# Patient Record
Sex: FEMALE | Race: WHITE | NOT HISPANIC OR LATINO | Employment: OTHER | URBAN - NONMETROPOLITAN AREA
[De-identification: names, ages, dates, MRNs, and addresses within clinical notes are randomized per-mention and may not be internally consistent; named-entity substitution may affect disease eponyms.]

---

## 2017-01-24 ENCOUNTER — APPOINTMENT (OUTPATIENT)
Dept: CT IMAGING | Facility: HOSPITAL | Age: 82
End: 2017-01-24

## 2017-01-24 ENCOUNTER — APPOINTMENT (OUTPATIENT)
Dept: GENERAL RADIOLOGY | Facility: HOSPITAL | Age: 82
End: 2017-01-24

## 2017-01-24 ENCOUNTER — HOSPITAL ENCOUNTER (EMERGENCY)
Facility: HOSPITAL | Age: 82
Discharge: HOME | End: 2017-01-24
Attending: EMERGENCY MEDICINE | Admitting: EMERGENCY MEDICINE

## 2017-01-24 VITALS
OXYGEN SATURATION: 94 % | HEART RATE: 58 BPM | TEMPERATURE: 98.9 F | DIASTOLIC BLOOD PRESSURE: 92 MMHG | BODY MASS INDEX: 26.52 KG/M2 | WEIGHT: 159.2 LBS | SYSTOLIC BLOOD PRESSURE: 119 MMHG | RESPIRATION RATE: 17 BRPM | HEIGHT: 65 IN

## 2017-01-24 DIAGNOSIS — G45.9 TRANSIENT CEREBRAL ISCHEMIA, UNSPECIFIED TYPE: Primary | ICD-10-CM

## 2017-01-24 LAB
ALBUMIN SERPL-MCNC: 4.2 G/DL (ref 3.5–5)
ALBUMIN/GLOB SERPL: 1.3 G/DL (ref 1.1–2.5)
ALP SERPL-CCNC: 88 U/L (ref 24–120)
ALT SERPL W P-5'-P-CCNC: 29 U/L (ref 0–54)
ANION GAP SERPL CALCULATED.3IONS-SCNC: 9 MMOL/L (ref 4–13)
APTT PPP: 25.4 SECONDS (ref 24.1–34.8)
AST SERPL-CCNC: 23 U/L (ref 7–45)
BASOPHILS # BLD AUTO: 0.02 10*3/MM3 (ref 0–0.2)
BASOPHILS NFR BLD AUTO: 0.3 % (ref 0–2)
BILIRUB SERPL-MCNC: 0.4 MG/DL (ref 0.1–1)
BUN BLD-MCNC: 29 MG/DL (ref 5–21)
BUN/CREAT SERPL: 26.1 (ref 7–25)
CALCIUM SPEC-SCNC: 9.4 MG/DL (ref 8.4–10.4)
CHLORIDE SERPL-SCNC: 95 MMOL/L (ref 98–110)
CO2 SERPL-SCNC: 27 MMOL/L (ref 24–31)
CREAT BLD-MCNC: 1.11 MG/DL (ref 0.5–1.4)
DEPRECATED RDW RBC AUTO: 42.4 FL (ref 40–54)
EOSINOPHIL # BLD AUTO: 0.26 10*3/MM3 (ref 0–0.7)
EOSINOPHIL NFR BLD AUTO: 3.8 % (ref 0–4)
ERYTHROCYTE [DISTWIDTH] IN BLOOD BY AUTOMATED COUNT: 12.8 % (ref 12–15)
GFR SERPL CREATININE-BSD FRML MDRD: 47 ML/MIN/1.73
GLOBULIN UR ELPH-MCNC: 3.3 GM/DL
GLUCOSE BLD-MCNC: 105 MG/DL (ref 70–100)
HCT VFR BLD AUTO: 35.7 % (ref 37–47)
HGB BLD-MCNC: 12.3 G/DL (ref 12–16)
IMM GRANULOCYTES # BLD: 0.02 10*3/MM3 (ref 0–0.03)
IMM GRANULOCYTES NFR BLD: 0.3 % (ref 0–5)
INR PPP: 0.84 (ref 0.91–1.09)
LYMPHOCYTES # BLD AUTO: 2.62 10*3/MM3 (ref 0.72–4.86)
LYMPHOCYTES NFR BLD AUTO: 38.2 % (ref 15–45)
MCH RBC QN AUTO: 31.5 PG (ref 28–32)
MCHC RBC AUTO-ENTMCNC: 34.5 G/DL (ref 33–36)
MCV RBC AUTO: 91.3 FL (ref 82–98)
MONOCYTES # BLD AUTO: 0.71 10*3/MM3 (ref 0.19–1.3)
MONOCYTES NFR BLD AUTO: 10.3 % (ref 4–12)
NEUTROPHILS # BLD AUTO: 3.23 10*3/MM3 (ref 1.87–8.4)
NEUTROPHILS NFR BLD AUTO: 47.1 % (ref 39–78)
PLATELET # BLD AUTO: 202 10*3/MM3 (ref 130–400)
PMV BLD AUTO: 10.5 FL (ref 6–12)
POTASSIUM BLD-SCNC: 4.5 MMOL/L (ref 3.5–5.3)
PROT SERPL-MCNC: 7.5 G/DL (ref 6.3–8.7)
PROTHROMBIN TIME: 11.7 SECONDS (ref 11.9–14.6)
RBC # BLD AUTO: 3.91 10*6/MM3 (ref 4.2–5.4)
SODIUM BLD-SCNC: 131 MMOL/L (ref 135–145)
WBC NRBC COR # BLD: 6.86 10*3/MM3 (ref 4.8–10.8)

## 2017-01-24 PROCEDURE — 85610 PROTHROMBIN TIME: CPT | Performed by: EMERGENCY MEDICINE

## 2017-01-24 PROCEDURE — 93005 ELECTROCARDIOGRAM TRACING: CPT

## 2017-01-24 PROCEDURE — 85025 COMPLETE CBC W/AUTO DIFF WBC: CPT | Performed by: EMERGENCY MEDICINE

## 2017-01-24 PROCEDURE — 70450 CT HEAD/BRAIN W/O DYE: CPT

## 2017-01-24 PROCEDURE — 71010 HC CHEST PA OR AP: CPT

## 2017-01-24 PROCEDURE — 80053 COMPREHEN METABOLIC PANEL: CPT | Performed by: EMERGENCY MEDICINE

## 2017-01-24 PROCEDURE — 85730 THROMBOPLASTIN TIME PARTIAL: CPT | Performed by: EMERGENCY MEDICINE

## 2017-01-24 PROCEDURE — 93010 ELECTROCARDIOGRAM REPORT: CPT | Performed by: INTERNAL MEDICINE

## 2017-01-24 PROCEDURE — 99284 EMERGENCY DEPT VISIT MOD MDM: CPT

## 2017-01-24 RX ORDER — SODIUM CHLORIDE 0.9 % (FLUSH) 0.9 %
10 SYRINGE (ML) INJECTION AS NEEDED
Status: DISCONTINUED | OUTPATIENT
Start: 2017-01-24 | End: 2017-01-24 | Stop reason: HOSPADM

## 2017-01-25 NOTE — ED PROVIDER NOTES
"Subjective   HPI Comments: Patient says she noted \"numbness\" in right palm and middle finger yesterday and then it cleared.  Later had some numbness left forehead that cleared.  Had it again today in right arm and hand and little finger and then seemed to travel up to shoulder and right neck and face.  Now normal again.  Says she has h/o small stroke in past.    Patient is a 81 y.o. female presenting with neurologic complaint.   History provided by:  Patient   used: No    Neurologic Problem   The patient's primary symptoms include focal sensory loss. This is a new problem. The current episode started yesterday. The neurological problem developed suddenly. The last time the patient was known to be well was 1/24/2017 4:00 PM.  The problem has been resolved since onset. There was right-sided and upper extremity focality noted. Associated symptoms include neck pain. Past treatments include nothing. Her past medical history is significant for a CVA.       Review of Systems   Constitutional: Negative.    HENT: Negative.    Respiratory: Negative.    Cardiovascular: Negative.    Genitourinary: Negative.    Musculoskeletal: Positive for neck pain.   Neurological: Positive for numbness.   Hematological: Negative.    Psychiatric/Behavioral: Negative.    All other systems reviewed and are negative.      Past Medical History   Diagnosis Date   • Diabetes type 2, controlled    • Diverticulitis    • Hypertension    • Hypothyroidism        Allergies   Allergen Reactions   • Bactroban [Mupirocin Calcium]    • Codeine    • Iodine      Xray dye   • Levaquin [Levofloxacin]    • Lortab [Hydrocodone-Acetaminophen]    • Quinolones    • Shrimp (Diagnostic)        Past Surgical History   Procedure Laterality Date   • Appendectomy     • Tumor excision       under armpits and left breast   • Partial hysterectomy     • Breast cyst excision     • Basal cell carcinoma excision     • Cholecystectomy     • Cataract extraction   "   • Belpharoptosis repair     • Colonoscopy Left 11/1/2016     Procedure: COLONOSCOPY WITH ANESTHESIA;  Surgeon: Yordan Blanton MD;  Location: [facility] ENDOSCOPY;  Service:        Family History   Problem Relation Age of Onset   • Diabetes Mother    • Cancer Mother    • Heart failure Mother    • Diabetes Father        Social History     Social History   • Marital status:      Spouse name: N/A   • Number of children: N/A   • Years of education: N/A     Social History Main Topics   • Smoking status: Never Smoker   • Smokeless tobacco: None   • Alcohol use No   • Drug use: Defer   • Sexual activity: Defer     Other Topics Concern   • None     Social History Narrative   • None       Prior to Admission medications    Medication Sig Start Date End Date Taking? Authorizing Provider   acetaminophen (TYLENOL) 500 MG tablet Take 500 mg by mouth every 6 (six) hours as needed for mild pain (1-3).   Yes Historical Provider, MD   Cholecalciferol (VITAMIN D3) 5000 UNITS capsule capsule Take 5,000 Units by mouth daily.   Yes Historical Provider, MD   Coenzyme Q10 (CO Q 10) 100 MG capsule Take  by mouth.   Yes Historical Provider, MD   Garlic 10 MG capsule Take  by mouth.   Yes Historical Provider, MD   glimepiride (AMARYL) 4 MG tablet Take 4 mg by mouth every morning before breakfast.   Yes Historical Provider, MD   levothyroxine (SYNTHROID, LEVOTHROID) 50 MCG tablet Take 50 mcg by mouth daily.   Yes Historical Provider, MD   metFORMIN (GLUCOPHAGE) 500 MG tablet Take 500 mg by mouth Every Other Day.   Yes Historical Provider, MD   Multiple Vitamins-Minerals (CENTRUM SILVER PO) Take  by mouth.   Yes Historical Provider, MD   nebivolol (BYSTOLIC) 2.5 MG tablet Take 2.5 mg by mouth daily.   Yes Historical Provider, MD   valsartan (DIOVAN) 40 MG tablet Take 40 mg by mouth daily.   Yes Historical Provider, MD   nitrofurantoin (MACRODANTIN) 50 MG capsule Take 50 mg by mouth 3 (Three) Times a Week. weekly     Historical Provider, MD        Medications - No data to display    Vitals:    01/24/17 2101   BP:    Pulse:    Resp:    Temp: 98.9 °F (37.2 °C)   SpO2:          Objective   Physical Exam   Constitutional: She is oriented to person, place, and time. She appears well-developed and well-nourished.   HENT:   Head: Normocephalic and atraumatic.   Neck: Normal range of motion. Neck supple.   Cardiovascular: Normal rate and regular rhythm.    Pulmonary/Chest: Effort normal and breath sounds normal.   Abdominal: Soft. Bowel sounds are normal.   Musculoskeletal: Normal range of motion.   Neurological: She is alert and oriented to person, place, and time.   Skin: Skin is warm and dry.   Psychiatric: She has a normal mood and affect. Her behavior is normal.   Nursing note and vitals reviewed.      Procedures         Lab Results (last 24 hours)     Procedure Component Value Units Date/Time    CBC & Differential [[id number]] Collected:  01/24/17 1900    Specimen:  Blood Updated:  01/24/17 1915    Narrative:       The following orders were created for panel order CBC & Differential.  Procedure                               Abnormality         Status                     ---------                               -----------         ------                     CBC Auto Differential[[id number]]         Abnormal            Final result                 Please view results for these tests on the individual orders.    Comprehensive Metabolic Panel [[id number]]  (Abnormal) Collected:  01/24/17 1900    Specimen:  Blood Updated:  01/24/17 1929     Glucose 105 (H) mg/dL      BUN 29 (H) mg/dL      Creatinine 1.11 mg/dL      Sodium 131 (L) mmol/L      Potassium 4.5 mmol/L      Chloride 95 (L) mmol/L      CO2 27.0 mmol/L      Calcium 9.4 mg/dL      Total Protein 7.5 g/dL      Albumin 4.20 g/dL      ALT (SGPT) 29 U/L      AST (SGOT) 23 U/L      Alkaline Phosphatase 88 U/L      Total Bilirubin 0.4 mg/dL      eGFR Non African Amer 47 (L) mL/min/1.73      Globulin 3.3 gm/dL       A/G Ratio 1.3 g/dL      BUN/Creatinine Ratio 26.1 (H)      Anion Gap 9.0 mmol/L     Narrative:       The MDRD GFR formula is only valid for adults with stable renal function between ages 18 and 70.    aPTT [[id number]]  (Normal) Collected:  01/24/17 1900    Specimen:  Blood Updated:  01/24/17 1926     PTT 25.4 seconds     Protime-INR [[id number]]  (Abnormal) Collected:  01/24/17 1900    Specimen:  Blood Updated:  01/24/17 1926     Protime 11.7 (L) Seconds      INR 0.84 (L)     CBC Auto Differential [[id number]]  (Abnormal) Collected:  01/24/17 1900    Specimen:  Blood Updated:  01/24/17 1915     WBC 6.86 10*3/mm3      RBC 3.91 (L) 10*6/mm3      Hemoglobin 12.3 g/dL      Hematocrit 35.7 (L) %      MCV 91.3 fL      MCH 31.5 pg      MCHC 34.5 g/dL      RDW 12.8 %      RDW-SD 42.4 fl      MPV 10.5 fL      Platelets 202 10*3/mm3      Neutrophil % 47.1 %      Lymphocyte % 38.2 %      Monocyte % 10.3 %      Eosinophil % 3.8 %      Basophil % 0.3 %      Immature Grans % 0.3 %      Neutrophils, Absolute 3.23 10*3/mm3      Lymphocytes, Absolute 2.62 10*3/mm3      Monocytes, Absolute 0.71 10*3/mm3      Eosinophils, Absolute 0.26 10*3/mm3      Basophils, Absolute 0.02 10*3/mm3      Immature Grans, Absolute 0.02 10*3/mm3           CT Head Without Contrast   Final Result   Mild cerebral and cerebellar volume loss with chronic microvascular   disease but no evidence of acute intracranial process.           This report was finalized on 01/24/2017 19:51 by Dr. Brayan Resendiz MD.      XR Chest 1 View   Final Result   Impression: No evidence of acute cardiopulmonary disease.   This report was finalized on 01/24/2017 19:22 by Dr. Brayan Resendiz MD.          ED Course  ED Course   Comment By Time   Told patient that her testing here was okay with no signs of acute changes at present.  Her symptoms sound more like radicular problem with compression of nerve but I could not be sure that it could not be TIA.  Recommended and offered to  keep for obs and get further testing in AM but patient decided she did not want to stay and will keep her appointment with Dr. Rodrigues on Thursday. Melvin Keller Jr., MD 01/24 5965          MDM  Number of Diagnoses or Management Options  Transient cerebral ischemia, unspecified type: new and requires workup     Amount and/or Complexity of Data Reviewed  Clinical lab tests: ordered and reviewed  Tests in the radiology section of CPT®: ordered and reviewed  Tests in the medicine section of CPT®: reviewed and ordered    Risk of Complications, Morbidity, and/or Mortality  Presenting problems: moderate  Diagnostic procedures: moderate  Management options: moderate        Final diagnoses:   Transient cerebral ischemia, unspecified type        Melvin Keller Jr., MD  01/24/17 0173

## 2017-02-03 ENCOUNTER — TRANSCRIBE ORDERS (OUTPATIENT)
Dept: ADMINISTRATIVE | Facility: HOSPITAL | Age: 82
End: 2017-02-03

## 2017-02-03 DIAGNOSIS — G45.9 TRANSIENT CEREBRAL ISCHEMIA, UNSPECIFIED TYPE: Primary | ICD-10-CM

## 2017-02-21 ENCOUNTER — HOSPITAL ENCOUNTER (OUTPATIENT)
Dept: CARDIOLOGY | Facility: HOSPITAL | Age: 82
Discharge: HOME | End: 2017-02-21
Attending: INTERNAL MEDICINE

## 2017-02-21 ENCOUNTER — HOSPITAL ENCOUNTER (OUTPATIENT)
Dept: ULTRASOUND IMAGING | Facility: HOSPITAL | Age: 82
Discharge: HOME | End: 2017-02-21
Attending: INTERNAL MEDICINE

## 2017-02-21 ENCOUNTER — HOSPITAL ENCOUNTER (OUTPATIENT)
Dept: MRI IMAGING | Facility: HOSPITAL | Age: 82
Discharge: HOME | End: 2017-02-21
Attending: INTERNAL MEDICINE | Admitting: INTERNAL MEDICINE

## 2017-02-21 DIAGNOSIS — G45.9 TRANSIENT CEREBRAL ISCHEMIA, UNSPECIFIED TYPE: ICD-10-CM

## 2017-02-21 PROCEDURE — 93880 EXTRACRANIAL BILAT STUDY: CPT

## 2017-02-21 PROCEDURE — 93306 TTE W/DOPPLER COMPLETE: CPT | Performed by: INTERNAL MEDICINE

## 2017-02-21 PROCEDURE — 93306 TTE W/DOPPLER COMPLETE: CPT

## 2017-02-21 PROCEDURE — 70551 MRI BRAIN STEM W/O DYE: CPT

## 2017-02-21 PROCEDURE — 93880 EXTRACRANIAL BILAT STUDY: CPT | Performed by: SURGERY

## 2017-02-22 LAB
BH CV ECHO MEAS - AO ROOT AREA (BSA CORRECTED): 2
BH CV ECHO MEAS - AO ROOT AREA: 9.6 CM^2
BH CV ECHO MEAS - AO ROOT DIAM: 3.5 CM
BH CV ECHO MEAS - BSA(HAYCOCK): 1.8 M^2
BH CV ECHO MEAS - BSA: 1.8 M^2
BH CV ECHO MEAS - BZI_BMI: 26.4 KILOGRAMS/M^2
BH CV ECHO MEAS - BZI_METRIC_HEIGHT: 162.6 CM
BH CV ECHO MEAS - BZI_METRIC_WEIGHT: 69.9 KG
BH CV ECHO MEAS - CONTRAST EF 4CH: 60.2 ML/M^2
BH CV ECHO MEAS - EDV(CUBED): 106.5 ML
BH CV ECHO MEAS - EDV(MOD-SP4): 42 ML
BH CV ECHO MEAS - EDV(TEICH): 104.4 ML
BH CV ECHO MEAS - EF(CUBED): 65.9 %
BH CV ECHO MEAS - EF(MOD-SP4): 60.2 %
BH CV ECHO MEAS - EF(TEICH): 57.4 %
BH CV ECHO MEAS - ESV(CUBED): 36.3 ML
BH CV ECHO MEAS - ESV(MOD-SP4): 16.7 ML
BH CV ECHO MEAS - ESV(TEICH): 44.5 ML
BH CV ECHO MEAS - FS: 30.2 %
BH CV ECHO MEAS - IVS/LVPW: 0.91
BH CV ECHO MEAS - IVSD: 1.1 CM
BH CV ECHO MEAS - LA DIMENSION: 3.3 CM
BH CV ECHO MEAS - LA/AO: 0.94
BH CV ECHO MEAS - LAT PEAK E' VEL: 6.3 CM/SEC
BH CV ECHO MEAS - LV DIASTOLIC VOL/BSA (35-75): 24 ML/M^2
BH CV ECHO MEAS - LV MASS(C)D: 191.3 GRAMS
BH CV ECHO MEAS - LV MASS(C)DI: 109.3 GRAMS/M^2
BH CV ECHO MEAS - LV MAX PG: 2.4 MMHG
BH CV ECHO MEAS - LV MEAN PG: 1 MMHG
BH CV ECHO MEAS - LV SYSTOLIC VOL/BSA (12-30): 9.5 ML/M^2
BH CV ECHO MEAS - LV V1 MAX: 77.6 CM/SEC
BH CV ECHO MEAS - LV V1 MEAN: 56.7 CM/SEC
BH CV ECHO MEAS - LV V1 VTI: 22.1 CM
BH CV ECHO MEAS - LVIDD: 4.7 CM
BH CV ECHO MEAS - LVIDS: 3.3 CM
BH CV ECHO MEAS - LVLD AP4: 5.7 CM
BH CV ECHO MEAS - LVLS AP4: 4.8 CM
BH CV ECHO MEAS - LVOT AREA (M): 3.1 CM^2
BH CV ECHO MEAS - LVOT AREA: 3.1 CM^2
BH CV ECHO MEAS - LVOT DIAM: 2 CM
BH CV ECHO MEAS - LVPWD: 1.2 CM
BH CV ECHO MEAS - MV A MAX VEL: 107 CM/SEC
BH CV ECHO MEAS - MV DEC TIME: 0.3 SEC
BH CV ECHO MEAS - MV E MAX VEL: 84.9 CM/SEC
BH CV ECHO MEAS - MV E/A: 0.79
BH CV ECHO MEAS - RAP SYSTOLE: 10 MMHG
BH CV ECHO MEAS - RVSP: 38.5 MMHG
BH CV ECHO MEAS - SI(CUBED): 40.1 ML/M^2
BH CV ECHO MEAS - SI(LVOT): 39.7 ML/M^2
BH CV ECHO MEAS - SI(MOD-SP4): 14.5 ML/M^2
BH CV ECHO MEAS - SI(TEICH): 34.3 ML/M^2
BH CV ECHO MEAS - SV(CUBED): 70.2 ML
BH CV ECHO MEAS - SV(LVOT): 69.4 ML
BH CV ECHO MEAS - SV(MOD-SP4): 25.3 ML
BH CV ECHO MEAS - SV(TEICH): 60 ML
BH CV ECHO MEAS - TR MAX VEL: 267 CM/SEC
LEFT ATRIUM VOLUME INDEX: 29.5 ML/M2

## 2017-09-18 ENCOUNTER — OFFICE VISIT (OUTPATIENT)
Dept: OTOLARYNGOLOGY | Facility: CLINIC | Age: 82
End: 2017-09-18

## 2017-09-18 VITALS
SYSTOLIC BLOOD PRESSURE: 140 MMHG | BODY MASS INDEX: 27.31 KG/M2 | HEIGHT: 64 IN | HEART RATE: 80 BPM | WEIGHT: 160 LBS | DIASTOLIC BLOOD PRESSURE: 75 MMHG | TEMPERATURE: 97.8 F

## 2017-09-18 DIAGNOSIS — E04.1 THYROID NODULE: Primary | ICD-10-CM

## 2017-09-18 DIAGNOSIS — K21.9 LARYNGOPHARYNGEAL REFLUX (LPR): ICD-10-CM

## 2017-09-18 PROCEDURE — 99214 OFFICE O/P EST MOD 30 MIN: CPT | Performed by: NURSE PRACTITIONER

## 2017-09-18 NOTE — PROGRESS NOTES
YOB: 1935  Location: [facility]  Location Address: [address] [place] [address], [facility], [address] [place] [address]  Location Phone: [phone]    Chief Complaint   Patient presents with   • Thyroid Problem       History of Present Illness  Nedra Tolliver is a 81 y.o. female.  Nedra Tolliver complains of ENT complaints. The patient has had problems with a thyroid nodule  The symptoms are not localized to a particular location. The patient has had moderate symptoms. The symptoms have been present for the last several years . The symptoms are aggravated by  no identifiable factors . The symptoms are improved by no identifieable factors.  Denies dysphagia or new symptoms.    Longstanding history of thyroid nodule - has been followed by Dr. Campos for many years with no signficant growth at any interval.  She reports only occasional GERD and was on Omeprazole. Positive for fatigue   TSH .947 Tiffany 15, 2017         TSH Dec 2015 1.50 per PCP     Past Medical History:   Diagnosis Date   • Diabetes type 2, controlled    • Diverticulitis    • Hypertension    • Hypothyroidism    • LPRD (laryngopharyngeal reflux disease)    • Pharyngeal dysphagia    • Thyroid nodule        Past Surgical History:   Procedure Laterality Date   • APPENDECTOMY     • BASAL CELL CARCINOMA EXCISION     • BELPHAROPTOSIS REPAIR     • BREAST CYST EXCISION     • CATARACT EXTRACTION     • CHOLECYSTECTOMY     • COLONOSCOPY Left 2016    Procedure: COLONOSCOPY WITH ANESTHESIA;  Surgeon: Yordan Blanton MD;  Location: [facility] ENDOSCOPY;  Service:    • PARTIAL HYSTERECTOMY     • TUMOR EXCISION      under armpits and left breast         Current Outpatient Prescriptions:   •  acetaminophen (TYLENOL) 500 MG tablet, Take 500 mg by mouth every 6 (six) hours as needed for mild pain (1-3)., Disp: , Rfl:   •  Cholecalciferol (VITAMIN D3) 5000 UNITS capsule capsule, Take 5,000 Units by mouth daily., Disp: , Rfl:   •  Coenzyme Q10 (CO Q 10) 100 MG capsule,  Take  by mouth., Disp: , Rfl:   •  Garlic 10 MG capsule, Take  by mouth., Disp: , Rfl:   •  glimepiride (AMARYL) 4 MG tablet, Take 4 mg by mouth every morning before breakfast., Disp: , Rfl:   •  levothyroxine (SYNTHROID, LEVOTHROID) 50 MCG tablet, Take 50 mcg by mouth daily., Disp: , Rfl:   •  metFORMIN (GLUCOPHAGE) 500 MG tablet, Take 500 mg by mouth Every Other Day., Disp: , Rfl:   •  Multiple Vitamins-Minerals (CENTRUM SILVER PO), Take  by mouth., Disp: , Rfl:   •  nebivolol (BYSTOLIC) 2.5 MG tablet, Take 2.5 mg by mouth daily., Disp: , Rfl:   •  nitrofurantoin (MACRODANTIN) 50 MG capsule, Take 50 mg by mouth 3 (Three) Times a Week. weekly , Disp: , Rfl:   •  Probiotic Product (PROBIOTIC DAILY PO), Take  by mouth., Disp: , Rfl:   •  valsartan (DIOVAN) 40 MG tablet, Take 40 mg by mouth daily., Disp: , Rfl:     Bactroban [mupirocin calcium]; Codeine; Iodine; Levaquin [levofloxacin]; Lortab [hydrocodone-acetaminophen]; Quinolones; and Shrimp (diagnostic)    Family History   Problem Relation Age of Onset   • Diabetes Mother    • Cancer Mother    • Heart failure Mother    • Diabetes Father        Social History     Social History   • Marital status:      Spouse name: N/A   • Number of children: N/A   • Years of education: N/A     Occupational History   • Not on file.     Social History Main Topics   • Smoking status: Never Smoker   • Smokeless tobacco: Not on file   • Alcohol use No   • Drug use: Defer   • Sexual activity: Defer     Other Topics Concern   • Not on file     Social History Narrative       Review of Systems   Constitutional: Positive for fatigue.   HENT: Negative.         Reflux, dysphagia   Eyes: Negative.    Respiratory: Negative.    Cardiovascular: Negative.    Gastrointestinal: Negative.    Endocrine: Negative.    Genitourinary: Negative.    Musculoskeletal: Negative.    Skin: Negative.    Allergic/Immunologic: Negative.    Neurological: Negative.    Hematological: Negative.     Psychiatric/Behavioral: Negative.        Vitals:    09/18/17 1035   BP: 140/75   Pulse: 80   Temp: 97.8 °F (36.6 °C)       Objective     Physical Exam    CONSTITUTIONAL: well nourished, alert, oriented, in no acute distress     COMMUNICATION AND VOICE: able to communicate normally, normal voice quality    HEAD: normocephalic, no lesions, atraumatic, no tenderness, no masses     FACE: appearance normal, no lesions, no tenderness, no deformities, facial motion symmetric    SALIVARY GLANDS: parotid glands with no tenderness, no swelling, no masses, submandibular glands with normal size, nontender    EYES: ocular motility normal, eyelids normal, orbits normal, no proptosis, conjunctiva normal , pupils equal, round     EARS:  Hearing: response to conversational voice normal bilaterally   External Ears: auricles without lesions  Otoscopic: tympanic membrane appearance normal, no lesions, no perforation, normal mobility, no fluid    NOSE:  External Nose: structure normal, no tenderness on palpation, no nasal discharge, no lesions, no evidence of trauma, nostrils patent   Intranasal Exam: nasal mucosa normal, vestibule within normal limits, inferior turbinate normal, nasal septum midline     ORAL:  Lips: upper and lower lips without lesion   Teeth: dentition within normal limits for age   Gums: gingivae healthy   Oral Mucosa: oral mucosa normal, no mucosal lesions   Floor of Mouth: Warthin’s duct patent, mucosa normal  Tongue: lingual mucosa normal without lesions, normal tongue mobility   Palate: soft and hard palates with normal mucosa and structure  Oropharynx: oropharyngeal mucosa normal    NECK: neck appearance normal, no mass,  noted without erythema or tenderness    THYROID: nodule not palpable    LYMPH NODES: no lymphadenopathy    CHEST/RESPIRATORY: respiratory effort normal,     CARDIOVASCULAR: extremities without cyanosis or edema      NEUROLOGIC/PSYCHIATRIC: oriented to time, place and person, mood normal,  affect appropriate, CN II-XII intact grossly      Assessment/Plan   Nedra was seen today for thyroid problem.    Diagnoses and all orders for this visit:    Thyroid nodule  -     US Thyroid; Future    Laryngopharyngeal reflux (LPR)      * Surgery not found *  Orders Placed This Encounter   Procedures   • US Thyroid     Standing Status:   Future     Standing Expiration Date:   9/18/2019     Order Specific Question:   Reason for Exam:     Answer:   thyroid nodule     Return in about 1 year (around 9/18/2018).       Patient Instructions   The patient has a thyroid nodule, which is relatively small, and studies do not suggest a malignancy. I have recommended observation with follow-up with me for repeat ultrasound. I explained the pathology of thyroid nodules including the risks of cancer. The options of surgery were discussed, but we will take an observational course for now.

## 2017-09-18 NOTE — PATIENT INSTRUCTIONS
The patient has a thyroid nodule, which is relatively small, and studies do not suggest a malignancy. I have recommended observation with follow-up with me for repeat ultrasound. I explained the pathology of thyroid nodules including the risks of cancer. The options of surgery were discussed, but we will take an observational course for now.

## 2018-04-24 ENCOUNTER — TRANSCRIBE ORDERS (OUTPATIENT)
Dept: ADMINISTRATIVE | Facility: HOSPITAL | Age: 83
End: 2018-04-24

## 2018-04-24 DIAGNOSIS — R82.81 PYURIA: Primary | ICD-10-CM

## 2018-04-24 DIAGNOSIS — R10.2 PELVIC PAIN: ICD-10-CM

## 2018-04-25 ENCOUNTER — HOSPITAL ENCOUNTER (OUTPATIENT)
Dept: CT IMAGING | Facility: HOSPITAL | Age: 83
Discharge: HOME | End: 2018-04-25
Attending: INTERNAL MEDICINE | Admitting: INTERNAL MEDICINE

## 2018-04-25 DIAGNOSIS — R10.2 PELVIC PAIN: ICD-10-CM

## 2018-04-25 DIAGNOSIS — R82.81 PYURIA: ICD-10-CM

## 2018-04-25 LAB — CREAT BLDA-MCNC: 1.1 MG/DL (ref 0.6–1.3)

## 2018-04-25 PROCEDURE — 25010000002 IOPAMIDOL 61 % SOLUTION: Performed by: INTERNAL MEDICINE

## 2018-04-25 PROCEDURE — 74178 CT ABD&PLV WO CNTR FLWD CNTR: CPT

## 2018-04-25 PROCEDURE — 82565 ASSAY OF CREATININE: CPT

## 2018-04-25 RX ADMIN — IOPAMIDOL 100 ML: 612 INJECTION, SOLUTION INTRAVENOUS at 08:45

## 2018-05-02 ENCOUNTER — OFFICE VISIT (OUTPATIENT)
Dept: UROLOGY | Facility: CLINIC | Age: 83
End: 2018-05-02

## 2018-05-02 VITALS
SYSTOLIC BLOOD PRESSURE: 150 MMHG | DIASTOLIC BLOOD PRESSURE: 70 MMHG | WEIGHT: 163 LBS | TEMPERATURE: 97.8 F | HEIGHT: 64 IN | BODY MASS INDEX: 27.83 KG/M2

## 2018-05-02 DIAGNOSIS — R30.0 DYSURIA: ICD-10-CM

## 2018-05-02 DIAGNOSIS — N39.0 URINARY TRACT INFECTION WITHOUT HEMATURIA, SITE UNSPECIFIED: Primary | ICD-10-CM

## 2018-05-02 DIAGNOSIS — N95.2 VAGINAL ATROPHY: ICD-10-CM

## 2018-05-02 LAB
BILIRUB BLD-MCNC: NEGATIVE MG/DL
CLARITY, POC: CLEAR
COLOR UR: YELLOW
GLUCOSE UR STRIP-MCNC: NEGATIVE MG/DL
KETONES UR QL: NEGATIVE
LEUKOCYTE EST, POC: ABNORMAL
NITRITE UR-MCNC: NEGATIVE MG/ML
PH UR: 6 [PH] (ref 5–8)
PROT UR STRIP-MCNC: ABNORMAL MG/DL
RBC # UR STRIP: NEGATIVE /UL
SP GR UR: 1.02 (ref 1–1.03)
UROBILINOGEN UR QL: NORMAL

## 2018-05-02 PROCEDURE — 81003 URINALYSIS AUTO W/O SCOPE: CPT | Performed by: UROLOGY

## 2018-05-02 PROCEDURE — 99204 OFFICE O/P NEW MOD 45 MIN: CPT | Performed by: UROLOGY

## 2018-05-02 PROCEDURE — 51798 US URINE CAPACITY MEASURE: CPT | Performed by: UROLOGY

## 2018-05-02 PROCEDURE — 87086 URINE CULTURE/COLONY COUNT: CPT | Performed by: UROLOGY

## 2018-05-02 RX ORDER — AMLODIPINE BESYLATE 2.5 MG/1
2.5 TABLET ORAL NIGHTLY
Refills: 3 | COMMUNITY
Start: 2018-03-05 | End: 2021-01-07

## 2018-05-02 NOTE — PROGRESS NOTES
Ms. Tolliver is 82 y.o. female    Chief Complaint   Patient presents with   • Urinary Tract Infection   • Pelvic Pain       Urinary Tract Infection    This is a chronic problem. The current episode started more than 1 year ago. The problem occurs intermittently. The problem has been waxing and waning. The quality of the pain is described as aching and burning. The pain is moderate. There is no history of pyelonephritis. Associated symptoms include chills and urgency. Pertinent negatives include no flank pain, frequency or hematuria. She has tried antibiotics for the symptoms. The treatment provided mild relief. Her past medical history is significant for recurrent UTIs. There is no history of a urological procedure.   Pelvic Pain   The patient's primary symptoms include pelvic pain. This is a chronic problem. The current episode started more than 1 year ago. The problem occurs intermittently. Associated symptoms include back pain, chills and urgency. Pertinent negatives include no abdominal pain, dysuria, fever, flank pain, frequency, headaches, hematuria, rash or sore throat.       The following portions of the patient's history were reviewed and updated as appropriate: allergies, current medications, past family history, past medical history, past social history, past surgical history and problem list.    Review of Systems   Constitutional: Positive for chills. Negative for fever.   HENT: Negative for congestion and sore throat.    Eyes: Negative for pain and itching.   Respiratory: Positive for cough and shortness of breath.    Cardiovascular: Negative for chest pain.   Gastrointestinal: Negative for abdominal pain, anal bleeding and blood in stool.   Endocrine: Negative for cold intolerance and heat intolerance.   Genitourinary: Positive for difficulty urinating (burning with urination ), pelvic pain and urgency. Negative for dysuria, flank pain, frequency and hematuria.   Musculoskeletal: Positive for back pain.  Negative for neck pain.   Skin: Negative for color change and rash.   Allergic/Immunologic: Positive for food allergies (shrimp ). Negative for immunocompromised state.   Neurological: Positive for dizziness. Negative for headaches.   Hematological: Does not bruise/bleed easily.   Psychiatric/Behavioral: Negative for confusion and sleep disturbance.         Current Outpatient Prescriptions:   •  acetaminophen (TYLENOL) 500 MG tablet, Take 500 mg by mouth every 6 (six) hours as needed for mild pain (1-3)., Disp: , Rfl:   •  amLODIPine (NORVASC) 2.5 MG tablet, , Disp: , Rfl: 3  •  Cholecalciferol (VITAMIN D3) 5000 UNITS capsule capsule, Take 5,000 Units by mouth daily., Disp: , Rfl:   •  Coenzyme Q10 (CO Q 10) 100 MG capsule, Take  by mouth., Disp: , Rfl:   •  Garlic 10 MG capsule, Take  by mouth., Disp: , Rfl:   •  glimepiride (AMARYL) 4 MG tablet, Take 4 mg by mouth every morning before breakfast., Disp: , Rfl:   •  levothyroxine (SYNTHROID, LEVOTHROID) 50 MCG tablet, Take 50 mcg by mouth daily., Disp: , Rfl:   •  metFORMIN (GLUCOPHAGE) 500 MG tablet, Take 500 mg by mouth Every Other Day., Disp: , Rfl:   •  Multiple Vitamins-Minerals (CENTRUM SILVER PO), Take  by mouth., Disp: , Rfl:   •  nebivolol (BYSTOLIC) 2.5 MG tablet, Take 2.5 mg by mouth daily., Disp: , Rfl:   •  Probiotic Product (PROBIOTIC DAILY PO), Take  by mouth., Disp: , Rfl:   •  valsartan (DIOVAN) 40 MG tablet, Take 40 mg by mouth daily., Disp: , Rfl:     Past Medical History:   Diagnosis Date   • Diabetes type 2, controlled    • Diverticulitis    • Hypertension    • Hypothyroidism    • LPRD (laryngopharyngeal reflux disease)    • Pharyngeal dysphagia    • Thyroid nodule        Past Surgical History:   Procedure Laterality Date   • APPENDECTOMY     • BASAL CELL CARCINOMA EXCISION     • BELPHAROPTOSIS REPAIR     • BREAST CYST EXCISION     • CATARACT EXTRACTION     • CHOLECYSTECTOMY     • COLONOSCOPY Left 11/1/2016    Procedure: COLONOSCOPY WITH  "ANESTHESIA;  Surgeon: Yordan Blanton MD;  Location: [facility] ENDOSCOPY;  Service:    • PARTIAL HYSTERECTOMY     • TUMOR EXCISION      under armpits and left breast       Social History     Social History   • Marital status:      Social History Main Topics   • Smoking status: Never Smoker   • Smokeless tobacco: Never Used   • Alcohol use No   • Drug use: Unknown   • Sexual activity: Defer     Other Topics Concern   • Not on file       Family History   Problem Relation Age of Onset   • Diabetes Mother    • Cancer Mother    • Heart failure Mother    • Diabetes Father        Objective    /70   Temp 97.8 °F (36.6 °C)   Ht 162.6 cm (64\")   Wt 73.9 kg (163 lb)   LMP  (LMP Unknown)   BMI 27.98 kg/m²     Physical Exam  Constitutional: Well nourished, Well developed; No apparent distress  Psychiatric: Appropriate affect; Alert and oriented  Eyes: Unremarkable  Musculoskeletal: Normal gait and station  GI: Abdomen is soft, non-tender  Respiratory: No distress; Unlabored movement; No accessory musculature needed with symmetric movements  Skin: No pallor or diaphoresis  : Labia normal; Urethral meatus normal position, not stenotic; No significant bladder prolapse.  She does have significant vaginal atrophy.  She is significantly tender throughout the vagina both anterior and posteriorly    Hospital Outpatient Visit on 04/25/2018   Component Date Value Ref Range Status   • Creatinine 04/25/2018 1.10  0.60 - 1.30 mg/dL Final       Results for orders placed or performed in visit on 05/02/18   POC Urinalysis Dipstick, Automated   Result Value Ref Range    Color Yellow Yellow, Straw, Dark Yellow, Zenia    Clarity, UA Clear Clear    Glucose, UA Negative Negative, 1000 mg/dL (3+) mg/dL    Bilirubin Negative Negative    Ketones, UA Negative Negative    Specific Gravity  1.020 1.005 - 1.030    Blood, UA Negative Negative    pH, Urine 6.0 5.0 - 8.0    Protein, POC Trace (A) Negative mg/dL    Urobilinogen, UA Normal " Normal    Leukocytes Trace (A) Negative    Nitrite, UA Negative Negative     Assessment and Plan    Nedra was seen today for urinary tract infection and pelvic pain.    Diagnoses and all orders for this visit:    Urinary tract infection without hematuria, site unspecified  -     POC Urinalysis Dipstick, Automated  -     Urine Culture - Urine, Urine, Clean Catch    Dysuria    Vaginal atrophy       I reviewed her outside records.  In summary, this is an 82-year-old female who had previously seen a urologist at our practice and has retired for recurrent urinary tract infections and pelvic pressure.  At that point, she was started on nitrofurantoin 50 mg which she took 3 days a week.  This did seem to help her symptoms have returned.  She does have a urine culture from Dr. Rodrigues showing Escherichia coli positive UTI.    Recurrent urinary tract infections, culture proven.  I think she does need to start out with a cystoscopy with the risk and benefits as outlined below.  She has had a recent CT scan which I was able to review showing no abnormalities of the kidney ureters or bladder.  I discussed with her the association between urinary tract infections and vaginal atrophy.  I think if his cystoscopy is negative she would be a candidate to start topical ashen cream.  No history of breast uterine cervical or ovarian cancer.    CT independent reivew    The CT scan of the abdomen/pelvis done with and without contrast is available for me to review.  Treatment recommendations require an independent review.  First I scanned the liver, spleen, and bowel pattern.  The retroperitoneum including the major vessels and lymphatic packages are briefly reviewed.  This film as been reviewed by the radiologist to determine any non urologic abnormalities that are present.  The kidneys are closely inspected for size, symmetry, contour, parenchymal thickness, perinephric reaction, presence of calcifications, and intrarenal dilation of the  collecting system.  The ureters are inspected for their course, caliber, and any calcifications.  The bladder is inspected for its thickness, size, and presence of any calcifications.  This scan shows:    The right kidney appears normal on this contrasted CT scan.  The renal parenchymal is norml in thickness.  There are no solid masses or cysts.  There is no hydronephrosis.  There are no stones.      The left kidney appears normal on this contrasted CT scan.  The renal parenchymal is norml in thickness.  There are no solid masses or cysts.  There is no hydronephrosis.  There are no stones.      The bladder appears normal on this non-contrasted CT scan.  The bladder appears normal in thickness.  There no masses or stones seen on this exam.        Estimation of residual urine via abdominal ultrasound  Residual Urine: 19 ml  Indication: uti  Position: Supine  Examination: Incremental scanning of the suprapubic area using 3 MHz transducer using copious amounts of acoustic gel.   Findings: An anechoic area was demonstrated which represented the bladder, with measurement of residual urine as noted. I inspected this myself. In that the residual urine was stable or insignificant, no treatment will be necessary at this time.

## 2018-05-02 NOTE — PATIENT INSTRUCTIONS

## 2018-05-04 LAB — BACTERIA SPEC AEROBE CULT: NORMAL

## 2018-05-16 ENCOUNTER — PROCEDURE VISIT (OUTPATIENT)
Dept: UROLOGY | Facility: CLINIC | Age: 83
End: 2018-05-16

## 2018-05-16 DIAGNOSIS — N95.2 VAGINAL ATROPHY: ICD-10-CM

## 2018-05-16 DIAGNOSIS — N39.0 URINARY TRACT INFECTION WITHOUT HEMATURIA, SITE UNSPECIFIED: Primary | ICD-10-CM

## 2018-05-16 LAB
BILIRUB BLD-MCNC: NEGATIVE MG/DL
CLARITY, POC: CLEAR
COLOR UR: YELLOW
GLUCOSE UR STRIP-MCNC: NEGATIVE MG/DL
KETONES UR QL: NEGATIVE
LEUKOCYTE EST, POC: ABNORMAL
NITRITE UR-MCNC: NEGATIVE MG/ML
PH UR: 7 [PH] (ref 5–8)
PROT UR STRIP-MCNC: ABNORMAL MG/DL
RBC # UR STRIP: NEGATIVE /UL
SP GR UR: 1.02 (ref 1–1.03)
UROBILINOGEN UR QL: NORMAL

## 2018-05-16 PROCEDURE — 81003 URINALYSIS AUTO W/O SCOPE: CPT | Performed by: UROLOGY

## 2018-05-16 PROCEDURE — 52000 CYSTOURETHROSCOPY: CPT | Performed by: UROLOGY

## 2018-05-16 NOTE — PROGRESS NOTES
Pre- operative diagnosis:  Recurrent UTI    Post operative diagnosis:  Same    Procedure:  The patient was prepped and draped in a normal sterile fashion.  The urethra was anesthetized with 2% lidocaine jelly.  A rigid cystoscope was introduced per urethra.  The urethra is normal in appearance without obstruction or mass.  The bladder is without evidence of mucosal lesion.   There is no abnormality of the urothelium.  There is minimal trabeculation of the detrusor muscle.  The ureteral orifices are relatively normal in position and they efflux clear urine.    Patient tolerated the procedure well    Complications: none    Blood loss: minimal    Follow up:    Routine follow up    Cystoscopy negative for any lesions in the bladder.  I did discuss with her again today the association between vaginal atrophy and recurrent urinary tract infections.  I think she is very interested and starting a topical estrogen cream.  I written her for Premarin today.  We did have a discussion of the risks of a topical ashen cream including but not limited to gynecologic malignancies and blood clots.  I discussed with her that she needs to call me immediately if she notices asymmetric swelling of her extremities or vaginal spotting or bleeding.  She expressed understanding and I will see her back in 6 months.  I have added a 25 modifier due to the addition of medications today and the discussion of the risks and benefits.

## 2018-05-31 ENCOUNTER — OFFICE VISIT (OUTPATIENT)
Dept: OBSTETRICS AND GYNECOLOGY | Facility: CLINIC | Age: 83
End: 2018-05-31

## 2018-05-31 VITALS
DIASTOLIC BLOOD PRESSURE: 70 MMHG | HEIGHT: 64 IN | WEIGHT: 160 LBS | BODY MASS INDEX: 27.31 KG/M2 | SYSTOLIC BLOOD PRESSURE: 120 MMHG

## 2018-05-31 DIAGNOSIS — N95.2 VAGINAL ATROPHY: Primary | ICD-10-CM

## 2018-05-31 PROCEDURE — 99202 OFFICE O/P NEW SF 15 MIN: CPT | Performed by: OBSTETRICS & GYNECOLOGY

## 2018-05-31 NOTE — PROGRESS NOTES
"Nedra Tolliver is a 82 y.o. female here today for evaluation of pelvic pain.  She has had a recent evaluation for recurrent urinary tract infections and related lower abdominal and low back pain.  She has had a hysterectomy for endometriosis in the past.  She brings with her ultrasound reports from 2008 showing a possible right ovarian cyst, but resolution on repeat ultrasound.  As a part of her urology evaluation she had a CT scan performed in April showing no mention of abnormalities of the ovaries.  She was given a prescription for Premarin cream to be used at the introitus to try to help reduce the risk of recurrent urinary tract infection.  She denies vaginal discharge or bleeding.  She reports that she has been tested for the breast cancer gene and is negative.    Visit Vitals  /70 (BP Location: Left arm, Patient Position: Sitting, Cuff Size: Adult)   Ht 162.6 cm (64\")   Wt 72.6 kg (160 lb)   LMP  (LMP Unknown)   Breastfeeding? No   BMI 27.46 kg/m²     Pleasant female no acute distress  Abdomen nontender  No inguinal lymphadenopathy  The vaginal mucosa appears atrophic with a small amount of discharge present.  There are no lesions or blood present.  Bimanual exam reveals no tenderness in the adnexa.    Assessment: Vaginal atrophy    I find no abnormality of her adnexa and vaginal atrophy generally does not cause pelvic or back pain.  However, I have recommended that she use her Premarin cream 0.5 g with the vaginal applicator 3 times a week for the next 2 weeks.  She may then transition back to using it as prescribed by urology.  I have reassured her that I find no significant GYN abnormality.  She may follow-up here as needed.      "

## 2018-08-03 ENCOUNTER — HOSPITAL ENCOUNTER (OUTPATIENT)
Dept: ULTRASOUND IMAGING | Facility: HOSPITAL | Age: 83
Discharge: HOME | End: 2018-08-03
Attending: INTERNAL MEDICINE

## 2018-08-03 ENCOUNTER — TRANSCRIBE ORDERS (OUTPATIENT)
Dept: ADMINISTRATIVE | Facility: HOSPITAL | Age: 83
End: 2018-08-03

## 2018-08-03 ENCOUNTER — HOSPITAL ENCOUNTER (OUTPATIENT)
Dept: CT IMAGING | Facility: HOSPITAL | Age: 83
Discharge: HOME | End: 2018-08-03
Attending: INTERNAL MEDICINE | Admitting: INTERNAL MEDICINE

## 2018-08-03 DIAGNOSIS — G45.9 TRANSIENT CEREBRAL ISCHEMIA, UNSPECIFIED TYPE: ICD-10-CM

## 2018-08-03 DIAGNOSIS — I65.23 CAROTID OCCLUSION, BILATERAL: ICD-10-CM

## 2018-08-03 DIAGNOSIS — G45.9 TRANSIENT CEREBRAL ISCHEMIA, UNSPECIFIED TYPE: Primary | ICD-10-CM

## 2018-08-03 PROCEDURE — 70450 CT HEAD/BRAIN W/O DYE: CPT

## 2018-08-03 PROCEDURE — 93880 EXTRACRANIAL BILAT STUDY: CPT

## 2018-09-19 ENCOUNTER — OFFICE VISIT (OUTPATIENT)
Dept: OTOLARYNGOLOGY | Facility: CLINIC | Age: 83
End: 2018-09-19

## 2018-09-19 ENCOUNTER — CLINICAL SUPPORT (OUTPATIENT)
Dept: OTOLARYNGOLOGY | Facility: CLINIC | Age: 83
End: 2018-09-19

## 2018-09-19 VITALS
HEART RATE: 68 BPM | HEIGHT: 64 IN | DIASTOLIC BLOOD PRESSURE: 67 MMHG | SYSTOLIC BLOOD PRESSURE: 150 MMHG | WEIGHT: 163.38 LBS | BODY MASS INDEX: 27.89 KG/M2 | TEMPERATURE: 97.8 F

## 2018-09-19 DIAGNOSIS — E04.1 THYROID NODULE: Primary | ICD-10-CM

## 2018-09-19 DIAGNOSIS — E03.9 HYPOTHYROIDISM, UNSPECIFIED TYPE: ICD-10-CM

## 2018-09-19 DIAGNOSIS — E04.1 THYROID NODULE: ICD-10-CM

## 2018-09-19 DIAGNOSIS — K21.9 LARYNGOPHARYNGEAL REFLUX (LPR): ICD-10-CM

## 2018-09-19 DIAGNOSIS — H61.21 IMPACTED CERUMEN OF RIGHT EAR: ICD-10-CM

## 2018-09-19 PROCEDURE — 99214 OFFICE O/P EST MOD 30 MIN: CPT | Performed by: NURSE PRACTITIONER

## 2018-09-19 PROCEDURE — 76536 US EXAM OF HEAD AND NECK: CPT | Performed by: NURSE PRACTITIONER

## 2018-09-19 RX ORDER — IRBESARTAN 75 MG/1
TABLET ORAL
Refills: 3 | COMMUNITY
Start: 2018-09-10 | End: 2020-01-03 | Stop reason: HOSPADM

## 2018-09-19 NOTE — PROGRESS NOTES
YOB: 1935  Location: [facility]  Location Address: [address] [place] [address], [facility], [address] [place] [address]  Location Phone: [phone]    Chief Complaint   Patient presents with   • Thyroid Problem       History of Present Illness  Nedra Tolliver is a 82 y.o. female.  Nedra Tolliver is here for follow up of ENT complaints. The patient has had problems with a thyroid nodule  The symptoms are not localized to a particular location. The patient has had moderate symptoms. The symptoms have been present for the last several years The symptoms are aggravated by  no identifiable factors. The symptoms are improved by no identifiable factors.  C/o occasional dysphagia.  Having some problems hearing as well.           Past Medical History:   Diagnosis Date   • Diabetes type 2, controlled (CMS/HCC)    • Diverticulitis    • Hypertension    • Hypothyroidism    • LPRD (laryngopharyngeal reflux disease)    • Pharyngeal dysphagia    • Thyroid nodule        Past Surgical History:   Procedure Laterality Date   • APPENDECTOMY     • BASAL CELL CARCINOMA EXCISION     • BELPHAROPTOSIS REPAIR     • BREAST CYST EXCISION     • CATARACT EXTRACTION     • CHOLECYSTECTOMY     • COLONOSCOPY Left 2016    Procedure: COLONOSCOPY WITH ANESTHESIA;  Surgeon: Yordan Blanton MD;  Location: [facility] ENDOSCOPY;  Service:    • PARTIAL HYSTERECTOMY     • TUMOR EXCISION      under armpits and left breast       Outpatient Prescriptions Marked as Taking for the 18 encounter (Office Visit) with Estephania Steel APRN   Medication Sig Dispense Refill   • acetaminophen (TYLENOL) 500 MG tablet Take 500 mg by mouth every 6 (six) hours as needed for mild pain (1-3).     • amLODIPine (NORVASC) 2.5 MG tablet   3   • Cholecalciferol (VITAMIN D3) 5000 UNITS capsule capsule Take 5,000 Units by mouth daily.     • Coenzyme Q10 (CO Q 10) 100 MG capsule Take  by mouth.     • conjugated estrogens (PREMARIN) 0.625 MG/GM vaginal cream Insert  into the vagina  2 (Two) Times a Week. 30 g 6   • Garlic 10 MG capsule Take  by mouth.     • glimepiride (AMARYL) 4 MG tablet Take 4 mg by mouth every morning before breakfast.     • irbesartan (AVAPRO) 75 MG tablet TAKE 1 TABLET BY MOUTH EVERY DAY  REPLACES VALSARTAN  3   • levothyroxine (SYNTHROID, LEVOTHROID) 50 MCG tablet Take 50 mcg by mouth daily.     • metFORMIN (GLUCOPHAGE) 500 MG tablet Take 500 mg by mouth Every Other Day.     • Multiple Vitamins-Minerals (CENTRUM SILVER PO) Take  by mouth.     • nebivolol (BYSTOLIC) 2.5 MG tablet Take 2.5 mg by mouth daily.     • Probiotic Product (PROBIOTIC DAILY PO) Take  by mouth.         Bactroban [mupirocin calcium]; Codeine; Iodine; Levaquin [levofloxacin]; Lortab [hydrocodone-acetaminophen]; Quinolones; and Shrimp (diagnostic)    Family History   Problem Relation Age of Onset   • Diabetes Mother    • Cancer Mother    • Heart failure Mother    • Diabetes Father        Social History     Social History   • Marital status:      Spouse name: N/A   • Number of children: N/A   • Years of education: N/A     Occupational History   • Not on file.     Social History Main Topics   • Smoking status: Never Smoker   • Smokeless tobacco: Never Used   • Alcohol use No   • Drug use: Unknown   • Sexual activity: Defer     Other Topics Concern   • Not on file     Social History Narrative   • No narrative on file       Review of Systems   Constitutional: Negative.    HENT:        SEE HPI   Eyes: Negative.    Respiratory: Negative.    Cardiovascular: Negative.    Gastrointestinal: Negative.         Reflux   Endocrine: Negative.    Genitourinary: Negative.    Musculoskeletal: Negative.    Skin: Negative.    Allergic/Immunologic: Negative.    Neurological: Negative.    Hematological: Bruises/bleeds easily.   Psychiatric/Behavioral: Negative.        Vitals:    09/19/18 1019   BP: 150/67   Pulse: 68   Temp: 97.8 °F (36.6 °C)       Body mass index is 28.04 kg/m².    Objective     Physical  Exam  CONSTITUTIONAL: well nourished, alert, oriented, in no acute distress     COMMUNICATION AND VOICE: able to communicate normally, normal voice quality    HEAD: normocephalic, no lesions, atraumatic, no tenderness, no masses     FACE: appearance normal, no lesions, no tenderness, no deformities, facial motion symmetric    SALIVARY GLANDS: parotid glands with no tenderness, no swelling, no masses, submandibular glands with normal size, nontender, left parotid surgically absent    EYES: ocular motility normal, eyelids normal, orbits normal, no proptosis, conjunctiva normal , pupils equal, round     EARS:  Hearing: response to conversational voice normal bilaterally   External Ears: auricles without lesions  Otoscopic: tympanic membrane with myringosclerosis, no lesions, no perforation, normal mobility, no fluid  Cerumen removed from right EAC    NOSE:  External Nose: structure normal, no tenderness on palpation, no nasal discharge, no lesions, no evidence of trauma, nostrils patent   Intranasal Exam: nasal mucosa normal, vestibule within normal limits, inferior turbinate normal, nasal septum midline     ORAL:  Lips: upper and lower lips without lesion   Teeth: dentition within normal limits for age   Gums: gingivae healthy   Oral Mucosa: oral mucosa normal, no mucosal lesions   Floor of Mouth: Warthin’s duct patent, mucosa normal  Tongue: lingual mucosa normal without lesions, normal tongue mobility   Palate: soft and hard palates with normal mucosa and structure  Oropharynx: oropharyngeal mucosa normal    NECK: neck appearance normal, no mass,  noted without erythema or tenderness    THYROID: left thyroid nodule     LYMPH NODES: no lymphadenopathy    CHEST/RESPIRATORY: respiratory effort normal    CARDIOVASCULAR: extremities without cyanosis or edema      NEUROLOGIC/PSYCHIATRIC: oriented to time, place and person, mood normal, affect appropriate, CN II-XII intact grossly    Assessment/Plan   Nedra was seen today  for thyroid problem.    Diagnoses and all orders for this visit:    Thyroid nodule  -     US Head Neck Soft Tissue; Future    Laryngopharyngeal reflux (LPR)    Hypothyroidism, unspecified type    Impacted cerumen of right ear      * Surgery not found *  Orders Placed This Encounter   Procedures   • US Head Neck Soft Tissue     Standing Status:   Future     Standing Expiration Date:   2/1/2020     Order Specific Question:   Reason for Exam:     Answer:   thyroid nodules     Return in about 1 year (around 9/19/2019).       Patient Instructions   Cerumen was removed    The patient has a thyroid nodule, which is relatively small, and studies do not suggest a malignancy. I have recommended observation with follow-up with me for repeat ultrasound. I explained the pathology of thyroid nodules including the risks of cancer. The options of surgery were discussed, but we will take an observational course for now.    Call for problems or worsening symptoms

## 2018-09-19 NOTE — PATIENT INSTRUCTIONS
Cerumen was removed    The patient has a thyroid nodule, which is relatively small, and studies do not suggest a malignancy. I have recommended observation with follow-up with me for repeat ultrasound. I explained the pathology of thyroid nodules including the risks of cancer. The options of surgery were discussed, but we will take an observational course for now.    Call for problems or worsening symptoms

## 2018-12-05 ENCOUNTER — OFFICE VISIT (OUTPATIENT)
Dept: UROLOGY | Facility: CLINIC | Age: 83
End: 2018-12-05

## 2018-12-05 VITALS — WEIGHT: 160 LBS | BODY MASS INDEX: 27.31 KG/M2 | HEIGHT: 64 IN | TEMPERATURE: 98.6 F

## 2018-12-05 DIAGNOSIS — N39.0 URINARY TRACT INFECTION WITHOUT HEMATURIA, SITE UNSPECIFIED: ICD-10-CM

## 2018-12-05 DIAGNOSIS — N32.81 OVERACTIVE BLADDER: ICD-10-CM

## 2018-12-05 DIAGNOSIS — N95.2 VAGINAL ATROPHY: Primary | ICD-10-CM

## 2018-12-05 LAB
BILIRUB BLD-MCNC: NEGATIVE MG/DL
CLARITY, POC: CLEAR
COLOR UR: YELLOW
GLUCOSE UR STRIP-MCNC: NEGATIVE MG/DL
KETONES UR QL: NEGATIVE
LEUKOCYTE EST, POC: ABNORMAL
NITRITE UR-MCNC: NEGATIVE MG/ML
PH UR: 6 [PH] (ref 5–8)
PROT UR STRIP-MCNC: NEGATIVE MG/DL
RBC # UR STRIP: ABNORMAL /UL
SP GR UR: 1.02 (ref 1–1.03)
UROBILINOGEN UR QL: NORMAL

## 2018-12-05 PROCEDURE — 81003 URINALYSIS AUTO W/O SCOPE: CPT | Performed by: UROLOGY

## 2018-12-05 PROCEDURE — 99213 OFFICE O/P EST LOW 20 MIN: CPT | Performed by: UROLOGY

## 2018-12-05 NOTE — PROGRESS NOTES
"Ms. Tolliver is 83 y.o. female    Chief Complaint   Patient presents with   • Urinary Tract Infection   • vaginal atrophy   • Urinary Frequency       Urinary Frequency    This is a new problem. The current episode started more than 1 month ago. The problem occurs intermittently. The problem has been unchanged. The patient is experiencing no pain. There has been no fever. Associated symptoms include frequency and urgency. Pertinent negatives include no chills, flank pain or hematuria. She has tried nothing for the symptoms.   Urinary Tract Infection    This is a chronic problem. The current episode started more than 1 year ago. The problem has been resolved. The patient is experiencing no pain. There has been no fever. Associated symptoms include frequency and urgency. Pertinent negatives include no chills, flank pain or hematuria. Treatments tried: estrogen cream.       The following portions of the patient's history were reviewed and updated as appropriate: allergies, current medications, past family history, past medical history, past social history, past surgical history and problem list.    Review of Systems   Constitutional: Negative for chills and fever.   Gastrointestinal: Negative for abdominal pain, anal bleeding and blood in stool.   Genitourinary: Positive for dysuria, frequency and urgency. Negative for decreased urine volume, difficulty urinating, dyspareunia, enuresis, flank pain, genital sores, hematuria, menstrual problem, pelvic pain, vaginal bleeding, vaginal discharge and vaginal pain.        \"Odor to urine\"         Current Outpatient Medications:   •  acetaminophen (TYLENOL) 500 MG tablet, Take 500 mg by mouth every 6 (six) hours as needed for mild pain (1-3)., Disp: , Rfl:   •  amLODIPine (NORVASC) 2.5 MG tablet, , Disp: , Rfl: 3  •  Cholecalciferol (VITAMIN D3) 5000 UNITS capsule capsule, Take 5,000 Units by mouth daily., Disp: , Rfl:   •  Coenzyme Q10 (CO Q 10) 100 MG capsule, Take  by mouth., " Disp: , Rfl:   •  conjugated estrogens (PREMARIN) 0.625 MG/GM vaginal cream, Insert  into the vagina 2 (Two) Times a Week., Disp: 30 g, Rfl: 6  •  Garlic 10 MG capsule, Take  by mouth., Disp: , Rfl:   •  glimepiride (AMARYL) 4 MG tablet, Take 4 mg by mouth every morning before breakfast., Disp: , Rfl:   •  irbesartan (AVAPRO) 75 MG tablet, TAKE 1 TABLET BY MOUTH EVERY DAY  REPLACES VALSARTAN, Disp: , Rfl: 3  •  levothyroxine (SYNTHROID, LEVOTHROID) 50 MCG tablet, Take 50 mcg by mouth daily., Disp: , Rfl:   •  metFORMIN (GLUCOPHAGE) 500 MG tablet, Take 500 mg by mouth Every Other Day., Disp: , Rfl:   •  Multiple Vitamins-Minerals (CENTRUM SILVER PO), Take  by mouth., Disp: , Rfl:   •  nebivolol (BYSTOLIC) 2.5 MG tablet, Take 2.5 mg by mouth daily., Disp: , Rfl:   •  Probiotic Product (PROBIOTIC DAILY PO), Take  by mouth., Disp: , Rfl:     Past Medical History:   Diagnosis Date   • Diabetes type 2, controlled (CMS/HCC)    • Diverticulitis    • Hypertension    • Hypothyroidism    • LPRD (laryngopharyngeal reflux disease)    • Pharyngeal dysphagia    • Thyroid nodule        Past Surgical History:   Procedure Laterality Date   • APPENDECTOMY     • BASAL CELL CARCINOMA EXCISION     • BELPHAROPTOSIS REPAIR     • BREAST CYST EXCISION     • CATARACT EXTRACTION     • CHOLECYSTECTOMY     • PARTIAL HYSTERECTOMY     • TUMOR EXCISION      under armpits and left breast       Social History     Socioeconomic History   • Marital status:      Spouse name: Not on file   • Number of children: Not on file   • Years of education: Not on file   • Highest education level: Not on file   Tobacco Use   • Smoking status: Never Smoker   • Smokeless tobacco: Never Used   Substance and Sexual Activity   • Alcohol use: No   • Drug use: Defer   • Sexual activity: Defer       Family History   Problem Relation Age of Onset   • Diabetes Mother    • Cancer Mother    • Heart failure Mother    • Diabetes Father        Objective    Temp 98.6 °F (37  "°C)   Ht 162.6 cm (64\")   Wt 72.6 kg (160 lb)   LMP  (LMP Unknown)   BMI 27.46 kg/m²     Physical Exam    Procedure visit on 05/16/2018   Component Date Value Ref Range Status   • Color 05/16/2018 Yellow  Yellow, Straw, Dark Yellow, Zenia Final   • Clarity, UA 05/16/2018 Clear  Clear Final   • Glucose, UA 05/16/2018 Negative  Negative, 1000 mg/dL (3+) mg/dL Final   • Bilirubin 05/16/2018 Negative  Negative Final   • Ketones, UA 05/16/2018 Negative  Negative Final   • Specific Gravity  05/16/2018 1.020  1.005 - 1.030 Final   • Blood, UA 05/16/2018 Negative  Negative Final   • pH, Urine 05/16/2018 7.0  5.0 - 8.0 Final   • Protein, POC 05/16/2018 Trace* Negative mg/dL Final   • Urobilinogen, UA 05/16/2018 Normal  Normal Final   • Leukocytes 05/16/2018 Small (1+)* Negative Final   • Nitrite, UA 05/16/2018 Negative  Negative Final       Results for orders placed or performed in visit on 12/05/18   POC Urinalysis Dipstick, Multipro   Result Value Ref Range    Color Yellow Yellow, Straw, Dark Yellow, Zenia    Clarity, UA Clear Clear    Glucose, UA Negative Negative, 1000 mg/dL (3+) mg/dL    Bilirubin Negative Negative    Ketones, UA Negative Negative    Specific Gravity  1.020 1.005 - 1.030    Blood, UA Small (A) Negative    pH, Urine 6.0 5.0 - 8.0    Protein, POC Negative Negative mg/dL    Urobilinogen, UA Normal Normal    Nitrite, UA Negative Negative    Leukocytes Trace (A) Negative     Patient's Body mass index is 27.46 kg/m². BMI is above normal parameters. Recommendations include: educational material.    Assessment and Plan    Nedra was seen today for urinary tract infection, vaginal atrophy and urinary frequency.    Diagnoses and all orders for this visit:    Vaginal atrophy  -     POC Urinalysis Dipstick, Multipro    Urinary tract infection without hematuria, site unspecified    Overactive bladder    Patient with a history of vaginal atrophy with recurrent urinary tract infections.  She has had no infections " since our last visit.  I did start her on topical ashen cream, the patient admittedly does not use this regularly.  I did discuss with her the benefit of this medication and she will make a decision on her own if she would like to continue to use it.  Patient has new complaints today of overactive bladder.  She states that she has urgency but that her symptoms are not severe enough to want any type of medication or intervention at this time.  We will keep an eye on it and she will follow back up with the nurse practitioner in 6 months.

## 2018-12-05 NOTE — PATIENT INSTRUCTIONS

## 2019-03-30 ENCOUNTER — APPOINTMENT (OUTPATIENT)
Dept: CT IMAGING | Facility: HOSPITAL | Age: 84
End: 2019-03-30

## 2019-03-30 ENCOUNTER — HOSPITAL ENCOUNTER (EMERGENCY)
Facility: HOSPITAL | Age: 84
Discharge: HOME | End: 2019-03-30
Attending: EMERGENCY MEDICINE | Admitting: EMERGENCY MEDICINE

## 2019-03-30 VITALS
HEIGHT: 64 IN | OXYGEN SATURATION: 95 % | TEMPERATURE: 98.4 F | HEART RATE: 73 BPM | RESPIRATION RATE: 18 BRPM | WEIGHT: 166.8 LBS | BODY MASS INDEX: 28.48 KG/M2 | DIASTOLIC BLOOD PRESSURE: 66 MMHG | SYSTOLIC BLOOD PRESSURE: 164 MMHG

## 2019-03-30 DIAGNOSIS — K57.92 ACUTE DIVERTICULITIS: Primary | ICD-10-CM

## 2019-03-30 LAB
ALBUMIN SERPL-MCNC: 4.4 G/DL (ref 3.5–5)
ALBUMIN/GLOB SERPL: 1.7 G/DL (ref 1.1–2.5)
ALP SERPL-CCNC: 67 U/L (ref 24–120)
ALT SERPL W P-5'-P-CCNC: 20 U/L (ref 0–54)
ANION GAP SERPL CALCULATED.3IONS-SCNC: 10 MMOL/L (ref 4–13)
AST SERPL-CCNC: 25 U/L (ref 7–45)
BACTERIA UR QL AUTO: ABNORMAL /HPF
BASOPHILS # BLD AUTO: 0.04 10*3/MM3 (ref 0–0.2)
BASOPHILS NFR BLD AUTO: 0.4 % (ref 0–2)
BILIRUB SERPL-MCNC: 0.6 MG/DL (ref 0.1–1)
BILIRUB UR QL STRIP: NEGATIVE
BUN BLD-MCNC: 15 MG/DL (ref 5–21)
BUN/CREAT SERPL: 17.9 (ref 7–25)
CALCIUM SPEC-SCNC: 9.8 MG/DL (ref 8.4–10.4)
CHLORIDE SERPL-SCNC: 94 MMOL/L (ref 98–110)
CLARITY UR: CLEAR
CO2 SERPL-SCNC: 25 MMOL/L (ref 24–31)
COLOR UR: YELLOW
CREAT BLD-MCNC: 0.84 MG/DL (ref 0.5–1.4)
DEPRECATED RDW RBC AUTO: 41.3 FL (ref 40–54)
EOSINOPHIL # BLD AUTO: 0.24 10*3/MM3 (ref 0–0.7)
EOSINOPHIL NFR BLD AUTO: 2.3 % (ref 0–4)
ERYTHROCYTE [DISTWIDTH] IN BLOOD BY AUTOMATED COUNT: 12.8 % (ref 12–15)
GFR SERPL CREATININE-BSD FRML MDRD: 65 ML/MIN/1.73
GLOBULIN UR ELPH-MCNC: 2.6 GM/DL
GLUCOSE BLD-MCNC: 123 MG/DL (ref 70–100)
GLUCOSE UR STRIP-MCNC: NEGATIVE MG/DL
HCT VFR BLD AUTO: 33.6 % (ref 37–47)
HGB BLD-MCNC: 11.9 G/DL (ref 12–16)
HGB UR QL STRIP.AUTO: NEGATIVE
HYALINE CASTS UR QL AUTO: ABNORMAL /LPF
IMM GRANULOCYTES # BLD AUTO: 0.03 10*3/MM3 (ref 0–0.05)
IMM GRANULOCYTES NFR BLD AUTO: 0.3 % (ref 0–5)
KETONES UR QL STRIP: NEGATIVE
LEUKOCYTE ESTERASE UR QL STRIP.AUTO: ABNORMAL
LIPASE SERPL-CCNC: 44 U/L (ref 23–203)
LYMPHOCYTES # BLD AUTO: 1.84 10*3/MM3 (ref 0.72–4.86)
LYMPHOCYTES NFR BLD AUTO: 17.8 % (ref 15–45)
MCH RBC QN AUTO: 31.5 PG (ref 28–32)
MCHC RBC AUTO-ENTMCNC: 35.4 G/DL (ref 33–36)
MCV RBC AUTO: 88.9 FL (ref 82–98)
MONOCYTES # BLD AUTO: 1.04 10*3/MM3 (ref 0.19–1.3)
MONOCYTES NFR BLD AUTO: 10.1 % (ref 4–12)
NEUTROPHILS # BLD AUTO: 7.13 10*3/MM3 (ref 1.87–8.4)
NEUTROPHILS NFR BLD AUTO: 69.1 % (ref 39–78)
NITRITE UR QL STRIP: NEGATIVE
NRBC BLD AUTO-RTO: 0 /100 WBC (ref 0–0)
PH UR STRIP.AUTO: 7 [PH] (ref 5–8)
PLATELET # BLD AUTO: 254 10*3/MM3 (ref 130–400)
PMV BLD AUTO: 9.6 FL (ref 6–12)
POTASSIUM BLD-SCNC: 4.6 MMOL/L (ref 3.5–5.3)
PROT SERPL-MCNC: 7 G/DL (ref 6.3–8.7)
PROT UR QL STRIP: NEGATIVE
RBC # BLD AUTO: 3.78 10*6/MM3 (ref 4.2–5.4)
RBC # UR: ABNORMAL /HPF
REF LAB TEST METHOD: ABNORMAL
SODIUM BLD-SCNC: 129 MMOL/L (ref 135–145)
SP GR UR STRIP: 1.01 (ref 1–1.03)
SQUAMOUS #/AREA URNS HPF: ABNORMAL /HPF
UROBILINOGEN UR QL STRIP: ABNORMAL
WBC NRBC COR # BLD: 10.32 10*3/MM3 (ref 4.8–10.8)
WBC UR QL AUTO: ABNORMAL /HPF

## 2019-03-30 PROCEDURE — 87086 URINE CULTURE/COLONY COUNT: CPT | Performed by: EMERGENCY MEDICINE

## 2019-03-30 PROCEDURE — 99284 EMERGENCY DEPT VISIT MOD MDM: CPT

## 2019-03-30 PROCEDURE — 81001 URINALYSIS AUTO W/SCOPE: CPT | Performed by: EMERGENCY MEDICINE

## 2019-03-30 PROCEDURE — 80053 COMPREHEN METABOLIC PANEL: CPT | Performed by: EMERGENCY MEDICINE

## 2019-03-30 PROCEDURE — 74176 CT ABD & PELVIS W/O CONTRAST: CPT

## 2019-03-30 PROCEDURE — 87088 URINE BACTERIA CULTURE: CPT | Performed by: EMERGENCY MEDICINE

## 2019-03-30 PROCEDURE — 85025 COMPLETE CBC W/AUTO DIFF WBC: CPT | Performed by: EMERGENCY MEDICINE

## 2019-03-30 PROCEDURE — 83690 ASSAY OF LIPASE: CPT | Performed by: EMERGENCY MEDICINE

## 2019-03-30 PROCEDURE — 87186 SC STD MICRODIL/AGAR DIL: CPT | Performed by: EMERGENCY MEDICINE

## 2019-03-30 RX ORDER — ONDANSETRON 4 MG/1
4 TABLET, ORALLY DISINTEGRATING ORAL EVERY 6 HOURS PRN
Qty: 12 TABLET | Refills: 0 | Status: SHIPPED | OUTPATIENT
Start: 2019-03-30 | End: 2019-04-04

## 2019-03-30 RX ORDER — AMOXICILLIN AND CLAVULANATE POTASSIUM 875; 125 MG/1; MG/1
1 TABLET, FILM COATED ORAL 2 TIMES DAILY
Qty: 20 TABLET | Refills: 0 | Status: SHIPPED | OUTPATIENT
Start: 2019-03-30 | End: 2019-04-09

## 2019-03-30 RX ORDER — AMOXICILLIN AND CLAVULANATE POTASSIUM 875; 125 MG/1; MG/1
1 TABLET, FILM COATED ORAL ONCE
Status: COMPLETED | OUTPATIENT
Start: 2019-03-30 | End: 2019-03-30

## 2019-03-30 RX ORDER — SODIUM CHLORIDE 0.9 % (FLUSH) 0.9 %
10 SYRINGE (ML) INJECTION AS NEEDED
Status: DISCONTINUED | OUTPATIENT
Start: 2019-03-30 | End: 2019-03-30 | Stop reason: HOSPADM

## 2019-03-30 RX ADMIN — AMOXICILLIN AND CLAVULANATE POTASSIUM 1 TABLET: 875; 125 TABLET, FILM COATED ORAL at 19:35

## 2019-04-01 ENCOUNTER — TELEPHONE (OUTPATIENT)
Dept: EMERGENCY DEPT | Facility: HOSPITAL | Age: 84
End: 2019-04-01

## 2019-04-01 LAB — BACTERIA SPEC AEROBE CULT: ABNORMAL

## 2019-04-26 ENCOUNTER — TRANSCRIBE ORDERS (OUTPATIENT)
Dept: ADMINISTRATIVE | Facility: HOSPITAL | Age: 84
End: 2019-04-26

## 2019-04-26 DIAGNOSIS — R07.9 CHEST PAIN ON EXERTION: Primary | ICD-10-CM

## 2019-05-01 ENCOUNTER — HOSPITAL ENCOUNTER (OUTPATIENT)
Dept: CARDIOLOGY | Facility: HOSPITAL | Age: 84
Discharge: HOME | End: 2019-05-01
Admitting: INTERNAL MEDICINE

## 2019-05-01 VITALS — HEIGHT: 64 IN | WEIGHT: 162 LBS | BODY MASS INDEX: 27.66 KG/M2

## 2019-05-01 PROCEDURE — 93350 STRESS TTE ONLY: CPT

## 2019-05-01 PROCEDURE — 25010000003 DOBUTAMINE PER 250 MG: Performed by: INTERNAL MEDICINE

## 2019-05-01 PROCEDURE — 93352 ADMIN ECG CONTRAST AGENT: CPT | Performed by: INTERNAL MEDICINE

## 2019-05-01 PROCEDURE — 93017 CV STRESS TEST TRACING ONLY: CPT

## 2019-05-01 PROCEDURE — 93018 CV STRESS TEST I&R ONLY: CPT | Performed by: INTERNAL MEDICINE

## 2019-05-01 PROCEDURE — 93350 STRESS TTE ONLY: CPT | Performed by: INTERNAL MEDICINE

## 2019-05-01 PROCEDURE — 25010000002 PERFLUTREN 6.52 MG/ML SUSPENSION: Performed by: INTERNAL MEDICINE

## 2019-05-01 RX ORDER — DOBUTAMINE HYDROCHLORIDE 100 MG/100ML
10-50 INJECTION INTRAVENOUS CONTINUOUS
Status: DISCONTINUED | OUTPATIENT
Start: 2019-05-01 | End: 2019-05-02 | Stop reason: HOSPADM

## 2019-05-01 RX ORDER — METOPROLOL TARTRATE 5 MG/5ML
5 INJECTION INTRAVENOUS ONCE
Status: COMPLETED | OUTPATIENT
Start: 2019-05-01 | End: 2019-05-01

## 2019-05-01 RX ADMIN — PERFLUTREN 8.48 MG: 6.52 INJECTION, SUSPENSION INTRAVENOUS at 08:16

## 2019-05-01 RX ADMIN — ATROPINE SULFATE 0.3 MG: 0.1 INJECTION PARENTERAL at 08:42

## 2019-05-01 RX ADMIN — Medication 10 MCG/KG/MIN: at 08:19

## 2019-05-01 RX ADMIN — METOPROLOL TARTRATE 5 MG: 5 INJECTION INTRAVENOUS at 08:42

## 2019-05-02 LAB
BH CV STRESS BP STAGE 1: NORMAL
BH CV STRESS BP STAGE 2: NORMAL
BH CV STRESS BP STAGE 3: NORMAL
BH CV STRESS DOB - ATROPINE STAGE 3: 0.3
BH CV STRESS DOSE DOBUTAMINE STAGE 1: 10
BH CV STRESS DOSE DOBUTAMINE STAGE 2: 20
BH CV STRESS DOSE DOBUTAMINE STAGE 3: 30
BH CV STRESS DURATION MIN STAGE 1: 3
BH CV STRESS DURATION MIN STAGE 2: 3
BH CV STRESS DURATION MIN STAGE 3: 2
BH CV STRESS DURATION SEC STAGE 1: 0
BH CV STRESS DURATION SEC STAGE 2: 0
BH CV STRESS DURATION SEC STAGE 3: 17
BH CV STRESS HR STAGE 1: 65
BH CV STRESS HR STAGE 2: 81
BH CV STRESS HR STAGE 3: 146
BH CV STRESS PROTOCOL 1: NORMAL
BH CV STRESS RECOVERY BP: NORMAL MMHG
BH CV STRESS RECOVERY HR: 77 BPM
BH CV STRESS STAGE 1: 1
BH CV STRESS STAGE 2: 2
BH CV STRESS STAGE 3: 3
MAXIMAL PREDICTED HEART RATE: 137 BPM
PERCENT MAX PREDICTED HR: 106.57 %
STRESS BASELINE BP: NORMAL MMHG
STRESS BASELINE HR: 60 BPM
STRESS PERCENT HR: 125 %
STRESS POST EXERCISE DUR MIN: 8 MIN
STRESS POST EXERCISE DUR SEC: 17 SEC
STRESS POST PEAK BP: NORMAL MMHG
STRESS POST PEAK HR: 146 BPM
STRESS TARGET HR: 116 BPM

## 2019-05-08 ENCOUNTER — OFFICE VISIT (OUTPATIENT)
Dept: GASTROENTEROLOGY | Facility: CLINIC | Age: 84
End: 2019-05-08

## 2019-05-08 VITALS
WEIGHT: 163 LBS | DIASTOLIC BLOOD PRESSURE: 70 MMHG | HEART RATE: 70 BPM | SYSTOLIC BLOOD PRESSURE: 132 MMHG | BODY MASS INDEX: 27.83 KG/M2 | HEIGHT: 64 IN | OXYGEN SATURATION: 97 %

## 2019-05-08 DIAGNOSIS — K57.92 DIVERTICULITIS: Primary | ICD-10-CM

## 2019-05-08 DIAGNOSIS — I10 HTN (HYPERTENSION), BENIGN: ICD-10-CM

## 2019-05-08 PROCEDURE — 99213 OFFICE O/P EST LOW 20 MIN: CPT | Performed by: CLINICAL NURSE SPECIALIST

## 2019-05-08 RX ORDER — SODIUM, POTASSIUM,MAG SULFATES 17.5-3.13G
SOLUTION, RECONSTITUTED, ORAL ORAL
Qty: 2 BOTTLE | Refills: 0 | Status: SHIPPED | OUTPATIENT
Start: 2019-05-08 | End: 2020-01-04 | Stop reason: HOSPADM

## 2019-05-08 RX ORDER — DICYCLOMINE HYDROCHLORIDE 10 MG/1
10 CAPSULE ORAL 3 TIMES DAILY PRN
Status: ON HOLD | COMMUNITY
End: 2020-10-02

## 2019-05-08 NOTE — PROGRESS NOTES
Nedra Tolliver  1935 5/8/2019  Chief Complaint   Patient presents with   • GI Problem     Abdominal pain     Subjective   HPI  Nedra Tolliver is a 83 y.o. female who presents with a complaint of recent diverticulitis. She was diagnosed mid April and treated with antibiotics her pain persisted and then he put her on doxycycline and this has helped her. This was acute onset LLQ abdominal pain to the lower abdomen, severe at that time. No diarrhea or BRBPR. NO fever. No wt loss. Her last colonoscopy was in 2016. She has a hx of diverticula. No  Family hx for colon cancer. She has a hx of colon polyps.   Workup has included CT scan of the abdomen/pelvis showing acute diverticulitis of the mid sigmoid colon no obvious perforation or abscess.   Past Medical History:   Diagnosis Date   • Diabetes type 2, controlled (CMS/HCC)    • Diverticulitis    • Hypertension    • Hypothyroidism    • LPRD (laryngopharyngeal reflux disease)    • Pharyngeal dysphagia    • Thyroid nodule      Past Surgical History:   Procedure Laterality Date   • APPENDECTOMY     • BASAL CELL CARCINOMA EXCISION     • BELPHAROPTOSIS REPAIR     • BREAST CYST EXCISION     • CATARACT EXTRACTION     • CHOLECYSTECTOMY     • COLONOSCOPY Left 11/1/2016    Tubular adenoma cecum, Diverticulosis repeat prn   • PARTIAL HYSTERECTOMY     • TUMOR EXCISION      under armpits and left breast       Outpatient Medications Marked as Taking for the 5/8/19 encounter (Office Visit) with Arianne Charles APRN   Medication Sig Dispense Refill   • acetaminophen (TYLENOL) 500 MG tablet Take 500 mg by mouth every 6 (six) hours as needed for mild pain (1-3).     • amLODIPine (NORVASC) 2.5 MG tablet   3   • Cholecalciferol (VITAMIN D3) 5000 UNITS capsule capsule Take 5,000 Units by mouth daily.     • Coenzyme Q10 (CO Q 10) 100 MG capsule Take  by mouth.     • conjugated estrogens (PREMARIN) 0.625 MG/GM vaginal cream Insert  into the vagina 2 (Two) Times a Week. 30 g 6    • dicyclomine (BENTYL) 10 MG capsule Take 10 mg by mouth 3 (Three) Times a Day.     • Garlic 10 MG capsule Take  by mouth.     • glimepiride (AMARYL) 4 MG tablet Take 4 mg by mouth every morning before breakfast.     • irbesartan (AVAPRO) 75 MG tablet TAKE 1 TABLET BY MOUTH EVERY DAY  REPLACES VALSARTAN  3   • levothyroxine (SYNTHROID, LEVOTHROID) 50 MCG tablet Take 50 mcg by mouth daily.     • metFORMIN (GLUCOPHAGE) 500 MG tablet Take 500 mg by mouth Every Other Day.     • Multiple Vitamins-Minerals (CENTRUM SILVER PO) Take  by mouth.     • nebivolol (BYSTOLIC) 2.5 MG tablet Take 2.5 mg by mouth daily.     • Probiotic Product (PROBIOTIC DAILY PO) Take  by mouth.       Allergies   Allergen Reactions   • Bactroban [Mupirocin Calcium]    • Codeine    • Iodine      Xray dye   • Levaquin [Levofloxacin]    • Lortab [Hydrocodone-Acetaminophen]    • Quinolones    • Shrimp (Diagnostic)      Social History     Socioeconomic History   • Marital status:      Spouse name: Not on file   • Number of children: Not on file   • Years of education: Not on file   • Highest education level: Not on file   Tobacco Use   • Smoking status: Never Smoker   • Smokeless tobacco: Never Used   Substance and Sexual Activity   • Alcohol use: No   • Drug use: Defer   • Sexual activity: Defer     Family History   Problem Relation Age of Onset   • Diabetes Mother    • Cancer Mother    • Heart failure Mother    • Diabetes Father    • Colon cancer Neg Hx    • Colon polyps Neg Hx      Health Maintenance   Topic Date Due   • URINE MICROALBUMIN  1935   • TDAP/TD VACCINES (1 - Tdap) 11/16/1954   • ZOSTER VACCINE (1 of 2) 11/16/1985   • PNEUMOCOCCAL VACCINES (65+ LOW/MEDIUM RISK) (1 of 2 - PCV13) 11/16/2000   • MEDICARE ANNUAL WELLNESS  09/18/2017   • HEMOGLOBIN A1C  06/13/2019   • INFLUENZA VACCINE  08/01/2019     Review of Systems   Constitutional: Negative for activity change, appetite change, chills, diaphoresis, fatigue, fever and  "unexpected weight change.   HENT: Negative for ear pain, hearing loss, mouth sores, sore throat, trouble swallowing and voice change.    Eyes: Negative.    Respiratory: Negative for cough, choking, shortness of breath and wheezing.    Cardiovascular: Negative for chest pain and palpitations.   Gastrointestinal: Negative for abdominal pain, blood in stool, constipation, diarrhea, nausea and vomiting.   Endocrine: Negative for cold intolerance and heat intolerance.   Genitourinary: Negative for decreased urine volume, dysuria, frequency, hematuria and urgency.   Musculoskeletal: Negative for back pain, gait problem and myalgias.   Skin: Negative for color change, pallor and rash.   Allergic/Immunologic: Negative for food allergies and immunocompromised state.   Neurological: Negative for dizziness, tremors, seizures, syncope, weakness, light-headedness, numbness and headaches.   Hematological: Negative for adenopathy. Does not bruise/bleed easily.   Psychiatric/Behavioral: Negative for agitation and confusion. The patient is not nervous/anxious.    All other systems reviewed and are negative.    Objective   Vitals:    05/08/19 0839   BP: 132/70   Pulse: 70   SpO2: 97%   Weight: 73.9 kg (163 lb)   Height: 162.6 cm (64\")     Body mass index is 27.98 kg/m².  Physical Exam   Constitutional: She is oriented to person, place, and time. She appears well-developed and well-nourished.   HENT:   Head: Normocephalic and atraumatic.   Eyes: Pupils are equal, round, and reactive to light.   Neck: Normal range of motion. Neck supple. No tracheal deviation present.   Cardiovascular: Normal rate, regular rhythm and normal heart sounds. Exam reveals no gallop and no friction rub.   No murmur heard.  Pulmonary/Chest: Effort normal and breath sounds normal. No respiratory distress. She has no wheezes. She has no rales. She exhibits no tenderness.   Abdominal: Soft. Bowel sounds are normal. She exhibits no distension. There is no " hepatosplenomegaly. There is no tenderness. There is no rigidity, no rebound and no guarding.   Musculoskeletal: Normal range of motion. She exhibits no edema, tenderness or deformity.   Neurological: She is alert and oriented to person, place, and time. She has normal reflexes.   Skin: Skin is warm and dry. No rash noted. No pallor.   Psychiatric: She has a normal mood and affect. Her behavior is normal. Judgment and thought content normal.     Assessment/Plan   Nedra was seen today for gi problem.    Diagnoses and all orders for this visit:    Diverticulitis  -     Case Request; Standing  -     Follow Anesthesia Guidelines / Standing Orders; Future  -     Obtain Informed Consent; Future  -     Implement Anesthesia Orders Day of Procedure; Standing  -     Obtain Informed Consent; Standing  -     Verify bowel prep was successful; Standing  -     Case Request    HTN (hypertension), benign  Comments:  cont BP medication the day of procedure      COLONOSCOPY WITH ANESTHESIA (N/A)  EMR Dragon/transcription disclaimer: Much of this encounter note is electronic transcription/translation of spoken language to printed text. The electronic translation of spoken language may be erroneous, or at times, nonsensical words or phrases may be inadvertently transcribed. Although I have reviewed the note for such errors, some may still exist.  Body mass index is 27.98 kg/m².  No Follow-up on file.    Patient's Body mass index is 27.98 kg/m². BMI is above normal parameters. Recommendations include: nutrition counseling.      All risks, benefits, alternatives, and indications of colonoscopy and/or Endoscopy procedure have been discussed with the patient. Risks to include perforation of the colon requiring possible surgery or colostomy, risk of bleeding from biopsies or removal of colon tissue, possibility of missing a colon polyp or cancer, or adverse drug reaction.  Benefits to include the diagnosis and management of disease of the  colon and rectum. Alternatives to include barium enema, radiographic evaluation, lab testing or no intervention. Pt verbalizes understanding and agrees.     Arianne Charles, APRN  5/8/2019  9:03 AM      Obesity, Adult  Obesity is the condition of having too much total body fat. Being overweight or obese means that your weight is greater than what is considered healthy for your body size. Obesity is determined by a measurement called BMI. BMI is an estimate of body fat and is calculated from height and weight. For adults, a BMI of 30 or higher is considered obese.  Obesity can eventually lead to other health concerns and major illnesses, including:  · Stroke.  · Coronary artery disease (CAD).  · Type 2 diabetes.  · Some types of cancer, including cancers of the colon, breast, uterus, and gallbladder.  · Osteoarthritis.  · High blood pressure (hypertension).  · High cholesterol.  · Sleep apnea.  · Gallbladder stones.  · Infertility problems.  What are the causes?  The main cause of obesity is taking in (consuming) more calories than your body uses for energy. Other factors that contribute to this condition may include:  · Being born with genes that make you more likely to become obese.  · Having a medical condition that causes obesity. These conditions include:  ¨ Hypothyroidism.  ¨ Polycystic ovarian syndrome (PCOS).  ¨ Binge-eating disorder.  ¨ Cushing syndrome.  · Taking certain medicines, such as steroids, antidepressants, and seizure medicines.  · Not being physically active (sedentary lifestyle).  · Living where there are limited places to exercise safely or buy healthy foods.  · Not getting enough sleep.  What increases the risk?  The following factors may increase your risk of this condition:  · Having a family history of obesity.  · Being a woman of -American descent.  · Being a man of  descent.  What are the signs or symptoms?  Having excessive body fat is the main symptom of this  condition.  How is this diagnosed?  This condition may be diagnosed based on:  · Your symptoms.  · Your medical history.  · A physical exam. Your health care provider may measure:  ¨ Your BMI. If you are an adult with a BMI between 25 and less than 30, you are considered overweight. If you are an adult with a BMI of 30 or higher, you are considered obese.  ¨ The distances around your hips and your waist (circumferences). These may be compared to each other to help diagnose your condition.  ¨ Your skinfold thickness. Your health care provider may gently pinch a fold of your skin and measure it.  How is this treated?  Treatment for this condition often includes changing your lifestyle. Treatment may include some or all of the following:  · Dietary changes. Work with your health care provider and a dietitian to set a weight-loss goal that is healthy and reasonable for you. Dietary changes may include eating:  ¨ Smaller portions. A portion size is the amount of a particular food that is healthy for you to eat at one time. This varies from person to person.  ¨ Low-calorie or low-fat options.  ¨ More whole grains, fruits, and vegetables.  · Regular physical activity. This may include aerobic activity (cardio) and strength training.  · Medicine to help you lose weight. Your health care provider may prescribe medicine if you are unable to lose 1 pound a week after 6 weeks of eating more healthily and doing more physical activity.  · Surgery. Surgical options may include gastric banding and gastric bypass. Surgery may be done if:  ¨ Other treatments have not helped to improve your condition.  ¨ You have a BMI of 40 or higher.  ¨ You have life-threatening health problems related to obesity.  Follow these instructions at home:     Eating and drinking     · Follow recommendations from your health care provider about what you eat and drink. Your health care provider may advise you to:  ¨ Limit fast foods, sweets, and processed  snack foods.  ¨ Choose low-fat options, such as low-fat milk instead of whole milk.  ¨ Eat 5 or more servings of fruits or vegetables every day.  ¨ Eat at home more often. This gives you more control over what you eat.  ¨ Choose healthy foods when you eat out.  ¨ Learn what a healthy portion size is.  ¨ Keep low-fat snacks on hand.  ¨ Avoid sugary drinks, such as soda, fruit juice, iced tea sweetened with sugar, and flavored milk.  ¨ Eat a healthy breakfast.  · Drink enough water to keep your urine clear or pale yellow.  · Do not go without eating for long periods of time (do not fast) or follow a fad diet. Fasting and fad diets can be unhealthy and even dangerous.  Physical Activity   · Exercise regularly, as told by your health care provider. Ask your health care provider what types of exercise are safe for you and how often you should exercise.  · Warm up and stretch before being active.  · Cool down and stretch after being active.  · Rest between periods of activity.  Lifestyle   · Limit the time that you spend in front of your TV, computer, or video game system.  · Find ways to reward yourself that do not involve food.  · Limit alcohol intake to no more than 1 drink a day for nonpregnant women and 2 drinks a day for men. One drink equals 12 oz of beer, 5 oz of wine, or 1½ oz of hard liquor.  General instructions   · Keep a weight loss journal to keep track of the food you eat and how much you exercise you get.  · Take over-the-counter and prescription medicines only as told by your health care provider.  · Take vitamins and supplements only as told by your health care provider.  · Consider joining a support group. Your health care provider may be able to recommend a support group.  · Keep all follow-up visits as told by your health care provider. This is important.  Contact a health care provider if:  · You are unable to meet your weight loss goal after 6 weeks of dietary and lifestyle changes.  This  information is not intended to replace advice given to you by your health care provider. Make sure you discuss any questions you have with your health care provider.  Document Released: 01/25/2006 Document Revised: 05/22/2017 Document Reviewed: 10/05/2016  [email or web address] Interactive Patient Education © 2017 [email or web address]      If you smoke or use tobacco, 4 minutes reading provided  Steps to Quit Smoking  Smoking tobacco can be harmful to your health and can affect almost every organ in your body. Smoking puts you, and those around you, at risk for developing many serious chronic diseases. Quitting smoking is difficult, but it is one of the best things that you can do for your health. It is never too late to quit.  What are the benefits of quitting smoking?  When you quit smoking, you lower your risk of developing serious diseases and conditions, such as:  · Lung cancer or lung disease, such as COPD.  · Heart disease.  · Stroke.  · Heart attack.  · Infertility.  · Osteoporosis and bone fractures.  Additionally, symptoms such as coughing, wheezing, and shortness of breath may get better when you quit. You may also find that you get sick less often because your body is stronger at fighting off colds and infections. If you are pregnant, quitting smoking can help to reduce your chances of having a baby of low birth weight.  How do I get ready to quit?  When you decide to quit smoking, create a plan to make sure that you are successful. Before you quit:  · Pick a date to quit. Set a date within the next two weeks to give you time to prepare.  · Write down the reasons why you are quitting. Keep this list in places where you will see it often, such as on your bathroom mirror or in your car or wallet.  · Identify the people, places, things, and activities that make you want to smoke (triggers) and avoid them. Make sure to take these actions:  ¨ Throw away all cigarettes at home, at work, and in your car.  ¨ Throw away smoking  accessories, such as ashtrays and lighters.  ¨ Clean your car and make sure to empty the ashtray.  ¨ Clean your home, including curtains and carpets.  · Tell your family, friends, and coworkers that you are quitting. Support from your loved ones can make quitting easier.  · Talk with your health care provider about your options for quitting smoking.  · Find out what treatment options are covered by your health insurance.  What strategies can I use to quit smoking?  Talk with your healthcare provider about different strategies to quit smoking. Some strategies include:  · Quitting smoking altogether instead of gradually lessening how much you smoke over a period of time. Research shows that quitting “cold turkey” is more successful than gradually quitting.  · Attending in-person counseling to help you build problem-solving skills. You are more likely to have success in quitting if you attend several counseling sessions. Even short sessions of 10 minutes can be effective.  · Finding resources and support systems that can help you to quit smoking and remain smoke-free after you quit. These resources are most helpful when you use them often. They can include:  ¨ Online chats with a counselor.  ¨ Telephone quitlines.  ¨ Printed self-help materials.  ¨ Support groups or group counseling.  ¨ Text messaging programs.  ¨ Mobile phone applications.  · Taking medicines to help you quit smoking. (If you are pregnant or breastfeeding, talk with your health care provider first.) Some medicines contain nicotine and some do not. Both types of medicines help with cravings, but the medicines that include nicotine help to relieve withdrawal symptoms. Your health care provider may recommend:  ¨ Nicotine patches, gum, or lozenges.  ¨ Nicotine inhalers or sprays.  ¨ Non-nicotine medicine that is taken by mouth.  Talk with your health care provider about combining strategies, such as taking medicines while you are also receiving in-person  counseling. Using these two strategies together makes you more likely to succeed in quitting than if you used either strategy on its own.  If you are pregnant or breastfeeding, talk with your health care provider about finding counseling or other support strategies to quit smoking. Do not take medicine to help you quit smoking unless told to do so by your health care provider.  What things can I do to make it easier to quit?  Quitting smoking might feel overwhelming at first, but there is a lot that you can do to make it easier. Take these important actions:  · Reach out to your family and friends and ask that they support and encourage you during this time. Call telephone quitlines, reach out to support groups, or work with a counselor for support.  · Ask people who smoke to avoid smoking around you.  · Avoid places that trigger you to smoke, such as bars, parties, or smoke-break areas at work.  · Spend time around people who do not smoke.  · Lessen stress in your life, because stress can be a smoking trigger for some people. To lessen stress, try:  ¨ Exercising regularly.  ¨ Deep-breathing exercises.  ¨ Yoga.  ¨ Meditating.  ¨ Performing a body scan. This involves closing your eyes, scanning your body from head to toe, and noticing which parts of your body are particularly tense. Purposefully relax the muscles in those areas.  · Download or purchase mobile phone or tablet apps (applications) that can help you stick to your quit plan by providing reminders, tips, and encouragement. There are many free apps, such as QuitGuide from the CDC (Centers for Disease Control and Prevention). You can find other support for quitting smoking (smoking cessation) through smokefree.gov and other websites.  How will I feel when I quit smoking?  Within the first 24 hours of quitting smoking, you may start to feel some withdrawal symptoms. These symptoms are usually most noticeable 2-3 days after quitting, but they usually do not  last beyond 2-3 weeks. Changes or symptoms that you might experience include:  · Mood swings.  · Restlessness, anxiety, or irritation.  · Difficulty concentrating.  · Dizziness.  · Strong cravings for sugary foods in addition to nicotine.  · Mild weight gain.  · Constipation.  · Nausea.  · Coughing or a sore throat.  · Changes in how your medicines work in your body.  · A depressed mood.  · Difficulty sleeping (insomnia).  After the first 2-3 weeks of quitting, you may start to notice more positive results, such as:  · Improved sense of smell and taste.  · Decreased coughing and sore throat.  · Slower heart rate.  · Lower blood pressure.  · Clearer skin.  · The ability to breathe more easily.  · Fewer sick days.  Quitting smoking is very challenging for most people. Do not get discouraged if you are not successful the first time. Some people need to make many attempts to quit before they achieve long-term success. Do your best to stick to your quit plan, and talk with your health care provider if you have any questions or concerns.  This information is not intended to replace advice given to you by your health care provider. Make sure you discuss any questions you have with your health care provider.  Document Released: 12/12/2002 Document Revised: 08/15/2017 Document Reviewed: 05/03/2016  [employer] Interactive Patient Education © 2017 [employer]

## 2019-06-27 ENCOUNTER — TELEPHONE (OUTPATIENT)
Dept: GASTROENTEROLOGY | Facility: CLINIC | Age: 84
End: 2019-06-27

## 2019-06-27 NOTE — TELEPHONE ENCOUNTER
Patient cancelled her colonoscopy for diverticulitis. She states she is feeling much better and with her age she thinks it is best to not have the procedure. I will send her and her PCP a letter.

## 2019-07-08 DIAGNOSIS — E04.1 THYROID NODULE: Primary | ICD-10-CM

## 2019-09-18 ENCOUNTER — APPOINTMENT (OUTPATIENT)
Dept: ULTRASOUND IMAGING | Facility: HOSPITAL | Age: 84
End: 2019-09-18

## 2019-10-24 ENCOUNTER — HOSPITAL ENCOUNTER (OUTPATIENT)
Dept: ULTRASOUND IMAGING | Facility: HOSPITAL | Age: 84
Discharge: HOME | End: 2019-10-24
Admitting: OTOLARYNGOLOGY

## 2019-10-24 ENCOUNTER — OFFICE VISIT (OUTPATIENT)
Dept: OTOLARYNGOLOGY | Facility: CLINIC | Age: 84
End: 2019-10-24

## 2019-10-24 VITALS
HEIGHT: 64 IN | WEIGHT: 166.2 LBS | SYSTOLIC BLOOD PRESSURE: 167 MMHG | BODY MASS INDEX: 28.38 KG/M2 | HEART RATE: 77 BPM | TEMPERATURE: 97.8 F | DIASTOLIC BLOOD PRESSURE: 70 MMHG

## 2019-10-24 DIAGNOSIS — E03.9 ACQUIRED HYPOTHYROIDISM: ICD-10-CM

## 2019-10-24 DIAGNOSIS — E04.1 THYROID NODULE: ICD-10-CM

## 2019-10-24 DIAGNOSIS — E04.1 THYROID NODULE: Primary | ICD-10-CM

## 2019-10-24 PROCEDURE — 99213 OFFICE O/P EST LOW 20 MIN: CPT | Performed by: PHYSICIAN ASSISTANT

## 2019-10-24 PROCEDURE — 76536 US EXAM OF HEAD AND NECK: CPT

## 2019-10-24 NOTE — PROGRESS NOTES
YOB: 1935  Location: [facility]  Location Address: [address] [place] [address], [facility], [address] [place] [address]  Location Phone: [phone]    Chief Complaint   Patient presents with   • Follow-up       History of Present Illness  Nedra Tolliver is a 83 y.o. female.  Nedra Tolliver is here for follow up of ENT complaints. The patient has had problems with a thyroid nodule.  The symptoms are localized to the left thyroid. The patient has had no obvious clinical symptoms. The symptoms have been present for the last several years. The symptoms are aggravated by  no identifiable factors. The symptoms are improved by no identifiable factors.    Ultrasound indicates a stable left nodule and no nodule on the right.         Study Result     EXAMINATION: US THYROID- 10/24/2019 12:33 PM CDT     HISTORY: Thyroid disease     COMPARISON: None     FINDINGS:  Sonographic evaluation of the thyroid gland was performed in the  transverse and longitudinal projections.     The right thyroid lobe measures 4.5 x 1.7 x 1.5 cm in size. No discrete  nodules are demonstrated. Color flow appears grossly normal.     Left thyroid lobe measures 4.4 x 1.4 x 1.8 cm in size. A hypoechoic  nodule with microcalcifications is seen which measures 1.3 x 0.9 x 1.2  cm in size. No internal color flow is demonstrated.     The thyroid isthmus measures 0.3 cm in AP diameter.     IMPRESSION:  Hypoechoic nodule in the inferior left thyroid lobe  measuring up to 1.3 cm in size.  This report was finalized on 10/24/2019 12:38 by Dr. Bari Duarte MD.       Past Medical History:   Diagnosis Date   • Diabetes type 2, controlled (CMS/HCC)    • Diverticulitis    • Hypertension    • Hypothyroidism    • LPRD (laryngopharyngeal reflux disease)    • Pharyngeal dysphagia    • Thyroid nodule        Past Surgical History:   Procedure Laterality Date   • APPENDECTOMY     • BASAL CELL CARCINOMA EXCISION     • BELPHAROPTOSIS REPAIR     • BREAST CYST EXCISION      • CATARACT EXTRACTION     • CHOLECYSTECTOMY     • COLONOSCOPY Left 11/1/2016    Tubular adenoma cecum, Diverticulosis repeat prn   • PARTIAL HYSTERECTOMY     • TUMOR EXCISION      under armpits and left breast       Outpatient Medications Marked as Taking for the 10/24/19 encounter (Office Visit) with Roberto Collins PA   Medication Sig Dispense Refill   • acetaminophen (TYLENOL) 500 MG tablet Take 500 mg by mouth every 6 (six) hours as needed for mild pain (1-3).     • amLODIPine (NORVASC) 2.5 MG tablet   3   • Cholecalciferol (VITAMIN D3) 5000 UNITS capsule capsule Take 5,000 Units by mouth daily.     • Coenzyme Q10 (CO Q 10) 100 MG capsule Take  by mouth.     • conjugated estrogens (PREMARIN) 0.625 MG/GM vaginal cream Insert  into the vagina 2 (Two) Times a Week. 30 g 6   • dicyclomine (BENTYL) 10 MG capsule Take 10 mg by mouth 3 (Three) Times a Day.     • Garlic 10 MG capsule Take  by mouth.     • glimepiride (AMARYL) 4 MG tablet Take 4 mg by mouth every morning before breakfast.     • irbesartan (AVAPRO) 75 MG tablet TAKE 1 TABLET BY MOUTH EVERY DAY  REPLACES VALSARTAN  3   • levothyroxine (SYNTHROID, LEVOTHROID) 50 MCG tablet Take 50 mcg by mouth daily.     • metFORMIN (GLUCOPHAGE) 500 MG tablet Take 500 mg by mouth Every Other Day.     • Multiple Vitamins-Minerals (CENTRUM SILVER PO) Take  by mouth.     • nebivolol (BYSTOLIC) 2.5 MG tablet Take 2.5 mg by mouth daily.     • Probiotic Product (PROBIOTIC DAILY PO) Take  by mouth.         Bactroban [mupirocin calcium]; Codeine; Iodine; Levaquin [levofloxacin]; Lortab [hydrocodone-acetaminophen]; Quinolones; and Shrimp (diagnostic)    Family History   Problem Relation Age of Onset   • Diabetes Mother    • Cancer Mother    • Heart failure Mother    • Diabetes Father    • Colon cancer Neg Hx    • Colon polyps Neg Hx        Social History     Socioeconomic History   • Marital status:      Spouse name: Not on file   • Number of children: Not on file    • Years of education: Not on file   • Highest education level: Not on file   Tobacco Use   • Smoking status: Never Smoker   • Smokeless tobacco: Never Used   Substance and Sexual Activity   • Alcohol use: No   • Drug use: Defer   • Sexual activity: Defer       Review of Systems   Constitutional: Negative.  Negative for activity change, appetite change, chills, diaphoresis, fatigue, fever and unexpected weight change.   HENT: Negative for congestion, dental problem, drooling, ear discharge, ear pain, facial swelling, hearing loss, mouth sores, nosebleeds, postnasal drip, rhinorrhea, sinus pressure, sneezing, sore throat, tinnitus, trouble swallowing and voice change.         Thyroid nodule   Eyes: Negative.    Respiratory: Negative.    Cardiovascular: Negative.    Gastrointestinal: Negative.         Reflux   Endocrine: Negative.    Genitourinary: Negative.    Musculoskeletal: Negative.    Skin: Negative.    Allergic/Immunologic: Negative.  Negative for environmental allergies, food allergies and immunocompromised state.   Neurological: Negative.    Hematological: Bruises/bleeds easily.   Psychiatric/Behavioral: Negative.        Vitals:    10/24/19 1312   BP: 167/70   Pulse: 77   Temp: 97.8 °F (36.6 °C)       Body mass index is 28.53 kg/m².    Objective     Physical Exam  CONSTITUTIONAL: well nourished, alert, oriented, in no acute distress     COMMUNICATION AND VOICE: able to communicate normally, normal voice quality    HEAD: normocephalic, no lesions, atraumatic, no tenderness, no masses     FACE: appearance normal, no lesions, no tenderness, no deformities, facial motion symmetric    SALIVARY GLANDS: parotid glands with no tenderness, no swelling, no masses, submandibular glands with normal size, nontender, left parotid surgically absent    EYES: ocular motility normal, eyelids normal, orbits normal, no proptosis, conjunctiva normal , pupils equal, round     EARS:  Hearing: response to conversational voice  normal bilaterally   External Ears: auricles without lesions  Otoscopic: tympanic membrane with myringosclerosis, no lesions, no perforation, normal mobility, no fluid     NOSE:  External Nose: structure normal, no tenderness on palpation, no nasal discharge, no lesions, no evidence of trauma, nostrils patent   Intranasal Exam: nasal mucosa normal, vestibule within normal limits, inferior turbinate normal, nasal septum midline     ORAL:  Lips: upper and lower lips without lesion   Teeth: dentition within normal limits for age   Gums: gingivae healthy   Oral Mucosa: oral mucosa normal, no mucosal lesions   Floor of Mouth: Warthin’s duct patent, mucosa normal  Tongue: lingual mucosa normal without lesions, normal tongue mobility   Palate: soft and hard palates with normal mucosa and structure  Oropharynx: oropharyngeal mucosa normal    NECK: neck appearance normal, no mass,  noted without erythema or tenderness    THYROID: left thyroid nodule 1.3 cm, no nodules on the right, position midline, no tenderness on palpation, no overt thyromegaly     LYMPH NODES: no lymphadenopathy    CHEST/RESPIRATORY: respiratory effort normal    CARDIOVASCULAR: extremities without cyanosis or edema      NEUROLOGIC/PSYCHIATRIC: oriented to time, place and person, mood normal, affect appropriate, CN II-XII intact grossly    Assessment/Plan   Nedra was seen today for follow-up.    Diagnoses and all orders for this visit:    Thyroid nodule    Acquired hypothyroidism      * Surgery not found *  No orders of the defined types were placed in this encounter.    Return in about 1 year (around 10/24/2020) for Recheck thyroid ultrasound.       Patient Instructions   Recheck thyroid in one year with thyroid ultrasound

## 2019-10-28 PROBLEM — E04.1 THYROID NODULE: Status: ACTIVE | Noted: 2019-10-28

## 2019-10-28 PROBLEM — E03.9 HYPOTHYROIDISM: Status: ACTIVE | Noted: 2019-10-28

## 2019-12-29 ENCOUNTER — APPOINTMENT (OUTPATIENT)
Dept: GENERAL RADIOLOGY | Facility: HOSPITAL | Age: 84
End: 2019-12-29

## 2019-12-29 ENCOUNTER — HOSPITAL ENCOUNTER (INPATIENT)
Facility: HOSPITAL | Age: 84
LOS: 4 days | Discharge: HOME HEALTH | End: 2020-01-03
Attending: INTERNAL MEDICINE | Admitting: INTERNAL MEDICINE

## 2019-12-29 DIAGNOSIS — E87.1 HYPONATREMIA: Primary | ICD-10-CM

## 2019-12-29 DIAGNOSIS — R53.1 WEAKNESS: ICD-10-CM

## 2019-12-29 DIAGNOSIS — R06.02 SHORTNESS OF BREATH: ICD-10-CM

## 2019-12-29 DIAGNOSIS — E11.65 CONTROLLED TYPE 2 DIABETES MELLITUS WITH HYPERGLYCEMIA, WITHOUT LONG-TERM CURRENT USE OF INSULIN (HCC): Chronic | ICD-10-CM

## 2019-12-29 DIAGNOSIS — Z78.9 DECREASED ACTIVITIES OF DAILY LIVING (ADL): ICD-10-CM

## 2019-12-29 DIAGNOSIS — Z74.09 IMPAIRED MOBILITY: ICD-10-CM

## 2019-12-29 DIAGNOSIS — I10 HTN (HYPERTENSION), BENIGN: ICD-10-CM

## 2019-12-29 LAB
ALBUMIN SERPL-MCNC: 4 G/DL (ref 3.5–5.2)
ALBUMIN/GLOB SERPL: 1.4 G/DL
ALP SERPL-CCNC: 70 U/L (ref 39–117)
ALT SERPL W P-5'-P-CCNC: 17 U/L (ref 1–33)
ANION GAP SERPL CALCULATED.3IONS-SCNC: 14 MMOL/L (ref 5–15)
ARTERIAL PATENCY WRIST A: POSITIVE
AST SERPL-CCNC: 15 U/L (ref 1–32)
ATMOSPHERIC PRESS: 741 MMHG
BASE EXCESS BLDA CALC-SCNC: 0.2 MMOL/L (ref 0–2)
BASOPHILS # BLD AUTO: 0.03 10*3/MM3 (ref 0–0.2)
BASOPHILS NFR BLD AUTO: 0.3 % (ref 0–1.5)
BDY SITE: ABNORMAL
BILIRUB SERPL-MCNC: 0.3 MG/DL (ref 0.2–1.2)
BODY TEMPERATURE: 37 C
BUN BLD-MCNC: 20 MG/DL (ref 8–23)
BUN/CREAT SERPL: 18.7 (ref 7–25)
CALCIUM SPEC-SCNC: 9.7 MG/DL (ref 8.6–10.5)
CHLORIDE SERPL-SCNC: 83 MMOL/L (ref 98–107)
CO2 SERPL-SCNC: 23 MMOL/L (ref 22–29)
CREAT BLD-MCNC: 1.07 MG/DL (ref 0.57–1)
D DIMER PPP FEU-MCNC: 0.55 MG/L (FEU) (ref 0–0.5)
DEPRECATED RDW RBC AUTO: 39.4 FL (ref 37–54)
EOSINOPHIL # BLD AUTO: 0.09 10*3/MM3 (ref 0–0.4)
EOSINOPHIL NFR BLD AUTO: 0.9 % (ref 0.3–6.2)
ERYTHROCYTE [DISTWIDTH] IN BLOOD BY AUTOMATED COUNT: 12.3 % (ref 12.3–15.4)
FLUAV AG NPH QL: NEGATIVE
FLUBV AG NPH QL IA: NEGATIVE
GFR SERPL CREATININE-BSD FRML MDRD: 49 ML/MIN/1.73
GLOBULIN UR ELPH-MCNC: 2.9 GM/DL
GLUCOSE BLD-MCNC: 182 MG/DL (ref 65–99)
HCO3 BLDA-SCNC: 24.6 MMOL/L (ref 20–26)
HCT VFR BLD AUTO: 34.4 % (ref 34–46.6)
HGB BLD-MCNC: 12.5 G/DL (ref 12–15.9)
HOLD SPECIMEN: NORMAL
HOLD SPECIMEN: NORMAL
IMM GRANULOCYTES # BLD AUTO: 0.04 10*3/MM3 (ref 0–0.05)
IMM GRANULOCYTES NFR BLD AUTO: 0.4 % (ref 0–0.5)
LYMPHOCYTES # BLD AUTO: 1.47 10*3/MM3 (ref 0.7–3.1)
LYMPHOCYTES NFR BLD AUTO: 14 % (ref 19.6–45.3)
Lab: ABNORMAL
MCH RBC QN AUTO: 32 PG (ref 26.6–33)
MCHC RBC AUTO-ENTMCNC: 36.3 G/DL (ref 31.5–35.7)
MCV RBC AUTO: 88 FL (ref 79–97)
MODALITY: ABNORMAL
MONOCYTES # BLD AUTO: 0.96 10*3/MM3 (ref 0.1–0.9)
MONOCYTES NFR BLD AUTO: 9.1 % (ref 5–12)
NEUTROPHILS # BLD AUTO: 7.93 10*3/MM3 (ref 1.7–7)
NEUTROPHILS NFR BLD AUTO: 75.3 % (ref 42.7–76)
NRBC BLD AUTO-RTO: 0 /100 WBC (ref 0–0.2)
NT-PROBNP SERPL-MCNC: 248 PG/ML (ref 5–1800)
PCO2 BLDA: 38.4 MM HG (ref 35–45)
PH BLDA: 7.42 PH UNITS (ref 7.35–7.45)
PLATELET # BLD AUTO: 265 10*3/MM3 (ref 140–450)
PMV BLD AUTO: 9.6 FL (ref 6–12)
PO2 BLDA: 64.2 MM HG (ref 83–108)
POTASSIUM BLD-SCNC: 4.5 MMOL/L (ref 3.5–5.2)
PROT SERPL-MCNC: 6.9 G/DL (ref 6–8.5)
RBC # BLD AUTO: 3.91 10*6/MM3 (ref 3.77–5.28)
SAO2 % BLDCOA: 93.3 % (ref 94–99)
SODIUM BLD-SCNC: 120 MMOL/L (ref 136–145)
TROPONIN T SERPL-MCNC: <0.01 NG/ML (ref 0–0.03)
VENTILATOR MODE: ABNORMAL
WBC NRBC COR # BLD: 10.52 10*3/MM3 (ref 3.4–10.8)
WHOLE BLOOD HOLD SPECIMEN: NORMAL
WHOLE BLOOD HOLD SPECIMEN: NORMAL

## 2019-12-29 PROCEDURE — 83880 ASSAY OF NATRIURETIC PEPTIDE: CPT | Performed by: PHYSICIAN ASSISTANT

## 2019-12-29 PROCEDURE — 84484 ASSAY OF TROPONIN QUANT: CPT | Performed by: PHYSICIAN ASSISTANT

## 2019-12-29 PROCEDURE — 93005 ELECTROCARDIOGRAM TRACING: CPT

## 2019-12-29 PROCEDURE — 93005 ELECTROCARDIOGRAM TRACING: CPT | Performed by: INTERNAL MEDICINE

## 2019-12-29 PROCEDURE — 99285 EMERGENCY DEPT VISIT HI MDM: CPT

## 2019-12-29 PROCEDURE — 83036 HEMOGLOBIN GLYCOSYLATED A1C: CPT | Performed by: INTERNAL MEDICINE

## 2019-12-29 PROCEDURE — 85379 FIBRIN DEGRADATION QUANT: CPT | Performed by: PHYSICIAN ASSISTANT

## 2019-12-29 PROCEDURE — 93010 ELECTROCARDIOGRAM REPORT: CPT | Performed by: INTERNAL MEDICINE

## 2019-12-29 PROCEDURE — 87804 INFLUENZA ASSAY W/OPTIC: CPT | Performed by: PHYSICIAN ASSISTANT

## 2019-12-29 PROCEDURE — 36600 WITHDRAWAL OF ARTERIAL BLOOD: CPT

## 2019-12-29 PROCEDURE — 85025 COMPLETE CBC W/AUTO DIFF WBC: CPT | Performed by: PHYSICIAN ASSISTANT

## 2019-12-29 PROCEDURE — 71045 X-RAY EXAM CHEST 1 VIEW: CPT

## 2019-12-29 PROCEDURE — 82803 BLOOD GASES ANY COMBINATION: CPT

## 2019-12-29 PROCEDURE — 80053 COMPREHEN METABOLIC PANEL: CPT | Performed by: PHYSICIAN ASSISTANT

## 2019-12-29 RX ORDER — ACETAMINOPHEN 325 MG/1
650 TABLET ORAL ONCE
Status: COMPLETED | OUTPATIENT
Start: 2019-12-29 | End: 2019-12-29

## 2019-12-29 RX ORDER — SODIUM CHLORIDE 0.9 % (FLUSH) 0.9 %
10 SYRINGE (ML) INJECTION AS NEEDED
Status: DISCONTINUED | OUTPATIENT
Start: 2019-12-29 | End: 2020-01-03 | Stop reason: HOSPADM

## 2019-12-29 RX ADMIN — ACETAMINOPHEN 650 MG: 325 TABLET, FILM COATED ORAL at 23:50

## 2019-12-30 ENCOUNTER — APPOINTMENT (OUTPATIENT)
Dept: NUCLEAR MEDICINE | Facility: HOSPITAL | Age: 84
End: 2019-12-30

## 2019-12-30 ENCOUNTER — APPOINTMENT (OUTPATIENT)
Dept: CT IMAGING | Facility: HOSPITAL | Age: 84
End: 2019-12-30

## 2019-12-30 ENCOUNTER — APPOINTMENT (OUTPATIENT)
Dept: CARDIOLOGY | Facility: HOSPITAL | Age: 84
End: 2019-12-30

## 2019-12-30 PROBLEM — E11.9 DIABETES TYPE 2, CONTROLLED: Chronic | Status: ACTIVE | Noted: 2019-12-30

## 2019-12-30 PROBLEM — R55 SYNCOPE AND COLLAPSE: Status: ACTIVE | Noted: 2019-12-30

## 2019-12-30 PROBLEM — E86.9 VOLUME DEPLETION: Status: ACTIVE | Noted: 2019-12-30

## 2019-12-30 PROBLEM — E87.1 HYPONATREMIA: Status: ACTIVE | Noted: 2019-12-30

## 2019-12-30 LAB
ANION GAP SERPL CALCULATED.3IONS-SCNC: 10 MMOL/L (ref 5–15)
ANION GAP SERPL CALCULATED.3IONS-SCNC: 11 MMOL/L (ref 5–15)
ANION GAP SERPL CALCULATED.3IONS-SCNC: 12 MMOL/L (ref 5–15)
BH CV ECHO MEAS - AO MAX PG (FULL): 7.4 MMHG
BH CV ECHO MEAS - AO MAX PG: 11.6 MMHG
BH CV ECHO MEAS - AO MEAN PG (FULL): 4 MMHG
BH CV ECHO MEAS - AO MEAN PG: 6 MMHG
BH CV ECHO MEAS - AO ROOT AREA (BSA CORRECTED): 1.9
BH CV ECHO MEAS - AO ROOT AREA: 9.1 CM^2
BH CV ECHO MEAS - AO ROOT DIAM: 3.4 CM
BH CV ECHO MEAS - AO V2 MAX: 170 CM/SEC
BH CV ECHO MEAS - AO V2 MEAN: 108 CM/SEC
BH CV ECHO MEAS - AO V2 VTI: 34.5 CM
BH CV ECHO MEAS - AVA(I,A): 2.2 CM^2
BH CV ECHO MEAS - AVA(I,D): 2.2 CM^2
BH CV ECHO MEAS - AVA(V,A): 1.9 CM^2
BH CV ECHO MEAS - AVA(V,D): 1.9 CM^2
BH CV ECHO MEAS - BSA(HAYCOCK): 1.9 M^2
BH CV ECHO MEAS - BSA: 1.8 M^2
BH CV ECHO MEAS - BZI_BMI: 28.2 KILOGRAMS/M^2
BH CV ECHO MEAS - BZI_METRIC_HEIGHT: 162.6 CM
BH CV ECHO MEAS - BZI_METRIC_WEIGHT: 74.4 KG
BH CV ECHO MEAS - EDV(CUBED): 159.2 ML
BH CV ECHO MEAS - EDV(MOD-SP4): 49.6 ML
BH CV ECHO MEAS - EDV(TEICH): 142.5 ML
BH CV ECHO MEAS - EF(CUBED): 79.4 %
BH CV ECHO MEAS - EF(MOD-SP4): 76.2 %
BH CV ECHO MEAS - EF(TEICH): 71.3 %
BH CV ECHO MEAS - ESV(CUBED): 32.8 ML
BH CV ECHO MEAS - ESV(MOD-SP4): 11.8 ML
BH CV ECHO MEAS - ESV(TEICH): 41 ML
BH CV ECHO MEAS - FS: 41 %
BH CV ECHO MEAS - IVS/LVPW: 1.1
BH CV ECHO MEAS - IVSD: 1.1 CM
BH CV ECHO MEAS - LA DIMENSION: 3.3 CM
BH CV ECHO MEAS - LA/AO: 0.97
BH CV ECHO MEAS - LAT PEAK E' VEL: 5 CM/SEC
BH CV ECHO MEAS - LV DIASTOLIC VOL/BSA (35-75): 27.6 ML/M^2
BH CV ECHO MEAS - LV MASS(C)D: 225.2 GRAMS
BH CV ECHO MEAS - LV MASS(C)DI: 125.3 GRAMS/M^2
BH CV ECHO MEAS - LV MAX PG: 4.2 MMHG
BH CV ECHO MEAS - LV MEAN PG: 2 MMHG
BH CV ECHO MEAS - LV SYSTOLIC VOL/BSA (12-30): 6.6 ML/M^2
BH CV ECHO MEAS - LV V1 MAX: 102 CM/SEC
BH CV ECHO MEAS - LV V1 MEAN: 70.5 CM/SEC
BH CV ECHO MEAS - LV V1 VTI: 24.1 CM
BH CV ECHO MEAS - LVIDD: 5.4 CM
BH CV ECHO MEAS - LVIDS: 3.2 CM
BH CV ECHO MEAS - LVLD AP4: 7.1 CM
BH CV ECHO MEAS - LVLS AP4: 5.9 CM
BH CV ECHO MEAS - LVOT AREA (M): 3.1 CM^2
BH CV ECHO MEAS - LVOT AREA: 3.1 CM^2
BH CV ECHO MEAS - LVOT DIAM: 2 CM
BH CV ECHO MEAS - LVPWD: 0.99 CM
BH CV ECHO MEAS - MED PEAK E' VEL: 3.81 CM/SEC
BH CV ECHO MEAS - MV A MAX VEL: 89.2 CM/SEC
BH CV ECHO MEAS - MV DEC TIME: 0.41 SEC
BH CV ECHO MEAS - MV E MAX VEL: 56.3 CM/SEC
BH CV ECHO MEAS - MV E/A: 0.63
BH CV ECHO MEAS - PA MAX PG: 2.2 MMHG
BH CV ECHO MEAS - PA V2 MAX: 74.2 CM/SEC
BH CV ECHO MEAS - PI END-D VEL: 105 CM/SEC
BH CV ECHO MEAS - RAP SYSTOLE: 5 MMHG
BH CV ECHO MEAS - RVSP: 31.8 MMHG
BH CV ECHO MEAS - SI(AO): 174.2 ML/M^2
BH CV ECHO MEAS - SI(CUBED): 70.3 ML/M^2
BH CV ECHO MEAS - SI(LVOT): 42.1 ML/M^2
BH CV ECHO MEAS - SI(MOD-SP4): 21 ML/M^2
BH CV ECHO MEAS - SI(TEICH): 56.5 ML/M^2
BH CV ECHO MEAS - SV(AO): 313.2 ML
BH CV ECHO MEAS - SV(CUBED): 126.5 ML
BH CV ECHO MEAS - SV(LVOT): 75.7 ML
BH CV ECHO MEAS - SV(MOD-SP4): 37.8 ML
BH CV ECHO MEAS - SV(TEICH): 101.6 ML
BH CV ECHO MEAS - TR MAX VEL: 259 CM/SEC
BH CV ECHO MEASUREMENTS AVERAGE E/E' RATIO: 12.78
BUN BLD-MCNC: 16 MG/DL (ref 8–23)
BUN BLD-MCNC: 17 MG/DL (ref 8–23)
BUN BLD-MCNC: 17 MG/DL (ref 8–23)
BUN/CREAT SERPL: 16.8 (ref 7–25)
BUN/CREAT SERPL: 18 (ref 7–25)
BUN/CREAT SERPL: 18.7 (ref 7–25)
CALCIUM SPEC-SCNC: 9.4 MG/DL (ref 8.6–10.5)
CALCIUM SPEC-SCNC: 9.5 MG/DL (ref 8.6–10.5)
CALCIUM SPEC-SCNC: 9.5 MG/DL (ref 8.6–10.5)
CHLORIDE SERPL-SCNC: 84 MMOL/L (ref 98–107)
CHLORIDE SERPL-SCNC: 86 MMOL/L (ref 98–107)
CHLORIDE SERPL-SCNC: 88 MMOL/L (ref 98–107)
CHOLEST SERPL-MCNC: 266 MG/DL (ref 0–200)
CK SERPL-CCNC: 70 U/L (ref 20–180)
CO2 SERPL-SCNC: 26 MMOL/L (ref 22–29)
CO2 SERPL-SCNC: 27 MMOL/L (ref 22–29)
CO2 SERPL-SCNC: 27 MMOL/L (ref 22–29)
CREAT BLD-MCNC: 0.89 MG/DL (ref 0.57–1)
CREAT BLD-MCNC: 0.91 MG/DL (ref 0.57–1)
CREAT BLD-MCNC: 1.01 MG/DL (ref 0.57–1)
CREAT UR-MCNC: 38.2 MG/DL
GFR SERPL CREATININE-BSD FRML MDRD: 52 ML/MIN/1.73
GFR SERPL CREATININE-BSD FRML MDRD: 59 ML/MIN/1.73
GFR SERPL CREATININE-BSD FRML MDRD: 60 ML/MIN/1.73
GLUCOSE BLD-MCNC: 105 MG/DL (ref 65–99)
GLUCOSE BLD-MCNC: 134 MG/DL (ref 65–99)
GLUCOSE BLD-MCNC: 140 MG/DL (ref 65–99)
GLUCOSE BLDC GLUCOMTR-MCNC: 105 MG/DL (ref 70–130)
GLUCOSE BLDC GLUCOMTR-MCNC: 143 MG/DL (ref 70–130)
GLUCOSE BLDC GLUCOMTR-MCNC: 160 MG/DL (ref 70–130)
GLUCOSE BLDC GLUCOMTR-MCNC: 161 MG/DL (ref 70–130)
HBA1C MFR BLD: 7.2 % (ref 4.8–5.6)
HDLC SERPL-MCNC: 57 MG/DL (ref 40–60)
LDLC SERPL CALC-MCNC: 168 MG/DL (ref 0–100)
LDLC/HDLC SERPL: 2.95 {RATIO}
LEFT ATRIUM VOLUME INDEX: 28.5 ML/M2
LEFT ATRIUM VOLUME: 51.3 CM3
MAGNESIUM SERPL-MCNC: 1.8 MG/DL (ref 1.6–2.4)
MAXIMAL PREDICTED HEART RATE: 136 BPM
OSMOLALITY SERPL: 259 MOSM/KG (ref 289–308)
OSMOLALITY UR: 328 MOSM/KG (ref 601–850)
PHOSPHATE SERPL-MCNC: 3.1 MG/DL (ref 2.5–4.5)
POTASSIUM BLD-SCNC: 4 MMOL/L (ref 3.5–5.2)
POTASSIUM BLD-SCNC: 4.2 MMOL/L (ref 3.5–5.2)
POTASSIUM BLD-SCNC: 4.3 MMOL/L (ref 3.5–5.2)
SODIUM BLD-SCNC: 121 MMOL/L (ref 136–145)
SODIUM BLD-SCNC: 124 MMOL/L (ref 136–145)
SODIUM BLD-SCNC: 126 MMOL/L (ref 136–145)
SODIUM UR-SCNC: 84 MMOL/L
STRESS TARGET HR: 116 BPM
T4 FREE SERPL-MCNC: 1.08 NG/DL (ref 0.93–1.7)
TRIGL SERPL-MCNC: 205 MG/DL (ref 0–150)
TROPONIN T SERPL-MCNC: <0.01 NG/ML (ref 0–0.03)
TROPONIN T SERPL-MCNC: <0.01 NG/ML (ref 0–0.03)
TSH SERPL DL<=0.05 MIU/L-ACNC: 0.81 UIU/ML (ref 0.27–4.2)
VLDLC SERPL-MCNC: 41 MG/DL

## 2019-12-30 PROCEDURE — 93306 TTE W/DOPPLER COMPLETE: CPT | Performed by: INTERNAL MEDICINE

## 2019-12-30 PROCEDURE — 82570 ASSAY OF URINE CREATININE: CPT | Performed by: INTERNAL MEDICINE

## 2019-12-30 PROCEDURE — 93306 TTE W/DOPPLER COMPLETE: CPT

## 2019-12-30 PROCEDURE — A9558 XE133 XENON 10MCI: HCPCS | Performed by: PHYSICIAN ASSISTANT

## 2019-12-30 PROCEDURE — 70450 CT HEAD/BRAIN W/O DYE: CPT

## 2019-12-30 PROCEDURE — 83935 ASSAY OF URINE OSMOLALITY: CPT | Performed by: INTERNAL MEDICINE

## 2019-12-30 PROCEDURE — 82962 GLUCOSE BLOOD TEST: CPT

## 2019-12-30 PROCEDURE — 80048 BASIC METABOLIC PNL TOTAL CA: CPT | Performed by: INTERNAL MEDICINE

## 2019-12-30 PROCEDURE — 93010 ELECTROCARDIOGRAM REPORT: CPT | Performed by: INTERNAL MEDICINE

## 2019-12-30 PROCEDURE — A9540 TC99M MAA: HCPCS | Performed by: PHYSICIAN ASSISTANT

## 2019-12-30 PROCEDURE — 97161 PT EVAL LOW COMPLEX 20 MIN: CPT | Performed by: PHYSICAL THERAPIST

## 2019-12-30 PROCEDURE — 82550 ASSAY OF CK (CPK): CPT | Performed by: INTERNAL MEDICINE

## 2019-12-30 PROCEDURE — 80048 BASIC METABOLIC PNL TOTAL CA: CPT | Performed by: NURSE PRACTITIONER

## 2019-12-30 PROCEDURE — 84300 ASSAY OF URINE SODIUM: CPT | Performed by: INTERNAL MEDICINE

## 2019-12-30 PROCEDURE — 78582 LUNG VENTILAT&PERFUS IMAGING: CPT

## 2019-12-30 PROCEDURE — 0 TECHNETIUM ALBUMIN AGGREGATED: Performed by: PHYSICIAN ASSISTANT

## 2019-12-30 PROCEDURE — 0 XENON XE 133: Performed by: PHYSICIAN ASSISTANT

## 2019-12-30 PROCEDURE — 84484 ASSAY OF TROPONIN QUANT: CPT | Performed by: INTERNAL MEDICINE

## 2019-12-30 PROCEDURE — 84100 ASSAY OF PHOSPHORUS: CPT | Performed by: INTERNAL MEDICINE

## 2019-12-30 PROCEDURE — 83735 ASSAY OF MAGNESIUM: CPT | Performed by: INTERNAL MEDICINE

## 2019-12-30 PROCEDURE — 93005 ELECTROCARDIOGRAM TRACING: CPT | Performed by: PHYSICIAN ASSISTANT

## 2019-12-30 PROCEDURE — 84439 ASSAY OF FREE THYROXINE: CPT | Performed by: INTERNAL MEDICINE

## 2019-12-30 PROCEDURE — 80061 LIPID PANEL: CPT | Performed by: INTERNAL MEDICINE

## 2019-12-30 PROCEDURE — 25010000002 ENOXAPARIN PER 10 MG: Performed by: INTERNAL MEDICINE

## 2019-12-30 PROCEDURE — 84443 ASSAY THYROID STIM HORMONE: CPT | Performed by: INTERNAL MEDICINE

## 2019-12-30 PROCEDURE — 83930 ASSAY OF BLOOD OSMOLALITY: CPT | Performed by: INTERNAL MEDICINE

## 2019-12-30 RX ORDER — NITROGLYCERIN 0.4 MG/1
0.4 TABLET SUBLINGUAL
Status: DISCONTINUED | OUTPATIENT
Start: 2019-12-30 | End: 2020-01-03 | Stop reason: HOSPADM

## 2019-12-30 RX ORDER — ONDANSETRON 4 MG/1
4 TABLET, FILM COATED ORAL EVERY 6 HOURS PRN
Status: DISCONTINUED | OUTPATIENT
Start: 2019-12-30 | End: 2020-01-03 | Stop reason: HOSPADM

## 2019-12-30 RX ORDER — SODIUM CHLORIDE 0.9 % (FLUSH) 0.9 %
10 SYRINGE (ML) INJECTION EVERY 12 HOURS SCHEDULED
Status: DISCONTINUED | OUTPATIENT
Start: 2019-12-30 | End: 2020-01-03 | Stop reason: HOSPADM

## 2019-12-30 RX ORDER — LEVOTHYROXINE SODIUM 0.05 MG/1
50 TABLET ORAL DAILY
Status: DISCONTINUED | OUTPATIENT
Start: 2019-12-30 | End: 2020-01-03 | Stop reason: HOSPADM

## 2019-12-30 RX ORDER — NICOTINE POLACRILEX 4 MG
15 LOZENGE BUCCAL
Status: DISCONTINUED | OUTPATIENT
Start: 2019-12-30 | End: 2020-01-03 | Stop reason: HOSPADM

## 2019-12-30 RX ORDER — LOSARTAN POTASSIUM 25 MG/1
25 TABLET ORAL
Status: DISCONTINUED | OUTPATIENT
Start: 2019-12-30 | End: 2019-12-30

## 2019-12-30 RX ORDER — ASPIRIN 300 MG/1
300 SUPPOSITORY RECTAL DAILY
Status: DISCONTINUED | OUTPATIENT
Start: 2019-12-30 | End: 2019-12-30

## 2019-12-30 RX ORDER — DICYCLOMINE HYDROCHLORIDE 10 MG/1
10 CAPSULE ORAL 3 TIMES DAILY PRN
Status: DISCONTINUED | OUTPATIENT
Start: 2019-12-30 | End: 2020-01-03 | Stop reason: HOSPADM

## 2019-12-30 RX ORDER — ACETAMINOPHEN 650 MG/1
650 SUPPOSITORY RECTAL EVERY 4 HOURS PRN
Status: DISCONTINUED | OUTPATIENT
Start: 2019-12-30 | End: 2020-01-03 | Stop reason: HOSPADM

## 2019-12-30 RX ORDER — SODIUM CHLORIDE 0.9 % (FLUSH) 0.9 %
10 SYRINGE (ML) INJECTION AS NEEDED
Status: DISCONTINUED | OUTPATIENT
Start: 2019-12-30 | End: 2020-01-03 | Stop reason: HOSPADM

## 2019-12-30 RX ORDER — L.ACID,PARA/B.BIFIDUM/S.THERM 8B CELL
1 CAPSULE ORAL DAILY
Status: DISCONTINUED | OUTPATIENT
Start: 2019-12-30 | End: 2020-01-03 | Stop reason: HOSPADM

## 2019-12-30 RX ORDER — SODIUM CHLORIDE 9 MG/ML
75 INJECTION, SOLUTION INTRAVENOUS CONTINUOUS
Status: DISCONTINUED | OUTPATIENT
Start: 2019-12-30 | End: 2019-12-30

## 2019-12-30 RX ORDER — MULTIVIT,CALC,MINS/IRON/FOLIC 9MG-400MCG
1 TABLET ORAL DAILY
Status: DISCONTINUED | OUTPATIENT
Start: 2019-12-30 | End: 2020-01-03 | Stop reason: HOSPADM

## 2019-12-30 RX ORDER — GLIPIZIDE 10 MG/1
10 TABLET ORAL
Status: DISCONTINUED | OUTPATIENT
Start: 2019-12-30 | End: 2020-01-03 | Stop reason: HOSPADM

## 2019-12-30 RX ORDER — ACETAMINOPHEN 325 MG/1
650 TABLET ORAL EVERY 4 HOURS PRN
Status: DISCONTINUED | OUTPATIENT
Start: 2019-12-30 | End: 2020-01-03 | Stop reason: HOSPADM

## 2019-12-30 RX ORDER — NEBIVOLOL 2.5 MG/1
2.5 TABLET ORAL DAILY
Status: DISCONTINUED | OUTPATIENT
Start: 2019-12-30 | End: 2020-01-03 | Stop reason: HOSPADM

## 2019-12-30 RX ORDER — ATORVASTATIN CALCIUM 10 MG/1
20 TABLET, FILM COATED ORAL NIGHTLY
Status: DISCONTINUED | OUTPATIENT
Start: 2019-12-30 | End: 2019-12-31

## 2019-12-30 RX ORDER — ACETAMINOPHEN 500 MG
500 TABLET ORAL EVERY 6 HOURS PRN
Status: DISCONTINUED | OUTPATIENT
Start: 2019-12-30 | End: 2019-12-30 | Stop reason: SDUPTHER

## 2019-12-30 RX ORDER — ASPIRIN 325 MG
325 TABLET ORAL DAILY
Status: DISCONTINUED | OUTPATIENT
Start: 2019-12-30 | End: 2019-12-30

## 2019-12-30 RX ORDER — ONDANSETRON 2 MG/ML
4 INJECTION INTRAMUSCULAR; INTRAVENOUS EVERY 6 HOURS PRN
Status: DISCONTINUED | OUTPATIENT
Start: 2019-12-30 | End: 2020-01-03 | Stop reason: HOSPADM

## 2019-12-30 RX ORDER — ASPIRIN 81 MG/1
81 TABLET ORAL DAILY
Status: DISCONTINUED | OUTPATIENT
Start: 2019-12-31 | End: 2020-01-03 | Stop reason: HOSPADM

## 2019-12-30 RX ORDER — DEXTROSE MONOHYDRATE 25 G/50ML
25 INJECTION, SOLUTION INTRAVENOUS
Status: DISCONTINUED | OUTPATIENT
Start: 2019-12-30 | End: 2020-01-03 | Stop reason: HOSPADM

## 2019-12-30 RX ORDER — AMLODIPINE BESYLATE 5 MG/1
2.5 TABLET ORAL
Status: DISCONTINUED | OUTPATIENT
Start: 2019-12-30 | End: 2020-01-03 | Stop reason: HOSPADM

## 2019-12-30 RX ADMIN — GLIPIZIDE 10 MG: 10 TABLET ORAL at 09:46

## 2019-12-30 RX ADMIN — LOSARTAN POTASSIUM 25 MG: 25 TABLET ORAL at 09:48

## 2019-12-30 RX ADMIN — ENOXAPARIN SODIUM 40 MG: 40 INJECTION SUBCUTANEOUS at 09:46

## 2019-12-30 RX ADMIN — SODIUM CHLORIDE, PRESERVATIVE FREE 10 ML: 5 INJECTION INTRAVENOUS at 21:09

## 2019-12-30 RX ADMIN — NEBIVOLOL HYDROCHLORIDE 2.5 MG: 2.5 TABLET ORAL at 09:47

## 2019-12-30 RX ADMIN — SODIUM CHLORIDE 75 ML/HR: 9 INJECTION, SOLUTION INTRAVENOUS at 02:08

## 2019-12-30 RX ADMIN — Medication 1 TABLET: at 09:47

## 2019-12-30 RX ADMIN — Medication 1 CAPSULE: at 09:47

## 2019-12-30 RX ADMIN — ACETAMINOPHEN 650 MG: 325 TABLET, FILM COATED ORAL at 19:21

## 2019-12-30 RX ADMIN — ASPIRIN 325 MG: 325 TABLET ORAL at 09:46

## 2019-12-30 RX ADMIN — LEVOTHYROXINE SODIUM 50 MCG: 50 TABLET ORAL at 09:47

## 2019-12-30 RX ADMIN — AMLODIPINE BESYLATE 2.5 MG: 5 TABLET ORAL at 09:47

## 2019-12-30 RX ADMIN — ACETAMINOPHEN 650 MG: 325 TABLET, FILM COATED ORAL at 06:42

## 2019-12-30 RX ADMIN — XENON XE-133 13.6 MILLICURIE: 10 GAS RESPIRATORY (INHALATION) at 01:10

## 2019-12-30 RX ADMIN — Medication 1 DOSE: at 01:13

## 2019-12-31 LAB
ANION GAP SERPL CALCULATED.3IONS-SCNC: 11 MMOL/L (ref 5–15)
BACTERIA UR QL AUTO: ABNORMAL /HPF
BILIRUB UR QL STRIP: NEGATIVE
BUN BLD-MCNC: 17 MG/DL (ref 8–23)
BUN/CREAT SERPL: 17.9 (ref 7–25)
CALCIUM SPEC-SCNC: 9.8 MG/DL (ref 8.6–10.5)
CHLORIDE SERPL-SCNC: 89 MMOL/L (ref 98–107)
CLARITY UR: CLEAR
CO2 SERPL-SCNC: 26 MMOL/L (ref 22–29)
COLOR UR: YELLOW
CREAT BLD-MCNC: 0.95 MG/DL (ref 0.57–1)
DEPRECATED RDW RBC AUTO: 40.9 FL (ref 37–54)
ERYTHROCYTE [DISTWIDTH] IN BLOOD BY AUTOMATED COUNT: 12.4 % (ref 12.3–15.4)
GFR SERPL CREATININE-BSD FRML MDRD: 56 ML/MIN/1.73
GLUCOSE BLD-MCNC: 114 MG/DL (ref 65–99)
GLUCOSE BLDC GLUCOMTR-MCNC: 129 MG/DL (ref 70–130)
GLUCOSE BLDC GLUCOMTR-MCNC: 152 MG/DL (ref 70–130)
GLUCOSE BLDC GLUCOMTR-MCNC: 176 MG/DL (ref 70–130)
GLUCOSE BLDC GLUCOMTR-MCNC: 188 MG/DL (ref 70–130)
GLUCOSE UR STRIP-MCNC: NEGATIVE MG/DL
HCT VFR BLD AUTO: 36.6 % (ref 34–46.6)
HGB BLD-MCNC: 12.9 G/DL (ref 12–15.9)
HGB UR QL STRIP.AUTO: NEGATIVE
HYALINE CASTS UR QL AUTO: ABNORMAL /LPF
KETONES UR QL STRIP: NEGATIVE
LEUKOCYTE ESTERASE UR QL STRIP.AUTO: ABNORMAL
MCH RBC QN AUTO: 31.8 PG (ref 26.6–33)
MCHC RBC AUTO-ENTMCNC: 35.2 G/DL (ref 31.5–35.7)
MCV RBC AUTO: 90.1 FL (ref 79–97)
NITRITE UR QL STRIP: NEGATIVE
PH UR STRIP.AUTO: 7 [PH] (ref 5–8)
PLATELET # BLD AUTO: 293 10*3/MM3 (ref 140–450)
PMV BLD AUTO: 9.9 FL (ref 6–12)
POTASSIUM BLD-SCNC: 4.2 MMOL/L (ref 3.5–5.2)
PROT UR QL STRIP: NEGATIVE
RBC # BLD AUTO: 4.06 10*6/MM3 (ref 3.77–5.28)
RBC # UR: ABNORMAL /HPF
REF LAB TEST METHOD: ABNORMAL
SODIUM BLD-SCNC: 126 MMOL/L (ref 136–145)
SP GR UR STRIP: 1.01 (ref 1–1.03)
SQUAMOUS #/AREA URNS HPF: ABNORMAL /HPF
URATE SERPL-MCNC: 6 MG/DL (ref 2.4–5.7)
UROBILINOGEN UR QL STRIP: ABNORMAL
WBC NRBC COR # BLD: 6.66 10*3/MM3 (ref 3.4–10.8)
WBC UR QL AUTO: ABNORMAL /HPF
YEAST URNS QL MICRO: ABNORMAL /HPF

## 2019-12-31 PROCEDURE — 82962 GLUCOSE BLOOD TEST: CPT

## 2019-12-31 PROCEDURE — 85027 COMPLETE CBC AUTOMATED: CPT | Performed by: NURSE PRACTITIONER

## 2019-12-31 PROCEDURE — 81001 URINALYSIS AUTO W/SCOPE: CPT | Performed by: INTERNAL MEDICINE

## 2019-12-31 PROCEDURE — 84550 ASSAY OF BLOOD/URIC ACID: CPT | Performed by: INTERNAL MEDICINE

## 2019-12-31 PROCEDURE — 97110 THERAPEUTIC EXERCISES: CPT

## 2019-12-31 PROCEDURE — 97116 GAIT TRAINING THERAPY: CPT

## 2019-12-31 PROCEDURE — 25010000002 ONDANSETRON PER 1 MG: Performed by: INTERNAL MEDICINE

## 2019-12-31 PROCEDURE — 25010000002 ENOXAPARIN PER 10 MG: Performed by: INTERNAL MEDICINE

## 2019-12-31 PROCEDURE — 80048 BASIC METABOLIC PNL TOTAL CA: CPT | Performed by: NURSE PRACTITIONER

## 2019-12-31 PROCEDURE — 97165 OT EVAL LOW COMPLEX 30 MIN: CPT | Performed by: OCCUPATIONAL THERAPIST

## 2019-12-31 RX ADMIN — ASPIRIN 81 MG: 81 TABLET ORAL at 08:53

## 2019-12-31 RX ADMIN — Medication 1 CAPSULE: at 08:54

## 2019-12-31 RX ADMIN — ENOXAPARIN SODIUM 40 MG: 40 INJECTION SUBCUTANEOUS at 08:54

## 2019-12-31 RX ADMIN — SODIUM CHLORIDE, PRESERVATIVE FREE 10 ML: 5 INJECTION INTRAVENOUS at 22:29

## 2019-12-31 RX ADMIN — NEBIVOLOL HYDROCHLORIDE 2.5 MG: 2.5 TABLET ORAL at 08:53

## 2019-12-31 RX ADMIN — SODIUM CHLORIDE, PRESERVATIVE FREE 10 ML: 5 INJECTION INTRAVENOUS at 08:55

## 2019-12-31 RX ADMIN — ACETAMINOPHEN 650 MG: 325 TABLET, FILM COATED ORAL at 08:54

## 2019-12-31 RX ADMIN — ONDANSETRON HYDROCHLORIDE 4 MG: 2 SOLUTION INTRAMUSCULAR; INTRAVENOUS at 23:42

## 2019-12-31 RX ADMIN — LEVOTHYROXINE SODIUM 50 MCG: 50 TABLET ORAL at 08:54

## 2019-12-31 RX ADMIN — METFORMIN HYDROCHLORIDE 500 MG: 500 TABLET ORAL at 22:30

## 2019-12-31 RX ADMIN — GLIPIZIDE 10 MG: 10 TABLET ORAL at 08:54

## 2019-12-31 RX ADMIN — ACETAMINOPHEN 650 MG: 325 TABLET, FILM COATED ORAL at 23:36

## 2019-12-31 RX ADMIN — AMLODIPINE BESYLATE 2.5 MG: 5 TABLET ORAL at 08:53

## 2019-12-31 RX ADMIN — Medication 1 TABLET: at 08:53

## 2020-01-01 LAB
ANION GAP SERPL CALCULATED.3IONS-SCNC: 12 MMOL/L (ref 5–15)
BUN BLD-MCNC: 19 MG/DL (ref 8–23)
BUN/CREAT SERPL: 16.4 (ref 7–25)
CALCIUM SPEC-SCNC: 8.7 MG/DL (ref 8.6–10.5)
CHLORIDE SERPL-SCNC: 92 MMOL/L (ref 98–107)
CO2 SERPL-SCNC: 25 MMOL/L (ref 22–29)
CREAT BLD-MCNC: 1.16 MG/DL (ref 0.57–1)
GFR SERPL CREATININE-BSD FRML MDRD: 45 ML/MIN/1.73
GLUCOSE BLD-MCNC: 161 MG/DL (ref 65–99)
GLUCOSE BLDC GLUCOMTR-MCNC: 190 MG/DL (ref 70–130)
GLUCOSE BLDC GLUCOMTR-MCNC: 200 MG/DL (ref 70–130)
GLUCOSE BLDC GLUCOMTR-MCNC: 266 MG/DL (ref 70–130)
GLUCOSE BLDC GLUCOMTR-MCNC: 63 MG/DL (ref 70–130)
GLUCOSE BLDC GLUCOMTR-MCNC: 64 MG/DL (ref 70–130)
POTASSIUM BLD-SCNC: 4.2 MMOL/L (ref 3.5–5.2)
SODIUM BLD-SCNC: 129 MMOL/L (ref 136–145)

## 2020-01-01 PROCEDURE — 80048 BASIC METABOLIC PNL TOTAL CA: CPT | Performed by: NURSE PRACTITIONER

## 2020-01-01 PROCEDURE — 25010000002 CEFTRIAXONE PER 250 MG: Performed by: INTERNAL MEDICINE

## 2020-01-01 PROCEDURE — 82962 GLUCOSE BLOOD TEST: CPT

## 2020-01-01 PROCEDURE — 97116 GAIT TRAINING THERAPY: CPT

## 2020-01-01 RX ORDER — CEFDINIR 300 MG/1
300 CAPSULE ORAL 2 TIMES DAILY
Qty: 6 CAPSULE | Refills: 0 | Status: SHIPPED | OUTPATIENT
Start: 2020-01-01 | End: 2020-01-03 | Stop reason: HOSPADM

## 2020-01-01 RX ORDER — ASPIRIN 81 MG/1
81 TABLET ORAL DAILY
Start: 2020-01-02 | End: 2022-10-10 | Stop reason: HOSPADM

## 2020-01-01 RX ADMIN — ASPIRIN 81 MG: 81 TABLET ORAL at 09:59

## 2020-01-01 RX ADMIN — CEFTRIAXONE SODIUM 1 G: 1 INJECTION, POWDER, FOR SOLUTION INTRAMUSCULAR; INTRAVENOUS at 13:35

## 2020-01-01 RX ADMIN — GLIPIZIDE 10 MG: 10 TABLET ORAL at 09:59

## 2020-01-01 RX ADMIN — Medication 1 TABLET: at 09:59

## 2020-01-01 RX ADMIN — LEVOTHYROXINE SODIUM 50 MCG: 50 TABLET ORAL at 09:59

## 2020-01-01 RX ADMIN — ACETAMINOPHEN 650 MG: 325 TABLET, FILM COATED ORAL at 21:17

## 2020-01-01 RX ADMIN — METFORMIN HYDROCHLORIDE 500 MG: 500 TABLET ORAL at 09:59

## 2020-01-01 RX ADMIN — Medication 1 CAPSULE: at 10:00

## 2020-01-01 RX ADMIN — SODIUM CHLORIDE, PRESERVATIVE FREE 10 ML: 5 INJECTION INTRAVENOUS at 21:05

## 2020-01-01 NOTE — PLAN OF CARE
Problem: Pain, Chronic (Adult)  Goal: Identify Related Risk Factors and Signs and Symptoms  Outcome: Ongoing (interventions implemented as appropriate)  Goal: Acceptable Pain/Comfort Level and Functional Ability  Outcome: Ongoing (interventions implemented as appropriate)

## 2020-01-01 NOTE — PLAN OF CARE
Problem: Patient Care Overview  Goal: Plan of Care Review  Outcome: Ongoing (interventions implemented as appropriate)  Flowsheets (Taken 1/1/2020 7639)  Progress: no change  Plan of Care Reviewed With: patient  Outcome Summary: pt c/o pain and some nausea; tylenol and zofran given with relief noted; pt rested fair; VSS; safety maintained; will continue to monitor

## 2020-01-01 NOTE — PROGRESS NOTES
[facility] Medicine Services  INPATIENT PROGRESS NOTE    Patient Name: Nedra Tolliver  Date of Admission: 12/29/2019  Today's Date: 01/01/20  Length of Stay: 2  Primary Care Physician: Vijay Rodrigues MD    Subjective   Chief Complaint: Follow-up shortness of breath  HPI   Patient tells me she does not feel well.  Her biggest complaint is that her back and neck hurt from lying in bed.  She was not able to sleep overnight.  She tells me that the mattress is very uncomfortable.  I have encouraged her to increase activity today, sit in the chair with meals, and ambulate as tolerated.  She denies shortness of breath, chest pain or pressure.  She tells me her appetite is fair.  She tells me that her mouth is dry today.  She denies nausea, vomiting, abdominal pain and diarrhea.    Review of Systems   All pertinent negatives and positives are as above. All other systems have been reviewed and are negative unless otherwise stated.     Objective    Temp:  [97.8 °F (36.6 °C)-98.5 °F (36.9 °C)] 97.8 °F (36.6 °C)  Heart Rate:  [60-68] 68  Resp:  [16-20] 16  BP: ()/(43-89) 112/52  Physical Exam  Constitutional: She is oriented to person, place, and time. She appears well-developed and well-nourished. No distress.   HENT:   Head: Normocephalic and atraumatic.   Mouth/Throat: Mucous membranes are dry.    Eyes: Conjunctivae and EOM are normal. No scleral icterus.   Neck: Neck supple. No JVD present.   Cardiovascular: Normal rate, regular rhythm, normal heart sounds and intact distal pulses. Exam reveals no gallop and no friction rub.   No murmur heard.  Pulmonary/Chest: Effort normal and breath sounds normal. She has no wheezes. She has no rales.   Abdominal: Soft. Bowel sounds are normal. She exhibits no distension and no mass. There is no tenderness. There is no rebound and no guarding.   Musculoskeletal: Normal range of motion. She exhibits no edema.   Lymphadenopathy:     She has  no cervical adenopathy.   Neurological: She is alert and oriented to person, place, and time. No cranial nerve deficit.   Skin: Skin is warm and dry. She is not diaphoretic.   Psychiatric: She has a normal mood and affect. Her behavior is normal.   Nursing note and vitals reviewed.    Results Review:  I have reviewed the labs, radiology results, and diagnostic studies.    Laboratory Data:   Results from last 7 days   Lab Units 12/31/19  0448 12/29/19  2107   WBC 10*3/mm3 6.66 10.52   HEMOGLOBIN g/dL 12.9 12.5   HEMATOCRIT % 36.6 34.4   PLATELETS 10*3/mm3 293 265        Results from last 7 days   Lab Units 01/01/20  0517 12/31/19  0448 12/30/19  1724  12/29/19  2107   SODIUM mmol/L 129* 126* 126*   < > 120*   POTASSIUM mmol/L 4.2 4.2 4.3   < > 4.5   CHLORIDE mmol/L 92* 89* 88*   < > 83*   CO2 mmol/L 25.0 26.0 26.0   < > 23.0   BUN mg/dL 19 17 17   < > 20   CREATININE mg/dL 1.16* 0.95 1.01*   < > 1.07*   CALCIUM mg/dL 8.7 9.8 9.4   < > 9.7   BILIRUBIN mg/dL  --   --   --   --  0.3   ALK PHOS U/L  --   --   --   --  70   ALT (SGPT) U/L  --   --   --   --  17   AST (SGOT) U/L  --   --   --   --  15   GLUCOSE mg/dL 161* 114* 134*   < > 182*    < > = values in this interval not displayed.     I have reviewed the patient's current medications.     Assessment/Plan     Active Hospital Problems    Diagnosis   • Hyponatremia   • Syncope and collapse   • Volume depletion   • Diabetes type 2, controlled (CMS/HCC)   • Hypothyroidism   • HTN (hypertension), benign     cont BP medication the day of procedure         Plan:  1.  Hyponatremia improving with a level of 129 today.  2.  Nephrology following - encouraged patient fluid restriction 1500 milliliters fluid per day.  Hyponatremia likely caused by HCTZ.  3.  Fluid restriction slightly increased from 1000 to 1500 ml.  Creatinine slightly elevated compared to yesterday.  4.  She was given a one time dose of rocephin for urinalysis microscopic that was collected overnight from  initial admission orders.  Question whether it is a true urinary tract infection as there are 3-6 squamous epithelial cell.   5.  Strict intake and output and daily weights.  Patient understands fluid restriction and is agreeable.  6.  Patient was noted to be orthostatic this morning.  She was instructed on importance of changing positions slowly.  6.  Continue low-dose sliding scale insulin, metformin and glipizide.  7.  Lovenox for VTE prophylaxis.  8.  Will need to follow up as outpatient for hyperlipidemia as patient states she does not tolerate cholesterol medicine.  9.  Continue physical and Occupational Therapy.  11.  Labs in AM.    Discharge Planning: I expect the patient to be discharged to home likely tomorrow.    QUETA Michaels   01/01/20   12:36 PM     I personally evaluated and examined the patient in conjunction with QUETA Valdez and agree with the assessment, treatment plan, and disposition of the patient as recorded by her. My history, exam, and further recommendations are:     Patient seen and examined.  States she is not feeling great today.  She was orthostatic earlier that is improved.  States she had some urinary urgency/incontinence last evening.  Denies burning.  No chest pain or shortness of breath.    GEN: Awake, alert, interactive, in NAD  HEENT: PERRLA, EOMI, Anicteric, Trachea midline. Dry MMM  Lungs: CTAB, no wheezing/rales/rhonchi  Heart: RRR, +S1/s2, no rub  ABD: soft, nt/nd, +BS, no guarding/rebound  Extremities: atraumatic, no cyanosis, no edema  Neuro: AAOx3, no focal deficits    Patient with a small bump in creatinine today and orthostatic.  Suspect she may be starting to get a little dry in the setting of fluid restriction.  Will loosen her restriction to 1500 cc.  Her sodium up to 129.  Continue to monitor.  We will give her a one-time dose of ceftriaxone and then start Omnicef for possible UTI.  Continue to monitor here today.  Recheck sodium and BMP in the morning.   Potential discharge tomorrow.    Ruiz Bowser,   01/01/20  12:59 PM

## 2020-01-01 NOTE — THERAPY TREATMENT NOTE
Acute Care - Physical Therapy Treatment Note  [facility]     Patient Name: Nedra Tolliver  : 1935  MRN: [id number]  Today's Date: 2020  Onset of Illness/Injury or Date of Surgery: 19     Referring Physician: Dr. Bowser    Admit Date: 2019    Visit Dx:    ICD-10-CM ICD-9-CM   1. Hyponatremia E87.1 276.1   2. Weakness R53.1 780.79   3. Shortness of breath R06.02 786.05   4. Impaired mobility Z74.09 799.89   5. Decreased activities of daily living (ADL) Z78.9 V49.89     Patient Active Problem List   Diagnosis   • Urinary tract infection without hematuria   • Dysuria   • Vaginal atrophy   • Overactive bladder   • Diverticulitis   • HTN (hypertension), benign   • Thyroid nodule   • Hypothyroidism   • Hyponatremia   • Syncope and collapse   • Volume depletion   • Diabetes type 2, controlled (CMS/HCC)       Therapy Treatment    Rehabilitation Treatment Summary     Row Name 20 1357             Treatment Time/Intention    Discipline  physical therapy assistant  -YOLY      Document Type  therapy note (daily note)  -YOLY      Subjective Information  complains of;weakness;pain  -JB2      Existing Precautions/Restrictions  fall  -JB2      Recorded by [YOLY] Wiliam Manley, PTA 20 1401  [JB2] Wiliam Manley, PTA 20 1419      Row Name 20 1357             Bed Mobility Assessment/Treatment    Supine-Sit [place] (Bed Mobility)  independent  -YOLY      Sit-Supine [place] (Bed Mobility)  independent  -YOLY      Recorded by [YOLY] Wiliam Manley, PTA 20 1419      Row Name 20 1357             Sit-Stand Transfer    Sit-Stand [place] (Transfers)  stand by assist  -YOLY      Assistive Device (Sit-Stand Transfers)  walker, front-wheeled  -YOLY      Recorded by [YOLY] Wiliam Manley, PTA 20 1419      Row Name 20 1357             Stand-Sit Transfer    Stand-Sit [place] (Transfers)  stand by assist  -YOLY      Recorded by [YOLY] Wiliam Manley, PTA 20  1419      Row Name 01/01/20 1357             Gait/Stairs Assessment/Training    73618 - Gait Training Minutes   23  -YOLY      [place] Level (Gait)  stand by assist  -YOLY      Assistive Device (Gait)  walker, front-wheeled  -YOLY      Distance in Feet (Gait)  350  -YOLY      Pattern (Gait)  step-through  -YOLY      Comment (Gait/Stairs)  try to amb without AD next treatment, pt does not use walker at home.   -YOLY      Recorded by [YOLY] Wiliam Manley, PTA 01/01/20 1419      Row Name 01/01/20 1357             Positioning and Restraints    Pre-Treatment Position  in bed  -YOLY      Post Treatment Position  bed  -YOLY      In Bed  fowlers;call light within reach;encouraged to call for assist;side rails up x2  -YOLY      Recorded by [YOLY] Wiliam Manley, PTA 01/01/20 1419        User Key  (r) = Recorded By, (t) = Taken By, (c) = Cosigned By    Initials Name Effective Dates Discipline    YOLY Wiliam Manley, PTA 12/08/16 -  PT                       PT Recommendation and Plan        Outcome Measures     Row Name 01/01/20 1357 12/31/19 1041 12/31/19 1004       How much help from another person do you currently need...    Turning from your back to your side while in flat bed without using bedrails?  4  -YOLY  --  --    Moving from lying on back to sitting on the side of a flat bed without bedrails?  4  -YOLY  --  --    Moving to and from a bed to a chair (including a wheelchair)?  4  -YOLY  --  --    Standing up from a chair using your arms (e.g., wheelchair, bedside chair)?  4  -YOLY  --  --    Climbing 3-5 steps with a railing?  3  -YOLY  --  --    To walk in hospital room?  3  -YOLY  --  --    AM-PAC 6 Clicks Score (PT)  22  -YOLY  --  --       How much help from another is currently needed...    Putting on and taking off regular lower body clothing?  --  4  -JJ  --    Bathing (including washing, rinsing, and drying)  --  3  -JJ  --    Toileting (which includes using toilet bed pan or urinal)  --  4  -JJ  --    Putting on and taking off  regular upper body clothing  --  4  -JJ  --    Taking care of personal grooming (such as brushing teeth)  --  4  -JJ  --    Eating meals  --  4  -JJ  --    AM-PAC 6 Clicks Score (OT)  --  23  -JJ  --       Functional Assessment    Outcome Measure Options  AM-PAC 6 Clicks Basic Mobility (PT)  -YOLY  AM-PAC 6 Clicks Daily Activity (OT)  -  AM-PAC 6 Clicks Basic Mobility (PT)  -      User Key  (r) = Recorded By, (t) = Taken By, (c) = Cosigned By    Initials Name Provider Type    Wiliam Baeza, BREE Physical Therapy Assistant    Chelsea Christie, PTA Physical Therapy Assistant    Arianne Ortez, OTR/L Occupational Therapist         Time Calculation:   PT Charges     Row Name 01/01/20 1357             Time Calculation    Start Time  1357  -YOLY      Stop Time  1420  -YOLY      Time Calculation (min)  23 min  -YOLY      PT Received On  01/01/20  -YOLY         Time Calculation- PT    Total Timed Code Minutes- PT  23 minute(s)  -YOLY         Timed Charges    21548 - Gait Training Minutes   23  -YOLY        User Key  (r) = Recorded By, (t) = Taken By, (c) = Cosigned By    Initials Name Provider Type    Wiliam Baeza PTA Physical Therapy Assistant        Therapy Charges for Today     Code Description Service Date Service Provider Modifiers Qty    [id number] HC GAIT TRAINING EA 15 MIN 1/1/2020 Wiliam Manley PTA GP 2          PT G-Codes  Outcome Measure Options: AM-PAC 6 Clicks Basic Mobility (PT)  AM-PAC 6 Clicks Score (PT): 22  AM-PAC 6 Clicks Score (OT): 23    Wiliam Manley PTA  1/1/2020

## 2020-01-02 LAB
ANION GAP SERPL CALCULATED.3IONS-SCNC: 10 MMOL/L (ref 5–15)
BUN BLD-MCNC: 22 MG/DL (ref 8–23)
BUN/CREAT SERPL: 18.3 (ref 7–25)
CALCIUM SPEC-SCNC: 9.3 MG/DL (ref 8.6–10.5)
CHLORIDE SERPL-SCNC: 92 MMOL/L (ref 98–107)
CO2 SERPL-SCNC: 25 MMOL/L (ref 22–29)
CREAT BLD-MCNC: 1.2 MG/DL (ref 0.57–1)
GFR SERPL CREATININE-BSD FRML MDRD: 43 ML/MIN/1.73
GLUCOSE BLD-MCNC: 153 MG/DL (ref 65–99)
GLUCOSE BLDC GLUCOMTR-MCNC: 109 MG/DL (ref 70–130)
GLUCOSE BLDC GLUCOMTR-MCNC: 143 MG/DL (ref 70–130)
GLUCOSE BLDC GLUCOMTR-MCNC: 158 MG/DL (ref 70–130)
GLUCOSE BLDC GLUCOMTR-MCNC: 216 MG/DL (ref 70–130)
POTASSIUM BLD-SCNC: 4.7 MMOL/L (ref 3.5–5.2)
SODIUM BLD-SCNC: 127 MMOL/L (ref 136–145)

## 2020-01-02 PROCEDURE — 97110 THERAPEUTIC EXERCISES: CPT

## 2020-01-02 PROCEDURE — 80048 BASIC METABOLIC PNL TOTAL CA: CPT | Performed by: NURSE PRACTITIONER

## 2020-01-02 PROCEDURE — 82962 GLUCOSE BLOOD TEST: CPT

## 2020-01-02 PROCEDURE — 97116 GAIT TRAINING THERAPY: CPT

## 2020-01-02 PROCEDURE — 25010000002 ENOXAPARIN PER 10 MG: Performed by: INTERNAL MEDICINE

## 2020-01-02 RX ORDER — CEFDINIR 300 MG/1
300 CAPSULE ORAL EVERY 12 HOURS SCHEDULED
Status: DISCONTINUED | OUTPATIENT
Start: 2020-01-02 | End: 2020-01-03 | Stop reason: HOSPADM

## 2020-01-02 RX ORDER — SODIUM CHLORIDE 1000 MG
1 TABLET, SOLUBLE MISCELLANEOUS
Status: DISCONTINUED | OUTPATIENT
Start: 2020-01-02 | End: 2020-01-03 | Stop reason: HOSPADM

## 2020-01-02 RX ADMIN — ASPIRIN 81 MG: 81 TABLET ORAL at 09:13

## 2020-01-02 RX ADMIN — LEVOTHYROXINE SODIUM 50 MCG: 50 TABLET ORAL at 09:14

## 2020-01-02 RX ADMIN — AMLODIPINE BESYLATE 2.5 MG: 5 TABLET ORAL at 09:13

## 2020-01-02 RX ADMIN — METFORMIN HYDROCHLORIDE 500 MG: 500 TABLET ORAL at 09:14

## 2020-01-02 RX ADMIN — SODIUM CHLORIDE TAB 1 GM 1 G: 1 TAB at 12:27

## 2020-01-02 RX ADMIN — SODIUM CHLORIDE, PRESERVATIVE FREE 10 ML: 5 INJECTION INTRAVENOUS at 09:15

## 2020-01-02 RX ADMIN — Medication 1 CAPSULE: at 09:13

## 2020-01-02 RX ADMIN — CEFDINIR 300 MG: 300 CAPSULE ORAL at 12:27

## 2020-01-02 RX ADMIN — ENOXAPARIN SODIUM 40 MG: 40 INJECTION SUBCUTANEOUS at 09:13

## 2020-01-02 RX ADMIN — Medication 1 TABLET: at 09:14

## 2020-01-02 RX ADMIN — SODIUM CHLORIDE, PRESERVATIVE FREE 10 ML: 5 INJECTION INTRAVENOUS at 20:41

## 2020-01-02 RX ADMIN — METFORMIN HYDROCHLORIDE 500 MG: 500 TABLET ORAL at 18:20

## 2020-01-02 RX ADMIN — GLIPIZIDE 10 MG: 10 TABLET ORAL at 09:15

## 2020-01-02 RX ADMIN — CEFDINIR 300 MG: 300 CAPSULE ORAL at 20:41

## 2020-01-02 RX ADMIN — SODIUM CHLORIDE TAB 1 GM 1 G: 1 TAB at 18:20

## 2020-01-02 RX ADMIN — ACETAMINOPHEN 650 MG: 325 TABLET, FILM COATED ORAL at 20:48

## 2020-01-02 RX ADMIN — NEBIVOLOL HYDROCHLORIDE 2.5 MG: 2.5 TABLET ORAL at 09:13

## 2020-01-02 NOTE — PROGRESS NOTES
Nephrology ([facility]) Progress Note      Patient:  Nedra Tolliver  YOB: 1935  Date of Service: 1/1/2020  MRN: [id number]   Acct: [id number]   Primary Care Physician: Vijay Rodrigues MD  Advance Directive:   Code Status and Medical Interventions:   Ordered at: 12/30/19 0510     Limited Support to NOT Include:    Intubation     Level Of Support Discussed With:    Patient     Code Status:    No CPR     Medical Interventions (Level of Support Prior to Arrest):    Limited     Admit Date: 12/29/2019       Hospital Day: 2  Referring Provider: Fransisco Rod MD      Patient personally seen and examined.  Complete chart including Consults, Notes, Operative Reports, Labs, Cardiology, and Radiology studies reviewed as able.        Subjective:  Nedra Tolliver is a 84 y.o. female  whom we were consulted for consulted for hyponatremia.  Pt with chronic hyponatremia (~130) which worsened after starting HCTZ.  Admitted to weakness.   No other med changes noted.  No changes in diet.      Today, no new events.  Weakness improved.  Hoping for d/c tomorrow.  Denies cp/soa/n/v/dizzy/ha/falls/vision or hearing changes.     Allergies:  Bactroban [mupirocin calcium]; Codeine; Iodine; Levaquin [levofloxacin]; Lortab [hydrocodone-acetaminophen]; Quinolones; and Shrimp (diagnostic)    Home Meds:  Medications Prior to Admission   Medication Sig Dispense Refill Last Dose   • acetaminophen (TYLENOL) 500 MG tablet Take 500 mg by mouth every 6 (six) hours as needed for mild pain (1-3).   Taking   • amLODIPine (NORVASC) 2.5 MG tablet   3 Taking   • Cholecalciferol (VITAMIN D3) 5000 UNITS capsule capsule Take 5,000 Units by mouth daily.   Taking   • Coenzyme Q10 (CO Q 10) 100 MG capsule Take  by mouth.   Taking   • dicyclomine (BENTYL) 10 MG capsule Take 10 mg by mouth 3 (Three) Times a Day. The patient states she is taking this PRN   Taking   • glimepiride (AMARYL) 4 MG tablet Take 4 mg by mouth  every morning before breakfast.   Taking   • irbesartan (AVAPRO) 75 MG tablet TAKE 1 TABLET BY MOUTH EVERY DAY  REPLACES VALSARTAN  3 Taking   • levothyroxine (SYNTHROID, LEVOTHROID) 50 MCG tablet Take 50 mcg by mouth daily.   Taking   • metFORMIN (GLUCOPHAGE) 500 MG tablet Take 500 mg by mouth 2 (Two) Times a Day With Meals.   Taking   • Multiple Vitamins-Minerals (CENTRUM SILVER PO) Take  by mouth.   Taking   • nebivolol (BYSTOLIC) 2.5 MG tablet Take 2.5 mg by mouth daily.   Taking   • Probiotic Product (PROBIOTIC DAILY PO) Take  by mouth.   Taking   • conjugated estrogens (PREMARIN) 0.625 MG/GM vaginal cream Insert  into the vagina 2 (Two) Times a Week. 30 g 6 Patient Taking Differently   • SUPREP BOWEL PREP KIT 17.5-3.13-1.6 GM/177ML solution oral solution Take as directed by office instructions provided 2 bottle 0 Not Taking       Medicines:  Current Facility-Administered Medications   Medication Dose Route Frequency Provider Last Rate Last Dose   • acetaminophen (TYLENOL) tablet 650 mg  650 mg Oral Q4H PRN Fransisco Rod MD   650 mg at 01/01/20 2117    Or   • acetaminophen (TYLENOL) suppository 650 mg  650 mg Rectal Q4H PRN Fransisco Rod MD       • amLODIPine (NORVASC) tablet 2.5 mg  2.5 mg Oral Q24H Fransisco Rod MD   2.5 mg at 12/31/19 0853   • aspirin EC tablet 81 mg  81 mg Oral Daily Ana Raygoza APRN   81 mg at 01/01/20 0959   • dextrose (D50W) 25 g/ 50mL Intravenous Solution 25 g  25 g Intravenous Q15 Min PRN Fransisco Rod MD       • dextrose (GLUTOSE) oral gel 15 g  15 g Oral Q15 Min PRN Fransisco Rod MD       • dicyclomine (BENTYL) capsule 10 mg  10 mg Oral TID PRN Fransisco Rod MD       • enoxaparin (LOVENOX) syringe 40 mg  40 mg Subcutaneous Q24H Fransisco Rod MD   40 mg at 12/31/19 0854   • glipizide (GLUCOTROL) tablet 10 mg  10 mg Oral QAM AC Fransisco Rod MD   10 mg at 01/01/20 0959   • glucagon (human recombinant) (GLUCAGEN DIAGNOSTIC) injection 1 mg   1 mg Subcutaneous Q15 Min PRN Fransisco Rod MD       • insulin lispro (humaLOG) injection 2-7 Units  2-7 Units Subcutaneous 4x Daily With Meals & Nightly Fransisco Rod MD       • lactobacillus acidophilus (RISAQUAD) capsule 1 capsule  1 capsule Oral Daily Fransisco Rod MD   1 capsule at 01/01/20 1000   • levothyroxine (SYNTHROID, LEVOTHROID) tablet 50 mcg  50 mcg Oral Daily Fransisco Rod MD   50 mcg at 01/01/20 0959   • metFORMIN (GLUCOPHAGE) tablet 500 mg  500 mg Oral BID With Meals Beverly Paz, APRN   500 mg at 01/01/20 0959   • multivitamin w/minerals tablet 1 tablet  1 tablet Oral Daily Fransisco Rod MD   1 tablet at 01/01/20 0959   • nebivolol (BYSTOLIC) tablet 2.5 mg  2.5 mg Oral Daily Fransisco Rod MD   2.5 mg at 12/31/19 0853   • nitroglycerin (NITROSTAT) SL tablet 0.4 mg  0.4 mg Sublingual Q5 Min PRN Fransisco Rod MD       • ondansetron (ZOFRAN) tablet 4 mg  4 mg Oral Q6H PRN Fransisco Rod MD        Or   • ondansetron (ZOFRAN) injection 4 mg  4 mg Intravenous Q6H PRN Fransisco Rod MD   4 mg at 12/31/19 2342   • sodium chloride 0.9 % flush 10 mL  10 mL Intravenous PRN Fransisco Rod MD       • sodium chloride 0.9 % flush 10 mL  10 mL Intravenous PRN Fransisco Rod MD       • sodium chloride 0.9 % flush 10 mL  10 mL Intravenous Q12H Fransisco Rod MD   10 mL at 01/01/20 2105   • sodium chloride 0.9 % flush 10 mL  10 mL Intravenous PRN Fransisco Rod MD           Past Medical History:  Past Medical History:   Diagnosis Date   • Diabetes type 2, controlled (CMS/HCC)    • Diverticulitis    • Hypertension    • Hypothyroidism    • LPRD (laryngopharyngeal reflux disease)    • Pharyngeal dysphagia    • Thyroid nodule        Past Surgical History:  Past Surgical History:   Procedure Laterality Date   • APPENDECTOMY     • BASAL CELL CARCINOMA EXCISION     • BELPHAROPTOSIS REPAIR     • BREAST CYST EXCISION     • CATARACT EXTRACTION     • CHOLECYSTECTOMY     •  COLONOSCOPY Left 11/1/2016    Tubular adenoma cecum, Diverticulosis repeat prn   • PARTIAL HYSTERECTOMY     • TUMOR EXCISION      under armpits and left breast       Family History  Family History   Problem Relation Age of Onset   • Diabetes Mother    • Cancer Mother    • Heart failure Mother    • Diabetes Father    • Colon cancer Neg Hx    • Colon polyps Neg Hx        Social History  Social History     Socioeconomic History   • Marital status:      Spouse name: Not on file   • Number of children: Not on file   • Years of education: Not on file   • Highest education level: Not on file   Tobacco Use   • Smoking status: Never Smoker   • Smokeless tobacco: Never Used   Substance and Sexual Activity   • Alcohol use: No   • Drug use: Defer   • Sexual activity: Defer         Review of Systems:  History obtained from chart review and the patient  General ROS: No fever or chills  Respiratory ROS: No cough, shortness of breath, wheezing  Cardiovascular ROS: No chest pain or palpitations  Gastrointestinal ROS: No abdominal pain or melena  Genito-Urinary ROS: No dysuria or hematuria    Objective:  Patient Vitals for the past 24 hrs:   BP Temp Temp src Pulse Resp SpO2 Weight   01/01/20 2110 134/49 -- -- -- -- -- --   01/01/20 1936 (!) 117/38 -- -- 82 -- -- 72.8 kg (160 lb 6.4 oz)   01/01/20 1934 146/51 -- -- 78 -- -- --   01/01/20 1933 (!) 117/38 98.2 °F (36.8 °C) Oral 73 16 95 % --   01/01/20 1631 118/42 98.1 °F (36.7 °C) Oral 74 16 94 % --   01/01/20 1040 112/52 97.8 °F (36.6 °C) Oral 68 16 94 % --   01/01/20 1038 112/69 -- -- -- -- -- --   01/01/20 1035 126/52 -- -- -- -- -- --   01/01/20 0724 (!) 85/45 -- -- -- -- -- --   01/01/20 0722 102/89 -- -- -- -- -- --   01/01/20 0721 122/43 98.5 °F (36.9 °C) Oral 66 16 95 % --       Intake/Output Summary (Last 24 hours) at 1/1/2020 2142  Last data filed at 1/1/2020 1900  Gross per 24 hour   Intake 1340 ml   Output 975 ml   Net 365 ml     General: awake/alert   Chest:  clear  to auscultation bilaterally without respiratory distress  CVS: regular rate and rhythm  Abdominal: soft, nontender, positive bowel sounds  Extremities: no cyanosis or edema  Skin: warm and dry without rash         Labs:  Results from last 7 days   Lab Units 12/31/19  0448 12/29/19  2107   WBC 10*3/mm3 6.66 10.52   HEMOGLOBIN g/dL 12.9 12.5   HEMATOCRIT % 36.6 34.4   PLATELETS 10*3/mm3 293 265         Results from last 7 days   Lab Units 01/01/20  0517 12/31/19  0448 12/30/19  1724  12/29/19  2107   SODIUM mmol/L 129* 126* 126*   < > 120*   POTASSIUM mmol/L 4.2 4.2 4.3   < > 4.5   CHLORIDE mmol/L 92* 89* 88*   < > 83*   CO2 mmol/L 25.0 26.0 26.0   < > 23.0   BUN mg/dL 19 17 17   < > 20   CREATININE mg/dL 1.16* 0.95 1.01*   < > 1.07*   CALCIUM mg/dL 8.7 9.8 9.4   < > 9.7   BILIRUBIN mg/dL  --   --   --   --  0.3   ALK PHOS U/L  --   --   --   --  70   ALT (SGPT) U/L  --   --   --   --  17   AST (SGOT) U/L  --   --   --   --  15   GLUCOSE mg/dL 161* 114* 134*   < > 182*    < > = values in this interval not displayed.       Radiology:   Imaging Results (Last 72 Hours)     Procedure Component Value Units Date/Time    CT Head Without Contrast [[id number]] Collected:  12/30/19 0759     Updated:  12/30/19 0805    Narrative:       EXAMINATION: CT HEAD WO CONTRAST- 12/30/2019 7:59 AM CST     HISTORY: Syncope/fainting; E87.9-Pyqt-hlchtqunea and hyponatremia;  R53.1-Weakness; R06.02-Shortness of breath.     DOSE: 581 mGycm (Automatic exposure control technique was implemented in  an effort to keep the radiation dose as low as possible without  compromising image quality)     REPORT: Spiral CT of the head was performed without contrast,  reconstructed coronal and sagittal images were also reviewed.     COMPARISON: CT head without contrast 08/03/2018.     No intracranial hemorrhage, mass or mass effect is identified. There is  mild diffuse cortical atrophy. There is mildly decreased attenuation in  the periventricular and  subcortical white matter tracts compatible with  chronic small vessel white matter ischemic disease. This is stable.  Review of bone windows is unremarkable. There is normal aeration of the  visualized paranasal sinuses and mastoid air cells.       Impression:       1. No acute intracranial abnormality.  2. Mild diffuse atrophy and chronic small vessel white matter ischemic  changes. This is stable.     This report was finalized on 12/30/2019 08:02 by Dr. Hank Sauer MD.    XR Chest 1 View [[id number]] Collected:  12/30/19 0639     Updated:  12/30/19 0642    Narrative:       EXAMINATION: XR CHEST 1 VW- 12/30/2019 6:39 AM CST     HISTORY: Chest pain, sob.     REPORT: Comparison is made with the chest x-ray 01/24/2017.        One-view chest: Frontal view of the chest was obtained. The lungs are  clear and normally expanded. The cardiac and mediastinal silhouettes are  within normal limits. Scattered calcified granulomas are present as  before. The visualized bony thorax and upper abdomen are unremarkable.                                                                                                                                                       Impression:              Normal one-view chest.                                                                      This report was finalized on 12/30/2019 06:39 by Dr. Hank Sauer MD.    NM Lung Ventilation Perfusion [[id number]] Collected:  12/30/19 0636     Updated:  12/30/19 0641    Narrative:       EXAMINATION: NM LUNG VENTILATION PERFUSION- 12/30/2019 6:36 AM CST     HISTORY: Shortness of breath.; E87.1-Azfa-vffnrcnyco and hyponatremia;  R53.1-Weakness; R06.02-Shortness of breath.     Dose:  1. 13.6 mCi xenon-133 via inhalation.  2. 4.6 mCi technetium 99m MAA intravenously.     REPORT: Comparison is made with the chest x-ray on 12/29/2013.  Correlation images demonstrate a normal distribution of xenon within the  lungs without significant air trapping.  Perfusion images demonstrate a  normal homogeneous distribution of activity within both lungs with no  peripheral defects to indicate significant pulmonary emboli.       Impression:       Very low probability ventilation/perfusion lung scan.     A preliminary report was provided by the [facility] radiology service.     This report was finalized on 12/30/2019 06:38 by Dr. Hank Sauer MD.          Culture:  No results found for: BLOODCX, URINECX, WOUNDCX, MRSACX, RESPCX, STOOLCX      Assessment   Euvolemic acute on chronic hyponatremia  HTN  DM2  hypothyroidism     Plan:  Agree that hyponatremia likely caused by HCTZ, would not restart in the future.    TSH adequate  Encouraged pt fluid restriction, relaxed today  Recommended outpt eval to ensure age appropriate cancer screenings up to date (CXR 12/29 without mass)  F/u BMP 3d post d/c  D/w pt/nursing/Dr. Mague Zuniga MD  1/1/2020  9:42 PM

## 2020-01-02 NOTE — PLAN OF CARE
Patient c/o back pain; prn med given and relief noted; rested well through the night; VSS; room air; will continue to monitor.    Problem: Pain, Chronic (Adult)  Goal: Identify Related Risk Factors and Signs and Symptoms  Outcome: Ongoing (interventions implemented as appropriate)  Flowsheets (Taken 12/30/2019 0552 by Lavonne Bauer RN)  Related Risk Factors (Chronic Pain): pain control inadequate  Signs and Symptoms (Chronic Pain): verbalization of pain descriptors;verbalization of pain/discomfort for a prolonged time period;fatigue/weakness

## 2020-01-02 NOTE — THERAPY TREATMENT NOTE
Acute Care - Physical Therapy Treatment Note  [facility]     Patient Name: Nedra Tolliver  : 1935  MRN: [id number]  Today's Date: 2020  Onset of Illness/Injury or Date of Surgery: 19     Referring Physician: Dr. Bowser    Admit Date: 2019    Visit Dx:    ICD-10-CM ICD-9-CM   1. Hyponatremia E87.1 276.1   2. Weakness R53.1 780.79   3. Shortness of breath R06.02 786.05   4. Impaired mobility Z74.09 799.89   5. Decreased activities of daily living (ADL) Z78.9 V49.89     Patient Active Problem List   Diagnosis   • Urinary tract infection without hematuria   • Dysuria   • Vaginal atrophy   • Overactive bladder   • Diverticulitis   • HTN (hypertension), benign   • Thyroid nodule   • Hypothyroidism   • Hyponatremia   • Syncope and collapse   • Volume depletion   • Diabetes type 2, controlled (CMS/HCC)       Therapy Treatment    Rehabilitation Treatment Summary     Row Name 20 1518 20 1012 20 0804       Treatment Time/Intention    Discipline  physical therapy assistant  -YOLY  physical therapy assistant  -LC  physical therapy assistant  -    Document Type  therapy note (daily note)  -YOLY  therapy note (daily note)  -  therapy note (daily note)  -    Subjective Information  complains of;pain  -YOLY  complains of;pain  -LC2  --    Mode of Treatment  --  physical therapy  -LC  physical therapy  -    Comment  --  --  patient in bathroom  -2    Existing Precautions/Restrictions  fall  -  --  --    Recorded by [YOLY] Wiliam Manley, PTA 20 1529 [LC] Dejah Bates, PTA 20 1012  [LC2] Dejah Bates, PTA 20 1047 [LC] Dejah Bates, PTA 20 0804  [LC2] Dejah Bates, PTA 20 0807    Row Name 20 1518 20 1012          Bed Mobility Assessment/Treatment    Supine-Sit [place] [facility] (Bed Mobility)  independent  -YOLY  independent  -LC     Sit-Supine [facility] (Bed Mobility)  independent  -YOLY  independent  -     Assistive Device (Bed Mobility)  --   head of bed elevated  -LC     Recorded by [YOLY] Wiliam Manley, PTA 01/02/20 1529 [LC] Jana Dejah TARA, PTA 01/02/20 1047     Row Name 01/02/20 1518 01/02/20 1012          Sit-Stand Transfer    Sit-Stand [place] (Transfers)  independent  -YOLY  stand by assist  -LC     Recorded by [YOLY] Wiliam Manley, PTA 01/02/20 1529 [LC] Dejah Bates TARA, PTA 01/02/20 1047     Row Name 01/02/20 1518 01/02/20 1012          Stand-Sit Transfer    Stand-Sit [place] (Transfers)  independent  -YOLY  stand by assist  -LC     Recorded by [YOLY] Wiliam Manley, PTA 01/02/20 1529 [LC] Dejah Bates TARA, PTA 01/02/20 1047     Row Name 01/02/20 1518 01/02/20 1012          Gait/Stairs Assessment/Training    [place] Level (Gait)  verbal cues;contact guard  -YOLY  contact guard  -LC     Distance in Feet (Gait)  525  -YOLY  300 x2  -LC     Pattern (Gait)  --  step-through  -LC     Deviations/Abnormal Patterns (Gait)  --  stride length decreased  -LC     Bilateral Gait Deviations  forward flexed posture  -YOLY  forward flexed posture  -LC     [place] Level (Stairs)  --  contact guard  -LC     Handrail Location (Stairs)  --  right side (ascending)  -LC     Number of Steps (Stairs)  --  3 x2  -LC     Ascending Technique (Stairs)  --  step-over-step  -LC     Descending Technique (Stairs)  --  step-to-step  -LC     Comment (Gait/Stairs)  --  patient wanted to practice stairs, she has 3 at home  -LC     Recorded by [YOLY] Wiliam Manley, PTA 01/02/20 1529 [LC] Dejah Bates TARA, PTA 01/02/20 1047     Row Name 01/02/20 1012             Therapeutic Exercise    Lower Extremity (Therapeutic Exercise)  LAQ (long arc quad), bilateral;marching while seated  -LC      Lower Extremity Range of Motion (Therapeutic Exercise)  hip abduction/adduction, bilateral;ankle dorsiflexion/plantar flexion, bilateral  -LC      Exercise Type (Therapeutic Exercise)  AROM (active range of motion)  -LC      Position (Therapeutic Exercise)  seated  -LC      Sets/Reps  (Therapeutic Exercise)  20  -LC      Recorded by [LC] Dejah Bates, PTA 01/02/20 1047      Row Name 01/02/20 1518 01/02/20 1012          Positioning and Restraints    Pre-Treatment Position  in bed  -YOLY  in bed  -LC     Post Treatment Position  bed  -YOLY  bed  -LC     In Bed  sitting EOB;call light within reach;encouraged to call for assist;side rails up x2  -YOLY  fowlers;call light within reach  -LC     Recorded by [YOLY] Wiliam Manley, PTA 01/02/20 1529 [LC] Dejah Bates, PTA 01/02/20 1047     Row Name 01/02/20 1518 01/02/20 1012          Pain Scale: Numbers Pre/Post-Treatment    Pain Scale: Numbers, Pretreatment  4/10  -YOLY  3/10  -LC     Pain Scale: Numbers, Post-Treatment  4/10  -YOLY  3/10  -LC     Pre/Post Treatment Pain Comment  back  -YOLY  soreness in her back  -LC     Pain Intervention(s)  Repositioned;Ambulation/increased activity  -YOLY  --     Recorded by [YOLY] Wiliam Manley, PTA 01/02/20 1529 [LC] Dejah Bates, PTA 01/02/20 1047     Row Name 01/02/20 1012             Plan of Care Review    Plan of Care Reviewed With  patient  -      Progress  improving  -      Outcome Summary  Patient is independent with all bed moibility. She ambulated 300' x2 without AD, she states she does not use one at home. No LOB noted with ambulation. Practiced stairs 3x2 with one handrail, CGA. She performed BLE AROM x20 seated for increased strength.   -LC      Recorded by [LC] Dejah Bates, PTA 01/02/20 1047        User Key  (r) = Recorded By, (t) = Taken By, (c) = Cosigned By    Initials Name Effective Dates Discipline    YOLY Wiliam Manley, PTA 12/08/16 -  PT     Dejah Bates, PTA 10/18/19 -  PT                       PT Recommendation and Plan        Outcome Measures     Row Name 01/01/20 1357 12/31/19 1041 12/31/19 1004       How much help from another person do you currently need...    Turning from your back to your side while in flat bed without using bedrails?  4  -YOLY  --  --    Moving from lying on back to  sitting on the side of a flat bed without bedrails?  4  -YOLY  --  --    Moving to and from a bed to a chair (including a wheelchair)?  4  -YOLY  --  --    Standing up from a chair using your arms (e.g., wheelchair, bedside chair)?  4  -YOLY  --  --    Climbing 3-5 steps with a railing?  3  -YOLY  --  --    To walk in hospital room?  3  -YOLY  --  --    AM-PAC 6 Clicks Score (PT)  22  -YOLY  --  --       How much help from another is currently needed...    Putting on and taking off regular lower body clothing?  --  4  -JJ  --    Bathing (including washing, rinsing, and drying)  --  3  -JJ  --    Toileting (which includes using toilet bed pan or urinal)  --  4  -JJ  --    Putting on and taking off regular upper body clothing  --  4  -JJ  --    Taking care of personal grooming (such as brushing teeth)  --  4  -JJ  --    Eating meals  --  4  -JJ  --    AM-PAC 6 Clicks Score (OT)  --  23  -JJ  --       Functional Assessment    Outcome Measure Options  AM-PAC 6 Clicks Basic Mobility (PT)  -  AM-PAC 6 Clicks Daily Activity (OT)  -[facility] 6 Clicks Basic Mobility (PT)  -      User Key  (r) = Recorded By, (t) = Taken By, (c) = Cosigned By    Initials Name Provider Type    YOLY Wiliam Manley, BREE Physical Therapy Assistant    Chelsea Christie PTA Physical Therapy Assistant    Arianne Ortez OTR/L Occupational Therapist         Time Calculation:   PT Charges     Row Name 01/02/20 1518 01/02/20 1047          Time Calculation    Start Time  1518  -  1012  -     Stop Time  1529  -  1042  -     Time Calculation (min)  11 min  -YOLY  30 min  -     PT Received On  --  01/02/20  -     PT Goal Re-Cert Due Date  --  01/13/20  -        Time Calculation- PT    Total Timed Code Minutes- PT  11 minute(s)  -YOLY  30 minute(s)  -        Timed Charges    32170 - Gait Training Minutes   11  -YOLY  --       User Key  (r) = Recorded By, (t) = Taken By, (c) = Cosigned By    Initials Name Provider Type    Wiliam Baeza  BREE THOMAS Physical Therapy Assistant    Dejah North PTA Physical Therapy Assistant        Therapy Charges for Today     Code Description Service Date Service Provider Modifiers Qty    45255115106 HC GAIT TRAINING EA 15 MIN 1/1/2020 Wiliam Manley, BREE GP 2    10198373433 HC GAIT TRAINING EA 15 MIN 1/2/2020 Wiliam Manley PTA GP 1          PT G-Codes  Outcome Measure Options: AM-PAC 6 Clicks Basic Mobility (PT)  AM-PAC 6 Clicks Score (PT): 22  AM-PAC 6 Clicks Score (OT): 23    Wiliam Manley PTA  1/2/2020

## 2020-01-02 NOTE — THERAPY TREATMENT NOTE
Acute Care - Physical Therapy Treatment Note  [facility]     Patient Name: Nedra Tolliver  : 1935  MRN: [id number]  Today's Date: 2020  Onset of Illness/Injury or Date of Surgery: 19     Referring Physician: Dr. Bowser    Admit Date: 2019    Visit Dx:    ICD-10-CM ICD-9-CM   1. Hyponatremia E87.1 276.1   2. Weakness R53.1 780.79   3. Shortness of breath R06.02 786.05   4. Impaired mobility Z74.09 799.89   5. Decreased activities of daily living (ADL) Z78.9 V49.89     Patient Active Problem List   Diagnosis   • Urinary tract infection without hematuria   • Dysuria   • Vaginal atrophy   • Overactive bladder   • Diverticulitis   • HTN (hypertension), benign   • Thyroid nodule   • Hypothyroidism   • Hyponatremia   • Syncope and collapse   • Volume depletion   • Diabetes type 2, controlled (CMS/HCC)       Therapy Treatment    Rehabilitation Treatment Summary     Row Name 20 1012 20 0804          Treatment Time/Intention    Discipline  physical therapy assistant  -  physical therapy assistant  -     Document Type  therapy note (daily note)  -  therapy note (daily note)  -     Subjective Information  complains of;pain  -LC2  --     Mode of Treatment  physical therapy  -  physical therapy  -     Comment  --  patient in bathroom  -2     Recorded by [LC] Dejah Bates, PTA 20 1012  [LC2] Dejah Bates, PTA 20 1047 [LC] Dejah Bates, PTA 20 0804  [LC2] Dejah Bates, PTA 20 0807     Row Name 20 1012             Bed Mobility Assessment/Treatment    Supine-Sit [place] (Bed Mobility)  independent  -      Sit-Supine [place] (Bed Mobility)  independent  -      Assistive Device (Bed Mobility)  head of bed elevated  -      Recorded by [LC] Dejah Bates, PTA 20 1047      Row Name 20 1012             Sit-Stand Transfer    Sit-Stand [place] (Transfers)  stand by assist  -      Recorded by [LC] Dejah Bates, PTA  01/02/20 1047      Row Name 01/02/20 1012             Stand-Sit Transfer    Stand-Sit [place] (Transfers)  stand by assist  -LC      Recorded by [LC] Dejah Bates, PTA 01/02/20 1047      Row Name 01/02/20 1012             Gait/Stairs Assessment/Training    [place] Level (Gait)  contact guard  -LC      Distance in Feet (Gait)  300 x2  -LC      Pattern (Gait)  step-through  -LC      Deviations/Abnormal Patterns (Gait)  stride length decreased  -LC      Bilateral Gait Deviations  forward flexed posture  -LC      [place] Level (Stairs)  contact guard  -LC      Handrail Location (Stairs)  right side (ascending)  -LC      Number of Steps (Stairs)  3 x2  -LC      Ascending Technique (Stairs)  step-over-step  -LC      Descending Technique (Stairs)  step-to-step  -LC      Comment (Gait/Stairs)  patient wanted to practice stairs, she has 3 at home  -LC      Recorded by [LC] Dejah Bates, PTA 01/02/20 1047      Row Name 01/02/20 1012             Therapeutic Exercise    Lower Extremity (Therapeutic Exercise)  LAQ (long arc quad), bilateral;marching while seated  -LC      Lower Extremity Range of Motion (Therapeutic Exercise)  hip abduction/adduction, bilateral;ankle dorsiflexion/plantar flexion, bilateral  -      Exercise Type (Therapeutic Exercise)  AROM (active range of motion)  -LC      Position (Therapeutic Exercise)  seated  -LC      Sets/Reps (Therapeutic Exercise)  20  -LC      Recorded by [LC] Dejah Bates, PTA 01/02/20 1047      Row Name 01/02/20 1012             Positioning and Restraints    Pre-Treatment Position  in bed  -      Post Treatment Position  bed  -      In Bed  fowlers;call light within reach  -LC      Recorded by [LC] Djeah Bates, PTA 01/02/20 1047      Row Name 01/02/20 1012             Pain Scale: Numbers Pre/Post-Treatment    Pain Scale: Numbers, Pretreatment  3/10  -LC      Pain Scale: Numbers, Post-Treatment  3/10  -LC      Pre/Post Treatment Pain Comment  soreness in her  back  -LC      Recorded by [] Dejah Bates, PTA 01/02/20 1047      Row Name 01/02/20 1012             Plan of Care Review    Plan of Care Reviewed With  patient  -LC      Progress  improving  -      Outcome Summary  Patient is independent with all bed moibility. She ambulated 300' x2 without AD, she states she does not use one at home. No LOB noted with ambulation. Practiced stairs 3x2 with one handrail, CGA. She performed BLE AROM x20 seated for increased strength.   -LC      Recorded by [LC] Dejah Bates, PTA 01/02/20 1047        User Key  (r) = Recorded By, (t) = Taken By, (c) = Cosigned By    Initials Name Effective Dates Discipline     Dejah Bates, PTA 10/18/19 -  PT                       PT Recommendation and Plan     Plan of Care Reviewed With: patient  Progress: improving  Outcome Summary: Patient is independent with all bed moibility. She ambulated 300' x2 without AD, she states she does not use one at home. No LOB noted with ambulation. Practiced stairs 3x2 with one handrail, CGA. She performed BLE AROM x20 seated for increased strength.   Outcome Measures     Row Name 01/01/20 1357 12/31/19 1041 12/31/19 1004       How much help from another person do you currently need...    Turning from your back to your side while in flat bed without using bedrails?  4  -YOLY  --  --    Moving from lying on back to sitting on the side of a flat bed without bedrails?  4  -YOLY  --  --    Moving to and from a bed to a chair (including a wheelchair)?  4  -YOLY  --  --    Standing up from a chair using your arms (e.g., wheelchair, bedside chair)?  4  -YOLY  --  --    Climbing 3-5 steps with a railing?  3  -YOLY  --  --    To walk in hospital room?  3  -YOLY  --  --    AM-PAC 6 Clicks Score (PT)  22  -YOLY  --  --       How much help from another is currently needed...    Putting on and taking off regular lower body clothing?  --  4  -JJ  --    Bathing (including washing, rinsing, and drying)  --  3  -JJ  --    Toileting (which  includes using toilet bed pan or urinal)  --  4  -JJ  --    Putting on and taking off regular upper body clothing  --  4  -JJ  --    Taking care of personal grooming (such as brushing teeth)  --  4  -JJ  --    Eating meals  --  4  -JJ  --    AM-PAC 6 Clicks Score (OT)  --  23 -JJ  --       Functional Assessment    Outcome Measure Options  AM-PAC 6 Clicks Basic Mobility (PT)  -YOLY  AM-PAC 6 Clicks Daily Activity (OT)  -  AM-PAC 6 Clicks Basic Mobility (PT)  -      User Key  (r) = Recorded By, (t) = Taken By, (c) = Cosigned By    Initials Name Provider Type    Wiliam Baeza, PTA Physical Therapy Assistant    hCelsea Christie, PTA Physical Therapy Assistant    Arianne Ortez, OTR/L Occupational Therapist         Time Calculation:   PT Charges     Row Name 01/02/20 1047             Time Calculation    Start Time  1012  -      Stop Time  1042  -      Time Calculation (min)  30 min  -      PT Received On  01/02/20  -      PT Goal Re-Cert Due Date  01/13/20  -         Time Calculation- PT    Total Timed Code Minutes- PT  30 minute(s)  -        User Key  (r) = Recorded By, (t) = Taken By, (c) = Cosigned By    Initials Name Provider Type    Dejah North PTA Physical Therapy Assistant        Therapy Charges for Today     Code Description Service Date Service Provider Modifiers Qty    04917625161 HC GAIT TRAINING EA 15 MIN 1/2/2020 Dejah Bates PTA GP 1    05544587819 HC PT THER PROC EA 15 MIN 1/2/2020 Dejah Bates PTA GP 1          PT G-Codes  Outcome Measure Options: AM-PAC 6 Clicks Basic Mobility (PT)  AM-PAC 6 Clicks Score (PT): 22  AM-PAC 6 Clicks Score (OT): 23    Dejah Bates PTA  1/2/2020

## 2020-01-02 NOTE — PROGRESS NOTES
[facility]  INPATIENT PROGRESS NOTE    Patient Name: Nedra Tolliver  Date of Admission: 12/29/2019  Today's Date: 01/02/20  Length of Stay: 3  Primary Care Physician: Vijay Rodrigues MD    Subjective   Chief Complaint: Follow-up hyponatremia  HPI   Patient resting comfortably in bed.  She tells me she feels as though she is ready to go home.  She denies shortness of breath, chest pain or pressure.  She tells me she is concerned that nursing staff have not been keeping up with her intake and output.  She tells me that there is urine sitting in the hat of the toilet that has been there since yesterday some time.  She tells me she is thirsty and has not had anything to drink.  I talked to her nurse Fabio, he tells me he will keep up with her intake and output today.  Patient made aware that it is okay to drink as Fabio will document her intake.  She tells me her appetite is okay, she tries to eat about half of each meal.  Her bowels last moved the day before yesterday.    Review of Systems   All pertinent negatives and positives are as above. All other systems have been reviewed and are negative unless otherwise stated.     Objective    Temp:  [97.8 °F (36.6 °C)-98.2 °F (36.8 °C)] 98 °F (36.7 °C)  Heart Rate:  [63-82] 79  Resp:  [16-18] 18  BP: (115-146)/(38-57) 129/57  Physical Exam  Constitutional: She is oriented to person, place, and time. She appears well-developed and well-nourished. No distress.   HENT:   Head: Normocephalic and atraumatic.   Mouth/Throat: Mucous membranes are dry.    Eyes: Conjunctivae and EOM are normal. No scleral icterus.   Neck: Neck supple. No JVD present.   Cardiovascular: Normal rate, regular rhythm, normal heart sounds and intact distal pulses. Exam reveals no gallop and no friction rub.   No murmur heard.  Pulmonary/Chest: Effort normal and breath sounds normal. She has no wheezes. She has no rales.   Abdominal: Soft. Bowel sounds  are normal. She exhibits no distension and no mass. There is no tenderness. There is no rebound and no guarding.   Musculoskeletal: Normal range of motion. She exhibits no edema.   Lymphadenopathy:     She has no cervical adenopathy.   Neurological: She is alert and oriented to person, place, and time. No cranial nerve deficit.   Skin: Skin is warm and dry. She is not diaphoretic.   Psychiatric: She has a normal mood and affect. Her behavior is normal.   Nursing note and vitals reviewed.    Results Review:  I have reviewed the labs, radiology results, and diagnostic studies.    Laboratory Data:   Results from last 7 days   Lab Units 12/31/19  0448 12/29/19  2107   WBC 10*3/mm3 6.66 10.52   HEMOGLOBIN g/dL 12.9 12.5   HEMATOCRIT % 36.6 34.4   PLATELETS 10*3/mm3 293 265        Results from last 7 days   Lab Units 01/02/20  0706 01/01/20  0517 12/31/19  0448  12/29/19  2107   SODIUM mmol/L 127* 129* 126*   < > 120*   POTASSIUM mmol/L 4.7 4.2 4.2   < > 4.5   CHLORIDE mmol/L 92* 92* 89*   < > 83*   CO2 mmol/L 25.0 25.0 26.0   < > 23.0   BUN mg/dL 22 19 17   < > 20   CREATININE mg/dL 1.20* 1.16* 0.95   < > 1.07*   CALCIUM mg/dL 9.3 8.7 9.8   < > 9.7   BILIRUBIN mg/dL  --   --   --   --  0.3   ALK PHOS U/L  --   --   --   --  70   ALT (SGPT) U/L  --   --   --   --  17   AST (SGOT) U/L  --   --   --   --  15   GLUCOSE mg/dL 153* 161* 114*   < > 182*    < > = values in this interval not displayed.     I have reviewed the patient's current medications.     Assessment/Plan     Active Hospital Problems    Diagnosis   • **Hyponatremia   • Syncope and collapse   • Volume depletion   • Diabetes type 2, controlled (CMS/[facility])   • Hypothyroidism   • HTN (hypertension), benign     cont BP medication the day of procedure     • Urinary tract infection without hematuria       Plan:  1.  Sodium noted to be 127 today.  Will begin sodium chloride tablets.  2.  Nephrology following - encouraged patient fluid restriction 1500 milliliters fluid  per day.  Hyponatremia likely caused by HCTZ.  3.  Creatinine slightly elevated compared to yesterday.  Will continue to monitor closely.  4.  She was given a one time dose of rocephin for concern for urinary tract infection.  Will start omnicef for 4 days.  5.  Strict intake and output and daily weights.  Discussed with KHANG Mccarthy the importance of documenting her intake and output.  Patient tells me she did not drink hardly anything yesterday because the nursing staff had not been keeping up with her I&O.  She was afraid to drink on her own.  She was told that it is okay to drink water and KHANG Mccarthy is aware to keep up with her intake.  6.  Continue low-dose sliding scale insulin, metformin and glipizide.  7.  I have encouraged her to increase activity as tolerated.  She must be up in the chair with meals.  8.  Lovenox for VTE prophylaxis.  9.  Will need to follow up as outpatient for hyperlipidemia as patient states she does not tolerate cholesterol medicine.  10.  Continue physical and Occupational Therapy.  11.  Labs in AM.    Discharge Planning: I expect the patient to be discharged to home likely tomorrow.     QUETA Michaels   01/02/20   10:56 AM     I personally evaluated and examined the patient in conjunction with QUETA Valdez and agree with the assessment, treatment plan, and disposition of the patient as recorded by her. My history, exam, and further recommendations are:     Patient seen working with PT and feels better.  Feels stronger.  No chest pain or shortness of breath.  No nausea or vomiting.    GEN: Awake, alert, interactive, in NAD  HEENT: PERRLA, EOMI, Anicteric, Trachea midline. Dry MMM  Lungs: CTAB, no wheezing/rales/rhonchi  Heart: RRR, +S1/s2, no rub  ABD: soft, nt/nd, +BS, no guarding/rebound  Extremities: atraumatic, no cyanosis, no edema  Neuro: AAOx3, no focal deficits    Slight dip down in sodium to 127 today.  Renal function about the same as yesterday.  Still up from arrival.  Will  continue looser fluid restrictions and start salt tabs.  Monitor renal function and sodium.  Hopefully home with home health services tomorrow.    Ruiz Bowser,   01/02/20  4:40 PM

## 2020-01-02 NOTE — PLAN OF CARE
Denies pain. Up ad tramaine in room and ambulates with SBA in hallway. VSS with no drop in b/p with orthostatic check.I&O continued. Will continue current POC and monitor effectiveness each shift and PRN.

## 2020-01-03 VITALS
RESPIRATION RATE: 20 BRPM | HEART RATE: 68 BPM | OXYGEN SATURATION: 97 % | WEIGHT: 160.4 LBS | DIASTOLIC BLOOD PRESSURE: 56 MMHG | BODY MASS INDEX: 27.39 KG/M2 | SYSTOLIC BLOOD PRESSURE: 124 MMHG | HEIGHT: 64 IN | TEMPERATURE: 97.6 F

## 2020-01-03 LAB
ANION GAP SERPL CALCULATED.3IONS-SCNC: 9 MMOL/L (ref 5–15)
BUN BLD-MCNC: 21 MG/DL (ref 8–23)
BUN/CREAT SERPL: 22.8 (ref 7–25)
CALCIUM SPEC-SCNC: 9.2 MG/DL (ref 8.6–10.5)
CHLORIDE SERPL-SCNC: 95 MMOL/L (ref 98–107)
CO2 SERPL-SCNC: 25 MMOL/L (ref 22–29)
CREAT BLD-MCNC: 0.92 MG/DL (ref 0.57–1)
GFR SERPL CREATININE-BSD FRML MDRD: 58 ML/MIN/1.73
GLUCOSE BLD-MCNC: 107 MG/DL (ref 65–99)
GLUCOSE BLDC GLUCOMTR-MCNC: 110 MG/DL (ref 70–130)
GLUCOSE BLDC GLUCOMTR-MCNC: 219 MG/DL (ref 70–130)
POTASSIUM BLD-SCNC: 4.3 MMOL/L (ref 3.5–5.2)
SODIUM BLD-SCNC: 129 MMOL/L (ref 136–145)

## 2020-01-03 PROCEDURE — 97110 THERAPEUTIC EXERCISES: CPT

## 2020-01-03 PROCEDURE — 97116 GAIT TRAINING THERAPY: CPT

## 2020-01-03 PROCEDURE — 25010000002 ENOXAPARIN PER 10 MG: Performed by: INTERNAL MEDICINE

## 2020-01-03 PROCEDURE — 82962 GLUCOSE BLOOD TEST: CPT

## 2020-01-03 PROCEDURE — 80048 BASIC METABOLIC PNL TOTAL CA: CPT | Performed by: NURSE PRACTITIONER

## 2020-01-03 RX ORDER — SODIUM CHLORIDE 1000 MG
1 TABLET, SOLUBLE MISCELLANEOUS
Qty: 15 TABLET | Refills: 0 | Status: SHIPPED | OUTPATIENT
Start: 2020-01-03 | End: 2020-01-08

## 2020-01-03 RX ORDER — CEFDINIR 300 MG/1
300 CAPSULE ORAL EVERY 12 HOURS SCHEDULED
Qty: 5 CAPSULE | Refills: 0 | Status: SHIPPED | OUTPATIENT
Start: 2020-01-03 | End: 2020-01-06

## 2020-01-03 RX ADMIN — AMLODIPINE BESYLATE 2.5 MG: 5 TABLET ORAL at 10:05

## 2020-01-03 RX ADMIN — ENOXAPARIN SODIUM 40 MG: 40 INJECTION SUBCUTANEOUS at 10:14

## 2020-01-03 RX ADMIN — NEBIVOLOL HYDROCHLORIDE 2.5 MG: 2.5 TABLET ORAL at 10:05

## 2020-01-03 RX ADMIN — GLIPIZIDE 10 MG: 10 TABLET ORAL at 10:04

## 2020-01-03 RX ADMIN — CEFDINIR 300 MG: 300 CAPSULE ORAL at 10:04

## 2020-01-03 RX ADMIN — SODIUM CHLORIDE, PRESERVATIVE FREE 10 ML: 5 INJECTION INTRAVENOUS at 10:06

## 2020-01-03 RX ADMIN — ASPIRIN 81 MG: 81 TABLET ORAL at 10:04

## 2020-01-03 RX ADMIN — SODIUM CHLORIDE TAB 1 GM 1 G: 1 TAB at 10:06

## 2020-01-03 RX ADMIN — Medication 1 CAPSULE: at 10:06

## 2020-01-03 RX ADMIN — Medication 1 TABLET: at 10:04

## 2020-01-03 RX ADMIN — LEVOTHYROXINE SODIUM 50 MCG: 50 TABLET ORAL at 10:06

## 2020-01-03 RX ADMIN — METFORMIN HYDROCHLORIDE 500 MG: 500 TABLET ORAL at 10:05

## 2020-01-03 NOTE — PROGRESS NOTES
Continued Stay Note   [facility]     Patient Name: Nedra Tolliver  MRN: [id number]  Today's Date: 1/3/2020    Admit Date: 12/29/2019    Discharge Plan     Row Name 01/03/20 0950       Plan    Plan  Home    Patient/Family in Agreement with Plan  yes    Plan Comments  Pt is planning to return home alone as before.  No home needs identified this far. Will follow.         Discharge Codes    No documentation.       Expected Discharge Date and Time     Expected Discharge Date Expected Discharge Time    Jan 1, 2020             DACIA Lucia

## 2020-01-03 NOTE — PROGRESS NOTES
Continued Stay Note  [facility]     Patient Name: Nedra Tolliver  MRN: [id number]  Today's Date: 1/3/2020    Admit Date: 12/29/2019    Discharge Plan     Row Name 01/03/20 1216       Plan    Plan  Home    Post Acute Provider Lists  Home Health    Post Acuite Provider List  Delivered    Delivered To  Patient    Method of Delivery  In person    Patient/Family in Agreement with Plan  yes    Final Discharge Disposition Code  06 - home with home health care    Final Note  Pt is being discharged home today with  care orders. Pt prefers [facility] so provided Martha Maciel referral,she is here now.     Row Name 01/03/20 1053       Plan    Final Discharge Disposition Code  01 - home or self-care    Row Name 01/03/20 0950       Plan    Plan  Home    Patient/Family in Agreement with Plan  yes    Plan Comments  Pt is planning to return home alone as before.  No home needs identified this far. Will follow.         Discharge Codes    No documentation.       Expected Discharge Date and Time     Expected Discharge Date Expected Discharge Time    Daljit 3, 2020             DACIA Lucia

## 2020-01-03 NOTE — PLAN OF CARE
Problem: Patient Care Overview  Goal: Plan of Care Review  1/3/2020 0401 by Fannie Mckeon RN  Outcome: Ongoing (interventions implemented as appropriate)  Flowsheets (Taken 1/3/2020 0401)  Progress: improving  Plan of Care Reviewed With: patient  Outcome Summary: pt c/o pain this shift; tylenol given with relief noted; overall pain better; pt is hoping to go home today but it will depend on her morning lab results; pt rested well; VSS; safety maintained; will continue to monitor

## 2020-01-03 NOTE — THERAPY TREATMENT NOTE
Acute Care - Physical Therapy Treatment Note  [facility]     Patient Name: Nedra Tolliver  : 1935  MRN: [id number]  Today's Date: 1/3/2020  Onset of Illness/Injury or Date of Surgery: 19     Referring Physician: Dr. Bowser    Admit Date: 2019    Visit Dx:    ICD-10-CM ICD-9-CM   1. Hyponatremia E87.1 276.1   2. Weakness R53.1 780.79   3. Shortness of breath R06.02 786.05   4. Impaired mobility Z74.09 799.89   5. Decreased activities of daily living (ADL) Z78.9 V49.89     Patient Active Problem List   Diagnosis   • Urinary tract infection without hematuria   • Dysuria   • Vaginal atrophy   • Overactive bladder   • Diverticulitis   • HTN (hypertension), benign   • Thyroid nodule   • Hypothyroidism   • Hyponatremia   • Syncope and collapse   • Volume depletion   • Diabetes type 2, controlled (CMS/HCC)       Therapy Treatment    Rehabilitation Treatment Summary     Row Name 20             Treatment Time/Intention    Discipline  physical therapy assistant  -      Document Type  therapy note (daily note)  -LC2      Subjective Information  complains of;pain  -LC2      Mode of Treatment  physical therapy  -LC2      Recorded by [LC] Dejah Bates, PTA 20  [LC2] Dejah Bates, PTA 20      Row Name 20             Bed Mobility Assessment/Treatment    Supine-Sit [place] (Bed Mobility)  independent  -LC      Sit-Supine [place] (Bed Mobility)  independent  -LC      Assistive Device (Bed Mobility)  head of bed elevated  -LC      Recorded by [LC] Dejah Bates, PTA 20      Row Name 20             Sit-Stand Transfer    Sit-Stand [place] (Transfers)  independent  -LC      Recorded by [LC] Dejah Bates, PTA 20      Row Name 20             Stand-Sit Transfer    Stand-Sit [place] (Transfers)  independent  -      Recorded by [LC] Dejah Bates, PTA 20      Row Name 20              Gait/Stairs Assessment/Training    [place] Level (Gait)  verbal cues;stand by assist  -      Distance in Feet (Gait)  300 x2   -      Pattern (Gait)  step-through  -      Deviations/Abnormal Patterns (Gait)  trang decreased;stride length decreased  -      Bilateral Gait Deviations  forward flexed posture  -      Comment (Gait/Stairs)  standing rest break required between trials   -LC      Recorded by [LC] Dejah Bates, PTA 01/03/20 0912      Row Name 01/03/20 0843             Therapeutic Exercise    Lower Extremity (Therapeutic Exercise)  LAQ (long arc quad), bilateral;marching while seated  -      Lower Extremity Range of Motion (Therapeutic Exercise)  hip abduction/adduction, bilateral;ankle dorsiflexion/plantar flexion, bilateral  -      Exercise Type (Therapeutic Exercise)  AROM (active range of motion)  -      Position (Therapeutic Exercise)  seated  -      Sets/Reps (Therapeutic Exercise)  20  -LC      Recorded by [LC] Dejah Bates, PTA 01/03/20 0912      Row Name 01/03/20 0843             Positioning and Restraints    Pre-Treatment Position  in bed  -      Post Treatment Position  bed  -LC      In Bed  sitting EOB;call light within reach  -      Recorded by [LC] Dejah Bates, PTA 01/03/20 0912      Row Name 01/03/20 0843             Pain Scale: Numbers Pre/Post-Treatment    Pain Scale: Numbers, Pretreatment  4/10  -      Pain Scale: Numbers, Post-Treatment  4/10  -LC      Pre/Post Treatment Pain Comment  back  -      Pain Intervention(s)  Medication (See MAR);Repositioned  -LC      Recorded by [LC] Dejah Bates, PTA 01/03/20 0912      Row Name 01/03/20 0843             Plan of Care Review    Plan of Care Reviewed With  patient  -      Progress  improving  -      Outcome Summary  Patient continues to progress, she ambulated 300 x2 with SBA and 1 standing rest break. Performed seated BLE therex in all available planes x20. Patient will benefit from  services for  improved strength and endurance. Will cont to monitor.  -      Recorded by [LC] Dejah Bates, PTA 01/03/20 0912        User Key  (r) = Recorded By, (t) = Taken By, (c) = Cosigned By    Initials Name Effective Dates Discipline    LC Dejah Bates, PTA 10/18/19 -  PT                       PT Recommendation and Plan     Plan of Care Reviewed With: patient  Progress: improving  Outcome Summary: Patient continues to progress, she ambulated 300 x2 with SBA and 1 standing rest break. Performed seated BLE therex in all available planes x20. Patient will benefit from  services for improved strength and endurance. Will cont to monitor.  Outcome Measures     Row Name 01/01/20 1357 12/31/19 1041 12/31/19 1004       How much help from another person do you currently need...    Turning from your back to your side while in flat bed without using bedrails?  4  -YOLY  --  --    Moving from lying on back to sitting on the side of a flat bed without bedrails?  4  -YOLY  --  --    Moving to and from a bed to a chair (including a wheelchair)?  4  -YOLY  --  --    Standing up from a chair using your arms (e.g., wheelchair, bedside chair)?  4  -YOLY  --  --    Climbing 3-5 steps with a railing?  3  -YOLY  --  --    To walk in hospital room?  3  -YOLY  --  --    AM-PAC 6 Clicks Score (PT)  22  -YOLY  --  --       How much help from another is currently needed...    Putting on and taking off regular lower body clothing?  --  4  -JJ  --    Bathing (including washing, rinsing, and drying)  --  3  -JJ  --    Toileting (which includes using toilet bed pan or urinal)  --  4  -JJ  --    Putting on and taking off regular upper body clothing  --  4  -JJ  --    Taking care of personal grooming (such as brushing teeth)  --  4  -JJ  --    Eating meals  --  4  -JJ  --    AM-PAC 6 Clicks Score (OT)  --  23  -JJ  --       Functional Assessment    Outcome Measure Options  AM-PAC 6 Clicks Basic Mobility (PT)  -YOLY  AM-PAC 6 Clicks Daily Activity (OT)  -JJ  AM-PAC 6  Clicks Basic Mobility (PT)  -      User Key  (r) = Recorded By, (t) = Taken By, (c) = Cosigned By    Initials Name Provider Type    Wiliam Baeza, BREE Physical Therapy Assistant    Chelsea Christie, PTA Physical Therapy Assistant    Arianne Ortez, OTR/L Occupational Therapist         Time Calculation:   PT Charges     Row Name 01/03/20 0912             Time Calculation    Start Time  0843  -      Stop Time  0907  -      Time Calculation (min)  24 min  -      PT Received On  01/03/20  -      PT Goal Re-Cert Due Date  01/13/20  -         Time Calculation- PT    Total Timed Code Minutes- PT  24 minute(s)  -        User Key  (r) = Recorded By, (t) = Taken By, (c) = Cosigned By    Initials Name Provider Type    Dejah North PTA Physical Therapy Assistant        Therapy Charges for Today     Code Description Service Date Service Provider Modifiers Qty    76754808474 HC GAIT TRAINING EA 15 MIN 1/2/2020 Dejah Bates, PTA GP 1    32052001637 HC PT THER PROC EA 15 MIN 1/2/2020 Dejah Bates, PTA GP 1    08710254243 HC GAIT TRAINING EA 15 MIN 1/3/2020 Dejah Bates, PTA GP 1    94491363904 HC PT THER PROC EA 15 MIN 1/3/2020 Dejah Bates, BREE GP 1          PT G-Codes  Outcome Measure Options: AM-PAC 6 Clicks Basic Mobility (PT)  AM-PAC 6 Clicks Score (PT): 22  AM-PAC 6 Clicks Score (OT): 23    Dejah Bates PTA  1/3/2020

## 2020-01-03 NOTE — DISCHARGE SUMMARY
[facility] Medicine Services  DISCHARGE SUMMARY       Date of Admission: 12/29/2019  Date of Discharge:  1/3/2020  Primary Care Physician: Vijay Rodrigues MD    Presenting Problem/History of Present Illness:  Hyponatremia [E87.1]  Hyponatremia [E87.1]     Final Discharge Diagnoses:  Active Hospital Problems    Diagnosis   • **Hyponatremia   • Syncope and collapse   • Volume depletion   • Diabetes type 2, controlled (CMS/HCC)   • Hypothyroidism   • HTN (hypertension), benign     cont BP medication the day of procedure     • Urinary tract infection without hematuria       Consults: Dr. Zuniga with nephrology.    Procedures Performed:   Imaging Results (Last 7 Days)     Procedure Component Value Units Date/Time    CT Head Without Contrast [[id number]] Collected:  12/30/19 0759     Updated:  12/30/19 0805    Narrative:       EXAMINATION: CT HEAD WO CONTRAST- 12/30/2019 7:59 AM CST     HISTORY: Syncope/fainting; E87.3-Hbca-bqunkjcfns and hyponatremia;  R53.1-Weakness; R06.02-Shortness of breath.     DOSE: 581 mGycm (Automatic exposure control technique was implemented in  an effort to keep the radiation dose as low as possible without  compromising image quality)     REPORT: Spiral CT of the head was performed without contrast,  reconstructed coronal and sagittal images were also reviewed.     COMPARISON: CT head without contrast 08/03/2018.     No intracranial hemorrhage, mass or mass effect is identified. There is  mild diffuse cortical atrophy. 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 This is stable.  Review of bone windows is unremarkable. There is normal aeration of the  visualized paranasal sinuses and mastoid air cells.       Impression:       1. No acute intracranial abnormality.  2. Mild diffuse atrophy and chronic small vessel white matter  ischemic  changes. This is stable.     This report was finalized on 12/30/2019 08:02 by Dr. Hank Sauer MD.    XR Chest 1 View [[id number]] Collected:  12/30/19 0639     Updated:  12/30/19 0642    Narrative:       EXAMINATION: XR CHEST 1 VW- 12/30/2019 6:39 AM CST     HISTORY: Chest pain, sob.     REPORT: Comparison is made with the chest x-ray 01/24/2017.        One-view chest: Frontal view of the chest was obtained. The lungs are  clear and normally expanded. The cardiac and mediastinal silhouettes are  within normal limits. Scattered calcified granulomas are present as  before. The visualized bony thorax and upper abdomen are unremarkable.                                                                                                                                                       Impression:              Normal one-view chest.                                                                      This report was finalized on 12/30/2019 06:39 by Dr. Hank Sauer MD.    NM Lung Ventilation Perfusion [[id number]] Collected:  12/30/19 0636     Updated:  12/30/19 0641    Narrative:       EXAMINATION: NM LUNG VENTILATION PERFUSION- 12/30/2019 6:36 AM CST     HISTORY: Shortness of breath.; E87.9-Iowk-vfnybvjmue and hyponatremia;  R53.1-Weakness; R06.02-Shortness of breath.     Dose:  1. 13.6 mCi xenon-133 via inhalation.  2. 4.6 mCi technetium 99m MAA intravenously.     REPORT: Comparison is made with the chest x-ray on 12/29/2013.  Correlation images demonstrate a normal distribution of xenon within the  lungs without significant air trapping. Perfusion images demonstrate a  normal homogeneous distribution of activity within both lungs with no  peripheral defects to indicate significant pulmonary emboli.       Impression:       Very low probability ventilation/perfusion lung scan.     A preliminary report was provided by the [facility] radiology service.     This report was finalized on 12/30/2019 06:38 by   Hank Sauer MD.        Pertinent Test Results:   Lab Results (last 48 hours)     Procedure Component Value Units Date/Time    Basic Metabolic Panel [[id number]]  (Abnormal) Collected:  01/03/20 0454    Specimen:  Blood Updated:  01/03/20 0535     Glucose 107 mg/dL      BUN 21 mg/dL      Creatinine 0.92 mg/dL      Sodium 129 mmol/L      Potassium 4.3 mmol/L      Chloride 95 mmol/L      CO2 25.0 mmol/L      Calcium 9.2 mg/dL      eGFR Non African Amer 58 mL/min/1.73      BUN/Creatinine Ratio 22.8     Anion Gap 9.0 mmol/L     Narrative:       GFR Normal >60  Chronic Kidney Disease <60  Kidney Failure <15      POC Glucose Once [[id number]]  (Abnormal) Collected:  01/02/20 2041    Specimen:  Blood Updated:  01/02/20 2052     Glucose 143 mg/dL      Comment: : [id number] Mike Saleh) JaneeaMeter ID: [id number]       POC Glucose Once [[id number]]  (Normal) Collected:  01/02/20 1640    Specimen:  Blood Updated:  01/02/20 1704     Glucose 109 mg/dL      Comment: : [id number] Bharathi FancloudMeter ID: [id number]       POC Glucose Once [[id number]]  (Abnormal) Collected:  01/02/20 1113    Specimen:  Blood Updated:  01/02/20 1143     Glucose 216 mg/dL      Comment: : [id number] Bharathi FancloudMeter ID: [id number]       POC Glucose Once [[id number]]  (Abnormal) Collected:  01/02/20 0749    Specimen:  Blood Updated:  01/02/20 0819     Glucose 158 mg/dL      Comment: : [id number] Bharathi FancloudMeter ID: [id number]       Basic Metabolic Panel [[id number]]  (Abnormal) Collected:  01/02/20 0706    Specimen:  Blood Updated:  01/02/20 0757     Glucose 153 mg/dL      BUN 22 mg/dL      Creatinine 1.20 mg/dL      Sodium 127 mmol/L      Potassium 4.7 mmol/L      Chloride 92 mmol/L      CO2 25.0 mmol/L      Calcium 9.3 mg/dL      eGFR Non African Amer 43 mL/min/1.73      BUN/Creatinine Ratio 18.3     Anion Gap 10.0 mmol/L     Narrative:       GFR Normal >60  Chronic Kidney Disease <60  Kidney Failure <15      POC Glucose Once  [[id number]]  (Abnormal) Collected:  01/01/20 2107    Specimen:  Blood Updated:  01/01/20 2118     Glucose 190 mg/dL      Comment: : [id number] Bg CrystalMeter ID: [id number]       POC Glucose Once [[id number]]  (Abnormal) Collected:  01/01/20 1646    Specimen:  Blood Updated:  01/01/20 1657     Glucose 64 mg/dL      Comment: : [id number] WhitneyMeter ID: [id number]       POC Glucose Once [[id number]]  (Abnormal) Collected:  01/01/20 1635    Specimen:  Blood Updated:  01/01/20 1646     Glucose 63 mg/dL      Comment: : [id number] Whitney[employer]eter ID: [id number]       POC Glucose Once [[id number]]  (Abnormal) Collected:  01/01/20 1109    Specimen:  Blood Updated:  01/01/20 1120     Glucose 266 mg/dL      Comment: : [id number] [employer]eter ID: [id number]       POC Glucose Once [[id number]]  (Abnormal) Collected:  01/01/20 0724    Specimen:  Blood Updated:  01/01/20 0735     Glucose 200 mg/dL      Comment: : [id number] [employer]eter ID: [id number]             Chief Complaint on Day of Discharge: She voices no new concerns at this time she tells me she feels as though she is ready to go home.    Hospital Course:  The patient is a 84 y.o. female who presented to [facility] with shortness of breath and syncope.  She has a past medical history significant for non-insulin-dependent type 2 diabetes, hypertension, coronary artery disease and hypothyroidism.  Upon review of records, it is noted the patient has chronic hyponatremia with a level at baseline around 130.  Patient reported worsening shortness of breath with exertion and lying supine.  The patient stated that approximately a week prior to admission she also began to experience increased swelling in her legs and abdomen.  Her primary care provider Dr. Rodrigues had started her on hydrochlorothiazide.  Patient reported she was trying to drink extra water to counteract fluid loss.  Since then, patient reported she  lost 6 pounds and noted that the edema in her legs has resolved.  However, her shortness of breath persisted.  Prior to presentation the patient had a syncopal episode after she stood up in which she believes she was unconscious for an unknown period of time.  She had also complained of some intermittent chest tightness with exertion.  She also denied nausea, vomiting, diarrhea and abdominal pain.  She denied any recent sick contacts.  Upon evaluation in the emergency department, she was found to have hyponatremia with a level of 120.  CT of the head was negative for acute process.  She was admitted to the hospitalist service for further evaluation and management.    Hydrochlorothiazide was discontinued.  She was initially placed on intravenous fluids for suspected dehydration.  Urine sodium was noted to be 84 and Osmo of 324.  FENA calculated at 1.6%.  Intravenous fluids was subsequently stopped.  She was seen in consultation by nephrologist Dr. Zuniga.  Hyponatremia likely caused by hydrochlorothiazide recommendations to not restart for the future.  Patient was encouraged fluid restriction of 1000 mL.  She has been monitored on strict intake and outputs and daily weights.  Patient had a mildly increased creatinine and was found to be orthostatic.  Orthostatic hypotension has resolved.  Fluid restriction was loosened to 1500 mL.  She was given sodium chloride tablets.  Today, sodium is noted to be 129 and renal function is noted to be stable.  She will be discharged with instructions to continue chloride tablets upon discharge for 5 days.  Serial troponins have remained negative.  EKG with no acute ST - T wave changes.  The patient had a low risk stress echocardiogram in May 2019.  VQ scan with very low probability for pulmonary embolism.  Transthoracic echocardiogram obtained, revealed left ventricular diastolic dysfunction with an ejection fraction of 66 to 70%.  Syncope was felt likely secondary to  "dehydration and hyponatremia.  Urinalysis was obtained with concern for urinary tract infection.  Patient reported she has had some urinary urgency and incontinence.  She was given a one-time dose of intravenous Rocephin and has been transitioned to start Omnicef for 4 days for a total of 5 days of antibiotic therapy.  She has been working with physical and Occupational Therapy.  Physical therapy recommends home health for continued strengthening upon discharge.  Overall, the patient tells me she is feeling better and is ready to go home.  She has denied shortness of breath and chest pain.  She also denies nausea, vomiting, diarrhea and abdominal pain.  She is tolerating a diet.  She is voiding without difficulty.  She has been cleared for discharge by nephrology.  She is now medically stable for discharge home with home health.  Nephrology recommends outpatient evaluation to ensure age-appropriate cancer screenings up-to-date.  She will be discharged with instructions to continue 1500 mL fluid restriction.  She is to follow-up with her primary care provider within one week.  She is to follow-up with nephrology on Monday 1/6 or Tuesday 1/7 - Repeat BMP ordered for Monday, January 6, 2020.    Condition on Discharge:  Medically stable.    Physical Exam on Discharge:  /56 (BP Location: Left arm, Patient Position: Standing)   Pulse 68   Temp 97.6 °F (36.4 °C) (Oral)   Resp 20   Ht 162.6 cm (64\")   Wt 72.8 kg (160 lb 6.4 oz)   LMP  (LMP Unknown)   SpO2 97%   BMI 27.53 kg/m²   Physical Exam  Constitutional: She is oriented to person, place, and time. She appears well-developed and well-nourished. No distress.   HENT:   Head: Normocephalic and atraumatic.   Mouth/Throat: Oropharynx is clear and moist.   Eyes: Conjunctivae and EOM are normal. No scleral icterus.   Neck: Neck supple. No JVD present.   Cardiovascular: Normal rate, regular rhythm, normal heart sounds and intact distal pulses. Exam reveals no " gallop and no friction rub.   No murmur heard.  Pulmonary/Chest: Effort normal and breath sounds normal. She has no wheezes. She has no rales.   Abdominal: Soft. Bowel sounds are normal. She exhibits no distension and no mass. There is no tenderness. There is no rebound and no guarding.   Musculoskeletal: Normal range of motion. She exhibits no edema.   Lymphadenopathy:     She has no cervical adenopathy.   Neurological: She is alert and oriented to person, place, and time. No cranial nerve deficit.   Skin: Skin is warm and dry. She is not diaphoretic.   Psychiatric: She has a normal mood and affect. Her behavior is normal.   Nursing note and vitals reviewed.    Discharge Disposition:  [facility]-Health Care [facility]    Discharge Medications:     Discharge Medications      New Medications      Instructions Start Date   aspirin 81 MG EC tablet   81 mg, Oral, Daily      cefdinir 300 MG capsule  Commonly known as:  OMNICEF   300 mg, Oral, Every 12 Hours Scheduled      sodium chloride 1 g tablet   1 g, Oral, 3 Times Daily With Meals         Continue These Medications      Instructions Start Date   acetaminophen 500 MG tablet  Commonly known as:  TYLENOL   500 mg, Oral, Every 6 Hours PRN      amLODIPine 2.5 MG tablet  Commonly known as:  NORVASC   No dose, route, or frequency recorded.      CENTRUM SILVER PO   Oral      Co Q 10 100 MG capsule   Oral      conjugated estrogens 0.625 MG/GM vaginal cream  Commonly known as:  PREMARIN   Vaginal, 2 Times Weekly      dicyclomine 10 MG capsule  Commonly known as:  BENTYL   10 mg, Oral, 3 Times Daily, The patient states she is taking this PRN      glimepiride 4 MG tablet  Commonly known as:  AMARYL   4 mg, Oral, Every Morning Before Breakfast      levothyroxine 50 MCG tablet  Commonly known as:  SYNTHROID, LEVOTHROID   50 mcg, Oral, Daily      metFORMIN 500 MG tablet  Commonly known as:  GLUCOPHAGE   500 mg, Oral, 2 Times Daily With Meals      nebivolol 2.5 MG tablet  Commonly known as:   BYSTOLIC   2.5 mg, Oral, Daily      PROBIOTIC DAILY PO   Oral      SUPREP BOWEL PREP KIT 17.5-3.13-1.6 GM/177ML solution oral solution  Generic drug:  sodium-potassium-magnesium sulfates   Take as directed by office instructions provided      vitamin D3 125 MCG (5000 UT) capsule capsule   5,000 Units, Oral, Daily         Stop These Medications    irbesartan 75 MG tablet  Commonly known as:  AVAPRO          Discharge Diet:   Diet Instructions     Diet: Regular, Consistent Carbohydrate, Cardiac; Thin      Discharge Diet:   Regular  Consistent Carbohydrate  Cardiac       Fluid Consistency:  Thin    Fluid Restriction per day:  1500 mL Fluid        Activity at Discharge:   Activity Instructions     Activity as Tolerated          Discharge Care Plan/Instructions:  1.  Return for worsening symptoms.  2.  Nephrology recommends outpatient evaluation to ensure age-appropriate cancer screenings up-to-date.   3.  She will be discharged with instructions to continue 1500 mL fluid restriction.    Follow-up Appointments:   1.  She is to follow-up with her primary care provider within one week.  2.  She is to follow-up with nephrology on Monday 1/6 or Tuesday 1/7 - Repeat BMP ordered for Monday, January 6, 2020.    Test Results Pending at Discharge: None.    QUETA Michaels  01/03/20  11:16 AM    Time: 40 minutes.    I personally evaluated and examined the patient in conjunction with QUETA Valdez and agree with the assessment, treatment plan, and disposition of the patient as recorded by her. My history, exam, and further recommendations are:     GEN: Awake, alert, interactive, in NAD  HEENT: Atraumatic, PERRLA, EOMI, Anicteric, Trachea midline  Lungs: CTAB, no wheezing/rales/rhonchi  Heart: RRR, +S1/s2, no rub  ABD: soft, nt/nd, +BS, no guarding/rebound  Extremities: atraumatic, no cyanosis, no edema  Skin: no rashes or lesions  Neuro: AAOx3, no focal deficits  Psych: normal mood & affect    Seen and examined.  She is doing  well.  Feels stronger.  No dizziness or orthostasis upon standing.  Urinating well.  Sodium up to 129 and creatinine normalized.  Wants to go home.  Agree with above note.  We will continue Omnicef to complete 5 days of antibiotics for UTI.  Continue salt tabs and fluid restriction after discharge.  Close interval follow-up with nephrology for repeat BMP and ongoing management as needed for hyponatremia.  Otherwise follow-up with PCP.      Ruiz Bowser DO  01/03/20  4:39 PM

## 2020-01-03 NOTE — PLAN OF CARE
Problem: Patient Care Overview  Goal: Plan of Care Review  Outcome: Ongoing (interventions implemented as appropriate)  Flowsheets (Taken 1/3/2020 2881)  Progress: improving  Plan of Care Reviewed With: patient  Outcome Summary: Patient continues to progress, she ambulated 300 x2 with SBA and 1 standing rest break. Performed seated BLE therex in all available planes x20. Patient will benefit from  services for improved strength and endurance. Will cont to monitor.

## 2020-01-04 ENCOUNTER — HOSPITAL ENCOUNTER (EMERGENCY)
Facility: HOSPITAL | Age: 85
Discharge: HOME | End: 2020-01-04
Admitting: PHYSICIAN ASSISTANT

## 2020-01-04 ENCOUNTER — READMISSION MANAGEMENT (OUTPATIENT)
Dept: CALL CENTER | Facility: HOSPITAL | Age: 85
End: 2020-01-04

## 2020-01-04 ENCOUNTER — APPOINTMENT (OUTPATIENT)
Dept: CT IMAGING | Facility: HOSPITAL | Age: 85
End: 2020-01-04

## 2020-01-04 VITALS
HEART RATE: 84 BPM | WEIGHT: 153.5 LBS | DIASTOLIC BLOOD PRESSURE: 72 MMHG | RESPIRATION RATE: 16 BRPM | BODY MASS INDEX: 24.67 KG/M2 | HEIGHT: 66 IN | OXYGEN SATURATION: 98 % | SYSTOLIC BLOOD PRESSURE: 130 MMHG | TEMPERATURE: 97.8 F

## 2020-01-04 DIAGNOSIS — K59.00 CONSTIPATION, UNSPECIFIED CONSTIPATION TYPE: ICD-10-CM

## 2020-01-04 DIAGNOSIS — R11.0 NAUSEA: ICD-10-CM

## 2020-01-04 DIAGNOSIS — R10.84 GENERALIZED ABDOMINAL PAIN: Primary | ICD-10-CM

## 2020-01-04 LAB
ALBUMIN SERPL-MCNC: 4 G/DL (ref 3.5–5.2)
ALBUMIN/GLOB SERPL: 1.2 G/DL
ALP SERPL-CCNC: 69 U/L (ref 39–117)
ALT SERPL W P-5'-P-CCNC: 18 U/L (ref 1–33)
AMYLASE SERPL-CCNC: 35 U/L (ref 28–100)
ANION GAP SERPL CALCULATED.3IONS-SCNC: 13 MMOL/L (ref 5–15)
AST SERPL-CCNC: 18 U/L (ref 1–32)
BACTERIA UR QL AUTO: ABNORMAL /HPF
BASOPHILS # BLD AUTO: 0.03 10*3/MM3 (ref 0–0.2)
BASOPHILS NFR BLD AUTO: 0.3 % (ref 0–1.5)
BILIRUB SERPL-MCNC: 0.3 MG/DL (ref 0.2–1.2)
BILIRUB UR QL STRIP: NEGATIVE
BUN BLD-MCNC: 26 MG/DL (ref 8–23)
BUN/CREAT SERPL: 25.2 (ref 7–25)
CALCIUM SPEC-SCNC: 9.6 MG/DL (ref 8.6–10.5)
CHLORIDE SERPL-SCNC: 95 MMOL/L (ref 98–107)
CLARITY UR: CLEAR
CO2 SERPL-SCNC: 23 MMOL/L (ref 22–29)
COLOR UR: ABNORMAL
CREAT BLD-MCNC: 1.03 MG/DL (ref 0.57–1)
D-LACTATE SERPL-SCNC: 1.3 MMOL/L (ref 0.5–2)
DEPRECATED RDW RBC AUTO: 42.5 FL (ref 37–54)
EOSINOPHIL # BLD AUTO: 0.09 10*3/MM3 (ref 0–0.4)
EOSINOPHIL NFR BLD AUTO: 0.9 % (ref 0.3–6.2)
ERYTHROCYTE [DISTWIDTH] IN BLOOD BY AUTOMATED COUNT: 12.9 % (ref 12.3–15.4)
GFR SERPL CREATININE-BSD FRML MDRD: 51 ML/MIN/1.73
GLOBULIN UR ELPH-MCNC: 3.4 GM/DL
GLUCOSE BLD-MCNC: 235 MG/DL (ref 65–99)
GLUCOSE UR STRIP-MCNC: NEGATIVE MG/DL
HCT VFR BLD AUTO: 37 % (ref 34–46.6)
HGB BLD-MCNC: 12.8 G/DL (ref 12–15.9)
HGB UR QL STRIP.AUTO: NEGATIVE
HOLD SPECIMEN: NORMAL
HYALINE CASTS UR QL AUTO: ABNORMAL /LPF
IMM GRANULOCYTES # BLD AUTO: 0.05 10*3/MM3 (ref 0–0.05)
IMM GRANULOCYTES NFR BLD AUTO: 0.5 % (ref 0–0.5)
KETONES UR QL STRIP: ABNORMAL
LEUKOCYTE ESTERASE UR QL STRIP.AUTO: ABNORMAL
LIPASE SERPL-CCNC: 22 U/L (ref 13–60)
LYMPHOCYTES # BLD AUTO: 0.49 10*3/MM3 (ref 0.7–3.1)
LYMPHOCYTES NFR BLD AUTO: 5.1 % (ref 19.6–45.3)
MCH RBC QN AUTO: 31.7 PG (ref 26.6–33)
MCHC RBC AUTO-ENTMCNC: 34.6 G/DL (ref 31.5–35.7)
MCV RBC AUTO: 91.6 FL (ref 79–97)
MONOCYTES # BLD AUTO: 0.6 10*3/MM3 (ref 0.1–0.9)
MONOCYTES NFR BLD AUTO: 6.2 % (ref 5–12)
NEUTROPHILS # BLD AUTO: 8.39 10*3/MM3 (ref 1.7–7)
NEUTROPHILS NFR BLD AUTO: 87 % (ref 42.7–76)
NITRITE UR QL STRIP: NEGATIVE
NRBC BLD AUTO-RTO: 0 /100 WBC (ref 0–0.2)
PH UR STRIP.AUTO: 5.5 [PH] (ref 5–8)
PLATELET # BLD AUTO: 270 10*3/MM3 (ref 140–450)
PMV BLD AUTO: 9.6 FL (ref 6–12)
POTASSIUM BLD-SCNC: 5 MMOL/L (ref 3.5–5.2)
PROT SERPL-MCNC: 7.4 G/DL (ref 6–8.5)
PROT UR QL STRIP: ABNORMAL
RBC # BLD AUTO: 4.04 10*6/MM3 (ref 3.77–5.28)
RBC # UR: ABNORMAL /HPF
REF LAB TEST METHOD: ABNORMAL
SODIUM BLD-SCNC: 131 MMOL/L (ref 136–145)
SP GR UR STRIP: 1.02 (ref 1–1.03)
SQUAMOUS #/AREA URNS HPF: ABNORMAL /HPF
UROBILINOGEN UR QL STRIP: ABNORMAL
WBC NRBC COR # BLD: 9.65 10*3/MM3 (ref 3.4–10.8)
WBC UR QL AUTO: ABNORMAL /HPF
WHOLE BLOOD HOLD SPECIMEN: NORMAL
WHOLE BLOOD HOLD SPECIMEN: NORMAL

## 2020-01-04 PROCEDURE — 96374 THER/PROPH/DIAG INJ IV PUSH: CPT

## 2020-01-04 PROCEDURE — 81001 URINALYSIS AUTO W/SCOPE: CPT | Performed by: PHYSICIAN ASSISTANT

## 2020-01-04 PROCEDURE — 83605 ASSAY OF LACTIC ACID: CPT | Performed by: PHYSICIAN ASSISTANT

## 2020-01-04 PROCEDURE — 25010000002 ONDANSETRON PER 1 MG: Performed by: PHYSICIAN ASSISTANT

## 2020-01-04 PROCEDURE — 87205 SMEAR GRAM STAIN: CPT | Performed by: PHYSICIAN ASSISTANT

## 2020-01-04 PROCEDURE — 83690 ASSAY OF LIPASE: CPT | Performed by: PHYSICIAN ASSISTANT

## 2020-01-04 PROCEDURE — 87040 BLOOD CULTURE FOR BACTERIA: CPT | Performed by: PHYSICIAN ASSISTANT

## 2020-01-04 PROCEDURE — 36415 COLL VENOUS BLD VENIPUNCTURE: CPT

## 2020-01-04 PROCEDURE — 82150 ASSAY OF AMYLASE: CPT | Performed by: PHYSICIAN ASSISTANT

## 2020-01-04 PROCEDURE — 96361 HYDRATE IV INFUSION ADD-ON: CPT

## 2020-01-04 PROCEDURE — 87147 CULTURE TYPE IMMUNOLOGIC: CPT | Performed by: PHYSICIAN ASSISTANT

## 2020-01-04 PROCEDURE — 87150 DNA/RNA AMPLIFIED PROBE: CPT | Performed by: PHYSICIAN ASSISTANT

## 2020-01-04 PROCEDURE — 85025 COMPLETE CBC W/AUTO DIFF WBC: CPT | Performed by: PHYSICIAN ASSISTANT

## 2020-01-04 PROCEDURE — 80053 COMPREHEN METABOLIC PANEL: CPT | Performed by: PHYSICIAN ASSISTANT

## 2020-01-04 PROCEDURE — 99284 EMERGENCY DEPT VISIT MOD MDM: CPT

## 2020-01-04 PROCEDURE — 74176 CT ABD & PELVIS W/O CONTRAST: CPT

## 2020-01-04 RX ORDER — SODIUM CHLORIDE 0.9 % (FLUSH) 0.9 %
10 SYRINGE (ML) INJECTION AS NEEDED
Status: DISCONTINUED | OUTPATIENT
Start: 2020-01-04 | End: 2020-01-04 | Stop reason: HOSPADM

## 2020-01-04 RX ORDER — ONDANSETRON 2 MG/ML
4 INJECTION INTRAMUSCULAR; INTRAVENOUS ONCE
Status: COMPLETED | OUTPATIENT
Start: 2020-01-04 | End: 2020-01-04

## 2020-01-04 RX ORDER — IRBESARTAN 75 MG/1
75 TABLET ORAL NIGHTLY
COMMUNITY
End: 2020-07-28 | Stop reason: SDUPTHER

## 2020-01-04 RX ORDER — SODIUM CHLORIDE 9 MG/ML
125 INJECTION, SOLUTION INTRAVENOUS CONTINUOUS
Status: DISCONTINUED | OUTPATIENT
Start: 2020-01-04 | End: 2020-01-04 | Stop reason: HOSPADM

## 2020-01-04 RX ORDER — ONDANSETRON 4 MG/1
4 TABLET, ORALLY DISINTEGRATING ORAL EVERY 6 HOURS PRN
Qty: 10 TABLET | Refills: 0 | Status: SHIPPED | OUTPATIENT
Start: 2020-01-04 | End: 2020-10-26

## 2020-01-04 RX ADMIN — BARIUM SULFATE 900 ML: 20 SUSPENSION ORAL at 12:45

## 2020-01-04 RX ADMIN — SODIUM CHLORIDE 125 ML/HR: 9 INJECTION, SOLUTION INTRAVENOUS at 12:23

## 2020-01-04 RX ADMIN — ONDANSETRON HYDROCHLORIDE 4 MG: 2 SOLUTION INTRAMUSCULAR; INTRAVENOUS at 12:23

## 2020-01-04 NOTE — THERAPY DISCHARGE NOTE
Acute Care - Physical Therapy Discharge Summary  [facility]       Patient Name: Nedra Tolliver  : 1935  MRN: [id number]    Today's Date: 2020  Onset of Illness/Injury or Date of Surgery: 19       Referring Physician: Dr. Bowser      Admit Date: 2019      PT Recommendation and Plan    Visit Dx:    ICD-10-CM ICD-9-CM   1. Hyponatremia E87.1 276.1   2. Weakness R53.1 780.79   3. Shortness of breath R06.02 786.05   4. Impaired mobility Z74.09 799.89   5. Decreased activities of daily living (ADL) Z78.9 V49.89   6. HTN (hypertension), benign I10 401.1   7. Controlled type 2 diabetes mellitus with hyperglycemia, without long-term current use of insulin (CMS/[facility]) E11.65 250.80     790.29               Rehab Goal Summary     Row Name 20 1421             Bed Mobility Goal 1 (PT)    Activity/Assistive Device (Bed Mobility Goal 1, PT)  sit to supine;supine to sit  -MF      [place] Level/Cues Needed (Bed Mobility Goal 1, PT)  independent  -MF      Time Frame (Bed Mobility Goal 1, PT)  10 days  -MF      Progress/Outcomes (Bed Mobility Goal 1, PT)  goal met  -MF         Transfer Goal 1 (PT)    Activity/Assistive Device (Transfer Goal 1, PT)  sit-to-stand/stand-to-sit;bed-to-chair/chair-to-bed;walker, rolling  -MF      [place] Level/Cues Needed (Transfer Goal 1, PT)  conditional independence  -MF      Time Frame (Transfer Goal 1, PT)  10 days  -MF      Progress/Outcome (Transfer Goal 1, PT)  goal met  -MF         Gait Training Goal 1 (PT)    Activity/Assistive Device (Gait Training Goal 1, PT)  gait (walking locomotion);improve balance and speed;increase endurance/gait distance;increase energy conservation;decrease fall risk;walker, rolling  -MF      [place] Level (Gait Training Goal 1, PT)  supervision required  -MF      Distance (Gait Goal 1, PT)  250  -MF      Time Frame (Gait Training Goal 1, PT)  10 days  -MF      Progress/Outcome (Gait Training Goal 1, PT)  goal partially met   -        User Key  (r) = Recorded By, (t) = Taken By, (c) = Cosigned By    Initials Name Provider Type Discipline    Chelsea Christie, PTA Physical Therapy Assistant PT              PT Discharge Summary  Anticipated Discharge Disposition (PT): home with home health  Reason for Discharge: Discharge from facility  Outcomes Achieved: Able to achieve all goals within established timeline  Discharge Destination: Home with home health      Chelsea Figueroa PTA   1/4/2020

## 2020-01-04 NOTE — OUTREACH NOTE
Prep Survey      Responses   Facility patient discharged from?  [facility]   Is patient eligible?  Yes   Discharge diagnosis  Hyponatremia,  Syncope and collapse, volume depletion, DMT2, UTI   Does the patient have one of the following disease processes/diagnoses(primary or secondary)?  Other   Does the patient have Home health ordered?  Yes   What is the Home health agency?   [facility]    Is there a DME ordered?  No   Prep survey completed?  Yes          Arianne Resendiz RN

## 2020-01-05 LAB — BACTERIA BLD CULT: ABNORMAL

## 2020-01-06 ENCOUNTER — READMISSION MANAGEMENT (OUTPATIENT)
Dept: CALL CENTER | Facility: HOSPITAL | Age: 85
End: 2020-01-06

## 2020-01-06 NOTE — OUTREACH NOTE
Medical Week 1 Survey      Responses   Facility patient discharged from?  [facility]   Does the patient have one of the following disease processes/diagnoses(primary or secondary)?  Other   Is there a successful TCM telephone encounter documented?  No   Week 1 attempt successful?  No   Unsuccessful attempts  Attempt 1          Lexy Ibarra RN

## 2020-01-07 LAB
BACTERIA SPEC AEROBE CULT: ABNORMAL
GRAM STN SPEC: ABNORMAL
ISOLATED FROM: ABNORMAL

## 2020-01-09 ENCOUNTER — READMISSION MANAGEMENT (OUTPATIENT)
Dept: CALL CENTER | Facility: HOSPITAL | Age: 85
End: 2020-01-09

## 2020-01-09 LAB — BACTERIA SPEC AEROBE CULT: NORMAL

## 2020-01-09 NOTE — OUTREACH NOTE
Medical Week 1 Survey      Responses   Facility patient discharged from?  [facility]   Does the patient have one of the following disease processes/diagnoses(primary or secondary)?  Other   Is there a successful TCM telephone encounter documented?  No   Week 1 attempt successful?  Yes   Call start time  1036   Rescheduled  Revoked   Revoke  Decline to participate [Has home health through [facility]. Aware of Nurse Call Center.]   Discharge diagnosis  Hyponatremia,  Syncope and collapse, volume depletion, DMT2, UTI          Sheila Welch RN

## 2020-01-22 ENCOUNTER — TELEPHONE (OUTPATIENT)
Dept: INTERNAL MEDICINE | Facility: CLINIC | Age: 85
End: 2020-01-22

## 2020-01-22 NOTE — TELEPHONE ENCOUNTER
Called Mariama and discuss her DM medications, not BP meds.  Sugar running around 100 in the morning and 200 in the evenings.  She is taking Metformin 500mg 1/2 bid on her own, not taking Amaryl.  Discussed with Dr. Rodrigues, who says she should take Metformin 500mg in the morning, may continue 250mg in the evening and see how she does with this.  Nurse says pt was only c/o flank pain, no other urinary sx's, and worried that UTI from hospital hadn't cleared, so will hold off on giving Ab's until we get her report back.

## 2020-01-22 NOTE — TELEPHONE ENCOUNTER
"Mariama with HH called requesting a UA and social work eval.   PT is c/o pain with urination and frequency.     Caller is also needing to verify Bp meds     [phone]      Polina VICKERS gave \"ok\" for HH to collect UA and Culture. And for pt to start antibiotics.    Pharm: [facility]      "

## 2020-01-24 ENCOUNTER — TELEPHONE (OUTPATIENT)
Dept: INTERNAL MEDICINE | Facility: CLINIC | Age: 85
End: 2020-01-24

## 2020-01-30 ENCOUNTER — TELEPHONE (OUTPATIENT)
Dept: INTERNAL MEDICINE | Facility: CLINIC | Age: 85
End: 2020-01-30

## 2020-02-05 NOTE — ED PROVIDER NOTES
Subjective   History of Present Illness    Review of Systems    Past Medical History:   Diagnosis Date   • Diabetes type 2, controlled (CMS/HCC)    • Diverticulitis    • Hypertension    • Hypothyroidism    • LPRD (laryngopharyngeal reflux disease)    • Pharyngeal dysphagia    • Thyroid nodule        Allergies   Allergen Reactions   • Iodine Anaphylaxis     Xray dye   • Levaquin [Levofloxacin] Anaphylaxis   • Shrimp (Diagnostic) Anaphylaxis   • Bactroban [Mupirocin Calcium] Swelling   • Codeine Hallucinations   • Lortab [Hydrocodone-Acetaminophen] Confusion   • Quinolones Other (See Comments)     Unknown.       Past Surgical History:   Procedure Laterality Date   • APPENDECTOMY     • BASAL CELL CARCINOMA EXCISION     • BELPHAROPTOSIS REPAIR     • BREAST CYST EXCISION     • CATARACT EXTRACTION     • CHOLECYSTECTOMY     • COLONOSCOPY Left 11/1/2016    Tubular adenoma cecum, Diverticulosis repeat prn   • SUBTOTAL HYSTERECTOMY     • TUMOR EXCISION      under armpits and left breast       Family History   Problem Relation Age of Onset   • Diabetes Mother    • Cancer Mother    • Heart failure Mother    • Diabetes Father    • Colon cancer Neg Hx    • Colon polyps Neg Hx        Social History     Socioeconomic History   • Marital status:      Spouse name: Not on file   • Number of children: Not on file   • Years of education: Not on file   • Highest education level: Not on file   Tobacco Use   • Smoking status: Never Smoker   • Smokeless tobacco: Never Used   Substance and Sexual Activity   • Alcohol use: No   • Drug use: Defer   • Sexual activity: Defer       Prior to Admission medications    Medication Sig Start Date End Date Taking? Authorizing Provider   acetaminophen (TYLENOL) 500 MG tablet Take 500 mg by mouth every 6 (six) hours as needed for mild pain (1-3).   Yes ProviderElma MD   amLODIPine (NORVASC) 2.5 MG tablet Every Night. 3/5/18  Yes ProviderElma MD   aspirin 81 MG EC tablet Take 1  "tablet by mouth Daily. 1/2/20  Yes Beverly Paz APRN   Cholecalciferol (VITAMIN D3) 5000 UNITS capsule capsule Take 5,000 Units by mouth daily.   Yes Elma Yoo MD   Coenzyme Q10 (CO Q 10) 100 MG capsule Take  by mouth.   Yes Elma Yoo MD   conjugated estrogens (PREMARIN) 0.625 MG/GM vaginal cream Insert  into the vagina 2 (Two) Times a Week. 5/17/18  Yes Chandrakant Amin MD   dicyclomine (BENTYL) 10 MG capsule Take 10 mg by mouth 3 (Three) Times a Day. The patient states she is taking this PRN   Yes Elma Yoo MD   glimepiride (AMARYL) 4 MG tablet Take 2 mg by mouth 2 (Two) Times a Day.   Yes Elma Yoo MD   irbesartan (AVAPRO) 75 MG tablet Take 75 mg by mouth Every Night.   Yes Elma Yoo MD   levothyroxine (SYNTHROID, LEVOTHROID) 50 MCG tablet Take 50 mcg by mouth daily.   Yes Elma Yoo MD   metFORMIN (GLUCOPHAGE) 500 MG tablet Take 500 mg by mouth 2 (Two) Times a Day With Meals.   Yes Elma Yoo MD   Multiple Vitamins-Minerals (CENTRUM SILVER PO) Take  by mouth.   Yes Elma Yoo MD   nebivolol (BYSTOLIC) 2.5 MG tablet Take 2.5 mg by mouth daily.   Yes Elma Yoo MD   ondansetron ODT (ZOFRAN-ODT) 4 MG disintegrating tablet Take 1 tablet by mouth Every 6 (Six) Hours As Needed for Nausea. 1/4/20   Mayela Lebron APRN   Probiotic Product (PROBIOTIC DAILY PO) Take  by mouth.    Elma Yoo MD       /72   Pulse 84   Temp 97.8 °F (36.6 °C)   Resp 16   Ht 166.4 cm (65.5\")   Wt 69.6 kg (153 lb 8 oz)   LMP  (LMP Unknown)   SpO2 98%   BMI 25.16 kg/m²     Objective   Physical Exam    Procedures         Lab Results (last 24 hours)     ** No results found for the last 24 hours. **          CT Abdomen Pelvis Without Contrast   Final Result   1. Colonic diverticulosis without CT evidence of acute diverticulitis.   2. Small focus of gas dependently in the urinary bladder, iatrogenic   etiology " questioned, correlate with patient history.   3. Atherosclerotic calcifications.   4. Otherwise, No CT evidence of acute intra-abdominal/pelvic   pathological process.   This report was finalized on 01/04/2020 15:59 by Dr. Corwin Sommer MD.          ED Course  ED Course as of Feb 05 1146   Sat Jan 04, 2020   1441 Pt resting- says her stomach symptoms have eased.  Labs overall stable.  Na doing better at 131.  CT pending.  No vomiting while in ER.  Has declined pain medication.  Continue to monitor.  Wait for CT results.    [DC]   1504 Discussed case with CELESTE Lebron NP, she will be managing care from this point forward.     [DC]   1612 Obtained pt care from D.Castleman PA-C. Please see her note for h&p and physical exam.Reviewed ct scan results and laboratory studies with pt and pt spouse. Pt states that nausea is better this time. Pt was just discharged from hospital yesterday after being hospitalized with hyponatremia. Her sodium is much improved today at 131. Advised pt that she is very constipated. She states that she has not had BM within the last week. Will send home with golytely and zofran. Advised to follow up with dr sam draper on Monday- advised to return before if symptoms worsen     [CW]      ED Course User Index  [CW] Mayela Lebron APRN  [DC] Castleman, Danna D, PA MDM    Final diagnoses:   Generalized abdominal pain   Nausea   Constipation, unspecified constipation type          Mayela Lebron APRN  02/05/20 1146

## 2020-02-06 RX ORDER — CALCIUM CITRATE/VITAMIN D3 200MG-6.25
TABLET ORAL
Qty: 100 EACH | Refills: 3 | Status: SHIPPED | OUTPATIENT
Start: 2020-02-06 | End: 2020-08-24

## 2020-03-12 ENCOUNTER — TELEPHONE (OUTPATIENT)
Dept: INTERNAL MEDICINE | Facility: CLINIC | Age: 85
End: 2020-03-12

## 2020-03-12 NOTE — TELEPHONE ENCOUNTER
We have a pre-op clearnace request from [facility], for upcoming TKR.  Please call pt. And explain that we will need to see her in order to clear her for surgery.  She is having pre-op testing on 3/24 and surgery on 4/1, so see if we can get her on Dr. Rodrigues's schedule for 3/26.

## 2020-03-24 ENCOUNTER — APPOINTMENT (OUTPATIENT)
Dept: PREADMISSION TESTING | Facility: HOSPITAL | Age: 85
End: 2020-03-24

## 2020-04-13 ENCOUNTER — OFFICE VISIT (OUTPATIENT)
Dept: INTERNAL MEDICINE | Facility: CLINIC | Age: 85
End: 2020-04-13

## 2020-04-13 ENCOUNTER — TELEPHONE (OUTPATIENT)
Dept: INTERNAL MEDICINE | Facility: CLINIC | Age: 85
End: 2020-04-13

## 2020-04-13 VITALS — DIASTOLIC BLOOD PRESSURE: 70 MMHG | SYSTOLIC BLOOD PRESSURE: 146 MMHG | TEMPERATURE: 97.7 F | HEART RATE: 64 BPM

## 2020-04-13 DIAGNOSIS — S93.402A MODERATE LEFT ANKLE SPRAIN, INITIAL ENCOUNTER: Primary | ICD-10-CM

## 2020-04-13 DIAGNOSIS — M79.645 PAIN OF LEFT THUMB: ICD-10-CM

## 2020-04-13 DIAGNOSIS — M25.562 ACUTE PAIN OF LEFT KNEE: ICD-10-CM

## 2020-04-13 DIAGNOSIS — M25.552 LEFT HIP PAIN: ICD-10-CM

## 2020-04-13 PROCEDURE — 99214 OFFICE O/P EST MOD 30 MIN: CPT | Performed by: INTERNAL MEDICINE

## 2020-04-13 NOTE — PROGRESS NOTES
Subjective   Nedra Tolliver is a 84 y.o. female.   Chief Complaint   Patient presents with   • Fall     Fell yesterday, after jumping off couch to answer the phone.  c/o left ankle pain and swelling, left knee, left hip, left wrist.   • Back Pain     c/o mid lower back pain, which has been an ongoing issue, not just since her fall       Patient got up suddenly to answer the phone yesterday morning subsequently she became lightheaded and fell turning her left ankle hitting her left knee her left lateral to posterior buttock area and her left.       The following portions of the patient's history were reviewed and updated as appropriate: allergies, current medications, past family history, past medical history, past social history, past surgical history and problem list.    Review of Systems   Constitutional: Negative for activity change, appetite change, fatigue, fever, unexpected weight gain and unexpected weight loss.   HENT: Negative for swollen glands, trouble swallowing and voice change.    Eyes: Negative for blurred vision and visual disturbance.   Respiratory: Negative for cough and shortness of breath.    Cardiovascular: Negative for chest pain, palpitations and leg swelling.   Gastrointestinal: Negative for abdominal pain, constipation, diarrhea, nausea, vomiting and indigestion.   Endocrine: Negative for cold intolerance, heat intolerance, polydipsia and polyphagia.   Genitourinary: Negative for dysuria and frequency.   Musculoskeletal: Positive for arthralgias and myalgias. Negative for back pain, joint swelling and neck pain.   Skin: Negative for color change, rash and skin lesions.   Neurological: Negative for dizziness, weakness, headache, memory problem and confusion.   Hematological: Does not bruise/bleed easily.   Psychiatric/Behavioral: Negative for agitation, hallucinations and suicidal ideas. The patient is not nervous/anxious.        Objective   Past Medical History:   Diagnosis Date   •  Diabetes type 2, controlled (CMS/HCC)    • Diverticulitis    • Hypertension    • Hypothyroidism    • LPRD (laryngopharyngeal reflux disease)    • Pharyngeal dysphagia    • Thyroid nodule       Past Surgical History:   Procedure Laterality Date   • APPENDECTOMY     • BASAL CELL CARCINOMA EXCISION     • BELPHAROPTOSIS REPAIR     • BREAST CYST EXCISION     • CATARACT EXTRACTION     • CHOLECYSTECTOMY     • COLONOSCOPY Left 11/1/2016    Tubular adenoma cecum, Diverticulosis repeat prn   • SUBTOTAL HYSTERECTOMY     • TUMOR EXCISION      under armpits and left breast        Current Outpatient Medications:   •  acetaminophen (TYLENOL) 500 MG tablet, Take 500 mg by mouth every 6 (six) hours as needed for mild pain (1-3)., Disp: , Rfl:   •  amLODIPine (NORVASC) 2.5 MG tablet, Every Night., Disp: , Rfl: 3  •  aspirin 81 MG EC tablet, Take 1 tablet by mouth Daily., Disp: , Rfl:   •  Cholecalciferol (VITAMIN D3) 5000 UNITS capsule capsule, Take 5,000 Units by mouth daily., Disp: , Rfl:   •  Coenzyme Q10 (CO Q 10) 100 MG capsule, Take  by mouth., Disp: , Rfl:   •  conjugated estrogens (PREMARIN) 0.625 MG/GM vaginal cream, Insert  into the vagina 2 (Two) Times a Week., Disp: 30 g, Rfl: 6  •  dicyclomine (BENTYL) 10 MG capsule, Take 10 mg by mouth 3 (Three) Times a Day. The patient states she is taking this PRN, Disp: , Rfl:   •  glimepiride (AMARYL) 4 MG tablet, Take 2 mg by mouth 2 (Two) Times a Day., Disp: , Rfl:   •  irbesartan (AVAPRO) 75 MG tablet, Take 75 mg by mouth Every Night., Disp: , Rfl:   •  levothyroxine (SYNTHROID, LEVOTHROID) 50 MCG tablet, Take 50 mcg by mouth daily., Disp: , Rfl:   •  metFORMIN (GLUCOPHAGE) 500 MG tablet, Take 500 mg by mouth 2 (Two) Times a Day With Meals., Disp: , Rfl:   •  Multiple Vitamins-Minerals (CENTRUM SILVER PO), Take  by mouth., Disp: , Rfl:   •  nebivolol (BYSTOLIC) 2.5 MG tablet, Take 2.5 mg by mouth daily., Disp: , Rfl:   •  ondansetron ODT (ZOFRAN-ODT) 4 MG disintegrating tablet,  Take 1 tablet by mouth Every 6 (Six) Hours As Needed for Nausea., Disp: 10 tablet, Rfl: 0  •  Probiotic Product (PROBIOTIC DAILY PO), Take  by mouth., Disp: , Rfl:   •  TRUE METRIX BLOOD GLUCOSE TEST test strip, TEST TWICE DAILY AS DIRECTED, Disp: 100 each, Rfl: 3     Vitals:    04/13/20 1400   BP: 146/70   Pulse: 64   Temp: 97.7 °F (36.5 °C)     There were no vitals filed for this visit.  Patient's There is no height or weight on file to calculate BMI. BMI is within normal parameters. No follow-up required..      Physical Exam   Constitutional: She is oriented to person, place, and time. She appears well-developed and well-nourished.   HENT:   Head: Normocephalic and atraumatic.   Right Ear: External ear normal.   Left Ear: External ear normal.   Nose: Nose normal.   Mouth/Throat: Oropharynx is clear and moist.   Eyes: Pupils are equal, round, and reactive to light. Conjunctivae and EOM are normal.   Neck: Normal range of motion. Neck supple. No thyromegaly present.   Cardiovascular: Normal rate, regular rhythm, normal heart sounds and intact distal pulses.   Pulmonary/Chest: Effort normal and breath sounds normal.   Abdominal: Soft. Bowel sounds are normal.   Musculoskeletal:   Patient has severe swelling left lateral ankle she also has some mild tenderness along the left knee no particular swelling her left lateral buttock she has about a 4cm hematoma and bruise her left thumb is very tender and swollen at the wrist   Lymphadenopathy:     She has no cervical adenopathy.   Neurological: She is alert and oriented to person, place, and time.   Skin: Skin is warm and dry.   Psychiatric: She has a normal mood and affect. Her behavior is normal. Thought content normal.   Nursing note and vitals reviewed.            Assessment/Plan   Diagnoses and all orders for this visit:    1. Moderate left ankle sprain, initial encounter (Primary)  -     Walking Boot- Left  -     XR Ankle 3+ View Left; Future    2. Left hip pain  -      XR Hip With or Without Pelvis 2 - 3 View Left; Future    3. Acute pain of left knee  -     XR Knee 3 View Left; Future    4. Pain of left thumb  -     XR Finger 2+ View Left; Future        At this point patient needs her ankle hip knee and thumb x-rayed to rule out fracture we are going ahead and giving her a cast boot/walking boot for the severe sprain I have sent her to [facility] radiology as they are open and could give me a verbal report quickly we will follow-up with her soon as we get that verbal report.

## 2020-04-14 DIAGNOSIS — S82.892D AVULSION FRACTURE OF ANKLE, LEFT, CLOSED, WITH ROUTINE HEALING, SUBSEQUENT ENCOUNTER: Primary | ICD-10-CM

## 2020-05-13 ENCOUNTER — OFFICE VISIT (OUTPATIENT)
Dept: INTERNAL MEDICINE | Facility: CLINIC | Age: 85
End: 2020-05-13

## 2020-05-13 VITALS
WEIGHT: 154 LBS | HEART RATE: 72 BPM | BODY MASS INDEX: 24.75 KG/M2 | DIASTOLIC BLOOD PRESSURE: 80 MMHG | HEIGHT: 66 IN | SYSTOLIC BLOOD PRESSURE: 140 MMHG

## 2020-05-13 DIAGNOSIS — S92.255D CLOSED NONDISPLACED FRACTURE OF NAVICULAR BONE OF LEFT FOOT WITH ROUTINE HEALING, SUBSEQUENT ENCOUNTER: ICD-10-CM

## 2020-05-13 DIAGNOSIS — E87.1 HYPONATREMIA: ICD-10-CM

## 2020-05-13 DIAGNOSIS — I10 HTN (HYPERTENSION), BENIGN: Primary | ICD-10-CM

## 2020-05-13 DIAGNOSIS — I10 HTN (HYPERTENSION), BENIGN: ICD-10-CM

## 2020-05-13 PROCEDURE — 99214 OFFICE O/P EST MOD 30 MIN: CPT | Performed by: INTERNAL MEDICINE

## 2020-05-13 RX ORDER — METFORMIN HYDROCHLORIDE 500 MG/1
TABLET, EXTENDED RELEASE ORAL
COMMUNITY
Start: 2020-04-30 | End: 2020-05-21

## 2020-05-13 NOTE — PROGRESS NOTES
Subjective   Nedra Tolliver is a 84 y.o. female.   Chief Complaint   Patient presents with   • Ankle Injury       This is a 4-week follow-up for patient who had a avulsion fracture of the anterior navicular.  She is wearing her cast boot regularly she has some concern about what appears to be some atrophy of the muscles above the ankle which I think to a great extent is from wearing the cast boot.       The following portions of the patient's history were reviewed and updated as appropriate: allergies, current medications, past family history, past medical history, past social history, past surgical history and problem list.    Review of Systems   Constitutional: Negative for activity change, appetite change, fatigue, fever, unexpected weight gain and unexpected weight loss.   HENT: Negative for swollen glands, trouble swallowing and voice change.    Eyes: Negative for blurred vision and visual disturbance.   Respiratory: Negative for cough and shortness of breath.    Cardiovascular: Negative for chest pain, palpitations and leg swelling.   Gastrointestinal: Negative for abdominal pain, constipation, diarrhea, nausea, vomiting and indigestion.   Endocrine: Negative for cold intolerance, heat intolerance, polydipsia and polyphagia.   Genitourinary: Negative for dysuria and frequency.   Musculoskeletal: Negative for arthralgias, back pain, joint swelling and neck pain.   Skin: Negative for color change, rash and skin lesions.   Neurological: Negative for dizziness, weakness, headache, memory problem and confusion.   Hematological: Does not bruise/bleed easily.   Psychiatric/Behavioral: Negative for agitation, hallucinations and suicidal ideas. The patient is not nervous/anxious.        Objective   Past Medical History:   Diagnosis Date   • Diabetes type 2, controlled (CMS/HCC)    • Diverticulitis    • Hypertension    • Hypothyroidism    • LPRD (laryngopharyngeal reflux disease)    • Pharyngeal dysphagia    • Thyroid  nodule       Past Surgical History:   Procedure Laterality Date   • APPENDECTOMY     • BASAL CELL CARCINOMA EXCISION     • BELPHAROPTOSIS REPAIR     • BREAST CYST EXCISION     • CATARACT EXTRACTION     • CHOLECYSTECTOMY     • COLONOSCOPY Left 11/1/2016    Tubular adenoma cecum, Diverticulosis repeat prn   • SUBTOTAL HYSTERECTOMY     • TUMOR EXCISION      under armpits and left breast        Current Outpatient Medications:   •  acetaminophen (TYLENOL) 500 MG tablet, Take 500 mg by mouth every 6 (six) hours as needed for mild pain (1-3)., Disp: , Rfl:   •  amLODIPine (NORVASC) 2.5 MG tablet, Every Night., Disp: , Rfl: 3  •  aspirin 81 MG EC tablet, Take 1 tablet by mouth Daily., Disp: , Rfl:   •  Cholecalciferol (VITAMIN D3) 5000 UNITS capsule capsule, Take 5,000 Units by mouth daily., Disp: , Rfl:   •  Coenzyme Q10 (CO Q 10) 100 MG capsule, Take  by mouth., Disp: , Rfl:   •  conjugated estrogens (PREMARIN) 0.625 MG/GM vaginal cream, Insert  into the vagina 2 (Two) Times a Week., Disp: 30 g, Rfl: 6  •  dicyclomine (BENTYL) 10 MG capsule, Take 10 mg by mouth 3 (Three) Times a Day. The patient states she is taking this PRN, Disp: , Rfl:   •  glimepiride (AMARYL) 4 MG tablet, Take 2 mg by mouth 2 (Two) Times a Day., Disp: , Rfl:   •  irbesartan (AVAPRO) 75 MG tablet, Take 75 mg by mouth Every Night., Disp: , Rfl:   •  levothyroxine (SYNTHROID, LEVOTHROID) 50 MCG tablet, Take 50 mcg by mouth daily., Disp: , Rfl:   •  metFORMIN (GLUCOPHAGE) 500 MG tablet, Take 500 mg by mouth 2 (Two) Times a Day With Meals., Disp: , Rfl:   •  metFORMIN ER (GLUCOPHAGE-XR) 500 MG 24 hr tablet, , Disp: , Rfl:   •  Multiple Vitamins-Minerals (CENTRUM SILVER PO), Take  by mouth., Disp: , Rfl:   •  nebivolol (BYSTOLIC) 2.5 MG tablet, Take 2.5 mg by mouth daily., Disp: , Rfl:   •  ondansetron ODT (ZOFRAN-ODT) 4 MG disintegrating tablet, Take 1 tablet by mouth Every 6 (Six) Hours As Needed for Nausea., Disp: 10 tablet, Rfl: 0  •  Probiotic Product  (PROBIOTIC DAILY PO), Take  by mouth., Disp: , Rfl:   •  TRUE METRIX BLOOD GLUCOSE TEST test strip, TEST TWICE DAILY AS DIRECTED, Disp: 100 each, Rfl: 3     Vitals:    05/13/20 1131   BP: 140/80   Pulse: 72         05/13/20  1131   Weight: 69.9 kg (154 lb)     Patient's Body mass index is 25.24 kg/m². BMI is within normal parameters. No follow-up required..      Physical Exam   Constitutional: She is oriented to person, place, and time. She appears well-developed and well-nourished.   HENT:   Head: Normocephalic and atraumatic.   Right Ear: External ear normal.   Left Ear: External ear normal.   Nose: Nose normal.   Mouth/Throat: Oropharynx is clear and moist.   Eyes: Pupils are equal, round, and reactive to light. Conjunctivae and EOM are normal.   Neck: Normal range of motion. Neck supple. No thyromegaly present.   Cardiovascular: Normal rate, regular rhythm, normal heart sounds and intact distal pulses.   Pulmonary/Chest: Effort normal and breath sounds normal.   Abdominal: Soft. Bowel sounds are normal.   Lymphadenopathy:     She has no cervical adenopathy.   Neurological: She is alert and oriented to person, place, and time.   Skin: Skin is warm and dry.   Psychiatric: She has a normal mood and affect. Her behavior is normal. Thought content normal.   Nursing note and vitals reviewed.            Assessment/Plan   Diagnoses and all orders for this visit:    1. HTN (hypertension), benign (Primary)  -     Comprehensive Metabolic Panel  -     CBC & Differential  -     Urinalysis With Microscopic - Urine, Clean Catch  -     Protein / Creatinine Ratio, Urine - Urine, Clean Catch; Future    2. Hyponatremia  -     CBC & Differential    3. Closed nondisplaced fracture of navicular bone of left foot with routine healing, subsequent encounter      At this point patient's navicular fracture appears to be healed I have asked her to wear the fracture boot another 2 weeks and then to begin gentle weightbearing call us if she  has more problems

## 2020-05-14 DIAGNOSIS — I10 HTN (HYPERTENSION), BENIGN: Primary | ICD-10-CM

## 2020-05-14 LAB
ALBUMIN SERPL-MCNC: 4.5 G/DL (ref 3.5–5.2)
ALBUMIN/GLOB SERPL: 1.7 G/DL
ALP SERPL-CCNC: 65 U/L (ref 39–117)
ALT SERPL-CCNC: 14 U/L (ref 1–33)
APPEARANCE UR: CLEAR
AST SERPL-CCNC: 17 U/L (ref 1–32)
BACTERIA #/AREA URNS HPF: NORMAL /HPF
BASOPHILS # BLD AUTO: 0.07 10*3/MM3 (ref 0–0.2)
BASOPHILS NFR BLD AUTO: 0.9 % (ref 0–1.5)
BILIRUB SERPL-MCNC: 0.2 MG/DL (ref 0.2–1.2)
BILIRUB UR QL STRIP: NEGATIVE
BUN SERPL-MCNC: 20 MG/DL (ref 8–23)
BUN/CREAT SERPL: 19.4 (ref 7–25)
CALCIUM SERPL-MCNC: 10.5 MG/DL (ref 8.6–10.5)
CASTS URNS MICRO: NORMAL
CHLORIDE SERPL-SCNC: 87 MMOL/L (ref 98–107)
CO2 SERPL-SCNC: 25.1 MMOL/L (ref 22–29)
COLOR UR: YELLOW
CREAT SERPL-MCNC: 1.03 MG/DL (ref 0.57–1)
CREAT UR-MCNC: 50.5 MG/DL
EOSINOPHIL # BLD AUTO: 0.24 10*3/MM3 (ref 0–0.4)
EOSINOPHIL NFR BLD AUTO: 3.1 % (ref 0.3–6.2)
EPI CELLS #/AREA URNS HPF: NORMAL /HPF
ERYTHROCYTE [DISTWIDTH] IN BLOOD BY AUTOMATED COUNT: 12.8 % (ref 12.3–15.4)
GLOBULIN SER CALC-MCNC: 2.6 GM/DL
GLUCOSE SERPL-MCNC: 144 MG/DL (ref 65–99)
GLUCOSE UR QL: NEGATIVE
HCT VFR BLD AUTO: 35.6 % (ref 34–46.6)
HGB BLD-MCNC: 12.3 G/DL (ref 12–15.9)
HGB UR QL STRIP: NEGATIVE
IMM GRANULOCYTES # BLD AUTO: 0.02 10*3/MM3 (ref 0–0.05)
IMM GRANULOCYTES NFR BLD AUTO: 0.3 % (ref 0–0.5)
KETONES UR QL STRIP: NEGATIVE
LEUKOCYTE ESTERASE UR QL STRIP: ABNORMAL
LYMPHOCYTES # BLD AUTO: 2.87 10*3/MM3 (ref 0.7–3.1)
LYMPHOCYTES NFR BLD AUTO: 37.3 % (ref 19.6–45.3)
MCH RBC QN AUTO: 31.3 PG (ref 26.6–33)
MCHC RBC AUTO-ENTMCNC: 34.6 G/DL (ref 31.5–35.7)
MCV RBC AUTO: 90.6 FL (ref 79–97)
MONOCYTES # BLD AUTO: 0.73 10*3/MM3 (ref 0.1–0.9)
MONOCYTES NFR BLD AUTO: 9.5 % (ref 5–12)
NEUTROPHILS # BLD AUTO: 3.76 10*3/MM3 (ref 1.7–7)
NEUTROPHILS NFR BLD AUTO: 48.9 % (ref 42.7–76)
NITRITE UR QL STRIP: NEGATIVE
NRBC BLD AUTO-RTO: 0 /100 WBC (ref 0–0.2)
PH UR STRIP: 7 [PH] (ref 5–8)
PLATELET # BLD AUTO: 281 10*3/MM3 (ref 140–450)
POTASSIUM SERPL-SCNC: 4.6 MMOL/L (ref 3.5–5.2)
PROT SERPL-MCNC: 7.1 G/DL (ref 6–8.5)
PROT UR QL STRIP: ABNORMAL
PROT UR-MCNC: 25 MG/DL
PROT/CREAT UR: 495 MG/G CREA (ref 0–200)
RBC # BLD AUTO: 3.93 10*6/MM3 (ref 3.77–5.28)
RBC #/AREA URNS HPF: NORMAL /HPF
SODIUM SERPL-SCNC: 126 MMOL/L (ref 136–145)
SP GR UR: 1.01 (ref 1–1.03)
UROBILINOGEN UR STRIP-MCNC: ABNORMAL MG/DL
WBC # BLD AUTO: 7.69 10*3/MM3 (ref 3.4–10.8)
WBC #/AREA URNS HPF: NORMAL /HPF

## 2020-05-19 ENCOUNTER — TELEPHONE (OUTPATIENT)
Dept: INTERNAL MEDICINE | Facility: CLINIC | Age: 85
End: 2020-05-19

## 2020-05-19 NOTE — TELEPHONE ENCOUNTER
Pt called in requesting a call back from nurse to go over med list. Pt wants to make sure she is taking meds as directed.   Please call [phone].

## 2020-05-21 NOTE — TELEPHONE ENCOUNTER
Called pt and her printout showed she was on Metformin and Metformin ER and she says she is not on Metformin ER.  Told her I was not sure how that got on her chart, but I have removed it now.

## 2020-05-28 ENCOUNTER — RESULTS ENCOUNTER (OUTPATIENT)
Dept: INTERNAL MEDICINE | Facility: CLINIC | Age: 85
End: 2020-05-28

## 2020-05-28 DIAGNOSIS — I10 HTN (HYPERTENSION), BENIGN: ICD-10-CM

## 2020-06-26 ENCOUNTER — LAB (OUTPATIENT)
Dept: INTERNAL MEDICINE | Facility: CLINIC | Age: 85
End: 2020-06-26

## 2020-06-26 DIAGNOSIS — I10 HTN (HYPERTENSION), BENIGN: Primary | ICD-10-CM

## 2020-06-26 DIAGNOSIS — E11.65 CONTROLLED TYPE 2 DIABETES MELLITUS WITH HYPERGLYCEMIA, WITHOUT LONG-TERM CURRENT USE OF INSULIN (HCC): Chronic | ICD-10-CM

## 2020-06-26 DIAGNOSIS — M85.80 OSTEOPENIA, UNSPECIFIED LOCATION: ICD-10-CM

## 2020-06-26 DIAGNOSIS — E03.9 ACQUIRED HYPOTHYROIDISM: ICD-10-CM

## 2020-06-27 LAB
25(OH)D3+25(OH)D2 SERPL-MCNC: 80.9 NG/ML (ref 30–100)
ALBUMIN SERPL-MCNC: 4.7 G/DL (ref 3.5–5.2)
ALBUMIN/GLOB SERPL: 2 G/DL
ALP SERPL-CCNC: 68 U/L (ref 39–117)
ALT SERPL-CCNC: 11 U/L (ref 1–33)
APPEARANCE UR: ABNORMAL
AST SERPL-CCNC: 12 U/L (ref 1–32)
BACTERIA #/AREA URNS HPF: ABNORMAL /HPF
BASOPHILS # BLD AUTO: 0.05 10*3/MM3 (ref 0–0.2)
BASOPHILS NFR BLD AUTO: 0.7 % (ref 0–1.5)
BILIRUB SERPL-MCNC: 0.4 MG/DL (ref 0.2–1.2)
BILIRUB UR QL STRIP: NEGATIVE
BUN SERPL-MCNC: 20 MG/DL (ref 8–23)
BUN/CREAT SERPL: 17.9 (ref 7–25)
CALCIUM SERPL-MCNC: 9.9 MG/DL (ref 8.6–10.5)
CASTS URNS MICRO: ABNORMAL
CHLORIDE SERPL-SCNC: 95 MMOL/L (ref 98–107)
CHOLEST SERPL-MCNC: 301 MG/DL (ref 0–200)
CO2 SERPL-SCNC: 25 MMOL/L (ref 22–29)
COLOR UR: YELLOW
CREAT SERPL-MCNC: 1.12 MG/DL (ref 0.57–1)
EOSINOPHIL # BLD AUTO: 0.18 10*3/MM3 (ref 0–0.4)
EOSINOPHIL NFR BLD AUTO: 2.6 % (ref 0.3–6.2)
EPI CELLS #/AREA URNS HPF: ABNORMAL /HPF
ERYTHROCYTE [DISTWIDTH] IN BLOOD BY AUTOMATED COUNT: 13.1 % (ref 12.3–15.4)
GLOBULIN SER CALC-MCNC: 2.4 GM/DL
GLUCOSE SERPL-MCNC: 124 MG/DL (ref 65–99)
GLUCOSE UR QL: NEGATIVE
HBA1C MFR BLD: 7 % (ref 4.8–5.6)
HCT VFR BLD AUTO: 35.5 % (ref 34–46.6)
HDLC SERPL-MCNC: 58 MG/DL (ref 40–60)
HGB BLD-MCNC: 12.1 G/DL (ref 12–15.9)
HGB UR QL STRIP: NEGATIVE
IMM GRANULOCYTES # BLD AUTO: 0.02 10*3/MM3 (ref 0–0.05)
IMM GRANULOCYTES NFR BLD AUTO: 0.3 % (ref 0–0.5)
KETONES UR QL STRIP: NEGATIVE
LDLC SERPL CALC-MCNC: 193 MG/DL (ref 0–100)
LEUKOCYTE ESTERASE UR QL STRIP: ABNORMAL
LYMPHOCYTES # BLD AUTO: 2.34 10*3/MM3 (ref 0.7–3.1)
LYMPHOCYTES NFR BLD AUTO: 33.3 % (ref 19.6–45.3)
MCH RBC QN AUTO: 31.3 PG (ref 26.6–33)
MCHC RBC AUTO-ENTMCNC: 34.1 G/DL (ref 31.5–35.7)
MCV RBC AUTO: 92 FL (ref 79–97)
MONOCYTES # BLD AUTO: 0.75 10*3/MM3 (ref 0.1–0.9)
MONOCYTES NFR BLD AUTO: 10.7 % (ref 5–12)
NEUTROPHILS # BLD AUTO: 3.68 10*3/MM3 (ref 1.7–7)
NEUTROPHILS NFR BLD AUTO: 52.4 % (ref 42.7–76)
NITRITE UR QL STRIP: POSITIVE
NRBC BLD AUTO-RTO: 0 /100 WBC (ref 0–0.2)
PH UR STRIP: 6.5 [PH] (ref 5–8)
PLATELET # BLD AUTO: 263 10*3/MM3 (ref 140–450)
POTASSIUM SERPL-SCNC: 4.8 MMOL/L (ref 3.5–5.2)
PROT SERPL-MCNC: 7.1 G/DL (ref 6–8.5)
PROT UR QL STRIP: ABNORMAL
RBC # BLD AUTO: 3.86 10*6/MM3 (ref 3.77–5.28)
RBC #/AREA URNS HPF: ABNORMAL /HPF
SODIUM SERPL-SCNC: 133 MMOL/L (ref 136–145)
SP GR UR: 1.02 (ref 1–1.03)
TRIGL SERPL-MCNC: 251 MG/DL (ref 0–150)
TSH SERPL DL<=0.005 MIU/L-ACNC: 0.68 UIU/ML (ref 0.27–4.2)
URATE SERPL-MCNC: 7.1 MG/DL (ref 2.4–5.7)
UROBILINOGEN UR STRIP-MCNC: ABNORMAL MG/DL
VLDLC SERPL CALC-MCNC: 50.2 MG/DL
WBC # BLD AUTO: 7.02 10*3/MM3 (ref 3.4–10.8)
WBC #/AREA URNS HPF: ABNORMAL /HPF

## 2020-06-30 ENCOUNTER — OFFICE VISIT (OUTPATIENT)
Dept: INTERNAL MEDICINE | Facility: CLINIC | Age: 85
End: 2020-06-30

## 2020-06-30 VITALS
BODY MASS INDEX: 24.65 KG/M2 | OXYGEN SATURATION: 98 % | WEIGHT: 153.38 LBS | HEIGHT: 66 IN | SYSTOLIC BLOOD PRESSURE: 150 MMHG | RESPIRATION RATE: 18 BRPM | DIASTOLIC BLOOD PRESSURE: 76 MMHG | TEMPERATURE: 98.5 F | HEART RATE: 73 BPM

## 2020-06-30 DIAGNOSIS — N30.00 ACUTE CYSTITIS WITHOUT HEMATURIA: ICD-10-CM

## 2020-06-30 DIAGNOSIS — I10 HTN (HYPERTENSION), BENIGN: ICD-10-CM

## 2020-06-30 DIAGNOSIS — R07.9 CHEST PAIN AT REST: ICD-10-CM

## 2020-06-30 DIAGNOSIS — E11.21 CONTROLLED TYPE 2 DIABETES MELLITUS WITH DIABETIC NEPHROPATHY, WITHOUT LONG-TERM CURRENT USE OF INSULIN (HCC): Primary | Chronic | ICD-10-CM

## 2020-06-30 PROCEDURE — G0439 PPPS, SUBSEQ VISIT: HCPCS | Performed by: INTERNAL MEDICINE

## 2020-06-30 PROCEDURE — 93000 ELECTROCARDIOGRAM COMPLETE: CPT | Performed by: INTERNAL MEDICINE

## 2020-06-30 RX ORDER — CEFDINIR 300 MG/1
300 CAPSULE ORAL 2 TIMES DAILY
Qty: 14 CAPSULE | Refills: 0 | Status: SHIPPED | OUTPATIENT
Start: 2020-06-30 | End: 2020-09-15

## 2020-06-30 RX ORDER — TRIAMCINOLONE ACETONIDE 1 MG/G
1 CREAM TOPICAL 2 TIMES DAILY PRN
COMMUNITY
Start: 2020-06-12 | End: 2020-12-08

## 2020-06-30 NOTE — PROGRESS NOTES
The ABCs of the Annual Wellness Visit  Initial Medicare Wellness Visit    Chief Complaint   Patient presents with   • Welcome To Medicare     Annual Exam       Subjective   History of Present Illness:  Nedra Tolliver is a 84 y.o. female who presents for an Initial Medicare Wellness Visit.    HEALTH RISK ASSESSMENT    Recent Hospitalizations:  No hospitalization(s) within the last year.    Current Medical Providers:  Patient Care Team:  Vijay Rodrigues MD as PCP - General  Vijay Rodrigues MD as PCP - Family Medicine  Manish Smith MD as Consulting Physician (Otolaryngology)  Chandrakant Amin MD (Inactive) as Consulting Physician (Urology)  Yordan Blanton MD as Consulting Physician (Gastroenterology)  Roberto Collins PA as Physician Assistant (Otolaryngology)    Smoking Status:  Social History     Tobacco Use   Smoking Status Never Smoker   Smokeless Tobacco Never Used       Alcohol Consumption:  Social History     Substance and Sexual Activity   Alcohol Use No       Depression Screen:   PHQ-2/PHQ-9 Depression Screening 6/30/2020   Little interest or pleasure in doing things 0   Feeling down, depressed, or hopeless 0   Total Score 0       Fall Risk Screen:  STEADI Fall Risk Assessment was completed, and patient is at HIGH risk for falls. Assessment completed on:5/13/2020    Health Habits and Functional and Cognitive Screening:  Functional & Cognitive Status 6/30/2020   Do you have difficulty preparing food and eating? No   Do you have difficulty bathing yourself, getting dressed or grooming yourself? No   Do you have difficulty using the toilet? No   Do you have difficulty moving around from place to place? No   Do you have trouble with steps or getting out of a bed or a chair? Yes   Current Diet Well Balanced Diet   Dental Exam Up to date   Eye Exam Up to date   Exercise (times per week) 2 times per week   Current Exercise Activities Include Yoga   Do you need help using the phone?  No   Are  you deaf or do you have serious difficulty hearing?  No   Do you need help with transportation? No   Do you need help shopping? No   Do you need help preparing meals?  No   Do you need help with housework?  No   Do you need help with laundry? No   Do you need help taking your medications? No   Do you need help managing money? No   Do you ever drive or ride in a car without wearing a seat belt? No   Have you felt unusual stress, anger or loneliness in the last month? No   Who do you live with? Alone   If you need help, do you have trouble finding someone available to you? No   Have you been bothered in the last four weeks by sexual problems? No   Do you have difficulty concentrating, remembering or making decisions? No         Does the patient have evidence of cognitive impairment? No    Asprin use counseling:Taking ASA appropriately as indicated    Age-appropriate Screening Schedule:  Refer to the list below for future screening recommendations based on patient's age, sex and/or medical conditions. Orders for these recommended tests are listed in the plan section. The patient has been provided with a written plan.    Health Maintenance   Topic Date Due   • TDAP/TD VACCINES (1 - Tdap) 11/16/1946   • ZOSTER VACCINE (1 of 2) 11/16/1985   • DIABETIC EYE EXAM  09/18/2017   • INFLUENZA VACCINE  08/01/2020   • HEMOGLOBIN A1C  12/26/2020   • URINE MICROALBUMIN  05/13/2021          The following portions of the patient's history were reviewed and updated as appropriate: allergies, current medications, past family history, past medical history, past social history, past surgical history and problem list.    Outpatient Medications Prior to Visit   Medication Sig Dispense Refill   • acetaminophen (TYLENOL) 500 MG tablet Take 500 mg by mouth every 6 (six) hours as needed for mild pain (1-3).     • amLODIPine (NORVASC) 2.5 MG tablet Every Night.  3   • aspirin 81 MG EC tablet Take 1 tablet by mouth Daily.     • Cholecalciferol  (VITAMIN D3) 5000 UNITS capsule capsule Take 5,000 Units by mouth daily.     • Coenzyme Q10 (CO Q 10) 100 MG capsule Take  by mouth.     • conjugated estrogens (PREMARIN) 0.625 MG/GM vaginal cream Insert  into the vagina 2 (Two) Times a Week. 30 g 6   • dicyclomine (BENTYL) 10 MG capsule Take 10 mg by mouth 3 (Three) Times a Day As Needed.     • glimepiride (AMARYL) 4 MG tablet Take 2 mg by mouth 2 (Two) Times a Day.     • irbesartan (AVAPRO) 75 MG tablet Take 75 mg by mouth Every Night.     • levothyroxine (SYNTHROID, LEVOTHROID) 50 MCG tablet Take 50 mcg by mouth daily.     • metFORMIN (GLUCOPHAGE) 500 MG tablet Take 500 mg by mouth 2 (Two) Times a Day With Meals.     • Multiple Vitamins-Minerals (CENTRUM SILVER PO) Take  by mouth.     • nebivolol (BYSTOLIC) 2.5 MG tablet Take 2.5 mg by mouth daily.     • ondansetron ODT (ZOFRAN-ODT) 4 MG disintegrating tablet Take 1 tablet by mouth Every 6 (Six) Hours As Needed for Nausea. 10 tablet 0   • Probiotic Product (PROBIOTIC DAILY PO) Take  by mouth.     • triamcinolone (KENALOG) 0.1 % cream 1 application 2 (Two) Times a Day As Needed.     • TRUE METRIX BLOOD GLUCOSE TEST test strip TEST TWICE DAILY AS DIRECTED 100 each 3     No facility-administered medications prior to visit.        Patient Active Problem List   Diagnosis   • Urinary tract infection without hematuria   • Dysuria   • Vaginal atrophy   • Overactive bladder   • Diverticulitis   • HTN (hypertension), benign   • Thyroid nodule   • Hypothyroidism   • Hyponatremia   • Syncope and collapse   • Volume depletion   • Diabetes type 2, controlled (CMS/[facility])   • Moderate left ankle sprain   • Left hip pain   • Acute pain of left knee   • Pain of left thumb   • Nondisplaced fracture of navicular bone of left foot with routine healing   • Chest pain at rest   • Acute cystitis without hematuria       Advanced Care Planning:  ACP discussion was held with the patient during this visit. Patient has an advance directive in  "EMR which is still valid.     Review of Systems   Constitutional: Negative for activity change, appetite change and chills.   HENT: Negative for congestion, ear pain and facial swelling.    Eyes: Negative for pain, discharge and itching.   Respiratory: Negative for apnea, cough and shortness of breath.    Cardiovascular: Negative for chest pain, palpitations and leg swelling.   Gastrointestinal: Negative for abdominal distention, abdominal pain and anal bleeding.   Endocrine: Negative for cold intolerance and heat intolerance.   Genitourinary: Negative for difficulty urinating, dysuria and flank pain.   Musculoskeletal: Positive for arthralgias and gait problem. Negative for back pain and joint swelling.   Skin: Negative for color change, pallor and rash.   Allergic/Immunologic: Negative for environmental allergies and food allergies.   Neurological: Negative for dizziness and facial asymmetry.   Hematological: Negative for adenopathy. Does not bruise/bleed easily.   Psychiatric/Behavioral: Negative for agitation, behavioral problems and confusion.       Compared to one year ago, the patient feels her physical health is the same.  Compared to one year ago, the patient feels her mental health is the same.    Reviewed chart for potential of high risk medication in the elderly: yes  Reviewed chart for potential of harmful drug interactions in the elderly:yes    Objective         Vitals:    06/30/20 1311   BP: 150/76   BP Location: Left arm   Patient Position: Sitting   Cuff Size: Adult   Pulse: 73   Resp: 18   Temp: 98.5 °F (36.9 °C)   TempSrc: Temporal   SpO2: 98%   Weight: 69.6 kg (153 lb 6 oz)   Height: 166.4 cm (65.5\")       Body mass index is 25.13 kg/m².  Discussed the patient's BMI with her. The BMI is above average; BMI management plan is completed.    Physical Exam   Constitutional: She is oriented to person, place, and time. She appears well-developed and well-nourished.   HENT:   Head: Normocephalic and " atraumatic.   Mouth/Throat: Oropharynx is clear and moist.   Eyes: Pupils are equal, round, and reactive to light. EOM are normal.   Neck: Normal range of motion. Neck supple.   Cardiovascular: Normal rate and regular rhythm.   Pulmonary/Chest: Effort normal and breath sounds normal.   I did not do breast exam per her request   Abdominal: Soft. Bowel sounds are normal.   Musculoskeletal: Normal range of motion. She exhibits no edema.   Neurological: She is alert and oriented to person, place, and time.   Skin: Skin is warm and dry.   Psychiatric: She has a normal mood and affect. Her behavior is normal.         Lab Results   Component Value Date     (H) 06/26/2020    CHLPL 301 (H) 06/26/2020    TRIG 251 (H) 06/26/2020    HDL 58 06/26/2020     (H) 06/26/2020    VLDL 50.2 06/26/2020    HGBA1C 7.00 (H) 06/26/2020        Assessment/Plan   Medicare Risks and Personalized Health Plan  CMS Preventative Services Quick Reference  Advance Directive Discussion  Breast Cancer/Mammogram Screening  Colon Cancer Screening  Diabetic Lab Screening   Immunizations Discussed/Encouraged (specific immunizations; Shingrix )  Obesity/Overweight   Osteoprorosis Risk    The above risks/problems have been discussed with the patient.  Pertinent information has been shared with the patient in the After Visit Summary.  Follow up plans and orders are seen below in the Assessment/Plan Section.    Diagnoses and all orders for this visit:    1. Controlled type 2 diabetes mellitus with diabetic nephropathy, without long-term current use of insulin (CMS/[facility]) (Primary)    2. HTN (hypertension), benign    3. Chest pain at rest  -     ECG 12 Lead    4. Acute cystitis without hematuria    Other orders  -     cefdinir (OMNICEF) 300 MG capsule; Take 1 capsule by mouth 2 (Two) Times a Day.  Dispense: 14 capsule; Refill: 0      ECG 12 Lead  Date/Time: 6/30/2020 1:53 PM  Performed by: Vijay Rodrigues MD  Authorized by: Vijay Rodrigues MD    Comparison: not compared with previous ECG   Rhythm: sinus rhythm  Rate: normal  Conduction: left anterior fascicular block  QRS axis: normal  Other: no other findings    Clinical impression: abnormal EKG          Follow Up:  Return in about 6 months (around 12/30/2020).  Patient having some nonspecific chest discomfort she only had one episode it occurred in the evening states it was underneath her left breast it lasted for significant period of time.  I reviewed her EKG which showed no acute findings she has a left anterior fascicular block otherwise unremarkable.  His diabetes is reasonably well controlled her urine analysis is abnormal I am going to go ahead and treat her as presumed urinary tract infection    An After Visit Summary and PPPS were given to the patient.

## 2020-07-06 RX ORDER — LEVOTHYROXINE SODIUM 0.05 MG/1
TABLET ORAL
Qty: 90 TABLET | Refills: 3 | Status: SHIPPED | OUTPATIENT
Start: 2020-07-06 | End: 2021-06-30

## 2020-07-28 ENCOUNTER — TELEPHONE (OUTPATIENT)
Dept: INTERNAL MEDICINE | Facility: CLINIC | Age: 85
End: 2020-07-28

## 2020-07-28 RX ORDER — IRBESARTAN 150 MG/1
75 TABLET ORAL NIGHTLY
Qty: 45 TABLET | Refills: 3 | Status: SHIPPED | OUTPATIENT
Start: 2020-07-28 | End: 2021-04-28

## 2020-07-28 NOTE — TELEPHONE ENCOUNTER
[facility] need script for Irbesartan 150mg 1/2 tablet at bedtime (this is how she is use to taking it)

## 2020-08-06 RX ORDER — GLIMEPIRIDE 4 MG/1
2 TABLET ORAL 2 TIMES DAILY
Qty: 90 TABLET | Refills: 3 | Status: SHIPPED | OUTPATIENT
Start: 2020-08-06 | End: 2020-11-12

## 2020-08-14 RX ORDER — METFORMIN HYDROCHLORIDE 500 MG/1
TABLET, EXTENDED RELEASE ORAL
Qty: 180 TABLET | Refills: 3 | Status: SHIPPED | OUTPATIENT
Start: 2020-08-14 | End: 2020-11-12

## 2020-08-24 DIAGNOSIS — E11.21 CONTROLLED TYPE 2 DIABETES MELLITUS WITH DIABETIC NEPHROPATHY, WITHOUT LONG-TERM CURRENT USE OF INSULIN (HCC): Primary | Chronic | ICD-10-CM

## 2020-08-24 RX ORDER — CALCIUM CITRATE/VITAMIN D3 200MG-6.25
TABLET ORAL
Qty: 100 EACH | Refills: 3 | Status: SHIPPED | OUTPATIENT
Start: 2020-08-24 | End: 2021-04-07

## 2020-09-03 DIAGNOSIS — Z12.31 ENCOUNTER FOR SCREENING MAMMOGRAM FOR BREAST CANCER: Primary | ICD-10-CM

## 2020-09-15 ASSESSMENT — KOOS JR
KOOS JR SCORE: 21
KOOS JR SCORE: 34.174

## 2020-09-22 ENCOUNTER — APPOINTMENT (OUTPATIENT)
Dept: PREADMISSION TESTING | Facility: HOSPITAL | Age: 85
End: 2020-09-22

## 2020-09-22 ENCOUNTER — HOSPITAL ENCOUNTER (OUTPATIENT)
Dept: GENERAL RADIOLOGY | Facility: HOSPITAL | Age: 85
Discharge: HOME | End: 2020-09-22

## 2020-09-22 VITALS
WEIGHT: 153.88 LBS | OXYGEN SATURATION: 100 % | BODY MASS INDEX: 26.27 KG/M2 | DIASTOLIC BLOOD PRESSURE: 61 MMHG | HEART RATE: 63 BPM | HEIGHT: 64 IN | SYSTOLIC BLOOD PRESSURE: 151 MMHG

## 2020-09-22 LAB
ALBUMIN SERPL-MCNC: 4.6 G/DL (ref 3.5–5.2)
ALBUMIN/GLOB SERPL: 1.6 G/DL
ALP SERPL-CCNC: 70 U/L (ref 39–117)
ALT SERPL W P-5'-P-CCNC: 14 U/L (ref 1–33)
ANION GAP SERPL CALCULATED.3IONS-SCNC: 10 MMOL/L (ref 5–15)
AST SERPL-CCNC: 16 U/L (ref 1–32)
BACTERIA UR QL AUTO: ABNORMAL /HPF
BILIRUB SERPL-MCNC: 0.3 MG/DL (ref 0–1.2)
BILIRUB UR QL STRIP: NEGATIVE
BUN SERPL-MCNC: 27 MG/DL (ref 8–23)
BUN/CREAT SERPL: 25 (ref 7–25)
CALCIUM SPEC-SCNC: 10.2 MG/DL (ref 8.6–10.5)
CHLORIDE SERPL-SCNC: 97 MMOL/L (ref 98–107)
CLARITY UR: CLEAR
CO2 SERPL-SCNC: 25 MMOL/L (ref 22–29)
COLOR UR: YELLOW
CREAT SERPL-MCNC: 1.08 MG/DL (ref 0.57–1)
DEPRECATED RDW RBC AUTO: 41.7 FL (ref 37–54)
ERYTHROCYTE [DISTWIDTH] IN BLOOD BY AUTOMATED COUNT: 12.4 % (ref 12.3–15.4)
GFR SERPL CREATININE-BSD FRML MDRD: 48 ML/MIN/1.73
GLOBULIN UR ELPH-MCNC: 2.8 GM/DL
GLUCOSE SERPL-MCNC: 171 MG/DL (ref 65–99)
GLUCOSE UR STRIP-MCNC: NEGATIVE MG/DL
HCT VFR BLD AUTO: 37.3 % (ref 34–46.6)
HGB BLD-MCNC: 12.9 G/DL (ref 12–15.9)
HGB UR QL STRIP.AUTO: NEGATIVE
HYALINE CASTS UR QL AUTO: ABNORMAL /LPF
INR PPP: 0.95 (ref 0.91–1.09)
KETONES UR QL STRIP: NEGATIVE
LEUKOCYTE ESTERASE UR QL STRIP.AUTO: ABNORMAL
MCH RBC QN AUTO: 31.9 PG (ref 26.6–33)
MCHC RBC AUTO-ENTMCNC: 34.6 G/DL (ref 31.5–35.7)
MCV RBC AUTO: 92.3 FL (ref 79–97)
NITRITE UR QL STRIP: NEGATIVE
PH UR STRIP.AUTO: 6.5 [PH] (ref 5–8)
PLATELET # BLD AUTO: 298 10*3/MM3 (ref 140–450)
PMV BLD AUTO: 9.6 FL (ref 6–12)
POTASSIUM SERPL-SCNC: 4.5 MMOL/L (ref 3.5–5.2)
PROT SERPL-MCNC: 7.4 G/DL (ref 6–8.5)
PROT UR QL STRIP: ABNORMAL
PROTHROMBIN TIME: 12.3 SECONDS (ref 11.9–14.6)
RBC # BLD AUTO: 4.04 10*6/MM3 (ref 3.77–5.28)
RBC # UR: ABNORMAL /HPF
REF LAB TEST METHOD: ABNORMAL
SODIUM SERPL-SCNC: 132 MMOL/L (ref 136–145)
SP GR UR STRIP: 1.01 (ref 1–1.03)
SQUAMOUS #/AREA URNS HPF: ABNORMAL /HPF
UROBILINOGEN UR QL STRIP: ABNORMAL
WBC # BLD AUTO: 7.36 10*3/MM3 (ref 3.4–10.8)
WBC UR QL AUTO: ABNORMAL /HPF

## 2020-09-22 PROCEDURE — 93010 ELECTROCARDIOGRAM REPORT: CPT | Performed by: INTERNAL MEDICINE

## 2020-09-22 PROCEDURE — 93005 ELECTROCARDIOGRAM TRACING: CPT

## 2020-09-22 PROCEDURE — 36415 COLL VENOUS BLD VENIPUNCTURE: CPT

## 2020-09-22 PROCEDURE — 71045 X-RAY EXAM CHEST 1 VIEW: CPT

## 2020-09-22 PROCEDURE — 85610 PROTHROMBIN TIME: CPT | Performed by: ORTHOPAEDIC SURGERY

## 2020-09-22 PROCEDURE — 85027 COMPLETE CBC AUTOMATED: CPT | Performed by: ORTHOPAEDIC SURGERY

## 2020-09-22 PROCEDURE — 81001 URINALYSIS AUTO W/SCOPE: CPT | Performed by: ORTHOPAEDIC SURGERY

## 2020-09-22 PROCEDURE — 80053 COMPREHEN METABOLIC PANEL: CPT | Performed by: ORTHOPAEDIC SURGERY

## 2020-09-22 NOTE — PAT
Patient attended total joint replacement class education included: pain scale, incentive spirometry , turn cough deep breathing, DVT prevention, use of bactroban and CHG surgical scrub, patient verbalizes understanding

## 2020-09-23 ENCOUNTER — TELEPHONE (OUTPATIENT)
Dept: INTERNAL MEDICINE | Facility: CLINIC | Age: 85
End: 2020-09-23

## 2020-09-23 ENCOUNTER — OFFICE VISIT (OUTPATIENT)
Dept: INTERNAL MEDICINE | Facility: CLINIC | Age: 85
End: 2020-09-23

## 2020-09-23 VITALS
SYSTOLIC BLOOD PRESSURE: 160 MMHG | OXYGEN SATURATION: 99 % | HEART RATE: 60 BPM | WEIGHT: 153.2 LBS | DIASTOLIC BLOOD PRESSURE: 68 MMHG | HEIGHT: 64 IN | TEMPERATURE: 97.5 F | BODY MASS INDEX: 26.15 KG/M2

## 2020-09-23 DIAGNOSIS — E11.21 CONTROLLED TYPE 2 DIABETES MELLITUS WITH DIABETIC NEPHROPATHY, WITHOUT LONG-TERM CURRENT USE OF INSULIN (HCC): Chronic | ICD-10-CM

## 2020-09-23 DIAGNOSIS — I10 BENIGN ESSENTIAL HTN: ICD-10-CM

## 2020-09-23 DIAGNOSIS — Z23 NEED FOR INFLUENZA VACCINATION: Primary | ICD-10-CM

## 2020-09-23 DIAGNOSIS — Z01.818 PREOPERATIVE CLEARANCE: ICD-10-CM

## 2020-09-23 PROBLEM — E04.2 NON-TOXIC MULTINODULAR GOITER: Status: ACTIVE | Noted: 2020-09-23

## 2020-09-23 PROBLEM — M41.20 IDIOPATHIC SCOLIOSIS: Status: ACTIVE | Noted: 2020-09-23

## 2020-09-23 PROBLEM — E55.9 VITAMIN D DEFICIENCY: Status: ACTIVE | Noted: 2020-09-23

## 2020-09-23 PROBLEM — G47.00 INSOMNIA: Status: ACTIVE | Noted: 2020-09-23

## 2020-09-23 PROBLEM — E11.40 NEUROPATHY DUE TO TYPE 2 DIABETES MELLITUS: Status: ACTIVE | Noted: 2020-09-23

## 2020-09-23 PROBLEM — I32 PERICARDITIS SECONDARY TO UREMIA: Status: ACTIVE | Noted: 2020-09-23

## 2020-09-23 PROBLEM — M54.16 LUMBAR RADICULOPATHY: Status: ACTIVE | Noted: 2020-09-23

## 2020-09-23 PROBLEM — M17.11 PRIMARY OSTEOARTHRITIS OF RIGHT KNEE: Status: ACTIVE | Noted: 2020-09-23

## 2020-09-23 PROBLEM — N18.9 PERICARDITIS SECONDARY TO UREMIA: Status: ACTIVE | Noted: 2020-09-23

## 2020-09-23 PROBLEM — I07.1 MILD TRICUSPID INSUFFICIENCY: Status: ACTIVE | Noted: 2020-09-23

## 2020-09-23 PROBLEM — M85.80 OSTEOPENIA: Status: ACTIVE | Noted: 2020-09-23

## 2020-09-23 PROBLEM — R80.9 PROTEINURIA: Status: ACTIVE | Noted: 2020-09-23

## 2020-09-23 PROBLEM — G45.9 TRANSIENT ISCHEMIC ATTACK: Status: ACTIVE | Noted: 2020-09-23

## 2020-09-23 PROBLEM — K58.9 IBS (IRRITABLE BOWEL SYNDROME): Status: ACTIVE | Noted: 2020-09-23

## 2020-09-23 PROBLEM — B35.1 ONYCHOMYCOSIS: Status: ACTIVE | Noted: 2020-09-23

## 2020-09-23 PROBLEM — E16.2 HYPOGLYCEMIA: Status: ACTIVE | Noted: 2020-09-23

## 2020-09-23 PROBLEM — E78.00 HIGH CHOLESTEROL: Status: ACTIVE | Noted: 2020-09-23

## 2020-09-23 PROBLEM — Z86.39 H/O THYROID NODULE: Status: ACTIVE | Noted: 2020-09-23

## 2020-09-23 PROCEDURE — G0008 ADMIN INFLUENZA VIRUS VAC: HCPCS | Performed by: INTERNAL MEDICINE

## 2020-09-23 PROCEDURE — 90694 VACC AIIV4 NO PRSRV 0.5ML IM: CPT | Performed by: INTERNAL MEDICINE

## 2020-09-23 PROCEDURE — 99214 OFFICE O/P EST MOD 30 MIN: CPT | Performed by: INTERNAL MEDICINE

## 2020-09-23 RX ORDER — LANCETS 30 GAUGE
1 EACH MISCELLANEOUS DAILY
Qty: 100 EACH | Refills: 3 | Status: ON HOLD | OUTPATIENT
Start: 2020-09-23 | End: 2022-10-10

## 2020-09-23 RX ORDER — BLOOD-GLUCOSE METER
1 KIT MISCELLANEOUS DAILY
Qty: 1 EACH | Refills: 0 | Status: ON HOLD | OUTPATIENT
Start: 2020-09-23 | End: 2022-10-10

## 2020-09-23 NOTE — TELEPHONE ENCOUNTER
Patient called in regards to having surgery this coming Wednesday. She would like advice in regards to which stool softener you would prefer for her to take since there are so many.    Patient mentioned getting a copy of labs that were done on yesterday at [facility]. She was informed that I did not see any labs that were reviewed in chart and someone from this office will call her. Please advise.

## 2020-09-23 NOTE — PROGRESS NOTES
Subjective   Nedra Tolliver is a 84 y.o. female.   Chief Complaint   Patient presents with   • Surgical Clearance       Patient is here for surgical clearance for total knee replacement.  She is scheduled to have that with Dr. Pickens soon.  She is already had blood work which she is available and epic.  I have reviewed her blood work and with the exception of her sugar and a slightly decreased sodium everything else looked acceptable.  We spent some time talking about care post knee replacement I suggested she do [facility].       The following portions of the patient's history were reviewed and updated as appropriate: allergies, current medications, past family history, past medical history, past social history, past surgical history and problem list.    Review of Systems   Constitutional: Negative for activity change, appetite change, fatigue, fever, unexpected weight gain and unexpected weight loss.   HENT: Negative for swollen glands, trouble swallowing and voice change.    Eyes: Negative for blurred vision and visual disturbance.   Respiratory: Negative for cough and shortness of breath.    Cardiovascular: Negative for chest pain, palpitations and leg swelling.   Gastrointestinal: Negative for abdominal pain, constipation, diarrhea, nausea, vomiting and indigestion.   Endocrine: Negative for cold intolerance, heat intolerance, polydipsia and polyphagia.   Genitourinary: Negative for dysuria and frequency.   Musculoskeletal: Negative for arthralgias, back pain, joint swelling and neck pain.   Skin: Negative for color change, rash and skin lesions.   Neurological: Negative for dizziness, weakness, headache, memory problem and confusion.   Hematological: Does not bruise/bleed easily.   Psychiatric/Behavioral: Negative for agitation, hallucinations and suicidal ideas. The patient is not nervous/anxious.        Objective   Past Medical History:   Diagnosis Date   • Arthritis    • Cancer (CMS/[facility])     BCC  @ nose & Left arm   • Diabetes type 2, controlled (CMS/HCC)    • Diverticulitis    • History of GI bleed    • History of transfusion     Has had x 5 units.   • Hypertension    • Hyponatremia     Required hospitalization   • Hypothyroidism    • LPRD (laryngopharyngeal reflux disease)    • Pharyngeal dysphagia    • Thyroid nodule       Past Surgical History:   Procedure Laterality Date   • APPENDECTOMY     • BASAL CELL CARCINOMA EXCISION     • BELPHAROPTOSIS REPAIR     • BREAST CYST EXCISION     • CATARACT EXTRACTION     • CATARACT EXTRACTION WITH INTRAOCULAR LENS IMPLANT Bilateral    • CHOLECYSTECTOMY     • COLONOSCOPY Left 11/1/2016    Tubular adenoma cecum, Diverticulosis repeat prn   • SUBTOTAL HYSTERECTOMY     • TUMOR EXCISION      under armpits and left breast        Current Outpatient Medications:   •  acetaminophen (TYLENOL) 500 MG tablet, Take 1,000 mg by mouth At Night As Needed for Mild Pain ., Disp: , Rfl:   •  amLODIPine (NORVASC) 2.5 MG tablet, Take 2.5 mg by mouth Every Night., Disp: , Rfl: 3  •  aspirin 81 MG EC tablet, Take 1 tablet by mouth Daily., Disp: , Rfl:   •  Cholecalciferol (VITAMIN D3) 5000 UNITS capsule capsule, Take 5,000 Units by mouth daily., Disp: , Rfl:   •  Coenzyme Q10 (CO Q 10) 100 MG capsule, Take 1 tablet by mouth Daily., Disp: , Rfl:   •  conjugated estrogens (PREMARIN) 0.625 MG/GM vaginal cream, Insert  into the vagina 2 (Two) Times a Week. (Patient taking differently: Insert  into the vagina 2 (Two) Times a Week. Pt does not use regularly), Disp: 30 g, Rfl: 6  •  glimepiride (AMARYL) 4 MG tablet, Take 0.5 tablets by mouth 2 (Two) Times a Day., Disp: 90 tablet, Rfl: 3  •  irbesartan (AVAPRO) 150 MG tablet, Take 0.5 tablets by mouth Every Night., Disp: 45 tablet, Rfl: 3  •  levothyroxine (SYNTHROID, LEVOTHROID) 50 MCG tablet, TAKE 1 TABLET BY MOUTH EVERY DAY, Disp: 90 tablet, Rfl: 3  •  metFORMIN ER (GLUCOPHAGE-XR) 500 MG 24 hr tablet, TAKE 1 TABLET BY MOUTH TWICE DAILY , Disp:  180 tablet, Rfl: 3  •  Multiple Vitamins-Minerals (CENTRUM SILVER PO), Take 1 tablet by mouth Daily., Disp: , Rfl:   •  nebivolol (BYSTOLIC) 2.5 MG tablet, Take 2.5 mg by mouth Every Evening., Disp: , Rfl:   •  ondansetron ODT (ZOFRAN-ODT) 4 MG disintegrating tablet, Take 1 tablet by mouth Every 6 (Six) Hours As Needed for Nausea., Disp: 10 tablet, Rfl: 0  •  triamcinolone (KENALOG) 0.1 % cream, 1 application 2 (Two) Times a Day As Needed., Disp: , Rfl:   •  TRUE METRIX BLOOD GLUCOSE TEST test strip, TEST TWICE DAILY AS DIRECTED , Disp: 100 each, Rfl: 3  •  dicyclomine (BENTYL) 10 MG capsule, Take 10 mg by mouth 3 (Three) Times a Day As Needed., Disp: , Rfl:   •  glucose blood test strip, 1 daily, Disp: 100 each, Rfl: 3  •  glucose monitor monitoring kit, 1 each Daily., Disp: 1 each, Rfl: 0  •  Lancets misc, 1 each Daily., Disp: 100 each, Rfl: 3  •  Probiotic Product (PROBIOTIC DAILY PO), Take 1 tablet by mouth Daily., Disp: , Rfl:      Vitals:    09/23/20 0840   BP: 160/68   Pulse: 60   Temp: 97.5 °F (36.4 °C)   SpO2: 99%         09/23/20  0840   Weight: 69.5 kg (153 lb 3.2 oz)     Patient's Body mass index is 26.48 kg/m². BMI is .      Physical Exam  Constitutional:       Appearance: Normal appearance. She is well-developed.   HENT:      Head: Normocephalic and atraumatic.      Right Ear: External ear normal.      Left Ear: External ear normal.      Nose: Nose normal.   Eyes:      Extraocular Movements: Extraocular movements intact.      Conjunctiva/sclera: Conjunctivae normal.      Pupils: Pupils are equal, round, and reactive to light.   Neck:      Musculoskeletal: Normal range of motion and neck supple. No neck rigidity or muscular tenderness.      Vascular: No carotid bruit.   Cardiovascular:      Rate and Rhythm: Normal rate and regular rhythm.      Heart sounds: No murmur. No gallop.    Pulmonary:      Effort: Pulmonary effort is normal.      Breath sounds: Normal breath sounds. No wheezing.   Abdominal:       General: Bowel sounds are normal. There is no distension.      Palpations: Abdomen is soft.   Musculoskeletal: Normal range of motion.   Skin:     General: Skin is warm and dry.   Neurological:      General: No focal deficit present.      Mental Status: She is alert and oriented to person, place, and time.   Psychiatric:         Mood and Affect: Mood normal.         Behavior: Behavior normal.               Assessment/Plan   Diagnoses and all orders for this visit:    1. Need for influenza vaccination (Primary)  -     Fluad Quad >65 years    2. Preoperative clearance    3. Benign essential HTN    4. Controlled type 2 diabetes mellitus with diabetic nephropathy, without long-term current use of insulin (CMS/[facility])    Other orders  -     glucose monitor monitoring kit; 1 each Daily.  Dispense: 1 each; Refill: 0  -     Lancets misc; 1 each Daily.  Dispense: 100 each; Refill: 3  -     glucose blood test strip; 1 daily  Dispense: 100 each; Refill: 3      We spent time reviewing her preoperative list of medications to avoid she understands not to take aspirin or nonsteroidals.  Also her diabetes is under reasonable control I am suggesting she hold metformin 24 hours prior to surgery and not take the glipizide the evening before.  She will need sliding scale NovoLog or Humalog around the time of surgery to control her sugars.  Blood pressure is not ideal however feel some of this is anxiety associated with upcoming surgery no changes are made.

## 2020-09-24 ENCOUNTER — TRANSCRIBE ORDERS (OUTPATIENT)
Dept: ADMINISTRATIVE | Facility: HOSPITAL | Age: 85
End: 2020-09-24

## 2020-09-24 DIAGNOSIS — Z11.59 SCREENING FOR VIRAL DISEASE: Primary | ICD-10-CM

## 2020-09-24 NOTE — TELEPHONE ENCOUNTER
I usually like for people to use MiraLAX 1 scoop daily or adjust the dose as needed to have a good bowel movement

## 2020-09-28 ENCOUNTER — LAB (OUTPATIENT)
Dept: LAB | Facility: HOSPITAL | Age: 85
End: 2020-09-28

## 2020-09-28 PROCEDURE — C9803 HOPD COVID-19 SPEC COLLECT: HCPCS | Performed by: ORTHOPAEDIC SURGERY

## 2020-09-28 PROCEDURE — 87635 SARS-COV-2 COVID-19 AMP PRB: CPT | Performed by: ORTHOPAEDIC SURGERY

## 2020-09-29 LAB — SARS-COV-2 N GENE RESP QL NAA+PROBE: NOT DETECTED

## 2020-09-30 ENCOUNTER — ANESTHESIA EVENT (OUTPATIENT)
Dept: PERIOP | Facility: HOSPITAL | Age: 85
End: 2020-09-30

## 2020-09-30 ENCOUNTER — HOSPITAL ENCOUNTER (OUTPATIENT)
Facility: HOSPITAL | Age: 85
Discharge: HOME | End: 2020-10-03
Attending: ORTHOPAEDIC SURGERY | Admitting: ORTHOPAEDIC SURGERY

## 2020-09-30 ENCOUNTER — ANESTHESIA (OUTPATIENT)
Dept: PERIOP | Facility: HOSPITAL | Age: 85
End: 2020-09-30

## 2020-09-30 DIAGNOSIS — Z78.9 DECREASED ACTIVITIES OF DAILY LIVING (ADL): ICD-10-CM

## 2020-09-30 DIAGNOSIS — Z74.09 IMPAIRED MOBILITY: ICD-10-CM

## 2020-09-30 DIAGNOSIS — M17.11 PRIMARY OSTEOARTHRITIS OF RIGHT KNEE: Primary | ICD-10-CM

## 2020-09-30 LAB
ABO GROUP BLD: NORMAL
BLD GP AB SCN SERPL QL: NEGATIVE
GLUCOSE BLDC GLUCOMTR-MCNC: 133 MG/DL (ref 70–130)
GLUCOSE BLDC GLUCOMTR-MCNC: 134 MG/DL (ref 70–130)
RH BLD: POSITIVE
T&S EXPIRATION DATE: NORMAL

## 2020-09-30 PROCEDURE — A9270 NON-COVERED ITEM OR SERVICE: HCPCS | Performed by: ORTHOPAEDIC SURGERY

## 2020-09-30 PROCEDURE — 25010000002 VANCOMYCIN 1 G RECONSTITUTED SOLUTION 1 EACH VIAL: Performed by: ORTHOPAEDIC SURGERY

## 2020-09-30 PROCEDURE — 63710000001 ACETAMINOPHEN 325 MG TABLET: Performed by: ORTHOPAEDIC SURGERY

## 2020-09-30 PROCEDURE — 63710000001 NEBIVOLOL 5 MG TABLET: Performed by: ORTHOPAEDIC SURGERY

## 2020-09-30 PROCEDURE — C1776 JOINT DEVICE (IMPLANTABLE): HCPCS | Performed by: ORTHOPAEDIC SURGERY

## 2020-09-30 PROCEDURE — 25010000002 FENTANYL CITRATE (PF) 100 MCG/2ML SOLUTION: Performed by: ANESTHESIOLOGY

## 2020-09-30 PROCEDURE — 82962 GLUCOSE BLOOD TEST: CPT

## 2020-09-30 PROCEDURE — 25010000002 PROPOFOL 10 MG/ML EMULSION: Performed by: NURSE ANESTHETIST, CERTIFIED REGISTERED

## 2020-09-30 PROCEDURE — 86901 BLOOD TYPING SEROLOGIC RH(D): CPT | Performed by: ORTHOPAEDIC SURGERY

## 2020-09-30 PROCEDURE — 25010000002 ONDANSETRON PER 1 MG: Performed by: NURSE ANESTHETIST, CERTIFIED REGISTERED

## 2020-09-30 PROCEDURE — 63710000001 DICYCLOMINE 10 MG CAPSULE: Performed by: ORTHOPAEDIC SURGERY

## 2020-09-30 PROCEDURE — 86900 BLOOD TYPING SEROLOGIC ABO: CPT | Performed by: ORTHOPAEDIC SURGERY

## 2020-09-30 PROCEDURE — 25010000002 ROPIVACAINE PER 1 MG: Performed by: ANESTHESIOLOGY

## 2020-09-30 PROCEDURE — 25010000002 ONDANSETRON PER 1 MG: Performed by: ANESTHESIOLOGY

## 2020-09-30 PROCEDURE — 25010000002 FENTANYL CITRATE (PF) 100 MCG/2ML SOLUTION: Performed by: NURSE ANESTHETIST, CERTIFIED REGISTERED

## 2020-09-30 PROCEDURE — 63710000001 AMLODIPINE 5 MG TABLET: Performed by: ORTHOPAEDIC SURGERY

## 2020-09-30 PROCEDURE — 63710000001 DIPHENHYDRAMINE PER 50 MG: Performed by: ORTHOPAEDIC SURGERY

## 2020-09-30 PROCEDURE — 63710000001 TRAMADOL 50 MG TABLET: Performed by: ORTHOPAEDIC SURGERY

## 2020-09-30 PROCEDURE — 25010000002 ONDANSETRON PER 1 MG: Performed by: ORTHOPAEDIC SURGERY

## 2020-09-30 PROCEDURE — 86850 RBC ANTIBODY SCREEN: CPT | Performed by: ORTHOPAEDIC SURGERY

## 2020-09-30 PROCEDURE — 25010000002 HYDROMORPHONE PER 4 MG: Performed by: NURSE ANESTHETIST, CERTIFIED REGISTERED

## 2020-09-30 PROCEDURE — 25010000002 CEFAZOLIN PER 500 MG: Performed by: ORTHOPAEDIC SURGERY

## 2020-09-30 PROCEDURE — C1713 ANCHOR/SCREW BN/BN,TIS/BN: HCPCS | Performed by: ORTHOPAEDIC SURGERY

## 2020-09-30 DEVICE — IMPLANTABLE DEVICE: Type: IMPLANTABLE DEVICE | Status: FUNCTIONAL

## 2020-09-30 DEVICE — COMP FEM TRIATH PS CMT NO5 RT: Type: IMPLANTABLE DEVICE | Status: FUNCTIONAL

## 2020-09-30 DEVICE — BASEPLT TIB TRIATH TS NO4: Type: IMPLANTABLE DEVICE | Status: FUNCTIONAL

## 2020-09-30 DEVICE — CMT BONE SIMPLEX/P FULL DOSE 10/PK: Type: IMPLANTABLE DEVICE | Status: FUNCTIONAL

## 2020-09-30 DEVICE — TOTL KN HI DEMAND STRYKER: Type: IMPLANTABLE DEVICE | Status: FUNCTIONAL

## 2020-09-30 RX ORDER — SODIUM CHLORIDE, SODIUM LACTATE, POTASSIUM CHLORIDE, CALCIUM CHLORIDE 600; 310; 30; 20 MG/100ML; MG/100ML; MG/100ML; MG/100ML
100 INJECTION, SOLUTION INTRAVENOUS CONTINUOUS
Status: DISCONTINUED | OUTPATIENT
Start: 2020-09-30 | End: 2020-09-30

## 2020-09-30 RX ORDER — LEVOTHYROXINE SODIUM 0.05 MG/1
50 TABLET ORAL
Status: DISCONTINUED | OUTPATIENT
Start: 2020-10-01 | End: 2020-10-03 | Stop reason: HOSPADM

## 2020-09-30 RX ORDER — SODIUM CHLORIDE 0.9 % (FLUSH) 0.9 %
10 SYRINGE (ML) INJECTION AS NEEDED
Status: DISCONTINUED | OUTPATIENT
Start: 2020-09-30 | End: 2020-09-30 | Stop reason: HOSPADM

## 2020-09-30 RX ORDER — SODIUM CHLORIDE 0.9 % (FLUSH) 0.9 %
10 SYRINGE (ML) INJECTION EVERY 12 HOURS SCHEDULED
Status: DISCONTINUED | OUTPATIENT
Start: 2020-09-30 | End: 2020-09-30 | Stop reason: HOSPADM

## 2020-09-30 RX ORDER — DOCUSATE SODIUM 100 MG/1
100 CAPSULE, LIQUID FILLED ORAL 2 TIMES DAILY PRN
Status: DISCONTINUED | OUTPATIENT
Start: 2020-09-30 | End: 2020-10-03 | Stop reason: HOSPADM

## 2020-09-30 RX ORDER — MORPHINE SULFATE 2 MG/ML
2 INJECTION, SOLUTION INTRAMUSCULAR; INTRAVENOUS
Status: DISCONTINUED | OUTPATIENT
Start: 2020-09-30 | End: 2020-09-30 | Stop reason: HOSPADM

## 2020-09-30 RX ORDER — METFORMIN HYDROCHLORIDE 500 MG/1
500 TABLET, EXTENDED RELEASE ORAL 2 TIMES DAILY WITH MEALS
Status: DISCONTINUED | OUTPATIENT
Start: 2020-09-30 | End: 2020-10-03 | Stop reason: HOSPADM

## 2020-09-30 RX ORDER — ONDANSETRON 2 MG/ML
4 INJECTION INTRAMUSCULAR; INTRAVENOUS AS NEEDED
Status: DISCONTINUED | OUTPATIENT
Start: 2020-09-30 | End: 2020-09-30 | Stop reason: HOSPADM

## 2020-09-30 RX ORDER — BUPIVACAINE HCL/0.9 % NACL/PF 0.1 %
2 PLASTIC BAG, INJECTION (ML) EPIDURAL EVERY 8 HOURS
Status: COMPLETED | OUTPATIENT
Start: 2020-09-30 | End: 2020-10-01

## 2020-09-30 RX ORDER — DIPHENHYDRAMINE HCL 25 MG
25 CAPSULE ORAL EVERY 6 HOURS PRN
Status: DISCONTINUED | OUTPATIENT
Start: 2020-09-30 | End: 2020-10-03 | Stop reason: HOSPADM

## 2020-09-30 RX ORDER — ASPIRIN 81 MG/1
81 TABLET ORAL DAILY
Status: DISCONTINUED | OUTPATIENT
Start: 2020-10-01 | End: 2020-10-03 | Stop reason: HOSPADM

## 2020-09-30 RX ORDER — ACETAMINOPHEN 325 MG/1
650 TABLET ORAL EVERY 4 HOURS PRN
Status: DISCONTINUED | OUTPATIENT
Start: 2020-09-30 | End: 2020-10-03 | Stop reason: HOSPADM

## 2020-09-30 RX ORDER — LABETALOL HYDROCHLORIDE 5 MG/ML
5 INJECTION, SOLUTION INTRAVENOUS
Status: DISCONTINUED | OUTPATIENT
Start: 2020-09-30 | End: 2020-09-30 | Stop reason: HOSPADM

## 2020-09-30 RX ORDER — ONDANSETRON 4 MG/1
4 TABLET, FILM COATED ORAL EVERY 6 HOURS PRN
Status: DISCONTINUED | OUTPATIENT
Start: 2020-09-30 | End: 2020-10-03 | Stop reason: HOSPADM

## 2020-09-30 RX ORDER — GLIPIZIDE 5 MG/1
5 TABLET ORAL
Status: DISCONTINUED | OUTPATIENT
Start: 2020-10-01 | End: 2020-10-03 | Stop reason: HOSPADM

## 2020-09-30 RX ORDER — ONDANSETRON 2 MG/ML
4 INJECTION INTRAMUSCULAR; INTRAVENOUS EVERY 6 HOURS PRN
Status: DISCONTINUED | OUTPATIENT
Start: 2020-09-30 | End: 2020-10-03 | Stop reason: HOSPADM

## 2020-09-30 RX ORDER — SODIUM CHLORIDE, SODIUM LACTATE, POTASSIUM CHLORIDE, CALCIUM CHLORIDE 600; 310; 30; 20 MG/100ML; MG/100ML; MG/100ML; MG/100ML
1000 INJECTION, SOLUTION INTRAVENOUS CONTINUOUS
Status: DISCONTINUED | OUTPATIENT
Start: 2020-09-30 | End: 2020-09-30

## 2020-09-30 RX ORDER — FENTANYL CITRATE 50 UG/ML
50 INJECTION, SOLUTION INTRAMUSCULAR; INTRAVENOUS ONCE
Status: COMPLETED | OUTPATIENT
Start: 2020-09-30 | End: 2020-09-30

## 2020-09-30 RX ORDER — OXYCODONE AND ACETAMINOPHEN 7.5; 325 MG/1; MG/1
2 TABLET ORAL ONCE AS NEEDED
Status: DISCONTINUED | OUTPATIENT
Start: 2020-09-30 | End: 2020-09-30 | Stop reason: HOSPADM

## 2020-09-30 RX ORDER — NALOXONE HCL 0.4 MG/ML
0.1 VIAL (ML) INJECTION
Status: DISCONTINUED | OUTPATIENT
Start: 2020-09-30 | End: 2020-10-03 | Stop reason: HOSPADM

## 2020-09-30 RX ORDER — SODIUM CHLORIDE 0.9 % (FLUSH) 0.9 %
3-10 SYRINGE (ML) INJECTION AS NEEDED
Status: DISCONTINUED | OUTPATIENT
Start: 2020-09-30 | End: 2020-09-30 | Stop reason: HOSPADM

## 2020-09-30 RX ORDER — SODIUM CHLORIDE, SODIUM LACTATE, POTASSIUM CHLORIDE, CALCIUM CHLORIDE 600; 310; 30; 20 MG/100ML; MG/100ML; MG/100ML; MG/100ML
100 INJECTION, SOLUTION INTRAVENOUS CONTINUOUS PRN
Status: DISCONTINUED | OUTPATIENT
Start: 2020-09-30 | End: 2020-09-30 | Stop reason: HOSPADM

## 2020-09-30 RX ORDER — FENTANYL CITRATE 50 UG/ML
INJECTION, SOLUTION INTRAMUSCULAR; INTRAVENOUS AS NEEDED
Status: DISCONTINUED | OUTPATIENT
Start: 2020-09-30 | End: 2020-09-30 | Stop reason: SURG

## 2020-09-30 RX ORDER — ROPIVACAINE HYDROCHLORIDE 5 MG/ML
INJECTION, SOLUTION EPIDURAL; INFILTRATION; PERINEURAL
Status: COMPLETED | OUTPATIENT
Start: 2020-09-30 | End: 2020-09-30

## 2020-09-30 RX ORDER — SODIUM CHLORIDE 0.9 % (FLUSH) 0.9 %
3 SYRINGE (ML) INJECTION EVERY 12 HOURS SCHEDULED
Status: DISCONTINUED | OUTPATIENT
Start: 2020-09-30 | End: 2020-09-30 | Stop reason: HOSPADM

## 2020-09-30 RX ORDER — LABETALOL HYDROCHLORIDE 5 MG/ML
INJECTION, SOLUTION INTRAVENOUS AS NEEDED
Status: DISCONTINUED | OUTPATIENT
Start: 2020-09-30 | End: 2020-09-30 | Stop reason: SURG

## 2020-09-30 RX ORDER — MORPHINE SULFATE 1 MG/ML
INJECTION INTRAVENOUS CONTINUOUS
Status: DISCONTINUED | OUTPATIENT
Start: 2020-09-30 | End: 2020-10-01

## 2020-09-30 RX ORDER — NALOXONE HCL 0.4 MG/ML
0.04 VIAL (ML) INJECTION AS NEEDED
Status: DISCONTINUED | OUTPATIENT
Start: 2020-09-30 | End: 2020-09-30 | Stop reason: HOSPADM

## 2020-09-30 RX ORDER — AMLODIPINE BESYLATE 5 MG/1
2.5 TABLET ORAL NIGHTLY
Status: DISCONTINUED | OUTPATIENT
Start: 2020-09-30 | End: 2020-10-03 | Stop reason: HOSPADM

## 2020-09-30 RX ORDER — HYDROMORPHONE HCL 110MG/55ML
PATIENT CONTROLLED ANALGESIA SYRINGE INTRAVENOUS AS NEEDED
Status: DISCONTINUED | OUTPATIENT
Start: 2020-09-30 | End: 2020-09-30 | Stop reason: SURG

## 2020-09-30 RX ORDER — ACETAMINOPHEN 500 MG
1000 TABLET ORAL ONCE
Status: COMPLETED | OUTPATIENT
Start: 2020-09-30 | End: 2020-09-30

## 2020-09-30 RX ORDER — LIDOCAINE HYDROCHLORIDE 10 MG/ML
0.5 INJECTION, SOLUTION EPIDURAL; INFILTRATION; INTRACAUDAL; PERINEURAL ONCE AS NEEDED
Status: DISCONTINUED | OUTPATIENT
Start: 2020-09-30 | End: 2020-09-30 | Stop reason: HOSPADM

## 2020-09-30 RX ORDER — ROCURONIUM BROMIDE 10 MG/ML
INJECTION, SOLUTION INTRAVENOUS AS NEEDED
Status: DISCONTINUED | OUTPATIENT
Start: 2020-09-30 | End: 2020-09-30 | Stop reason: SURG

## 2020-09-30 RX ORDER — PROPOFOL 10 MG/ML
VIAL (ML) INTRAVENOUS AS NEEDED
Status: DISCONTINUED | OUTPATIENT
Start: 2020-09-30 | End: 2020-09-30 | Stop reason: SURG

## 2020-09-30 RX ORDER — NEBIVOLOL 5 MG/1
2.5 TABLET ORAL NIGHTLY
Status: DISCONTINUED | OUTPATIENT
Start: 2020-09-30 | End: 2020-10-03 | Stop reason: HOSPADM

## 2020-09-30 RX ORDER — OXYCODONE AND ACETAMINOPHEN 10; 325 MG/1; MG/1
1 TABLET ORAL ONCE AS NEEDED
Status: DISCONTINUED | OUTPATIENT
Start: 2020-09-30 | End: 2020-09-30 | Stop reason: HOSPADM

## 2020-09-30 RX ORDER — TRANEXAMIC ACID 100 MG/ML
INJECTION, SOLUTION INTRAVENOUS AS NEEDED
Status: DISCONTINUED | OUTPATIENT
Start: 2020-09-30 | End: 2020-09-30 | Stop reason: SURG

## 2020-09-30 RX ORDER — HYDROCODONE BITARTRATE AND ACETAMINOPHEN 5; 325 MG/1; MG/1
1 TABLET ORAL EVERY 4 HOURS PRN
Status: DISCONTINUED | OUTPATIENT
Start: 2020-09-30 | End: 2020-10-02

## 2020-09-30 RX ORDER — SODIUM CHLORIDE 0.9 % (FLUSH) 0.9 %
3 SYRINGE (ML) INJECTION AS NEEDED
Status: DISCONTINUED | OUTPATIENT
Start: 2020-09-30 | End: 2020-09-30 | Stop reason: HOSPADM

## 2020-09-30 RX ORDER — MAGNESIUM HYDROXIDE 1200 MG/15ML
LIQUID ORAL AS NEEDED
Status: DISCONTINUED | OUTPATIENT
Start: 2020-09-30 | End: 2020-09-30 | Stop reason: HOSPADM

## 2020-09-30 RX ORDER — ONDANSETRON 2 MG/ML
INJECTION INTRAMUSCULAR; INTRAVENOUS AS NEEDED
Status: DISCONTINUED | OUTPATIENT
Start: 2020-09-30 | End: 2020-09-30 | Stop reason: SURG

## 2020-09-30 RX ORDER — SODIUM CHLORIDE, SODIUM LACTATE, POTASSIUM CHLORIDE, CALCIUM CHLORIDE 600; 310; 30; 20 MG/100ML; MG/100ML; MG/100ML; MG/100ML
75 INJECTION, SOLUTION INTRAVENOUS CONTINUOUS PRN
Status: DISCONTINUED | OUTPATIENT
Start: 2020-09-30 | End: 2020-10-01

## 2020-09-30 RX ORDER — DICYCLOMINE HYDROCHLORIDE 10 MG/1
10 CAPSULE ORAL 3 TIMES DAILY
Status: DISCONTINUED | OUTPATIENT
Start: 2020-09-30 | End: 2020-10-02

## 2020-09-30 RX ORDER — TRAMADOL HYDROCHLORIDE 50 MG/1
50 TABLET ORAL EVERY 6 HOURS PRN
Status: DISCONTINUED | OUTPATIENT
Start: 2020-09-30 | End: 2020-10-03 | Stop reason: HOSPADM

## 2020-09-30 RX ORDER — FLUMAZENIL 0.1 MG/ML
0.2 INJECTION INTRAVENOUS AS NEEDED
Status: DISCONTINUED | OUTPATIENT
Start: 2020-09-30 | End: 2020-09-30 | Stop reason: HOSPADM

## 2020-09-30 RX ORDER — FENTANYL CITRATE 50 UG/ML
25 INJECTION, SOLUTION INTRAMUSCULAR; INTRAVENOUS
Status: DISCONTINUED | OUTPATIENT
Start: 2020-09-30 | End: 2020-09-30 | Stop reason: HOSPADM

## 2020-09-30 RX ORDER — LOSARTAN POTASSIUM 50 MG/1
50 TABLET ORAL
Status: DISCONTINUED | OUTPATIENT
Start: 2020-10-01 | End: 2020-10-03 | Stop reason: HOSPADM

## 2020-09-30 RX ADMIN — ONDANSETRON HYDROCHLORIDE 4 MG: 2 SOLUTION INTRAMUSCULAR; INTRAVENOUS at 16:58

## 2020-09-30 RX ADMIN — ROPIVACAINE HYDROCHLORIDE 20 ML: 5 INJECTION, SOLUTION EPIDURAL; INFILTRATION; PERINEURAL at 13:57

## 2020-09-30 RX ADMIN — SODIUM CHLORIDE, POTASSIUM CHLORIDE, SODIUM LACTATE AND CALCIUM CHLORIDE 1000 ML: 600; 310; 30; 20 INJECTION, SOLUTION INTRAVENOUS at 10:38

## 2020-09-30 RX ADMIN — ROCURONIUM BROMIDE 30 MG: 10 INJECTION INTRAVENOUS at 14:27

## 2020-09-30 RX ADMIN — CEFAZOLIN SODIUM 2 G: 10 INJECTION, POWDER, FOR SOLUTION INTRAVENOUS at 22:52

## 2020-09-30 RX ADMIN — ONDANSETRON HYDROCHLORIDE 4 MG: 2 SOLUTION INTRAMUSCULAR; INTRAVENOUS at 15:36

## 2020-09-30 RX ADMIN — TRANEXAMIC ACID 1000 MG: 100 INJECTION, SOLUTION INTRAVENOUS at 14:53

## 2020-09-30 RX ADMIN — ACETAMINOPHEN 1000 MG: 500 TABLET, FILM COATED ORAL at 13:28

## 2020-09-30 RX ADMIN — SODIUM CHLORIDE, POTASSIUM CHLORIDE, SODIUM LACTATE AND CALCIUM CHLORIDE: 600; 310; 30; 20 INJECTION, SOLUTION INTRAVENOUS at 15:45

## 2020-09-30 RX ADMIN — LABETALOL 20 MG/4 ML (5 MG/ML) INTRAVENOUS SYRINGE 10 MG: at 16:41

## 2020-09-30 RX ADMIN — FENTANYL CITRATE 100 MCG: 50 INJECTION, SOLUTION INTRAMUSCULAR; INTRAVENOUS at 15:45

## 2020-09-30 RX ADMIN — DIPHENHYDRAMINE HYDROCHLORIDE 25 MG: 25 CAPSULE ORAL at 23:28

## 2020-09-30 RX ADMIN — ACETAMINOPHEN 650 MG: 325 TABLET, FILM COATED ORAL at 20:37

## 2020-09-30 RX ADMIN — FENTANYL CITRATE 100 MCG: 50 INJECTION, SOLUTION INTRAMUSCULAR; INTRAVENOUS at 14:26

## 2020-09-30 RX ADMIN — LIDOCAINE HYDROCHLORIDE 100 MG: 20 INJECTION, SOLUTION INTRAVENOUS at 14:26

## 2020-09-30 RX ADMIN — DICYCLOMINE HYDROCHLORIDE 10 MG: 10 CAPSULE ORAL at 20:38

## 2020-09-30 RX ADMIN — FENTANYL CITRATE 50 MCG: 50 INJECTION, SOLUTION INTRAMUSCULAR; INTRAVENOUS at 13:52

## 2020-09-30 RX ADMIN — PROPOFOL 150 MG: 10 INJECTION, EMULSION INTRAVENOUS at 14:27

## 2020-09-30 RX ADMIN — LABETALOL 20 MG/4 ML (5 MG/ML) INTRAVENOUS SYRINGE 5 MG: at 16:05

## 2020-09-30 RX ADMIN — HYDROMORPHONE HYDROCHLORIDE 2 MG: 2 INJECTION, SOLUTION INTRAMUSCULAR; INTRAVENOUS; SUBCUTANEOUS at 16:55

## 2020-09-30 RX ADMIN — CEFAZOLIN 1 G: 1 INJECTION, POWDER, FOR SOLUTION INTRAMUSCULAR; INTRAVENOUS; PARENTERAL at 14:30

## 2020-09-30 RX ADMIN — TRANEXAMIC ACID 1000 MG: 100 INJECTION, SOLUTION INTRAVENOUS at 16:18

## 2020-09-30 RX ADMIN — AMLODIPINE BESYLATE 2.5 MG: 5 TABLET ORAL at 20:38

## 2020-09-30 RX ADMIN — ONDANSETRON HYDROCHLORIDE 4 MG: 2 SOLUTION INTRAMUSCULAR; INTRAVENOUS at 20:48

## 2020-09-30 RX ADMIN — SODIUM CHLORIDE, POTASSIUM CHLORIDE, SODIUM LACTATE AND CALCIUM CHLORIDE 75 ML/HR: 600; 310; 30; 20 INJECTION, SOLUTION INTRAVENOUS at 22:52

## 2020-09-30 RX ADMIN — TRAMADOL HYDROCHLORIDE 50 MG: 50 TABLET, FILM COATED ORAL at 23:28

## 2020-09-30 RX ADMIN — FENTANYL CITRATE 100 MCG: 50 INJECTION, SOLUTION INTRAMUSCULAR; INTRAVENOUS at 15:09

## 2020-09-30 RX ADMIN — NEBIVOLOL HYDROCHLORIDE 2.5 MG: 5 TABLET ORAL at 20:39

## 2020-09-30 NOTE — ANESTHESIA PROCEDURE NOTES
Airway  Date/Time: 9/30/2020 2:27 PM  Airway not difficult    General Information and Staff    Patient location during procedure: OR  CRNA: Vladimir Fields CRNA    Indications and Patient Condition  Indications for airway management: airway protection    Preoxygenated: yes  Mask difficulty assessment: 0 - not attempted    Final Airway Details  Final airway type: endotracheal airway      Successful airway: ETT  Cuffed: yes   Successful intubation technique: direct laryngoscopy  Blade: Melba  Blade size: 3  ETT size (mm): 7.0  Cormack-Lehane Classification: grade I - full view of glottis  Placement verified by: chest auscultation and capnometry   Cuff volume (mL): 6  Measured from: teeth  ETT/EBT  to teeth (cm): 21  Number of attempts at approach: 1  Assessment: lips, teeth, and gum same as pre-op and atraumatic intubation

## 2020-09-30 NOTE — ANESTHESIA PREPROCEDURE EVALUATION
Anesthesia Evaluation     Patient summary reviewed and Nursing notes reviewed   no history of anesthetic complications:  NPO Solid Status: > 8 hours  NPO Liquid Status: > 8 hours           Airway   Mallampati: II  TM distance: >3 FB  Neck ROM: full  Dental      Pulmonary    Cardiovascular   Exercise tolerance: poor (<4 METS)    Patient on routine beta blocker and Beta blocker given within 24 hours of surgery    (+) hypertension, hyperlipidemia,     ROS comment: Echo 12/2019  · Left ventricular systolic function is normal. Estimated ejection fraction is 66 to 70%.  · Left ventricular diastolic dysfunction (grade I) consistent with impaired relaxation.  · Normal right ventricular cavity size and systolic function noted.  · There is aortic valve sclerosis without significant stenosis.  · Mild aortic, pulmonic, and tricuspid valve regurgitation is present.       Low risk stress test 5/2019    Neuro/Psych  (+) TIA,     GI/Hepatic/Renal/Endo    (+)   renal disease CRI, diabetes mellitus well controlled, thyroid problem hypothyroidism    Musculoskeletal     Abdominal    Substance History      OB/GYN          Other   arthritis,                    Anesthesia Plan    ASA 3     general and general with block     intravenous induction     Anesthetic plan, all risks, benefits, and alternatives have been provided, discussed and informed consent has been obtained with: patient.

## 2020-09-30 NOTE — ANESTHESIA PROCEDURE NOTES
Peripheral Block    Pre-sedation assessment completed: 9/30/2020 1:48 PM    Patient reassessed immediately prior to procedure    Patient location during procedure: holding area  Start time: 9/30/2020 1:56 PM  Stop time: 9/30/2020 1:57 PM  Reason for block: procedure for pain, at surgeon's request, post-op pain management and Requested by Dr RANCHO Pickens  Performed by  Anesthesiologist: Autumn Pickens MD  Preanesthetic Checklist  Completed: patient identified, site marked, surgical consent, pre-op evaluation, timeout performed, IV checked, risks and benefits discussed and monitors and equipment checked  Prep:  Pt Position: supine  Sterile barriers:gloves, mask and cap  Prep: ChloraPrep  Patient monitoring: continuous pulse oximetry, blood pressure monitoring and EKG  Procedure  Sedation:yes    Guidance:ultrasound guided and femoral artery identified in adductor canal and local anesthetic seen surrounding artery  ULTRASOUND INTERPRETATION. Using ultrasound guidance a 20 G gauge needle was placed in close proximity to the nerve, at which point, under ultrasound guidance anesthetic was injected in the area of the nerve and spread of the anesthesia was seen on ultrasound in close proximity thereto.  There were no abnormalities seen on ultrasound; a digital image was taken; and the patient tolerated the procedure with no complications. Images:still images obtained (picture printed and placed in patients chart)    Laterality:right  Block Type:adductor canal block  Injection Technique:single-shot  Needle Type:echogenic      Medications Used: ropivacaine (NAROPIN) injection 0.5 %, 20 mL      Post Assessment  Injection Assessment: negative aspiration for heme, no paresthesia on injection and incremental injection  Patient Tolerance:comfortable throughout block  Complications:no

## 2020-10-01 LAB
GLUCOSE BLDC GLUCOMTR-MCNC: 168 MG/DL (ref 70–130)
GLUCOSE BLDC GLUCOMTR-MCNC: 190 MG/DL (ref 70–130)

## 2020-10-01 PROCEDURE — A9270 NON-COVERED ITEM OR SERVICE: HCPCS | Performed by: ORTHOPAEDIC SURGERY

## 2020-10-01 PROCEDURE — 97161 PT EVAL LOW COMPLEX 20 MIN: CPT

## 2020-10-01 PROCEDURE — 63710000001 ACETAMINOPHEN 325 MG TABLET: Performed by: ORTHOPAEDIC SURGERY

## 2020-10-01 PROCEDURE — 63710000001 AMLODIPINE 5 MG TABLET: Performed by: ORTHOPAEDIC SURGERY

## 2020-10-01 PROCEDURE — 63710000001 LOSARTAN 50 MG TABLET: Performed by: ORTHOPAEDIC SURGERY

## 2020-10-01 PROCEDURE — 97166 OT EVAL MOD COMPLEX 45 MIN: CPT

## 2020-10-01 PROCEDURE — 63710000001 LEVOTHYROXINE 50 MCG TABLET: Performed by: ORTHOPAEDIC SURGERY

## 2020-10-01 PROCEDURE — 63710000001 NEBIVOLOL 5 MG TABLET: Performed by: ORTHOPAEDIC SURGERY

## 2020-10-01 PROCEDURE — 97530 THERAPEUTIC ACTIVITIES: CPT

## 2020-10-01 PROCEDURE — 25010000002 CEFAZOLIN PER 500 MG: Performed by: ORTHOPAEDIC SURGERY

## 2020-10-01 PROCEDURE — 63710000001 HYDROCODONE-ACETAMINOPHEN 5-325 MG TABLET: Performed by: ORTHOPAEDIC SURGERY

## 2020-10-01 PROCEDURE — 82962 GLUCOSE BLOOD TEST: CPT

## 2020-10-01 PROCEDURE — 63710000001 TRAMADOL 50 MG TABLET: Performed by: ORTHOPAEDIC SURGERY

## 2020-10-01 PROCEDURE — 63710000001 ASPIRIN 81 MG TABLET DELAYED-RELEASE: Performed by: ORTHOPAEDIC SURGERY

## 2020-10-01 PROCEDURE — 63710000001 GLIPIZIDE 5 MG TABLET: Performed by: ORTHOPAEDIC SURGERY

## 2020-10-01 PROCEDURE — 97110 THERAPEUTIC EXERCISES: CPT

## 2020-10-01 PROCEDURE — 63710000001 METFORMIN ER 500 MG TABLET SUSTAINED-RELEASE 24 HOUR: Performed by: ORTHOPAEDIC SURGERY

## 2020-10-01 RX ADMIN — ACETAMINOPHEN 650 MG: 325 TABLET, FILM COATED ORAL at 15:08

## 2020-10-01 RX ADMIN — TRAMADOL HYDROCHLORIDE 50 MG: 50 TABLET, FILM COATED ORAL at 06:35

## 2020-10-01 RX ADMIN — CEFAZOLIN SODIUM 2 G: 10 INJECTION, POWDER, FOR SOLUTION INTRAVENOUS at 05:42

## 2020-10-01 RX ADMIN — GLIPIZIDE 5 MG: 5 TABLET ORAL at 08:47

## 2020-10-01 RX ADMIN — HYDROCODONE BITARTRATE AND ACETAMINOPHEN 1 TABLET: 5; 325 TABLET ORAL at 21:11

## 2020-10-01 RX ADMIN — LEVOTHYROXINE SODIUM 50 MCG: 50 TABLET ORAL at 05:42

## 2020-10-01 RX ADMIN — ACETAMINOPHEN 650 MG: 325 TABLET, FILM COATED ORAL at 08:46

## 2020-10-01 RX ADMIN — AMLODIPINE BESYLATE 2.5 MG: 5 TABLET ORAL at 21:11

## 2020-10-01 RX ADMIN — LOSARTAN POTASSIUM 50 MG: 50 TABLET, FILM COATED ORAL at 08:46

## 2020-10-01 RX ADMIN — TRAMADOL HYDROCHLORIDE 50 MG: 50 TABLET, FILM COATED ORAL at 13:07

## 2020-10-01 RX ADMIN — METFORMIN HYDROCHLORIDE 500 MG: 500 TABLET, EXTENDED RELEASE ORAL at 17:59

## 2020-10-01 RX ADMIN — ACETAMINOPHEN 650 MG: 325 TABLET, FILM COATED ORAL at 02:40

## 2020-10-01 RX ADMIN — NEBIVOLOL HYDROCHLORIDE 2.5 MG: 5 TABLET ORAL at 21:12

## 2020-10-01 RX ADMIN — ASPIRIN 81 MG: 81 TABLET ORAL at 08:47

## 2020-10-01 RX ADMIN — METFORMIN HYDROCHLORIDE 500 MG: 500 TABLET, EXTENDED RELEASE ORAL at 08:46

## 2020-10-01 NOTE — PROGRESS NOTES
Patient is 1 day post total knee arthroplasty.  Plan change dressing discontinue Hemovac discontinue IV and PCA.  Continue with physical therapy

## 2020-10-01 NOTE — THERAPY EVALUATION
Patient Name: Nedra Tolliver  : 1935    MRN: [id number]                              Today's Date: 10/1/2020       Admit Date: 2020    Visit Dx:     ICD-10-CM ICD-9-CM   1. Decreased activities of daily living (ADL)  Z78.9 V49.89     Patient Active Problem List   Diagnosis   • Urinary tract infection without hematuria   • Dysuria   • Vaginal atrophy   • Overactive bladder   • Diverticulitis   • Benign essential HTN   • Thyroid nodule   • Hypothyroidism   • Hyponatremia   • Syncope and collapse   • Volume depletion   • Diabetes type 2, controlled (CMS/HCC)   • Moderate left ankle sprain   • Left hip pain   • Acute pain of left knee   • Pain of left thumb   • Nondisplaced fracture of navicular bone of left foot with routine healing   • Chest pain at rest   • Acute cystitis without hematuria   • H/O thyroid nodule   • Hypoglycemia   • IBS (irritable bowel syndrome)   • Idiopathic scoliosis   • Insomnia   • Mild tricuspid insufficiency   • Neuropathy due to type 2 diabetes mellitus (CMS/HCC)   • Onychomycosis   • Osteopenia   • Pericarditis secondary to uremia   • Primary osteoarthritis of right knee   • Proteinuria   • Vitamin D deficiency   • High cholesterol   • Lumbar radiculopathy   • Non-toxic multinodular goiter   • Transient ischemic attack     Past Medical History:   Diagnosis Date   • Arthritis    • Cancer (CMS/HCC)     BCC @ nose & Left arm   • Diabetes type 2, controlled (CMS/HCC)    • Diverticulitis    • History of GI bleed    • History of transfusion     Has had x 5 units.   • Hypertension    • Hyponatremia     Required hospitalization   • Hypothyroidism    • LPRD (laryngopharyngeal reflux disease)    • Pharyngeal dysphagia    • Thyroid nodule      Past Surgical History:   Procedure Laterality Date   • APPENDECTOMY     • BASAL CELL CARCINOMA EXCISION     • BELPHAROPTOSIS REPAIR     • BREAST CYST EXCISION     • CATARACT EXTRACTION     • CATARACT EXTRACTION WITH INTRAOCULAR LENS IMPLANT  Bilateral    • CHOLECYSTECTOMY     • COLONOSCOPY Left 11/1/2016    Tubular adenoma cecum, Diverticulosis repeat prn   • SUBTOTAL HYSTERECTOMY     • TOTAL KNEE ARTHROPLASTY Right 9/30/2020    Procedure: TOTAL KNEE ARTHROPLASTY;  Surgeon: Mak Pickens MD;  Location: [facility];  Service: Orthopedics;  Laterality: Right;   • TUMOR EXCISION      under armpits and left breast     General Information     Row Name 10/01/20 0731          OT Time and Intention    Document Type  evaluation  -CS     Mode of Treatment  occupational therapy  -CS     Row Name 10/01/20 0731          General Information    Patient Profile Reviewed  yes  -CS     Prior Level of Function  independent:;dressing;bathing;all household mobility;driving;cleaning;cooking  -CS     Existing Precautions/Restrictions  fall  -CS     Barriers to Rehab  physical barrier  -CS     Row Name 10/01/20 0731          Occupational Profile    Occupational History/Life Experiences (Occupational Profile)  s/p L TKA  -CS     Environmental Supports and Barriers (Occupational Profile)  tub shower with grab bars, pt owns a rollator from a family member (does not fit appropriate, shower chair (unable to use well in her shower)  -CS     Row Name 10/01/20 0731          Living Environment    Lives With  alone  -CS     Row Name 10/01/20 0731          Home Main Entrance    Number of Stairs, Main Entrance  four  -CS     Stair Railings, Main Entrance  none  -CS     Row Name 10/01/20 0731          Stairs Within Home, Primary    Number of Stairs, Within Home, Primary  none  -CS     Row Name 10/01/20 0731          Cognition    Orientation Status (Cognition)  oriented x 4  -CS     Row Name 10/01/20 0731          Safety Issues, Functional Mobility    Impairments Affecting Function (Mobility)  balance;pain;endurance/activity tolerance  -CS       User Key  (r) = Recorded By, (t) = Taken By, (c) = Cosigned By    Initials Name Provider Type    CS Rebecca Gregorio S, OTR/L, CNT Occupational  Therapist        Mobility/ADL's     Row Name 10/01/20 0731          Bed Mobility    Bed Mobility  scooting/bridging;supine-sit;sit-supine  -     Scooting/Bridging [place] (Bed Mobility)  contact guard  -     Supine-Sit [place] (Bed Mobility)  minimum assist (75% patient effort);2 person assist;verbal cues;nonverbal cues (demo/gesture)  -     Sit-Supine [place] (Bed Mobility)  minimum assist (75% patient effort);moderate assist (50% patient effort);2 person assist;verbal cues;nonverbal cues (demo/gesture)  -     Assistive Device (Bed Mobility)  head of bed elevated  -     Comment (Bed Mobility)  Pt required increased assist to transition from sitting to supine due to becoming diaphoretic upon standing and needing to lay down quickly.  -     Row Name 10/01/20 0731          Transfers    Transfers  sit-stand transfer  -     Sit-Stand [place] (Transfers)  contact guard;verbal cues;nonverbal cues (demo/gesture)  -     Row Name 10/01/20 0731          Sit-Stand Transfer    Assistive Device (Sit-Stand Transfers)  walker, front-wheeled  -     Row Name 10/01/20 0731          Functional Mobility    Functional Mobility- Ind. Level  contact guard assist;minimum assist (75% patient effort)  -     Functional Mobility- Device  rolling walker  -     Functional Mobility-Distance (Feet)  -- Pt was able to take 2 steps forward and backward.  -     Functional Mobility- Comment  Upon standing and taking a couple of steps, pt became dizzy, light headed and faint requiring her to sit back down quickly after taking a couple of steps.  Pt required intermittent min A due to sudden onset weakness.  -     Row Name 10/01/20 0731          Activities of Daily Living    BADL Assessment/Intervention  lower body dressing  -     Row Name 10/01/20 0731          Lower Body Dressing Assessment/Training    [place] Level (Lower Body Dressing)  don;socks;moderate assist (50% patient effort);maximum assist  (25% patient effort)  -CS     Position (Lower Body Dressing)  edge of bed sitting  -CS       User Key  (r) = Recorded By, (t) = Taken By, (c) = Cosigned By    Initials Name Provider Type    Rebecca Dunne OTR/L, LATRELL Occupational Therapist        Obj/Interventions     Row Name 10/01/20 0731          Sensory Assessment (Somatosensory)    Sensory Assessment (Somatosensory)  sensation intact  -CS     Row Name 10/01/20 0731          Vision Assessment/Intervention    Visual Impairment/Limitations  WNL  -CS     Row Name 10/01/20 0731          Range of Motion Comprehensive    General Range of Motion  bilateral upper extremity ROM WFL  -CS     Comment, General Range of Motion  LUE shoulder AROM impaired 25-50% due to previous injury.  All other UE joint AROM WNL.  -CS     Row Name 10/01/20 0731          Strength Comprehensive (MMT)    Comment, General Manual Muscle Testing (MMT) Assessment  BUE functional strength: 4+/5 within limitations of AROM  -CS     Row Name 10/01/20 0731          Balance    Balance Assessment  standing static balance;standing dynamic balance  -CS     Static Standing Balance  mild impairment  -CS     Dynamic Standing Balance  mild impairment  -CS       User Key  (r) = Recorded By, (t) = Taken By, (c) = Cosigned By    Initials Name Provider Type    Rebecca Dunne OTR/L, LATRELL Occupational Therapist        Goals/Plan     Row Name 10/01/20 0731          Transfer Goal 1 (OT)    Activity/Assistive Device (Transfer Goal 1, OT)  sit-to-stand/stand-to-sit;bed-to-chair/chair-to-bed;tub;toilet;commode  -CS     [place] Level/Cues Needed (Transfer Goal 1, OT)  standby assist  -CS     Time Frame (Transfer Goal 1, OT)  long term goal (LTG)  -CS     Progress/Outcome (Transfer Goal 1, OT)  goal ongoing  -CS     Row Name 10/01/20 0731          Dressing Goal 1 (OT)    Activity/Device (Dressing Goal 1, OT)  lower body dressing;long-handled shoe horn;reacher;sock-aid  -CS     [place]/Cues Needed  (Dressing Goal 1, OT)  standby assist  -CS     Row Name 10/01/20 0731          Toileting Goal 1 (OT)    Activity/Device (Toileting Goal 1, OT)  toileting skills, all;commode  -CS     [place] Level/Cues Needed (Toileting Goal 1, OT)  standby assist  -CS     Time Frame (Toileting Goal 1, OT)  long term goal (LTG)  -CS     Progress/Outcome (Toileting Goal 1, OT)  goal ongoing  -CS     Row Name 10/01/20 0731          Therapy Assessment/Plan (OT)    Planned Therapy Interventions (OT)  activity tolerance training;BADL retraining;transfer/mobility retraining;patient/caregiver education/training;occupation/activity based interventions;functional balance retraining;adaptive equipment training;IADL retraining  -CS       User Key  (r) = Recorded By, (t) = Taken By, (c) = Cosigned By    Initials Name Provider Type    CS Rebecca Gregorio S, OTR/L, CNT Occupational Therapist        Clinical Impression     Row Name 10/01/20 0731          Pain Assessment    Additional Documentation  Pain Scale: Numbers Pre/Post-Treatment (Group) chronic numbness in toes of arabella feet  -CS     Row Name 10/01/20 0731          Pain Scale: Numbers Pre/Post-Treatment    Pretreatment Pain Rating  5/10  -CS     Posttreatment Pain Rating  5/10  -CS     Pain Location - Side  Right  -CS     Pain Location  knee  -CS     Pain Intervention(s)  Medication (See MAR);Repositioned;Ambulation/increased activity  -CS     Row Name 10/01/20 0731          Plan of Care Review    Plan of Care Reviewed With  patient  -CS     Progress  improving  -CS     Outcome Summary  OT evaluation completed.  Pt demo need for skilled OT services.  Pt required mod-max A for LB dressing sitting EOB.  Pt completed sup to sit with min A x2, and pt required mod Ax2 to lay back down due to being faint and dizzy.  Pt able to stand with CGA and use of RWX.  Pt required CGA for functional mobility of taking 2 steps forward and 2 steps backward however she required min A at times due to her  being faint and becoming weak.  Pt transitioned to supine, wet washcloth provided to her head, and pt cued to complete ankle pumps.  OT will cont to follow to maximize her I and safety with ADLs and functional mobility.  It is recommended pt to discharge home with assist and HH OT and PT.  -CS     Row Name 10/01/20 0731          Therapy Assessment/Plan (OT)    Patient/Family Therapy Goal Statement (OT)  to go home  -CS     Rehab Potential (OT)  good, to achieve stated therapy goals  -CS     Criteria for Skilled Therapeutic Interventions Met (OT)  yes  -CS     Therapy Frequency (OT)  5 times/wk  -CS     Predicted Duration of Therapy Intervention (OT)  until hospital discharge  -CS     Row Name 10/01/20 0731          Therapy Plan Review/Discharge Plan (OT)    Equipment Needs Upon Discharge (OT)  walker, rolling  -CS     Anticipated Discharge Disposition (OT)  home with assist;home with home health  -CS     Row Name 10/01/20 0731          Positioning and Restraints    Pre-Treatment Position  in bed  -CS     Post Treatment Position  bed  -CS     In Bed  supine;fowlers;call light within reach;notified nsg;encouraged to call for assist;side rails up x2  -CS       User Key  (r) = Recorded By, (t) = Taken By, (c) = Cosigned By    Initials Name Provider Type    CS Rebecca Gregorio, OTR/L, CNT Occupational Therapist        Outcome Measures     Row Name 10/01/20 0731          How much help from another is currently needed...    Putting on and taking off regular lower body clothing?  2  -CS     Bathing (including washing, rinsing, and drying)  2  -CS     Toileting (which includes using toilet bed pan or urinal)  2  -CS     Putting on and taking off regular upper body clothing  4  -CS     Taking care of personal grooming (such as brushing teeth)  4  -CS     Eating meals  4  -CS     AM-PAC 6 Clicks Score (OT)  18  -CS     Row Name 10/01/20 0731          Functional Assessment    Outcome Measure Options  AM-PAC 6 Clicks Daily  Activity (OT)  -       User Key  (r) = Recorded By, (t) = Taken By, (c) = Cosigned By    Initials Name Provider Type    Rebecca Dunne OTR/L, CNT Occupational Therapist        Occupational Therapy Education                 Title: PT OT SLP Therapies (Done)     Topic: Occupational Therapy (Done)     Point: ADL training (Done)     Description:   Instruct learner(s) on proper safety adaptation and remediation techniques during self care or transfers.   Instruct in proper use of assistive devices.              Learning Progress Summary           Patient Acceptance, E, VU,NR by  at 10/1/2020 0911                   Point: Precautions (Done)     Description:   Instruct learner(s) on prescribed precautions during self-care and functional transfers.              Learning Progress Summary           Patient Acceptance, E, VU,NR by  at 10/1/2020 0911                               User Key     Initials Effective Dates Name Provider Type [facility] 04/02/19 -  Rebecca Gregorio OTR/L, CNT Occupational Therapist OT              OT Recommendation and Plan  Retired Outcome Summary/Treatment Plan (OT)  Anticipated Discharge Disposition (OT): home with assist, home with home health  Planned Therapy Interventions (OT): activity tolerance training, BADL retraining, transfer/mobility retraining, patient/caregiver education/training, occupation/activity based interventions, functional balance retraining, adaptive equipment training, IADL retraining  Therapy Frequency (OT): 5 times/wk  Plan of Care Review  Plan of Care Reviewed With: patient  Progress: improving  Outcome Summary: OT evaluation completed.  Pt demo need for skilled OT services.  Pt required mod-max A for LB dressing sitting EOB.  Pt completed sup to sit with min A x2, and pt required mod Ax2 to lay back down due to being faint and dizzy.  Pt able to stand with CGA and use of RWX.  Pt required CGA for functional mobility of taking 2 steps forward and 2 steps  backward however she required min A at times due to her being faint and becoming weak.  Pt transitioned to supine, wet washcloth provided to her head, and pt cued to complete ankle pumps.  OT will cont to follow to maximize her I and safety with ADLs and functional mobility.  It is recommended pt to discharge home with assist and HH OT and PT.  Plan of Care Reviewed With: patient  Outcome Summary: OT evaluation completed.  Pt demo need for skilled OT services.  Pt required mod-max A for LB dressing sitting EOB.  Pt completed sup to sit with min A x2, and pt required mod Ax2 to lay back down due to being faint and dizzy.  Pt able to stand with CGA and use of RWX.  Pt required CGA for functional mobility of taking 2 steps forward and 2 steps backward however she required min A at times due to her being faint and becoming weak.  Pt transitioned to supine, wet washcloth provided to her head, and pt cued to complete ankle pumps.  OT will cont to follow to maximize her I and safety with ADLs and functional mobility.  It is recommended pt to discharge home with assist and HH OT and PT.     Time Calculation:   Time Calculation- OT     Row Name 10/01/20 0911             Time Calculation- OT    OT Start Time  0728  -CS      OT Stop Time  0807  -      OT Time Calculation (min)  39 min  -CS      OT Received On  10/01/20  -      OT Goal Re-Cert Due Date  10/11/20  -        User Key  (r) = Recorded By, (t) = Taken By, (c) = Cosigned By    Initials Name Provider Type    CS Rebecca Gregorio OTR/L, LATRELL Occupational Therapist        Therapy Charges for Today     Code Description Service Date Service Provider Modifiers Qty    [id number]  OT EVAL MOD COMPLEXITY 3 10/1/2020 Rebecca Gregorio OTR/L, LATRELL GO 1               THEODORE Contreras/L, CNT  10/1/2020

## 2020-10-01 NOTE — PLAN OF CARE
Goal Outcome Evaluation:  Plan of Care Reviewed With: patient  Progress: no change  Outcome Summary: Patient c/o pain, tylenol and toradol given as ordered. Patient refused narcotics. No neuro changes A&O x4, [place]. Dressing to right knee CDI, PPP. VSS, Safety maintained.

## 2020-10-01 NOTE — PLAN OF CARE
Goal Outcome Evaluation:  Plan of Care Reviewed With: patient, family  Progress: no change  Outcome Summary: Pt is A&Ox4. VSS. Medicated with pain meds in PACU. Pt denied pain meds on floor due to being so drowsy. Educated pt on pain management interventions. No neuro changes. PPP. Radha CDI. Hemovac and F/C present. Safety maintained. Will cont to monitor.

## 2020-10-01 NOTE — PLAN OF CARE
Goal Outcome Evaluation:  Plan of Care Reviewed With: patient  Progress: improving  Outcome Summary: Pt A&Ox4. VSS. C/o pain this shift, medicated with PRNs with relief. Up  with PT. Voiding. Dressing changed and hemavac pulled. CDI. PPP. Safety maintained, sandy continue to monitor.

## 2020-10-01 NOTE — THERAPY EVALUATION
Patient Name: Nedra Tolliver  : 1935    MRN: [id number]                              Today's Date: 10/1/2020       Admit Date: 2020    Visit Dx:     ICD-10-CM ICD-9-CM   1. Decreased activities of daily living (ADL)  Z78.9 V49.89   2. Impaired mobility  Z74.09 799.89     Patient Active Problem List   Diagnosis   • Urinary tract infection without hematuria   • Dysuria   • Vaginal atrophy   • Overactive bladder   • Diverticulitis   • Benign essential HTN   • Thyroid nodule   • Hypothyroidism   • Hyponatremia   • Syncope and collapse   • Volume depletion   • Diabetes type 2, controlled (CMS/HCC)   • Moderate left ankle sprain   • Left hip pain   • Acute pain of left knee   • Pain of left thumb   • Nondisplaced fracture of navicular bone of left foot with routine healing   • Chest pain at rest   • Acute cystitis without hematuria   • H/O thyroid nodule   • Hypoglycemia   • IBS (irritable bowel syndrome)   • Idiopathic scoliosis   • Insomnia   • Mild tricuspid insufficiency   • Neuropathy due to type 2 diabetes mellitus (CMS/HCC)   • Onychomycosis   • Osteopenia   • Pericarditis secondary to uremia   • Primary osteoarthritis of right knee   • Proteinuria   • Vitamin D deficiency   • High cholesterol   • Lumbar radiculopathy   • Non-toxic multinodular goiter   • Transient ischemic attack     Past Medical History:   Diagnosis Date   • Arthritis    • Cancer (CMS/HCC)     BCC @ nose & Left arm   • Diabetes type 2, controlled (CMS/HCC)    • Diverticulitis    • History of GI bleed    • History of transfusion     Has had x 5 units.   • Hypertension    • Hyponatremia     Required hospitalization   • Hypothyroidism    • LPRD (laryngopharyngeal reflux disease)    • Pharyngeal dysphagia    • Thyroid nodule      Past Surgical History:   Procedure Laterality Date   • APPENDECTOMY     • BASAL CELL CARCINOMA EXCISION     • BELPHAROPTOSIS REPAIR     • BREAST CYST EXCISION     • CATARACT EXTRACTION     • CATARACT  EXTRACTION WITH INTRAOCULAR LENS IMPLANT Bilateral    • CHOLECYSTECTOMY     • COLONOSCOPY Left 11/1/2016    Tubular adenoma cecum, Diverticulosis repeat prn   • SUBTOTAL HYSTERECTOMY     • TOTAL KNEE ARTHROPLASTY Right 9/30/2020    Procedure: TOTAL KNEE ARTHROPLASTY;  Surgeon: Mak Pickens MD;  Location[facility];  Service: Orthopedics;  Laterality: Right;   • TUMOR EXCISION      under armpits and left breast     General Information     Row Name 10/01/20 0730          Physical Therapy Time and Intention    Document Type  evaluation Sx:  RTKR 9.30.20  -     Mode of Treatment  co-treatment;physical therapy  -     Row Name 10/01/20 0730          General Information    Patient Profile Reviewed  yes  -     Prior Level of Function  independent:;community mobility;ADL's;driving;shopping;cooking;cleaning  -     Existing Precautions/Restrictions  fall  -     Barriers to Rehab  none identified  -     Row Name 10/01/20 0730          Living Environment    Lives With  alone  -     Row Name 10/01/20 0730          Home Main Entrance    Number of Stairs, Main Entrance  four  -     Stair Railings, Main Entrance  none  -     Row Name 10/01/20 0730          Stairs Within Home, Primary    Stair Railings, Within Home, Primary  none  -     Row Name 10/01/20 0730          Cognition    Orientation Status (Cognition)  oriented x 4  -     Row Name 10/01/20 0730          Safety Issues, Functional Mobility    Safety Issues Affecting Function (Mobility)  ability to follow commands  -     Impairments Affecting Function (Mobility)  balance;endurance/activity tolerance;pain;range of motion (ROM);strength  -       User Key  (r) = Recorded By, (t) = Taken By, (c) = Cosigned By    Initials Name Provider Type     Wicho Johnson, PT Physical Therapist        Mobility     Row Name 10/01/20 0730          Bed Mobility    Bed Mobility  supine-sit;sit-supine  -     Supine-Sit [place] (Bed Mobility)  verbal  cues;minimum assist (75% patient effort);2 person assist  -     Sit-Supine [place] (Bed Mobility)  verbal cues;minimum assist (75% patient effort);moderate assist (50% patient effort);2 person assist  -[facility] Name 10/01/20 0730          Sit-Stand Transfer    Sit-Stand [place] (Transfers)  -- sit<>stand cg A  -     Row Name 10/01/20 0730          Gait/Stairs (Locomotion)    [place] Level (Gait)  verbal cues;contact guard;minimum assist (75% patient effort)  -     Assistive Device (Gait)  walker, front-wheeled  -     Distance in Feet (Gait)  few steps before pt became faint and dizzy  -       User Key  (r) = Recorded By, (t) = Taken By, (c) = Cosigned By    Initials Name Provider Type     Wicho Johnson, PT Physical Therapist        Obj/Interventions     Row Name 10/01/20 0730          Range of Motion Comprehensive    General Range of Motion  lower extremity range of motion deficits identified  -     Comment, General Range of Motion  R knee AAROM 12-62 degrees  -     Row Name 10/01/20 0730          Strength Comprehensive (MMT)    General Manual Muscle Testing (MMT) Assessment  lower extremity strength deficits identified  -       User Key  (r) = Recorded By, (t) = Taken By, (c) = Cosigned By    Initials Name Provider Type     Wicho Johnson, PT Physical Therapist        Goals/Plan     Row Name 10/01/20 0730          Bed Mobility Goal 1 (PT)    Activity/Assistive Device (Bed Mobility Goal 1, PT)  bed mobility activities, all  -     [place] Level/Cues Needed (Bed Mobility Goal 1, PT)  independent  -     Time Frame (Bed Mobility Goal 1, PT)  by discharge  -     Progress/Outcomes (Bed Mobility Goal 1, PT)  goal ongoing  -     Row Name 10/01/20 0730          Transfer Goal 1 (PT)    Activity/Assistive Device (Transfer Goal 1, PT)  sit-to-stand/stand-to-sit  -     [place] Level/Cues Needed (Transfer Goal 1, PT)  independent  -     Time Frame (Transfer Goal 1, PT)  by  discharge  -     Progress/Outcome (Transfer Goal 1, PT)  goal ongoing  -     Row Name 10/01/20 0730          Gait Training Goal 1 (PT)    Activity/Assistive Device (Gait Training Goal 1, PT)  gait (walking locomotion);assistive device use  -     [place] Level (Gait Training Goal 1, PT)  standby assist  -     Distance (Gait Training Goal 1, PT)  100ft  -LH     Time Frame (Gait Training Goal 1, PT)  by discharge  -     Progress/Outcome (Gait Training Goal 1, PT)  goal ongoing  -     Row Name 10/01/20 0730          ROM Goal 1 (PT)    ROM Goal 1 (PT)  R knee AROM 5-75 degrees  -     Time Frame (ROM Goal 1, PT)  by discharge  -     Progress/Outcome (ROM Goal 1, PT)  goal ongoing  -     Row Name 10/01/20 0730          Stairs Goal 1 (PT)    Activity/Assistive Device (Stairs Goal 1, PT)  ascending stairs;descending stairs no HR at home  -     [place] Level/Cues Needed (Stairs Goal 1, PT)  contact guard assist;1 person assist  -     Number of Stairs (Stairs Goal 1, PT)  4  -LH     Time Frame (Stairs Goal 1, PT)  by discharge  -     Progress/Outcome (Stairs Goal 1, PT)  goal ongoing  -       User Key  (r) = Recorded By, (t) = Taken By, (c) = Cosigned By    Initials Name Provider Type     Wicho Johnson, PT Physical Therapist        Clinical Impression     Row Name 10/01/20 0730          Pain    Additional Documentation  Pain Scale: Numbers Pre/Post-Treatment (Group)  -     Row Name 10/01/20 0730          Pain Scale: Numbers Pre/Post-Treatment    Pretreatment Pain Rating  5/10  -     Posttreatment Pain Rating  5/10  -     Pain Location - Side  Right  -     Pain Location  knee  -     Pain Intervention(s)  Medication (See MAR);Repositioned;Ambulation/increased activity  -     Row Name 10/01/20 0730          Plan of Care Review    Plan of Care Reviewed With  patient  -     Outcome Summary  PT IE complete.  Min/mod A x2 bed mobility.  Cg A sit<>stand.  After a few steps forward,  pt became faint and dizzy.  Returned to bed safely.  R knee AAROM 12-62 degrees.  PT to provide gait, exercise, ROM.  Recommend home health at [place].  Thank you for referral.  -     Row Name 10/01/20 0775          Therapy Assessment/Plan (PT)    Patient/Family Therapy Goals Statement (PT)  return home  -     Rehab Potential (PT)  good, to achieve stated therapy goals  -     Criteria for Skilled Interventions Met (PT)  yes;skilled treatment is necessary  -     Row Name 10/01/20 1630          Positioning and Restraints    Pre-Treatment Position  in bed  -     Post Treatment Position  bed  -     In Bed  fowlers;call light within reach;encouraged to call for assist;side rails up x2  -       User Key  (r) = Recorded By, (t) = Taken By, (c) = Cosigned By    Initials Name Provider Type     Wicho Johnson, PT Physical Therapist        Outcome Measures     Row Name 10/01/20 1132          How much help from another person do you currently need...    Turning from your back to your side while in flat bed without using bedrails?  3  -LH     Moving from lying on back to sitting on the side of a flat bed without bedrails?  3  -LH     Moving to and from a bed to a chair (including a wheelchair)?  3  -LH     Standing up from a chair using your arms (e.g., wheelchair, bedside chair)?  3  -LH     Climbing 3-5 steps with a railing?  3  -LH     To walk in hospital room?  3  -     AM-PAC 6 Clicks Score (PT)  18  -     Row Name 10/01/20 1132          Functional Assessment    Outcome Measure Options  AM-PAC 6 Clicks Basic Mobility (PT)  -       User Key  (r) = Recorded By, (t) = Taken By, (c) = Cosigned By    Initials Name Provider Type     Wicho Johnson, PT Physical Therapist        Physical Therapy Education                 Title: PT OT SLP Therapies (Done)     Topic: Physical Therapy (Done)     Point: Mobility training (Done)     Learning Progress Summary           Patient Acceptance, GIL CRAVEN, SCOTT,VU by  at 10/1/2020  1132    Comment: benefits of PT and POC, call for A OOB                   Point: Precautions (Done)     Learning Progress Summary           Patient Acceptance, E,D, DU,VU by  at 10/1/2020 1132    Comment: benefits of PT and POC, call for A OOB                               User Key     Initials Effective Dates Name Provider Type [facility] 08/02/16 -  Wicho Johnson PT Physical Therapist PT              PT Recommendation and Plan  Planned Therapy Interventions (PT): bed mobility training, gait training, home exercise program, patient/family education, ROM (range of motion), stair training, strengthening, transfer training  Plan of Care Reviewed With: patient  Outcome Summary: PT IE complete.  Min/mod A x2 bed mobility.  Cg A sit<>stand.  After a few steps forward, pt became faint and dizzy.  Returned to bed safely.  R knee AAROM 12-62 degrees.  PT to provide gait, exercise, ROM.  [facility]  Thank you for referral.     Time Calculation:   PT Charges     Row Name 10/01/20 1133             Time Calculation    Start Time  0730  -      Stop Time  0808  -      Time Calculation (min)  38 min  -      PT Received On  10/01/20  -      PT Goal Re-Cert Due Date  10/11/20  -        User Key  (r) = Recorded By, (t) = Taken By, (c) = Cosigned By    Initials Name Provider Type     Wicho Johnson PT Physical Therapist        Therapy Charges for Today     Code Description Service Date Service Provider Modifiers Qty    03317524454 HC PT EVAL LOW COMPLEXITY 3 10/1/2020 Wicho Johnson PT GP 1          PT G-Codes  Outcome Measure Options: AM-PAC 6 Clicks Basic Mobility (PT)  AM-PAC 6 Clicks Score (PT): 18  AM-PAC 6 Clicks Score (OT): 18    Wicho Johnson PT  10/1/2020

## 2020-10-01 NOTE — PLAN OF CARE
Problem: Adult Inpatient Plan of Care  Goal: Plan of Care Review  Outcome: Ongoing, Progressing  Flowsheets (Taken 10/1/2020 9193)  Progress: improving  Plan of Care Reviewed With: patient  Outcome Summary: OT evaluation completed.  Pt demo need for skilled OT services.  Pt required mod-max A for LB dressing sitting EOB.  Pt completed sup to sit with min A x2, and pt required mod Ax2 to lay back down due to being faint and dizzy.  Pt able to stand with CGA and use of RWX.  Pt required CGA for functional mobility of taking 2 steps forward and 2 steps backward however she required min A at times due to her being faint and becoming weak.  Pt transitioned to supine, wet washcloth provided to her head, and pt cued to complete ankle pumps.  OT will cont to follow to maximize her I and safety with ADLs and functional mobility.  It is recommended pt to discharge home with assist and HH OT and PT.

## 2020-10-02 LAB — GLUCOSE BLDC GLUCOMTR-MCNC: 196 MG/DL (ref 70–130)

## 2020-10-02 PROCEDURE — 63710000001 METFORMIN ER 500 MG TABLET SUSTAINED-RELEASE 24 HOUR: Performed by: ORTHOPAEDIC SURGERY

## 2020-10-02 PROCEDURE — 63710000001 GLIPIZIDE 5 MG TABLET: Performed by: ORTHOPAEDIC SURGERY

## 2020-10-02 PROCEDURE — A9270 NON-COVERED ITEM OR SERVICE: HCPCS | Performed by: ORTHOPAEDIC SURGERY

## 2020-10-02 PROCEDURE — 63710000001 LEVOTHYROXINE 50 MCG TABLET: Performed by: ORTHOPAEDIC SURGERY

## 2020-10-02 PROCEDURE — 97535 SELF CARE MNGMENT TRAINING: CPT

## 2020-10-02 PROCEDURE — 82962 GLUCOSE BLOOD TEST: CPT

## 2020-10-02 PROCEDURE — 63710000001 DICYCLOMINE 10 MG CAPSULE: Performed by: ORTHOPAEDIC SURGERY

## 2020-10-02 PROCEDURE — 63710000001 ACETAMINOPHEN 325 MG TABLET: Performed by: ORTHOPAEDIC SURGERY

## 2020-10-02 PROCEDURE — 63710000001 ASPIRIN 81 MG TABLET DELAYED-RELEASE: Performed by: ORTHOPAEDIC SURGERY

## 2020-10-02 PROCEDURE — 97110 THERAPEUTIC EXERCISES: CPT

## 2020-10-02 PROCEDURE — 97116 GAIT TRAINING THERAPY: CPT

## 2020-10-02 PROCEDURE — 63710000001 HYDROCODONE-ACETAMINOPHEN 5-325 MG TABLET: Performed by: ORTHOPAEDIC SURGERY

## 2020-10-02 PROCEDURE — 63710000001 AMLODIPINE 5 MG TABLET: Performed by: ORTHOPAEDIC SURGERY

## 2020-10-02 PROCEDURE — 63710000001 LOSARTAN 50 MG TABLET: Performed by: ORTHOPAEDIC SURGERY

## 2020-10-02 PROCEDURE — 63710000001 NEBIVOLOL 5 MG TABLET: Performed by: ORTHOPAEDIC SURGERY

## 2020-10-02 RX ORDER — HYDROCODONE BITARTRATE AND ACETAMINOPHEN 5; 325 MG/1; MG/1
1 TABLET ORAL EVERY 6 HOURS PRN
Qty: 40 TABLET | Refills: 0 | Status: SHIPPED | OUTPATIENT
Start: 2020-10-02 | End: 2020-10-03 | Stop reason: HOSPADM

## 2020-10-02 RX ORDER — POLYETHYLENE GLYCOL 3350 17 G/17G
17 POWDER, FOR SOLUTION ORAL DAILY
Status: DISCONTINUED | OUTPATIENT
Start: 2020-10-02 | End: 2020-10-03 | Stop reason: HOSPADM

## 2020-10-02 RX ADMIN — HYDROCODONE BITARTRATE AND ACETAMINOPHEN 1 TABLET: 5; 325 TABLET ORAL at 13:20

## 2020-10-02 RX ADMIN — GLIPIZIDE 5 MG: 5 TABLET ORAL at 08:36

## 2020-10-02 RX ADMIN — ACETAMINOPHEN 650 MG: 325 TABLET, FILM COATED ORAL at 20:19

## 2020-10-02 RX ADMIN — METFORMIN HYDROCHLORIDE 500 MG: 500 TABLET, EXTENDED RELEASE ORAL at 08:36

## 2020-10-02 RX ADMIN — AMLODIPINE BESYLATE 2.5 MG: 5 TABLET ORAL at 20:11

## 2020-10-02 RX ADMIN — METFORMIN HYDROCHLORIDE 500 MG: 500 TABLET, EXTENDED RELEASE ORAL at 18:12

## 2020-10-02 RX ADMIN — LEVOTHYROXINE SODIUM 50 MCG: 50 TABLET ORAL at 06:08

## 2020-10-02 RX ADMIN — DICYCLOMINE HYDROCHLORIDE 10 MG: 10 CAPSULE ORAL at 08:36

## 2020-10-02 RX ADMIN — ACETAMINOPHEN 650 MG: 325 TABLET, FILM COATED ORAL at 12:23

## 2020-10-02 RX ADMIN — HYDROCODONE BITARTRATE AND ACETAMINOPHEN 1 TABLET: 5; 325 TABLET ORAL at 04:49

## 2020-10-02 RX ADMIN — LOSARTAN POTASSIUM 50 MG: 50 TABLET, FILM COATED ORAL at 08:36

## 2020-10-02 RX ADMIN — HYDROCODONE BITARTRATE AND ACETAMINOPHEN 1 TABLET: 5; 325 TABLET ORAL at 08:35

## 2020-10-02 RX ADMIN — ASPIRIN 81 MG: 81 TABLET ORAL at 08:36

## 2020-10-02 RX ADMIN — NEBIVOLOL HYDROCHLORIDE 2.5 MG: 5 TABLET ORAL at 20:12

## 2020-10-02 NOTE — PLAN OF CARE
Problem: Adult Inpatient Plan of Care  Goal: Plan of Care Review  Recent Flowsheet Documentation  Taken 10/2/2020 0933 by Ashlee Lyles, PTA  Progress: improving  Plan of Care Reviewed With: patient  Outcome Summary: pt in chair, performed R TKR HEP A-AAROM x 10 reps, aarom 10-70 degrees, sit-stand cga-min assist, pt amb 25 feet with rwx cga, pt declined stair training at this time, trans back to bed cga

## 2020-10-02 NOTE — THERAPY TREATMENT NOTE
Acute Care - Physical Therapy Treatment Note  [facility]     Patient Name: Nedra Tolliver  : 1935  MRN: [id number]  Today's Date: 10/2/2020           PT Assessment (last 12 hours)      PT Evaluation and Treatment     [facility] Name 10/02/20 0933 10/02/20 0922       Physical Therapy Time and Intention    Subjective Information  complains of;pain;fatigue;weakness  -  --  -    Document Type  therapy note (daily note)  -  --    Mode of Treatment  physical therapy  -  --    Comment, Session Not Performed  --  with OT  -[facility] Name 10/02/20 0830          Physical Therapy Time and Intention    Session Not Performed  other (see comments)  -     Comment, Session Not Performed  check back after pain med  -[facility] Name 10/02/20 0933          General Information    Existing Precautions/Restrictions  fall  -[facility] Name 10/02/20 0933          Pain Scale: Numbers Pre/Post-Treatment    Pretreatment Pain Rating  6/10  -     Posttreatment Pain Rating  8/10  -     Pain Location - Side  Right  -     Pain Location  knee  -[facility] Name 10/02/20 0933          Pain Scale: Word Pre/Post-Treatment    Pain Intervention(s)  Medication (See MAR);Repositioned;Ambulation/increased activity;Cold applied  -[facility] Name 10/02/20 0933          Range of Motion Comprehensive    Comment, General Range of Motion  AAROM R knee 10-70, R TKR HEP AAROM x 10 reps  -[facility] Name 10/02/20 0933          Bed Mobility    Supine-Sit [place] (Bed Mobility)  -- chair  -     Sit-Supine [place] (Bed Mobility)  contact guard;verbal cues  -[facility] Name 10/02/20 0933          Transfers    Transfers  stand-sit transfer  -     Sit-Stand [place] (Transfers)  contact guard;minimum assist (75% patient effort);verbal cues  [facility]     Stand-Sit [place] (Transfers)  contact guard;verbal cues  -[facility] Name 10/02/20 0933          Gait/Stairs (Locomotion)    [place] Level (Gait)  contact guard;verbal cues  [facility]      Assistive Device (Gait)  walker, front-wheeled  -     Distance in Feet (Gait)  25  -     Comment (Gait/Stairs)  pt declined stair training at this time, will try again in pm  -     Row Name             Wound 09/30/20 1611 Right anterior knee Incision    Wound - Properties Group Placement Date: 09/30/20  - Placement Time: 1611  -HW Present on Hospital Admission: N  -HW Side: Right  -HW Orientation: anterior  -HW Location: knee  -HW Primary Wound Type: Incision  -HW    Retired Wound - Properties Group Date first assessed: 09/30/20  - Time first assessed: 1611  -HW Present on Hospital Admission: N  -HW Side: Right  -HW Location: knee  -HW Primary Wound Type: Incision  -HW    Row Name 10/02/20 0933          Plan of Care Review    Plan of Care Reviewed With  patient  -     Progress  [facility]  -     Outcome Summary  pt in chair, performed R TKR HEP A-AAROM x 10 reps, aarom 10-70 degrees, sit-stand cga-min assist, pt amb 25 feet with rwx cga, pt declined stair training at this time, trans back to bed [facility]  -     Row Name 10/02/20 0933          Positioning and Restraints    Pre-Treatment Position  sitting in chair/recliner  -     Post Treatment Position  bed  -     In Bed  fowlers;call light within reach;encouraged to call for assist;notified [facility]       User Key  (r) = Recorded By, (t) = Taken By, (c) = Cosigned By    Initials Name Provider Type     Ashlee Lyles PTA Physical Therapy Assistant     Kirill Garvin RNA Registered Nurse        Physical Therapy Education                 Title: PT OT SLP Therapies (Done)     Topic: Physical Therapy (Done)     Point: Mobility training (Done)     Learning Progress Summary           Patient Acceptance, E,D, DU,VU by  at 10/1/2020 1132    Comment: benefits of PT and POC, call for A OOB                   Point: Home exercise program (Done)     Learning Progress Summary           Patient Acceptance, E,TB,D,H, VU,DU,NR by  at 10/2/2020 1032     Comment: R TKR HEP                   Point: Precautions (Done)     Learning Progress Summary           Patient Acceptance, E,D, DU,VU by  at 10/1/2020 1132    Comment: benefits of PT and POC, call for A OOB                               User Key     Initials Effective Dates Name Provider Type [facility] 08/02/16 -  Ashlee Lyles PTA Physical Therapy Assistant PT     08/02/16 -  Wicho Johnson, PT Physical Therapist PT              PT Recommendation and Plan     Plan of Care Reviewed With: patient  Progress: improving  Outcome Summary: pt in chair, performed R TKR HEP A-AAROM x 10 reps, aarom 10-70 degrees, sit-stand cga-min assist, pt amb 25 feet with rwx cga, pt declined stair training at this time, trans back to bed cga       Time Calculation:   PT Charges     Row Name 10/02/20 1033             Time Calculation    Start Time  0933  -      Stop Time  1014  -      Time Calculation (min)  41 min  -      PT Received On  10/02/20  -         Time Calculation- PT    Total Timed Code Minutes- PT  41 minute(s)  -         Timed Charges    16050 - PT Therapeutic Exercise Minutes  20  -      26258 - Gait Training Minutes   21  -        User Key  (r) = Recorded By, (t) = Taken By, (c) = Cosigned By    Initials Name Provider Type     Ashlee Lyles PTA Physical Therapy Assistant        Therapy Charges for Today     Code Description Service Date Service Provider Modifiers Qty    28952964705 HC PT THER PROC EA 15 MIN 10/2/2020 Ashlee Lyles PTA GP 1    63932243279 HC GAIT TRAINING EA 15 MIN 10/2/2020 Ashlee Lyles PTA GP 2          PT G-Codes  Outcome Measure Options: AM-PAC 6 Clicks Basic Mobility (PT)  AM-PAC 6 Clicks Score (PT): 18  AM-PAC 6 Clicks Score (OT): 18    Ashlee Lyles PTA  10/2/2020

## 2020-10-02 NOTE — PROGRESS NOTES
Continued Stay Note   [facility]     Patient Name: Nedra Tolliver  MRN: [id number]  Today's Date: 10/2/2020    Admit Date: 9/30/2020    Discharge Plan     Row Name 10/02/20 0839       Plan    Plan  [facility]- Need orders    Patient/Family in Agreement with Plan  yes    Plan Comments  Pt lives at home alone and plans to dc home. Pt states she has a son and daughter nearby that assist. Pt is requesting HH [facility] [place]. Pt [facility] in [facility]. [facility] is only provider for [facility]. Will need HH orders to arrange. Pt states she has a standard walker at home but will need a rolling walker at dc. Pt has RX coverage and PCP        Discharge Codes    No documentation.             DACIA Lucia

## 2020-10-02 NOTE — THERAPY TREATMENT NOTE
Acute Care - Physical Therapy Treatment Note  [facility]     Patient Name: Nedra Tolliver  : 1935  MRN: [id number]  Today's Date: 10/2/2020           PT Assessment (last 12 hours)      PT Evaluation and Treatment     Row Name 10/02/20 1349 10/02/20 1300       Physical Therapy Time and Intention    Subjective Information  complains of;dizziness;nausea/vomiting;pain;fatigue;weakness  -  --  -    Document Type  therapy note (daily note)  -  --    Mode of Treatment  physical therapy  -  --    Comment, Session Not Performed  --  eating lunch  -    Row Name 10/02/20 0933 10/02/20 0922       Physical Therapy Time and Intention    Subjective Information  complains of;pain;fatigue;weakness  -  --  -    Document Type  therapy note (daily note)  -  --    Mode of Treatment  physical therapy  -  --    Comment, Session Not Performed  --  with OT  -    Row Name 10/02/20 0830          Physical Therapy Time and Intention    Session Not Performed  other (see comments)  -     Comment, Session Not Performed  check back after pain med  -     Row Name 10/02/20 1349 10/02/20 0933       General Information    Existing Precautions/Restrictions  fall  -  fall  -    Row Name 10/02/20 1349 10/02/20 0933       Pain Scale: Numbers Pre/Post-Treatment    Pretreatment Pain Rating  6/10  -  6/10  -    Posttreatment Pain Rating  8/10  -  8/10  -    Pain Location - Side  Right  -  Right  -    Pain Location  knee  -  knee  -    Row Name 10/02/20 1349 10/02/20 0933       Pain Scale: Word Pre/Post-Treatment    Pain Intervention(s)  Medication (See MAR);Cold applied;Repositioned;Ambulation/increased activity  -  Medication (See MAR);Repositioned;Ambulation/increased activity;Cold applied  -    Row Name 10/02/20 1349 10/02/20 0933       Range of Motion Comprehensive    Comment, General Range of Motion  AAROM R TKR HEP x 10 reps  -  AAROM R knee 10-70, R TKR HEP AAROM x 10 reps  -    Row Name  10/02/20 1349 10/02/20 0933       Bed Mobility    Supine-Sit [place] (Bed Mobility)  contact guard;verbal cues  -  -- chair  -    Sit-Supine [place] (Bed Mobility)  contact guard;verbal cues  -  contact guard;verbal cues  -    Comment (Bed Mobility)  left side of bed  -  --    Row Name 10/02/20 1349 10/02/20 0933       Transfers    Transfers  stand-sit transfer  -  stand-sit transfer  -    Sit-Stand [place] (Transfers)  contact guard;minimum assist (75% patient effort);verbal cues  -  contact guard;minimum assist (75% patient effort);verbal cues  -    Stand-Sit [place] (Transfers)  contact guard;verbal cues  -  contact guard;verbal cues  -    Row Name 10/02/20 1349 10/02/20 0933       Gait/Stairs (Locomotion)    [place] Level (Gait)  contact guard;verbal cues  -  contact guard;verbal cues  -    Assistive Device (Gait)  walker, front-wheeled  -  walker, front-wheeled  -    Distance in Feet (Gait)  25  -AH  25  -    [place] Level (Stairs)  minimum assist (75% patient effort);moderate assist (50% patient effort);verbal cues  -  --    Handrail Location (Stairs)  both sides  -  --    Number of Steps (Stairs)  3  -  --    Ascending Technique (Stairs)  step-to-step  -  --    Descending Technique (Stairs)  step-to-step  -  --    Stairs, Impairments  pain;ROM decreased;strength decreased;impaired balance;coordination impaired  -  --    Comment (Gait/Stairs)  pt required constant cues for technique with stairs and gait, pt DOES NOT have any handrails at home  -  pt declined stair training at this time, will try again in pm  -    Row Name 10/02/20 1349          Safety Issues, Functional Mobility    Impairments Affecting Function (Mobility)  balance;pain;endurance/activity tolerance  -     Row Name             Wound 09/30/20 1611 Right anterior knee Incision    Wound - Properties Group Placement Date: 09/30/20  - Placement Time: 1611 -HW Present  on Hospital Admission: N  -HW Side: Right  -HW Orientation: anterior  -HW Location: knee  -HW Primary Wound Type: Incision  -HW    Retired Wound - Properties Group Date first assessed: 09/30/20  -HW Time first assessed: 1611  -HW Present on Hospital Admission: N  -HW Side: Right  -HW Location: knee  -HW Primary Wound Type: Incision  -HW    Row Name 10/02/20 1349 10/02/20 0933       Plan of Care Review    Plan of Care Reviewed With  patient  -  patient  -    Progress  no change  -  improving  -    Outcome Summary  pt trans to EOB cga, sit-stand cga-min, pt amb 25 feet with rwx cga, transported to in w/c to perform stair training, pt required min-mod assist for up/down 3 steps with 2 handrails, pt DOES NOT have any handrails at home, pt became dizzy and nausous during stair training, vital signs WNL, pt trans back to bed cga-min, performed R TKR HEP x 10 reps,   -  pt in chair, performed R TKR HEP A-AAROM x 10 reps, aarom 10-70 degrees, sit-stand cga-min assist, pt amb 25 feet with rwx cga, pt declined stair training at this time, trans back to bed cga  -AH    Row Name 10/02/20 1349          Vital Signs    Post Treatment Diastolic BP  154  -     Pretreatment Heart Rate (beats/min)  57  -AH     Posttreatment Heart Rate (beats/min)  74  -     Post SpO2 (%)  95  -     O2 Delivery Post Treatment  room air  -     Row Name 10/02/20 1349 10/02/20 0933       Positioning and Restraints    Pre-Treatment Position  in bed  -  sitting in chair/recliner  -    Post Treatment Position  bed  -  bed  -AH    In Bed  notified nsg;fowlers;call light within reach;encouraged to call for assist;exit alarm on  -  fowlers;call light within reach;encouraged to call for assist;notified nsg  -      User Key  (r) = Recorded By, (t) = Taken By, (c) = Cosigned By    Initials Name Provider Type     Ashlee Lyles PTA Physical Therapy Assistant     Kirill Garvin RNA Registered Nurse        Physical Therapy  Education                 Title: PT OT SLP Therapies (Done)     Topic: Physical Therapy (Done)     Point: Mobility training (Done)     Learning Progress Summary           Patient Acceptance, E,TB,D, VU,NR by  at 10/2/2020 1501    Comment: stair training    Acceptance, E,D, DU,VU by  at 10/1/2020 1132    Comment: benefits of PT and POC, call for A OOB                   Point: Home exercise program (Done)     Learning Progress Summary           Patient Acceptance, E,TB,D,H, VU,DU,NR by  at 10/2/2020 1032    Comment: R TKR HEP                   Point: Precautions (Done)     Learning Progress Summary           Patient Acceptance, E,D, DU,VU by  at 10/1/2020 1132    Comment: benefits of PT and POC, call for A OOB                               User Key     Initials Effective Dates Name Provider Type Discipline     08/02/16 -  Ashlee Lyles, PTA Physical Therapy Assistant PT     08/02/16 -  Wicho Johnson, PT Physical Therapist PT              PT Recommendation and Plan     Plan of Care Reviewed With: patient  Progress: no change  Outcome Summary: pt trans to EOB cga, sit-stand cga-min, pt amb 25 feet with rwx cga, transported to in w/c to perform stair training, pt required min-mod assist for up/down 3 steps with 2 handrails, pt DOES NOT have any handrails at home, pt became dizzy and nausous during stair training, vital signs WNL, pt trans back to bed cga-min, performed R TKR HEP x 10 reps,        Time Calculation:   PT Charges     Row Name 10/02/20 1502 10/02/20 1033          Time Calculation    Start Time  1349  -  0933  -     Stop Time  1442  -  1014  -     Time Calculation (min)  53 min  -  41 min  -     PT Received On  --  10/02/20  -        Time Calculation- PT    Total Timed Code Minutes- PT  53 minute(s)  -  41 minute(s)  -        Timed Charges    25963 - PT Therapeutic Exercise Minutes  23  -  20  -     12964 - Gait Training Minutes   30  -  21  -       User Key  (r) =  Recorded By, (t) = Taken By, (c) = Cosigned By    Initials Name Provider Type    Ashlee Macias PTA Physical Therapy Assistant        Therapy Charges for Today     Code Description Service Date Service Provider Modifiers Qty    11884739013 HC PT THER PROC EA 15 MIN 10/2/2020 Ashlee Lyles, BREE GP 1    22850117498 HC GAIT TRAINING EA 15 MIN 10/2/2020 Ashlee Lyles, BREE GP 2    78644593195 HC PT THER PROC EA 15 MIN 10/2/2020 Ashlee Lyles, BREE GP 2    29678451969 HC GAIT TRAINING EA 15 MIN 10/2/2020 Ashlee Lyles, BREE GP 2          PT G-Codes  Outcome Measure Options: AM-PAC 6 Clicks Basic Mobility (PT)  AM-PAC 6 Clicks Score (PT): 18  AM-PAC 6 Clicks Score (OT): 18    Ashlee Lyles PTA  10/2/2020

## 2020-10-02 NOTE — PLAN OF CARE
Problem: Adult Inpatient Plan of Care  Goal: Plan of Care Review  Recent Flowsheet Documentation  Taken 10/2/2020 1349 by Ashlee Lyles, PTA  Progress: no change  Plan of Care Reviewed With: patient  Outcome Summary: pt trans to EOB cga, sit-stand cga-min, pt amb 25 feet with rwx cga, transported to in w/c to perform stair training, pt required min-mod assist for up/down 3 steps with 2 handrails, pt DOES NOT have any handrails at home, pt became dizzy and nausous during stair training, vital signs WNL, pt trans back to bed cga-min, performed R TKR HEP x 10 reps,   Taken 10/2/2020 0933 by Ashlee Lyles, PTA  Progress: improving  Plan of Care Reviewed With: patient  Outcome Summary: pt in chair, performed R TKR HEP A-AAROM x 10 reps, aarom 10-70 degrees, sit-stand cga-min assist, pt amb 25 feet with rwx cga, pt declined stair training at this time, trans back to bed cga

## 2020-10-02 NOTE — DISCHARGE SUMMARY
"[facility]  DISCHARGE SUMMARY       Date of Admission: 9/30/2020  Date of Discharge:  10/03/2020  Primary Care Physician: Vijay Rodrigues MD    Presenting Problem/History of Present Illness:  Primary osteoarthritis of right knee [M17.11]     Final Discharge Diagnoses:  Active Hospital Problems    Diagnosis   • Primary osteoarthritis of right knee       Consults: None    Procedures Performed: Right total knee arthroplasty    Pertinent Test Results: Preop lab within normal limits    History of Present Illness on Day of Discharge: Stable on discharge    Hospital Course:  The patient is a 84 y.o. female who presented to [facility] with painful primary osteoarthritis of the right knee.  On admission patient taken to surgery and right total knee arthroplasty carried out.  Postop she did well.  Her wound is clean without evidence of infection.  She is discharged to home in stable condition.        /51 (BP Location: Left arm, Patient Position: Lying)   Pulse 76   Temp 97.7 °F (36.5 °C) (Oral)   Resp 15   Ht 162.6 cm (64\")   Wt 72.8 kg (160 lb 8 oz)   LMP  (LMP Unknown)   SpO2 94%   Breastfeeding No   BMI 27.55 kg/m²       Discharge Medications:     Discharge Medications      Continue These Medications      Instructions Start Date   glucose monitor monitoring kit   1 each, Does not apply, Daily      Lancets misc   1 each, Does not apply, Daily         ASK your doctor about these medications      Instructions Start Date   acetaminophen 500 MG tablet  Commonly known as: TYLENOL   1,000 mg, Oral, Nightly PRN      amLODIPine 2.5 MG tablet  Commonly known as: NORVASC   2.5 mg, Oral, Nightly      aspirin 81 MG EC tablet   81 mg, Oral, Daily      CENTRUM SILVER PO   1 tablet, Oral, Daily      Co Q 10 100 MG capsule   1 tablet, Oral, Daily      conjugated estrogens 0.625 MG/GM vaginal cream  Commonly known as: Premarin   Vaginal, 2 Times Weekly      glimepiride 4 MG tablet  Commonly known as: " AMARYL   2 mg, Oral, 2 Times Daily      irbesartan 150 MG tablet  Commonly known as: AVAPRO   75 mg, Oral, Nightly      levothyroxine 50 MCG tablet  Commonly known as: SYNTHROID, LEVOTHROID   TAKE 1 TABLET BY MOUTH EVERY DAY      metFORMIN  MG 24 hr tablet  Commonly known as: GLUCOPHAGE-XR   TAKE 1 TABLET BY MOUTH TWICE DAILY       nebivolol 2.5 MG tablet  Commonly known as: BYSTOLIC   2.5 mg, Oral, Every Evening      ondansetron ODT 4 MG disintegrating tablet  Commonly known as: ZOFRAN-ODT   4 mg, Oral, Every 6 Hours PRN      PROBIOTIC DAILY PO   1 tablet, Oral, Daily      triamcinolone 0.1 % cream  Commonly known as: KENALOG   1 application, 2 Times Daily PRN      True Metrix Blood Glucose Test test strip  Generic drug: glucose blood   TEST TWICE DAILY AS DIRECTED       glucose blood test strip   1 daily      vitamin D3 125 MCG (5000 UT) capsule capsule   5,000 Units, Oral, Daily             Discharge Diet: Regular    Discharge Care Plan/Instructions: #1 she can bear weight as tolerated #2 she can shower and redress the wound #3 she will have home health physical therapy #4 she will be maintained on aspirin No. 5 she is to call for any problems #6 she is to follow-up in the office    Follow-up Appointments: Monday October lab  Future Appointments   Date Time Provider Department Center   10/22/2020  9:30 AM PAD BIC US 2 BH PAD US BI PAD   12/8/2020  1:45 PM Vijay Rodrigues MD [facility]AD         Mak Pickens MD  10/02/20  13:37 CDT

## 2020-10-02 NOTE — PROGRESS NOTES
Continued Stay Note   [place]     Patient Name: Nedra Tolliver  MRN: [id number]  Today's Date: 10/2/2020    Admit Date: 9/30/2020    Discharge Plan     Row Name 10/02/20 1418       Plan    Plan  [facility]    Patient/Family in Agreement with Plan  yes    Final Discharge Disposition Code  06 - home with home health care    Final Note  Pt is being discharged home today.  Informed Brisa from [facility] of referral and d/c status [phone].        Discharge Codes    No documentation.       Expected Discharge Date and Time     Expected Discharge Date Expected Discharge Time    Oct 3, 2020             DACIA Lucia

## 2020-10-02 NOTE — PLAN OF CARE
Goal Outcome Evaluation:  Plan of Care Reviewed With: patient  Progress: no change  Outcome Summary: Norco dc'd due to patient history of allergy to Lortab. DC tomorrow. Pain control an issue today. Wanted laxatives, then refused them. Nausea when up due to pain but does not want meds.

## 2020-10-02 NOTE — PLAN OF CARE
Goal Outcome Evaluation:  Plan of Care Reviewed With: patient  Progress: improving  Outcome Summary: Pt is A&Ox4, no neuro changes. Pt c/o pain to Rt knee, PRN PO pain meds administered with good relief. Pt has been resting well throughout shift. Denies N/T. Dressing to rt knee CDI. Up with PT. Voiding per bedpan. VSS. , O2 2L at night. Safety maintained. Will continue to monitor.

## 2020-10-02 NOTE — THERAPY TREATMENT NOTE
Acute Care - Occupational Therapy Treatment Note  [facility]     Patient Name: Nedra Tolliver  : 1935  MRN: [id number]  Today's Date: 10/2/2020             Admit Date: 2020       ICD-10-CM ICD-9-CM   1. Decreased activities of daily living (ADL)  Z78.9 V49.89   2. Impaired mobility  Z74.09 799.89     Patient Active Problem List   Diagnosis   • Urinary tract infection without hematuria   • Dysuria   • Vaginal atrophy   • Overactive bladder   • Diverticulitis   • Benign essential HTN   • Thyroid nodule   • Hypothyroidism   • Hyponatremia   • Syncope and collapse   • Volume depletion   • Diabetes type 2, controlled (CMS/HCC)   • Moderate left ankle sprain   • Left hip pain   • Acute pain of left knee   • Pain of left thumb   • Nondisplaced fracture of navicular bone of left foot with routine healing   • Chest pain at rest   • Acute cystitis without hematuria   • H/O thyroid nodule   • Hypoglycemia   • IBS (irritable bowel syndrome)   • Idiopathic scoliosis   • Insomnia   • Mild tricuspid insufficiency   • Neuropathy due to type 2 diabetes mellitus (CMS/HCC)   • Onychomycosis   • Osteopenia   • Pericarditis secondary to uremia   • Primary osteoarthritis of right knee   • Proteinuria   • Vitamin D deficiency   • High cholesterol   • Lumbar radiculopathy   • Non-toxic multinodular goiter   • Transient ischemic attack     Past Medical History:   Diagnosis Date   • Arthritis    • Cancer (CMS/HCC)     BCC @ nose & Left arm   • Diabetes type 2, controlled (CMS/HCC)    • Diverticulitis    • History of GI bleed    • History of transfusion     Has had x 5 units.   • Hypertension    • Hyponatremia     Required hospitalization   • Hypothyroidism    • LPRD (laryngopharyngeal reflux disease)    • Pharyngeal dysphagia    • Thyroid nodule      Past Surgical History:   Procedure Laterality Date   • APPENDECTOMY     • BASAL CELL CARCINOMA EXCISION     • BELPHAROPTOSIS REPAIR     • BREAST CYST EXCISION     • CATARACT  EXTRACTION     • CATARACT EXTRACTION WITH INTRAOCULAR LENS IMPLANT Bilateral    • CHOLECYSTECTOMY     • COLONOSCOPY Left 11/1/2016    Tubular adenoma cecum, Diverticulosis repeat prn   • SUBTOTAL HYSTERECTOMY     • TOTAL KNEE ARTHROPLASTY Right 9/30/2020    Procedure: TOTAL KNEE ARTHROPLASTY;  Surgeon: Mak Pickens MD;  Location: [facility];  Service: Orthopedics;  Laterality: Right;   • TUMOR EXCISION      under armpits and left breast          OT ASSESSMENT FLOWSHEET (last 12 hours)      OT Evaluation and Treatment     Row Name 10/02/20 0933 10/02/20 0901                OT Time and Intention    Subjective Information  --  complains of;pain  -MW       Document Type  --  therapy note (daily note)  -MW       Mode of Treatment  --  occupational therapy  -MW          General Information    Patient Profile Reviewed  --  yes  -MW       Existing Precautions/Restrictions  --  fall  -MW          Cognition    Personal Safety Interventions  --  fall prevention program maintained;gait belt;muscle strengthening facilitated;nonskid shoes/slippers when out of bed;supervised activity  -MW          Pain Assessment    Additional Documentation  --  Pain Scale: Numbers Pre/Post-Treatment (Group)  -          Pain Scale: Numbers Pre/Post-Treatment    Pretreatment Pain Rating  --  6/10  -MW       Posttreatment Pain Rating  --  6/10  -MW       Pain Location - Side  --  Right  -       Pain Location  --  knee  -MW       Pain Intervention(s)  --  Medication (See MAR);Repositioned;Ambulation/increased activity  -          Bed Mobility    Bed Mobility  --  rolling left;sidelying-sit  -       Rolling Left [place] (Bed Mobility)  --  supervision;verbal cues  -       Sidelying-Sit [place] (Bed Mobility)  --  minimum assist (75% patient effort);verbal cues  -       Comment (Bed Mobility)  --  used LLE to hook RLE to assist with bed mobility. stategies and training provided for increased independence  -           Functional Mobility    Functional Mobility- Ind. Level  --  contact guard assist;verbal cues required  -       Functional Mobility- Device  --  rolling walker  -       Functional Mobility- Comment  --  took a couple steps to chair, seated rest, took steps toward BR but unable to make it further than the end of the bed before requiring return to chair to rest. C/o increased fatigue in BUE  -          Transfer Assessment/Treatment    Transfers  --  sit-stand transfer;stand-sit transfer  -          Transfers    Sit-Stand [place] (Transfers)  --  contact guard;minimum assist (75% patient effort);verbal cues  -          Sit-Stand Transfer    Assistive Device (Sit-Stand Transfers)  --  walker, front-wheeled  -          Safety Issues, Functional Mobility    Impairments Affecting Function (Mobility)  balance;pain;endurance/activity tolerance  -  --          Activities of Daily Living    BADL Assessment/Intervention  --  -MW  lower body dressing  -          Lower Body Dressing Assessment/Training    [place] Level (Lower Body Dressing)  --  -MW  don;socks;moderate assist (50% patient effort)  -       Position (Lower Body Dressing)  --  -  edge of bed sitting  -       Comment (Lower Body Dressing)  --  -MW  --          Wound 09/30/20 1611 Right anterior knee Incision    Wound - Properties Group Placement Date: 09/30/20  - Placement Time: 1611  -HW Present on Hospital Admission: N  -HW Side: Right  -HW Orientation: anterior  -HW Location: knee  -HW Primary Wound Type: Incision  -HW    Retired Wound - Properties Group Date first assessed: 09/30/20  - Time first assessed: 1611  -HW Present on Hospital Admission: N  -HW Side: Right  -HW Location: knee  -HW Primary Wound Type: Incision  -HW       Plan of Care Review    Plan of Care Reviewed With  --  patient  -       Progress  --  no change  -       Outcome Summary  --  OT txt completed. Pt requires some encouragement to participate 2' her  pain. Strategies with training implemented for increased independence with bed mobility, CGA required with rolling L and sidelying to sit with use of LLE to assist her RLE coming to EOB. Stands CGA-Nicolasa with vc and use of RW. Took steps to chair with vc for step sequence. Able to take a couple steps away from chair toward BR, however unable to make it to BR c/o increased fatigue in BUE. Pt requires cont strengthening and endurance training to increase independence. Recommend cont rehab at d/c.  -MW          Positioning and Restraints    Pre-Treatment Position  --  in bed  -MW       Post Treatment Position  --  chair  -MW       In Chair  --  sitting;call light within reach;encouraged to call for assist;legs elevated;notified ns  -MW          Therapy Plan Review/Discharge Plan (OT)    Equipment Needs Upon Discharge (OT)  --  walker, rolling  -MW       Anticipated Discharge Disposition (OT)  --  home with assist;home with home health  -MW         User Key  (r) = Recorded By, (t) = Taken By, (c) = Cosigned By    Initials Name Effective Dates    MW Patti Messina, OTR/L 08/28/18 -     Kirill Wyatt RNA 02/04/20 - 06/04/20         Occupational Therapy Education                 Title: PT OT SLP Therapies (Done)     Topic: Occupational Therapy (Done)     Point: ADL training (Done)     Description:   Instruct learner(s) on proper safety adaptation and remediation techniques during self care or transfers.   Instruct in proper use of assistive devices.              Learning Progress Summary           Patient Acceptance, E, VU,NR by  at 10/1/2020 0911                   Point: Precautions (Done)     Description:   Instruct learner(s) on prescribed precautions during self-care and functional transfers.              Learning Progress Summary           Patient Acceptance, E, VU,NR by  at 10/1/2020 0911                               User Key     Initials Effective Dates Name Provider Type Discipline     04/02/19 -   Rebecca Gregorio S, OTR/L, CNT Occupational Therapist OT                  OT Recommendation and Plan     Plan of Care Review  Plan of Care Reviewed With: patient  Progress: no change  Outcome Summary: OT txt completed. Pt requires some encouragement to participate 2' her pain. Strategies with training implemented for increased independence with bed mobility, CGA required with rolling L and sidelying to sit with use of LLE to assist her RLE coming to EOB. Stands CGA-Nicolasa with vc and use of RW. Took steps to chair with vc for step sequence. Able to take a couple steps away from chair toward BR, however unable to make it to BR c/o increased fatigue in BUE. Pt requires cont strengthening and endurance training to increase independence. Recommend cont rehab at d/c.  Plan of Care Reviewed With: patient  Outcome Summary: OT txt completed. Pt requires some encouragement to participate 2' her pain. Strategies with training implemented for increased independence with bed mobility, CGA required with rolling L and sidelying to sit with use of LLE to assist her RLE coming to EOB. Stands CGA-Nicolasa with vc and use of RW. Took steps to chair with vc for step sequence. Able to take a couple steps away from chair toward BR, however unable to make it to BR c/o increased fatigue in BUE. Pt requires cont strengthening and endurance training to increase independence. Recommend cont rehab at d/c.        Time Calculation:   Time Calculation- OT     Row Name 10/02/20 1326 10/02/20 1033          Time Calculation- OT    OT Start Time  0853  -MW  --     OT Stop Time  0932  -MW  --     OT Time Calculation (min)  39 min  -MW  --     Total Timed Code Minutes- OT  39 minute(s)  -MW  --     OT Received On  10/02/20  -  --        Timed Charges    32142 - Gait Training Minutes   --  21  -       User Key  (r) = Recorded By, (t) = Taken By, (c) = Cosigned By    Initials Name Provider Type    Ashlee Macias, PTA Physical Therapy Assistant      Patti Messina, OTR/L Occupational Therapist        Therapy Charges for Today     Code Description Service Date Service Provider Modifiers Qty    [id number] HC OT SELF CARE/MGMT/TRAIN EA 15 MIN 10/2/2020 Patti Messina, JUANR/L GO 3               THEODORE Worthington/MARTHA  10/2/2020

## 2020-10-02 NOTE — PROGRESS NOTES
Patient is 2 days post right total knee replaced.  She is progressing satisfactorily.  Plan continue with physical therapy discharge in a.m.

## 2020-10-02 NOTE — DISCHARGE PLACEMENT REQUEST
"Shante Virk [phone]  Dorie Tolliver (84 y.o. Female)     Date of Birth Social Security Number Address Home Phone MRN    1935  [address] [place] [id number] [phone] [id number]    Scientologist Marital Status          Gnosticist        Admission Date Admission Type Admitting Provider Attending Provider Department, Room/Bed    9/30/20 Elective Mak Pickens MD Jackson, Stephen H, MD [facility] 3A, 335/1    Discharge Date Discharge Disposition Discharge Destination         Home or Self Care              Attending Provider: Mak Pickens MD    Allergies: Codeine, Iodine, Levaquin [Levofloxacin], Lortab [Hydrocodone-acetaminophen], Quinolones, Shrimp (Diagnostic), Bactroban [Mupirocin Calcium], Ace Inhibitors, Piperacillin Sod-tazobactam So, Statins, Thiazide-type Diuretics, Mupirocin    Isolation: None   Infection: None   Code Status: CPR    Ht: 162.6 cm (64\")   Wt: 72.8 kg (160 lb 8 oz)    Admission Cmt: None   Principal Problem: None                Active Insurance as of 9/30/2020     Primary Coverage     Payor Plan Insurance Group Employer/Plan Group    MEDICARE MEDICARE A & B      Payor Plan Address Payor Plan Phone Number Payor Plan Fax Number Effective Dates    [address] [phone]  11/1/2000 - None Entered    [facility] [id number]       Subscriber Name Subscriber Birth Date Member ID       DORIE TOLLIVER 1935 [id number]           Secondary Coverage     Payor Plan Insurance Group Employer/Plan Group    [facility] SUPP [id number]     Payor Plan Address Payor Plan Phone Number Payor Plan Fax Number Effective Dates    [address]   12/1/2016 - None Entered    [facility] [id number]       Subscriber Name Subscriber Birth Date Member ID       DORIE TOLLIVER 1935 [id number]                 Emergency Contacts      (Rel.) Home Phone Work Phone Mobile Phone    Jeannie Fernandez (Daughter) [phone] -- " [phone]    Harinder Tolliver (Son) -- -- [phone]        Referral to Home Health [[id number]] (Order [id number])  Order  Date: 10/2/2020 Department: [address] Ordering/Authorizing: Mak Pickens MD   Order History  Outpatient  Date/Time Action Taken User Additional Information   10/02/20 1341 Sign Mak Pickens MD    Order Details    Frequency Duration Priority Order Class   None None Routine Internal Referral   Start Date/Time    Start Date   10/02/20   Order Information    Order Date Service Start Date Start Time   10/02/20 Surgery 10/02/20    Reference Links    Associated Reports External References   View Encounter Current Health Referral Information   Order Questions    Question Answer Comment   Face to Face Visit Date: 10/2/2020    Follow-up provider for Plan of Care? I treated the patient in an acute care facility and will not continue treatment after discharge.    Follow-up provider: MAK PICKENS    Reason/Clinical Findings Total knee    Describe mobility limitations that make leaving home difficult: Total knee    Nursing/Therapeutic Services Requested Physical Therapy    Frequency: 1 Week 1           Source Order Set / Preference List    Order Set    IP GEN EXPRESS DISCHARGE [[id number]]   Clinical Indications     ICD-10-CM ICD-9-CM   Primary osteoarthritis of right knee  - Primary     M17.11 715.16   Reprint Order Requisition    Referral to Home Health (Order #[id number]) on 10/2/20   Encounter    View Encounter          Order Provider Info        Office phone Pager E-mail   Ordering User Mak Pickens [phone] -- --   Authorizing Provider Mak Pickens [phone] -- --   Attending Provider Mak Pickens [phone] -- --   Linked Charges    No charges linked to this procedure   Tracking Reports    Cosign Tracking Order Transmittal Tracking   Authorized by:  Mak Pickens MD  (NPI: [id number])       Lab Component SmartPhrase  "Guide    Referral to Home Health (Order #[id number]) on 10/2/            History & Physical      H&P signed by [employer] Onbase, [place] at 09/16/20 1158      Scan on 9/30/2020 by [employer] Onbase, [place]: H&amp;P, IGNACIOWANDREI, 09/30/2020          Electronically signed by New Onbase, [place] at 09/16/20 1158     H&P signed by New Onbase, [place] at 09/08/20 0939      Scan on 4/1/2020 by New Onbase, [place]: H&amp;P, OIWK, 09/03/2020          Electronically signed by New Onbase, [place] at 09/08/20 0939          Discharge Summary      Mak Pickens MD at 10/02/20 1337          [facility]  DISCHARGE SUMMARY       Date of Admission: 9/30/2020  Date of Discharge:  10/03/2020  Primary Care Physician: Vijay Rodrigues MD    Presenting Problem/History of Present Illness:  Primary osteoarthritis of right knee [M17.11]     Final Discharge Diagnoses:  Active Hospital Problems    Diagnosis   • Primary osteoarthritis of right knee       Consults: None    Procedures Performed: Right total knee arthroplasty    Pertinent Test Results: Preop lab within normal limits    History of Present Illness on Day of Discharge: Stable on discharge    Hospital Course:  The patient is a 84 y.o. female who presented to [facility] with painful primary osteoarthritis of the right knee.  On admission patient taken to surgery and right total knee arthroplasty carried out.  Postop she did well.  Her wound is clean without evidence of infection.  She is discharged to home in stable condition.        /51 (BP Location: Left arm, Patient Position: Lying)   Pulse 76   Temp 97.7 °F (36.5 °C) (Oral)   Resp 15   Ht 162.6 cm (64\")   Wt 72.8 kg (160 lb 8 oz)   LMP  (LMP Unknown)   SpO2 94%   Breastfeeding No   BMI 27.55 kg/m²       Discharge Medications:     Discharge Medications      Continue These Medications      Instructions Start Date   glucose monitor monitoring kit   1 each, Does not apply, Daily      Lancets misc   1 each, " Does not apply, Daily         ASK your doctor about these medications      Instructions Start Date   acetaminophen 500 MG tablet  Commonly known as: TYLENOL   1,000 mg, Oral, Nightly PRN      amLODIPine 2.5 MG tablet  Commonly known as: NORVASC   2.5 mg, Oral, Nightly      aspirin 81 MG EC tablet   81 mg, Oral, Daily      CENTRUM SILVER PO   1 tablet, Oral, Daily      Co Q 10 100 MG capsule   1 tablet, Oral, Daily      conjugated estrogens 0.625 MG/GM vaginal cream  Commonly known as: Premarin   Vaginal, 2 Times Weekly      glimepiride 4 MG tablet  Commonly known as: AMARYL   2 mg, Oral, 2 Times Daily      irbesartan 150 MG tablet  Commonly known as: AVAPRO   75 mg, Oral, Nightly      levothyroxine 50 MCG tablet  Commonly known as: SYNTHROID, LEVOTHROID   TAKE 1 TABLET BY MOUTH EVERY DAY      metFORMIN  MG 24 hr tablet  Commonly known as: GLUCOPHAGE-XR   TAKE 1 TABLET BY MOUTH TWICE DAILY       nebivolol 2.5 MG tablet  Commonly known as: BYSTOLIC   2.5 mg, Oral, Every Evening      ondansetron ODT 4 MG disintegrating tablet  Commonly known as: ZOFRAN-ODT   4 mg, Oral, Every 6 Hours PRN      PROBIOTIC DAILY PO   1 tablet, Oral, Daily      triamcinolone 0.1 % cream  Commonly known as: KENALOG   1 application, 2 Times Daily PRN      True Metrix Blood Glucose Test test strip  Generic drug: glucose blood   TEST TWICE DAILY AS DIRECTED       glucose blood test strip   1 daily      vitamin D3 125 MCG (5000 UT) capsule capsule   5,000 Units, Oral, Daily             Discharge Diet: Regular    Discharge Care Plan/Instructions: #1 she can bear weight as tolerated #2 she can shower and redress the wound #3 she will have home health physical therapy #4 she will be maintained on aspirin No. 5 she is to call for any problems #6 she is to follow-up in the office    Follow-up Appointments: Monday October lab  Future Appointments   Date Time Provider Department Center   10/22/2020  9:30 AM PAD BIC US 2 BH PAD US BI PAD    12/8/2020  1:45 PM Vijay Rodrigues MD [facility]         Mak Pickens MD  10/02/20  13:37 CDT        Electronically signed by Mak Pickens MD at 10/02/20 2707

## 2020-10-02 NOTE — PLAN OF CARE
Problem: Adult Inpatient Plan of Care  Goal: Plan of Care Review  Outcome: Ongoing, Progressing  Flowsheets (Taken 10/2/2020 0901)  Progress: no change  Plan of Care Reviewed With: patient  Outcome Summary: OT txt completed. Pt requires some encouragement to participate 2' her pain. Strategies with training implemented for increased independence with bed mobility, CGA required with rolling L and sidelying to sit with use of LLE to assist her RLE coming to EOB. Stands CGA-Nicolasa with vc and use of RW. Took steps to chair with vc for step sequence. Able to take a couple steps away from chair toward BR, however unable to make it to BR c/o increased fatigue in BUE. Pt requires cont strengthening and endurance training to increase independence. Recommend cont rehab at d/c.

## 2020-10-03 VITALS
WEIGHT: 160.5 LBS | RESPIRATION RATE: 16 BRPM | DIASTOLIC BLOOD PRESSURE: 56 MMHG | HEART RATE: 86 BPM | OXYGEN SATURATION: 94 % | TEMPERATURE: 98.6 F | HEIGHT: 64 IN | BODY MASS INDEX: 27.4 KG/M2 | SYSTOLIC BLOOD PRESSURE: 149 MMHG

## 2020-10-03 PROCEDURE — 63710000001 ACETAMINOPHEN 325 MG TABLET: Performed by: ORTHOPAEDIC SURGERY

## 2020-10-03 PROCEDURE — 97110 THERAPEUTIC EXERCISES: CPT

## 2020-10-03 PROCEDURE — 97535 SELF CARE MNGMENT TRAINING: CPT

## 2020-10-03 PROCEDURE — 63710000001 LEVOTHYROXINE 50 MCG TABLET: Performed by: ORTHOPAEDIC SURGERY

## 2020-10-03 PROCEDURE — A9270 NON-COVERED ITEM OR SERVICE: HCPCS | Performed by: ORTHOPAEDIC SURGERY

## 2020-10-03 PROCEDURE — 63710000001 ASPIRIN 81 MG TABLET DELAYED-RELEASE: Performed by: ORTHOPAEDIC SURGERY

## 2020-10-03 PROCEDURE — 63710000001 METFORMIN ER 500 MG TABLET SUSTAINED-RELEASE 24 HOUR: Performed by: ORTHOPAEDIC SURGERY

## 2020-10-03 PROCEDURE — 97116 GAIT TRAINING THERAPY: CPT

## 2020-10-03 PROCEDURE — 63710000001 POLYETHYLENE GLYCOL 17 G PACK: Performed by: ORTHOPAEDIC SURGERY

## 2020-10-03 PROCEDURE — 63710000001 GLIPIZIDE 5 MG TABLET: Performed by: ORTHOPAEDIC SURGERY

## 2020-10-03 PROCEDURE — 63710000001 LOSARTAN 50 MG TABLET: Performed by: ORTHOPAEDIC SURGERY

## 2020-10-03 RX ADMIN — GLIPIZIDE 5 MG: 5 TABLET ORAL at 09:18

## 2020-10-03 RX ADMIN — LOSARTAN POTASSIUM 50 MG: 50 TABLET, FILM COATED ORAL at 09:19

## 2020-10-03 RX ADMIN — POLYETHYLENE GLYCOL (3350) 17 G: 17 POWDER, FOR SOLUTION ORAL at 03:55

## 2020-10-03 RX ADMIN — ACETAMINOPHEN 650 MG: 325 TABLET, FILM COATED ORAL at 03:43

## 2020-10-03 RX ADMIN — ACETAMINOPHEN 650 MG: 325 TABLET, FILM COATED ORAL at 14:10

## 2020-10-03 RX ADMIN — LEVOTHYROXINE SODIUM 50 MCG: 50 TABLET ORAL at 06:46

## 2020-10-03 RX ADMIN — ACETAMINOPHEN 650 MG: 325 TABLET, FILM COATED ORAL at 09:38

## 2020-10-03 RX ADMIN — METFORMIN HYDROCHLORIDE 500 MG: 500 TABLET, EXTENDED RELEASE ORAL at 09:18

## 2020-10-03 RX ADMIN — ASPIRIN 81 MG: 81 TABLET ORAL at 09:20

## 2020-10-03 NOTE — PLAN OF CARE
Goal Outcome Evaluation:  Plan of Care Reviewed With: patient  Progress: no change  Outcome Summary: Pt is up x1 stand by assist with RW to bathroom.  Pt c/o weakness.  No BM yet, pt had refused miralax yesterday, but requested it this morning.  Bowel sounds + all 4 quads.  Knee drsg CDI.  Pt has been pleasant and cooperative tonight.  Self turning in bed.  Ice pack to knee

## 2020-10-03 NOTE — PLAN OF CARE
Goal Outcome Evaluation:  Plan of Care Reviewed With: patient  Progress: improving  Outcome Summary: A&Ox4. Dressing to RLE CDI. VSS. Tolerating regular diet. PRN tylenol given x2 with relief. Plans to d/c home today w home health. Total knee education completed. Voiding on own. Safety maintained.

## 2020-10-03 NOTE — THERAPY TREATMENT NOTE
Acute Care - Physical Therapy Treatment Note  [facility]     Patient Name: Nedra Tolliver  : 1935  MRN: [id number]  Today's Date: 10/3/2020           PT Assessment (last 12 hours)      PT Evaluation and Treatment     [facility] Name 10/03/20 0721          Physical Therapy Time and Intention    Subjective Information  complains of;pain;fatigue;weakness  -     Document Type  therapy note (daily note)  -     Mode of Treatment  physical therapy  -[facility] Name 10/03/20 0721          General Information    Existing Precautions/Restrictions  fall  -[facility] Name 10/03/20 0721          Pain Scale: Numbers Pre/Post-Treatment    Pretreatment Pain Rating  5/10  -     Posttreatment Pain Rating  6/10  -     Pain Location - Side  Right  -     Pain Location  knee  -[facility] Name 10/03/20 0721          Pain Scale: Word Pre/Post-Treatment    Pain Intervention(s)  Medication (See MAR);Repositioned;Ambulation/increased activity  -[facility] Name 10/03/20 0721          Range of Motion Comprehensive    Comment, General Range of Motion  R knee AAROM 10-80, R TKR HEP x 15 reps  -[facility] Name 10/03/20 0721          Bed Mobility    Supine-Sit [place] (Bed Mobility)  contact guard;verbal cues  -     Sit-Supine [place] (Bed Mobility)  -- chair  -[facility] Name 10/03/20 0721          Transfers    Transfers  stand-sit transfer  -     Sit-Stand [place] (Transfers)  contact guard;verbal cues  -     Stand-Sit [place] (Transfers)  contact guard;verbal cues  -[facility] Name 10/03/20 0721          Gait/Stairs (Locomotion)    [place] Level (Gait)  contact guard;verbal cues  -     Assistive Device (Gait)  walker, front-wheeled  -     Distance in Feet (Gait)  50  -[facility] Name 10/03/20 0721          Safety Issues, Functional Mobility    Impairments Affecting Function (Mobility)  balance;pain;endurance/activity tolerance  -[facility] Name             Wound 20 1611 Right anterior knee  Incision    Wound - Properties Group Placement Date: 09/30/20  - Placement Time: 1611  -HW Present on Hospital Admission: N  -HW Side: Right  -HW Orientation: anterior  -HW Location: knee  -HW Primary Wound Type: Incision  -HW    Retired Wound - Properties Group Date first assessed: 09/30/20  - Time first assessed: 1611  -HW Present on Hospital Admission: N  -HW Side: Right  -HW Location: knee  -HW Primary Wound Type: Incision  -HW    Row Name 10/03/20 0721          Plan of Care Review    Plan of Care Reviewed With  patient  -     Progress  improving  -     Outcome Summary  pt performed R TKR HEP x 15 reps AAROM, pt trans to EOB cga, sit-stand cga, pt amb 50 feet with rwx cga, cues for proper technique with gait/rwx, pt trans to chair cga, pt feels she is not ready to go home, will attempt stair training again in pm  -     Row Name 10/03/20 0721          Positioning and Restraints    Pre-Treatment Position  in bed  -     Post Treatment Position  chair  -     In Chair  notified nsg;sitting;call light within reach;encouraged to call for assist  -       User Key  (r) = Recorded By, (t) = Taken By, (c) = Cosigned By    Initials Name Provider Type     Ashlee Lyles PTA Physical Therapy Assistant     Kirill Garvin RNA Registered Nurse        Physical Therapy Education                 Title: PT OT SLP Therapies (Done)     Topic: Physical Therapy (Done)     Point: Mobility training (Done)     Learning Progress Summary           Patient Acceptance, E,TB,D, VU,NR by  at 10/2/2020 1501    Comment: stair training    Acceptance, E,D, DU,VU by  at 10/1/2020 1132    Comment: benefits of PT and POC, call for A OOB                   Point: Home exercise program (Done)     Learning Progress Summary           Patient Acceptance, E,TB,D,H, VU,DU,NR by  at 10/3/2020 0829    Comment: R TKR HEP    Acceptance, E,TB,D,H, VU,DU,NR by  at 10/2/2020 1032    Comment: R TKR HEP                   Point:  Precautions (Done)     Learning Progress Summary           Patient Acceptance, E,D, DU,VU by  at 10/1/2020 1132    Comment: benefits of PT and POC, call for A OOB                               User Key     Initials Effective Dates Name Provider Type [facility] 08/02/16 -  Ashlee Lyles PTA Physical Therapy Assistant PT     08/02/16 -  Wicho Johnson, PT Physical Therapist PT              PT Recommendation and Plan     Plan of Care Reviewed With: patient  Progress: improving  Outcome Summary: pt performed R TKR HEP x 15 reps AAROM, pt trans to EOB cga, sit-stand cga, pt amb 50 feet with rwx cga, cues for proper technique with gait/rwx, pt trans to chair cga, pt feels she is not ready to go home, will attempt stair training again in pm       Time Calculation:   PT Charges     Row Name 10/03/20 0829             Time Calculation    Start Time  0721  -      Stop Time  0815  -      Time Calculation (min)  54 min  -      PT Received On  10/03/20  -         Time Calculation- PT    Total Timed Code Minutes- PT  54 minute(s)  -         Timed Charges    55424 - PT Therapeutic Exercise Minutes  24  -      64419 - Gait Training Minutes   30  -AH        User Key  (r) = Recorded By, (t) = Taken By, (c) = Cosigned By    Initials Name Provider Type     Ashlee Lyles PTA Physical Therapy Assistant        Therapy Charges for Today     Code Description Service Date Service Provider Modifiers Qty    30059638043 HC PT THER PROC EA 15 MIN 10/2/2020 Ashlee Lyles, PTA GP 1    76716961362 HC GAIT TRAINING EA 15 MIN 10/2/2020 Ashlee Lyles, PTA GP 2    93662828199 HC PT THER PROC EA 15 MIN 10/2/2020 Ashlee Lyles, PTA GP 2    81384611119 HC GAIT TRAINING EA 15 MIN 10/2/2020 Ashlee Lyles, PTA GP 2    39469961030 HC PT THER PROC EA 15 MIN 10/3/2020 Ashlee Lyles, PTA GP 2    07855069826 HC GAIT TRAINING EA 15 MIN 10/3/2020 Ashlee Lyles, PTA GP 2          PT G-Codes  Outcome Measure Options: AM-PAC 6  Clicks Basic Mobility (PT)  AM-PAC 6 Clicks Score (PT): 18  AM-PAC 6 Clicks Score (OT): 18    Ashlee Lyles, PTA  10/3/2020

## 2020-10-03 NOTE — DISCHARGE INSTR - ACTIVITY
Discharge Care Plan/Instructions: #1 she can bear weight as tolerated  #2 she can shower and redress the wound  #3 she will have home health physical therapy   #4 she will be maintained on aspirin # 5 she is to call for any problems   #6 she is to follow-up in the office

## 2020-10-03 NOTE — PLAN OF CARE
Problem: Adult Inpatient Plan of Care  Goal: Plan of Care Review  Recent Flowsheet Documentation  Taken 10/3/2020 0721 by Ashlee Lyles, PTA  Progress: improving  Plan of Care Reviewed With: patient  Outcome Summary: pt performed R TKR HEP x 15 reps AAROM, pt trans to EOB cga, sit-stand cga, pt amb 50 feet with rwx cga, cues for proper technique with gait/rwx, pt trans to chair cga, pt feels she is not ready to go home, will attempt stair training again in pm

## 2020-10-03 NOTE — PLAN OF CARE
Problem: Adult Inpatient Plan of Care  Goal: Plan of Care Review  Recent Flowsheet Documentation  Taken 10/3/2020 1140 by Sayra Yang, TIDWELL/L  Progress: improving  Plan of Care Reviewed With: patient  Outcome Summary: Pt mod I for bed mobility with HOB elevated. Pt tranfers with S with RW. Ambulated in room with SBA/S with RW. Completed TB spongebath while seated on commode with set up. Pt set up with S for LB dressing with verbal cues. Pt would benefit from home with family assist and HH. Pt interested in life alert button. Continue OT POC

## 2020-10-03 NOTE — PROGRESS NOTES
Continued Stay Note  JARRETT [facility]     Patient Name: Nedra Tolliver  MRN: [id number]  Today's Date: 10/3/2020    Admit Date: 9/30/2020    Discharge Plan     Row Name 10/03/20 1329       Plan    Plan Comments  Jesika from [facility] is aware of discharge today.    Final Discharge Disposition Code  06 - home with home health care        Discharge Codes    No documentation.       Expected Discharge Date and Time     Expected Discharge Date Expected Discharge Time    Oct 3, 2020             Dejah Rivera

## 2020-10-03 NOTE — NURSING NOTE
Patient shaking like having chills, states this is what happened in past when she had reaction to Lortab. Patient has been receiving Norco (x 4 doses). Vitals were stable, warm blankets provided, pharmacy called and notified, as well as physician. Norco was discontinued. Within 15 minutes, chilling sensation had passed, and patient was sleeping comfortably. Continue to monitor.

## 2020-10-03 NOTE — THERAPY TREATMENT NOTE
Acute Care - Physical Therapy Treatment Note  [facility]     Patient Name: Nedra Tolliver  : 1935  MRN: [id number]  Today's Date: 10/3/2020           PT Assessment (last 12 hours)      PT Evaluation and Treatment     Row Name 10/03/20 1300 10/03/20 0721       Physical Therapy Time and Intention    Subjective Information  complains of;pain;fatigue;weakness  -  complains of;pain;fatigue;weakness  -    Document Type  therapy note (daily note)  -  therapy note (daily note)  -    Mode of Treatment  physical therapy  -  physical therapy  -    Row Name 10/03/20 1300 10/03/20 0721       General Information    Existing Precautions/Restrictions  fall  -  fall  -    Row Name 10/03/20 1300 10/03/20 0721       Pain Scale: Numbers Pre/Post-Treatment    Pretreatment Pain Rating  5/10  -  5/10  -    Posttreatment Pain Rating  5/10  -  6/10  -    Pain Location - Side  Right  -  Right  -    Pain Location  knee  -  knee  -[facility] Name 10/03/20 1300 10/03/20 0721       Pain Scale: Word Pre/Post-Treatment    Pain Intervention(s)  Repositioned;Ambulation/increased activity  -  Medication (See MAR);Repositioned;Ambulation/increased activity  -[facility] Name 10/03/20 0721          Range of Motion Comprehensive    Comment, General Range of Motion  R knee AAROM 10-80, R TKR HEP x 15 reps  -[facility] Name 10/03/20 1300 10/03/20 0721       Bed Mobility    Supine-Sit [place] (Bed Mobility)  contact guard;verbal cues  -  contact guard;verbal cues  -    Sit-Supine [place] (Bed Mobility)  contact guard;verbal cues  -  -- chair  -    Row Name 10/03/20 1300 10/03/20 0721       Transfers    Transfers  stand-sit transfer  -  stand-sit transfer  -    Sit-Stand [place] (Transfers)  contact guard;minimum assist (75% patient effort);verbal cues  -  contact guard;verbal cues  -    Stand-Sit [place] (Transfers)  contact guard;verbal cues  -  contact guard;verbal cues  -     Row Name 10/03/20 1300 10/03/20 0721       Gait/Stairs (Locomotion)    [place] Level (Gait)  contact guard;verbal cues  -  contact guard;verbal cues  -    Assistive Device (Gait)  walker, front-wheeled  -  walker, front-wheeled  -    Distance in Feet (Gait)  50  -AH  50  -    [place] Level (Stairs)  minimum assist (75% patient effort);verbal cues;contact guard  -  --    Handrail Location (Stairs)  both sides  -  --    Number of Steps (Stairs)  3 x 2  -  --    Ascending Technique (Stairs)  step-to-step  -  --    Descending Technique (Stairs)  step-to-step  -  --    Stairs, Impairments  pain;ROM decreased;strength decreased;impaired balance;coordination impaired  -  --    Row Name 10/03/20 1300 10/03/20 0721       Safety Issues, Functional Mobility    Impairments Affecting Function (Mobility)  balance;pain;endurance/activity tolerance  -  balance;pain;endurance/activity tolerance  -    Row Name             Wound 09/30/20 1611 Right anterior knee Incision    Wound - Properties Group Placement Date: 09/30/20  - Placement Time: 1611  -HW Present on Hospital Admission: N  -HW Side: Right  -HW Orientation: anterior  -HW Location: knee  -HW Primary Wound Type: Incision  -HW    Retired Wound - Properties Group Date first assessed: 09/30/20  - Time first assessed: 1611  -HW Present on Hospital Admission: N  -HW Side: Right  -HW Location: knee  -HW Primary Wound Type: Incision  -HW    Row Name 10/03/20 0721          Plan of Care Review    Plan of Care Reviewed With  patient  -     Progress  improving  -     Outcome Summary  pt performed R TKR HEP x 15 reps AAROM, pt trans to EOB cga, sit-stand cga, pt amb 50 feet with rwx cga, cues for proper technique with gait/rwx, pt trans to chair cga, pt feels she is not ready to go home, will attempt stair training again in pm  -     Row Name 10/03/20 1300 10/03/20 0721       Positioning and Restraints    Pre-Treatment Position  sitting in  chair/recliner  -AH  in bed  -AH    Post Treatment Position  bed  -AH  chair  -AH    In Bed  fowlers;call light within reach;encouraged to call for assist;notified nsg  -AH  --    In Chair  --  notified nsg;sitting;call light within reach;encouraged to call for assist  -      User Key  (r) = Recorded By, (t) = Taken By, (c) = Cosigned By    Initials Name Provider Type     Ashlee Lyles, BREE Physical Therapy Assistant     Kirill Garvin RNA Registered Nurse        Physical Therapy Education                 Title: PT OT SLP Therapies (Done)     Topic: Physical Therapy (Done)     Point: Mobility training (Done)     Learning Progress Summary           Patient Acceptance, E,TB,D, VU,NR by  at 10/2/2020 1501    Comment: stair training    Acceptance, E,D, DU,VU by  at 10/1/2020 1132    Comment: benefits of PT and POC, call for A OOB                   Point: Home exercise program (Done)     Learning Progress Summary           Patient Acceptance, E,TB,D,H, VU,DU,NR by  at 10/3/2020 0829    Comment: R TKR HEP    Acceptance, E,TB,D,H, VU,DU,NR by  at 10/2/2020 1032    Comment: R TKR HEP                   Point: Precautions (Done)     Learning Progress Summary           Patient Acceptance, E,D, DU,VU by  at 10/1/2020 1132    Comment: benefits of PT and POC, call for A OOB                               User Key     Initials Effective Dates Name Provider Type [facility] 08/02/16 -  Ashlee Lyles PTA Physical Therapy Assistant PT     08/02/16 -  Wicho Johnson, PT Physical Therapist PT              PT Recommendation and Plan     Plan of Care Reviewed With: patient  Progress: improving  Outcome Summary: pt performed R TKR HEP x 15 reps AAROM, pt trans to EOB cga, sit-stand cga, pt amb 50 feet with rwx cga, cues for proper technique with gait/rwx, pt trans to chair cga, pt feels she is not ready to go home, will attempt stair training again in pm       Time Calculation:   PT Charges     Row Name  10/03/20 1336 10/03/20 0829          Time Calculation    Start Time  1300  -  0721  -     Stop Time  1330  -  0815  -     Time Calculation (min)  30 min  -  54 min  -     PT Received On  --  10/03/20  -        Time Calculation- PT    Total Timed Code Minutes- PT  30 minute(s)  -  54 minute(s)  -        Timed Charges    92029 - PT Therapeutic Exercise Minutes  --  24  -     55609 - Gait Training Minutes   30  -AH  30  -       User Key  (r) = Recorded By, (t) = Taken By, (c) = Cosigned By    Initials Name Provider Type     Ashlee Lyles, PTA Physical Therapy Assistant        Therapy Charges for Today     Code Description Service Date Service Provider Modifiers Qty    62230932637 HC PT THER PROC EA 15 MIN 10/2/2020 Ashlee Lyles, PTA GP 1    26346540880 HC GAIT TRAINING EA 15 MIN 10/2/2020 Ashlee Lyles, PTA GP 2    74419967130 HC PT THER PROC EA 15 MIN 10/2/2020 Ashlee Lyles, PTA GP 2    39485485442 HC GAIT TRAINING EA 15 MIN 10/2/2020 Ashlee Lyles, PTA GP 2    62706960397 HC PT THER PROC EA 15 MIN 10/3/2020 Ashlee Lyles, PTA GP 2    94189304615 HC GAIT TRAINING EA 15 MIN 10/3/2020 Ashlee Lyles, PTA GP 2    62505611134 HC GAIT TRAINING EA 15 MIN 10/3/2020 Ashlee Lyles, PTA GP 2          PT G-Codes  Outcome Measure Options: AM-PAC 6 Clicks Basic Mobility (PT)  AM-PAC 6 Clicks Score (PT): 18  AM-PAC 6 Clicks Score (OT): 18    Ashlee Lyles PTA  10/3/2020

## 2020-10-03 NOTE — THERAPY TREATMENT NOTE
Acute Care - Occupational Therapy Treatment Note  [facility]     Patient Name: Nedra Tolliver  : 1935  MRN: [id number]  Today's Date: 10/3/2020             Admit Date: 2020       ICD-10-CM ICD-9-CM   1. Primary osteoarthritis of right knee  M17.11 715.16   2. Decreased activities of daily living (ADL)  Z78.9 V49.89   3. Impaired mobility  Z74.09 799.89     Patient Active Problem List   Diagnosis   • Urinary tract infection without hematuria   • Dysuria   • Vaginal atrophy   • Overactive bladder   • Diverticulitis   • Benign essential HTN   • Thyroid nodule   • Hypothyroidism   • Hyponatremia   • Syncope and collapse   • Volume depletion   • Diabetes type 2, controlled (CMS/HCC)   • Moderate left ankle sprain   • Left hip pain   • Acute pain of left knee   • Pain of left thumb   • Nondisplaced fracture of navicular bone of left foot with routine healing   • Chest pain at rest   • Acute cystitis without hematuria   • H/O thyroid nodule   • Hypoglycemia   • IBS (irritable bowel syndrome)   • Idiopathic scoliosis   • Insomnia   • Mild tricuspid insufficiency   • Neuropathy due to type 2 diabetes mellitus (CMS/HCC)   • Onychomycosis   • Osteopenia   • Pericarditis secondary to uremia   • Primary osteoarthritis of right knee   • Proteinuria   • Vitamin D deficiency   • High cholesterol   • Lumbar radiculopathy   • Non-toxic multinodular goiter   • Transient ischemic attack     Past Medical History:   Diagnosis Date   • Arthritis    • Cancer (CMS/HCC)     BCC @ nose & Left arm   • Diabetes type 2, controlled (CMS/HCC)    • Diverticulitis    • History of GI bleed    • History of transfusion     Has had x 5 units.   • Hypertension    • Hyponatremia     Required hospitalization   • Hypothyroidism    • LPRD (laryngopharyngeal reflux disease)    • Pharyngeal dysphagia    • Thyroid nodule      Past Surgical History:   Procedure Laterality Date   • APPENDECTOMY     • BASAL CELL CARCINOMA EXCISION     •  BELPHAROPTOSIS REPAIR     • BREAST CYST EXCISION     • CATARACT EXTRACTION     • CATARACT EXTRACTION WITH INTRAOCULAR LENS IMPLANT Bilateral    • CHOLECYSTECTOMY     • COLONOSCOPY Left 11/1/2016    Tubular adenoma cecum, Diverticulosis repeat prn   • SUBTOTAL HYSTERECTOMY     • TOTAL KNEE ARTHROPLASTY Right 9/30/2020    Procedure: TOTAL KNEE ARTHROPLASTY;  Surgeon: Mak Pickens MD;  Location: [facility] OR;  Service: Orthopedics;  Laterality: Right;   • TUMOR EXCISION      under armpits and left breast          OT ASSESSMENT FLOWSHEET (last 12 hours)      OT Evaluation and Treatment     Row Name 10/03/20 1140                   OT Time and Intention    Subjective Information  complains of;pain  -TS        Document Type  therapy note (daily note)  -TS        Mode of Treatment  occupational therapy  -TS        Patient Effort  good  -TS           Cognition    Personal Safety Interventions  fall prevention program maintained;nonskid shoes/slippers when out of bed  -TS           Pain Scale: Numbers Pre/Post-Treatment    Pretreatment Pain Rating  2/10  -TS        Posttreatment Pain Rating  2/10  -TS        Pain Location - Side  Right  -TS        Pain Location  knee  -TS        Pain Intervention(s)  Repositioned;Ambulation/increased activity  -TS           Bed Mobility    Supine-Sit [place] (Bed Mobility)  modified independence  -TS        Assistive Device (Bed Mobility)  head of bed elevated  -TS           Functional Mobility    Functional Mobility- Ind. Level  supervision required;conditional independence  -TS        Functional Mobility- Device  rolling walker  -TS        Functional Mobility- Comment  in room, in BR  -TS           Transfer Assessment/Treatment    Transfers  sit-stand transfer;stand-sit transfer;toilet transfer  -TS           Transfers    Sit-Stand [place] (Transfers)  supervision  -TS        Stand-Sit [place] (Transfers)  supervision  -TS        [place] Level (Toilet Transfer)   supervision  -TS        Assistive Device (Toilet Transfer)  commode;walker, front-wheeled  -TS           Sit-Stand Transfer    Assistive Device (Sit-Stand Transfers)  walker, front-wheeled  -TS           Stand-Sit Transfer    Assistive Device (Stand-Sit Transfers)  walker, front-wheeled  -TS           Toilet Transfer    Type (Toilet Transfer)  sit-stand;stand-sit  -TS           Activities of Daily Living    BADL Assessment/Intervention  upper body dressing;lower body dressing;bathing;toileting  -TS           Bathing Assessment/Intervention    [place] Level (Bathing)  upper body;lower body;set up;supervision  -TS        Position (Bathing)  unsupported sitting;unsupported standing  -TS        Comment (Bathing)  sponge bath while seated on commode  -TS           Upper Body Dressing Assessment/Training    [place] Level (Upper Body Dressing)  don;set up;bra/undergarment;pull-over garment  -TS        Position (Upper Body Dressing)  unsupported sitting  -TS           Lower Body Dressing Assessment/Training    [place] Level (Lower Body Dressing)  doff;socks;supervision;don;pants/bottoms;verbal cues  -TS        Position (Lower Body Dressing)  unsupported sitting;unsupported standing  -TS        Comment (Lower Body Dressing)  including undergarment  -TS           Toileting Assessment/Training    [place] Level (Toileting)  toileting skills;perform perineal hygiene;supervision  -TS        Assistive Devices (Toileting)  commode  -TS        Position (Toileting)  unsupported sitting;unsupported standing  -TS           Wound 09/30/20 1611 Right anterior knee Incision    Wound - Properties Group Placement Date: 09/30/20 -HW Placement Time: 1611 -HW Present on Hospital Admission: N  -HW Side: Right  -HW Orientation: anterior  -HW Location: knee  -HW Primary Wound Type: Incision  -HW    Retired Wound - Properties Group Date first assessed: 09/30/20  -HW Time first assessed: 1611 -HW Present on Hospital  Admission: N  -HW Side: Right  -HW Location: knee  -HW Primary Wound Type: Incision  -HW       Plan of Care Review    Plan of Care Reviewed With  patient  -TS        Progress  improving  -TS        Outcome Summary  Pt mod I for bed mobility with HOB elevated. Pt tranfers with S with RW. Ambulated in room with SBA/S with RW. Completed TB spongebath while seated on commode with set up. Pt set up with S for LB dressing with verbal cues. Pt would benefit from home with family assist and HH. Pt interested in life alert button. Continue OT POC   -TS           Positioning and Restraints    Pre-Treatment Position  in bed  -TS        Post Treatment Position  chair  -TS        In Chair  sitting;call light within reach;encouraged to call for assist  -TS          User Key  (r) = Recorded By, (t) = Taken By, (c) = Cosigned By    Initials Name Effective Dates    TS Sayra Yang TIDWELL/MARTHA 08/02/16 -     Kirill Wyatt RNA 02/04/20 - 06/04/20         Occupational Therapy Education                 Title: PT OT SLP Therapies (Done)     Topic: Occupational Therapy (Done)     Point: ADL training (Done)     Description:   Instruct learner(s) on proper safety adaptation and remediation techniques during self care or transfers.   Instruct in proper use of assistive devices.              Learning Progress Summary           Patient Acceptance, E, VU,NR by  at 10/1/2020 0911                   Point: Precautions (Done)     Description:   Instruct learner(s) on prescribed precautions during self-care and functional transfers.              Learning Progress Summary           Patient Acceptance, E, VU,NR by  at 10/1/2020 0911                               User Key     Initials Effective Dates Name Provider Type Discipline     04/02/19 -  Rebecca Gregorio, OTR/L, CNT Occupational Therapist OT                  OT Recommendation and Plan     Plan of Care Review  Plan of Care Reviewed With: patient  Progress: improving  Outcome  Summary: Pt mod I for bed mobility with HOB elevated. Pt tranfers with S with RW. Ambulated in room with SBA/S with RW. Completed TB spongebath while seated on commode with set up. Pt set up with S for LB dressing with verbal cues. Pt would benefit from home with family assist and HH. Pt interested in life alert button. Continue OT POC   Plan of Care Reviewed With: patient  Outcome Summary: Pt mod I for bed mobility with HOB elevated. Pt tranfers with S with RW. Ambulated in room with SBA/S with RW. Completed TB spongebath while seated on commode with set up. Pt set up with S for LB dressing with verbal cues. Pt would benefit from home with family assist and HH. Pt interested in life alert button. Continue OT POC         Time Calculation:   Time Calculation- OT     Row Name 10/03/20 1254 10/03/20 0829          Time Calculation- OT    OT Start Time  1140  -TS  --     OT Stop Time  1220  -TS  --     OT Time Calculation (min)  40 min  -TS  --     Total Timed Code Minutes- OT  40 minute(s)  -TS  --     OT Received On  10/03/20  -TS  --        Timed Charges    08973 - Gait Training Minutes   --  30  -AH     23957 - OT Self Care/Mgmt Minutes  40  -TS  --       User Key  (r) = Recorded By, (t) = Taken By, (c) = Cosigned By    Initials Name Provider Type     Ashlee Lyles, PTA Physical Therapy Assistant    TS Sayra Yang TIDWELL/L Occupational Therapy Assistant        Therapy Charges for Today     Code Description Service Date Service Provider Modifiers Qty    16184398089  OT SELF CARE/MGMT/TRAIN EA 15 MIN 10/3/2020 Sayra Yang TIDWELL/L GO 3               Sayra HEWITT. YAW Yang/MARTHA  10/3/2020

## 2020-10-04 NOTE — THERAPY DISCHARGE NOTE
Acute Care - Physical Therapy Discharge Summary  [facility]       Patient Name: Nedra Tolliver  : 1935  MRN: [id number]    Today's Date: 10/4/2020                 Admit Date: 2020      PT Recommendation and Plan    Visit Dx:    ICD-10-CM ICD-9-CM   1. Primary osteoarthritis of right knee  M17.11 715.16   2. Decreased activities of daily living (ADL)  Z78.9 V49.89   3. Impaired mobility  Z74.09 799.89               Rehab Goal Summary     Row Name 10/04/20 0659             Bed Mobility Goal 1 (PT)    Activity/Assistive Device (Bed Mobility Goal 1, PT)  bed mobility activities, all  -      [place] Level/Cues Needed (Bed Mobility Goal 1, PT)  independent  -AH      Time Frame (Bed Mobility Goal 1, PT)  by discharge  -      Progress/Outcomes (Bed Mobility Goal 1, PT)  goal met  -         Transfer Goal 1 (PT)    Activity/Assistive Device (Transfer Goal 1, PT)  sit-to-stand/stand-to-sit  -      [place] Level/Cues Needed (Transfer Goal 1, PT)  independent  -AH      Time Frame (Transfer Goal 1, PT)  by discharge  -      Progress/Outcome (Transfer Goal 1, PT)  goal not met  -         Gait Training Goal 1 (PT)    Activity/Assistive Device (Gait Training Goal 1, PT)  gait (walking locomotion);assistive device use  -      [place] Level (Gait Training Goal 1, PT)  standby assist  -      Distance (Gait Training Goal 1, PT)  100ft  -AH      Time Frame (Gait Training Goal 1, PT)  by discharge  -      Progress/Outcome (Gait Training Goal 1, PT)  goal not met  -AH         ROM Goal 1 (PT)    ROM Goal 1 (PT)  R knee AROM 5-75 degrees  -AH      Time Frame (ROM Goal 1, PT)  by discharge  -      Progress/Outcome (ROM Goal 1, PT)  goal not met  -         Stairs Goal 1 (PT)    Activity/Assistive Device (Stairs Goal 1, PT)  ascending stairs;descending stairs no HR at home  -      [place] Level/Cues Needed (Stairs Goal 1, PT)  contact guard assist;1 person assist  -AH      Number of  Stairs (Stairs Goal 1, PT)  4  -      Time Frame (Stairs Goal 1, PT)  by discharge  -      Progress/Outcome (Stairs Goal 1, PT)  goal not met  -        User Key  (r) = Recorded By, (t) = Taken By, (c) = Cosigned By    Initials Name Provider Type Discipline     Ashlee Lyles PTA Physical Therapy Assistant PT          Therapy Charges for Today     Code Description Service Date Service Provider Modifiers Qty    01334308540 HC PT THER PROC EA 15 MIN 10/3/2020 Ashlee Lyles, BREE GP 2    06630500551 HC GAIT TRAINING EA 15 MIN 10/3/2020 Ashlee Lyles, BREE GP 2    06621214726 HC GAIT TRAINING EA 15 MIN 10/3/2020 Ashlee Lyles, BREE GP 2          PT Discharge Summary  Reason for Discharge: Discharge from facility  Outcomes Achieved: Refer to plan of care for updates on goals achieved  Discharge Destination: Home with home health      Ashlee Lyles PTA   10/4/2020

## 2020-10-04 NOTE — THERAPY DISCHARGE NOTE
Acute Care - Occupational Therapy Discharge Summary  [facility]     Patient Name: Nedra Tolliver  : 1935  MRN: [id number]    Today's Date: 10/4/2020                 Admit Date: 2020        OT Recommendation and Plan    Visit Dx:    ICD-10-CM ICD-9-CM   1. Primary osteoarthritis of right knee  M17.11 715.16   2. Decreased activities of daily living (ADL)  Z78.9 V49.89   3. Impaired mobility  Z74.09 799.89               Rehab Goal Summary     Row Name 10/04/20 0700 10/04/20 0659          Bed Mobility Goal 1 (PT)    Activity/Assistive Device (Bed Mobility Goal 1, PT)  --  bed mobility activities, all  -     [place] Level/Cues Needed (Bed Mobility Goal 1, PT)  --  independent  -     Time Frame (Bed Mobility Goal 1, PT)  --  by discharge  -     Progress/Outcomes (Bed Mobility Goal 1, PT)  --  goal met  -        Transfer Goal 1 (PT)    Activity/Assistive Device (Transfer Goal 1, PT)  --  sit-to-stand/stand-to-sit  -     [place] Level/Cues Needed (Transfer Goal 1, PT)  --  independent  -     Time Frame (Transfer Goal 1, PT)  --  by discharge  -     Progress/Outcome (Transfer Goal 1, PT)  --  goal not met  -        Gait Training Goal 1 (PT)    Activity/Assistive Device (Gait Training Goal 1, PT)  --  gait (walking locomotion);assistive device use  -     [place] Level (Gait Training Goal 1, PT)  --  standby assist  -     Distance (Gait Training Goal 1, PT)  --  100ft  -     Time Frame (Gait Training Goal 1, PT)  --  by discharge  -     Progress/Outcome (Gait Training Goal 1, PT)  --  goal not met  -        ROM Goal 1 (PT)    ROM Goal 1 (PT)  --  R knee AROM 5-75 degrees  -AH     Time Frame (ROM Goal 1, PT)  --  by discharge  -     Progress/Outcome (ROM Goal 1, PT)  --  goal not met  -        Stairs Goal 1 (PT)    Activity/Assistive Device (Stairs Goal 1, PT)  --  ascending stairs;descending stairs no HR at home  -     [place] Level/Cues Needed (Stairs Goal  1, PT)  --  contact guard assist;1 person assist  -     Number of Stairs (Stairs Goal 1, PT)  --  4  -AH     Time Frame (Stairs Goal 1, PT)  --  by discharge  -     Progress/Outcome (Stairs Goal 1, PT)  --  goal not met  -        Occupational Therapy Goals    Dressing Goal Selection (OT)  dressing, OT goal 1  -TS  --        Transfer Goal 1 (OT)    Activity/Assistive Device (Transfer Goal 1, OT)  sit-to-stand/stand-to-sit;bed-to-chair/chair-to-bed;tub;toilet;commode  -TS  --     [place] Level/Cues Needed (Transfer Goal 1, OT)  standby assist  -TS  --     Time Frame (Transfer Goal 1, OT)  long term goal (LTG)  -TS  --     Progress/Outcome (Transfer Goal 1, OT)  goal met  -TS  --        Dressing Goal 1 (OT)    Activity/Device (Dressing Goal 1, OT)  lower body dressing;long-handled shoe horn;reacher;sock-aid  -TS  --     [place]/Cues Needed (Dressing Goal 1, OT)  standby assist  -TS  --     Time Frame (Dressing Goal 1, OT)  long term goal (LTG)  -TS  --     Progress/Outcome (Dressing Goal 1, OT)  goal met  -TS  --        Toileting Goal 1 (OT)    Activity/Device (Toileting Goal 1, OT)  toileting skills, all;commode  -TS  --     [place] Level/Cues Needed (Toileting Goal 1, OT)  standby assist  -TS  --     Time Frame (Toileting Goal 1, OT)  long term goal (LTG)  -TS  --     Progress/Outcome (Toileting Goal 1, OT)  goal met  -TS  --       User Key  (r) = Recorded By, (t) = Taken By, (c) = Cosigned By    Initials Name Provider Type Discipline     Ashlee Lyles, BREE Physical Therapy Assistant PT    TS Sayra Yang, YAW/L Occupational Therapy Assistant OT              Timed Therapy Charges  Total Units: 3    Charges  Total Units: 3    Procedure Name Documented Minutes Units Code    HC OT SELF CARE/MGMT/TRAIN EA 15 MIN 40  3    37467 (CPT®)               Documented Minutes  Total Minutes: 40    Therapy Provided Minutes    15628 - OT Self Care/Mgmt Minutes 40                Therapy Charges for  Today     Code Description Service Date Service Provider Modifiers Qty    37771885173 HC OT SELF CARE/MGMT/TRAIN EA 15 MIN 10/3/2020 Sayra Yang COTA/L GO 3          OT Discharge Summary  Anticipated Discharge Disposition (OT): home with assist  Reason for Discharge: Discharge from facility  Outcomes Achieved: Refer to plan of care for updates on goals achieved  Discharge Destination: Home with assist      YAW Pierre/MARTHA  10/4/2020

## 2020-10-05 ENCOUNTER — TELEPHONE (OUTPATIENT)
Dept: INTERNAL MEDICINE | Facility: CLINIC | Age: 85
End: 2020-10-05

## 2020-10-05 NOTE — TELEPHONE ENCOUNTER
Called to see when the last HBA1c was done. Gave her date of 6.26.20 and result was 7.00. She asked if we could authorize a home health nurse to go out and draw for a new one since it's been a while and last was abnormal.

## 2020-10-05 NOTE — H&P
I have reviewed the patient's office H & P. No changes need to be made.  
no vertigo/no hearing difficulty/no tinnitus/no nasal discharge/no sinus symptoms/no ear pain/no nasal congestion

## 2020-10-05 NOTE — TELEPHONE ENCOUNTER
Called Shelli and informed her that we will do the HBA1c at her appointment on 10.13.20. She will call a couple of days after to ask us to fax results.

## 2020-10-13 ENCOUNTER — OFFICE VISIT (OUTPATIENT)
Dept: INTERNAL MEDICINE | Facility: CLINIC | Age: 85
End: 2020-10-13

## 2020-10-13 VITALS
SYSTOLIC BLOOD PRESSURE: 138 MMHG | HEART RATE: 72 BPM | BODY MASS INDEX: 27.31 KG/M2 | TEMPERATURE: 97.1 F | WEIGHT: 160 LBS | HEIGHT: 64 IN | DIASTOLIC BLOOD PRESSURE: 70 MMHG | OXYGEN SATURATION: 99 %

## 2020-10-13 DIAGNOSIS — I10 BENIGN ESSENTIAL HTN: Primary | ICD-10-CM

## 2020-10-13 DIAGNOSIS — E11.21 CONTROLLED TYPE 2 DIABETES MELLITUS WITH DIABETIC NEPHROPATHY, WITHOUT LONG-TERM CURRENT USE OF INSULIN (HCC): Chronic | ICD-10-CM

## 2020-10-13 PROCEDURE — 99214 OFFICE O/P EST MOD 30 MIN: CPT | Performed by: INTERNAL MEDICINE

## 2020-10-13 NOTE — PROGRESS NOTES
Transitional Care Follow Up Visit  Subjective     Nedra Tolliver is a 84 y.o. female who presents for a transitional care management visit.    Within 48 business hours after discharge our office contacted her via telephone to coordinate her care and needs.      I reviewed and discussed the details of that call along with the discharge summary, hospital problems, inpatient lab results, inpatient diagnostic studies, and consultation reports with Nedra.     Current outpatient and discharge medications have been reconciled for the patient.  Reviewed by: Vijay Rodrigues MD      No flowsheet data found.  Risk for Readmission (LACE) Score: 9 (10/3/2020  5:00 AM)      Patient is here for follow-up and transition to care after having right total knee replacement.  She is had quite a bit of pain she is intolerant of opioids she is using tramadol as well as Tylenol at this time we spent some time talking about the safe usage of acetaminophen and the dose limitations.  She is currently getting home health and doing physical therapy there though they are saying she may be running out of her home health days.     Course During Hospital Stay: See HPI I reviewed patient's hospital course and her records.     The following portions of the patient's history were reviewed and updated as appropriate: allergies, current medications, past family history, past medical history, past social history, past surgical history and problem list.    Review of Systems   Constitutional: Negative for activity change, appetite change and chills.   HENT: Negative for congestion, ear pain and facial swelling.    Eyes: Negative for pain, discharge and itching.   Respiratory: Negative for apnea, cough and shortness of breath.    Cardiovascular: Negative for chest pain, palpitations and leg swelling.   Gastrointestinal: Negative for abdominal distention, abdominal pain and anal bleeding.   Endocrine: Negative for cold intolerance and heat intolerance.    Genitourinary: Negative for difficulty urinating, dysuria and flank pain.   Musculoskeletal: Positive for arthralgias ( Right knee pain right knee pain). Negative for back pain and joint swelling.   Skin: Negative for color change, pallor and rash.   Allergic/Immunologic: Negative for environmental allergies and food allergies.   Neurological: Negative for dizziness and facial asymmetry.   Hematological: Negative for adenopathy. Does not bruise/bleed easily.   Psychiatric/Behavioral: Negative for agitation, behavioral problems and confusion.       Objective   Physical Exam  Constitutional:       Appearance: Normal appearance. She is well-developed.   HENT:      Head: Normocephalic and atraumatic.      Right Ear: External ear normal.      Left Ear: External ear normal.   Eyes:      Extraocular Movements: Extraocular movements intact.      Conjunctiva/sclera: Conjunctivae normal.      Pupils: Pupils are equal, round, and reactive to light.   Neck:      Musculoskeletal: Normal range of motion and neck supple. No neck rigidity.      Vascular: No carotid bruit.   Cardiovascular:      Rate and Rhythm: Normal rate and regular rhythm.      Pulses: Normal pulses.      Heart sounds: Normal heart sounds. No murmur. No gallop.    Pulmonary:      Effort: Pulmonary effort is normal.      Breath sounds: Normal breath sounds.   Musculoskeletal:         General: Swelling ( Right knee is postop.  Wound and incision looks good there is no evidence of drainage) present. No tenderness.   Skin:     General: Skin is warm and dry.   Neurological:      General: No focal deficit present.      Mental Status: She is alert and oriented to person, place, and time.   Psychiatric:         Mood and Affect: Mood normal.         Behavior: Behavior normal.         Assessment/Plan   Diagnoses and all orders for this visit:    1. Benign essential HTN (Primary)  -     Basic Metabolic Panel    2. Controlled type 2 diabetes mellitus with diabetic  nephropathy, without long-term current use of insulin (CMS/[facility])  -     CBC & Differential      We are going to go ahead and get a BMP and CBC today to reevaluate patient's labs.  She also was not eating well I encouraged her to try to get at least 3 meals in a day.  In regard to her pain we talked about the use of Tylenol.  She is already using Ultram on a 4 times a day basis

## 2020-10-14 LAB
BASOPHILS # BLD AUTO: 0.05 10*3/MM3 (ref 0–0.2)
BASOPHILS NFR BLD AUTO: 0.5 % (ref 0–1.5)
BUN SERPL-MCNC: 21 MG/DL (ref 8–23)
BUN/CREAT SERPL: 21.9 (ref 7–25)
CALCIUM SERPL-MCNC: 9.7 MG/DL (ref 8.6–10.5)
CHLORIDE SERPL-SCNC: 94 MMOL/L (ref 98–107)
CO2 SERPL-SCNC: 26.7 MMOL/L (ref 22–29)
CREAT SERPL-MCNC: 0.96 MG/DL (ref 0.57–1)
EOSINOPHIL # BLD AUTO: 0.19 10*3/MM3 (ref 0–0.4)
EOSINOPHIL NFR BLD AUTO: 1.8 % (ref 0.3–6.2)
ERYTHROCYTE [DISTWIDTH] IN BLOOD BY AUTOMATED COUNT: 12.6 % (ref 12.3–15.4)
GLUCOSE SERPL-MCNC: 168 MG/DL (ref 65–99)
HCT VFR BLD AUTO: 30.5 % (ref 34–46.6)
HGB BLD-MCNC: 10.2 G/DL (ref 12–15.9)
IMM GRANULOCYTES # BLD AUTO: 0.08 10*3/MM3 (ref 0–0.05)
IMM GRANULOCYTES NFR BLD AUTO: 0.8 % (ref 0–0.5)
LYMPHOCYTES # BLD AUTO: 1.91 10*3/MM3 (ref 0.7–3.1)
LYMPHOCYTES NFR BLD AUTO: 18.4 % (ref 19.6–45.3)
MCH RBC QN AUTO: 31.2 PG (ref 26.6–33)
MCHC RBC AUTO-ENTMCNC: 33.4 G/DL (ref 31.5–35.7)
MCV RBC AUTO: 93.3 FL (ref 79–97)
MONOCYTES # BLD AUTO: 0.8 10*3/MM3 (ref 0.1–0.9)
MONOCYTES NFR BLD AUTO: 7.7 % (ref 5–12)
NEUTROPHILS # BLD AUTO: 7.35 10*3/MM3 (ref 1.7–7)
NEUTROPHILS NFR BLD AUTO: 70.8 % (ref 42.7–76)
NRBC BLD AUTO-RTO: 0 /100 WBC (ref 0–0.2)
PLATELET # BLD AUTO: 626 10*3/MM3 (ref 140–450)
POTASSIUM SERPL-SCNC: 5.1 MMOL/L (ref 3.5–5.2)
RBC # BLD AUTO: 3.27 10*6/MM3 (ref 3.77–5.28)
SODIUM SERPL-SCNC: 130 MMOL/L (ref 136–145)
WBC # BLD AUTO: 10.38 10*3/MM3 (ref 3.4–10.8)

## 2020-10-15 ENCOUNTER — TELEPHONE (OUTPATIENT)
Dept: INTERNAL MEDICINE | Facility: CLINIC | Age: 85
End: 2020-10-15

## 2020-10-15 RX ORDER — LANOLIN ALCOHOL/MO/W.PET/CERES
325 CREAM (GRAM) TOPICAL
Qty: 90 TABLET | Refills: 5 | Status: SHIPPED | OUTPATIENT
Start: 2020-10-15 | End: 2020-10-26 | Stop reason: SINTOL

## 2020-10-15 NOTE — TELEPHONE ENCOUNTER
Pt called in stating she spoke with someone on yesterday re: iron med.     Pt stated the med wasn't sent to [facility].     Pt is requesting a call back once med is sent in. [phone].

## 2020-10-16 LAB — HBA1C MFR BLD: 7 % (ref 4–6)

## 2020-10-21 ENCOUNTER — TELEPHONE (OUTPATIENT)
Dept: INTERNAL MEDICINE | Facility: CLINIC | Age: 85
End: 2020-10-21

## 2020-10-21 ENCOUNTER — OFFICE VISIT (OUTPATIENT)
Dept: INTERNAL MEDICINE | Facility: CLINIC | Age: 85
End: 2020-10-21

## 2020-10-21 VITALS
SYSTOLIC BLOOD PRESSURE: 152 MMHG | OXYGEN SATURATION: 98 % | DIASTOLIC BLOOD PRESSURE: 64 MMHG | HEIGHT: 64 IN | BODY MASS INDEX: 27.46 KG/M2 | HEART RATE: 73 BPM | TEMPERATURE: 97.1 F

## 2020-10-21 DIAGNOSIS — D64.9 POSTOPERATIVE ANEMIA: ICD-10-CM

## 2020-10-21 DIAGNOSIS — E87.1 HYPONATREMIA: Primary | ICD-10-CM

## 2020-10-21 DIAGNOSIS — T84.84XS PAIN DUE TO TOTAL KNEE REPLACEMENT, SEQUELA: ICD-10-CM

## 2020-10-21 DIAGNOSIS — Z96.659 PAIN DUE TO TOTAL KNEE REPLACEMENT, SEQUELA: ICD-10-CM

## 2020-10-21 DIAGNOSIS — I10 BENIGN ESSENTIAL HTN: ICD-10-CM

## 2020-10-21 PROCEDURE — 99213 OFFICE O/P EST LOW 20 MIN: CPT | Performed by: NURSE PRACTITIONER

## 2020-10-21 RX ORDER — TRAMADOL HYDROCHLORIDE 50 MG/1
50 TABLET ORAL EVERY 6 HOURS PRN
Qty: 20 TABLET | Refills: 0 | Status: SHIPPED | OUTPATIENT
Start: 2020-10-21 | End: 2020-10-26 | Stop reason: SINTOL

## 2020-10-21 RX ORDER — TRAMADOL HYDROCHLORIDE 50 MG/1
50 TABLET ORAL EVERY 6 HOURS PRN
COMMUNITY
End: 2020-10-21 | Stop reason: SDUPTHER

## 2020-10-21 NOTE — PROGRESS NOTES
Subjective   Nedra Tolliver is a 84 y.o. female.   Chief Complaint   Patient presents with   • Hypertension     Brought in bp readings. Patient has felt lightheaded today.        Mrs. Tolliver is a pleasant 84-year-old female who presents the office today related to intermittent increased blood pressure and feeling lightheaded multiple days since her surgery.  Patient had just been seen by Dr. Rodrigues for postsurgical follow-up.  Patient had had right total knee replacement by Dr. Pickens.  Labs were checked on 1016 and noted postoperative anemia and patient was started on iron supplementation.  Patient also has a chronically low sodium level which increases her risk for dizziness.  She is a patient of Dr. Busby at [facility] kidney specialist who has her on a 30 to 33 ounce fluid limit.  Blood pressure was rechecked and notable improvement with blood pressure of 124/64 and rechecked on opposite arm which showed 121/67.  Patient had brought in multiple blood pressure readings from home which ranged from 133/86-15 9/110.  Check patient for orthostatics and was negative.  Patient denies chest pain, shortness of breath, or palpitations.  Her right knee incision is healing appropriately for postop week 3.  Patient has chronic numbness in bilateral feet, denies changes at this time.  Dr. Rodrigues was consulted on this case per patient comfort.  Patient reports that her tramadol has been helping with her acute postoperative pain.  She has been taking every 6 hours to improve her mobility.  Patient is requesting refill on tramadol today for additional 2 to 3 days.  Patient has 1 week for of physical therapy and physical therapist is going to evaluate her this week for possible need for additional therapy.       The following portions of the patient's history were reviewed and updated as appropriate: allergies, current medications, past family history, past medical history, past social history, past surgical  history and problem list.    Review of Systems   Constitutional: Positive for activity change. Negative for appetite change, fatigue, fever, unexpected weight gain and unexpected weight loss.        Acute pain related to postoperative right knee replacement   HENT: Negative for swollen glands, trouble swallowing and voice change.    Eyes: Negative for blurred vision and visual disturbance.   Respiratory: Negative for cough and shortness of breath.    Cardiovascular: Negative for chest pain, palpitations and leg swelling.   Gastrointestinal: Negative for abdominal pain, constipation, diarrhea, nausea, vomiting and indigestion.   Endocrine: Negative for cold intolerance, heat intolerance, polydipsia and polyphagia.   Genitourinary: Negative for dysuria and frequency.   Musculoskeletal: Positive for arthralgias and gait problem. Negative for back pain, joint swelling and neck pain.   Skin: Negative for color change, rash and skin lesions.   Neurological: Positive for light-headedness. Negative for dizziness, weakness, headache, memory problem and confusion.   Hematological: Does not bruise/bleed easily.   Psychiatric/Behavioral: Negative for agitation, hallucinations and suicidal ideas. The patient is not nervous/anxious.        Objective   Past Medical History:   Diagnosis Date   • Arthritis    • Cancer (CMS/HCC)     BCC @ nose & Left arm   • Diabetes type 2, controlled (CMS/HCC)    • Diverticulitis    • History of GI bleed    • History of transfusion     Has had x 5 units.   • Hypertension    • Hyponatremia     Required hospitalization   • Hypothyroidism    • Knee arthropathy 09/30/2020   • LPRD (laryngopharyngeal reflux disease)    • Pharyngeal dysphagia    • Thyroid nodule       Past Surgical History:   Procedure Laterality Date   • APPENDECTOMY     • BASAL CELL CARCINOMA EXCISION     • BELPHAROPTOSIS REPAIR     • BREAST CYST EXCISION     • CATARACT EXTRACTION     • CATARACT EXTRACTION WITH INTRAOCULAR LENS IMPLANT  Bilateral    • CHOLECYSTECTOMY     • COLONOSCOPY Left 11/1/2016    Tubular adenoma cecum, Diverticulosis repeat prn   • SUBTOTAL HYSTERECTOMY     • TOTAL KNEE ARTHROPLASTY Right 9/30/2020    Procedure: TOTAL KNEE ARTHROPLASTY;  Surgeon: Mak Pickens MD;  Location: [facility] OR;  Service: Orthopedics;  Laterality: Right;   • TUMOR EXCISION      under armpits and left breast        Current Outpatient Medications:   •  acetaminophen (TYLENOL) 500 MG tablet, Take 1,000 mg by mouth At Night As Needed for Mild Pain ., Disp: , Rfl:   •  amLODIPine (NORVASC) 2.5 MG tablet, Take 2.5 mg by mouth Every Night., Disp: , Rfl: 3  •  aspirin 81 MG EC tablet, Take 1 tablet by mouth Daily., Disp: , Rfl:   •  Cholecalciferol (VITAMIN D3) 5000 UNITS capsule capsule, Take 5,000 Units by mouth daily., Disp: , Rfl:   •  Coenzyme Q10 (CO Q 10) 100 MG capsule, Take 1 tablet by mouth Daily., Disp: , Rfl:   •  conjugated estrogens (PREMARIN) 0.625 MG/GM vaginal cream, Insert  into the vagina 2 (Two) Times a Week. (Patient taking differently: Insert  into the vagina 2 (Two) Times a Week. Pt does not use regularly), Disp: 30 g, Rfl: 6  •  ferrous sulfate 325 (65 FE) MG EC tablet, Take 1 tablet by mouth 3 (Three) Times a Day With Meals., Disp: 90 tablet, Rfl: 5  •  glimepiride (AMARYL) 4 MG tablet, Take 0.5 tablets by mouth 2 (Two) Times a Day., Disp: 90 tablet, Rfl: 3  •  glucose blood test strip, 1 daily, Disp: 100 each, Rfl: 3  •  glucose monitor monitoring kit, 1 each Daily., Disp: 1 each, Rfl: 0  •  irbesartan (AVAPRO) 150 MG tablet, Take 0.5 tablets by mouth Every Night., Disp: 45 tablet, Rfl: 3  •  Lancets misc, 1 each Daily., Disp: 100 each, Rfl: 3  •  levothyroxine (SYNTHROID, LEVOTHROID) 50 MCG tablet, TAKE 1 TABLET BY MOUTH EVERY DAY, Disp: 90 tablet, Rfl: 3  •  metFORMIN ER (GLUCOPHAGE-XR) 500 MG 24 hr tablet, TAKE 1 TABLET BY MOUTH TWICE DAILY , Disp: 180 tablet, Rfl: 3  •  Multiple Vitamins-Minerals (CENTRUM SILVER PO), Take 1  tablet by mouth Daily., Disp: , Rfl:   •  nebivolol (BYSTOLIC) 2.5 MG tablet, Take 2.5 mg by mouth Every Evening., Disp: , Rfl:   •  ondansetron ODT (ZOFRAN-ODT) 4 MG disintegrating tablet, Take 1 tablet by mouth Every 6 (Six) Hours As Needed for Nausea., Disp: 10 tablet, Rfl: 0  •  Probiotic Product (PROBIOTIC DAILY PO), Take 1 tablet by mouth Daily., Disp: , Rfl:   •  traMADol (ULTRAM) 50 MG tablet, Take 50 mg by mouth Every 6 (Six) Hours As Needed for Moderate Pain ., Disp: , Rfl:   •  TRUE METRIX BLOOD GLUCOSE TEST test strip, TEST TWICE DAILY AS DIRECTED , Disp: 100 each, Rfl: 3  •  triamcinolone (KENALOG) 0.1 % cream, 1 application 2 (Two) Times a Day As Needed., Disp: , Rfl:      Vitals:    10/21/20 1447   BP: 152/64   Pulse: 73   Temp: 97.1 °F (36.2 °C)   SpO2: 98%     There were no vitals filed for this visit.  Patient's Body mass index is 27.46 kg/m². BMI is within normal parameters. No follow-up required..      Physical Exam  Vitals signs and nursing note reviewed.   Constitutional:       Appearance: Normal appearance. She is well-developed.   HENT:      Head: Normocephalic and atraumatic.      Right Ear: Tympanic membrane, ear canal and external ear normal. Decreased hearing noted.      Left Ear: Tympanic membrane, ear canal and external ear normal. Decreased hearing noted.      Nose: Nose normal.      Mouth/Throat:      Lips: Pink.      Mouth: Mucous membranes are moist.      Pharynx: Oropharynx is clear. Uvula midline.   Eyes:      General: Lids are normal. Lids are everted, no foreign bodies appreciated. Vision grossly intact.      Conjunctiva/sclera: Conjunctivae normal.      Pupils: Pupils are equal, round, and reactive to light.   Neck:      Musculoskeletal: Full passive range of motion without pain, normal range of motion and neck supple.      Thyroid: No thyromegaly.   Cardiovascular:      Rate and Rhythm: Normal rate and regular rhythm.      Pulses: Normal pulses.      Heart sounds: Normal heart  sounds.      Comments: Right lower extremity edema related to postoperative changes, appropriate for healing  Pulmonary:      Effort: Pulmonary effort is normal.      Breath sounds: Normal breath sounds.   Abdominal:      General: Bowel sounds are normal.      Palpations: Abdomen is soft.   Musculoskeletal:      Right lower le+ Edema present.   Lymphadenopathy:      Cervical: No cervical adenopathy.   Skin:     General: Skin is warm and dry.      Capillary Refill: Capillary refill takes less than 2 seconds.      Findings: Erythema present.          Neurological:      Mental Status: She is alert and oriented to person, place, and time.   Psychiatric:         Attention and Perception: Attention normal.         Mood and Affect: Mood normal.         Behavior: Behavior normal. Behavior is cooperative.         Thought Content: Thought content normal.           Assessment/Plan   Diagnoses and all orders for this visit:    1. Hyponatremia (Primary)    2. Benign essential HTN    3. Pain due to total knee replacement, sequela    4. Postoperative anemia      Patient will continue on iron supplementation.  We will follow parameters set by Dr. Busby to limit fluids to maintain hyponatremia.  We will continue monitoring blood pressure will adjust when she takes her blood pressure medicine.  Patient will take her Norvasc in the evening and a irbesartan in the morning.  Patient will hold irbesartan tonight and take it first thing in the morning.  Patient verbalized understanding, did write down on a piece of paper for her to take back for reference.  Will have patient return sooner than December appointment if continuing to feel lightheaded and fluctuating blood pressures.  However, when I rechecked her blood pressure multiple times in the office she was 120s over 60s with a controlled heart rate.  Will consider rechecking CBC in 1 month to make sure anemia has improved.

## 2020-10-26 ENCOUNTER — OFFICE VISIT (OUTPATIENT)
Dept: INTERNAL MEDICINE | Facility: CLINIC | Age: 85
End: 2020-10-26

## 2020-10-26 ENCOUNTER — TELEPHONE (OUTPATIENT)
Dept: INTERNAL MEDICINE | Facility: CLINIC | Age: 85
End: 2020-10-26

## 2020-10-26 VITALS
WEIGHT: 140 LBS | OXYGEN SATURATION: 99 % | DIASTOLIC BLOOD PRESSURE: 72 MMHG | SYSTOLIC BLOOD PRESSURE: 124 MMHG | BODY MASS INDEX: 23.9 KG/M2 | TEMPERATURE: 97.8 F | HEART RATE: 78 BPM | HEIGHT: 64 IN

## 2020-10-26 DIAGNOSIS — K29.00 ACUTE GASTRITIS WITHOUT HEMORRHAGE, UNSPECIFIED GASTRITIS TYPE: Primary | ICD-10-CM

## 2020-10-26 DIAGNOSIS — D50.8 OTHER IRON DEFICIENCY ANEMIA: ICD-10-CM

## 2020-10-26 DIAGNOSIS — R39.9 UTI SYMPTOMS: ICD-10-CM

## 2020-10-26 PROBLEM — D64.9 ABSOLUTE ANEMIA: Status: ACTIVE | Noted: 2020-10-26

## 2020-10-26 LAB
BILIRUB BLD-MCNC: NEGATIVE MG/DL
CLARITY, POC: ABNORMAL
COLOR UR: YELLOW
GLUCOSE UR STRIP-MCNC: NEGATIVE MG/DL
KETONES UR QL: ABNORMAL
LEUKOCYTE EST, POC: ABNORMAL
NITRITE UR-MCNC: NEGATIVE MG/ML
PH UR: 5.5 [PH] (ref 5–8)
PROT UR STRIP-MCNC: ABNORMAL MG/DL
RBC # UR STRIP: ABNORMAL /UL
SP GR UR: 1.02 (ref 1–1.03)
UROBILINOGEN UR QL: NORMAL

## 2020-10-26 PROCEDURE — 99214 OFFICE O/P EST MOD 30 MIN: CPT | Performed by: INTERNAL MEDICINE

## 2020-10-26 PROCEDURE — 81003 URINALYSIS AUTO W/O SCOPE: CPT | Performed by: INTERNAL MEDICINE

## 2020-10-26 RX ORDER — PANTOPRAZOLE SODIUM 40 MG/1
40 TABLET, DELAYED RELEASE ORAL DAILY
Qty: 30 TABLET | Refills: 1 | Status: SHIPPED | OUTPATIENT
Start: 2020-10-26 | End: 2021-04-29

## 2020-10-26 NOTE — PROGRESS NOTES
Subjective   Nedra Tolliver is a 84 y.o. female.   Chief Complaint   Patient presents with   • Nausea     started when she stared taking iron, started about a week ago   • Dizziness   • Urinary Tract Infection     burining        Ms. Tolliver is having quite a bit of abdominal discomfort she is feeling weak she is status post right total knee replacement and having quite a bit of problems with medications.  Specifically she is intolerant to her iron therapy she is intolerant to her pain medications most recently tramadol.       The following portions of the patient's history were reviewed and updated as appropriate: allergies, current medications, past family history, past medical history, past social history, past surgical history and problem list.    Review of Systems   Constitutional: Positive for fatigue. Negative for activity change, appetite change, fever, unexpected weight gain and unexpected weight loss.   HENT: Negative for swollen glands, trouble swallowing and voice change.    Eyes: Negative for blurred vision and visual disturbance.   Respiratory: Negative for cough and shortness of breath.    Cardiovascular: Negative for chest pain, palpitations and leg swelling.   Gastrointestinal: Positive for abdominal pain, nausea and indigestion. Negative for constipation, diarrhea and vomiting.   Endocrine: Negative for cold intolerance, heat intolerance, polydipsia and polyphagia.   Genitourinary: Negative for dysuria and frequency.   Musculoskeletal: Positive for arthralgias. Negative for back pain, joint swelling and neck pain.   Skin: Negative for color change, rash and skin lesions.   Neurological: Negative for dizziness, weakness, headache, memory problem and confusion.   Hematological: Does not bruise/bleed easily.   Psychiatric/Behavioral: Negative for agitation, hallucinations and suicidal ideas. The patient is not nervous/anxious.        Objective   Past Medical History:   Diagnosis Date   • Arthritis     • Cancer (CMS/HCC)     BCC @ nose & Left arm   • Diabetes type 2, controlled (CMS/HCC)    • Diverticulitis    • History of GI bleed    • History of transfusion     Has had x 5 units.   • Hypertension    • Hyponatremia     Required hospitalization   • Hypothyroidism    • Knee arthropathy 09/30/2020   • LPRD (laryngopharyngeal reflux disease)    • Pharyngeal dysphagia    • Thyroid nodule       Past Surgical History:   Procedure Laterality Date   • APPENDECTOMY     • BASAL CELL CARCINOMA EXCISION     • BELPHAROPTOSIS REPAIR     • BREAST CYST EXCISION     • CATARACT EXTRACTION     • CATARACT EXTRACTION WITH INTRAOCULAR LENS IMPLANT Bilateral    • CHOLECYSTECTOMY     • COLONOSCOPY Left 11/1/2016    Tubular adenoma cecum, Diverticulosis repeat prn   • SUBTOTAL HYSTERECTOMY     • TOTAL KNEE ARTHROPLASTY Right 9/30/2020    Procedure: TOTAL KNEE ARTHROPLASTY;  Surgeon: Mak Pickens MD;  Location: [facility];  Service: Orthopedics;  Laterality: Right;   • TUMOR EXCISION      under armpits and left breast        Current Outpatient Medications:   •  acetaminophen (TYLENOL) 500 MG tablet, Take 1,000 mg by mouth At Night As Needed for Mild Pain ., Disp: , Rfl:   •  amLODIPine (NORVASC) 2.5 MG tablet, Take 2.5 mg by mouth Every Night., Disp: , Rfl: 3  •  aspirin 81 MG EC tablet, Take 1 tablet by mouth Daily., Disp: , Rfl:   •  Cholecalciferol (VITAMIN D3) 5000 UNITS capsule capsule, Take 5,000 Units by mouth daily., Disp: , Rfl:   •  Coenzyme Q10 (CO Q 10) 100 MG capsule, Take 1 tablet by mouth Daily., Disp: , Rfl:   •  conjugated estrogens (PREMARIN) 0.625 MG/GM vaginal cream, Insert  into the vagina 2 (Two) Times a Week. (Patient taking differently: Insert  into the vagina 2 (Two) Times a Week. Pt does not use regularly), Disp: 30 g, Rfl: 6  •  glimepiride (AMARYL) 4 MG tablet, Take 0.5 tablets by mouth 2 (Two) Times a Day., Disp: 90 tablet, Rfl: 3  •  glucose blood test strip, 1 daily, Disp: 100 each, Rfl: 3  •  glucose  monitor monitoring kit, 1 each Daily., Disp: 1 each, Rfl: 0  •  irbesartan (AVAPRO) 150 MG tablet, Take 0.5 tablets by mouth Every Night., Disp: 45 tablet, Rfl: 3  •  Lancets misc, 1 each Daily., Disp: 100 each, Rfl: 3  •  levothyroxine (SYNTHROID, LEVOTHROID) 50 MCG tablet, TAKE 1 TABLET BY MOUTH EVERY DAY, Disp: 90 tablet, Rfl: 3  •  metFORMIN ER (GLUCOPHAGE-XR) 500 MG 24 hr tablet, TAKE 1 TABLET BY MOUTH TWICE DAILY , Disp: 180 tablet, Rfl: 3  •  Multiple Vitamins-Minerals (CENTRUM SILVER PO), Take 1 tablet by mouth Daily., Disp: , Rfl:   •  nebivolol (BYSTOLIC) 2.5 MG tablet, Take 2.5 mg by mouth Every Evening., Disp: , Rfl:   •  TRUE METRIX BLOOD GLUCOSE TEST test strip, TEST TWICE DAILY AS DIRECTED , Disp: 100 each, Rfl: 3  •  pantoprazole (Protonix) 40 MG EC tablet, Take 1 tablet by mouth Daily., Disp: 30 tablet, Rfl: 1  •  Probiotic Product (PROBIOTIC DAILY PO), Take 1 tablet by mouth Daily., Disp: , Rfl:   •  triamcinolone (KENALOG) 0.1 % cream, 1 application 2 (Two) Times a Day As Needed., Disp: , Rfl:      Vitals:    10/26/20 1525   BP: 124/72   Pulse: 78   Temp: 97.8 °F (36.6 °C)   SpO2: 99%         10/26/20  1525   Weight: 63.5 kg (140 lb)     Patient's Body mass index is 24.03 kg/m². BMI is .      Physical Exam  Constitutional:       Appearance: Normal appearance. She is well-developed.   HENT:      Head: Normocephalic and atraumatic.      Right Ear: External ear normal.      Left Ear: External ear normal.   Eyes:      Extraocular Movements: Extraocular movements intact.      Conjunctiva/sclera: Conjunctivae normal.      Pupils: Pupils are equal, round, and reactive to light.   Neck:      Musculoskeletal: Normal range of motion and neck supple. No neck rigidity.      Vascular: No carotid bruit.   Cardiovascular:      Rate and Rhythm: Normal rate and regular rhythm.      Pulses: Normal pulses.      Heart sounds: Normal heart sounds. No murmur. No gallop.    Pulmonary:      Effort: Pulmonary effort is  normal.      Breath sounds: Normal breath sounds.   Abdominal:      General: Bowel sounds are normal.      Palpations: Abdomen is soft.   Musculoskeletal: Normal range of motion.         General: No swelling or tenderness.   Skin:     General: Skin is warm and dry.   Neurological:      General: No focal deficit present.      Mental Status: She is alert and oriented to person, place, and time.   Psychiatric:         Mood and Affect: Mood normal.         Behavior: Behavior normal.               Assessment/Plan   Diagnoses and all orders for this visit:    1. Acute gastritis without hemorrhage, unspecified gastritis type (Primary)    2. UTI symptoms  -     POC Urinalysis Dipstick, Automated    3. Other iron deficiency anemia    Other orders  -     pantoprazole (Protonix) 40 MG EC tablet; Take 1 tablet by mouth Daily.  Dispense: 30 tablet; Refill: 1        Start patient on Protonix hopefully she will get her stomach much more comfortable in the next few days if she cannot tolerate iron and she cannot build her counts back up we may have to give her some IV iron therapy.  We will see her back in about 2 weeks

## 2020-10-26 NOTE — TELEPHONE ENCOUNTER
Gm, Nedra Escoto is c/o sickness to her stomach. Cant eat and feel very weak, Dr. Rodrigues is at 23 pt's. Can I add her on? She wants to see Dr. Rodrigues only    [phone]    Pt is requesting to be seen after 2 pm due to transportation.

## 2020-11-12 ENCOUNTER — OFFICE VISIT (OUTPATIENT)
Dept: INTERNAL MEDICINE | Facility: CLINIC | Age: 85
End: 2020-11-12

## 2020-11-12 VITALS
TEMPERATURE: 97.3 F | SYSTOLIC BLOOD PRESSURE: 170 MMHG | WEIGHT: 142 LBS | HEART RATE: 67 BPM | BODY MASS INDEX: 24.24 KG/M2 | HEIGHT: 64 IN | OXYGEN SATURATION: 99 % | DIASTOLIC BLOOD PRESSURE: 72 MMHG

## 2020-11-12 DIAGNOSIS — T38.3X5A ADVERSE EFFECT OF METFORMIN, INITIAL ENCOUNTER: ICD-10-CM

## 2020-11-12 DIAGNOSIS — R10.30 LOWER ABDOMINAL PAIN: Primary | ICD-10-CM

## 2020-11-12 DIAGNOSIS — I10 BENIGN ESSENTIAL HTN: ICD-10-CM

## 2020-11-12 PROCEDURE — 99213 OFFICE O/P EST LOW 20 MIN: CPT | Performed by: INTERNAL MEDICINE

## 2020-11-12 RX ORDER — GLIMEPIRIDE 4 MG/1
4 TABLET ORAL
Qty: 90 TABLET | Refills: 3 | Status: SHIPPED | OUTPATIENT
Start: 2020-11-12 | End: 2022-02-01

## 2020-11-12 NOTE — PROGRESS NOTES
Subjective   Nedra Tolliver is a 84 y.o. female.   Chief Complaint   Patient presents with   • Follow-up     2 week follow up : stomach       Is a follow-up for patient who has had multiple episodes of diverticulitis she is having some lower abdominal pain at this point.  She is not running temperatures she is not nauseated she has not had no vomiting and there is been no change in her stools.       The following portions of the patient's history were reviewed and updated as appropriate: allergies, current medications, past family history, past medical history, past social history, past surgical history and problem list.    Review of Systems   Constitutional: Negative for activity change, appetite change, fatigue, fever, unexpected weight gain and unexpected weight loss.   HENT: Negative for swollen glands, trouble swallowing and voice change.    Eyes: Negative for blurred vision and visual disturbance.   Respiratory: Negative for cough and shortness of breath.    Cardiovascular: Negative for chest pain, palpitations and leg swelling.   Gastrointestinal: Positive for abdominal pain. Negative for constipation, diarrhea, nausea, vomiting and indigestion.   Endocrine: Negative for cold intolerance, heat intolerance, polydipsia and polyphagia.   Genitourinary: Negative for dysuria and frequency.   Musculoskeletal: Negative for arthralgias, back pain, joint swelling and neck pain.   Skin: Negative for color change, rash and skin lesions.   Neurological: Negative for dizziness, weakness, headache, memory problem and confusion.   Hematological: Does not bruise/bleed easily.   Psychiatric/Behavioral: Negative for agitation, hallucinations and suicidal ideas. The patient is not nervous/anxious.        Objective   Past Medical History:   Diagnosis Date   • Arthritis    • Cancer (CMS/HCC)     BCC @ nose & Left arm   • Diabetes type 2, controlled (CMS/HCC)    • Diverticulitis    • History of GI bleed    • History of  transfusion     Has had x 5 units.   • Hypertension    • Hyponatremia     Required hospitalization   • Hypothyroidism    • Knee arthropathy 09/30/2020   • LPRD (laryngopharyngeal reflux disease)    • Pharyngeal dysphagia    • Thyroid nodule       Past Surgical History:   Procedure Laterality Date   • APPENDECTOMY     • BASAL CELL CARCINOMA EXCISION     • BELPHAROPTOSIS REPAIR     • BREAST CYST EXCISION     • CATARACT EXTRACTION     • CATARACT EXTRACTION WITH INTRAOCULAR LENS IMPLANT Bilateral    • CHOLECYSTECTOMY     • COLONOSCOPY Left 11/1/2016    Tubular adenoma cecum, Diverticulosis repeat prn   • SUBTOTAL HYSTERECTOMY     • TOTAL KNEE ARTHROPLASTY Right 9/30/2020    Procedure: TOTAL KNEE ARTHROPLASTY;  Surgeon: Mak Pickens MD;  Location: [facility] OR;  Service: Orthopedics;  Laterality: Right;   • TUMOR EXCISION      under armpits and left breast        Current Outpatient Medications:   •  acetaminophen (TYLENOL) 500 MG tablet, Take 1,000 mg by mouth At Night As Needed for Mild Pain ., Disp: , Rfl:   •  amLODIPine (NORVASC) 2.5 MG tablet, Take 2.5 mg by mouth Every Night., Disp: , Rfl: 3  •  aspirin 81 MG EC tablet, Take 1 tablet by mouth Daily., Disp: , Rfl:   •  Cholecalciferol (VITAMIN D3) 5000 UNITS capsule capsule, Take 5,000 Units by mouth daily., Disp: , Rfl:   •  Coenzyme Q10 (CO Q 10) 100 MG capsule, Take 1 tablet by mouth Daily., Disp: , Rfl:   •  conjugated estrogens (PREMARIN) 0.625 MG/GM vaginal cream, Insert  into the vagina 2 (Two) Times a Week. (Patient taking differently: Insert  into the vagina 2 (Two) Times a Week. Pt does not use regularly), Disp: 30 g, Rfl: 6  •  glimepiride (AMARYL) 4 MG tablet, Take 1 tablet by mouth Every Morning Before Breakfast., Disp: 90 tablet, Rfl: 3  •  glucose blood test strip, 1 daily, Disp: 100 each, Rfl: 3  •  glucose monitor monitoring kit, 1 each Daily., Disp: 1 each, Rfl: 0  •  irbesartan (AVAPRO) 150 MG tablet, Take 0.5 tablets by mouth Every Night.,  Disp: 45 tablet, Rfl: 3  •  Lancets misc, 1 each Daily., Disp: 100 each, Rfl: 3  •  levothyroxine (SYNTHROID, LEVOTHROID) 50 MCG tablet, TAKE 1 TABLET BY MOUTH EVERY DAY, Disp: 90 tablet, Rfl: 3  •  Multiple Vitamins-Minerals (CENTRUM SILVER PO), Take 1 tablet by mouth Daily., Disp: , Rfl:   •  nebivolol (BYSTOLIC) 2.5 MG tablet, Take 2.5 mg by mouth Every Evening., Disp: , Rfl:   •  pantoprazole (Protonix) 40 MG EC tablet, Take 1 tablet by mouth Daily., Disp: 30 tablet, Rfl: 1  •  Probiotic Product (PROBIOTIC DAILY PO), Take 1 tablet by mouth Daily., Disp: , Rfl:   •  triamcinolone (KENALOG) 0.1 % cream, 1 application 2 (Two) Times a Day As Needed., Disp: , Rfl:   •  TRUE METRIX BLOOD GLUCOSE TEST test strip, TEST TWICE DAILY AS DIRECTED , Disp: 100 each, Rfl: 3     Vitals:    11/12/20 1138   BP: 170/72   Pulse: 67   Temp: 97.3 °F (36.3 °C)   SpO2: 99%         11/12/20  1138   Weight: 64.4 kg (142 lb)     Patient's Body mass index is 24.37 kg/m². BMI is .      Physical Exam  Constitutional:       Appearance: Normal appearance. She is well-developed.   HENT:      Head: Normocephalic and atraumatic.      Right Ear: External ear normal.      Left Ear: External ear normal.   Eyes:      Extraocular Movements: Extraocular movements intact.      Conjunctiva/sclera: Conjunctivae normal.      Pupils: Pupils are equal, round, and reactive to light.   Neck:      Musculoskeletal: Normal range of motion and neck supple. No neck rigidity.      Vascular: No carotid bruit.   Cardiovascular:      Rate and Rhythm: Normal rate and regular rhythm.      Pulses: Normal pulses.      Heart sounds: Normal heart sounds. No murmur. No gallop.    Pulmonary:      Effort: Pulmonary effort is normal.      Breath sounds: Normal breath sounds.   Abdominal:      General: Bowel sounds are normal.      Palpations: Abdomen is soft.   Musculoskeletal: Normal range of motion.         General: No swelling or tenderness.   Skin:     General: Skin is warm  and dry.   Neurological:      General: No focal deficit present.      Mental Status: She is alert and oriented to person, place, and time.   Psychiatric:         Mood and Affect: Mood normal.         Behavior: Behavior normal.               Assessment/Plan   Diagnoses and all orders for this visit:    1. Lower abdominal pain (Primary)    2. Adverse effect of metformin, initial encounter    3. Benign essential HTN    Other orders  -     glimepiride (AMARYL) 4 MG tablet; Take 1 tablet by mouth Every Morning Before Breakfast.  Dispense: 90 tablet; Refill: 3        I have asked patient to stop her Metformin at this point as this may be causing her abdominal discomfort her sugars are in reasonably good range her blood pressure is slightly elevated today but that may be from her abdominal pain I have asked her to call me if this does not resolve the issue otherwise we will see her back in about 3 months

## 2020-12-04 RX ORDER — NEBIVOLOL HYDROCHLORIDE 2.5 MG/1
TABLET ORAL
Qty: 90 TABLET | Refills: 3 | Status: SHIPPED | OUTPATIENT
Start: 2020-12-04 | End: 2021-07-23

## 2020-12-08 ENCOUNTER — OFFICE VISIT (OUTPATIENT)
Dept: INTERNAL MEDICINE | Facility: CLINIC | Age: 85
End: 2020-12-08

## 2020-12-08 VITALS
HEIGHT: 64 IN | DIASTOLIC BLOOD PRESSURE: 70 MMHG | TEMPERATURE: 97.3 F | OXYGEN SATURATION: 99 % | WEIGHT: 143 LBS | HEART RATE: 64 BPM | SYSTOLIC BLOOD PRESSURE: 170 MMHG | BODY MASS INDEX: 24.41 KG/M2

## 2020-12-08 DIAGNOSIS — E78.00 HIGH CHOLESTEROL: ICD-10-CM

## 2020-12-08 DIAGNOSIS — E11.21 CONTROLLED TYPE 2 DIABETES MELLITUS WITH DIABETIC NEPHROPATHY, WITHOUT LONG-TERM CURRENT USE OF INSULIN (HCC): Primary | Chronic | ICD-10-CM

## 2020-12-08 DIAGNOSIS — I10 BENIGN ESSENTIAL HTN: ICD-10-CM

## 2020-12-08 PROCEDURE — 99214 OFFICE O/P EST MOD 30 MIN: CPT | Performed by: INTERNAL MEDICINE

## 2020-12-08 NOTE — PROGRESS NOTES
Subjective   Nedra Tolliver is a 85 y.o. female.   Chief Complaint   Patient presents with   • Hypertension     6 month follow up    • Diabetes     7.0 on 10/16/2020       Patient is here for routine follow-up she has hypertension which is not well controlled at this point she has diabetes hypothyroidism she is taking her medications appropriately       The following portions of the patient's history were reviewed and updated as appropriate: allergies, current medications, past family history, past medical history, past social history, past surgical history and problem list.    Review of Systems   Constitutional: Negative for activity change, appetite change, fatigue, fever, unexpected weight gain and unexpected weight loss.   HENT: Negative for swollen glands, trouble swallowing and voice change.    Eyes: Negative for blurred vision and visual disturbance.   Respiratory: Negative for cough and shortness of breath.    Cardiovascular: Negative for chest pain, palpitations and leg swelling.   Gastrointestinal: Negative for abdominal pain, constipation, diarrhea, nausea, vomiting and indigestion.   Endocrine: Negative for cold intolerance, heat intolerance, polydipsia and polyphagia.   Genitourinary: Negative for dysuria and frequency.   Musculoskeletal: Negative for arthralgias, back pain, joint swelling and neck pain.   Skin: Negative for color change, rash and skin lesions.   Neurological: Negative for dizziness, weakness, headache, memory problem and confusion.   Hematological: Does not bruise/bleed easily.   Psychiatric/Behavioral: Negative for agitation, hallucinations and suicidal ideas. The patient is not nervous/anxious.        Objective   Past Medical History:   Diagnosis Date   • Arthritis    • Cancer (CMS/HCC)     BCC @ nose & Left arm   • Diabetes type 2, controlled (CMS/HCC)    • Diverticulitis    • History of GI bleed    • History of transfusion     Has had x 5 units.   • Hypertension    • Hyponatremia      Required hospitalization   • Hypothyroidism    • Knee arthropathy 09/30/2020   • LPRD (laryngopharyngeal reflux disease)    • Pharyngeal dysphagia    • Thyroid nodule       Past Surgical History:   Procedure Laterality Date   • APPENDECTOMY     • BASAL CELL CARCINOMA EXCISION     • BELPHAROPTOSIS REPAIR     • BREAST CYST EXCISION     • CATARACT EXTRACTION     • CATARACT EXTRACTION WITH INTRAOCULAR LENS IMPLANT Bilateral    • CHOLECYSTECTOMY     • COLONOSCOPY Left 11/1/2016    Tubular adenoma cecum, Diverticulosis repeat prn   • SUBTOTAL HYSTERECTOMY     • TOTAL KNEE ARTHROPLASTY Right 9/30/2020    Procedure: TOTAL KNEE ARTHROPLASTY;  Surgeon: Mak Pickens MD;  Location: [facility];  Service: Orthopedics;  Laterality: Right;   • TUMOR EXCISION      under armpits and left breast        Current Outpatient Medications:   •  acetaminophen (TYLENOL) 500 MG tablet, Take 1,000 mg by mouth At Night As Needed for Mild Pain ., Disp: , Rfl:   •  amLODIPine (NORVASC) 2.5 MG tablet, Take 2.5 mg by mouth Every Night., Disp: , Rfl: 3  •  aspirin 81 MG EC tablet, Take 1 tablet by mouth Daily., Disp: , Rfl:   •  Bystolic 2.5 MG tablet, TAKE 1 TABLET BY MOUTH DAILY , Disp: 90 tablet, Rfl: 3  •  Cholecalciferol (VITAMIN D3) 5000 UNITS capsule capsule, Take 5,000 Units by mouth daily., Disp: , Rfl:   •  Coenzyme Q10 (CO Q 10) 100 MG capsule, Take 1 tablet by mouth Daily., Disp: , Rfl:   •  conjugated estrogens (PREMARIN) 0.625 MG/GM vaginal cream, Insert  into the vagina 2 (Two) Times a Week. (Patient taking differently: Insert  into the vagina 2 (Two) Times a Week. Pt does not use regularly), Disp: 30 g, Rfl: 6  •  glimepiride (AMARYL) 4 MG tablet, Take 1 tablet by mouth Every Morning Before Breakfast., Disp: 90 tablet, Rfl: 3  •  glucose blood test strip, 1 daily, Disp: 100 each, Rfl: 3  •  glucose monitor monitoring kit, 1 each Daily., Disp: 1 each, Rfl: 0  •  irbesartan (AVAPRO) 150 MG tablet, Take 0.5 tablets by mouth  Every Night., Disp: 45 tablet, Rfl: 3  •  Lancets misc, 1 each Daily., Disp: 100 each, Rfl: 3  •  levothyroxine (SYNTHROID, LEVOTHROID) 50 MCG tablet, TAKE 1 TABLET BY MOUTH EVERY DAY, Disp: 90 tablet, Rfl: 3  •  Multiple Vitamins-Minerals (CENTRUM SILVER PO), Take 1 tablet by mouth Daily., Disp: , Rfl:   •  pantoprazole (Protonix) 40 MG EC tablet, Take 1 tablet by mouth Daily., Disp: 30 tablet, Rfl: 1  •  Probiotic Product (PROBIOTIC DAILY PO), Take 1 tablet by mouth Daily., Disp: , Rfl:   •  TRUE METRIX BLOOD GLUCOSE TEST test strip, TEST TWICE DAILY AS DIRECTED , Disp: 100 each, Rfl: 3     Vitals:    12/08/20 1408   BP: 170/70   Pulse: 64   Temp: 97.3 °F (36.3 °C)   SpO2: 99%         12/08/20  1408   Weight: 64.9 kg (143 lb)     Patient's Body mass index is 24.55 kg/m². BMI is .      Physical Exam  Constitutional:       Appearance: Normal appearance. She is well-developed.   HENT:      Head: Normocephalic and atraumatic.      Right Ear: External ear normal.      Left Ear: External ear normal.   Eyes:      Extraocular Movements: Extraocular movements intact.      Conjunctiva/sclera: Conjunctivae normal.      Pupils: Pupils are equal, round, and reactive to light.   Neck:      Musculoskeletal: Normal range of motion and neck supple. No neck rigidity.      Vascular: No carotid bruit.   Cardiovascular:      Rate and Rhythm: Normal rate and regular rhythm.      Pulses: Normal pulses.      Heart sounds: Normal heart sounds. No murmur. No gallop.    Pulmonary:      Effort: Pulmonary effort is normal.      Breath sounds: Normal breath sounds.   Musculoskeletal: Normal range of motion.         General: No swelling or tenderness.   Skin:     General: Skin is warm and dry.   Neurological:      General: No focal deficit present.      Mental Status: She is alert and oriented to person, place, and time.   Psychiatric:         Mood and Affect: Mood normal.         Behavior: Behavior normal.               Assessment/Plan    Diagnoses and all orders for this visit:    1. Controlled type 2 diabetes mellitus with diabetic nephropathy, without long-term current use of insulin (CMS/[facility]) (Primary)  -     Basic Metabolic Panel  -     CBC & Differential    2. Benign essential HTN  -     Basic Metabolic Panel    3. High cholesterol      Patient is a reasonably well-controlled diabetic she is experiencing some neuropathy her blood pressure is not as well controlled is with prefer she is scheduled to back in the office for a follow-up in 6 months.  No change in medications are entertained at this point

## 2020-12-09 LAB
BASOPHILS # BLD AUTO: 0.08 10*3/MM3 (ref 0–0.2)
BASOPHILS NFR BLD AUTO: 1 % (ref 0–1.5)
BUN SERPL-MCNC: 18 MG/DL (ref 8–23)
BUN/CREAT SERPL: 20.2 (ref 7–25)
CALCIUM SERPL-MCNC: 10.3 MG/DL (ref 8.6–10.5)
CHLORIDE SERPL-SCNC: 98 MMOL/L (ref 98–107)
CO2 SERPL-SCNC: 26.2 MMOL/L (ref 22–29)
CREAT SERPL-MCNC: 0.89 MG/DL (ref 0.57–1)
EOSINOPHIL # BLD AUTO: 0.85 10*3/MM3 (ref 0–0.4)
EOSINOPHIL NFR BLD AUTO: 10.3 % (ref 0.3–6.2)
ERYTHROCYTE [DISTWIDTH] IN BLOOD BY AUTOMATED COUNT: 13.5 % (ref 12.3–15.4)
GLUCOSE SERPL-MCNC: 86 MG/DL (ref 65–99)
HCT VFR BLD AUTO: 35.9 % (ref 34–46.6)
HGB BLD-MCNC: 11.6 G/DL (ref 12–15.9)
IMM GRANULOCYTES # BLD AUTO: 0.03 10*3/MM3 (ref 0–0.05)
IMM GRANULOCYTES NFR BLD AUTO: 0.4 % (ref 0–0.5)
LYMPHOCYTES # BLD AUTO: 2.83 10*3/MM3 (ref 0.7–3.1)
LYMPHOCYTES NFR BLD AUTO: 34.3 % (ref 19.6–45.3)
MCH RBC QN AUTO: 30.9 PG (ref 26.6–33)
MCHC RBC AUTO-ENTMCNC: 32.3 G/DL (ref 31.5–35.7)
MCV RBC AUTO: 95.7 FL (ref 79–97)
MONOCYTES # BLD AUTO: 0.89 10*3/MM3 (ref 0.1–0.9)
MONOCYTES NFR BLD AUTO: 10.8 % (ref 5–12)
NEUTROPHILS # BLD AUTO: 3.56 10*3/MM3 (ref 1.7–7)
NEUTROPHILS NFR BLD AUTO: 43.2 % (ref 42.7–76)
NRBC BLD AUTO-RTO: 0.1 /100 WBC (ref 0–0.2)
PLATELET # BLD AUTO: 280 10*3/MM3 (ref 140–450)
POTASSIUM SERPL-SCNC: 4.2 MMOL/L (ref 3.5–5.2)
RBC # BLD AUTO: 3.75 10*6/MM3 (ref 3.77–5.28)
SODIUM SERPL-SCNC: 136 MMOL/L (ref 136–145)
WBC # BLD AUTO: 8.24 10*3/MM3 (ref 3.4–10.8)

## 2020-12-11 ENCOUNTER — TELEPHONE (OUTPATIENT)
Dept: INTERNAL MEDICINE | Facility: CLINIC | Age: 85
End: 2020-12-11

## 2020-12-11 NOTE — TELEPHONE ENCOUNTER
PATIENT CALLED STATING THAT SHE WAS MAILED A LIST OF HER MEDS AND SHE HAS SEVERAL QUESTIONS ABOUT THEM AND HER TEST RESULTS.    GOOD CALL BACK:  [phone]

## 2021-01-04 ENCOUNTER — TELEPHONE (OUTPATIENT)
Dept: INTERNAL MEDICINE | Facility: CLINIC | Age: 86
End: 2021-01-04

## 2021-01-04 NOTE — TELEPHONE ENCOUNTER
Pt called in stating she has trouble walking to mailbox due to neuropathy. Pt is requesting a letter from Dr. Rodrigues stating she needs mailbox moved closer to the house.     Pt is requesting a call back at [phone].

## 2021-01-07 RX ORDER — AMLODIPINE BESYLATE 2.5 MG/1
2.5 TABLET ORAL 2 TIMES DAILY
Qty: 180 TABLET | Refills: 3 | Status: SHIPPED | OUTPATIENT
Start: 2021-01-07 | End: 2021-05-09 | Stop reason: HOSPADM

## 2021-01-11 ENCOUNTER — TELEPHONE (OUTPATIENT)
Dept: INTERNAL MEDICINE | Facility: CLINIC | Age: 86
End: 2021-01-11

## 2021-01-11 NOTE — TELEPHONE ENCOUNTER
Pt stated she has several food and drug allergies. Pt stated she wants to know if its ok to get the vaccine. Per pt, she was told with the allergies she doesn't need to take it.    I asked pt what allergies she has:    Shrimp  Loratatricia Pretty  Pt stated there's more but she can't remember the name of them.    I advised to follow the CDC guidelines per Dr. Rodrigues. Pt is still requesting a call back.     [phone].

## 2021-01-12 NOTE — TELEPHONE ENCOUNTER
Patient was called and informed of guidelines. Patient advised to call the health department with any other questions or concerns.

## 2021-03-29 ENCOUNTER — TELEPHONE (OUTPATIENT)
Dept: INTERNAL MEDICINE | Facility: CLINIC | Age: 86
End: 2021-03-29

## 2021-03-29 NOTE — TELEPHONE ENCOUNTER
Caller: Nedra Tolliver    Relationship: Self    Best call back number: [phone]    Who are you requesting to speak with (clinical staff, provider,  specific staff member): CLINICAL STAFF MEMBER    What was the call regarding: COVID VACCINE

## 2021-03-29 NOTE — TELEPHONE ENCOUNTER
Pt called stating the information she has received about the Covid vaccine says not to take it if she has an allergy to Quinolones.  She had an anaphylactic reaction to Levaquin.  She is wanting your opinion.

## 2021-03-29 NOTE — TELEPHONE ENCOUNTER
I am unable to find any contraindication based on previous allergy to quinolones.  They are recommending people have severe anaphylactic reactions what ever the precipitating cause being monitored for 30 minutes instead of 15 minutes post vaccination.  If she would forward to me the information that she has on quinolones I would be happy to look at it however my research does not show quinolones to contraindicate vaccination.

## 2021-04-06 DIAGNOSIS — E11.21 CONTROLLED TYPE 2 DIABETES MELLITUS WITH DIABETIC NEPHROPATHY, WITHOUT LONG-TERM CURRENT USE OF INSULIN (HCC): Chronic | ICD-10-CM

## 2021-04-07 ENCOUNTER — TELEPHONE (OUTPATIENT)
Dept: INTERNAL MEDICINE | Facility: CLINIC | Age: 86
End: 2021-04-07

## 2021-04-07 RX ORDER — CALCIUM CITRATE/VITAMIN D3 200MG-6.25
TABLET ORAL
Qty: 100 EACH | Refills: 3 | Status: SHIPPED | OUTPATIENT
Start: 2021-04-07 | End: 2021-12-10

## 2021-04-07 NOTE — TELEPHONE ENCOUNTER
Patient is asking your opinion on taking the Covid vaccine, as she had an anaphylactic reaction to Levaquin and it has polyethylene glycol in it, same as Moderna.  The J&J vaccine has polysorbate-80, which is also in Levaquin.  Please advise.

## 2021-04-07 NOTE — TELEPHONE ENCOUNTER
Very much doubt this has anything to do with polyethylene glycol or any of the other binding agents in Levaquin.  It is likely the quinolone itself.  Which is not related to polyethylene glycol.  That said there is no way to be 100% sure except to have her allergy tested however I feel very confident that she is not had her reaction to Levaquin because of polyethylene glycol.  I would suggest that she have her shot at a medical facility and not at a pharmacy where they have the ability to take care of allergic reactions.

## 2021-04-08 NOTE — TELEPHONE ENCOUNTER
Pt informed of this information and voiced understanding.  She will take into consideration when trying to make her decision.

## 2021-04-23 ENCOUNTER — TELEPHONE (OUTPATIENT)
Dept: INTERNAL MEDICINE | Facility: CLINIC | Age: 86
End: 2021-04-23

## 2021-04-23 NOTE — TELEPHONE ENCOUNTER
PATIENT CALLED IN TO CHECK THE STATUS ON THE PAPERWORK FOR HER DIABETIC SHOES AND IF THERE IS ANYTHING ELSE SHE NEEDS TO DO BEFORE THE PAPERWORK CAN BE FILLED OUT AND FAXED BACK     PLEASE CALL BACK AND ADVISE  [phone]

## 2021-04-27 NOTE — TELEPHONE ENCOUNTER
Called pt and discussed at length and explained that we do not have sufficient documentation on her last note to cover Medicare requirements for coverage for DM shoes.  She made an appt solely for this purpose, as she does not want to wait until July for her regularly scheduled office visit.

## 2021-04-28 RX ORDER — IRBESARTAN 75 MG/1
TABLET ORAL
Qty: 90 TABLET | Refills: 3 | Status: SHIPPED | OUTPATIENT
Start: 2021-04-28 | End: 2021-04-29 | Stop reason: SDUPTHER

## 2021-04-29 ENCOUNTER — OFFICE VISIT (OUTPATIENT)
Dept: INTERNAL MEDICINE | Facility: CLINIC | Age: 86
End: 2021-04-29

## 2021-04-29 VITALS
TEMPERATURE: 96.9 F | OXYGEN SATURATION: 99 % | HEIGHT: 64 IN | BODY MASS INDEX: 26.12 KG/M2 | SYSTOLIC BLOOD PRESSURE: 192 MMHG | HEART RATE: 61 BPM | WEIGHT: 153 LBS | DIASTOLIC BLOOD PRESSURE: 76 MMHG

## 2021-04-29 DIAGNOSIS — E11.21 CONTROLLED TYPE 2 DIABETES MELLITUS WITH DIABETIC NEPHROPATHY, WITHOUT LONG-TERM CURRENT USE OF INSULIN (HCC): Primary | ICD-10-CM

## 2021-04-29 DIAGNOSIS — E11.9 ENCOUNTER FOR DIABETIC FOOT EXAM (HCC): ICD-10-CM

## 2021-04-29 DIAGNOSIS — M20.41 HAMMER TOES OF BOTH FEET: ICD-10-CM

## 2021-04-29 DIAGNOSIS — M20.42 HAMMER TOES OF BOTH FEET: ICD-10-CM

## 2021-04-29 LAB — HBA1C MFR BLD: 7 %

## 2021-04-29 PROCEDURE — 99214 OFFICE O/P EST MOD 30 MIN: CPT | Performed by: INTERNAL MEDICINE

## 2021-04-29 PROCEDURE — 83036 HEMOGLOBIN GLYCOSYLATED A1C: CPT | Performed by: INTERNAL MEDICINE

## 2021-04-29 RX ORDER — IRBESARTAN 150 MG/1
150 TABLET ORAL NIGHTLY
Qty: 90 TABLET | Refills: 3 | Status: SHIPPED | OUTPATIENT
Start: 2021-04-29 | End: 2021-05-20 | Stop reason: SDUPTHER

## 2021-04-29 NOTE — PROGRESS NOTES
Subjective   Nedra Tolliver is a 85 y.o. female.   Chief Complaint   Patient presents with   • Diabetes     A1C: 7.0%   • Diabetic Foot Exam     Required for diabetic shoe rx.       History of Present Illness patient is here for diabetic foot exam follow-up of her diabetes.  She is wanting shoes she has bilateral hammertoes.    The following portions of the patient's history were reviewed and updated as appropriate: allergies, current medications, past family history, past medical history, past social history, past surgical history and problem list.    Review of Systems   Constitutional: Negative for activity change, appetite change, fatigue, fever, unexpected weight gain and unexpected weight loss.   HENT: Negative for swollen glands, trouble swallowing and voice change.    Eyes: Negative for blurred vision and visual disturbance.   Respiratory: Negative for cough and shortness of breath.    Cardiovascular: Negative for chest pain, palpitations and leg swelling.   Gastrointestinal: Negative for abdominal pain, constipation, diarrhea, nausea, vomiting and indigestion.   Endocrine: Negative for cold intolerance, heat intolerance, polydipsia and polyphagia.   Genitourinary: Negative for dysuria and frequency.   Musculoskeletal: Negative for arthralgias, back pain, joint swelling and neck pain.   Skin: Negative for color change, rash and skin lesions.   Neurological: Negative for dizziness, weakness, headache, memory problem and confusion.   Hematological: Does not bruise/bleed easily.   Psychiatric/Behavioral: Negative for agitation, hallucinations and suicidal ideas. The patient is not nervous/anxious.        Objective   Past Medical History:   Diagnosis Date   • Arthritis    • Cancer (CMS/HCC)     BCC @ nose & Left arm   • Diabetes type 2, controlled (CMS/HCC)    • Diverticulitis    • History of GI bleed    • History of transfusion     Has had x 5 units.   • Hypertension    • Hyponatremia     Required  hospitalization   • Hypothyroidism    • Knee arthropathy 09/30/2020   • LPRD (laryngopharyngeal reflux disease)    • Pharyngeal dysphagia    • Thyroid nodule       Past Surgical History:   Procedure Laterality Date   • APPENDECTOMY     • BASAL CELL CARCINOMA EXCISION     • BELPHAROPTOSIS REPAIR     • BREAST CYST EXCISION     • CATARACT EXTRACTION     • CATARACT EXTRACTION WITH INTRAOCULAR LENS IMPLANT Bilateral    • CHOLECYSTECTOMY     • COLONOSCOPY Left 11/1/2016    Tubular adenoma cecum, Diverticulosis repeat prn   • SUBTOTAL HYSTERECTOMY     • TOTAL KNEE ARTHROPLASTY Right 9/30/2020    Procedure: TOTAL KNEE ARTHROPLASTY;  Surgeon: Mak Pickens MD;  Location: [facility];  Service: Orthopedics;  Laterality: Right;   • TUMOR EXCISION      under armpits and left breast        Current Outpatient Medications:   •  acetaminophen (TYLENOL) 500 MG tablet, Take 1,000 mg by mouth At Night As Needed for Mild Pain ., Disp: , Rfl:   •  amLODIPine (NORVASC) 2.5 MG tablet, Take 1 tablet by mouth 2 (two) times a day., Disp: 180 tablet, Rfl: 3  •  aspirin 81 MG EC tablet, Take 1 tablet by mouth Daily., Disp: , Rfl:   •  Bystolic 2.5 MG tablet, TAKE 1 TABLET BY MOUTH DAILY , Disp: 90 tablet, Rfl: 3  •  Cholecalciferol (VITAMIN D3) 5000 UNITS capsule capsule, Take 5,000 Units by mouth daily., Disp: , Rfl:   •  Coenzyme Q10 (CO Q 10) 100 MG capsule, Take 1 tablet by mouth Daily., Disp: , Rfl:   •  conjugated estrogens (PREMARIN) 0.625 MG/GM vaginal cream, Insert  into the vagina 2 (Two) Times a Week. (Patient taking differently: Insert  into the vagina 2 (Two) Times a Week. Pt does not use regularly), Disp: 30 g, Rfl: 6  •  glimepiride (AMARYL) 4 MG tablet, Take 1 tablet by mouth Every Morning Before Breakfast., Disp: 90 tablet, Rfl: 3  •  glucose blood test strip, 1 daily, Disp: 100 each, Rfl: 3  •  glucose monitor monitoring kit, 1 each Daily., Disp: 1 each, Rfl: 0  •  irbesartan (AVAPRO) 150 MG tablet, Take 1 tablet by mouth  Every Night., Disp: 90 tablet, Rfl: 3  •  Lancets misc, 1 each Daily., Disp: 100 each, Rfl: 3  •  levothyroxine (SYNTHROID, LEVOTHROID) 50 MCG tablet, TAKE 1 TABLET BY MOUTH EVERY DAY, Disp: 90 tablet, Rfl: 3  •  Multiple Vitamins-Minerals (CENTRUM SILVER PO), Take 1 tablet by mouth Daily., Disp: , Rfl:   •  True Metrix Blood Glucose Test test strip, TEST TWICE DAILY AS DIRECTED , Disp: 100 each, Rfl: 3     Vitals:    04/29/21 1114   BP: (!) 192/76   Pulse: 61   Temp: 96.9 °F (36.1 °C)   SpO2: 99%         04/29/21  1114   Weight: 69.4 kg (153 lb)     Patient's Body mass index is 26.26 kg/m². BMI is .      Physical Exam  Constitutional:       Appearance: Normal appearance. She is well-developed.   HENT:      Head: Normocephalic and atraumatic.      Right Ear: External ear normal.      Left Ear: External ear normal.   Eyes:      Extraocular Movements: Extraocular movements intact.      Conjunctiva/sclera: Conjunctivae normal.      Pupils: Pupils are equal, round, and reactive to light.   Neck:      Vascular: No carotid bruit.   Cardiovascular:      Rate and Rhythm: Normal rate and regular rhythm.      Pulses: Normal pulses.           Dorsalis pedis pulses are 2+ on the right side and 2+ on the left side.        Posterior tibial pulses are 2+ on the right side and 2+ on the left side.      Heart sounds: Normal heart sounds. No murmur heard.   No gallop.    Pulmonary:      Effort: Pulmonary effort is normal.      Breath sounds: Normal breath sounds.   Musculoskeletal:         General: No swelling or tenderness. Normal range of motion.      Cervical back: Normal range of motion and neck supple. No rigidity.      Right foot: Deformity and prominent metatarsal heads present.      Left foot: Deformity and prominent metatarsal heads present.        Feet:    Feet:      Right foot:      Protective Sensation: 2 sites tested. 2 sites sensed.      Left foot:      Protective Sensation: 2 sites tested. 2 sites sensed.       Comments: hammertoes  Skin:     General: Skin is warm and dry.   Neurological:      General: No focal deficit present.      Mental Status: She is alert and oriented to person, place, and time.   Psychiatric:         Mood and Affect: Mood normal.         Behavior: Behavior normal.               Assessment/Plan   Diagnoses and all orders for this visit:    1. Controlled type 2 diabetes mellitus with diabetic nephropathy, without long-term current use of insulin (CMS/[facility]) (Primary)  -     Diabetic Footwear    2. Encounter for diabetic foot exam (CMS/[facility])  -     POC Glycosylated Hemoglobin (Hb A1C)    3. Hammer toes of both feet  -     Diabetic Footwear    Other orders  -     irbesartan (AVAPRO) 150 MG tablet; Take 1 tablet by mouth Every Night.  Dispense: 90 tablet; Refill: 3      Patient has bilateral hammertoes she is reasonably controlled diabetes.  At this point I have ordered diabetic foot wear hopefully that will give her some relief from her hammertoes do not think we should proceed with operative procedure until this is our only option.  His blood pressure is out of control of increased her Avapro to 150 mg daily she understands that she is to take 2 of her 75's daily until she uses those up.

## 2021-05-07 ENCOUNTER — TELEPHONE (OUTPATIENT)
Dept: INTERNAL MEDICINE | Facility: CLINIC | Age: 86
End: 2021-05-07

## 2021-05-07 NOTE — TELEPHONE ENCOUNTER
Caller: Nedra Tolliver    Relationship: Self    Best call back number: [phone]    What is the best time to reach you: ANY    Who are you requesting to speak with (clinical staff, provider,  specific staff member): ANY    What was the call regarding: PATIENT WAS TOLD TO CALL WITH HER LATEST BLOOD PRESSURE READINGS. SHE IS CURRENTLY /61 WITH HEART RATE OF 61. PATIENT ALSO STATES THAT SHE NEEDS A NEW MONITOR AS HERS IS VERY OLD AND MAY NOT BE ACCURATE. SHE WOULD LIKE TO SPEAK WITH A NURSE ABOUT RECOMMENDING A NEW ONE.    Do you require a callback: YES

## 2021-05-07 NOTE — TELEPHONE ENCOUNTER
Pt was transferred from [facility]. Stating bld sugar 190, heart rate 58, bp 188/86-all checked at 12:30 and all meds taken. She is Dizzy. Per Norma BIRCH with any symptoms she needs to go to ER. Pt also told me she was shaky and having pain at left clavicle area. She will have a friend/neighbor to take her to ER.

## 2021-05-07 NOTE — TELEPHONE ENCOUNTER
Please document additional phone call.   I had advised if patient's BP is significantly elevated and symptomatic to recommend ER, otherwise would be happy to see her in the office today to adjust medication.

## 2021-05-08 ENCOUNTER — NURSE TRIAGE (OUTPATIENT)
Dept: CALL CENTER | Facility: HOSPITAL | Age: 86
End: 2021-05-08

## 2021-05-08 ENCOUNTER — HOSPITAL ENCOUNTER (OUTPATIENT)
Facility: HOSPITAL | Age: 86
Setting detail: OBSERVATION
Discharge: HOME | End: 2021-05-09
Attending: INTERNAL MEDICINE | Admitting: INTERNAL MEDICINE

## 2021-05-08 ENCOUNTER — APPOINTMENT (OUTPATIENT)
Dept: CT IMAGING | Facility: HOSPITAL | Age: 86
End: 2021-05-08

## 2021-05-08 ENCOUNTER — APPOINTMENT (OUTPATIENT)
Dept: GENERAL RADIOLOGY | Facility: HOSPITAL | Age: 86
End: 2021-05-08

## 2021-05-08 DIAGNOSIS — I48.91 NEW ONSET ATRIAL FIBRILLATION (HCC): ICD-10-CM

## 2021-05-08 DIAGNOSIS — I10 ESSENTIAL HYPERTENSION: Primary | ICD-10-CM

## 2021-05-08 DIAGNOSIS — I48.91 ATRIAL FIBRILLATION, UNSPECIFIED TYPE (HCC): ICD-10-CM

## 2021-05-08 LAB
ALBUMIN SERPL-MCNC: 4.3 G/DL (ref 3.5–5.2)
ALBUMIN/GLOB SERPL: 1.4 G/DL
ALP SERPL-CCNC: 75 U/L (ref 39–117)
ALT SERPL W P-5'-P-CCNC: 12 U/L (ref 1–33)
ANION GAP SERPL CALCULATED.3IONS-SCNC: 9 MMOL/L (ref 5–15)
AST SERPL-CCNC: 13 U/L (ref 1–32)
BASOPHILS # BLD AUTO: 0.05 10*3/MM3 (ref 0–0.2)
BASOPHILS NFR BLD AUTO: 0.8 % (ref 0–1.5)
BILIRUB SERPL-MCNC: 0.3 MG/DL (ref 0–1.2)
BUN SERPL-MCNC: 18 MG/DL (ref 8–23)
BUN/CREAT SERPL: 22.5 (ref 7–25)
CALCIUM SPEC-SCNC: 10.4 MG/DL (ref 8.6–10.5)
CHLORIDE SERPL-SCNC: 96 MMOL/L (ref 98–107)
CO2 SERPL-SCNC: 26 MMOL/L (ref 22–29)
CREAT SERPL-MCNC: 0.8 MG/DL (ref 0.57–1)
DEPRECATED RDW RBC AUTO: 41.1 FL (ref 37–54)
EOSINOPHIL # BLD AUTO: 0.25 10*3/MM3 (ref 0–0.4)
EOSINOPHIL NFR BLD AUTO: 4 % (ref 0.3–6.2)
ERYTHROCYTE [DISTWIDTH] IN BLOOD BY AUTOMATED COUNT: 12.5 % (ref 12.3–15.4)
GFR SERPL CREATININE-BSD FRML MDRD: 68 ML/MIN/1.73
GLOBULIN UR ELPH-MCNC: 3 GM/DL
GLUCOSE SERPL-MCNC: 124 MG/DL (ref 65–99)
HCT VFR BLD AUTO: 38.1 % (ref 34–46.6)
HGB BLD-MCNC: 13.2 G/DL (ref 12–15.9)
IMM GRANULOCYTES # BLD AUTO: 0.01 10*3/MM3 (ref 0–0.05)
IMM GRANULOCYTES NFR BLD AUTO: 0.2 % (ref 0–0.5)
LYMPHOCYTES # BLD AUTO: 2.23 10*3/MM3 (ref 0.7–3.1)
LYMPHOCYTES NFR BLD AUTO: 35.6 % (ref 19.6–45.3)
MCH RBC QN AUTO: 31 PG (ref 26.6–33)
MCHC RBC AUTO-ENTMCNC: 34.6 G/DL (ref 31.5–35.7)
MCV RBC AUTO: 89.4 FL (ref 79–97)
MONOCYTES # BLD AUTO: 0.78 10*3/MM3 (ref 0.1–0.9)
MONOCYTES NFR BLD AUTO: 12.4 % (ref 5–12)
NEUTROPHILS NFR BLD AUTO: 2.95 10*3/MM3 (ref 1.7–7)
NEUTROPHILS NFR BLD AUTO: 47 % (ref 42.7–76)
NRBC BLD AUTO-RTO: 0 /100 WBC (ref 0–0.2)
NT-PROBNP SERPL-MCNC: 691.1 PG/ML (ref 0–1800)
PLATELET # BLD AUTO: 246 10*3/MM3 (ref 140–450)
PMV BLD AUTO: 9.3 FL (ref 6–12)
POTASSIUM SERPL-SCNC: 4.4 MMOL/L (ref 3.5–5.2)
PROT SERPL-MCNC: 7.3 G/DL (ref 6–8.5)
RBC # BLD AUTO: 4.26 10*6/MM3 (ref 3.77–5.28)
SARS-COV-2 RNA PNL SPEC NAA+PROBE: NOT DETECTED
SODIUM SERPL-SCNC: 131 MMOL/L (ref 136–145)
TROPONIN T SERPL-MCNC: <0.01 NG/ML (ref 0–0.03)
WBC # BLD AUTO: 6.27 10*3/MM3 (ref 3.4–10.8)

## 2021-05-08 PROCEDURE — 25010000002 HYDRALAZINE PER 20 MG: Performed by: PHYSICIAN ASSISTANT

## 2021-05-08 PROCEDURE — G0378 HOSPITAL OBSERVATION PER HR: HCPCS

## 2021-05-08 PROCEDURE — 71045 X-RAY EXAM CHEST 1 VIEW: CPT

## 2021-05-08 PROCEDURE — 93010 ELECTROCARDIOGRAM REPORT: CPT | Performed by: INTERNAL MEDICINE

## 2021-05-08 PROCEDURE — 84484 ASSAY OF TROPONIN QUANT: CPT | Performed by: PHYSICIAN ASSISTANT

## 2021-05-08 PROCEDURE — 85025 COMPLETE CBC W/AUTO DIFF WBC: CPT | Performed by: PHYSICIAN ASSISTANT

## 2021-05-08 PROCEDURE — 93005 ELECTROCARDIOGRAM TRACING: CPT | Performed by: NURSE PRACTITIONER

## 2021-05-08 PROCEDURE — 70450 CT HEAD/BRAIN W/O DYE: CPT

## 2021-05-08 PROCEDURE — 96374 THER/PROPH/DIAG INJ IV PUSH: CPT

## 2021-05-08 PROCEDURE — 87635 SARS-COV-2 COVID-19 AMP PRB: CPT | Performed by: NURSE PRACTITIONER

## 2021-05-08 PROCEDURE — 80053 COMPREHEN METABOLIC PANEL: CPT | Performed by: PHYSICIAN ASSISTANT

## 2021-05-08 PROCEDURE — 25010000002 LORAZEPAM PER 2 MG: Performed by: NURSE PRACTITIONER

## 2021-05-08 PROCEDURE — 83880 ASSAY OF NATRIURETIC PEPTIDE: CPT | Performed by: PHYSICIAN ASSISTANT

## 2021-05-08 PROCEDURE — 96375 TX/PRO/DX INJ NEW DRUG ADDON: CPT

## 2021-05-08 PROCEDURE — 99285 EMERGENCY DEPT VISIT HI MDM: CPT

## 2021-05-08 PROCEDURE — C9803 HOPD COVID-19 SPEC COLLECT: HCPCS

## 2021-05-08 PROCEDURE — 93005 ELECTROCARDIOGRAM TRACING: CPT | Performed by: PHYSICIAN ASSISTANT

## 2021-05-08 RX ORDER — NEBIVOLOL 2.5 MG/1
2.5 TABLET ORAL DAILY
Status: DISCONTINUED | OUTPATIENT
Start: 2021-05-09 | End: 2021-05-09 | Stop reason: HOSPADM

## 2021-05-08 RX ORDER — ASPIRIN 81 MG/1
81 TABLET ORAL DAILY
Status: DISCONTINUED | OUTPATIENT
Start: 2021-05-09 | End: 2021-05-09 | Stop reason: HOSPADM

## 2021-05-08 RX ORDER — LORAZEPAM 2 MG/ML
1 INJECTION INTRAMUSCULAR ONCE
Status: COMPLETED | OUTPATIENT
Start: 2021-05-08 | End: 2021-05-08

## 2021-05-08 RX ORDER — ATORVASTATIN CALCIUM 40 MG/1
80 TABLET, FILM COATED ORAL NIGHTLY
Status: DISCONTINUED | OUTPATIENT
Start: 2021-05-09 | End: 2021-05-09

## 2021-05-08 RX ORDER — HYDRALAZINE HYDROCHLORIDE 20 MG/ML
20 INJECTION INTRAMUSCULAR; INTRAVENOUS ONCE
Status: COMPLETED | OUTPATIENT
Start: 2021-05-08 | End: 2021-05-08

## 2021-05-08 RX ORDER — SODIUM CHLORIDE 0.9 % (FLUSH) 0.9 %
10 SYRINGE (ML) INJECTION EVERY 12 HOURS SCHEDULED
Status: DISCONTINUED | OUTPATIENT
Start: 2021-05-09 | End: 2021-05-09 | Stop reason: HOSPADM

## 2021-05-08 RX ORDER — LEVOTHYROXINE SODIUM 0.05 MG/1
50 TABLET ORAL
Status: DISCONTINUED | OUTPATIENT
Start: 2021-05-09 | End: 2021-05-09 | Stop reason: HOSPADM

## 2021-05-08 RX ORDER — ACETAMINOPHEN 500 MG
1000 TABLET ORAL NIGHTLY PRN
Status: DISCONTINUED | OUTPATIENT
Start: 2021-05-08 | End: 2021-05-09 | Stop reason: HOSPADM

## 2021-05-08 RX ORDER — SODIUM CHLORIDE 0.9 % (FLUSH) 0.9 %
10 SYRINGE (ML) INJECTION AS NEEDED
Status: DISCONTINUED | OUTPATIENT
Start: 2021-05-08 | End: 2021-05-09 | Stop reason: HOSPADM

## 2021-05-08 RX ORDER — NICOTINE POLACRILEX 4 MG
15 LOZENGE BUCCAL
Status: DISCONTINUED | OUTPATIENT
Start: 2021-05-08 | End: 2021-05-09 | Stop reason: HOSPADM

## 2021-05-08 RX ORDER — AMLODIPINE BESYLATE 5 MG/1
5 TABLET ORAL DAILY
Status: DISCONTINUED | OUTPATIENT
Start: 2021-05-09 | End: 2021-05-09

## 2021-05-08 RX ORDER — GLIPIZIDE 10 MG/1
10 TABLET ORAL
Refills: 3 | Status: DISCONTINUED | OUTPATIENT
Start: 2021-05-09 | End: 2021-05-09

## 2021-05-08 RX ORDER — LOSARTAN POTASSIUM 50 MG/1
50 TABLET ORAL
Status: DISCONTINUED | OUTPATIENT
Start: 2021-05-09 | End: 2021-05-09 | Stop reason: HOSPADM

## 2021-05-08 RX ORDER — DEXTROSE MONOHYDRATE 25 G/50ML
25 INJECTION, SOLUTION INTRAVENOUS
Status: DISCONTINUED | OUTPATIENT
Start: 2021-05-08 | End: 2021-05-09 | Stop reason: HOSPADM

## 2021-05-08 RX ORDER — LABETALOL HYDROCHLORIDE 5 MG/ML
20 INJECTION, SOLUTION INTRAVENOUS EVERY 6 HOURS PRN
Status: DISCONTINUED | OUTPATIENT
Start: 2021-05-08 | End: 2021-05-08

## 2021-05-08 RX ORDER — DILTIAZEM HYDROCHLORIDE 5 MG/ML
20 INJECTION INTRAVENOUS ONCE
Status: COMPLETED | OUTPATIENT
Start: 2021-05-08 | End: 2021-05-08

## 2021-05-08 RX ADMIN — LORAZEPAM 1 MG: 2 INJECTION INTRAMUSCULAR; INTRAVENOUS at 20:24

## 2021-05-08 RX ADMIN — DILTIAZEM HYDROCHLORIDE 20 MG: 5 INJECTION INTRAVENOUS at 20:26

## 2021-05-08 RX ADMIN — HYDRALAZINE HYDROCHLORIDE 20 MG: 20 INJECTION INTRAMUSCULAR; INTRAVENOUS at 19:33

## 2021-05-08 NOTE — TELEPHONE ENCOUNTER
"She was told by PCP yesterday to go to ER and did not go. She is feeling worse, b/p is still high.     Reason for Disposition  • [1] Weakness of the face, arm or leg on one side of the body AND [2] new onset    Additional Information  • Negative: Difficult to awaken or acting confused (e.g., disoriented, slurred speech)  • Negative: Severe difficulty breathing (e.g., struggling for each breath, speaks in single words)    Answer Assessment - Initial Assessment Questions  1. BLOOD PRESSURE: \"What is the blood pressure?\" \"Did you take at least two measurements 5 minutes apart?\"      191/87 and 194/88   2. ONSET: \"When did you take your blood pressure?\"      Just now diastolic 100   3. HOW: \"How did you obtain the blood pressure?\" (e.g., visiting nurse, automatic home BP monitor)      Home cuff   4. HISTORY: \"Do you have a history of high blood pressure?\"      Yes   5. MEDICATIONS: \"Are you taking any medications for blood pressure?\" \"Have you missed any doses recently?\"      Medications recently been changed and taking as directed   6. OTHER SYMPTOMS: \"Do you have any symptoms?\" (e.g., headache, chest pain, blurred vision, difficulty breathing, weakness)      Yes, headache, dizzy, shaky, and weak   7. PREGNANCY: \"Is there any chance you are pregnant?\" \"When was your last menstrual period?\"      No    Protocols used: HIGH BLOOD PRESSURE-ADULT-AH      "

## 2021-05-09 ENCOUNTER — APPOINTMENT (OUTPATIENT)
Dept: CARDIOLOGY | Facility: HOSPITAL | Age: 86
End: 2021-05-09

## 2021-05-09 ENCOUNTER — READMISSION MANAGEMENT (OUTPATIENT)
Dept: CALL CENTER | Facility: HOSPITAL | Age: 86
End: 2021-05-09

## 2021-05-09 VITALS
OXYGEN SATURATION: 96 % | WEIGHT: 148.4 LBS | RESPIRATION RATE: 16 BRPM | DIASTOLIC BLOOD PRESSURE: 60 MMHG | SYSTOLIC BLOOD PRESSURE: 133 MMHG | HEIGHT: 64 IN | HEART RATE: 63 BPM | BODY MASS INDEX: 25.33 KG/M2 | TEMPERATURE: 98 F

## 2021-05-09 PROBLEM — I16.1 HYPERTENSIVE EMERGENCY: Status: ACTIVE | Noted: 2021-05-09

## 2021-05-09 PROBLEM — E11.9 TYPE 2 DIABETES MELLITUS, WITHOUT LONG-TERM CURRENT USE OF INSULIN (HCC): Status: ACTIVE | Noted: 2019-12-30

## 2021-05-09 PROBLEM — I44.7 LEFT BUNDLE BRANCH BLOCK (LBBB) ON ELECTROCARDIOGRAM: Status: ACTIVE | Noted: 2021-05-09

## 2021-05-09 PROBLEM — I48.0 PAROXYSMAL ATRIAL FIBRILLATION (HCC): Status: ACTIVE | Noted: 2021-05-09

## 2021-05-09 PROBLEM — I48.91 NEW ONSET ATRIAL FIBRILLATION (HCC): Status: ACTIVE | Noted: 2021-05-09

## 2021-05-09 PROBLEM — N18.2 CKD (CHRONIC KIDNEY DISEASE) STAGE 2, GFR 60-89 ML/MIN: Status: ACTIVE | Noted: 2021-05-09

## 2021-05-09 PROBLEM — I16.0 HYPERTENSIVE URGENCY: Status: ACTIVE | Noted: 2021-05-09

## 2021-05-09 PROBLEM — E78.2 MIXED HYPERLIPIDEMIA: Status: ACTIVE | Noted: 2021-05-09

## 2021-05-09 LAB
ANION GAP SERPL CALCULATED.3IONS-SCNC: 12 MMOL/L (ref 5–15)
BH CV ECHO MEAS - AO MAX PG (FULL): 4.5 MMHG
BH CV ECHO MEAS - AO MAX PG: 7.3 MMHG
BH CV ECHO MEAS - AO MEAN PG (FULL): 1 MMHG
BH CV ECHO MEAS - AO MEAN PG: 3 MMHG
BH CV ECHO MEAS - AO ROOT AREA (BSA CORRECTED): 2.1
BH CV ECHO MEAS - AO ROOT AREA: 10.2 CM^2
BH CV ECHO MEAS - AO ROOT DIAM: 3.6 CM
BH CV ECHO MEAS - AO V2 MAX: 135 CM/SEC
BH CV ECHO MEAS - AO V2 MEAN: 82.5 CM/SEC
BH CV ECHO MEAS - AO V2 VTI: 24.6 CM
BH CV ECHO MEAS - AVA(I,A): 2.3 CM^2
BH CV ECHO MEAS - AVA(I,D): 2.3 CM^2
BH CV ECHO MEAS - AVA(V,A): 1.9 CM^2
BH CV ECHO MEAS - AVA(V,D): 1.9 CM^2
BH CV ECHO MEAS - BSA(HAYCOCK): 1.8 M^2
BH CV ECHO MEAS - BSA: 1.7 M^2
BH CV ECHO MEAS - BZI_BMI: 25.4 KILOGRAMS/M^2
BH CV ECHO MEAS - BZI_METRIC_HEIGHT: 162.6 CM
BH CV ECHO MEAS - BZI_METRIC_WEIGHT: 67.1 KG
BH CV ECHO MEAS - EDV(CUBED): 93 ML
BH CV ECHO MEAS - EDV(MOD-SP2): 47.2 ML
BH CV ECHO MEAS - EDV(MOD-SP4): 80.1 ML
BH CV ECHO MEAS - EDV(TEICH): 93.9 ML
BH CV ECHO MEAS - EF(CUBED): 76.4 %
BH CV ECHO MEAS - EF(MOD-SP2): 46.2 %
BH CV ECHO MEAS - EF(MOD-SP4): 59.9 %
BH CV ECHO MEAS - EF(TEICH): 68.5 %
BH CV ECHO MEAS - ESV(CUBED): 22 ML
BH CV ECHO MEAS - ESV(MOD-SP2): 25.4 ML
BH CV ECHO MEAS - ESV(MOD-SP4): 32.1 ML
BH CV ECHO MEAS - ESV(TEICH): 29.6 ML
BH CV ECHO MEAS - FS: 38.2 %
BH CV ECHO MEAS - IVS/LVPW: 1.2
BH CV ECHO MEAS - IVSD: 1.2 CM
BH CV ECHO MEAS - LA DIMENSION: 3.7 CM
BH CV ECHO MEAS - LA/AO: 1
BH CV ECHO MEAS - LAT PEAK E' VEL: 4.5 CM/SEC
BH CV ECHO MEAS - LV DIASTOLIC VOL/BSA (35-75): 46.5 ML/M^2
BH CV ECHO MEAS - LV MASS(C)D: 184.9 GRAMS
BH CV ECHO MEAS - LV MASS(C)DI: 107.4 GRAMS/M^2
BH CV ECHO MEAS - LV MAX PG: 2.8 MMHG
BH CV ECHO MEAS - LV MEAN PG: 2 MMHG
BH CV ECHO MEAS - LV SYSTOLIC VOL/BSA (12-30): 18.7 ML/M^2
BH CV ECHO MEAS - LV V1 MAX: 83.5 CM/SEC
BH CV ECHO MEAS - LV V1 MEAN: 58.6 CM/SEC
BH CV ECHO MEAS - LV V1 VTI: 18.3 CM
BH CV ECHO MEAS - LVIDD: 4.5 CM
BH CV ECHO MEAS - LVIDS: 2.8 CM
BH CV ECHO MEAS - LVLD AP2: 6.7 CM
BH CV ECHO MEAS - LVLD AP4: 6.8 CM
BH CV ECHO MEAS - LVLS AP2: 6.3 CM
BH CV ECHO MEAS - LVLS AP4: 5.8 CM
BH CV ECHO MEAS - LVOT AREA (M): 3.1 CM^2
BH CV ECHO MEAS - LVOT AREA: 3.1 CM^2
BH CV ECHO MEAS - LVOT DIAM: 2 CM
BH CV ECHO MEAS - LVPWD: 1.1 CM
BH CV ECHO MEAS - MED PEAK E' VEL: 9.14 CM/SEC
BH CV ECHO MEAS - MV A MAX VEL: 88.8 CM/SEC
BH CV ECHO MEAS - MV DEC TIME: 0.27 SEC
BH CV ECHO MEAS - MV E MAX VEL: 55.3 CM/SEC
BH CV ECHO MEAS - MV E/A: 0.62
BH CV ECHO MEAS - PI END-D VEL: 122 CM/SEC
BH CV ECHO MEAS - SI(AO): 145.5 ML/M^2
BH CV ECHO MEAS - SI(CUBED): 41.3 ML/M^2
BH CV ECHO MEAS - SI(LVOT): 33.4 ML/M^2
BH CV ECHO MEAS - SI(MOD-SP2): 12.7 ML/M^2
BH CV ECHO MEAS - SI(MOD-SP4): 27.9 ML/M^2
BH CV ECHO MEAS - SI(TEICH): 37.4 ML/M^2
BH CV ECHO MEAS - SV(AO): 250.4 ML
BH CV ECHO MEAS - SV(CUBED): 71 ML
BH CV ECHO MEAS - SV(LVOT): 57.5 ML
BH CV ECHO MEAS - SV(MOD-SP2): 21.8 ML
BH CV ECHO MEAS - SV(MOD-SP4): 48 ML
BH CV ECHO MEAS - SV(TEICH): 64.3 ML
BH CV ECHO MEAS - TR MAX VEL: 223 CM/SEC
BH CV ECHO MEASUREMENTS AVERAGE E/E' RATIO: 8.11
BUN SERPL-MCNC: 21 MG/DL (ref 8–23)
BUN/CREAT SERPL: 21 (ref 7–25)
CALCIUM SPEC-SCNC: 10.1 MG/DL (ref 8.6–10.5)
CHLORIDE SERPL-SCNC: 96 MMOL/L (ref 98–107)
CHOLEST SERPL-MCNC: 302 MG/DL (ref 0–200)
CO2 SERPL-SCNC: 24 MMOL/L (ref 22–29)
CREAT SERPL-MCNC: 1 MG/DL (ref 0.57–1)
GFR SERPL CREATININE-BSD FRML MDRD: 53 ML/MIN/1.73
GLUCOSE BLDC GLUCOMTR-MCNC: 132 MG/DL (ref 70–130)
GLUCOSE BLDC GLUCOMTR-MCNC: 134 MG/DL (ref 70–130)
GLUCOSE BLDC GLUCOMTR-MCNC: 201 MG/DL (ref 70–130)
GLUCOSE SERPL-MCNC: 146 MG/DL (ref 65–99)
HBA1C MFR BLD: 6.9 % (ref 4.8–5.6)
HDLC SERPL-MCNC: 56 MG/DL (ref 40–60)
LDLC SERPL CALC-MCNC: 210 MG/DL (ref 0–100)
LDLC/HDLC SERPL: 3.73 {RATIO}
LEFT ATRIUM VOLUME INDEX: 23.1 ML/M2
LEFT ATRIUM VOLUME: 39.7 CM3
MAXIMAL PREDICTED HEART RATE: 135 BPM
POTASSIUM SERPL-SCNC: 4.3 MMOL/L (ref 3.5–5.2)
SODIUM SERPL-SCNC: 132 MMOL/L (ref 136–145)
STRESS TARGET HR: 115 BPM
T4 FREE SERPL-MCNC: 1.08 NG/DL (ref 0.93–1.7)
TRIGL SERPL-MCNC: 187 MG/DL (ref 0–150)
TROPONIN T SERPL-MCNC: <0.01 NG/ML (ref 0–0.03)
TROPONIN T SERPL-MCNC: <0.01 NG/ML (ref 0–0.03)
TSH SERPL DL<=0.05 MIU/L-ACNC: 1.59 UIU/ML (ref 0.27–4.2)
VLDLC SERPL-MCNC: 36 MG/DL (ref 5–40)

## 2021-05-09 PROCEDURE — 93005 ELECTROCARDIOGRAM TRACING: CPT | Performed by: INTERNAL MEDICINE

## 2021-05-09 PROCEDURE — 82962 GLUCOSE BLOOD TEST: CPT

## 2021-05-09 PROCEDURE — G0378 HOSPITAL OBSERVATION PER HR: HCPCS

## 2021-05-09 PROCEDURE — 84439 ASSAY OF FREE THYROXINE: CPT | Performed by: NURSE PRACTITIONER

## 2021-05-09 PROCEDURE — 84484 ASSAY OF TROPONIN QUANT: CPT | Performed by: INTERNAL MEDICINE

## 2021-05-09 PROCEDURE — 80061 LIPID PANEL: CPT | Performed by: INTERNAL MEDICINE

## 2021-05-09 PROCEDURE — 99214 OFFICE O/P EST MOD 30 MIN: CPT | Performed by: NURSE PRACTITIONER

## 2021-05-09 PROCEDURE — 93306 TTE W/DOPPLER COMPLETE: CPT | Performed by: EMERGENCY MEDICINE

## 2021-05-09 PROCEDURE — 63710000001 INSULIN LISPRO (HUMAN) PER 5 UNITS: Performed by: INTERNAL MEDICINE

## 2021-05-09 PROCEDURE — 93010 ELECTROCARDIOGRAM REPORT: CPT | Performed by: INTERNAL MEDICINE

## 2021-05-09 PROCEDURE — 84443 ASSAY THYROID STIM HORMONE: CPT | Performed by: NURSE PRACTITIONER

## 2021-05-09 PROCEDURE — 80048 BASIC METABOLIC PNL TOTAL CA: CPT | Performed by: NURSE PRACTITIONER

## 2021-05-09 PROCEDURE — 83036 HEMOGLOBIN GLYCOSYLATED A1C: CPT | Performed by: INTERNAL MEDICINE

## 2021-05-09 PROCEDURE — 93306 TTE W/DOPPLER COMPLETE: CPT

## 2021-05-09 RX ORDER — AMLODIPINE BESYLATE 10 MG/1
10 TABLET ORAL DAILY
Status: DISCONTINUED | OUTPATIENT
Start: 2021-05-10 | End: 2021-05-09 | Stop reason: HOSPADM

## 2021-05-09 RX ORDER — ATORVASTATIN CALCIUM 10 MG/1
20 TABLET, FILM COATED ORAL NIGHTLY
Status: DISCONTINUED | OUTPATIENT
Start: 2021-05-09 | End: 2021-05-09

## 2021-05-09 RX ORDER — AMLODIPINE BESYLATE 10 MG/1
10 TABLET ORAL DAILY
Qty: 30 TABLET | Refills: 0 | Status: SHIPPED | OUTPATIENT
Start: 2021-05-10 | End: 2021-06-03

## 2021-05-09 RX ADMIN — AMLODIPINE BESYLATE 5 MG: 5 TABLET ORAL at 08:06

## 2021-05-09 RX ADMIN — ASPIRIN 81 MG: 81 TABLET, FILM COATED ORAL at 08:06

## 2021-05-09 RX ADMIN — SODIUM CHLORIDE, PRESERVATIVE FREE 10 ML: 5 INJECTION INTRAVENOUS at 01:07

## 2021-05-09 RX ADMIN — LEVOTHYROXINE SODIUM 50 MCG: 50 TABLET ORAL at 06:20

## 2021-05-09 RX ADMIN — NEBIVOLOL HYDROCHLORIDE 2.5 MG: 2.5 TABLET ORAL at 08:06

## 2021-05-09 RX ADMIN — INSULIN LISPRO 3 UNITS: 100 INJECTION, SOLUTION INTRAVENOUS; SUBCUTANEOUS at 11:35

## 2021-05-09 RX ADMIN — LOSARTAN POTASSIUM 50 MG: 50 TABLET, FILM COATED ORAL at 08:06

## 2021-05-09 RX ADMIN — SODIUM CHLORIDE, PRESERVATIVE FREE 10 ML: 5 INJECTION INTRAVENOUS at 08:06

## 2021-05-09 NOTE — OUTREACH NOTE
Prep Survey      Responses   [facility] facility patient discharged from?  [place]   Is LACE score < 7 ?  No   Emergency Room discharge w/ pulse ox?  No   Eligibility  [facility]   Date of Admission  05/08/21   Date of Discharge  05/09/21   Discharge Disposition  Home or Self Care   Discharge diagnosis  hypertensive emergency with H/A, new onset A-fib, CKD, T2DM   Does the patient have one of the following disease processes/diagnoses(primary or secondary)?  Other   Does the patient have Home health ordered?  No   Is there a DME ordered?  No   Prep survey completed?  Yes          Reva Schafer RN

## 2021-05-10 ENCOUNTER — TRANSITIONAL CARE MANAGEMENT TELEPHONE ENCOUNTER (OUTPATIENT)
Dept: CALL CENTER | Facility: HOSPITAL | Age: 86
End: 2021-05-10

## 2021-05-10 LAB
QT INTERVAL: 358 MS
QT INTERVAL: 444 MS
QT INTERVAL: 462 MS
QTC INTERVAL: 444 MS
QTC INTERVAL: 482 MS
QTC INTERVAL: 484 MS

## 2021-05-10 NOTE — OUTREACH NOTE
Call Center TCM Note      Responses   [facility] patient discharged from?  [place]   Does the patient have one of the following disease processes/diagnoses(primary or secondary)?  Other   TCM attempt successful?  Yes   Call start time  1341   Call end time  1343   Discharge diagnosis  hypertensive emergency with H/A, new onset A-fib, CKD, T2DM   Meds reviewed with patient/caregiver?  Yes   Is the patient having any side effects they believe may be caused by any medication additions or changes?  No   Is the patient taking all medications as directed (includes completed medication regime)?  Yes   Does the patient have a primary care provider?   Yes   Does the patient have an appointment with their PCP within 7 days of discharge?  Yes   Comments regarding PCP  PCP APPOINTMENT IS 5/13/21   Has the patient kept scheduled appointments due by today?  N/A   Has home health visited the patient within 72 hours of discharge?  N/A   Psychosocial issues?  No   Did the patient receive a copy of their discharge instructions?  Yes   Nursing interventions  Reviewed instructions with patient   What is the patient's perception of their health status since discharge?  Improving   Is the patient/caregiver able to teach back signs and symptoms related to disease process for when to call PCP?  Yes   Is the patient/caregiver able to teach back signs and symptoms related to disease process for when to call 911?  Yes   Is the patient/caregiver able to teach back the hierarchy of who to call/visit for symptoms/problems? PCP, Specialist, Home health nurse, Urgent Care, ED, 911  Yes   If the patient is a current smoker, are they able to teach back resources for cessation?  Not a smoker   TCM call completed?  Yes          Darlene Edmonds LPN    5/10/2021, 13:44 EDT

## 2021-05-13 ENCOUNTER — OFFICE VISIT (OUTPATIENT)
Dept: INTERNAL MEDICINE | Facility: CLINIC | Age: 86
End: 2021-05-13

## 2021-05-13 VITALS
HEIGHT: 64 IN | OXYGEN SATURATION: 98 % | BODY MASS INDEX: 26.05 KG/M2 | WEIGHT: 152.6 LBS | DIASTOLIC BLOOD PRESSURE: 76 MMHG | SYSTOLIC BLOOD PRESSURE: 144 MMHG | HEART RATE: 63 BPM | TEMPERATURE: 97.3 F

## 2021-05-13 DIAGNOSIS — I10 ESSENTIAL HYPERTENSION: ICD-10-CM

## 2021-05-13 DIAGNOSIS — Z09 HOSPITAL DISCHARGE FOLLOW-UP: Primary | ICD-10-CM

## 2021-05-13 DIAGNOSIS — I48.91 NEW ONSET ATRIAL FIBRILLATION (HCC): ICD-10-CM

## 2021-05-13 DIAGNOSIS — I44.7 LEFT BUNDLE BRANCH BLOCK (LBBB) ON ELECTROCARDIOGRAM: ICD-10-CM

## 2021-05-13 PROCEDURE — 99496 TRANSJ CARE MGMT HIGH F2F 7D: CPT | Performed by: NURSE PRACTITIONER

## 2021-05-13 PROCEDURE — 1111F DSCHRG MED/CURRENT MED MERGE: CPT | Performed by: NURSE PRACTITIONER

## 2021-05-13 RX ORDER — IRBESARTAN 150 MG/1
150 TABLET ORAL NIGHTLY
Qty: 90 TABLET | Refills: 3 | Status: CANCELLED | OUTPATIENT
Start: 2021-05-13

## 2021-05-13 NOTE — PROGRESS NOTES
"Subjective   Nedra Tolliver is a 85 y.o. female.   Chief Complaint   Patient presents with   • Hospital Follow Up Visit     JARRETT, D/C 9th, Hypertension   • Transitional Care Management       Keyanna presents today for hospital follow-up after going to the emergency room for elevated blood pressure.  Emergency room she was given hydralazine but subsequently caused her to go into A. fib with RVR.  She was given a Cardizem bolus and brought back to sinus rhythm.  At discharge she was placed with a 14-day prescription monitor and instructed to increase her Norvasc to 10 mg daily.  Since discharge she reports she is feeling \"not very good\".  She states she \"feels weak\".  She reports she is \"kind of short of breath\" when walking distances.  She denies any chest pain or palpitations.  She complains of having some dizziness.  She reports she is struggled with dizziness in the past as well related to blood pressure medication.  At that time she was taking irbesartan and Norvasc at the same time.  After  the doses into the morning dose and evening dose she was tolerating the medication better.  Since her discharge she has been concerned with increasing her Norvasc up to 10 mg.  She has varied her dose between 5, 7.5, to 10 mg depending on what her blood pressure is reading.  She still reports getting multiple readings up in the 170s systolic.  She states she is not sleeping good since her hospitalization.       The following portions of the patient's history were reviewed and updated as appropriate: allergies, current medications, past family history, past medical history, past social history, past surgical history and problem list.    Review of Systems   Constitutional: Positive for fatigue. Negative for activity change, appetite change, fever, unexpected weight gain and unexpected weight loss.   HENT: Negative for swollen glands, trouble swallowing and voice change.    Eyes: Negative for blurred vision and visual " disturbance.   Respiratory: Negative for cough and shortness of breath.    Cardiovascular: Negative for chest pain, palpitations and leg swelling.   Gastrointestinal: Negative for abdominal pain, constipation, diarrhea, nausea, vomiting and indigestion.   Endocrine: Negative for cold intolerance, heat intolerance, polydipsia and polyphagia.   Genitourinary: Negative for dysuria and frequency.   Musculoskeletal: Negative for arthralgias, back pain, joint swelling and neck pain.   Skin: Negative for color change, rash and skin lesions.   Neurological: Positive for dizziness and weakness. Negative for headache, memory problem and confusion.   Hematological: Does not bruise/bleed easily.   Psychiatric/Behavioral: Negative for agitation, hallucinations and suicidal ideas. The patient is not nervous/anxious.        Objective   Past Medical History:   Diagnosis Date   • Arthritis    • Cancer (CMS/HCC)     BCC @ nose & Left arm   • Diabetes type 2, controlled (CMS/HCC)    • Diverticulitis    • History of GI bleed    • History of transfusion     Has had x 5 units.   • Hypertension    • Hyponatremia     Required hospitalization   • Hypothyroidism    • Knee arthropathy 09/30/2020   • LPRD (laryngopharyngeal reflux disease)    • Pharyngeal dysphagia    • Thyroid nodule       Past Surgical History:   Procedure Laterality Date   • APPENDECTOMY     • BASAL CELL CARCINOMA EXCISION     • BELPHAROPTOSIS REPAIR     • BREAST CYST EXCISION     • CATARACT EXTRACTION     • CATARACT EXTRACTION WITH INTRAOCULAR LENS IMPLANT Bilateral    • CHOLECYSTECTOMY     • COLONOSCOPY Left 11/1/2016    Tubular adenoma cecum, Diverticulosis repeat prn   • SUBTOTAL HYSTERECTOMY     • TOTAL KNEE ARTHROPLASTY Right 9/30/2020    Procedure: TOTAL KNEE ARTHROPLASTY;  Surgeon: Mak Pickens MD;  Location: [facility];  Service: Orthopedics;  Laterality: Right;   • TUMOR EXCISION      under armpits and left breast        Current Outpatient Medications:   •   acetaminophen (TYLENOL) 500 MG tablet, Take 1,000 mg by mouth At Night As Needed for Mild Pain ., Disp: , Rfl:   •  amLODIPine (NORVASC) 10 MG tablet, Take 1 tablet by mouth Daily., Disp: 30 tablet, Rfl: 0  •  aspirin 81 MG EC tablet, Take 1 tablet by mouth Daily., Disp: , Rfl:   •  Bystolic 2.5 MG tablet, TAKE 1 TABLET BY MOUTH DAILY , Disp: 90 tablet, Rfl: 3  •  Cholecalciferol (VITAMIN D3) 5000 UNITS capsule capsule, Take 5,000 Units by mouth daily., Disp: , Rfl:   •  Coenzyme Q10 (CO Q 10) 100 MG capsule, Take 1 tablet by mouth Daily., Disp: , Rfl:   •  conjugated estrogens (PREMARIN) 0.625 MG/GM vaginal cream, Insert  into the vagina 2 (Two) Times a Week. (Patient taking differently: Insert  into the vagina 2 (Two) Times a Week. Pt does not use regularly), Disp: 30 g, Rfl: 6  •  glimepiride (AMARYL) 4 MG tablet, Take 1 tablet by mouth Every Morning Before Breakfast., Disp: 90 tablet, Rfl: 3  •  glucose blood test strip, 1 daily, Disp: 100 each, Rfl: 3  •  glucose monitor monitoring kit, 1 each Daily., Disp: 1 each, Rfl: 0  •  irbesartan (AVAPRO) 150 MG tablet, Take 1 tablet by mouth Every Night., Disp: 90 tablet, Rfl: 3  •  Lancets misc, 1 each Daily., Disp: 100 each, Rfl: 3  •  levothyroxine (SYNTHROID, LEVOTHROID) 50 MCG tablet, TAKE 1 TABLET BY MOUTH EVERY DAY, Disp: 90 tablet, Rfl: 3  •  Multiple Vitamins-Minerals (CENTRUM SILVER PO), Take 1 tablet by mouth Daily., Disp: , Rfl:   •  True Metrix Blood Glucose Test test strip, TEST TWICE DAILY AS DIRECTED , Disp: 100 each, Rfl: 3     Vitals:    05/13/21 0854   BP: 144/76   Pulse: 63   Temp: 97.3 °F (36.3 °C)   SpO2: 98%         05/13/21  0854   Weight: 69.2 kg (152 lb 9.6 oz)         Physical Exam  HENT:      Right Ear: Tympanic membrane and external ear normal.      Left Ear: Tympanic membrane and external ear normal.      Nose: Nose normal.      Mouth/Throat:      Mouth: Mucous membranes are moist. No oral lesions.      Pharynx: Oropharynx is clear.   Eyes:       Conjunctiva/sclera: Conjunctivae normal.      Pupils: Pupils are equal, round, and reactive to light.   Neck:      Thyroid: No thyromegaly.   Cardiovascular:      Rate and Rhythm: Normal rate and regular rhythm.      Pulses: Normal pulses.           Radial pulses are 2+ on the right side and 2+ on the left side.      Heart sounds: Normal heart sounds.   Pulmonary:      Effort: Pulmonary effort is normal.      Breath sounds: Normal breath sounds.   Musculoskeletal:      Cervical back: Normal range of motion.      Right lower leg: No edema.      Left lower leg: No edema.   Lymphadenopathy:      Cervical: No cervical adenopathy.   Skin:     General: Skin is warm and dry.   Neurological:      Mental Status: She is alert and oriented to person, place, and time.      Gait: Gait normal.   Psychiatric:         Mood and Affect: Mood normal.         Speech: Speech normal.         Behavior: Behavior normal.         Thought Content: Thought content normal.               Assessment/Plan   Diagnoses and all orders for this visit:    1. Hospital discharge follow-up (Primary)    2. Essential hypertension    3. New Left bundle branch block (LBBB) on electrocardiogram    4. New onset atrial fibrillation, brief episode resolved after IV Cardizem bolus (CMS/[facility])        Address has had worsening dizziness since increasing her Norvasc dose to 10 mg.  She has previously taken Norvasc 5 mg with irbesartan together which also led to worsening dizziness.  She states she tolerates the irbesartan well.  It appears she may be having a better response to the irbesartan as her blood pressure does come down after taking this in the morning.  I did however recheck her blood pressure in the office today and she was back up to 160s over 60.  With her dizziness I considered having her to go back down to 5 mg of amlodipine at this time and we try taking her irbesartan twice daily. I am concerned with her chronic hyponatremia.  For now, will split  dose of Amlodipine to 5mg twice daily and continue 150mg Irbesartan.  I have asked for her to monitor her readings and return to the office in 1 week to recheck her blood pressures but sooner if needed.  Continue follow-ups with cardiology as scheduled.

## 2021-05-20 ENCOUNTER — OFFICE VISIT (OUTPATIENT)
Dept: INTERNAL MEDICINE | Facility: CLINIC | Age: 86
End: 2021-05-20

## 2021-05-20 VITALS
OXYGEN SATURATION: 98 % | HEIGHT: 64 IN | DIASTOLIC BLOOD PRESSURE: 68 MMHG | WEIGHT: 154.6 LBS | BODY MASS INDEX: 26.4 KG/M2 | HEART RATE: 58 BPM | SYSTOLIC BLOOD PRESSURE: 156 MMHG | TEMPERATURE: 97.1 F

## 2021-05-20 DIAGNOSIS — E87.1 HYPONATREMIA: ICD-10-CM

## 2021-05-20 DIAGNOSIS — I10 ESSENTIAL HYPERTENSION: Primary | ICD-10-CM

## 2021-05-20 PROCEDURE — 99213 OFFICE O/P EST LOW 20 MIN: CPT | Performed by: NURSE PRACTITIONER

## 2021-05-20 RX ORDER — IRBESARTAN 150 MG/1
TABLET ORAL
Start: 2021-05-20 | End: 2021-07-20 | Stop reason: SDUPTHER

## 2021-05-20 NOTE — PROGRESS NOTES
Subjective   Nedra Tolliver is a 85 y.o. female.   Chief Complaint   Patient presents with   • Hypertension     1 week follow up       Nedra presents today for 2-week follow-up on hypertension.  At her recent visit she was advised to use 5 mg of amlodipine twice daily and continue irbesartan at 150 mg daily.  She also continues Bystolic 2.5 mg daily.  She does bring a list of blood pressure readings in the office today that are primarily 140s to upper 150s systolic over 70s.  There are multiple readings however of 160s to 170s systolic over 70s to 80s.  Heart heart rate continues to be on the lower end anywhere between 47 and 69 bpm.  She continues on a Holter monitor at this time and currently under cardiology evaluation.  He states she is doing okay but does state that she does have some mild swelling in the ankles in the evenings.  She continues on a fluid restriction due to hyponatremia.  This was encouraged by her nephrologist.  She still has some intermittent dizziness.        The following portions of the patient's history were reviewed and updated as appropriate: allergies, current medications, past family history, past medical history, past social history, past surgical history and problem list.    Review of Systems   Constitutional: Negative for activity change, appetite change, fatigue, fever, unexpected weight gain and unexpected weight loss.   HENT: Negative for swollen glands, trouble swallowing and voice change.    Eyes: Negative for blurred vision and visual disturbance.   Respiratory: Negative for cough and shortness of breath.    Cardiovascular: Positive for leg swelling. Negative for chest pain and palpitations.   Gastrointestinal: Negative for abdominal pain, constipation, diarrhea, nausea, vomiting and indigestion.   Endocrine: Negative for cold intolerance, heat intolerance, polydipsia and polyphagia.   Genitourinary: Negative for dysuria and frequency.   Musculoskeletal: Negative for  arthralgias, back pain, joint swelling and neck pain.   Skin: Negative for color change, rash and skin lesions.   Neurological: Positive for dizziness. Negative for weakness, headache, memory problem and confusion.   Hematological: Does not bruise/bleed easily.   Psychiatric/Behavioral: Negative for agitation, hallucinations and suicidal ideas. The patient is not nervous/anxious.        Objective   Past Medical History:   Diagnosis Date   • Arthritis    • Cancer (CMS/HCC)     BCC @ nose & Left arm   • Diabetes type 2, controlled (CMS/HCC)    • Diverticulitis    • History of GI bleed    • History of transfusion     Has had x 5 units.   • Hypertension    • Hyponatremia     Required hospitalization   • Hypothyroidism    • Knee arthropathy 09/30/2020   • LPRD (laryngopharyngeal reflux disease)    • Pharyngeal dysphagia    • Thyroid nodule       Past Surgical History:   Procedure Laterality Date   • APPENDECTOMY     • BASAL CELL CARCINOMA EXCISION     • BELPHAROPTOSIS REPAIR     • BREAST CYST EXCISION     • CATARACT EXTRACTION     • CATARACT EXTRACTION WITH INTRAOCULAR LENS IMPLANT Bilateral    • CHOLECYSTECTOMY     • COLONOSCOPY Left 11/1/2016    Tubular adenoma cecum, Diverticulosis repeat prn   • SUBTOTAL HYSTERECTOMY     • TOTAL KNEE ARTHROPLASTY Right 9/30/2020    Procedure: TOTAL KNEE ARTHROPLASTY;  Surgeon: Mak Pickens MD;  Location: [facility];  Service: Orthopedics;  Laterality: Right;   • TUMOR EXCISION      under armpits and left breast        Current Outpatient Medications:   •  acetaminophen (TYLENOL) 500 MG tablet, Take 1,000 mg by mouth At Night As Needed for Mild Pain ., Disp: , Rfl:   •  amLODIPine (NORVASC) 10 MG tablet, Take 1 tablet by mouth Daily., Disp: 30 tablet, Rfl: 0  •  aspirin 81 MG EC tablet, Take 1 tablet by mouth Daily., Disp: , Rfl:   •  Bystolic 2.5 MG tablet, TAKE 1 TABLET BY MOUTH DAILY , Disp: 90 tablet, Rfl: 3  •  Cholecalciferol (VITAMIN D3) 5000 UNITS capsule capsule, Take  5,000 Units by mouth daily., Disp: , Rfl:   •  Coenzyme Q10 (CO Q 10) 100 MG capsule, Take 1 tablet by mouth Daily., Disp: , Rfl:   •  conjugated estrogens (PREMARIN) 0.625 MG/GM vaginal cream, Insert  into the vagina 2 (Two) Times a Week. (Patient taking differently: Insert  into the vagina 2 (Two) Times a Week. Pt does not use regularly), Disp: 30 g, Rfl: 6  •  glimepiride (AMARYL) 4 MG tablet, Take 1 tablet by mouth Every Morning Before Breakfast., Disp: 90 tablet, Rfl: 3  •  glucose blood test strip, 1 daily, Disp: 100 each, Rfl: 3  •  glucose monitor monitoring kit, 1 each Daily., Disp: 1 each, Rfl: 0  •  irbesartan (AVAPRO) 150 MG tablet, Take 1 tablet in the morning, 1/2 tablet in the evening, Disp: , Rfl:   •  Lancets misc, 1 each Daily., Disp: 100 each, Rfl: 3  •  levothyroxine (SYNTHROID, LEVOTHROID) 50 MCG tablet, TAKE 1 TABLET BY MOUTH EVERY DAY, Disp: 90 tablet, Rfl: 3  •  Multiple Vitamins-Minerals (CENTRUM SILVER PO), Take 1 tablet by mouth Daily., Disp: , Rfl:   •  True Metrix Blood Glucose Test test strip, TEST TWICE DAILY AS DIRECTED , Disp: 100 each, Rfl: 3     Vitals:    05/20/21 0904   BP: 156/68   Pulse: 58   Temp: 97.1 °F (36.2 °C)   SpO2: 98%         05/20/21  0904   Weight: 70.1 kg (154 lb 9.6 oz)         Physical Exam  Constitutional:       General: She is not in acute distress.     Appearance: She is well-developed.   HENT:      Head: Normocephalic.      Right Ear: External ear normal.      Left Ear: External ear normal.      Mouth/Throat:      Mouth: No oral lesions.   Eyes:      Conjunctiva/sclera: Conjunctivae normal.   Cardiovascular:      Rate and Rhythm: Normal rate and regular rhythm.      Heart sounds: Normal heart sounds.   Pulmonary:      Effort: Pulmonary effort is normal.      Breath sounds: Normal breath sounds.   Musculoskeletal:      Right lower leg: No edema.      Left lower leg: No edema.   Skin:     General: Skin is warm and dry.   Neurological:      Mental Status: She is  alert and oriented to person, place, and time.      Gait: Gait normal.   Psychiatric:         Mood and Affect: Mood normal.         Speech: Speech normal.         Behavior: Behavior normal.         Thought Content: Thought content normal.               Assessment/Plan   Diagnoses and all orders for this visit:    1. Essential hypertension (Primary)  -     irbesartan (AVAPRO) 150 MG tablet; Take 1 tablet in the morning, 1/2 tablet in the evening    2. Hyponatremia  -     Basic Metabolic Panel    BPs overall are still elevated with some minimal improvement.  Will avoid diuretic therapy due to her hyponatremia and listed allergy.  Will continue amlodipine at 5 mg twice daily at this time.  Will trial a mild increase in irbesartan but will have her take this dose in the evening.  I would like to start slow with increasing this therapy to be sure she tolerates this well.  We will check her sodium today to see where her level is.  I would like her to continue monitoring her blood pressures and follow-up with Dr. Rodrigues in 2 weeks to recheck.

## 2021-05-21 ENCOUNTER — TELEPHONE (OUTPATIENT)
Dept: INTERNAL MEDICINE | Facility: CLINIC | Age: 86
End: 2021-05-21

## 2021-05-21 LAB
BUN SERPL-MCNC: 21 MG/DL (ref 8–23)
BUN/CREAT SERPL: 21.9 (ref 7–25)
CALCIUM SERPL-MCNC: 10.1 MG/DL (ref 8.6–10.5)
CHLORIDE SERPL-SCNC: 95 MMOL/L (ref 98–107)
CO2 SERPL-SCNC: 24.1 MMOL/L (ref 22–29)
CREAT SERPL-MCNC: 0.96 MG/DL (ref 0.57–1)
GLUCOSE SERPL-MCNC: 142 MG/DL (ref 65–99)
POTASSIUM SERPL-SCNC: 5 MMOL/L (ref 3.5–5.2)
SODIUM SERPL-SCNC: 133 MMOL/L (ref 136–145)

## 2021-05-24 ENCOUNTER — TELEPHONE (OUTPATIENT)
Dept: INTERNAL MEDICINE | Facility: CLINIC | Age: 86
End: 2021-05-24

## 2021-05-24 NOTE — TELEPHONE ENCOUNTER
----- Message from Yeny Chaudhary sent at 5/22/2021 12:30 PM CDT -----    ----- Message -----  From: Norma Coreas APRN  Sent: 5/21/2021   7:51 AM CDT  To: Zayda Segovia MA    Stable BMP.  Sodium is 133.

## 2021-05-25 ENCOUNTER — READMISSION MANAGEMENT (OUTPATIENT)
Dept: CALL CENTER | Facility: HOSPITAL | Age: 86
End: 2021-05-25

## 2021-05-25 NOTE — OUTREACH NOTE
Medical Week 3 Survey      Responses   [facility] patient discharged from?  [facility]   Does the patient have one of the following disease processes/diagnoses(primary or secondary)?  Other   Week 3 attempt successful?  Yes   Call start time  1540   Call end time  1543   Discharge diagnosis  hypertensive emergency with H/A, new onset A-fib, CKD, T2DM   Person spoke with today (if not patient) and relationship  michael Dennis   Meds reviewed with patient/caregiver?  Yes   Is the patient having any side effects they believe may be caused by any medication additions or changes?  No   Does the patient have all medications ordered at discharge?  N/A   Is the patient taking all medications as directed (includes completed medication regime)?  Yes   Does the patient have an appointment with their PCP within 7 days of discharge?  Yes   Has the patient kept scheduled appointments due by today?  Yes   What is the Home health agency?   none   Has home health visited the patient within 72 hours of discharge?  N/A   Psychosocial issues?  No   Did the patient receive a copy of their discharge instructions?  Yes   Nursing interventions  Reviewed instructions with patient   What is the patient's perception of their health status since discharge?  Improving   Is the patient/caregiver able to teach back signs and symptoms related to disease process for when to call PCP?  Yes   Is the patient/caregiver able to teach back signs and symptoms related to disease process for when to call 911?  Yes   Is the patient/caregiver able to teach back the hierarchy of who to call/visit for symptoms/problems? PCP, Specialist, Home health nurse, Urgent Care, ED, 911  Yes   Additional teach back comments  daughter states pt well, still has episodes of light headedness which is chronic   Week 3 Call Completed?  Yes          Caro Arvizu RN

## 2021-06-03 ENCOUNTER — OFFICE VISIT (OUTPATIENT)
Dept: INTERNAL MEDICINE | Facility: CLINIC | Age: 86
End: 2021-06-03

## 2021-06-03 VITALS
BODY MASS INDEX: 26.29 KG/M2 | HEART RATE: 63 BPM | SYSTOLIC BLOOD PRESSURE: 156 MMHG | WEIGHT: 154 LBS | OXYGEN SATURATION: 99 % | HEIGHT: 64 IN | TEMPERATURE: 97.9 F | DIASTOLIC BLOOD PRESSURE: 64 MMHG

## 2021-06-03 DIAGNOSIS — I10 BENIGN ESSENTIAL HTN: Primary | ICD-10-CM

## 2021-06-03 DIAGNOSIS — E11.21 TYPE 2 DIABETES MELLITUS WITH DIABETIC NEPHROPATHY, WITHOUT LONG-TERM CURRENT USE OF INSULIN (HCC): ICD-10-CM

## 2021-06-03 DIAGNOSIS — I48.0 PAROXYSMAL ATRIAL FIBRILLATION (HCC): ICD-10-CM

## 2021-06-03 PROBLEM — M54.12 CERVICAL RADICULOPATHY: Status: ACTIVE | Noted: 2017-01-27

## 2021-06-03 PROCEDURE — 99214 OFFICE O/P EST MOD 30 MIN: CPT | Performed by: INTERNAL MEDICINE

## 2021-06-03 RX ORDER — DILTIAZEM HYDROCHLORIDE 120 MG/1
120 TABLET, EXTENDED RELEASE ORAL DAILY
Qty: 30 TABLET | Refills: 11 | Status: SHIPPED | OUTPATIENT
Start: 2021-06-03 | End: 2021-06-04 | Stop reason: SDUPTHER

## 2021-06-03 NOTE — PROGRESS NOTES
Subjective   Nedra Tolliver is a 85 y.o. female.   Chief Complaint   Patient presents with   • Hypertension     2 week recheck; saw Norma on 05/20/2021   • Leg Swelling     bilateral leg swelling; noticed about a week ago; keeping feet elevated does not seem to help    • Fall     patient fell in bathroom when up during the night going to the bathroom.  she fell back into the tub and bumped her head on her right side.    • Balance Issues     fells like her balance has been off more the last couple of weeks    • Shortness of Breath     when up moving around much        History of Present Illness patient is here for reevaluation she had been recently hospitalized for extremely elevated blood pressure brief episode of atrial fibrillation which responded to Cardizem drip.  Since that time she is worn a 14-day Zio patch and has a follow-up appointment with cardiology.  Since she has been home from the hospital her legs are becoming edematous very red secondary to the amlodipine dosage.  Also she had an episode where she fell in the bathroom she got up in the middle the night she felt fuzzy headed and lost her balance.    The following portions of the patient's history were reviewed and updated as appropriate: allergies, current medications, past family history, past medical history, past social history, past surgical history and problem list.    Review of Systems   Constitutional: Negative for activity change, appetite change, fatigue, fever, unexpected weight gain and unexpected weight loss.   HENT: Negative for swollen glands, trouble swallowing and voice change.    Eyes: Negative for blurred vision and visual disturbance.   Respiratory: Negative for cough and shortness of breath.    Cardiovascular: Negative for chest pain, palpitations and leg swelling.   Gastrointestinal: Negative for abdominal pain, constipation, diarrhea, nausea, vomiting and indigestion.   Endocrine: Negative for cold intolerance, heat  intolerance, polydipsia and polyphagia.   Genitourinary: Negative for dysuria and frequency.   Musculoskeletal: Negative for arthralgias, back pain, joint swelling and neck pain.   Skin: Negative for color change, rash and skin lesions.   Neurological: Negative for dizziness, weakness, headache, memory problem and confusion.   Hematological: Does not bruise/bleed easily.   Psychiatric/Behavioral: Negative for agitation, hallucinations and suicidal ideas. The patient is not nervous/anxious.        Objective   Past Medical History:   Diagnosis Date   • Arthritis    • Cancer (CMS/HCC)     BCC @ nose & Left arm   • Diabetes type 2, controlled (CMS/HCC)    • Diverticulitis    • History of GI bleed    • History of transfusion     Has had x 5 units.   • Hypertension    • Hyponatremia     Required hospitalization   • Hypothyroidism    • Knee arthropathy 09/30/2020   • LPRD (laryngopharyngeal reflux disease)    • Pharyngeal dysphagia    • Thyroid nodule       Past Surgical History:   Procedure Laterality Date   • APPENDECTOMY     • BASAL CELL CARCINOMA EXCISION     • BELPHAROPTOSIS REPAIR     • BREAST CYST EXCISION     • CATARACT EXTRACTION     • CATARACT EXTRACTION WITH INTRAOCULAR LENS IMPLANT Bilateral    • CHOLECYSTECTOMY     • COLONOSCOPY Left 11/1/2016    Tubular adenoma cecum, Diverticulosis repeat prn   • SUBTOTAL HYSTERECTOMY     • TOTAL KNEE ARTHROPLASTY Right 9/30/2020    Procedure: TOTAL KNEE ARTHROPLASTY;  Surgeon: Mak Pickens MD;  Location: [facility];  Service: Orthopedics;  Laterality: Right;   • TUMOR EXCISION      under armpits and left breast        Current Outpatient Medications:   •  acetaminophen (TYLENOL) 500 MG tablet, Take 1,000 mg by mouth At Night As Needed for Mild Pain ., Disp: , Rfl:   •  aspirin 81 MG EC tablet, Take 1 tablet by mouth Daily., Disp: , Rfl:   •  Bystolic 2.5 MG tablet, TAKE 1 TABLET BY MOUTH DAILY , Disp: 90 tablet, Rfl: 3  •  Cholecalciferol (VITAMIN D3) 5000 UNITS  capsule capsule, Take 5,000 Units by mouth daily., Disp: , Rfl:   •  Coenzyme Q10 (CO Q 10) 100 MG capsule, Take 1 tablet by mouth Daily., Disp: , Rfl:   •  conjugated estrogens (PREMARIN) 0.625 MG/GM vaginal cream, Insert  into the vagina 2 (Two) Times a Week. (Patient taking differently: Insert  into the vagina 2 (Two) Times a Week. Pt does not use regularly), Disp: 30 g, Rfl: 6  •  glimepiride (AMARYL) 4 MG tablet, Take 1 tablet by mouth Every Morning Before Breakfast., Disp: 90 tablet, Rfl: 3  •  glucose blood test strip, 1 daily, Disp: 100 each, Rfl: 3  •  glucose monitor monitoring kit, 1 each Daily., Disp: 1 each, Rfl: 0  •  irbesartan (AVAPRO) 150 MG tablet, Take 1 tablet in the morning, 1/2 tablet in the evening, Disp: , Rfl:   •  Lancets misc, 1 each Daily., Disp: 100 each, Rfl: 3  •  levothyroxine (SYNTHROID, LEVOTHROID) 50 MCG tablet, TAKE 1 TABLET BY MOUTH EVERY DAY, Disp: 90 tablet, Rfl: 3  •  Multiple Vitamins-Minerals (CENTRUM SILVER PO), Take 1 tablet by mouth Daily., Disp: , Rfl:   •  True Metrix Blood Glucose Test test strip, TEST TWICE DAILY AS DIRECTED , Disp: 100 each, Rfl: 3  •  dilTIAZem LA (Cardizem LA) 120 MG 24 hr tablet, Take 1 tablet by mouth Daily., Disp: 30 tablet, Rfl: 11     Vitals:    06/03/21 1343   BP: 156/64   Pulse: 63   Temp: 97.9 °F (36.6 °C)   SpO2: 99%         06/03/21  1343   Weight: 69.9 kg (154 lb)         Physical Exam  Constitutional:       Appearance: Normal appearance. She is well-developed.   HENT:      Head: Normocephalic and atraumatic.      Right Ear: External ear normal.      Left Ear: External ear normal.   Eyes:      Extraocular Movements: Extraocular movements intact.      Conjunctiva/sclera: Conjunctivae normal.      Pupils: Pupils are equal, round, and reactive to light.   Neck:      Vascular: No carotid bruit.   Cardiovascular:      Rate and Rhythm: Normal rate and regular rhythm.      Pulses: Normal pulses.      Heart sounds: Normal heart sounds. No murmur  heard.   No gallop.    Pulmonary:      Effort: Pulmonary effort is normal.      Breath sounds: Normal breath sounds.   Musculoskeletal:         General: No swelling or tenderness. Normal range of motion.      Cervical back: Normal range of motion and neck supple. No rigidity.   Skin:     General: Skin is warm and dry.   Neurological:      General: No focal deficit present.      Mental Status: She is alert and oriented to person, place, and time.   Psychiatric:         Mood and Affect: Mood normal.         Behavior: Behavior normal.               Assessment/Plan   Diagnoses and all orders for this visit:    1. Benign essential HTN (Primary)    2. Type 2 diabetes mellitus with diabetic nephropathy, without long-term current use of insulin (CMS/[facility])    3. Paroxysmal atrial fibrillation (CMS/[facility])    Other orders  -     dilTIAZem LA (Cardizem LA) 120 MG 24 hr tablet; Take 1 tablet by mouth Daily.  Dispense: 30 tablet; Refill: 11      At this point I am going to discontinue patient's amlodipine I am placing her on 120 mg of Cardizem I will bring her back next week and recheck her blood pressures.  My concern is that she is get paroxysmal atrial fibrillation she is on aspirin therapy at this point she may need to be on anticoagulant therapy depending on what her Zio patch shows.

## 2021-06-04 ENCOUNTER — TELEPHONE (OUTPATIENT)
Dept: INTERNAL MEDICINE | Facility: CLINIC | Age: 86
End: 2021-06-04

## 2021-06-04 RX ORDER — DILTIAZEM HYDROCHLORIDE 120 MG/1
120 TABLET, EXTENDED RELEASE ORAL DAILY
Qty: 30 TABLET | Refills: 11 | Status: SHIPPED | OUTPATIENT
Start: 2021-06-04 | End: 2021-06-07 | Stop reason: RX

## 2021-06-07 ENCOUNTER — TELEPHONE (OUTPATIENT)
Dept: INTERNAL MEDICINE | Facility: CLINIC | Age: 86
End: 2021-06-07

## 2021-06-07 RX ORDER — DILTIAZEM HYDROCHLORIDE 120 MG/1
120 CAPSULE, COATED, EXTENDED RELEASE ORAL DAILY
Qty: 30 CAPSULE | Refills: 11
Start: 2021-06-07 | End: 2021-12-01

## 2021-06-07 NOTE — TELEPHONE ENCOUNTER
Called pt and she had to wait until today to get her Cardizem, as it had to be ordered by the pharmacy, so needs to postpone her 1 week FU to next week.  Sheron r/s pt's appt.

## 2021-06-07 NOTE — TELEPHONE ENCOUNTER
Caller: Dorie Tolliver    Relationship: Self    Best call back number:  [phone]     What is the best time to reach you: ANYTIME     Who are you requesting to speak with (clinical staff, provider,  specific staff member): PRESTON    Do you know the name of the person who called: DORIE TOLLIVER     What was the call regarding: DISCUSS APPOINTMENT WITH PRESTON.    Do you require a callback: YES

## 2021-06-07 NOTE — TELEPHONE ENCOUNTER
Pt called back stating she talked to pharmacy and they said they have been trying to talk to our office about a different formula. She said this seems to be a lot of trouble is she needs to cancel this med she will. If you try to call her close to 2pm she has another appt. And sometimes her voicemail doesn't always work.

## 2021-06-07 NOTE — TELEPHONE ENCOUNTER
Called and talked with Carmen, the pharmacist at [facility], and she says they have the Cardizem in a capsule, but not a tablet.  Told ok to give the capsule.  Pt informed she can pick Rx up later today.  I have pended a new Rx, just for the sake of changing to a capsule so that we don't have this problem in the future.

## 2021-06-10 LAB
MAXIMAL PREDICTED HEART RATE: 135 BPM
STRESS TARGET HR: 115 BPM

## 2021-06-14 ENCOUNTER — OFFICE VISIT (OUTPATIENT)
Dept: INTERNAL MEDICINE | Facility: CLINIC | Age: 86
End: 2021-06-14

## 2021-06-14 VITALS
HEIGHT: 64 IN | TEMPERATURE: 97.1 F | SYSTOLIC BLOOD PRESSURE: 168 MMHG | HEART RATE: 67 BPM | BODY MASS INDEX: 26.46 KG/M2 | DIASTOLIC BLOOD PRESSURE: 70 MMHG | OXYGEN SATURATION: 98 % | WEIGHT: 155 LBS

## 2021-06-14 DIAGNOSIS — I87.303 STASIS EDEMA OF BOTH LOWER EXTREMITIES: ICD-10-CM

## 2021-06-14 DIAGNOSIS — I47.1 SVT (SUPRAVENTRICULAR TACHYCARDIA) (HCC): Primary | ICD-10-CM

## 2021-06-14 DIAGNOSIS — I10 ESSENTIAL HYPERTENSION: ICD-10-CM

## 2021-06-14 PROCEDURE — 99214 OFFICE O/P EST MOD 30 MIN: CPT | Performed by: INTERNAL MEDICINE

## 2021-06-14 NOTE — PROGRESS NOTES
"Subjective   Nedra Tolliver is a 85 y.o. female.   Chief Complaint   Patient presents with   • Follow-up     one week follow up for BP: at last appointment ; discontinue patient's amlodipine I am placing her on 120 mg of Cardizem ; the new medicatin causes patient to have a \" full feeling head\" at night like having sinus issues and makes sligtly dizzy.  she still has the symptoms in the evening but they have eased up        History of Present Illness patient is here for a follow-up after changing medications.  She had had some leg edema I changed her from Norvasc to Cardizem.  Also in that interim she has gotten a report on her Zio patch.  She was not aware of it but it did show some supraventricular tachycardia.  Interestingly Cardizem will likely help with that as well.    The following portions of the patient's history were reviewed and updated as appropriate: allergies, current medications, past family history, past medical history, past social history, past surgical history and problem list.    Review of Systems   Constitutional: Negative for activity change, appetite change, fatigue, fever, unexpected weight gain and unexpected weight loss.   HENT: Negative for swollen glands, trouble swallowing and voice change.    Eyes: Negative for blurred vision and visual disturbance.   Respiratory: Negative for cough and shortness of breath.    Cardiovascular: Negative for chest pain, palpitations and leg swelling.   Gastrointestinal: Negative for abdominal pain, constipation, diarrhea, nausea, vomiting and indigestion.   Endocrine: Negative for cold intolerance, heat intolerance, polydipsia and polyphagia.   Genitourinary: Negative for dysuria and frequency.   Musculoskeletal: Negative for arthralgias, back pain, joint swelling and neck pain.   Skin: Negative for color change, rash and skin lesions.   Neurological: Negative for dizziness, weakness, headache, memory problem and confusion.   Hematological: Does not " bruise/bleed easily.   Psychiatric/Behavioral: Negative for agitation, hallucinations and suicidal ideas. The patient is not nervous/anxious.        Objective   Past Medical History:   Diagnosis Date   • Arthritis    • Cancer (CMS/HCC)     BCC @ nose & Left arm   • Diabetes type 2, controlled (CMS/HCC)    • Diverticulitis    • History of GI bleed    • History of transfusion     Has had x 5 units.   • Hypertension    • Hyponatremia     Required hospitalization   • Hypothyroidism    • Knee arthropathy 09/30/2020   • LPRD (laryngopharyngeal reflux disease)    • Pharyngeal dysphagia    • Thyroid nodule       Past Surgical History:   Procedure Laterality Date   • APPENDECTOMY     • BASAL CELL CARCINOMA EXCISION     • BELPHAROPTOSIS REPAIR     • BREAST CYST EXCISION     • CATARACT EXTRACTION     • CATARACT EXTRACTION WITH INTRAOCULAR LENS IMPLANT Bilateral    • CHOLECYSTECTOMY     • COLONOSCOPY Left 11/1/2016    Tubular adenoma cecum, Diverticulosis repeat prn   • SUBTOTAL HYSTERECTOMY     • TOTAL KNEE ARTHROPLASTY Right 9/30/2020    Procedure: TOTAL KNEE ARTHROPLASTY;  Surgeon: Mak Pickens MD;  Location: [facility];  Service: Orthopedics;  Laterality: Right;   • TUMOR EXCISION      under armpits and left breast        Current Outpatient Medications:   •  acetaminophen (TYLENOL) 500 MG tablet, Take 1,000 mg by mouth At Night As Needed for Mild Pain ., Disp: , Rfl:   •  aspirin 81 MG EC tablet, Take 1 tablet by mouth Daily., Disp: , Rfl:   •  Bystolic 2.5 MG tablet, TAKE 1 TABLET BY MOUTH DAILY , Disp: 90 tablet, Rfl: 3  •  Cholecalciferol (VITAMIN D3) 5000 UNITS capsule capsule, Take 5,000 Units by mouth daily., Disp: , Rfl:   •  conjugated estrogens (PREMARIN) 0.625 MG/GM vaginal cream, Insert  into the vagina 2 (Two) Times a Week. (Patient taking differently: Insert  into the vagina 2 (Two) Times a Week. Pt does not use regularly), Disp: 30 g, Rfl: 6  •  dilTIAZem CD (dilTIAZem CD) 120 MG 24 hr capsule, Take 1  capsule by mouth Daily., Disp: 30 capsule, Rfl: 11  •  glimepiride (AMARYL) 4 MG tablet, Take 1 tablet by mouth Every Morning Before Breakfast., Disp: 90 tablet, Rfl: 3  •  glucose blood test strip, 1 daily, Disp: 100 each, Rfl: 3  •  glucose monitor monitoring kit, 1 each Daily., Disp: 1 each, Rfl: 0  •  irbesartan (AVAPRO) 150 MG tablet, Take 1 tablet in the morning, 1/2 tablet in the evening, Disp: , Rfl:   •  Lancets misc, 1 each Daily., Disp: 100 each, Rfl: 3  •  levothyroxine (SYNTHROID, LEVOTHROID) 50 MCG tablet, TAKE 1 TABLET BY MOUTH EVERY DAY, Disp: 90 tablet, Rfl: 3  •  Multiple Vitamins-Minerals (CENTRUM SILVER PO), Take 1 tablet by mouth Daily., Disp: , Rfl:   •  True Metrix Blood Glucose Test test strip, TEST TWICE DAILY AS DIRECTED , Disp: 100 each, Rfl: 3  •  Coenzyme Q10 (CO Q 10) 100 MG capsule, Take 1 tablet by mouth Daily., Disp: , Rfl:      Vitals:    06/14/21 1133   BP: 168/70   Pulse: 67   Temp: 97.1 °F (36.2 °C)   SpO2: 98%         06/14/21  1133   Weight: 70.3 kg (155 lb)         Physical Exam  Constitutional:       Appearance: Normal appearance. She is well-developed.   HENT:      Head: Normocephalic and atraumatic.      Right Ear: External ear normal.      Left Ear: External ear normal.   Eyes:      Extraocular Movements: Extraocular movements intact.      Conjunctiva/sclera: Conjunctivae normal.      Pupils: Pupils are equal, round, and reactive to light.   Neck:      Vascular: No carotid bruit.   Cardiovascular:      Rate and Rhythm: Normal rate and regular rhythm.      Pulses: Normal pulses.      Heart sounds: Normal heart sounds. No murmur heard.   No gallop.    Pulmonary:      Effort: Pulmonary effort is normal.      Breath sounds: Normal breath sounds.   Musculoskeletal:         General: No swelling or tenderness. Normal range of motion.      Cervical back: Normal range of motion and neck supple. No rigidity.   Skin:     General: Skin is warm and dry.   Neurological:      General: No  focal deficit present.      Mental Status: She is alert and oriented to person, place, and time.   Psychiatric:         Mood and Affect: Mood normal.         Behavior: Behavior normal.               Assessment/Plan   Diagnoses and all orders for this visit:    1. SVT (supraventricular tachycardia) (CMS/HCC) (Primary)    2. Essential hypertension    3. Stasis edema of both lower extremities      Patient recently had a 14-day monitor placed the results show brief episodes of SVT.  I have placed her on Cardizem in place of her Norvasc primarily because of the amount of leg edema she was experiencing however it is likely she will get a antiarrhythmic effect from the Cardizem.  In regard to her blood pressure is not as well-controlled as I would like but it may take a few weeks does get the desired effect otherwise we may need to increase the dosage.

## 2021-06-18 ENCOUNTER — TELEPHONE (OUTPATIENT)
Dept: CARDIOLOGY | Facility: CLINIC | Age: 86
End: 2021-06-18

## 2021-06-18 ENCOUNTER — OFFICE VISIT (OUTPATIENT)
Dept: CARDIOLOGY | Facility: CLINIC | Age: 86
End: 2021-06-18

## 2021-06-18 VITALS
WEIGHT: 155 LBS | SYSTOLIC BLOOD PRESSURE: 182 MMHG | HEART RATE: 57 BPM | DIASTOLIC BLOOD PRESSURE: 80 MMHG | OXYGEN SATURATION: 100 % | HEIGHT: 64 IN | BODY MASS INDEX: 26.46 KG/M2

## 2021-06-18 DIAGNOSIS — I44.7 NEW ONSET LEFT BUNDLE BRANCH BLOCK (LBBB): ICD-10-CM

## 2021-06-18 DIAGNOSIS — I71.40 ABDOMINAL AORTIC ANEURYSM (AAA) WITHOUT RUPTURE (HCC): Primary | ICD-10-CM

## 2021-06-18 DIAGNOSIS — I20.0 UNSTABLE ANGINA (HCC): ICD-10-CM

## 2021-06-18 PROCEDURE — 99214 OFFICE O/P EST MOD 30 MIN: CPT | Performed by: EMERGENCY MEDICINE

## 2021-06-18 RX ORDER — PREDNISONE 10 MG/1
50 TABLET ORAL TAKE AS DIRECTED
Qty: 5 TABLET | Refills: 0 | Status: SHIPPED | OUTPATIENT
Start: 2021-06-18 | End: 2021-06-24 | Stop reason: HOSPADM

## 2021-06-18 RX ORDER — DIPHENHYDRAMINE HCL 25 MG
25 TABLET ORAL TAKE AS DIRECTED
Qty: 1 TABLET | Refills: 0 | Status: SHIPPED | OUTPATIENT
Start: 2021-06-18 | End: 2021-06-24 | Stop reason: HOSPADM

## 2021-06-18 RX ORDER — SPIRONOLACTONE 25 MG/1
25 TABLET ORAL DAILY
Qty: 30 TABLET | Refills: 11 | Status: SHIPPED | OUTPATIENT
Start: 2021-06-18 | End: 2021-06-24 | Stop reason: HOSPADM

## 2021-06-18 NOTE — TELEPHONE ENCOUNTER
Mrs. Tolliver has a contrast allergy.  Please prescribe allergy pretreatment for her.  She is scheduled for 6/24/21 at 11:00/1:00 p.m.  Thanks.

## 2021-06-20 NOTE — PROGRESS NOTES
[facility] - CARDIOLOGY  New Patient Initial Outpatient Evaulation    Primary Care Physician: Vijay Rodrigues MD    Subjective     Chief Complaint   Patient presents with   • Palpitations     1 MON D/C FU/ RESULTS    • Shortness of Breath   • Dizziness        History of Present Illness    Patient is a very pleasant 85-year-old female who we first saw in the hospital last month at which time she was hospitalized with hypertensive emergency.  She has a history of diabetes, chronic kidney disease, hypertension, hyperlipidemia and hypothyroidism.  When she arrived to the hospital on May 8, her blood pressure was 213/70.  EKG at that time showed a new left bundle branch block.  She also had some evidence of atrial fibrillation with rapid ventricular rate on EKG which responded to 20 mg of Cardizem IV.    She underwent a echocardiogram which showed normal systolic function with some grade 1 diastolic dysfunction with LVH.  Trace AI, trace CO but otherwise valves were functioning appropriately.  She wore a Holter monitor for 14 days when she was discharged which showed a predominant rhythm of normal sinus with a first-degree block and intermittent left bundle block.  She had 9 episodes of SVT with occasional PACs but no evidence of atrial fibrillation.    Patient tells me today in more detail what has been going on with her the past few months.  She says she underwent knee surgery in September and started having episodes of chest pain and shortness of breath the months following this procedure.  She describes the episodes as a funny-like feeling in her chest which she kind of describes as pressure-like.  It was during this time when an EKG was performed in the left bundle branch block was first observed.  Patient tells me for the past few months she has also been getting episodes where she will be out trying to work in her garden and she will get so short of breath with chest pain that she has to stop and sit down for  resolution of the symptoms.  She had an episode in December where she became very nauseated and short of breath with the chest pain and started to see spots.  She says she almost lost consciousness during this episode.  She also gets occasional palpitations.  She tells me that she was told a long time ago that she had an abdominal aortic aneurysm but nothing else was ever mentioned since then.    Review of Systems   Constitutional: Negative for diaphoresis, fever and malaise/fatigue.   HENT: Negative for congestion.    Eyes: Negative for vision loss in left eye and vision loss in right eye.   Cardiovascular: Positive for chest pain and dyspnea on exertion. Negative for claudication, irregular heartbeat, leg swelling, orthopnea, palpitations and syncope.   Respiratory: Positive for shortness of breath. Negative for cough and wheezing.    Hematologic/Lymphatic: Negative for adenopathy.   Skin: Negative for rash.   Musculoskeletal: Negative for joint pain and joint swelling.   Gastrointestinal: Positive for nausea. Negative for abdominal pain, diarrhea and vomiting.   Neurological: Negative for excessive daytime sleepiness, dizziness, focal weakness, light-headedness, numbness and weakness.   Psychiatric/Behavioral: Negative for depression. The patient does not have insomnia.         Otherwise complete ROS reviewed and negative except as mentioned in the HPI.      Past Medical History:   Past Medical History:   Diagnosis Date   • Arthritis    • Cancer (CMS/HCC)     BCC @ nose & Left arm   • Diabetes type 2, controlled (CMS/HCC)    • Diverticulitis    • History of GI bleed    • History of transfusion     Has had x 5 units.   • Hypertension    • Hyponatremia     Required hospitalization   • Hypothyroidism    • Knee arthropathy 09/30/2020   • LPRD (laryngopharyngeal reflux disease)    • Pharyngeal dysphagia    • Thyroid nodule        Past Surgical History:  Past Surgical History:   Procedure Laterality Date   •  APPENDECTOMY     • BASAL CELL CARCINOMA EXCISION     • BELPHAROPTOSIS REPAIR     • BREAST CYST EXCISION     • CARDIAC CATHETERIZATION     • CATARACT EXTRACTION     • CATARACT EXTRACTION WITH INTRAOCULAR LENS IMPLANT Bilateral    • CHOLECYSTECTOMY     • COLONOSCOPY Left 11/1/2016    Tubular adenoma cecum, Diverticulosis repeat prn   • SUBTOTAL HYSTERECTOMY     • TOTAL KNEE ARTHROPLASTY Right 9/30/2020    Procedure: TOTAL KNEE ARTHROPLASTY;  Surgeon: Mak Pickens MD;  Location: [facility];  Service: Orthopedics;  Laterality: Right;   • TUMOR EXCISION      under armpits and left breast       Family History: family history includes Cancer in her mother; Diabetes in her father and mother; Heart failure in her mother.    Social History:  reports that she has never smoked. She has never used smokeless tobacco. She reports that she does not drink alcohol and does not use drugs.    Medications:  Prior to Admission medications    Medication Sig Start Date End Date Taking? Authorizing Provider   acetaminophen (TYLENOL) 500 MG tablet Take 1,000 mg by mouth At Night As Needed for Mild Pain .   Yes Elma Yoo MD   aspirin 81 MG EC tablet Take 1 tablet by mouth Daily. 1/2/20  Yes Beverly Paz APRN   Bystolic 2.5 MG tablet TAKE 1 TABLET BY MOUTH DAILY  12/4/20  Yes Jossy Maciel APRN   Cholecalciferol (VITAMIN D3) 5000 UNITS capsule capsule Take 5,000 Units by mouth daily.   Yes Elma Yoo MD   Coenzyme Q10 (CO Q 10) 100 MG capsule Take 1 tablet by mouth Daily.   Yes Elma Yoo MD   conjugated estrogens (PREMARIN) 0.625 MG/GM vaginal cream Insert  into the vagina 2 (Two) Times a Week.  Patient taking differently: Insert  into the vagina 2 (Two) Times a Week. Pt does not use regularly 5/17/18  Yes Chandrakant Amin MD   dilTIAZem CD (dilTIAZem CD) 120 MG 24 hr capsule Take 1 capsule by mouth Daily. 6/7/21  Yes Vijay Rodrigues MD   glimepiride (AMARYL) 4 MG tablet Take 1  tablet by mouth Every Morning Before Breakfast. 11/12/20  Yes Vijay Rodrigues MD   glucose blood test strip 1 daily 9/23/20  Yes Vijay Rodrigues MD   glucose monitor monitoring kit 1 each Daily. 9/23/20  Yes Vijay Rodrigues MD   irbesartan (AVAPRO) 150 MG tablet Take 1 tablet in the morning, 1/2 tablet in the evening 5/20/21  Yes Norma Coreas APRN   Lancets misc 1 each Daily. 9/23/20  Yes Vijay Rodrigues MD   levothyroxine (SYNTHROID, LEVOTHROID) 50 MCG tablet TAKE 1 TABLET BY MOUTH EVERY DAY 7/6/20  Yes Vijay Rodrigues MD   Multiple Vitamins-Minerals (CENTRUM SILVER PO) Take 1 tablet by mouth Daily.   Yes ProviderElma MD   True Metrix Blood Glucose Test test strip TEST TWICE DAILY AS DIRECTED  4/7/21  Yes Jossy Maciel APRN   cimetidine (Tagamet HB) 200 MG tablet Take 1 tablet by mouth Take As Directed. Take at 8am morning of procedure 6/18/21   Mak Nickerson DO   diphenhydrAMINE (Benadryl Allergy) 25 MG tablet Take 1 tablet by mouth Take As Directed. Take at 8am morning of procedure 6/18/21   Mak Nickerson DO   predniSONE (DELTASONE) 10 MG tablet Take 5 tablets by mouth Take As Directed. Take 5 tablets at 8am morning of procedure 6/18/21   Mak Nickerson DO   spironolactone (ALDACTONE) 25 MG tablet Take 1 tablet by mouth Daily. 6/18/21   Mak Nickerson DO     Allergies:  Allergies   Allergen Reactions   • Codeine Hallucinations   • Iodine Anaphylaxis     Xray dye   • Levaquin [Levofloxacin] Anaphylaxis   • Lortab [Hydrocodone-Acetaminophen] Mental Status Change and Confusion   • Quinolones Anaphylaxis     - CIPRO -    • Shrimp (Diagnostic) Anaphylaxis   • Bactroban [Mupirocin Calcium] Swelling   • Ace Inhibitors Cough   • Piperacillin Sod-Tazobactam So Unknown - High Severity   • Statins Myalgia   • Thiazide-Type Diuretics Unknown - High Severity   • Mupirocin Rash       Objective     Vital Signs: BP (!) 182/80   Pulse 57   Ht  "162.6 cm (64.02\")   Wt 70.3 kg (155 lb)   LMP  (LMP Unknown)   SpO2 100%   BMI 26.59 kg/m²     Vitals and nursing note reviewed.   Constitutional:       Appearance: Normal and healthy appearance. Well-developed and not in distress.   Eyes:      Extraocular Movements: Extraocular movements intact.      Pupils: Pupils are equal, round, and reactive to light.   HENT:      Head: Normocephalic and atraumatic.    Mouth/Throat:      Pharynx: Oropharynx is clear.   Neck:      Vascular: JVD normal.      Trachea: Trachea normal.   Pulmonary:      Effort: Pulmonary effort is normal.      Breath sounds: Normal breath sounds. No wheezing. No rhonchi. No rales.   Cardiovascular:      PMI at left midclavicular line. Normal rate. Regular rhythm. Normal S1. Normal S2.      Murmurs: There is a grade 2/6 systolic murmur.      No gallop. No click. No rub.   Pulses:     Dorsalis pedis: 2+ bilaterally.     Posterior tibial: 2+ bilaterally.  Abdominal:      General: Bowel sounds are normal. There is abdominal bruit.      Palpations: Abdomen is soft.      Tenderness: There is no abdominal tenderness.   Musculoskeletal: Normal range of motion.      Cervical back: Normal range of motion and neck supple. Skin:     General: Skin is warm and dry.      Capillary Refill: Capillary refill takes less than 2 seconds.   Feet:      Right foot:      Skin integrity: Skin integrity normal.      Left foot:      Skin integrity: Skin integrity normal.   Neurological:      Mental Status: Alert and oriented to person, place and time.      Cranial Nerves: Cranial nerves are intact.      Sensory: Sensation is intact.      Motor: Motor function is intact.      Coordination: Coordination is intact.   Psychiatric:         Speech: Speech normal.         Behavior: Behavior is cooperative.         Results Reviewed:          Lab Results   Component Value Date    CHOL 302 (H) 05/09/2021    TRIG 187 (H) 05/09/2021    HDL 56 05/09/2021    VLDL 36 05/09/2021    LDLHDL " 3.73 05/09/2021     Lab Results   Component Value Date    HGBA1C 6.90 (H) 05/09/2021       Assessment / Plan        Problem List Items Addressed This Visit        Cardiac and Vasculature    Unstable angina (CMS/HCC)    Overview     Added automatically from request for surgery [id number]         Relevant Orders    Case Request Cath Lab: Left Heart Cath R radial, US guided w poss intervention (Completed)    New onset left bundle branch block (LBBB)    Overview     Added automatically from request for surgery [id number]         Relevant Orders    Case Request Cath Lab: Left Heart Cath R radial, US guided w poss intervention (Completed)      Other Visit Diagnoses     Abdominal aortic aneurysm (AAA) without rupture (CMS/HCC)    -  Primary    Relevant Orders    US AAA Screen Limited          Plan:    85-year-old female with a history of diabetes, chronic kidney disease, hypertension, hyperlipidemia and hypothyroidism who is a hospital follow-up today.  She has a new left bundle branch block on her EKG that developed sometime over the past 9 months.  She has symptoms concerning for coronary disease including progressively worsening chest tightness and shortness of breath was associated nausea and seeing spots whenever she exerts herself that improves with rest.  She was hospitalized for hypertensive emergency and changes to her medications were made.  Current cardiac medications include aspirin 81, Bystolic 2.5, Cardizem 120, Avapro 150 mg in the morning and 75 mg at night.    Cardiology recommendations:  1.  With patient's symptoms concerning for unstable angina with the new onset left bundle branch block, I am concerned that she has coronary artery disease and is having unstable angina symptoms.  I am going to set her up for a left heart catheterization to assess her coronary anatomy.  We will plan on doing this next week.  2.  In terms of the atrial fibrillation, I do not think the EKG that was performed in the ER was A. fib  but instead was SVT.  The 14-day monitor also supports this as patient was found to have multiple episodes of SVT but no evidence of atrial fibrillation.  I do not think she needs to be on any anticoagulation at this time and recommend she just continue on the aspirin 81  3.  Patient has multiple allergies so we are limited on what medications we can use for her blood pressure.  She is still significantly elevated despite the changes while she was in the hospital.  Is 182/80 mmHg today.  I have instructed her to increase the Avapro from 75 mg at night up to 150 mg at night.  I am also adding on Aldactone 25 mg.  4.  In terms of this possibility of having abdominal aortic aneurysm, I am going to order a ultrasound to further assess for the possibility of this.  5.  Patient's lipid panel showed a total cholesterol 302, triglyceride 187, HDL 56 and LDL of 210.  She has tried statins in the past but were unable to tolerate due to significant myalgias.  I am going to discuss with her at next appointment other options including trying Pravachol versus possibly starting a PCSK9 inhibitor.  6.  Follow-up in 1 month to continue work-up and aggressive treatment for her blood pressure.    Thank you Dr. Rodrigues for this referral.  Please call me with any questions at any time.  My personal cell phone number is [phone].    Mak Nickerson, DO   Interventional cardiology  [facility]  06/20/21   11:12 CDT

## 2021-06-21 ENCOUNTER — TELEPHONE (OUTPATIENT)
Dept: INTERNAL MEDICINE | Facility: CLINIC | Age: 86
End: 2021-06-21

## 2021-06-21 NOTE — TELEPHONE ENCOUNTER
Called pt and she just wanted to make sure we had received Dr. Nickerson's notes about her recent visit and medication changes.  She is also concerned that her sugars have been running a bit higher since starting on Spironolactone.  She is having a heart cath soon and will be having labs to check her electrolytes.  I asked her to send us her sugar readings in 1-2 weeks for Dr. Rodrigues to review and see if anything DM meds needs to be adjusted.

## 2021-06-21 NOTE — TELEPHONE ENCOUNTER
Caller: Nedra Tolliver    Relationship: Self    Best call back number:[phone]    What is the best time to reach you: ANYTIME    Who are you requesting to speak with (clinical staff, provider,  specific staff member): PRESTON CHARLTON    Do you know the name of the person who called: SELF    What was the call regarding: BLOOD PRESSURE IS RUNNING HIGH AND WANTS TO SPEAK WITH PRESTON TO GIVE AN UPDATE     Do you require a callback: YES

## 2021-06-24 ENCOUNTER — HOSPITAL ENCOUNTER (OUTPATIENT)
Facility: HOSPITAL | Age: 86
Discharge: HOME | End: 2021-06-24
Attending: EMERGENCY MEDICINE | Admitting: EMERGENCY MEDICINE

## 2021-06-24 ENCOUNTER — HOSPITAL ENCOUNTER (OUTPATIENT)
Dept: ULTRASOUND IMAGING | Facility: HOSPITAL | Age: 86
Discharge: HOME | End: 2021-06-24

## 2021-06-24 VITALS
DIASTOLIC BLOOD PRESSURE: 61 MMHG | RESPIRATION RATE: 14 BRPM | OXYGEN SATURATION: 94 % | SYSTOLIC BLOOD PRESSURE: 174 MMHG | TEMPERATURE: 98.3 F | HEART RATE: 63 BPM

## 2021-06-24 DIAGNOSIS — I10 ESSENTIAL HYPERTENSION: ICD-10-CM

## 2021-06-24 DIAGNOSIS — I71.40 ABDOMINAL AORTIC ANEURYSM (AAA) WITHOUT RUPTURE (HCC): ICD-10-CM

## 2021-06-24 DIAGNOSIS — I20.0 UNSTABLE ANGINA (HCC): ICD-10-CM

## 2021-06-24 DIAGNOSIS — I44.7 NEW ONSET LEFT BUNDLE BRANCH BLOCK (LBBB): ICD-10-CM

## 2021-06-24 LAB
ANION GAP SERPL CALCULATED.3IONS-SCNC: 12 MMOL/L (ref 5–15)
BUN SERPL-MCNC: 31 MG/DL (ref 8–23)
BUN/CREAT SERPL: 29 (ref 7–25)
CALCIUM SPEC-SCNC: 10.7 MG/DL (ref 8.6–10.5)
CHLORIDE SERPL-SCNC: 98 MMOL/L (ref 98–107)
CO2 SERPL-SCNC: 24 MMOL/L (ref 22–29)
CREAT SERPL-MCNC: 1.07 MG/DL (ref 0.57–1)
DEPRECATED RDW RBC AUTO: 42.5 FL (ref 37–54)
ERYTHROCYTE [DISTWIDTH] IN BLOOD BY AUTOMATED COUNT: 12.5 % (ref 12.3–15.4)
GFR SERPL CREATININE-BSD FRML MDRD: 49 ML/MIN/1.73
GLUCOSE SERPL-MCNC: 179 MG/DL (ref 65–99)
HCT VFR BLD AUTO: 38.8 % (ref 34–46.6)
HGB BLD-MCNC: 13.1 G/DL (ref 12–15.9)
MCH RBC QN AUTO: 31.2 PG (ref 26.6–33)
MCHC RBC AUTO-ENTMCNC: 33.8 G/DL (ref 31.5–35.7)
MCV RBC AUTO: 92.4 FL (ref 79–97)
PLATELET # BLD AUTO: 260 10*3/MM3 (ref 140–450)
PMV BLD AUTO: 9.7 FL (ref 6–12)
POTASSIUM SERPL-SCNC: 4.7 MMOL/L (ref 3.5–5.2)
RBC # BLD AUTO: 4.2 10*6/MM3 (ref 3.77–5.28)
SODIUM SERPL-SCNC: 134 MMOL/L (ref 136–145)
WBC # BLD AUTO: 6.37 10*3/MM3 (ref 3.4–10.8)

## 2021-06-24 PROCEDURE — 93458 L HRT ARTERY/VENTRICLE ANGIO: CPT | Performed by: EMERGENCY MEDICINE

## 2021-06-24 PROCEDURE — 93571 IV DOP VEL&/PRESS C FLO 1ST: CPT | Performed by: EMERGENCY MEDICINE

## 2021-06-24 PROCEDURE — 25010000002 HEPARIN (PORCINE) 2000-0.9 UNIT/L-% SOLUTION: Performed by: EMERGENCY MEDICINE

## 2021-06-24 PROCEDURE — 93572 IV DOP VEL&/PRESS C FLO EA: CPT | Performed by: EMERGENCY MEDICINE

## 2021-06-24 PROCEDURE — 99152 MOD SED SAME PHYS/QHP 5/>YRS: CPT | Performed by: EMERGENCY MEDICINE

## 2021-06-24 PROCEDURE — 99153 MOD SED SAME PHYS/QHP EA: CPT | Performed by: EMERGENCY MEDICINE

## 2021-06-24 PROCEDURE — 25010000002 ADENOSINE (DIAGNOSTIC) PER 30 MG: Performed by: EMERGENCY MEDICINE

## 2021-06-24 PROCEDURE — 25010000002 FENTANYL CITRATE (PF) 100 MCG/2ML SOLUTION: Performed by: EMERGENCY MEDICINE

## 2021-06-24 PROCEDURE — 25010000002 IOPAMIDOL 61 % SOLUTION: Performed by: EMERGENCY MEDICINE

## 2021-06-24 PROCEDURE — C1769 GUIDE WIRE: HCPCS | Performed by: EMERGENCY MEDICINE

## 2021-06-24 PROCEDURE — 25010000002 HEPARIN (PORCINE) PER 1000 UNITS: Performed by: EMERGENCY MEDICINE

## 2021-06-24 PROCEDURE — 76706 US ABDL AORTA SCREEN AAA: CPT

## 2021-06-24 PROCEDURE — 25010000002 DIPHENHYDRAMINE PER 50 MG: Performed by: EMERGENCY MEDICINE

## 2021-06-24 PROCEDURE — 85027 COMPLETE CBC AUTOMATED: CPT | Performed by: EMERGENCY MEDICINE

## 2021-06-24 PROCEDURE — 25010000002 MIDAZOLAM HCL (PF) 5 MG/5ML SOLUTION: Performed by: EMERGENCY MEDICINE

## 2021-06-24 PROCEDURE — 80048 BASIC METABOLIC PNL TOTAL CA: CPT | Performed by: EMERGENCY MEDICINE

## 2021-06-24 PROCEDURE — 25010000002 HEPARIN (PORCINE) 1000-0.9 UT/500ML-% SOLUTION: Performed by: EMERGENCY MEDICINE

## 2021-06-24 PROCEDURE — C1887 CATHETER, GUIDING: HCPCS | Performed by: EMERGENCY MEDICINE

## 2021-06-24 PROCEDURE — C1894 INTRO/SHEATH, NON-LASER: HCPCS | Performed by: EMERGENCY MEDICINE

## 2021-06-24 RX ORDER — ACETAMINOPHEN 325 MG/1
650 TABLET ORAL EVERY 4 HOURS PRN
Status: CANCELLED | OUTPATIENT
Start: 2021-06-24

## 2021-06-24 RX ORDER — HEPARIN SODIUM 200 [USP'U]/100ML
INJECTION, SOLUTION INTRAVENOUS AS NEEDED
Status: DISCONTINUED | OUTPATIENT
Start: 2021-06-24 | End: 2021-06-24 | Stop reason: HOSPADM

## 2021-06-24 RX ORDER — LIDOCAINE HYDROCHLORIDE 20 MG/ML
INJECTION, SOLUTION EPIDURAL; INFILTRATION; INTRACAUDAL; PERINEURAL AS NEEDED
Status: DISCONTINUED | OUTPATIENT
Start: 2021-06-24 | End: 2021-06-24 | Stop reason: HOSPADM

## 2021-06-24 RX ORDER — SODIUM CHLORIDE 9 MG/ML
INJECTION, SOLUTION INTRAVENOUS CONTINUOUS PRN
Status: COMPLETED | OUTPATIENT
Start: 2021-06-24 | End: 2021-06-24

## 2021-06-24 RX ORDER — DIPHENHYDRAMINE HYDROCHLORIDE 50 MG/ML
INJECTION INTRAMUSCULAR; INTRAVENOUS AS NEEDED
Status: DISCONTINUED | OUTPATIENT
Start: 2021-06-24 | End: 2021-06-24 | Stop reason: HOSPADM

## 2021-06-24 RX ORDER — HEPARIN SODIUM 1000 [USP'U]/ML
INJECTION, SOLUTION INTRAVENOUS; SUBCUTANEOUS AS NEEDED
Status: DISCONTINUED | OUTPATIENT
Start: 2021-06-24 | End: 2021-06-24 | Stop reason: HOSPADM

## 2021-06-24 RX ORDER — MIDAZOLAM HYDROCHLORIDE 1 MG/ML
INJECTION, SOLUTION INTRAMUSCULAR; INTRAVENOUS AS NEEDED
Status: DISCONTINUED | OUTPATIENT
Start: 2021-06-24 | End: 2021-06-24 | Stop reason: HOSPADM

## 2021-06-24 RX ORDER — SODIUM CHLORIDE 9 MG/ML
100 INJECTION, SOLUTION INTRAVENOUS CONTINUOUS
Status: CANCELLED | OUTPATIENT
Start: 2021-06-24

## 2021-06-24 RX ORDER — FENTANYL CITRATE 50 UG/ML
INJECTION, SOLUTION INTRAMUSCULAR; INTRAVENOUS AS NEEDED
Status: DISCONTINUED | OUTPATIENT
Start: 2021-06-24 | End: 2021-06-24 | Stop reason: HOSPADM

## 2021-06-24 NOTE — H&P
Patient seen and examined at the bedside.  No changes have occurred in the interim since the time of the history and physical as below.  I explained the risk, benefits, and alternatives to the patient and she wishes to proceed.  We will perform a diagnostic left heart catheterization with possible intervention via right radial approach.        Chief Complaint   Patient presents with   • Palpitations       1 MON D/C FU/ RESULTS    • Shortness of Breath   • Dizziness         History of Present Illness     Patient is a very pleasant 85-year-old female who we first saw in the hospital last month at which time she was hospitalized with hypertensive emergency.  She has a history of diabetes, chronic kidney disease, hypertension, hyperlipidemia and hypothyroidism.  When she arrived to the hospital on May 8, her blood pressure was 213/70.  EKG at that time showed a new left bundle branch block.  She also had some evidence of atrial fibrillation with rapid ventricular rate on EKG which responded to 20 mg of Cardizem IV.     She underwent a echocardiogram which showed normal systolic function with some grade 1 diastolic dysfunction with LVH.  Trace AI, trace AL but otherwise valves were functioning appropriately.  She wore a Holter monitor for 14 days when she was discharged which showed a predominant rhythm of normal sinus with a first-degree block and intermittent left bundle block.  She had 9 episodes of SVT with occasional PACs but no evidence of atrial fibrillation.     Patient tells me today in more detail what has been going on with her the past few months.  She says she underwent knee surgery in September and started having episodes of chest pain and shortness of breath the months following this procedure.  She describes the episodes as a funny-like feeling in her chest which she kind of describes as pressure-like.  It was during this time when an EKG was performed in the left bundle branch block was first observed.   Patient tells me for the past few months she has also been getting episodes where she will be out trying to work in her garden and she will get so short of breath with chest pain that she has to stop and sit down for resolution of the symptoms.  She had an episode in December where she became very nauseated and short of breath with the chest pain and started to see spots.  She says she almost lost consciousness during this episode.  She also gets occasional palpitations.  She tells me that she was told a long time ago that she had an abdominal aortic aneurysm but nothing else was ever mentioned since then.     Review of Systems   Constitutional: Negative for diaphoresis, fever and malaise/fatigue.   HENT: Negative for congestion.    Eyes: Negative for vision loss in left eye and vision loss in right eye.   Cardiovascular: Positive for chest pain and dyspnea on exertion. Negative for claudication, irregular heartbeat, leg swelling, orthopnea, palpitations and syncope.   Respiratory: Positive for shortness of breath. Negative for cough and wheezing.    Hematologic/Lymphatic: Negative for adenopathy.   Skin: Negative for rash.   Musculoskeletal: Negative for joint pain and joint swelling.   Gastrointestinal: Positive for nausea. Negative for abdominal pain, diarrhea and vomiting.   Neurological: Negative for excessive daytime sleepiness, dizziness, focal weakness, light-headedness, numbness and weakness.   Psychiatric/Behavioral: Negative for depression. The patient does not have insomnia.          Otherwise complete ROS reviewed and negative except as mentioned in the HPI.        Past Medical History:   Medical History        Past Medical History:   Diagnosis Date   • Arthritis     • Cancer (CMS/HCC)       BCC @ nose & Left arm   • Diabetes type 2, controlled (CMS/HCC)     • Diverticulitis     • History of GI bleed     • History of transfusion       Has had x 5 units.   • Hypertension     • Hyponatremia        Required hospitalization   • Hypothyroidism     • Knee arthropathy 09/30/2020   • LPRD (laryngopharyngeal reflux disease)     • Pharyngeal dysphagia     • Thyroid nodule              Past Surgical History:  Surgical History         Past Surgical History:   Procedure Laterality Date   • APPENDECTOMY       • BASAL CELL CARCINOMA EXCISION       • BELPHAROPTOSIS REPAIR       • BREAST CYST EXCISION       • CARDIAC CATHETERIZATION       • CATARACT EXTRACTION       • CATARACT EXTRACTION WITH INTRAOCULAR LENS IMPLANT Bilateral     • CHOLECYSTECTOMY       • COLONOSCOPY Left 11/1/2016     Tubular adenoma cecum, Diverticulosis repeat prn   • SUBTOTAL HYSTERECTOMY       • TOTAL KNEE ARTHROPLASTY Right 9/30/2020     Procedure: TOTAL KNEE ARTHROPLASTY;  Surgeon: Mak Pickens MD;  Location: [facility];  Service: Orthopedics;  Laterality: Right;   • TUMOR EXCISION         under armpits and left breast            Family History: family history includes Cancer in her mother; Diabetes in her father and mother; Heart failure in her mother.     Social History:  reports that she has never smoked. She has never used smokeless tobacco. She reports that she does not drink alcohol and does not use drugs.     Medications:          Prior to Admission medications    Medication Sig Start Date End Date Taking? Authorizing Provider   acetaminophen (TYLENOL) 500 MG tablet Take 1,000 mg by mouth At Night As Needed for Mild Pain .     Yes Elma Yoo MD   aspirin 81 MG EC tablet Take 1 tablet by mouth Daily. 1/2/20   Yes Beverly Paz APRN   Bystolic 2.5 MG tablet TAKE 1 TABLET BY MOUTH DAILY  12/4/20   Yes Jossy Maciel APRN   Cholecalciferol (VITAMIN D3) 5000 UNITS capsule capsule Take 5,000 Units by mouth daily.     Yes Elma Yoo MD   Coenzyme Q10 (CO Q 10) 100 MG capsule Take 1 tablet by mouth Daily.     Yes Elma Yoo MD   conjugated estrogens (PREMARIN) 0.625 MG/GM vaginal cream Insert  into  the vagina 2 (Two) Times a Week.  Patient taking differently: Insert  into the vagina 2 (Two) Times a Week. Pt does not use regularly 5/17/18   Yes Chandrakant Amin MD   dilTIAZem CD (dilTIAZem CD) 120 MG 24 hr capsule Take 1 capsule by mouth Daily. 6/7/21   Yes Vijay Rodrigues MD   glimepiride (AMARYL) 4 MG tablet Take 1 tablet by mouth Every Morning Before Breakfast. 11/12/20   Yes Vijay Rodrigues MD   glucose blood test strip 1 daily 9/23/20   Yes Vijay Rodrigues MD   glucose monitor monitoring kit 1 each Daily. 9/23/20   Yes Vijay Rodrigues MD   irbesartan (AVAPRO) 150 MG tablet Take 1 tablet in the morning, 1/2 tablet in the evening 5/20/21   Yes Norma Coreas APRN   Lancets misc 1 each Daily. 9/23/20   Yes Vijay Rodrigues MD   levothyroxine (SYNTHROID, LEVOTHROID) 50 MCG tablet TAKE 1 TABLET BY MOUTH EVERY DAY 7/6/20   Yes Vijay Rodrigues MD   Multiple Vitamins-Minerals (CENTRUM SILVER PO) Take 1 tablet by mouth Daily.     Yes Provider, MD Elma   True Metrix Blood Glucose Test test strip TEST TWICE DAILY AS DIRECTED  4/7/21   Yes Jossy Maciel APRN   cimetidine (Tagamet HB) 200 MG tablet Take 1 tablet by mouth Take As Directed. Take at 8am morning of procedure 6/18/21     Mak Nickerson DO   diphenhydrAMINE (Benadryl Allergy) 25 MG tablet Take 1 tablet by mouth Take As Directed. Take at 8am morning of procedure 6/18/21     Mak Nickerson DO   predniSONE (DELTASONE) 10 MG tablet Take 5 tablets by mouth Take As Directed. Take 5 tablets at 8am morning of procedure 6/18/21     Mak Nickerson DO   spironolactone (ALDACTONE) 25 MG tablet Take 1 tablet by mouth Daily. 6/18/21     Mak Nickerson DO      Allergies:        Allergies   Allergen Reactions   • Codeine Hallucinations   • Iodine Anaphylaxis       Xray dye   • Levaquin [Levofloxacin] Anaphylaxis   • Lortab [Hydrocodone-Acetaminophen] Mental Status Change and Confusion   •  "Quinolones Anaphylaxis       - CIPRO -    • Shrimp (Diagnostic) Anaphylaxis   • Bactroban [Mupirocin Calcium] Swelling   • Ace Inhibitors Cough   • Piperacillin Sod-Tazobactam So Unknown - High Severity   • Statins Myalgia   • Thiazide-Type Diuretics Unknown - High Severity   • Mupirocin Rash         Objective      Vital Signs: BP (!) 182/80   Pulse 57   Ht 162.6 cm (64.02\")   Wt 70.3 kg (155 lb)   LMP  (LMP Unknown)   SpO2 100%   BMI 26.59 kg/m²      Vitals and nursing note reviewed.   Constitutional:       Appearance: Normal and healthy appearance. Well-developed and not in distress.   Eyes:      Extraocular Movements: Extraocular movements intact.      Pupils: Pupils are equal, round, and reactive to light.   HENT:      Head: Normocephalic and atraumatic.    Mouth/Throat:      Pharynx: Oropharynx is clear.   Neck:      Vascular: JVD normal.      Trachea: Trachea normal.   Pulmonary:      Effort: Pulmonary effort is normal.      Breath sounds: Normal breath sounds. No wheezing. No rhonchi. No rales.   Cardiovascular:      PMI at left midclavicular line. Normal rate. Regular rhythm. Normal S1. Normal S2.      Murmurs: There is a grade 2/6 systolic murmur.      No gallop. No click. No rub.   Pulses:     Dorsalis pedis: 2+ bilaterally.     Posterior tibial: 2+ bilaterally.  Abdominal:      General: Bowel sounds are normal. There is abdominal bruit.      Palpations: Abdomen is soft.      Tenderness: There is no abdominal tenderness.   Musculoskeletal: Normal range of motion.      Cervical back: Normal range of motion and neck supple. Skin:     General: Skin is warm and dry.      Capillary Refill: Capillary refill takes less than 2 seconds.   Feet:      Right foot:      Skin integrity: Skin integrity normal.      Left foot:      Skin integrity: Skin integrity normal.   Neurological:      Mental Status: Alert and oriented to person, place and time.      Cranial Nerves: Cranial nerves are intact.      Sensory: " Sensation is intact.      Motor: Motor function is intact.      Coordination: Coordination is intact.   Psychiatric:         Speech: Speech normal.         Behavior: Behavior is cooperative.            Results Reviewed:                    Lab Results   Component Value Date     CHOL 302 (H) 05/09/2021     TRIG 187 (H) 05/09/2021     HDL 56 05/09/2021     VLDL 36 05/09/2021     LDLHDL 3.73 05/09/2021            Lab Results   Component Value Date     HGBA1C 6.90 (H) 05/09/2021         Assessment / Plan              Problem List Items Addressed This Visit                 Cardiac and Vasculature      Unstable angina (CMS/HCC)      Overview        Added automatically from request for surgery [id number]            Relevant Orders      Case Request Cath Lab: Left Heart Cath R radial, US guided w poss intervention (Completed)      New onset left bundle branch block (LBBB)      Overview        Added automatically from request for surgery [id number]            Relevant Orders      Case Request Cath Lab: Left Heart Cath R radial, US guided w poss intervention (Completed)                Other Visit Diagnoses      Abdominal aortic aneurysm (AAA) without rupture (CMS/HCC)    -  Primary     Relevant Orders     US AAA Screen Limited             Plan:     85-year-old female with a history of diabetes, chronic kidney disease, hypertension, hyperlipidemia and hypothyroidism who is a hospital follow-up today.  She has a new left bundle branch block on her EKG that developed sometime over the past 9 months.  She has symptoms concerning for coronary disease including progressively worsening chest tightness and shortness of breath was associated nausea and seeing spots whenever she exerts herself that improves with rest.  She was hospitalized for hypertensive emergency and changes to her medications were made.  Current cardiac medications include aspirin 81, Bystolic 2.5, Cardizem 120, Avapro 150 mg in the morning and 75 mg at  night.     Cardiology recommendations:  1.  With patient's symptoms concerning for unstable angina with the new onset left bundle branch block, I am concerned that she has coronary artery disease and is having unstable angina symptoms.  I am going to set her up for a left heart catheterization to assess her coronary anatomy.  We will plan on doing this next week.  2.  In terms of the atrial fibrillation, I do not think the EKG that was performed in the ER was A. fib but instead was SVT.  The 14-day monitor also supports this as patient was found to have multiple episodes of SVT but no evidence of atrial fibrillation.  I do not think she needs to be on any anticoagulation at this time and recommend she just continue on the aspirin 81  3.  Patient has multiple allergies so we are limited on what medications we can use for her blood pressure.  She is still significantly elevated despite the changes while she was in the hospital.  Is 182/80 mmHg today.  I have instructed her to increase the Avapro from 75 mg at night up to 150 mg at night.  I am also adding on Aldactone 25 mg.  4.  In terms of this possibility of having abdominal aortic aneurysm, I am going to order a ultrasound to further assess for the possibility of this.  5.  Patient's lipid panel showed a total cholesterol 302, triglyceride 187, HDL 56 and LDL of 210.  She has tried statins in the past but were unable to tolerate due to significant myalgias.  I am going to discuss with her at next appointment other options including trying Pravachol versus possibly starting a PCSK9 inhibitor.  6.  Follow-up in 1 month to continue work-up and aggressive treatment for her blood pressure.

## 2021-06-24 NOTE — OP NOTE
Date of Procedure: June 24, 2021     Procedures Performed:     1. Access to the right radial artery via modified Seldinger technique and ultrasound guidance  2. Left heart catheterization with retrograde crossing the aortic valve into the left ventricle where pressures were obtained  3. LV ventriculography  4. Selective bilateral coronary angiography  5. Fractional flow reserve analysis of the left anterior descending coronary artery  6. Fractional flow reserve analysis of the left circumflex coronary artery  7. Patent hemostasis achieved to the right radial artery access site using a TR band applied to the right wrist once we pulled the sheath.  8. Conscious sedation with continuous hemodynamic monitoring for 40 minutes          Risk, Benefits, and Alternatives discussed with the patient and/or family.  Plan is for moderate sedation, and the patient agrees to proceed with the procedure.  An immediate assessment was done prior to the administration of moderate sedation.     Indication: Unstable angina, progressively worsening typical symptoms including classic chest pain and dyspnea on exertion, new onset left bundle branch block, recent hospitalization for hypertension emergency and new onset atrial fibrillation    Access: Right radial  Diagnostic Catheters: TIG, XB 3.5     Procedural Details:     After written and informed consent was obtained, the patient was brought to the cardiac catheterization lab in a fasting state. The skin overlying the patient's right wrist was prepped and draped in the usual sterile fashion. Timeout was taken to confirm the correct patient and procedure. IV Versed and fentanyl were used to achieve conscious sedation. Access was obtained in the right radial artery via modified Seldinger technique and ultrasound guidance. A 6 Jordanian sheath was placed into the artery and flushed. Next, the cocktail was administered. Next, a TIG catheter was inserted and advanced into the ascending aorta  where was used to engage the left main coronary artery. Selective left coronary angiography was performed in multiple views. This catheter was then used to engage the right coronary artery. Selective right coronary angiography was performed in multiple views. This catheter was then exchanged for a 5 Romanian pigtail catheter which was used to cross the aortic valve into the left ventricle where pressures were recorded. LV ventriculography was then performed in the BRODERICK projection. This catheter was then exchanged for an XB 3.5 catheter which was used to engage the left main coronary artery. We then inserted a FFR pressure wire into the LAD coronary artery. FFR analysis was performed and recorded a ratio of 0.82 indicating a nonhemodynamically significant lesion. The FFR wire was then pulled back and advanced into the left circumflex coronary artery. FFR analysis was then performed on the left circumflex artery and recorded a ratio of 0.86 indicating a nonhemodynamically significant lesion. Post analysis angiography was performed and no complications were noted. Everything was withdrawn to the sheath and the sheath was flushed. Patent hemostasis was achieved in the right radial artery access site using a TR band applied to the right wrist once the sheath was pulled. The patient tolerated the procedure well without any complications. She left the Cath Lab in stable condition.    During the procedure, I supervised the administration and monitoring of conscious sedation.  This included monitoring of the patient's overall status, hemodynamics and oximetry.  The patient received the first dose of IV sedation at 1422 and I left the room at 1503, accounting for a total of 40 minutes of face-to-face encounter time with the patient in the cath lab today.     Results:  Hemodynamics:  Aortic: 143/60 mmHg  LV: 170/7 mmHg  LVEDP: 11 mmHg     Selective Coronary Angiography:    Left Main Coronary Artery: Left main is a large-caliber  vessel that bifurcates into the LAD and left circumflex arteries, there is minor disease in the distal segment, TABITHA-3 flow  LAD: The LAD is a large-caliber vessel that wraps around to the apex, there is diffuse, mild disease in the proximal and mid segments, at its maximum, there is a 50% stenosis in the proximal/mid segment. We performed FFR analysis on this vessel and recorded a ratio of 0.82 which indicated a nonhemodynamically significant lesion. TABITHA-3 flow  Diagonals: The first and second diagonal branches are both moderate caliber vessels, the first diagonal has mild disease and is very tortuous, the second diagonal has diffuse disease in its proximal segment  Circumflex: Left circumflex is a large-caliber vessel, there is diffuse disease in the proximal segment, at its maximum there is approximately 50% stenosis. We performed FFR analysis on this vessel recorded a ratio of 0.86 which indicated a nonhemodynamically significant lesion. TABITHA-3 flow  Obtuse marginals: No significant OM branches  Right coronary artery: The right coronary artery is a large, dominant vessel, there is a 20 to 30% stenotic lesion in the proximal/mid segment, the remainder of the vessel has mild luminal irregularities, TABITHA-3 flow  PDA/DANIEL: Both of these are large caliber vessels with mild disease only         Total contrast: 133 mL     Fluoroscopy time: 7.5 min     X-ray exposure: 290 mGy     Estimated Blood Loss: 2 cc    Specimens: None    Complications: None    Impression:  1. Coronary artery disease as described above  2. Atrial fibrillation  3. Hypertension     Plan:  1. Routine post procedure orders  2. Discharged home later today  3. Continue to optimize her regimen to help alleviate symptoms and control her atrial fibrillation and blood pressure

## 2021-06-28 ENCOUNTER — TELEPHONE (OUTPATIENT)
Dept: CARDIOLOGY | Facility: CLINIC | Age: 86
End: 2021-06-28

## 2021-06-30 RX ORDER — LEVOTHYROXINE SODIUM 0.05 MG/1
TABLET ORAL
Qty: 90 TABLET | Refills: 3 | Status: ON HOLD | OUTPATIENT
Start: 2021-06-30 | End: 2022-10-10

## 2021-07-06 NOTE — PROGRESS NOTES
Nephrology ([facility]) Progress Note      Patient:  Nedra Tolliver  YOB: 1935  Date of Service: 1/3/2020  MRN: [id number]   Acct: [id number]   Primary Care Physician: Vijay Rodrigues MD  Advance Directive:   Code Status and Medical Interventions:   Ordered at: 12/30/19 0510     Limited Support to NOT Include:    Intubation     Level Of Support Discussed With:    Patient     Code Status:    No CPR     Medical Interventions (Level of Support Prior to Arrest):    Limited     Admit Date: 12/29/2019       Hospital Day: 4  Referring Provider: Fransisco Rod MD      Patient personally seen and examined.  Complete chart including Consults, Notes, Operative Reports, Labs, Cardiology, and Radiology studies reviewed as able.        Subjective:  Nedra Tolliver is a 84 y.o. female  whom we were consulted for consulted for hyponatremia.  Pt with chronic hyponatremia (~130) which worsened after starting HCTZ.  Admitted to weakness.   No other med changes noted.  No changes in diet.      Today, no new events.  Weakness improved.  Hoping for d/c today.  Denies cp/soa/n/v/dizzy/ha/falls/vision or hearing changes.     Allergies:  Bactroban [mupirocin calcium]; Codeine; Iodine; Levaquin [levofloxacin]; Lortab [hydrocodone-acetaminophen]; Quinolones; and Shrimp (diagnostic)    Home Meds:  Medications Prior to Admission   Medication Sig Dispense Refill Last Dose   • acetaminophen (TYLENOL) 500 MG tablet Take 500 mg by mouth every 6 (six) hours as needed for mild pain (1-3).   Taking   • amLODIPine (NORVASC) 2.5 MG tablet   3 Taking   • Cholecalciferol (VITAMIN D3) 5000 UNITS capsule capsule Take 5,000 Units by mouth daily.   Taking   • Coenzyme Q10 (CO Q 10) 100 MG capsule Take  by mouth.   Taking   • dicyclomine (BENTYL) 10 MG capsule Take 10 mg by mouth 3 (Three) Times a Day. The patient states she is taking this PRN   Taking   • glimepiride (AMARYL) 4 MG tablet Take 4 mg by mouth  every morning before breakfast.   Taking   • irbesartan (AVAPRO) 75 MG tablet TAKE 1 TABLET BY MOUTH EVERY DAY  REPLACES VALSARTAN  3 Taking   • levothyroxine (SYNTHROID, LEVOTHROID) 50 MCG tablet Take 50 mcg by mouth daily.   Taking   • metFORMIN (GLUCOPHAGE) 500 MG tablet Take 500 mg by mouth 2 (Two) Times a Day With Meals.   Taking   • Multiple Vitamins-Minerals (CENTRUM SILVER PO) Take  by mouth.   Taking   • nebivolol (BYSTOLIC) 2.5 MG tablet Take 2.5 mg by mouth daily.   Taking   • Probiotic Product (PROBIOTIC DAILY PO) Take  by mouth.   Taking   • conjugated estrogens (PREMARIN) 0.625 MG/GM vaginal cream Insert  into the vagina 2 (Two) Times a Week. 30 g 6 Patient Taking Differently   • SUPREP BOWEL PREP KIT 17.5-3.13-1.6 GM/177ML solution oral solution Take as directed by office instructions provided 2 bottle 0 Not Taking       Medicines:  Current Facility-Administered Medications   Medication Dose Route Frequency Provider Last Rate Last Dose   • acetaminophen (TYLENOL) tablet 650 mg  650 mg Oral Q4H PRN Fransisco Rod MD   650 mg at 01/02/20 2048    Or   • acetaminophen (TYLENOL) suppository 650 mg  650 mg Rectal Q4H PRN Fransisco Rod MD       • amLODIPine (NORVASC) tablet 2.5 mg  2.5 mg Oral Q24H Fransisco Rod MD   2.5 mg at 01/02/20 0913   • aspirin EC tablet 81 mg  81 mg Oral Daily Ana Raygoza APRN   81 mg at 01/02/20 0913   • cefdinir (OMNICEF) capsule 300 mg  300 mg Oral Q12H Ruiz Bowser DO   300 mg at 01/02/20 2041   • dextrose (D50W) 25 g/ 50mL Intravenous Solution 25 g  25 g Intravenous Q15 Min PRN Fransisco Rod MD       • dextrose (GLUTOSE) oral gel 15 g  15 g Oral Q15 Min PRN Fransisco Rod MD       • dicyclomine (BENTYL) capsule 10 mg  10 mg Oral TID PRN Fransisco Rod MD       • enoxaparin (LOVENOX) syringe 40 mg  40 mg Subcutaneous Q24H Fransisco Rod MD   40 mg at 01/02/20 0913   • glipizide (GLUCOTROL) tablet 10 mg  10 mg Oral ADRIAN Rod  Fransisco ALONSO MD   10 mg at 01/02/20 0915   • glucagon (human recombinant) (GLUCAGEN DIAGNOSTIC) injection 1 mg  1 mg Subcutaneous Q15 Min PRN Fransisco Rod MD       • insulin lispro (humaLOG) injection 2-7 Units  2-7 Units Subcutaneous 4x Daily With Meals & Nightly Fransisco Rod MD       • lactobacillus acidophilus (RISAQUAD) capsule 1 capsule  1 capsule Oral Daily Fransisco Rod MD   1 capsule at 01/02/20 0913   • levothyroxine (SYNTHROID, LEVOTHROID) tablet 50 mcg  50 mcg Oral Daily Fransisco Rod MD   50 mcg at 01/02/20 0914   • metFORMIN (GLUCOPHAGE) tablet 500 mg  500 mg Oral BID With Meals Beverly Paz APRN   500 mg at 01/02/20 1820   • multivitamin w/minerals tablet 1 tablet  1 tablet Oral Daily Fransisco Rod MD   1 tablet at 01/02/20 0914   • nebivolol (BYSTOLIC) tablet 2.5 mg  2.5 mg Oral Daily Fransisco Rod MD   2.5 mg at 01/02/20 0913   • nitroglycerin (NITROSTAT) SL tablet 0.4 mg  0.4 mg Sublingual Q5 Min PRN Fransisco Rod MD       • ondansetron (ZOFRAN) tablet 4 mg  4 mg Oral Q6H PRN Fransisco Rod MD        Or   • ondansetron (ZOFRAN) injection 4 mg  4 mg Intravenous Q6H PRN Fransisco Rod MD   4 mg at 12/31/19 2342   • sodium chloride 0.9 % flush 10 mL  10 mL Intravenous PRN Fransisco Rod MD       • sodium chloride 0.9 % flush 10 mL  10 mL Intravenous PRN Fransisco Rod MD       • sodium chloride 0.9 % flush 10 mL  10 mL Intravenous Q12H Fransisco Rod MD   10 mL at 01/02/20 2041   • sodium chloride 0.9 % flush 10 mL  10 mL Intravenous PRN Fransisco Rod MD       • sodium chloride tablet 1 g  1 g Oral TID With Meals Ruiz Bowser DO   1 g at 01/02/20 1820       Past Medical History:  Past Medical History:   Diagnosis Date   • Diabetes type 2, controlled (CMS/HCC)    • Diverticulitis    • Hypertension    • Hypothyroidism    • LPRD (laryngopharyngeal reflux disease)    • Pharyngeal dysphagia    • Thyroid nodule        Past Surgical  History:  Past Surgical History:   Procedure Laterality Date   • APPENDECTOMY     • BASAL CELL CARCINOMA EXCISION     • BELPHAROPTOSIS REPAIR     • BREAST CYST EXCISION     • CATARACT EXTRACTION     • CHOLECYSTECTOMY     • COLONOSCOPY Left 11/1/2016    Tubular adenoma cecum, Diverticulosis repeat prn   • PARTIAL HYSTERECTOMY     • TUMOR EXCISION      under armpits and left breast       Family History  Family History   Problem Relation Age of Onset   • Diabetes Mother    • Cancer Mother    • Heart failure Mother    • Diabetes Father    • Colon cancer Neg Hx    • Colon polyps Neg Hx        Social History  Social History     Socioeconomic History   • Marital status:      Spouse name: Not on file   • Number of children: Not on file   • Years of education: Not on file   • Highest education level: Not on file   Tobacco Use   • Smoking status: Never Smoker   • Smokeless tobacco: Never Used   Substance and Sexual Activity   • Alcohol use: No   • Drug use: Defer   • Sexual activity: Defer         Review of Systems:  History obtained from chart review and the patient  General ROS: No fever or chills  Respiratory ROS: No cough, shortness of breath, wheezing  Cardiovascular ROS: No chest pain or palpitations  Gastrointestinal ROS: No abdominal pain or melena  Genito-Urinary ROS: No dysuria or hematuria    Objective:  Patient Vitals for the past 24 hrs:   BP Temp Temp src Pulse Resp SpO2   01/02/20 1934 108/49 -- -- 74 -- --   01/02/20 1932 139/78 -- -- 67 -- --   01/02/20 1931 138/44 98 °F (36.7 °C) Oral 67 16 96 %   01/02/20 1203 114/46 97.9 °F (36.6 °C) Oral 65 18 96 %   01/02/20 0806 129/57 -- -- 79 -- --   01/02/20 0805 139/45 -- -- 66 -- --   01/02/20 0804 115/42 98 °F (36.7 °C) Oral 63 18 94 %   01/02/20 0503 125/48 97.8 °F (36.6 °C) Oral 68 16 92 %   01/02/20 0046 116/44 97.8 °F (36.6 °C) Oral 69 16 91 %       Intake/Output Summary (Last 24 hours) at 1/3/2020 0008  Last data filed at 1/2/2020 2048  Gross per 24  hour   Intake --   Output 700 ml   Net -700 ml     General: awake/alert   Chest:  clear to auscultation bilaterally without respiratory distress  CVS: regular rate and rhythm  Abdominal: soft, nontender, positive bowel sounds  Extremities: no cyanosis or edema  Skin: warm and dry without rash         Labs:  Results from last 7 days   Lab Units 12/31/19  0448 12/29/19 2107   WBC 10*3/mm3 6.66 10.52   HEMOGLOBIN g/dL 12.9 12.5   HEMATOCRIT % 36.6 34.4   PLATELETS 10*3/mm3 293 265         Results from last 7 days   Lab Units 01/02/20  0706 01/01/20  0517 12/31/19 0448  12/29/19  2107   SODIUM mmol/L 127* 129* 126*   < > 120*   POTASSIUM mmol/L 4.7 4.2 4.2   < > 4.5   CHLORIDE mmol/L 92* 92* 89*   < > 83*   CO2 mmol/L 25.0 25.0 26.0   < > 23.0   BUN mg/dL 22 19 17   < > 20   CREATININE mg/dL 1.20* 1.16* 0.95   < > 1.07*   CALCIUM mg/dL 9.3 8.7 9.8   < > 9.7   BILIRUBIN mg/dL  --   --   --   --  0.3   ALK PHOS U/L  --   --   --   --  70   ALT (SGPT) U/L  --   --   --   --  17   AST (SGOT) U/L  --   --   --   --  15   GLUCOSE mg/dL 153* 161* 114*   < > 182*    < > = values in this interval not displayed.       Radiology:   Imaging Results (Last 72 Hours)     ** No results found for the last 72 hours. **          Culture:  No results found for: BLOODCX, URINECX, WOUNDCX, MRSACX, RESPCX, STOOLCX      Assessment   Euvolemic acute on chronic hyponatremia  HTN  DM2  hypothyroidism     Plan:  Agree that hyponatremia likely caused by HCTZ, would not restart in the future.    TSH adequate  Encouraged pt fluid restriction, relaxed today  Recommended outpt eval to ensure age appropriate cancer screenings up to date (CXR 12/29 without mass)  F/u BMP 3d post d/c  D/w pt/nursing/Dr. Bowser  Na/creat both a bit worse, will watch one more day and monitor      Flavio Zuniga MD  1/3/2020  12:08 AM   Transposition Flap Text: The defect edges were debeveled with a #15 scalpel blade.  Given the location of the defect and the proximity to free margins a transposition flap was deemed most appropriate.  Using a sterile surgical marker, an appropriate transposition flap was drawn incorporating the defect.    The area thus outlined was incised deep to adipose tissue with a #15 scalpel blade.  The skin margins were undermined to an appropriate distance in all directions utilizing iris scissors.

## 2021-07-20 DIAGNOSIS — I10 ESSENTIAL HYPERTENSION: ICD-10-CM

## 2021-07-20 RX ORDER — IRBESARTAN 150 MG/1
TABLET ORAL
Qty: 45 TABLET | Refills: 11 | Status: SHIPPED | OUTPATIENT
Start: 2021-07-20 | End: 2021-07-21 | Stop reason: SDUPTHER

## 2021-07-20 NOTE — TELEPHONE ENCOUNTER
Caller: Tolliver Nedra HERNANDEZ    Relationship: Self    Best call back number:  [phone]      Medication needed:   Requested Prescriptions     Pending Prescriptions Disp Refills   • irbesartan (AVAPRO) 150 MG tablet       Sig: Take 1 tablet in the morning, 1/2 tablet in the evening       When do you need the refill by: ASAP     What additional details did the patient provide when requesting the medication: Pt states that  advised her to take 2x daily - she has ran out too soon - she said that her bp is still running high.     Does the patient have less than a 3 day supply:  [x] Yes  [] No    What is the patient's preferred pharmacy: [facility] - [address] - [phone] [facility] [phone] FX

## 2021-07-21 DIAGNOSIS — I10 ESSENTIAL HYPERTENSION: ICD-10-CM

## 2021-07-21 RX ORDER — IRBESARTAN 150 MG/1
150 TABLET ORAL 2 TIMES DAILY
Qty: 60 TABLET | Refills: 11 | Status: SHIPPED | OUTPATIENT
Start: 2021-07-21 | End: 2021-11-11 | Stop reason: SDUPTHER

## 2021-07-21 NOTE — TELEPHONE ENCOUNTER
Pt needs to confirm dosage on Irbesartan. Dr draper was prescribing one thing and now heart Dr wants to change it. Please call and confirm

## 2021-07-21 NOTE — TELEPHONE ENCOUNTER
Called pt and Rx we sent yesterday was not correct.  Cardiologist's notes indicate that dose was increased to 150mg bid, but wasn't changed on her medication list.  Pt informed we will send in a corrected Rx today.

## 2021-07-23 ENCOUNTER — OFFICE VISIT (OUTPATIENT)
Dept: CARDIOLOGY | Facility: CLINIC | Age: 86
End: 2021-07-23

## 2021-07-23 VITALS
HEIGHT: 64 IN | BODY MASS INDEX: 26.63 KG/M2 | WEIGHT: 156 LBS | OXYGEN SATURATION: 99 % | DIASTOLIC BLOOD PRESSURE: 88 MMHG | HEART RATE: 57 BPM | SYSTOLIC BLOOD PRESSURE: 160 MMHG

## 2021-07-23 DIAGNOSIS — I16.1 HYPERTENSIVE EMERGENCY: ICD-10-CM

## 2021-07-23 DIAGNOSIS — I44.7 LEFT BUNDLE BRANCH BLOCK (LBBB) ON ELECTROCARDIOGRAM: ICD-10-CM

## 2021-07-23 DIAGNOSIS — I10 ESSENTIAL HYPERTENSION: ICD-10-CM

## 2021-07-23 DIAGNOSIS — I07.1 MILD TRICUSPID INSUFFICIENCY: ICD-10-CM

## 2021-07-23 DIAGNOSIS — I10 BENIGN ESSENTIAL HTN: ICD-10-CM

## 2021-07-23 DIAGNOSIS — E78.00 HIGH CHOLESTEROL: ICD-10-CM

## 2021-07-23 DIAGNOSIS — R06.02 SHORTNESS OF BREATH: Primary | ICD-10-CM

## 2021-07-23 DIAGNOSIS — I48.91 NEW ONSET ATRIAL FIBRILLATION (HCC): ICD-10-CM

## 2021-07-23 DIAGNOSIS — R07.9 CHEST PAIN AT REST: ICD-10-CM

## 2021-07-23 PROCEDURE — 99213 OFFICE O/P EST LOW 20 MIN: CPT | Performed by: EMERGENCY MEDICINE

## 2021-07-23 RX ORDER — FUROSEMIDE 40 MG/1
40 TABLET ORAL DAILY
Qty: 30 TABLET | Refills: 11 | Status: SHIPPED | OUTPATIENT
Start: 2021-07-23 | End: 2021-10-27

## 2021-07-23 RX ORDER — CARVEDILOL 3.12 MG/1
3.12 TABLET ORAL 2 TIMES DAILY
Qty: 60 TABLET | Refills: 11 | Status: SHIPPED | OUTPATIENT
Start: 2021-07-23 | End: 2022-01-27

## 2021-07-24 NOTE — PROGRESS NOTES
Subjective:     Encounter Date:07/23/2021      Patient ID: Nedra Tolliver is a 85 y.o. female.    Chief Complaint:  History of Present Illness    Patient is an 85-year-old who follows up for 1 month follow-up today.  She has a history of left bundle branch block as well as difficult to control hypertension.  There was concern about atrial fibrillation while she was hospitalized.  She wore a Holter monitor for 14 days which showed a first-degree block with intermittent left bundle but no evidence of atrial fibrillation.  She underwent a left heart catheterization at which time she had FFR analysis of both her LAD and left circumflex arteries which were both hemodynamically nonsignificant.  No intervention was performed at that time.    Today, patient states that she still feels short of breath and has lower extremity edema.    Review of Systems   Constitutional: Negative for diaphoresis, fever and malaise/fatigue.   HENT: Negative for congestion.    Eyes: Negative for vision loss in left eye and vision loss in right eye.   Cardiovascular: Positive for dyspnea on exertion and leg swelling. Negative for chest pain, claudication, irregular heartbeat, orthopnea, palpitations and syncope.   Respiratory: Positive for shortness of breath. Negative for cough and wheezing.    Hematologic/Lymphatic: Negative for adenopathy.   Skin: Negative for rash.   Musculoskeletal: Negative for joint pain and joint swelling.   Gastrointestinal: Negative for abdominal pain, diarrhea, nausea and vomiting.   Neurological: Negative for excessive daytime sleepiness, dizziness, focal weakness, light-headedness, numbness and weakness.   Psychiatric/Behavioral: Negative for depression. The patient does not have insomnia.            Current Outpatient Medications:   •  acetaminophen (TYLENOL) 500 MG tablet, Take 1,000 mg by mouth At Night As Needed for Mild Pain ., Disp: , Rfl:   •  aspirin 81 MG EC tablet, Take 1 tablet by mouth Daily.,  Disp: , Rfl:   •  Cholecalciferol (VITAMIN D3) 5000 UNITS capsule capsule, Take 5,000 Units by mouth daily., Disp: , Rfl:   •  cimetidine (Tagamet HB) 200 MG tablet, Take 1 tablet by mouth Take As Directed. Take at 8am morning of procedure, Disp: 1 tablet, Rfl: 0  •  Coenzyme Q10 (CO Q 10) 100 MG capsule, Take 1 tablet by mouth Daily., Disp: , Rfl:   •  conjugated estrogens (PREMARIN) 0.625 MG/GM vaginal cream, Insert  into the vagina 2 (Two) Times a Week. (Patient taking differently: Insert  into the vagina 2 (Two) Times a Week. Pt does not use regularly), Disp: 30 g, Rfl: 6  •  dilTIAZem CD (dilTIAZem CD) 120 MG 24 hr capsule, Take 1 capsule by mouth Daily., Disp: 30 capsule, Rfl: 11  •  glimepiride (AMARYL) 4 MG tablet, Take 1 tablet by mouth Every Morning Before Breakfast., Disp: 90 tablet, Rfl: 3  •  glucose blood test strip, 1 daily, Disp: 100 each, Rfl: 3  •  glucose monitor monitoring kit, 1 each Daily., Disp: 1 each, Rfl: 0  •  irbesartan (AVAPRO) 150 MG tablet, Take 1 tablet by mouth 2 (Two) Times a Day. Take 1 tablet in the morning, 1/2 tablet in the evening, Disp: 60 tablet, Rfl: 11  •  Lancets misc, 1 each Daily., Disp: 100 each, Rfl: 3  •  levothyroxine (SYNTHROID, LEVOTHROID) 50 MCG tablet, TAKE 1 TABLET BY MOUTH EVERY DAY , Disp: 90 tablet, Rfl: 3  •  Multiple Vitamins-Minerals (CENTRUM SILVER PO), Take 1 tablet by mouth Daily., Disp: , Rfl:   •  True Metrix Blood Glucose Test test strip, TEST TWICE DAILY AS DIRECTED , Disp: 100 each, Rfl: 3  •  carvedilol (COREG) 3.125 MG tablet, Take 1 tablet by mouth 2 (Two) Times a Day., Disp: 60 tablet, Rfl: 11  •  furosemide (LASIX) 40 MG tablet, Take 1 tablet by mouth Daily., Disp: 30 tablet, Rfl: 11       Objective:      Vitals:    07/23/21 1105   BP: 160/88   Pulse: 57   SpO2: 99%     Vitals and nursing note reviewed.   Constitutional:       Appearance: Normal and healthy appearance. Well-developed and not in distress.   Eyes:      Extraocular Movements:  Extraocular movements intact.      Pupils: Pupils are equal, round, and reactive to light.   HENT:      Head: Normocephalic and atraumatic.    Mouth/Throat:      Pharynx: Oropharynx is clear.   Neck:      Vascular: JVD normal.      Trachea: Trachea normal.   Pulmonary:      Effort: Pulmonary effort is normal.      Breath sounds: Normal breath sounds. No wheezing. No rhonchi. No rales.   Cardiovascular:      PMI at left midclavicular line. Normal rate. Regular rhythm. Normal S1. Normal S2.      Murmurs: There is a grade 2/6 systolic murmur.      No gallop. No click. No rub.   Pulses:     Dorsalis pedis: 2+ bilaterally.     Posterior tibial: 2+ bilaterally.  Edema:     Peripheral edema present.  Abdominal:      General: Bowel sounds are normal.      Palpations: Abdomen is soft.      Tenderness: There is no abdominal tenderness.   Musculoskeletal: Normal range of motion.      Cervical back: Normal range of motion and neck supple. Skin:     General: Skin is warm and dry.      Capillary Refill: Capillary refill takes less than 2 seconds.   Feet:      Right foot:      Skin integrity: Skin integrity normal.      Left foot:      Skin integrity: Skin integrity normal.   Neurological:      Mental Status: Alert and oriented to person, place and time.      Cranial Nerves: Cranial nerves are intact.      Sensory: Sensation is intact.      Motor: Motor function is intact.      Coordination: Coordination is intact.   Psychiatric:         Speech: Speech normal.         Behavior: Behavior is cooperative.         Lab Review:             Assessment/Plan:     Problem List Items Addressed This Visit        Cardiac and Vasculature    Benign essential HTN    Overview     cont BP medication the day of procedure         Relevant Medications    carvedilol (COREG) 3.125 MG tablet    furosemide (LASIX) 40 MG tablet    Chest pain at rest    Mild tricuspid insufficiency    Relevant Medications    carvedilol (COREG) 3.125 MG tablet    High  cholesterol    Essential hypertension    Relevant Medications    carvedilol (COREG) 3.125 MG tablet    furosemide (LASIX) 40 MG tablet    Hypertensive emergency with headache    Relevant Medications    carvedilol (COREG) 3.125 MG tablet    furosemide (LASIX) 40 MG tablet    New Left bundle branch block (LBBB) on electrocardiogram    New onset atrial fibrillation, brief episode resolved after IV Cardizem bolus (CMS/HCC)    Overview      [id number] score  5         Relevant Medications    carvedilol (COREG) 3.125 MG tablet      Other Visit Diagnoses     Shortness of breath    -  Primary    Relevant Orders    Ambulatory Referral to Pulmonology            Recommendations/plans:    I reviewed the EKGs over the past year.  The EKG in question regarding the atrial fibrillation was poor quality.  I do not think this patient has atrial fibrillation.  Therefore  Recommend against full anticoagulation at this time.  Okay to continue on aspirin 81.  Her echocardiogram showed normal systolic function with grade 1 diastolic dysfunction.  Mild valve disease only.  I think her symptoms of shortness of breath are a combination of her poorly controlled hypertension as well as her diastolic dysfunction.  I am going to start her on a regimen of Lasix and Coreg today.  Stop the Bystolic.  Continue on the Cardizem 120 as well.  Continue on the Avapro 150 twice daily.  I am also going to refer her to pulmonology to assess the status of her lungs as a possible source for her significant shortness of breath.      Follow-up with cardiology in 3 months    Mak Nickerson,    Interventional cardiology  [facility]  07/23/2021  15:53 CDT

## 2021-08-04 ENCOUNTER — OFFICE VISIT (OUTPATIENT)
Dept: PULMONOLOGY | Facility: CLINIC | Age: 86
End: 2021-08-04

## 2021-08-04 VITALS
HEIGHT: 64 IN | DIASTOLIC BLOOD PRESSURE: 80 MMHG | BODY MASS INDEX: 26.12 KG/M2 | WEIGHT: 153 LBS | SYSTOLIC BLOOD PRESSURE: 172 MMHG | OXYGEN SATURATION: 98 % | HEART RATE: 59 BPM

## 2021-08-04 DIAGNOSIS — I07.1 MILD TRICUSPID INSUFFICIENCY: ICD-10-CM

## 2021-08-04 DIAGNOSIS — R06.09 DYSPNEA ON EXERTION: Primary | ICD-10-CM

## 2021-08-04 DIAGNOSIS — I44.7 NEW ONSET LEFT BUNDLE BRANCH BLOCK (LBBB): ICD-10-CM

## 2021-08-04 DIAGNOSIS — I51.89 GRADE I DIASTOLIC DYSFUNCTION: ICD-10-CM

## 2021-08-04 DIAGNOSIS — R01.1 HEART MURMUR: ICD-10-CM

## 2021-08-04 DIAGNOSIS — I10 ESSENTIAL HYPERTENSION: ICD-10-CM

## 2021-08-04 PROCEDURE — 99214 OFFICE O/P EST MOD 30 MIN: CPT | Performed by: NURSE PRACTITIONER

## 2021-08-04 NOTE — PROCEDURES
Walking Oximetry  Performed by: Gill Panda APRN  Authorized by: Gill Panda APRN     Rest room air SAT %:  98  Exercise room air SAT %:  98

## 2021-09-09 ENCOUNTER — OFFICE VISIT (OUTPATIENT)
Dept: INTERNAL MEDICINE | Facility: CLINIC | Age: 86
End: 2021-09-09

## 2021-09-09 VITALS
SYSTOLIC BLOOD PRESSURE: 170 MMHG | DIASTOLIC BLOOD PRESSURE: 60 MMHG | OXYGEN SATURATION: 97 % | HEIGHT: 64 IN | WEIGHT: 154 LBS | BODY MASS INDEX: 26.29 KG/M2 | HEART RATE: 57 BPM | TEMPERATURE: 97.9 F

## 2021-09-09 DIAGNOSIS — Z78.0 POST-MENOPAUSAL: ICD-10-CM

## 2021-09-09 DIAGNOSIS — I10 BENIGN ESSENTIAL HTN: ICD-10-CM

## 2021-09-09 DIAGNOSIS — Z79.899 ENCOUNTER FOR LONG-TERM CURRENT USE OF MEDICATION: ICD-10-CM

## 2021-09-09 DIAGNOSIS — E11.21 TYPE 2 DIABETES MELLITUS WITH DIABETIC NEPHROPATHY, WITHOUT LONG-TERM CURRENT USE OF INSULIN (HCC): Primary | ICD-10-CM

## 2021-09-09 DIAGNOSIS — E78.00 HIGH CHOLESTEROL: ICD-10-CM

## 2021-09-09 DIAGNOSIS — E55.9 VITAMIN D DEFICIENCY: ICD-10-CM

## 2021-09-09 DIAGNOSIS — Z12.31 SCREENING MAMMOGRAM, ENCOUNTER FOR: ICD-10-CM

## 2021-09-09 LAB — HBA1C MFR BLD: 7.2 %

## 2021-09-09 PROCEDURE — G0439 PPPS, SUBSEQ VISIT: HCPCS | Performed by: INTERNAL MEDICINE

## 2021-09-09 NOTE — PROGRESS NOTES
The ABCs of the Annual Wellness Visit  Subsequent Medicare Wellness Visit    Chief Complaint   Patient presents with   • Medicare Wellness-subsequent     a1c:7.2   • other     pt states she has an itchy spot and maybe a mole on her back       Subjective    History of Present Illness:  Nedra Tolliver is a 85 y.o. female who presents for a Subsequent Medicare Wellness Visit.    The following portions of the patient's history were reviewed and   updated as appropriate: allergies, current medications, past family history, past medical history, past social history, past surgical history and problem list.    Compared to one year ago, the patient feels her physical   health is worse.    Compared to one year ago, the patient feels her mental   health is the same.    Recent Hospitalizations:  This patient has had a [facility] admission record on file within the last 365 days.    Current Medical Providers:  Patient Care Team:  Vijay Rodrigues MD as PCP - General  Vijay Rodrigues MD as PCP - Family Medicine  Manish Smith MD as Consulting Physician (Otolaryngology)  Chandrakant Amin MD (Inactive) as Consulting Physician (Urology)  Yordan Blanton MD as Consulting Physician (Gastroenterology)  Roberto Collins PA as Physician Assistant (Otolaryngology)  Gill Panda APRN as Nurse Practitioner (Nurse Practitioner)    Outpatient Medications Prior to Visit   Medication Sig Dispense Refill   • acetaminophen (TYLENOL) 500 MG tablet Take 1,000 mg by mouth At Night As Needed for Mild Pain .     • aspirin 81 MG EC tablet Take 1 tablet by mouth Daily.     • carvedilol (COREG) 3.125 MG tablet Take 1 tablet by mouth 2 (Two) Times a Day. 60 tablet 11   • Coenzyme Q10 (CO Q 10) 100 MG capsule Take 1 tablet by mouth Daily.     • conjugated estrogens (PREMARIN) 0.625 MG/GM vaginal cream Insert  into the vagina 2 (Two) Times a Week. (Patient taking differently: Insert  into the vagina 2 (Two) Times a  Week. Pt does not use regularly) 30 g 6   • dilTIAZem CD (dilTIAZem CD) 120 MG 24 hr capsule Take 1 capsule by mouth Daily. 30 capsule 11   • glimepiride (AMARYL) 4 MG tablet Take 1 tablet by mouth Every Morning Before Breakfast. 90 tablet 3   • glucose monitor monitoring kit 1 each Daily. 1 each 0   • irbesartan (AVAPRO) 150 MG tablet Take 1 tablet by mouth 2 (Two) Times a Day. Take 1 tablet in the morning, 1/2 tablet in the evening (Patient taking differently: Take 150 mg by mouth 2 (Two) Times a Day. Take 1 tablet in the morning, 1 tablet in the evening) 60 tablet 11   • Lancets misc 1 each Daily. 100 each 3   • levothyroxine (SYNTHROID, LEVOTHROID) 50 MCG tablet TAKE 1 TABLET BY MOUTH EVERY DAY  90 tablet 3   • Multiple Vitamins-Minerals (CENTRUM SILVER PO) Take 1 tablet by mouth Daily.     • True Metrix Blood Glucose Test test strip TEST TWICE DAILY AS DIRECTED  100 each 3   • glucose blood test strip 1 daily 100 each 3   • Cholecalciferol (VITAMIN D3) 5000 UNITS capsule capsule Take 5,000 Units by mouth daily.     • furosemide (LASIX) 40 MG tablet Take 1 tablet by mouth Daily. 30 tablet 11     No facility-administered medications prior to visit.       No opioid medication identified on active medication list. I have reviewed chart for other potential  high risk medication/s and harmful drug interactions in the elderly.          Aspirin is on active medication list. Aspirin use is indicated based on review of current medical condition/s. Pros and cons of this therapy have been discussed today. Benefits of this medication outweigh potential harm.  Patient has been encouraged to continue taking this medication.  .      Patient Active Problem List   Diagnosis   • Urinary tract infection without hematuria   • Dysuria   • Vaginal atrophy   • Overactive bladder   • Diverticulitis   • Benign essential HTN   • Thyroid nodule   • Hypothyroidism   • Hyponatremia   • Syncope and collapse   • Volume depletion   • Type 2  diabetes mellitus, without long-term current use of insulin (CMS/[facility])   • Moderate left ankle sprain   • Left hip pain   • Acute pain of left knee   • Pain of left thumb   • Nondisplaced fracture of navicular bone of left foot with routine healing   • Chest pain at rest   • Acute cystitis without hematuria   • H/O thyroid nodule   • Hypoglycemia   • IBS (irritable bowel syndrome)   • Idiopathic scoliosis   • Insomnia   • Mild tricuspid insufficiency   • Neuropathy due to type 2 diabetes mellitus (CMS/[facility])   • Onychomycosis   • Osteopenia   • Pericarditis secondary to uremia   • Primary osteoarthritis of right knee   • Proteinuria   • Vitamin D deficiency   • High cholesterol   • Lumbar radiculopathy   • Non-toxic multinodular goiter   • Transient ischemic attack   • Acute gastritis without hemorrhage   • UTI symptoms   • Absolute anemia   • Essential hypertension   • Hypertensive emergency with headache   • New Left bundle branch block (LBBB) on electrocardiogram   • New onset atrial fibrillation, brief episode resolved after IV Cardizem bolus (CMS/[facility])   • Mixed hyperlipidemia with pervious statin intolerance   • CKD (chronic kidney disease) stage 2, GFR 60-89 ml/min   • Cervical radiculopathy   • Unstable angina (CMS/[facility])   • New onset left bundle branch block (LBBB)     Advance Care Planning  Advance Directive is on file.  ACP discussion was held with the patient during this visit. Patient has an advance directive in EMR which is still valid.     Review of Systems   Constitutional: Negative for activity change, appetite change and fatigue.   HENT: Negative for congestion, ear discharge and mouth sores.    Eyes: Negative for pain and discharge.   Respiratory: Negative for apnea, cough and shortness of breath.    Cardiovascular: Negative for chest pain, palpitations and leg swelling.   Gastrointestinal: Negative for abdominal distention, abdominal pain and anal bleeding.   Endocrine: Negative for cold intolerance  "and polydipsia.   Genitourinary: Negative for difficulty urinating, flank pain and hematuria.   Musculoskeletal: Negative for arthralgias, gait problem and myalgias.   Skin: Negative for color change and pallor.   Allergic/Immunologic: Negative for environmental allergies and food allergies.   Neurological: Negative for dizziness and numbness.   Psychiatric/Behavioral: Negative for agitation, decreased concentration and hallucinations.        Objective    Vitals:    09/09/21 1043   BP: 170/60   BP Location: Left arm   Patient Position: Sitting   Cuff Size: Adult   Pulse: 57   Temp: 97.9 °F (36.6 °C)   TempSrc: Temporal   SpO2: 97%   Weight: 69.9 kg (154 lb)   Height: 162.6 cm (64\")   PainSc: 0-No pain     BMI Readings from Last 1 Encounters:   09/09/21 26.43 kg/m²   BMI is above normal parameters. Recommendations include: nutrition counseling    Does the patient have evidence of cognitive impairment? No    Physical Exam  Constitutional:       Appearance: She is well-developed.   HENT:      Head: Normocephalic and atraumatic.   Eyes:      Pupils: Pupils are equal, round, and reactive to light.   Cardiovascular:      Rate and Rhythm: Normal rate and regular rhythm.   Pulmonary:      Effort: Pulmonary effort is normal.      Breath sounds: Normal breath sounds.   Abdominal:      General: Bowel sounds are normal.      Palpations: Abdomen is soft.   Musculoskeletal:         General: Normal range of motion.      Cervical back: Normal range of motion and neck supple.   Skin:     General: Skin is warm and dry.   Neurological:      Mental Status: She is alert and oriented to person, place, and time.   Psychiatric:         Behavior: Behavior normal.                 HEALTH RISK ASSESSMENT    Smoking Status:  Social History     Tobacco Use   Smoking Status Never Smoker   Smokeless Tobacco Never Used     Alcohol Consumption:  Social History     Substance and Sexual Activity   Alcohol Use No     Fall Risk Screen:    STEADI Fall " Risk Assessment was completed, and patient is at LOW risk for falls.Assessment completed on:9/9/2021    Depression Screening:  PHQ-2/PHQ-9 Depression Screening 9/9/2021   Little interest or pleasure in doing things 0   Feeling down, depressed, or hopeless 0   Total Score 0       Health Habits and Functional and Cognitive Screening:  Functional & Cognitive Status 9/9/2021   Do you have difficulty preparing food and eating? No   Do you have difficulty bathing yourself, getting dressed or grooming yourself? No   Do you have difficulty using the toilet? No   Do you have difficulty moving around from place to place? No   Do you have trouble with steps or getting out of a bed or a chair? Yes   Current Diet Well Balanced Diet   Dental Exam Up to date   Eye Exam Up to date   Exercise (times per week) 2 times per week   Current Exercises Include House Cleaning   Current Exercise Activities Include -   Do you need help using the phone?  No   Are you deaf or do you have serious difficulty hearing?  No   Do you need help with transportation? No   Do you need help shopping? No   Do you need help preparing meals?  No   Do you need help with housework?  No   Do you need help with laundry? No   Do you need help taking your medications? No   Do you need help managing money? No   Do you ever drive or ride in a car without wearing a seat belt? No   Have you felt unusual stress, anger or loneliness in the last month? No   Who do you live with? Alone   If you need help, do you have trouble finding someone available to you? No   Have you been bothered in the last four weeks by sexual problems? No   Do you have difficulty concentrating, remembering or making decisions? No       Age-appropriate Screening Schedule:  Refer to the list below for future screening recommendations based on patient's age, sex and/or medical conditions. Orders for these recommended tests are listed in the plan section. The patient has been provided with a written  plan.    Health Maintenance   Topic Date Due   • ZOSTER VACCINE (2 of 2) 07/28/2020   • URINE MICROALBUMIN  05/13/2021   • DXA SCAN  08/01/2021   • DIABETIC EYE EXAM  09/10/2021   • INFLUENZA VACCINE  10/01/2021   • HEMOGLOBIN A1C  11/09/2021   • LIPID PANEL  05/09/2022   • TDAP/TD VACCINES (3 - Td or Tdap) 06/19/2027              Assessment/Plan   CMS Preventative Services Quick Reference  Risk Factors Identified During Encounter  Cardiovascular Disease  Immunizations Discussed/Encouraged (specific Immunizations; Influenza  The above risks/problems have been discussed with the patient.  Follow up actions/plans if indicated are seen below in the Assessment/Plan Section.  Pertinent information has been shared with the patient in the After Visit Summary.    Diagnoses and all orders for this visit:    1. Benign essential HTN  -     Comprehensive Metabolic Panel  -     Urinalysis With Microscopic - Urine, Clean Catch  -     Uric Acid    2. High cholesterol  -     TSH  -     Lipid Panel    3. Vitamin D deficiency  -     Vitamin D 25 Hydroxy    4. Encounter for long-term current use of medication  -     CBC & Differential    5. Post-menopausal  -     DEXA Bone Density Axial; Future    6. Screening mammogram, encounter for  -     Mammo Screening Digital Tomosynthesis Bilateral With CAD; Future    Other orders  -     POC Glycosylated Hemoglobin (Hb A1C)  -     MicroAlbumin, Urine, Random - Urine, Clean Catch        Follow Up:   Return in about 6 months (around 3/9/2022).     An After Visit Summary and PPPS were made available to the patient.    At today's visit we completed her wellness evaluation I have ordered additional screening studies including laboratory mammography.  Patient's blood pressure is not under good control she is recently stopped her Lasix but she states it makes her feel bad and she did not want to continue diuretic therapy her diastolics are excellent.

## 2021-09-10 ENCOUNTER — TELEPHONE (OUTPATIENT)
Dept: INTERNAL MEDICINE | Facility: CLINIC | Age: 86
End: 2021-09-10

## 2021-09-10 DIAGNOSIS — N39.0 URINARY TRACT INFECTION WITHOUT HEMATURIA, SITE UNSPECIFIED: ICD-10-CM

## 2021-09-10 DIAGNOSIS — N95.2 VAGINAL ATROPHY: ICD-10-CM

## 2021-09-10 LAB
25(OH)D3+25(OH)D2 SERPL-MCNC: 83.4 NG/ML (ref 30–100)
ALBUMIN SERPL-MCNC: 4.9 G/DL (ref 3.5–5.2)
ALBUMIN/GLOB SERPL: 2.1 G/DL
ALP SERPL-CCNC: 86 U/L (ref 39–117)
ALT SERPL-CCNC: 14 U/L (ref 1–33)
APPEARANCE UR: CLEAR
AST SERPL-CCNC: 15 U/L (ref 1–32)
BACTERIA #/AREA URNS HPF: NORMAL /HPF
BASOPHILS # BLD AUTO: 0.06 10*3/MM3 (ref 0–0.2)
BASOPHILS NFR BLD AUTO: 1 % (ref 0–1.5)
BILIRUB SERPL-MCNC: 0.4 MG/DL (ref 0–1.2)
BILIRUB UR QL STRIP: NEGATIVE
BUN SERPL-MCNC: 19 MG/DL (ref 8–23)
BUN/CREAT SERPL: 18.8 (ref 7–25)
CALCIUM SERPL-MCNC: 10.5 MG/DL (ref 8.6–10.5)
CASTS URNS MICRO: NORMAL
CHLORIDE SERPL-SCNC: 94 MMOL/L (ref 98–107)
CHOLEST SERPL-MCNC: 370 MG/DL (ref 0–200)
CO2 SERPL-SCNC: 26.3 MMOL/L (ref 22–29)
COLOR UR: YELLOW
CREAT SERPL-MCNC: 1.01 MG/DL (ref 0.57–1)
EOSINOPHIL # BLD AUTO: 0.28 10*3/MM3 (ref 0–0.4)
EOSINOPHIL NFR BLD AUTO: 4.7 % (ref 0.3–6.2)
EPI CELLS #/AREA URNS HPF: NORMAL /HPF
ERYTHROCYTE [DISTWIDTH] IN BLOOD BY AUTOMATED COUNT: 12.5 % (ref 12.3–15.4)
GLOBULIN SER CALC-MCNC: 2.3 GM/DL
GLUCOSE SERPL-MCNC: 117 MG/DL (ref 65–99)
GLUCOSE UR QL: NEGATIVE
HCT VFR BLD AUTO: 37.7 % (ref 34–46.6)
HDLC SERPL-MCNC: 58 MG/DL (ref 40–60)
HGB BLD-MCNC: 12.8 G/DL (ref 12–15.9)
HGB UR QL STRIP: NEGATIVE
IMM GRANULOCYTES # BLD AUTO: 0.02 10*3/MM3 (ref 0–0.05)
IMM GRANULOCYTES NFR BLD AUTO: 0.3 % (ref 0–0.5)
KETONES UR QL STRIP: NEGATIVE
LDLC SERPL CALC-MCNC: 252 MG/DL (ref 0–100)
LEUKOCYTE ESTERASE UR QL STRIP: NEGATIVE
LYMPHOCYTES # BLD AUTO: 2.42 10*3/MM3 (ref 0.7–3.1)
LYMPHOCYTES NFR BLD AUTO: 40.3 % (ref 19.6–45.3)
MCH RBC QN AUTO: 31.5 PG (ref 26.6–33)
MCHC RBC AUTO-ENTMCNC: 34 G/DL (ref 31.5–35.7)
MCV RBC AUTO: 92.9 FL (ref 79–97)
MICROALBUMIN UR-MCNC: 226.3 UG/ML
MONOCYTES # BLD AUTO: 0.76 10*3/MM3 (ref 0.1–0.9)
MONOCYTES NFR BLD AUTO: 12.6 % (ref 5–12)
NEUTROPHILS # BLD AUTO: 2.47 10*3/MM3 (ref 1.7–7)
NEUTROPHILS NFR BLD AUTO: 41.1 % (ref 42.7–76)
NITRITE UR QL STRIP: NEGATIVE
NRBC BLD AUTO-RTO: 0 /100 WBC (ref 0–0.2)
PH UR STRIP: 7.5 [PH] (ref 5–8)
PLATELET # BLD AUTO: 280 10*3/MM3 (ref 140–450)
POTASSIUM SERPL-SCNC: 4.9 MMOL/L (ref 3.5–5.2)
PROT SERPL-MCNC: 7.2 G/DL (ref 6–8.5)
PROT UR QL STRIP: ABNORMAL
RBC # BLD AUTO: 4.06 10*6/MM3 (ref 3.77–5.28)
RBC #/AREA URNS HPF: NORMAL /HPF
SODIUM SERPL-SCNC: 132 MMOL/L (ref 136–145)
SP GR UR: 1.01 (ref 1–1.03)
TRIGL SERPL-MCNC: 281 MG/DL (ref 0–150)
TSH SERPL DL<=0.005 MIU/L-ACNC: 0.86 UIU/ML (ref 0.27–4.2)
URATE SERPL-MCNC: 5.9 MG/DL (ref 2.4–5.7)
UROBILINOGEN UR STRIP-MCNC: ABNORMAL MG/DL
VLDLC SERPL CALC-MCNC: 60 MG/DL (ref 5–40)
WBC # BLD AUTO: 6.01 10*3/MM3 (ref 3.4–10.8)
WBC #/AREA URNS HPF: NORMAL /HPF

## 2021-09-10 RX ORDER — CONJUGATED ESTROGENS 0.62 MG/G
CREAM VAGINAL 2 TIMES WEEKLY
Qty: 30 G | Refills: 6 | Status: SHIPPED | OUTPATIENT
Start: 2021-09-13 | End: 2022-03-01

## 2021-09-10 NOTE — TELEPHONE ENCOUNTER
Caller: Nedra Tolliver    Relationship: Self    Best call back number: [phone]     Medication needed: conjugated estrogens (PREMARIN) 0.625 MG/GM vaginal cream     Requested Prescriptions      No prescriptions requested or ordered in this encounter       When do you need the refill by: 09/10/2021    What additional details did the patient provide when requesting the medication: COMPLETELY OUT    Does the patient have less than a 3 day supply:  [x] Yes  [] No    What is the patient's preferred pharmacy: [facility] - [address] - [phone] [facility] [phone] FX

## 2021-09-21 ENCOUNTER — TRANSCRIBE ORDERS (OUTPATIENT)
Dept: LAB | Facility: HOSPITAL | Age: 86
End: 2021-09-21

## 2021-09-21 DIAGNOSIS — Z01.818 PREOP TESTING: Primary | ICD-10-CM

## 2021-09-27 ENCOUNTER — LAB (OUTPATIENT)
Dept: LAB | Facility: HOSPITAL | Age: 86
End: 2021-09-27

## 2021-09-27 LAB — SARS-COV-2 ORF1AB RESP QL NAA+PROBE: NOT DETECTED

## 2021-09-27 PROCEDURE — U0004 COV-19 TEST NON-CDC HGH THRU: HCPCS | Performed by: NURSE PRACTITIONER

## 2021-09-27 PROCEDURE — U0005 INFEC AGEN DETEC AMPLI PROBE: HCPCS | Performed by: NURSE PRACTITIONER

## 2021-09-27 PROCEDURE — C9803 HOPD COVID-19 SPEC COLLECT: HCPCS | Performed by: NURSE PRACTITIONER

## 2021-09-27 NOTE — PROGRESS NOTES
"Chief Complaint  Dyspnea on exertion    Subjective    History of Present Illness     Nedra Tolliver presents to [facility]   Ms. Tolliver is a pleasant 85 year old female with dyspnea on exertion, grade 1 diastolic dysfunction. Her heart work up did not show any significant disease.   She has hypertension. She has known hypothyroidism on Synthroid. Review of Dr. Nickerson felt her SOB was secondary to her uncontrolled HTN and diastolic dysfunction however has asked she has a pulmonology work up to look for any lung disease that may be contributing to her shortness of breath. She was walked at last visit and did not drop to qualify for oxygen. Her PFTs today are essentially normal. She states her BP is still not controlled. She has continued to have problems with low sodium. She continues to have shortness of breath.        Objective   Vital Signs:   /82   Pulse 80   Ht 157.5 cm (62\")   Wt 70.7 kg (155 lb 12.8 oz)   SpO2 99%   BMI 28.50 kg/m²     Physical Exam  Vitals reviewed.   Constitutional:       Appearance: Normal appearance.   Cardiovascular:      Rate and Rhythm: Normal rate and regular rhythm.   Pulmonary:      Effort: Pulmonary effort is normal.      Breath sounds: Normal breath sounds.   Neurological:      General: No focal deficit present.      Mental Status: She is alert and oriented to person, place, and time.   Psychiatric:         Mood and Affect: Mood normal.         Behavior: Behavior normal.          Result Review :  The following data was reviewed by: QUETA Serrano on 09/30/2021:    My interpretation of imaging:  none  My interpretation of labs: none     PFT Values        Some values may be hidden. Unless noted otherwise, only the newest values recorded on each date are displayed.         Old Values PFT Results 9/30/21   No data to display.      Pre Drug PFT Results 9/30/21      FEV1 110   FEF 25-75% 113   FEV1/FVC " 78.05      Post Drug PFT Results 21   No data to display.      Other Tests PFT Results 21         DLCO 115   D/VAsb 111           My interpretation of the PFT: as below     Results for orders placed in visit on 21    Pulmonary Function Test    Narrative  Pulmonary Function Test  Performed by: Monica Healy, RRT  Authorized by: Gill Panda APRN    Pre Drug % Predicted  FVC: 106%  FEV1: 110%  FEF 25-75%: 113%  FEV1/FVC: 78.05%  T%  RV: 124%  DLCO: 115%  D/VAsb: 111%    Interpretation  Spirometry  Spirometry shows normal results.  Review of FVL curve  Patient's effort is normal.  Lung Volume Measurements  Measurements show elevated residual volume consistent with gas trapping.  Diffusion Capacity  The patient's diffusion capacity is normal.  Diffusion capacity is normal when corrected for alveolar volume.    Rest/Exercise Pulse Ox Values        Some values may be hidden. Unless noted otherwise, only the newest values recorded on each date are displayed.         Rest/Exercise Pulse Ox Results 21   Rest room air SAT % 98   Exercise room air SAT % 98             Patient's BMI 28.50. BMI is within normal parameters. No follow-up required..    Assessment and Plan   Diagnoses and all orders for this visit:    1. Dyspnea on exertion (Primary)    2. Uncontrolled hypertension    3. Grade I diastolic dysfunction      From a lung stand point she is pretty good. She has not tried any inhalers and does not wish to. She is mostly exertional and ok at rest.       Alpha 1: none    Health maintenance:   Influenza vaccine: 2020   Pneumovax 23: 2019   Prevnar 13: Oct 2016  Covid-19 She states there are 2 ingredients in Pfizer and Moderna that she can not tolerate and one in the [place]. Her granddaughter has reviewed them and advised her not to take them. PCP has advised if she does get them she needs to be in a healthcare setting.     Follow Up   No follow-ups on  file.  Patient was given instructions and counseling regarding her condition or for health maintenance advice. Please see specific information pulled into the AVS if appropriate.     Gill Panda, APRN  9/30/2021  14:45 CDT

## 2021-09-30 ENCOUNTER — OFFICE VISIT (OUTPATIENT)
Dept: PULMONOLOGY | Facility: CLINIC | Age: 86
End: 2021-09-30

## 2021-09-30 VITALS
WEIGHT: 155.8 LBS | SYSTOLIC BLOOD PRESSURE: 148 MMHG | OXYGEN SATURATION: 99 % | HEIGHT: 62 IN | BODY MASS INDEX: 28.67 KG/M2 | DIASTOLIC BLOOD PRESSURE: 82 MMHG | HEART RATE: 80 BPM

## 2021-09-30 DIAGNOSIS — R06.09 DYSPNEA ON EXERTION: Primary | ICD-10-CM

## 2021-09-30 DIAGNOSIS — I10 UNCONTROLLED HYPERTENSION: ICD-10-CM

## 2021-09-30 DIAGNOSIS — R06.09 DYSPNEA ON EXERTION: ICD-10-CM

## 2021-09-30 DIAGNOSIS — I51.89 GRADE I DIASTOLIC DYSFUNCTION: ICD-10-CM

## 2021-09-30 PROCEDURE — 94729 DIFFUSING CAPACITY: CPT | Performed by: NURSE PRACTITIONER

## 2021-09-30 PROCEDURE — 94010 BREATHING CAPACITY TEST: CPT | Performed by: NURSE PRACTITIONER

## 2021-09-30 PROCEDURE — 99214 OFFICE O/P EST MOD 30 MIN: CPT | Performed by: NURSE PRACTITIONER

## 2021-09-30 PROCEDURE — 94727 GAS DIL/WSHOT DETER LNG VOL: CPT | Performed by: NURSE PRACTITIONER

## 2021-09-30 NOTE — PROCEDURES
Pulmonary Function Test  Performed by: Monica Healy, RRT  Authorized by: Gill Panda APRN      Pre Drug % Predicted    FVC: 106%   FEV1: 110%   FEF 25-75%: 113%   FEV1/FVC: 78.05%   T%   RV: 124%   DLCO: 115%   D/VAsb: 111%    Interpretation   Spirometry   Spirometry shows normal results.   Review of FVL curve   Patient's effort is normal.   Lung Volume Measurements  Measurements show elevated residual volume consistent with gas trapping.   Diffusion Capacity  The patient's diffusion capacity is normal.  Diffusion capacity is normal when corrected for alveolar volume.

## 2021-10-15 DIAGNOSIS — Z78.0 POST-MENOPAUSAL: ICD-10-CM

## 2021-10-15 DIAGNOSIS — Z12.31 SCREENING MAMMOGRAM, ENCOUNTER FOR: ICD-10-CM

## 2021-10-19 ENCOUNTER — TELEPHONE (OUTPATIENT)
Dept: INTERNAL MEDICINE | Facility: CLINIC | Age: 86
End: 2021-10-19

## 2021-10-27 ENCOUNTER — OFFICE VISIT (OUTPATIENT)
Dept: CARDIOLOGY | Facility: CLINIC | Age: 86
End: 2021-10-27

## 2021-10-27 VITALS
HEIGHT: 62 IN | SYSTOLIC BLOOD PRESSURE: 152 MMHG | OXYGEN SATURATION: 98 % | WEIGHT: 155 LBS | HEART RATE: 62 BPM | DIASTOLIC BLOOD PRESSURE: 78 MMHG | BODY MASS INDEX: 28.52 KG/M2

## 2021-10-27 DIAGNOSIS — E11.21 TYPE 2 DIABETES MELLITUS WITH DIABETIC NEPHROPATHY, WITHOUT LONG-TERM CURRENT USE OF INSULIN (HCC): Chronic | ICD-10-CM

## 2021-10-27 DIAGNOSIS — E78.2 MIXED HYPERLIPIDEMIA: ICD-10-CM

## 2021-10-27 DIAGNOSIS — I44.7 LEFT BUNDLE BRANCH BLOCK (LBBB) ON ELECTROCARDIOGRAM: ICD-10-CM

## 2021-10-27 DIAGNOSIS — I07.1 MILD TRICUSPID INSUFFICIENCY: ICD-10-CM

## 2021-10-27 DIAGNOSIS — Z86.39 H/O THYROID NODULE: ICD-10-CM

## 2021-10-27 DIAGNOSIS — I10 ESSENTIAL HYPERTENSION: Primary | ICD-10-CM

## 2021-10-27 PROCEDURE — 99214 OFFICE O/P EST MOD 30 MIN: CPT | Performed by: EMERGENCY MEDICINE

## 2021-10-27 PROCEDURE — 93000 ELECTROCARDIOGRAM COMPLETE: CPT | Performed by: EMERGENCY MEDICINE

## 2021-10-27 RX ORDER — HYDRALAZINE HYDROCHLORIDE 10 MG/1
10 TABLET, FILM COATED ORAL 3 TIMES DAILY
Qty: 90 TABLET | Refills: 3 | Status: SHIPPED | OUTPATIENT
Start: 2021-10-27 | End: 2021-11-11

## 2021-10-27 NOTE — PROGRESS NOTES
Subjective:     Encounter Date:10/27/2021      Patient ID: Nedra Tolliver is a 85 y.o. female.    Chief Complaint:  History of Present Illness    Patient is a pleasant 85-year-old female who is a 3-month follow-up today.  She is a history of difficult to control hypertension.  She is already worn a Holter monitor for 2 weeks.  She is already undergone a left heart catheterization that did not require any intervention.  She is also had an echocardiogram which showed normal systolic function.  Mild concentric hypertrophy and grade 1 diastolic dysfunction.  Very mild valve disease.  She has been evaluated by pulmonary and based off of their notes, it does not seem like patient has any significant lung pathology to explain her dyspnea.    Today, patient still complains of the dyspnea on exertion without much improvement with the changes to her medications from last appointment.  She makes mention that maybe it is her kidneys that are causing the problem as she says she was diagnosed with stage III chronic kidney disease many years ago.  She also believes that the Coreg is causing her problems.      Review of Systems   Constitutional: Negative for diaphoresis, fever and malaise/fatigue.   HENT: Negative for congestion.    Eyes: Negative for vision loss in left eye and vision loss in right eye.   Cardiovascular: Positive for dyspnea on exertion. Negative for chest pain, claudication, irregular heartbeat, leg swelling, orthopnea, palpitations and syncope.   Respiratory: Positive for shortness of breath. Negative for cough and wheezing.    Hematologic/Lymphatic: Negative for adenopathy.   Skin: Negative for rash.   Musculoskeletal: Negative for joint pain and joint swelling.   Gastrointestinal: Negative for abdominal pain, diarrhea, nausea and vomiting.   Neurological: Negative for excessive daytime sleepiness, dizziness, focal weakness, light-headedness, numbness and weakness.   Psychiatric/Behavioral: Negative for  depression. The patient does not have insomnia.            Current Outpatient Medications:   •  acetaminophen (TYLENOL) 500 MG tablet, Take 1,000 mg by mouth At Night As Needed for Mild Pain ., Disp: , Rfl:   •  aspirin 81 MG EC tablet, Take 1 tablet by mouth Daily., Disp: , Rfl:   •  carvedilol (COREG) 3.125 MG tablet, Take 1 tablet by mouth 2 (Two) Times a Day., Disp: 60 tablet, Rfl: 11  •  Cholecalciferol (VITAMIN D3) 5000 UNITS capsule capsule, Take 5,000 Units by mouth daily., Disp: , Rfl:   •  Coenzyme Q10 (CO Q 10) 100 MG capsule, Take 1 tablet by mouth Daily., Disp: , Rfl:   •  conjugated estrogens (Premarin) 0.625 MG/GM vaginal cream, Insert  into the vagina 2 (Two) Times a Week., Disp: 30 g, Rfl: 6  •  dilTIAZem CD (dilTIAZem CD) 120 MG 24 hr capsule, Take 1 capsule by mouth Daily., Disp: 30 capsule, Rfl: 11  •  glimepiride (AMARYL) 4 MG tablet, Take 1 tablet by mouth Every Morning Before Breakfast., Disp: 90 tablet, Rfl: 3  •  glucose monitor monitoring kit, 1 each Daily., Disp: 1 each, Rfl: 0  •  irbesartan (AVAPRO) 150 MG tablet, Take 1 tablet by mouth 2 (Two) Times a Day. Take 1 tablet in the morning, 1/2 tablet in the evening (Patient taking differently: Take 150 mg by mouth 2 (Two) Times a Day. Take 1 tablet in the morning, 1 tablet in the evening), Disp: 60 tablet, Rfl: 11  •  Lancets misc, 1 each Daily., Disp: 100 each, Rfl: 3  •  levothyroxine (SYNTHROID, LEVOTHROID) 50 MCG tablet, TAKE 1 TABLET BY MOUTH EVERY DAY , Disp: 90 tablet, Rfl: 3  •  Multiple Vitamins-Minerals (CENTRUM SILVER PO), Take 1 tablet by mouth Daily., Disp: , Rfl:   •  True Metrix Blood Glucose Test test strip, TEST TWICE DAILY AS DIRECTED , Disp: 100 each, Rfl: 3  •  hydrALAZINE (APRESOLINE) 10 MG tablet, Take 1 tablet by mouth 3 (Three) Times a Day., Disp: 90 tablet, Rfl: 3       Objective:      Vitals:    10/27/21 1128   BP: 152/78   Pulse: 62   SpO2: 98%     Vitals and nursing note reviewed.   Constitutional:        Appearance: Normal and healthy appearance. Well-developed and not in distress.   Eyes:      Extraocular Movements: Extraocular movements intact.      Pupils: Pupils are equal, round, and reactive to light.   HENT:      Head: Normocephalic and atraumatic.    Mouth/Throat:      Pharynx: Oropharynx is clear.   Neck:      Vascular: JVD normal.      Trachea: Trachea normal.   Pulmonary:      Effort: Pulmonary effort is normal.      Breath sounds: Normal breath sounds. No wheezing. No rhonchi. No rales.   Cardiovascular:      PMI at left midclavicular line. Normal rate. Regular rhythm. Normal S1. Normal S2.      Murmurs: There is a grade 2/6 systolic murmur.      No gallop. No click. No rub.   Pulses:     Dorsalis pedis: 2+ bilaterally.     Posterior tibial: 2+ bilaterally.  Abdominal:      General: Bowel sounds are normal.      Palpations: Abdomen is soft.      Tenderness: There is no abdominal tenderness.   Musculoskeletal: Normal range of motion.      Cervical back: Normal range of motion and neck supple. Skin:     General: Skin is warm and dry.      Capillary Refill: Capillary refill takes less than 2 seconds.   Feet:      Right foot:      Skin integrity: Skin integrity normal.      Left foot:      Skin integrity: Skin integrity normal.   Neurological:      Mental Status: Alert and oriented to person, place and time.      Cranial Nerves: Cranial nerves are intact.      Sensory: Sensation is intact.      Motor: Motor function is intact.      Coordination: Coordination is intact.   Psychiatric:         Speech: Speech normal.         Behavior: Behavior is cooperative.         Lab Review:         ECG 12 Lead    Date/Time: 10/27/2021 12:55 PM  Performed by: Mak Nickerson DO  Authorized by: Mak Nickerson DO   Comparison: compared with previous ECG   Rhythm: sinus bradycardia  Rate: bradycardic  Conduction: conduction normal  ST Segments: ST segments normal  T Waves: T waves normal  QRS axis:  normal    Clinical impression: abnormal EKG              Assessment/Plan:     Problem List Items Addressed This Visit        Cardiac and Vasculature    Mild tricuspid insufficiency    Essential hypertension - Primary    Relevant Medications    hydrALAZINE (APRESOLINE) 10 MG tablet    New Left bundle branch block (LBBB) on electrocardiogram    Mixed hyperlipidemia with pervious statin intolerance       Endocrine and Metabolic    Type 2 diabetes mellitus, without long-term current use of insulin (HCC) (Chronic)    H/O thyroid nodule            Recommendations/plans:    I am not really sure why patient is having her significant dyspnea on exertion.  From the pulmonologist notes, it seems like patient has mild disease only.  Her echocardiogram does not show any significant disease other than some grade 1 diastolic dysfunction and very mild valve disease.  Left heart catheterization had some mild coronary disease but nothing significant.  Her blood pressure remains significantly elevated today as well as her readings at home.  She has systolics in the 160-190 range on a regular basis.    Her current cardiac regimen includes aspirin 81, Coreg 3.125 twice daily, Cardizem 120, Avapro 150 twice daily.  I am going to add hydralazine 10 mg 3 times daily to her regimen today.  I am also going to check a renal artery ultrasound to rule out significant stenosis as a possible etiology for her blood pressure.    Follow-up in 3 months    Mak Nickerson DO   Interventional cardiology  [facility]  10/27/2021  12:55 CDT

## 2021-10-29 ENCOUNTER — HOSPITAL ENCOUNTER (OUTPATIENT)
Dept: ULTRASOUND IMAGING | Facility: HOSPITAL | Age: 86
Discharge: HOME | End: 2021-10-29

## 2021-10-29 DIAGNOSIS — I10 ESSENTIAL HYPERTENSION: ICD-10-CM

## 2021-11-02 ENCOUNTER — HOSPITAL ENCOUNTER (OUTPATIENT)
Dept: ULTRASOUND IMAGING | Facility: HOSPITAL | Age: 86
Discharge: HOME | End: 2021-11-02
Admitting: EMERGENCY MEDICINE

## 2021-11-02 DIAGNOSIS — I10 ESSENTIAL HYPERTENSION: ICD-10-CM

## 2021-11-02 PROCEDURE — 93975 VASCULAR STUDY: CPT

## 2021-11-02 PROCEDURE — 76775 US EXAM ABDO BACK WALL LIM: CPT

## 2021-11-10 ENCOUNTER — TELEPHONE (OUTPATIENT)
Dept: INTERNAL MEDICINE | Facility: CLINIC | Age: 86
End: 2021-11-10

## 2021-11-10 ENCOUNTER — TELEPHONE (OUTPATIENT)
Dept: CARDIOLOGY | Facility: CLINIC | Age: 86
End: 2021-11-10

## 2021-11-10 NOTE — TELEPHONE ENCOUNTER
high BP off/on-Monday 11/8 @ 3:30pm was 167/145 pulse 74, 8:30pm 175/70 pulse 49...dizzzy, spnning, vomitted, 11/10 @ 8:15 am-144/58 pulse 66. she wants to someone to look over meds and make sure everything is ok. No CP, no HA, No arm pain. She has jbeen having SOB. I was Unable to reach any MA's to verify so I made appt 111/11 for Dr. Rodrigues.

## 2021-11-10 NOTE — TELEPHONE ENCOUNTER
PT CALLED WOULD LIKE TO KNOW THE RESULTS OF US    ALL SO ON Monday BP WAS /145 AND HR 74, SHE DIZZY AND THEN SICK AND FELT BETTER SUGAR  BUT CAME BACK DOWN .  YESTERDAY  /66 HR 60 STILL DIZZY, TODAY /58 HR 66 AND STILL DIZZY SUGAR WAS 99, BALANCE IS OFF AND CAN NOT LAY FLAT.      HAS AN APT WITH PRIMARY TOMORROW 11/11/21      PLEASE ADVISE

## 2021-11-11 ENCOUNTER — OFFICE VISIT (OUTPATIENT)
Dept: INTERNAL MEDICINE | Facility: CLINIC | Age: 86
End: 2021-11-11

## 2021-11-11 VITALS
TEMPERATURE: 96.9 F | WEIGHT: 155 LBS | HEIGHT: 62 IN | BODY MASS INDEX: 28.52 KG/M2 | HEART RATE: 61 BPM | OXYGEN SATURATION: 98 % | SYSTOLIC BLOOD PRESSURE: 166 MMHG | DIASTOLIC BLOOD PRESSURE: 58 MMHG

## 2021-11-11 DIAGNOSIS — I25.10 CORONARY ARTERY DISEASE INVOLVING NATIVE CORONARY ARTERY OF NATIVE HEART WITHOUT ANGINA PECTORIS: Primary | ICD-10-CM

## 2021-11-11 DIAGNOSIS — I10 ESSENTIAL HYPERTENSION: ICD-10-CM

## 2021-11-11 PROCEDURE — 99214 OFFICE O/P EST MOD 30 MIN: CPT | Performed by: INTERNAL MEDICINE

## 2021-11-11 RX ORDER — IRBESARTAN 150 MG/1
150 TABLET ORAL 2 TIMES DAILY
Qty: 60 TABLET | Refills: 11
Start: 2021-11-11

## 2021-11-11 RX ORDER — HYDRALAZINE HYDROCHLORIDE 25 MG/1
25 TABLET, FILM COATED ORAL 3 TIMES DAILY
Qty: 90 TABLET | Refills: 5 | Status: SHIPPED | OUTPATIENT
Start: 2021-11-11 | End: 2021-11-24

## 2021-11-11 NOTE — PROGRESS NOTES
Subjective   Nedra Tolliver is a 85 y.o. female.   Chief Complaint   Patient presents with   • Hypertension     high BP off/on-Monday 11/8 @ 3:30pm was 167/145 pulse 74, 8:30pm 175/70 pulse 49...dizzzy, spnning, vomitted, 11/10 @ 8:15 am-144/58 pulse 66. she wants to someone to look over meds and make sure everything is ok.   • Dizziness       History of Present Illness patient is here with elevated blood pressure readings she is having a hard time getting her blood pressure under good control in addition to that she had an episode of dizziness associated with nausea.  I explained to her I think that the nausea and dizziness was more inner ear and not truly related to her blood pressure though I did recheck her blood pressure here in the office and she was high.    The following portions of the patient's history were reviewed and updated as appropriate: allergies, current medications, past family history, past medical history, past social history, past surgical history and problem list.    Review of Systems   Constitutional: Negative for activity change, appetite change, fatigue, fever, unexpected weight gain and unexpected weight loss.   HENT: Negative for swollen glands, trouble swallowing and voice change.    Eyes: Negative for blurred vision and visual disturbance.   Respiratory: Negative for cough and shortness of breath.    Cardiovascular: Negative for chest pain, palpitations and leg swelling.   Gastrointestinal: Positive for nausea. Negative for abdominal pain, constipation, diarrhea, vomiting and indigestion.   Endocrine: Negative for cold intolerance, heat intolerance, polydipsia and polyphagia.   Genitourinary: Negative for dysuria and frequency.   Musculoskeletal: Positive for arthralgias. Negative for back pain, joint swelling and neck pain.   Skin: Negative for color change, rash and skin lesions.   Neurological: Positive for dizziness. Negative for weakness, headache, memory problem and confusion.    Hematological: Does not bruise/bleed easily.   Psychiatric/Behavioral: Negative for agitation, hallucinations and suicidal ideas. The patient is not nervous/anxious.        Objective   Past Medical History:   Diagnosis Date   • Arthritis    • Cancer (HCC)     BCC @ nose & Left arm   • Diabetes type 2, controlled (HCC)    • Diverticulitis    • History of GI bleed    • History of transfusion     Has had x 5 units.   • Hypertension    • Hyponatremia     Required hospitalization   • Hypothyroidism    • Knee arthropathy 09/30/2020   • LPRD (laryngopharyngeal reflux disease)    • Pharyngeal dysphagia    • Thyroid nodule       Past Surgical History:   Procedure Laterality Date   • APPENDECTOMY     • BASAL CELL CARCINOMA EXCISION     • BELPHAROPTOSIS REPAIR     • BREAST CYST EXCISION     • CARDIAC CATHETERIZATION     • CARDIAC CATHETERIZATION Right 6/24/2021    Procedure: Left Heart Cath R radial, US guided w poss intervention;  Surgeon: Mak Nickerson DO;  Location:  [facility];  Service: Cardiovascular;  Laterality: Right;   • CARDIAC CATHETERIZATION     • CATARACT EXTRACTION     • CATARACT EXTRACTION WITH INTRAOCULAR LENS IMPLANT Bilateral    • CHOLECYSTECTOMY     • COLONOSCOPY Left 11/1/2016    Tubular adenoma cecum, Diverticulosis repeat prn   • SUBTOTAL HYSTERECTOMY     • TOTAL KNEE ARTHROPLASTY Right 9/30/2020    Procedure: TOTAL KNEE ARTHROPLASTY;  Surgeon: Mak Pickens MD;  Location: [facility] OR;  Service: Orthopedics;  Laterality: Right;   • TUMOR EXCISION      under armpits and left breast        Current Outpatient Medications:   •  acetaminophen (TYLENOL) 500 MG tablet, Take 1,000 mg by mouth At Night As Needed for Mild Pain ., Disp: , Rfl:   •  aspirin 81 MG EC tablet, Take 1 tablet by mouth Daily., Disp: , Rfl:   •  carvedilol (COREG) 3.125 MG tablet, Take 1 tablet by mouth 2 (Two) Times a Day., Disp: 60 tablet, Rfl: 11  •  Cholecalciferol (VITAMIN D3) 5000 UNITS capsule  capsule, Take 5,000 Units by mouth daily., Disp: , Rfl:   •  Coenzyme Q10 (CO Q 10) 100 MG capsule, Take 1 tablet by mouth Daily., Disp: , Rfl:   •  conjugated estrogens (Premarin) 0.625 MG/GM vaginal cream, Insert  into the vagina 2 (Two) Times a Week., Disp: 30 g, Rfl: 6  •  dilTIAZem CD (dilTIAZem CD) 120 MG 24 hr capsule, Take 1 capsule by mouth Daily., Disp: 30 capsule, Rfl: 11  •  glimepiride (AMARYL) 4 MG tablet, Take 1 tablet by mouth Every Morning Before Breakfast., Disp: 90 tablet, Rfl: 3  •  glucose monitor monitoring kit, 1 each Daily., Disp: 1 each, Rfl: 0  •  hydrALAZINE (APRESOLINE) 25 MG tablet, Take 1 tablet by mouth 3 (Three) Times a Day., Disp: 90 tablet, Rfl: 5  •  irbesartan (AVAPRO) 150 MG tablet, Take 1 tablet by mouth 2 (Two) Times a Day., Disp: 60 tablet, Rfl: 11  •  Lancets misc, 1 each Daily., Disp: 100 each, Rfl: 3  •  levothyroxine (SYNTHROID, LEVOTHROID) 50 MCG tablet, TAKE 1 TABLET BY MOUTH EVERY DAY , Disp: 90 tablet, Rfl: 3  •  Multiple Vitamins-Minerals (CENTRUM SILVER PO), Take 1 tablet by mouth Daily., Disp: , Rfl:   •  True Metrix Blood Glucose Test test strip, TEST TWICE DAILY AS DIRECTED , Disp: 100 each, Rfl: 3      Vitals:    11/11/21 1011   BP: 166/58   Pulse: 61   Temp: 96.9 °F (36.1 °C)   SpO2: 98%         11/11/21  1011   Weight: 70.3 kg (155 lb)       Body mass index is 28.35 kg/m².    Physical Exam  Constitutional:       Appearance: She is well-developed.   HENT:      Head: Normocephalic and atraumatic.   Eyes:      Pupils: Pupils are equal, round, and reactive to light.   Cardiovascular:      Rate and Rhythm: Normal rate and regular rhythm.   Pulmonary:      Effort: Pulmonary effort is normal.      Breath sounds: Normal breath sounds.   Abdominal:      General: Bowel sounds are normal.      Palpations: Abdomen is soft.   Musculoskeletal:         General: Normal range of motion.      Cervical back: Normal range of motion and neck supple.   Skin:     General: Skin is  warm and dry.   Neurological:      Mental Status: She is alert and oriented to person, place, and time.   Psychiatric:         Behavior: Behavior normal.               Assessment/Plan   Diagnoses and all orders for this visit:    1. Coronary artery disease involving native coronary artery of native heart without angina pectoris (Primary)    2. Essential hypertension  -     irbesartan (AVAPRO) 150 MG tablet; Take 1 tablet by mouth 2 (Two) Times a Day.  Dispense: 60 tablet; Refill: 11    Other orders  -     hydrALAZINE (APRESOLINE) 25 MG tablet; Take 1 tablet by mouth 3 (Three) Times a Day.  Dispense: 90 tablet; Refill: 5      I was able to review patient's cardiology notes including heart catheterization which showed a 50% lesion in the left anterior descending artery.  This lesion was not stented as it was not felt to be hemodynamically significant.  Her blood pressure has been significantly elevated I spent some time reviewing her medications and her treatment with her.  She is very frustrated at this point with her blood pressure running as high as it is.  I have also suggested that we increase her hydralazine to 25 mg 3 times daily and continue to adjust that medication until we achieve good blood pressure control.  I have explained to the patient that adjusting her medications will be a trial and error in coming off of current medications would not be in her best interest.

## 2021-11-24 ENCOUNTER — OFFICE VISIT (OUTPATIENT)
Dept: INTERNAL MEDICINE | Facility: CLINIC | Age: 86
End: 2021-11-24

## 2021-11-24 VITALS
HEIGHT: 62 IN | BODY MASS INDEX: 28.52 KG/M2 | OXYGEN SATURATION: 98 % | SYSTOLIC BLOOD PRESSURE: 130 MMHG | TEMPERATURE: 97.6 F | HEART RATE: 74 BPM | WEIGHT: 155 LBS | DIASTOLIC BLOOD PRESSURE: 60 MMHG

## 2021-11-24 DIAGNOSIS — E11.21 TYPE 2 DIABETES MELLITUS WITH DIABETIC NEPHROPATHY, WITHOUT LONG-TERM CURRENT USE OF INSULIN (HCC): ICD-10-CM

## 2021-11-24 DIAGNOSIS — I10 BENIGN ESSENTIAL HTN: Primary | ICD-10-CM

## 2021-11-24 DIAGNOSIS — E03.9 ACQUIRED HYPOTHYROIDISM: ICD-10-CM

## 2021-11-24 PROCEDURE — 99214 OFFICE O/P EST MOD 30 MIN: CPT | Performed by: INTERNAL MEDICINE

## 2021-11-24 RX ORDER — HYDRALAZINE HYDROCHLORIDE 25 MG/1
25 TABLET, FILM COATED ORAL 2 TIMES DAILY
Qty: 90 TABLET | Refills: 5 | Status: SHIPPED | OUTPATIENT
Start: 2021-11-24 | End: 2022-03-01

## 2021-11-24 NOTE — PROGRESS NOTES
Subjective   Nedra Tolliver is a 86 y.o. female.   Chief Complaint   Patient presents with   • Hypertension     patient states she has not felt well since last vist. has had a couple of dizzy spells       History of Present Illness this is a follow-up for hypertension she is also had some episodes of her heart skipping.  She has a history of paroxysmal atrial fib and apparently when she is trying to check her blood pressure her pulse ox she sometimes will get a low heart rate or an irregular heartbeat.  She is also had difficulty when standing up from a sitting position having some lightheadedness.    The following portions of the patient's history were reviewed and updated as appropriate: allergies, current medications, past family history, past medical history, past social history, past surgical history and problem list.    Review of Systems   Constitutional: Negative for activity change, appetite change, fatigue, fever, unexpected weight gain and unexpected weight loss.   HENT: Negative for swollen glands, trouble swallowing and voice change.    Eyes: Negative for blurred vision and visual disturbance.   Respiratory: Negative for cough and shortness of breath.    Cardiovascular: Positive for palpitations. Negative for chest pain and leg swelling.   Gastrointestinal: Negative for abdominal pain, constipation, diarrhea, nausea, vomiting and indigestion.   Endocrine: Negative for cold intolerance, heat intolerance, polydipsia and polyphagia.   Genitourinary: Negative for dysuria and frequency.   Musculoskeletal: Negative for arthralgias, back pain, joint swelling and neck pain.   Skin: Negative for color change, rash and skin lesions.   Neurological: Negative for dizziness, weakness, headache, memory problem and confusion.        Lightheadedness   Hematological: Does not bruise/bleed easily.   Psychiatric/Behavioral: Negative for agitation, hallucinations and suicidal ideas. The patient is not nervous/anxious.         Objective   Past Medical History:   Diagnosis Date   • Arthritis    • Cancer (HCC)     BCC @ nose & Left arm   • Diabetes type 2, controlled (HCC)    • Diverticulitis    • History of GI bleed    • History of transfusion     Has had x 5 units.   • Hypertension    • Hyponatremia     Required hospitalization   • Hypothyroidism    • Knee arthropathy 09/30/2020   • LPRD (laryngopharyngeal reflux disease)    • Pharyngeal dysphagia    • Thyroid nodule       Past Surgical History:   Procedure Laterality Date   • APPENDECTOMY     • BASAL CELL CARCINOMA EXCISION     • BELPHAROPTOSIS REPAIR     • BREAST CYST EXCISION     • CARDIAC CATHETERIZATION     • CARDIAC CATHETERIZATION Right 6/24/2021    Procedure: Left Heart Cath R radial, US guided w poss intervention;  Surgeon: Mak Nickerson DO;  Location:  [facility];  Service: Cardiovascular;  Laterality: Right;   • CARDIAC CATHETERIZATION     • CATARACT EXTRACTION     • CATARACT EXTRACTION WITH INTRAOCULAR LENS IMPLANT Bilateral    • CHOLECYSTECTOMY     • COLONOSCOPY Left 11/1/2016    Tubular adenoma cecum, Diverticulosis repeat prn   • SUBTOTAL HYSTERECTOMY     • TOTAL KNEE ARTHROPLASTY Right 9/30/2020    Procedure: TOTAL KNEE ARTHROPLASTY;  Surgeon: Mak Pickens MD;  Location: [facility] OR;  Service: Orthopedics;  Laterality: Right;   • TUMOR EXCISION      under armpits and left breast        Current Outpatient Medications:   •  acetaminophen (TYLENOL) 500 MG tablet, Take 1,000 mg by mouth At Night As Needed for Mild Pain ., Disp: , Rfl:   •  aspirin 81 MG EC tablet, Take 1 tablet by mouth Daily., Disp: , Rfl:   •  carvedilol (COREG) 3.125 MG tablet, Take 1 tablet by mouth 2 (Two) Times a Day., Disp: 60 tablet, Rfl: 11  •  Cholecalciferol (VITAMIN D3) 5000 UNITS capsule capsule, Take 5,000 Units by mouth daily., Disp: , Rfl:   •  Coenzyme Q10 (CO Q 10) 100 MG capsule, Take 1 tablet by mouth Daily., Disp: , Rfl:   •  conjugated estrogens  (Premarin) 0.625 MG/GM vaginal cream, Insert  into the vagina 2 (Two) Times a Week., Disp: 30 g, Rfl: 6  •  dilTIAZem CD (dilTIAZem CD) 120 MG 24 hr capsule, Take 1 capsule by mouth Daily., Disp: 30 capsule, Rfl: 11  •  glimepiride (AMARYL) 4 MG tablet, Take 1 tablet by mouth Every Morning Before Breakfast., Disp: 90 tablet, Rfl: 3  •  glucose monitor monitoring kit, 1 each Daily., Disp: 1 each, Rfl: 0  •  hydrALAZINE (APRESOLINE) 25 MG tablet, Take 1 tablet by mouth 2 (Two) Times a Day., Disp: 90 tablet, Rfl: 5  •  irbesartan (AVAPRO) 150 MG tablet, Take 1 tablet by mouth 2 (Two) Times a Day., Disp: 60 tablet, Rfl: 11  •  Lancets misc, 1 each Daily., Disp: 100 each, Rfl: 3  •  levothyroxine (SYNTHROID, LEVOTHROID) 50 MCG tablet, TAKE 1 TABLET BY MOUTH EVERY DAY , Disp: 90 tablet, Rfl: 3  •  Multiple Vitamins-Minerals (CENTRUM SILVER PO), Take 1 tablet by mouth Daily., Disp: , Rfl:   •  True Metrix Blood Glucose Test test strip, TEST TWICE DAILY AS DIRECTED , Disp: 100 each, Rfl: 3      Vitals:    11/24/21 1005   BP: 130/60   Pulse: 74   Temp: 97.6 °F (36.4 °C)   SpO2: 98%         11/24/21  1005   Weight: 70.3 kg (155 lb)       Body mass index is 28.35 kg/m².    Physical Exam  Constitutional:       Appearance: She is well-developed.   HENT:      Head: Normocephalic and atraumatic.   Eyes:      Pupils: Pupils are equal, round, and reactive to light.   Cardiovascular:      Rate and Rhythm: Normal rate and regular rhythm.   Pulmonary:      Effort: Pulmonary effort is normal.      Breath sounds: Normal breath sounds.   Abdominal:      General: Bowel sounds are normal.      Palpations: Abdomen is soft.   Musculoskeletal:         General: Normal range of motion.      Cervical back: Normal range of motion and neck supple.   Skin:     General: Skin is warm and dry.   Neurological:      Mental Status: She is alert and oriented to person, place, and time.   Psychiatric:         Behavior: Behavior normal.                Assessment/Plan   Diagnoses and all orders for this visit:    1. Benign essential HTN (Primary)  -     Basic Metabolic Panel  -     CBC & Differential    2. Acquired hypothyroidism  -     Basic Metabolic Panel  -     CBC & Differential    3. Type 2 diabetes mellitus with diabetic nephropathy, without long-term current use of insulin (HCC)  -     Basic Metabolic Panel  -     CBC & Differential    Other orders  -     hydrALAZINE (APRESOLINE) 25 MG tablet; Take 1 tablet by mouth 2 (Two) Times a Day.  Dispense: 90 tablet; Refill: 5      At today's visit I am going to drop her hydralazine to twice a day as she has had some lightheadedness.  In addition to that she has had some palpitations I told her the only thing else we could do at this point would be to place another 14-day monitor.  She had a 14-day monitor in May of this year and went back and reviewed that she did have some brief episodes of supraventricular tachycardia which is likely what she is doing again versus having A. fib.  She does not want to go ahead and put a monitor on at this point we will try dropping her a Apresoline to see if we get rid of the lightheadedness and continue her routine follow-up.

## 2021-11-25 LAB
BASOPHILS # BLD AUTO: 0.05 10*3/MM3 (ref 0–0.2)
BASOPHILS NFR BLD AUTO: 0.8 % (ref 0–1.5)
BUN SERPL-MCNC: 30 MG/DL (ref 8–23)
BUN/CREAT SERPL: 20.7 (ref 7–25)
CALCIUM SERPL-MCNC: 10 MG/DL (ref 8.6–10.5)
CHLORIDE SERPL-SCNC: 94 MMOL/L (ref 98–107)
CO2 SERPL-SCNC: 25.7 MMOL/L (ref 22–29)
CREAT SERPL-MCNC: 1.45 MG/DL (ref 0.57–1)
EOSINOPHIL # BLD AUTO: 0.16 10*3/MM3 (ref 0–0.4)
EOSINOPHIL NFR BLD AUTO: 2.7 % (ref 0.3–6.2)
ERYTHROCYTE [DISTWIDTH] IN BLOOD BY AUTOMATED COUNT: 12.3 % (ref 12.3–15.4)
GLUCOSE SERPL-MCNC: 161 MG/DL (ref 65–99)
HCT VFR BLD AUTO: 37.1 % (ref 34–46.6)
HGB BLD-MCNC: 12.3 G/DL (ref 12–15.9)
IMM GRANULOCYTES # BLD AUTO: 0.02 10*3/MM3 (ref 0–0.05)
IMM GRANULOCYTES NFR BLD AUTO: 0.3 % (ref 0–0.5)
LYMPHOCYTES # BLD AUTO: 1.77 10*3/MM3 (ref 0.7–3.1)
LYMPHOCYTES NFR BLD AUTO: 29.5 % (ref 19.6–45.3)
MCH RBC QN AUTO: 31.5 PG (ref 26.6–33)
MCHC RBC AUTO-ENTMCNC: 33.2 G/DL (ref 31.5–35.7)
MCV RBC AUTO: 95.1 FL (ref 79–97)
MONOCYTES # BLD AUTO: 0.68 10*3/MM3 (ref 0.1–0.9)
MONOCYTES NFR BLD AUTO: 11.3 % (ref 5–12)
NEUTROPHILS # BLD AUTO: 3.33 10*3/MM3 (ref 1.7–7)
NEUTROPHILS NFR BLD AUTO: 55.4 % (ref 42.7–76)
NRBC BLD AUTO-RTO: 0 /100 WBC (ref 0–0.2)
PLATELET # BLD AUTO: 269 10*3/MM3 (ref 140–450)
POTASSIUM SERPL-SCNC: 5.3 MMOL/L (ref 3.5–5.2)
RBC # BLD AUTO: 3.9 10*6/MM3 (ref 3.77–5.28)
SODIUM SERPL-SCNC: 131 MMOL/L (ref 136–145)
WBC # BLD AUTO: 6.01 10*3/MM3 (ref 3.4–10.8)

## 2021-12-01 RX ORDER — DILTIAZEM HYDROCHLORIDE 120 MG/1
CAPSULE, COATED, EXTENDED RELEASE ORAL
Qty: 30 CAPSULE | Refills: 2 | Status: SHIPPED | OUTPATIENT
Start: 2021-12-01 | End: 2022-01-13

## 2021-12-02 ENCOUNTER — TELEPHONE (OUTPATIENT)
Dept: INTERNAL MEDICINE | Facility: CLINIC | Age: 86
End: 2021-12-02

## 2021-12-02 DIAGNOSIS — N18.9 CHRONIC KIDNEY DISEASE, UNSPECIFIED CKD STAGE: Primary | ICD-10-CM

## 2021-12-02 NOTE — TELEPHONE ENCOUNTER
----- Message from Vijay Rodrigues MD sent at 12/2/2021  7:41 AM CST -----  Sugar and creatinine are both elevated.  If she is not seeing renal she needs to she definitely has some kidney failure which seems worse over the last 6 months.

## 2021-12-02 NOTE — TELEPHONE ENCOUNTER
Called regarding lab and informed of Dr. Rodrigues's comments.  She was seen by nephrology a couple years ago after being hospitalized and had a visit or 2 as an OP, but then cancelled when Covid started.  Will send referral to their office and she understands to contact us if they have not contacted her within 2 weeks.

## 2021-12-10 DIAGNOSIS — E11.21 CONTROLLED TYPE 2 DIABETES MELLITUS WITH DIABETIC NEPHROPATHY, WITHOUT LONG-TERM CURRENT USE OF INSULIN (HCC): Chronic | ICD-10-CM

## 2021-12-13 ENCOUNTER — OFFICE VISIT (OUTPATIENT)
Dept: INTERNAL MEDICINE | Facility: CLINIC | Age: 86
End: 2021-12-13

## 2021-12-13 VITALS
OXYGEN SATURATION: 97 % | HEIGHT: 62 IN | TEMPERATURE: 96.9 F | HEART RATE: 59 BPM | DIASTOLIC BLOOD PRESSURE: 60 MMHG | BODY MASS INDEX: 27.6 KG/M2 | WEIGHT: 150 LBS | SYSTOLIC BLOOD PRESSURE: 158 MMHG

## 2021-12-13 DIAGNOSIS — I10 BENIGN ESSENTIAL HTN: ICD-10-CM

## 2021-12-13 DIAGNOSIS — F41.9 ANXIETY: Primary | ICD-10-CM

## 2021-12-13 DIAGNOSIS — I49.3 SYMPTOMATIC PVCS: ICD-10-CM

## 2021-12-13 PROCEDURE — 99214 OFFICE O/P EST MOD 30 MIN: CPT | Performed by: INTERNAL MEDICINE

## 2021-12-13 PROCEDURE — 93000 ELECTROCARDIOGRAM COMPLETE: CPT | Performed by: INTERNAL MEDICINE

## 2021-12-13 RX ORDER — CALCIUM CITRATE/VITAMIN D3 200MG-6.25
TABLET ORAL
Qty: 100 EACH | Refills: 3 | Status: ON HOLD | OUTPATIENT
Start: 2021-12-13 | End: 2022-10-10

## 2021-12-13 RX ORDER — PAROXETINE 10 MG/1
10 TABLET, FILM COATED ORAL NIGHTLY
Qty: 30 TABLET | Refills: 11 | Status: SHIPPED | OUTPATIENT
Start: 2021-12-13 | End: 2022-04-06

## 2021-12-13 NOTE — PROGRESS NOTES
Subjective   Nedra Tolliver is a 86 y.o. female.   Chief Complaint   Patient presents with   • irregular heart beat     pt states she noticed this issue starting yesterday; having some SOB and hot flashes        History of Present Illness patient is here with elevated blood pressure readings irregular heartbeat some shortness of breath and hot flashes.  She is not have any chest discomfort.  She is taking all of her medications as prescribed.  We talked about her nerves a bit she is had quite a bit go on her life so she has been on Paxil intermittently in the past.  She tolerated that medication well.    The following portions of the patient's history were reviewed and updated as appropriate: allergies, current medications, past family history, past medical history, past social history, past surgical history and problem list.    Review of Systems   Constitutional: Negative for activity change, appetite change, fatigue, fever, unexpected weight gain and unexpected weight loss.   HENT: Negative for swollen glands, trouble swallowing and voice change.    Eyes: Negative for blurred vision and visual disturbance.   Respiratory: Negative for cough and shortness of breath.    Cardiovascular: Positive for palpitations. Negative for chest pain and leg swelling.   Gastrointestinal: Negative for abdominal pain, constipation, diarrhea, nausea, vomiting and indigestion.   Endocrine: Negative for cold intolerance, heat intolerance, polydipsia and polyphagia.   Genitourinary: Negative for dysuria and frequency.   Musculoskeletal: Negative for arthralgias, back pain, joint swelling and neck pain.   Skin: Negative for color change, rash and skin lesions.   Neurological: Negative for dizziness, weakness, headache, memory problem and confusion.   Hematological: Does not bruise/bleed easily.   Psychiatric/Behavioral: Negative for agitation, hallucinations and suicidal ideas. The patient is nervous/anxious.        Objective   Past  Medical History:   Diagnosis Date   • Arthritis    • Cancer (HCC)     BCC @ nose & Left arm   • Diabetes type 2, controlled (HCC)    • Diverticulitis    • History of GI bleed    • History of transfusion     Has had x 5 units.   • Hypertension    • Hyponatremia     Required hospitalization   • Hypothyroidism    • Knee arthropathy 09/30/2020   • LPRD (laryngopharyngeal reflux disease)    • Pharyngeal dysphagia    • Thyroid nodule       Past Surgical History:   Procedure Laterality Date   • APPENDECTOMY     • BASAL CELL CARCINOMA EXCISION     • BELPHAROPTOSIS REPAIR     • BREAST CYST EXCISION     • CARDIAC CATHETERIZATION     • CARDIAC CATHETERIZATION Right 6/24/2021    Procedure: Left Heart Cath R radial, US guided w poss intervention;  Surgeon: Mak Nickerson DO;  Location:  [facility];  Service: Cardiovascular;  Laterality: Right;   • CARDIAC CATHETERIZATION     • CATARACT EXTRACTION     • CATARACT EXTRACTION WITH INTRAOCULAR LENS IMPLANT Bilateral    • CHOLECYSTECTOMY     • COLONOSCOPY Left 11/1/2016    Tubular adenoma cecum, Diverticulosis repeat prn   • SUBTOTAL HYSTERECTOMY     • TOTAL KNEE ARTHROPLASTY Right 9/30/2020    Procedure: TOTAL KNEE ARTHROPLASTY;  Surgeon: Mak Pickens MD;  Location:  [facility] OR;  Service: Orthopedics;  Laterality: Right;   • TUMOR EXCISION      under armpits and left breast        Current Outpatient Medications:   •  acetaminophen (TYLENOL) 500 MG tablet, Take 1,000 mg by mouth At Night As Needed for Mild Pain ., Disp: , Rfl:   •  aspirin 81 MG EC tablet, Take 1 tablet by mouth Daily., Disp: , Rfl:   •  carvedilol (COREG) 3.125 MG tablet, Take 1 tablet by mouth 2 (Two) Times a Day., Disp: 60 tablet, Rfl: 11  •  Cholecalciferol (VITAMIN D3) 5000 UNITS capsule capsule, Take 5,000 Units by mouth daily., Disp: , Rfl:   •  Coenzyme Q10 (CO Q 10) 100 MG capsule, Take 1 tablet by mouth Daily., Disp: , Rfl:   •  conjugated estrogens (Premarin) 0.625 MG/GM  vaginal cream, Insert  into the vagina 2 (Two) Times a Week., Disp: 30 g, Rfl: 6  •  dilTIAZem CD (CARDIZEM CD) 120 MG 24 hr capsule, TAKE 1 CAPSULE BY MOUTH EVERY DAY, Disp: 30 capsule, Rfl: 2  •  glimepiride (AMARYL) 4 MG tablet, Take 1 tablet by mouth Every Morning Before Breakfast., Disp: 90 tablet, Rfl: 3  •  glucose monitor monitoring kit, 1 each Daily., Disp: 1 each, Rfl: 0  •  hydrALAZINE (APRESOLINE) 25 MG tablet, Take 1 tablet by mouth 2 (Two) Times a Day., Disp: 90 tablet, Rfl: 5  •  irbesartan (AVAPRO) 150 MG tablet, Take 1 tablet by mouth 2 (Two) Times a Day., Disp: 60 tablet, Rfl: 11  •  Lancets misc, 1 each Daily., Disp: 100 each, Rfl: 3  •  levothyroxine (SYNTHROID, LEVOTHROID) 50 MCG tablet, TAKE 1 TABLET BY MOUTH EVERY DAY , Disp: 90 tablet, Rfl: 3  •  Multiple Vitamins-Minerals (CENTRUM SILVER PO), Take 1 tablet by mouth Daily., Disp: , Rfl:   •  True Metrix Blood Glucose Test test strip, TEST TWICE DAILY AS DIRECTED, Disp: 100 each, Rfl: 3  •  PARoxetine (Paxil) 10 MG tablet, Take 1 tablet by mouth Every Night., Disp: 30 tablet, Rfl: 11      Vitals:    12/13/21 1326   BP: 158/60   Pulse: 59   Temp: 96.9 °F (36.1 °C)   SpO2: 97%         12/13/21  1326   Weight: 68 kg (150 lb)       Body mass index is 27.44 kg/m².    Physical Exam  Constitutional:       Appearance: She is well-developed.   HENT:      Head: Normocephalic and atraumatic.   Eyes:      Pupils: Pupils are equal, round, and reactive to light.   Cardiovascular:      Rate and Rhythm: Normal rate and regular rhythm.   Pulmonary:      Effort: Pulmonary effort is normal.      Breath sounds: Normal breath sounds.   Abdominal:      General: Bowel sounds are normal.      Palpations: Abdomen is soft.   Musculoskeletal:         General: Normal range of motion.      Cervical back: Normal range of motion and neck supple.   Skin:     General: Skin is warm and dry.   Neurological:      Mental Status: She is alert and oriented to person, place, and  time.   Psychiatric:         Behavior: Behavior normal.               Assessment/Plan   Diagnoses and all orders for this visit:    1. Anxiety (Primary)    2. Benign essential HTN    3. Symptomatic PVCs    Other orders  -     PARoxetine (Paxil) 10 MG tablet; Take 1 tablet by mouth Every Night.  Dispense: 30 tablet; Refill: 11        At this point I am starting patient on Paxil for her nerves I think her anxiety may be contributing somewhat to her current symptomatology.  She is tolerate Paxil well in the past she taking it when her   and at other points.  She is also having some PVCs that are symptomatic.  There is no chest pain associated with these.  I do not think a Holter is necessary at this point though certainly can consider that in the future.

## 2021-12-22 ENCOUNTER — TELEPHONE (OUTPATIENT)
Dept: INTERNAL MEDICINE | Facility: CLINIC | Age: 86
End: 2021-12-22

## 2022-01-12 ENCOUNTER — TELEPHONE (OUTPATIENT)
Dept: INTERNAL MEDICINE | Facility: CLINIC | Age: 87
End: 2022-01-12

## 2022-01-12 NOTE — TELEPHONE ENCOUNTER
Pt called stating since last night her BP machine has been showing irregular heat beats.  01/11/2022: PM: 172/77 pulse 50  1/12/2022: AM: 161/79 p: 47  01/12/2022: noon: 164/76 p: 51  01/12/2022: 330pm: 143/63 p: 55    Pt states SOB is constant when up movie states no CP but then stated she did have a twinge of left side breast pain some time today,    Advised I would let provider look at this and be back in touch with her but if SOB or CP got worse and persisted to go to ER.  Pt has upcoming apt with heart  On 01/23/2022    Pt has upcoming apt at end of month with heart .   Pt was not happy that she may not get to see baron or was instructed to go to ER if symptoms worsened. Pt started not happy bc I was not Polina Summers.

## 2022-01-13 ENCOUNTER — OFFICE VISIT (OUTPATIENT)
Dept: INTERNAL MEDICINE | Facility: CLINIC | Age: 87
End: 2022-01-13

## 2022-01-13 VITALS
TEMPERATURE: 97.3 F | HEIGHT: 62 IN | WEIGHT: 150 LBS | SYSTOLIC BLOOD PRESSURE: 144 MMHG | HEART RATE: 57 BPM | OXYGEN SATURATION: 98 % | BODY MASS INDEX: 27.6 KG/M2 | DIASTOLIC BLOOD PRESSURE: 60 MMHG

## 2022-01-13 DIAGNOSIS — I10 PRIMARY HYPERTENSION: ICD-10-CM

## 2022-01-13 DIAGNOSIS — E03.9 ACQUIRED HYPOTHYROIDISM: Primary | ICD-10-CM

## 2022-01-13 DIAGNOSIS — R00.1 BRADYCARDIA: ICD-10-CM

## 2022-01-13 DIAGNOSIS — R53.83 FATIGUE, UNSPECIFIED TYPE: ICD-10-CM

## 2022-01-13 PROCEDURE — 99214 OFFICE O/P EST MOD 30 MIN: CPT | Performed by: FAMILY MEDICINE

## 2022-01-13 RX ORDER — CLONIDINE 0.1 MG/24H
1 PATCH, EXTENDED RELEASE TRANSDERMAL WEEKLY
COMMUNITY
End: 2022-03-01

## 2022-01-13 NOTE — PROGRESS NOTES
Subjective     Chief Complaint   Patient presents with   • Irregular Heart Beat     happening for over a month but has getting worse.    • Hypertension     running high       History of Present Illness  Not feeling well  Onset prior to December 13th visit.   Thinks has not really felt well for over one year.  Occasional light headed with standing.  Unable to do much of anything with how she feels.   Pulse rate staying in the 40-50 range.  Blood pressure is up and down.   Has started a Catapres TTS 1 recently at the request of nephrology.  Patient did bring a log to the office of her pulse and blood pressure.   She notes there are some pulses that are marked as irregular by her bp machine   Patient's PMR from outside medical facility reviewed and noted.    Review of Systems     Otherwise complete ROS reviewed and negative except as mentioned in the HPI.    Past Medical History:   Past Medical History:   Diagnosis Date   • Arthritis    • Cancer (HCC)     BCC @ nose & Left arm   • Diabetes type 2, controlled (HCC)    • Diverticulitis    • History of GI bleed    • History of transfusion     Has had x 5 units.   • Hypertension    • Hyponatremia     Required hospitalization   • Hypothyroidism    • Knee arthropathy 09/30/2020   • LPRD (laryngopharyngeal reflux disease)    • Pharyngeal dysphagia    • Thyroid nodule      Past Surgical History:  Past Surgical History:   Procedure Laterality Date   • APPENDECTOMY     • BASAL CELL CARCINOMA EXCISION     • BELPHAROPTOSIS REPAIR     • BREAST CYST EXCISION     • CARDIAC CATHETERIZATION     • CARDIAC CATHETERIZATION Right 6/24/2021    Procedure: Left Heart Cath R radial, US guided w poss intervention;  Surgeon: Mak Nickerson DO;  Location:  [facility];  Service: Cardiovascular;  Laterality: Right;   • CARDIAC CATHETERIZATION     • CATARACT EXTRACTION     • CATARACT EXTRACTION WITH INTRAOCULAR LENS IMPLANT Bilateral    • CHOLECYSTECTOMY     •  COLONOSCOPY Left 11/1/2016    Tubular adenoma cecum, Diverticulosis repeat prn   • SUBTOTAL HYSTERECTOMY     • TOTAL KNEE ARTHROPLASTY Right 9/30/2020    Procedure: TOTAL KNEE ARTHROPLASTY;  Surgeon: Mak Pickens MD;  Location: [facility];  Service: Orthopedics;  Laterality: Right;   • TUMOR EXCISION      under armpits and left breast     Social History:  reports that she has never smoked. She has never used smokeless tobacco. She reports that she does not drink alcohol and does not use drugs.    Family History: family history includes Cancer in her mother; Diabetes in her father and mother; Heart failure in her mother.       Allergies:  Allergies   Allergen Reactions   • Codeine Hallucinations   • Iodine Anaphylaxis     Xray dye   • Levaquin [Levofloxacin] Anaphylaxis   • Lortab [Hydrocodone-Acetaminophen] Mental Status Change and Confusion   • Quinolones Anaphylaxis     - CIPRO -    • Shrimp (Diagnostic) Anaphylaxis   • Bactroban [Mupirocin Calcium] Swelling   • Ace Inhibitors Cough   • Piperacillin Sod-Tazobactam So Unknown - High Severity   • Statins Myalgia   • Thiazide-Type Diuretics Unknown - High Severity   • Mupirocin Rash     Medications:  Prior to Admission medications    Medication Sig Start Date End Date Taking? Authorizing Provider   acetaminophen (TYLENOL) 500 MG tablet Take 1,000 mg by mouth At Night As Needed for Mild Pain .   Yes Elma Yoo MD   aspirin 81 MG EC tablet Take 1 tablet by mouth Daily. 1/2/20  Yes Beverly Paz APRN   carvedilol (COREG) 3.125 MG tablet Take 1 tablet by mouth 2 (Two) Times a Day. 7/23/21  Yes Mak Nickerson DO   Cholecalciferol (VITAMIN D3) 5000 UNITS capsule capsule Take 5,000 Units by mouth daily.   Yes Elma Yoo MD   Coenzyme Q10 (CO Q 10) 100 MG capsule Take 1 tablet by mouth Daily.   Yes Elma Yoo MD   conjugated estrogens (Premarin) 0.625 MG/GM vaginal cream Insert  into the vagina 2 (Two) Times a Week. 9/13/21   "Yes Norma Coreas APRN   dilTIAZem CD (CARDIZEM CD) 120 MG 24 hr capsule TAKE 1 CAPSULE BY MOUTH EVERY DAY 12/1/21  Yes Vijay Rodrigues MD   glimepiride (AMARYL) 4 MG tablet Take 1 tablet by mouth Every Morning Before Breakfast. 11/12/20  Yes Vijay Rodrigues MD   glucose monitor monitoring kit 1 each Daily. 9/23/20  Yes Vijay Rodrigues MD   hydrALAZINE (APRESOLINE) 25 MG tablet Take 1 tablet by mouth 2 (Two) Times a Day. 11/24/21  Yes Vijay Rodrigues MD   irbesartan (AVAPRO) 150 MG tablet Take 1 tablet by mouth 2 (Two) Times a Day. 11/11/21  Yes Vijay Rodrigues MD   Lancets misc 1 each Daily. 9/23/20  Yes iVjay Rodrigues MD   levothyroxine (SYNTHROID, LEVOTHROID) 50 MCG tablet TAKE 1 TABLET BY MOUTH EVERY DAY  6/30/21  Yes Jossy Maciel APRN   Multiple Vitamins-Minerals (CENTRUM SILVER PO) Take 1 tablet by mouth Daily.   Yes Elma Yoo MD   PARoxetine (Paxil) 10 MG tablet Take 1 tablet by mouth Every Night. 12/13/21  Yes Vijay Rodrigues MD   True Metrix Blood Glucose Test test strip TEST TWICE DAILY AS DIRECTED 12/13/21  Yes Norma Coreas APRN       Objective     Vital Signs: /60 (BP Location: Left arm, Patient Position: Sitting, Cuff Size: Adult)   Pulse 57   Temp 97.3 °F (36.3 °C) (Temporal)   Ht 157.5 cm (62\")   Wt 68 kg (150 lb)   LMP  (LMP Unknown)   SpO2 98%   Breastfeeding No   BMI 27.44 kg/m²   Physical Exam  Vitals and nursing note reviewed.   Constitutional:       General: She is not in acute distress.     Appearance: Normal appearance. She is well-developed. She is not toxic-appearing.   HENT:      Head: Normocephalic and atraumatic.      Right Ear: Tympanic membrane, ear canal and external ear normal.      Left Ear: Tympanic membrane, ear canal and external ear normal.      Nose: Nose normal.      Mouth/Throat:      Mouth: Mucous membranes are moist.      Pharynx: Oropharynx is clear.   Eyes:      Extraocular Movements: Extraocular movements intact. "      Conjunctiva/sclera: Conjunctivae normal.      Pupils: Pupils are equal, round, and reactive to light.   Neck:      Thyroid: No thyromegaly.      Vascular: No JVD.      Trachea: No tracheal deviation.   Cardiovascular:      Rate and Rhythm: Regular rhythm. Bradycardia present.      Heart sounds: Normal heart sounds. No murmur heard.  No friction rub. No gallop.    Pulmonary:      Effort: Pulmonary effort is normal.      Breath sounds: Normal breath sounds.   Abdominal:      General: Bowel sounds are normal. There is no distension.      Palpations: Abdomen is soft.      Tenderness: There is no abdominal tenderness.   Musculoskeletal:         General: Normal range of motion.      Cervical back: Normal range of motion and neck supple.   Lymphadenopathy:      Cervical: No cervical adenopathy.   Skin:     General: Skin is warm and dry.      Capillary Refill: Capillary refill takes less than 2 seconds.   Neurological:      Mental Status: She is alert and oriented to person, place, and time.      Cranial Nerves: No cranial nerve deficit.      Coordination: Coordination normal.   Psychiatric:         Mood and Affect: Mood normal.         Behavior: Behavior normal.         Patient's Body mass index is 27.44 kg/m².     Results Reviewed:  Glucose   Date Value Ref Range Status   12/01/2021 192 (H) 65 - 99 mg/dL Final   06/24/2021 179 (H) 65 - 99 mg/dL Final     BUN   Date Value Ref Range Status   12/01/2021 31 (H) 8 - 23 mg/dL Final   06/24/2021 31 (H) 8 - 23 mg/dL Final     Creatinine   Date Value Ref Range Status   12/01/2021 1.57 (H) 0.57 - 1.00 mg/dL Final   06/24/2021 1.07 (H) 0.57 - 1.00 mg/dL Final   04/25/2018 1.10 0.60 - 1.30 mg/dL Final     Comment:     Serial Number: [id number]:  [id number]     Sodium   Date Value Ref Range Status   12/01/2021 131 (L) 136 - 145 mmol/L Final   06/24/2021 134 (L) 136 - 145 mmol/L Final     Potassium   Date Value Ref Range Status   12/01/2021 5.1 3.5 - 5.2 mmol/L Final   06/24/2021  4.7 3.5 - 5.2 mmol/L Final     Chloride   Date Value Ref Range Status   12/01/2021 94 (L) 98 - 107 mmol/L Final   06/24/2021 98 98 - 107 mmol/L Final     CO2   Date Value Ref Range Status   06/24/2021 24.0 22.0 - 29.0 mmol/L Final     Total CO2   Date Value Ref Range Status   12/01/2021 27.7 22.0 - 29.0 mmol/L Final     Calcium   Date Value Ref Range Status   12/01/2021 10.5 8.6 - 10.5 mg/dL Final   06/24/2021 10.7 (H) 8.6 - 10.5 mg/dL Final     ALT (SGPT)   Date Value Ref Range Status   09/09/2021 14 1 - 33 U/L Final   05/08/2021 12 1 - 33 U/L Final     AST (SGOT)   Date Value Ref Range Status   09/09/2021 15 1 - 32 U/L Final   05/08/2021 13 1 - 32 U/L Final     WBC   Date Value Ref Range Status   11/24/2021 6.01 3.40 - 10.80 10*3/mm3 Final     Hematocrit   Date Value Ref Range Status   11/24/2021 37.1 34.0 - 46.6 % Final   06/24/2021 38.8 34.0 - 46.6 % Final     Platelets   Date Value Ref Range Status   11/24/2021 269 140 - 450 10*3/mm3 Final   06/24/2021 260 140 - 450 10*3/mm3 Final     Total Cholesterol   Date Value Ref Range Status   05/09/2021 302 (H) 0 - 200 mg/dL Final     Triglycerides   Date Value Ref Range Status   09/09/2021 281 (H) 0 - 150 mg/dL Final   05/09/2021 187 (H) 0 - 150 mg/dL Final     HDL Cholesterol   Date Value Ref Range Status   09/09/2021 58 40 - 60 mg/dL Final   05/09/2021 56 40 - 60 mg/dL Final     LDL Chol Calc (NIH)   Date Value Ref Range Status   09/09/2021 252 (H) 0 - 100 mg/dL Final     LDL/HDL Ratio   Date Value Ref Range Status   05/09/2021 3.73  Final     Hemoglobin A1C   Date Value Ref Range Status   09/09/2021 7.2 % Final   05/09/2021 6.90 (H) 4.80 - 5.60 % Final   10/16/2020 7.0 (H) 4.0 - 6.0 % Final     Comment:     HbA1c levels >6% are an indication of hyperglycemia during the preceding 2  to 3 months or longer.    HbA1c levels may reach 20% or higher in poorly controlled diabetes.  Therapeutic action is suggested at levels above 8%.    Diabetes patients with HbA1c levels  below 7% meet the goal of the American  Diabetes Association.    HbA1c levels below the established reference range may indicate recent  episodes of hypoglycemia, the presence of Hb variants, or shortened lifetime  of erythrocytes.          Assessment / Plan     Assessment/Plan:  1. Acquired hypothyroidism    - TSH  - T4    2. Primary hypertension    - Comprehensive metabolic panel    3. Bradycardia      4. Fatigue, unspecified type        Stop the cardizem.  Monitor bloodpressure and pulse.   Keep log as she is use to doing.   Bring back to office in about 10 dasy.         Return in about 10 days (around 1/23/2022). unless patient needs to be seen sooner or acute issues arise.        I have discussed the patient results/orders and and plan/recommendation with them at today's visit.      Griselda Ugalde, DO   01/13/2022

## 2022-01-14 DIAGNOSIS — E87.1 HYPONATREMIA: Primary | ICD-10-CM

## 2022-01-14 LAB
ALBUMIN SERPL-MCNC: 4.3 G/DL (ref 3.5–5.2)
ALBUMIN/GLOB SERPL: 1.8 G/DL
ALP SERPL-CCNC: 87 U/L (ref 39–117)
ALT SERPL-CCNC: 11 U/L (ref 1–33)
AST SERPL-CCNC: 11 U/L (ref 1–32)
BILIRUB SERPL-MCNC: 0.4 MG/DL (ref 0–1.2)
BUN SERPL-MCNC: 21 MG/DL (ref 8–23)
BUN/CREAT SERPL: 17.9 (ref 7–25)
CALCIUM SERPL-MCNC: 9.9 MG/DL (ref 8.6–10.5)
CHLORIDE SERPL-SCNC: 91 MMOL/L (ref 98–107)
CO2 SERPL-SCNC: 27.6 MMOL/L (ref 22–29)
CREAT SERPL-MCNC: 1.17 MG/DL (ref 0.57–1)
GLOBULIN SER CALC-MCNC: 2.4 GM/DL
GLUCOSE SERPL-MCNC: 162 MG/DL (ref 65–99)
POTASSIUM SERPL-SCNC: 4.8 MMOL/L (ref 3.5–5.2)
PROT SERPL-MCNC: 6.7 G/DL (ref 6–8.5)
SODIUM SERPL-SCNC: 129 MMOL/L (ref 136–145)
T4 SERPL-MCNC: 7 UG/DL (ref 4.5–12)
TSH SERPL DL<=0.005 MIU/L-ACNC: 1.1 UIU/ML (ref 0.27–4.2)

## 2022-01-18 ENCOUNTER — TELEPHONE (OUTPATIENT)
Dept: INTERNAL MEDICINE | Facility: CLINIC | Age: 87
End: 2022-01-18

## 2022-01-19 DIAGNOSIS — R00.1 BRADYCARDIA: Primary | ICD-10-CM

## 2022-01-24 ENCOUNTER — LAB (OUTPATIENT)
Dept: LAB | Facility: HOSPITAL | Age: 87
End: 2022-01-24

## 2022-01-24 DIAGNOSIS — I10 ESSENTIAL HYPERTENSION: ICD-10-CM

## 2022-01-24 PROCEDURE — 82530 CORTISOL FREE: CPT

## 2022-01-24 PROCEDURE — 84295 ASSAY OF SERUM SODIUM: CPT | Performed by: FAMILY MEDICINE

## 2022-01-24 PROCEDURE — 82088 ASSAY OF ALDOSTERONE: CPT

## 2022-01-24 PROCEDURE — 84244 ASSAY OF RENIN: CPT

## 2022-01-27 ENCOUNTER — OFFICE VISIT (OUTPATIENT)
Dept: CARDIOLOGY | Facility: CLINIC | Age: 87
End: 2022-01-27

## 2022-01-27 ENCOUNTER — TELEPHONE (OUTPATIENT)
Dept: INTERNAL MEDICINE | Facility: CLINIC | Age: 87
End: 2022-01-27

## 2022-01-27 VITALS
DIASTOLIC BLOOD PRESSURE: 60 MMHG | OXYGEN SATURATION: 99 % | WEIGHT: 150 LBS | HEIGHT: 62 IN | SYSTOLIC BLOOD PRESSURE: 202 MMHG | BODY MASS INDEX: 27.6 KG/M2 | HEART RATE: 58 BPM

## 2022-01-27 DIAGNOSIS — M85.80 OSTEOPENIA, UNSPECIFIED LOCATION: ICD-10-CM

## 2022-01-27 DIAGNOSIS — I44.7 LBBB (LEFT BUNDLE BRANCH BLOCK): ICD-10-CM

## 2022-01-27 DIAGNOSIS — E11.21 TYPE 2 DIABETES MELLITUS WITH DIABETIC NEPHROPATHY, WITHOUT LONG-TERM CURRENT USE OF INSULIN: Chronic | ICD-10-CM

## 2022-01-27 DIAGNOSIS — M41.20 OTHER IDIOPATHIC SCOLIOSIS, UNSPECIFIED SPINAL REGION: Primary | ICD-10-CM

## 2022-01-27 DIAGNOSIS — R55 SYNCOPE AND COLLAPSE: ICD-10-CM

## 2022-01-27 DIAGNOSIS — I10 ESSENTIAL HYPERTENSION: Primary | ICD-10-CM

## 2022-01-27 DIAGNOSIS — I07.1 MILD TRICUSPID INSUFFICIENCY: ICD-10-CM

## 2022-01-27 DIAGNOSIS — E78.00 HIGH CHOLESTEROL: ICD-10-CM

## 2022-01-27 DIAGNOSIS — Z91.81 RISK FOR FALLS: ICD-10-CM

## 2022-01-27 PROCEDURE — 93000 ELECTROCARDIOGRAM COMPLETE: CPT | Performed by: EMERGENCY MEDICINE

## 2022-01-27 PROCEDURE — 99214 OFFICE O/P EST MOD 30 MIN: CPT | Performed by: EMERGENCY MEDICINE

## 2022-01-27 RX ORDER — AMLODIPINE BESYLATE 10 MG/1
10 TABLET ORAL DAILY
Qty: 30 TABLET | Refills: 11 | Status: SHIPPED | OUTPATIENT
Start: 2022-01-27 | End: 2022-01-31

## 2022-01-27 RX ORDER — ISOSORBIDE MONONITRATE 30 MG/1
30 TABLET, EXTENDED RELEASE ORAL EVERY MORNING
Qty: 30 TABLET | Refills: 11 | Status: SHIPPED | OUTPATIENT
Start: 2022-01-27 | End: 2022-03-01

## 2022-01-27 NOTE — TELEPHONE ENCOUNTER
"    Caller: Nedra Tolliver    Relationship to patient: Self    Best call back number:  [phone] (H)     Patient is needing:  Pt is asking for a prescription for a rollater walker because she is having a lot of issues w/ her mobility . No recent falls, but she is very unsteady and her basic cane is not doing well. She said that if she does not use her cane at all, she is very \"wobbly\" and her ankles feel weak.     She said that we can send to [facility] (if possible)  or [facility] as second choice.   "

## 2022-01-28 ENCOUNTER — TELEPHONE (OUTPATIENT)
Dept: CARDIOLOGY | Facility: CLINIC | Age: 87
End: 2022-01-28

## 2022-01-28 LAB
ALDOST SERPL-MCNC: 2.3 NG/DL (ref 0–30)
ALDOST/RENIN PLAS-RTO: 6.6 {RATIO} (ref 0–30)
RENIN PLAS-CCNC: 0.35 NG/ML/HR (ref 0.17–5.38)

## 2022-01-28 NOTE — TELEPHONE ENCOUNTER
PT CALLED SHE STATES DR. PAZ TO HER OFF AMLODIPINE BACK ON 06/03/21 DUE TO HER LEG SWELLING.    OV FROM DR. CHOWDHURY ON 06/03/21 DOES STAT TAKING HER OFF AMLODIPINE AND PUTTING HER ON CARDIZEM (NOT SURE ABOUT THE SWELLING)    PT WANTS YOU TO REVIEW BEFORE SHE STARTS TAKING MEDS     PLEASE ADVISE

## 2022-01-31 ENCOUNTER — TELEPHONE (OUTPATIENT)
Dept: INTERNAL MEDICINE | Facility: CLINIC | Age: 87
End: 2022-01-31

## 2022-01-31 LAB — CORTIS F SERPL-MCNC: 0.76 UG/DL

## 2022-01-31 RX ORDER — FUROSEMIDE 20 MG/1
20 TABLET ORAL DAILY
Qty: 30 TABLET | Refills: 3 | Status: SHIPPED | OUTPATIENT
Start: 2022-01-31 | End: 2022-03-01

## 2022-01-31 NOTE — PROGRESS NOTES
Subjective:     Encounter Date:01/27/2022      Patient ID: Nedra Tolliver is a 86 y.o. female.    Chief Complaint:  History of Present Illness    Patient is here today for a 3-month follow-up.  We have been following her for hypertension, hyperlipidemia, atrial fibrillation and diastolic CHF.  She states that her shortness of breath is much worse.  She has not been taking the Coreg because she says her heart rate gets too low.  Dr. Torres started her on Paxil but she says she is not taking it currently.  Her current cardiac regimen includes aspirin 81, irbesartan 150 twice daily, clonidine 0.1 daily, hydralazine 25 twice daily.  She was on Cardizem 120 but this was stopped recently.      Her blood pressure is significantly elevated today at 202/60 mmHg.    Review of Systems   Constitutional: Positive for malaise/fatigue. Negative for diaphoresis and fever.   HENT: Negative for congestion.    Eyes: Negative for vision loss in left eye and vision loss in right eye.   Cardiovascular: Positive for palpitations. Negative for chest pain, claudication, dyspnea on exertion, irregular heartbeat, leg swelling, orthopnea and syncope.   Respiratory: Positive for shortness of breath. Negative for cough and wheezing.    Hematologic/Lymphatic: Negative for adenopathy.   Skin: Negative for rash.   Musculoskeletal: Negative for joint pain and joint swelling.   Gastrointestinal: Negative for abdominal pain, diarrhea, nausea and vomiting.   Neurological: Positive for dizziness. Negative for excessive daytime sleepiness, focal weakness, light-headedness, numbness and weakness.   Psychiatric/Behavioral: Negative for depression. The patient does not have insomnia.            Current Outpatient Medications:   •  acetaminophen (TYLENOL) 500 MG tablet, Take 1,000 mg by mouth At Night As Needed for Mild Pain ., Disp: , Rfl:   •  aspirin 81 MG EC tablet, Take 1 tablet by mouth Daily., Disp: , Rfl:   •  Cholecalciferol (VITAMIN D3) 5000  UNITS capsule capsule, Take 5,000 Units by mouth daily., Disp: , Rfl:   •  cloNIDine (CATAPRES-TTS) 0.1 MG/24HR patch, Place 1 patch on the skin as directed by provider 1 (One) Time Per Week., Disp: , Rfl:   •  Coenzyme Q10 (CO Q 10) 100 MG capsule, Take 1 tablet by mouth Daily., Disp: , Rfl:   •  conjugated estrogens (Premarin) 0.625 MG/GM vaginal cream, Insert  into the vagina 2 (Two) Times a Week., Disp: 30 g, Rfl: 6  •  glimepiride (AMARYL) 4 MG tablet, Take 1 tablet by mouth Every Morning Before Breakfast., Disp: 90 tablet, Rfl: 3  •  glucose monitor monitoring kit, 1 each Daily., Disp: 1 each, Rfl: 0  •  hydrALAZINE (APRESOLINE) 25 MG tablet, Take 1 tablet by mouth 2 (Two) Times a Day., Disp: 90 tablet, Rfl: 5  •  irbesartan (AVAPRO) 150 MG tablet, Take 1 tablet by mouth 2 (Two) Times a Day., Disp: 60 tablet, Rfl: 11  •  Lancets misc, 1 each Daily., Disp: 100 each, Rfl: 3  •  levothyroxine (SYNTHROID, LEVOTHROID) 50 MCG tablet, TAKE 1 TABLET BY MOUTH EVERY DAY , Disp: 90 tablet, Rfl: 3  •  Multiple Vitamins-Minerals (CENTRUM SILVER PO), Take 1 tablet by mouth Daily., Disp: , Rfl:   •  PARoxetine (Paxil) 10 MG tablet, Take 1 tablet by mouth Every Night., Disp: 30 tablet, Rfl: 11  •  True Metrix Blood Glucose Test test strip, TEST TWICE DAILY AS DIRECTED, Disp: 100 each, Rfl: 3  •  amLODIPine (NORVASC) 10 MG tablet, Take 1 tablet by mouth Daily., Disp: 30 tablet, Rfl: 11  •  isosorbide mononitrate (IMDUR) 30 MG 24 hr tablet, Take 1 tablet by mouth Every Morning., Disp: 30 tablet, Rfl: 11       Objective:      Vitals:    01/27/22 1109   BP: (!) 202/60   Pulse: 58   SpO2: 99%     Vitals and nursing note reviewed.   Constitutional:       Appearance: Normal and healthy appearance. Well-developed and not in distress.   Eyes:      Extraocular Movements: Extraocular movements intact.      Pupils: Pupils are equal, round, and reactive to light.   HENT:      Head: Normocephalic and atraumatic.    Mouth/Throat:       Pharynx: Oropharynx is clear.   Neck:      Vascular: JVD normal.      Trachea: Trachea normal.   Pulmonary:      Effort: Pulmonary effort is normal.      Breath sounds: Normal breath sounds. No wheezing. No rhonchi. No rales.   Cardiovascular:      PMI at left midclavicular line. Normal rate. Regular rhythm. Normal S1. Normal S2.      Murmurs: There is no murmur.      No gallop. No click. No rub.   Pulses:     Dorsalis pedis: 2+ bilaterally.     Posterior tibial: 2+ bilaterally.  Abdominal:      General: Bowel sounds are normal.      Palpations: Abdomen is soft.      Tenderness: There is no abdominal tenderness.   Musculoskeletal: Normal range of motion.      Cervical back: Normal range of motion and neck supple. Skin:     General: Skin is warm and dry.      Capillary Refill: Capillary refill takes less than 2 seconds.   Feet:      Right foot:      Skin integrity: Skin integrity normal.      Left foot:      Skin integrity: Skin integrity normal.   Neurological:      Mental Status: Alert and oriented to person, place and time.      Cranial Nerves: Cranial nerves are intact.      Sensory: Sensation is intact.      Motor: Motor function is intact.      Coordination: Coordination is intact.   Psychiatric:         Speech: Speech normal.         Behavior: Behavior is cooperative.         Lab Review:         ECG 12 Lead    Date/Time: 1/31/2022 4:01 PM  Performed by: Mak Nickerson DO  Authorized by: Mak Nickerson DO   Comparison: compared with previous ECG   Similar to previous ECG  Rhythm: sinus rhythm  Rate: normal  Conduction: left bundle branch block  ST Segments: ST segments normal  T Waves: T waves normal  QRS axis: normal  Other: no other findings    Clinical impression: abnormal EKG              Assessment/Plan:     Problem List Items Addressed This Visit        Cardiac and Vasculature    Mild tricuspid insufficiency    Relevant Medications    amLODIPine (NORVASC) 10 MG tablet    isosorbide  mononitrate (IMDUR) 30 MG 24 hr tablet    High cholesterol    Essential hypertension - Primary    Relevant Medications    amLODIPine (NORVASC) 10 MG tablet    LBBB (left bundle branch block)    Overview     Added automatically from request for surgery [id number]            Endocrine and Metabolic    Type 2 diabetes mellitus, without long-term current use of insulin (HCC) (Chronic)       Symptoms and Signs    Syncope and collapse            Recommendations/plans:      I am going to add Norvasc 10 mg as well as Imdur 30 mg to her regimen she is already on including the aspirin 81, clonidine 0.1, hydralazine 25 twice daily, Avapro 150 twice daily.  She does not like taking the beta-blocker due to a drop in her heart rate too low.    Follow-up in 1 month to continue trying to optimize her medical regimen    Mak Nickerson,    Interventional cardiology  [facility]  01/27/2022  16:02 CST

## 2022-02-01 RX ORDER — GLIMEPIRIDE 4 MG/1
4 TABLET ORAL
Qty: 90 TABLET | Refills: 0 | Status: ON HOLD | OUTPATIENT
Start: 2022-02-01 | End: 2022-10-10

## 2022-02-07 ENCOUNTER — TELEPHONE (OUTPATIENT)
Dept: INTERNAL MEDICINE | Facility: CLINIC | Age: 87
End: 2022-02-07

## 2022-03-01 ENCOUNTER — OFFICE VISIT (OUTPATIENT)
Dept: CARDIOLOGY | Facility: CLINIC | Age: 87
End: 2022-03-01

## 2022-03-01 VITALS
OXYGEN SATURATION: 99 % | WEIGHT: 150 LBS | SYSTOLIC BLOOD PRESSURE: 162 MMHG | HEART RATE: 76 BPM | HEIGHT: 62 IN | DIASTOLIC BLOOD PRESSURE: 78 MMHG | BODY MASS INDEX: 27.6 KG/M2

## 2022-03-01 DIAGNOSIS — N18.2 CKD (CHRONIC KIDNEY DISEASE) STAGE 2, GFR 60-89 ML/MIN: ICD-10-CM

## 2022-03-01 DIAGNOSIS — I07.1 MILD TRICUSPID INSUFFICIENCY: ICD-10-CM

## 2022-03-01 DIAGNOSIS — I10 BENIGN ESSENTIAL HTN: Primary | ICD-10-CM

## 2022-03-01 DIAGNOSIS — E78.2 MIXED HYPERLIPIDEMIA: ICD-10-CM

## 2022-03-01 DIAGNOSIS — I44.7 LBBB (LEFT BUNDLE BRANCH BLOCK): ICD-10-CM

## 2022-03-01 DIAGNOSIS — E78.00 HIGH CHOLESTEROL: ICD-10-CM

## 2022-03-01 DIAGNOSIS — E11.21 TYPE 2 DIABETES MELLITUS WITH DIABETIC NEPHROPATHY, WITHOUT LONG-TERM CURRENT USE OF INSULIN: Chronic | ICD-10-CM

## 2022-03-01 PROCEDURE — 99214 OFFICE O/P EST MOD 30 MIN: CPT | Performed by: EMERGENCY MEDICINE

## 2022-03-01 RX ORDER — CLONIDINE 0.2 MG/24H
1 PATCH, EXTENDED RELEASE TRANSDERMAL WEEKLY
Qty: 4 EACH | Refills: 3 | Status: SHIPPED | OUTPATIENT
Start: 2022-03-01 | End: 2022-04-06

## 2022-03-01 NOTE — PROGRESS NOTES
Subjective:     Encounter Date:03/01/2022      Patient ID: Nedra Tolliver is a 86 y.o. female.    Chief Complaint:  History of Present Illness    86-year-old female with a history of difficult to control hypertension as well as hyperlipidemia and left bundle branch block presents today for 2-month follow-up.  Blood pressure today is 162/78.  She brings a log with her and all of her blood pressures at home are always elevated.  Currently, she is just taking the clonidine 0.1 patch and Avapro 150 twice a day.  She is not taking any of the other medications we have prescribed her.    Review of Systems   Constitutional: Positive for malaise/fatigue. Negative for diaphoresis and fever.   HENT: Negative for congestion.    Eyes: Negative for vision loss in left eye and vision loss in right eye.   Cardiovascular: Negative for chest pain, claudication, dyspnea on exertion, irregular heartbeat, leg swelling, orthopnea, palpitations and syncope.   Respiratory: Negative for cough, shortness of breath and wheezing.    Hematologic/Lymphatic: Negative for adenopathy.   Skin: Negative for rash.   Musculoskeletal: Negative for joint pain and joint swelling.   Gastrointestinal: Negative for abdominal pain, diarrhea, nausea and vomiting.   Neurological: Negative for excessive daytime sleepiness, dizziness, focal weakness, light-headedness, numbness and weakness.   Psychiatric/Behavioral: Negative for depression. The patient does not have insomnia.            Current Outpatient Medications:   •  acetaminophen (TYLENOL) 500 MG tablet, Take 1,000 mg by mouth At Night As Needed for Mild Pain ., Disp: , Rfl:   •  aspirin 81 MG EC tablet, Take 1 tablet by mouth Daily., Disp: , Rfl:   •  Cholecalciferol (Vitamin D3) 1.25 MG (25045 UT) tablet, Take  by mouth., Disp: , Rfl:   •  cloNIDine (CATAPRES-TTS) 0.2 MG/24HR patch, Place 1 patch on the skin as directed by provider 1 (One) Time Per Week., Disp: 4 each, Rfl: 3  •  Coenzyme Q10 (CO  Q 10) 100 MG capsule, Take 1 tablet by mouth Daily., Disp: , Rfl:   •  glimepiride (AMARYL) 4 MG tablet, TAKE 1 TABLET BY MOUTH EVERY MORNING BEFORE BREAKFAST, Disp: 90 tablet, Rfl: 0  •  glucose monitor monitoring kit, 1 each Daily., Disp: 1 each, Rfl: 0  •  irbesartan (AVAPRO) 150 MG tablet, Take 1 tablet by mouth 2 (Two) Times a Day., Disp: 60 tablet, Rfl: 11  •  Lancets misc, 1 each Daily., Disp: 100 each, Rfl: 3  •  levothyroxine (SYNTHROID, LEVOTHROID) 50 MCG tablet, TAKE 1 TABLET BY MOUTH EVERY DAY , Disp: 90 tablet, Rfl: 3  •  Multiple Vitamins-Minerals (CENTRUM SILVER PO), Take 1 tablet by mouth Daily., Disp: , Rfl:   •  PARoxetine (Paxil) 10 MG tablet, Take 1 tablet by mouth Every Night., Disp: 30 tablet, Rfl: 11  •  True Metrix Blood Glucose Test test strip, TEST TWICE DAILY AS DIRECTED, Disp: 100 each, Rfl: 3       Objective:      Vitals:    03/01/22 1106   BP: 162/78   Pulse: 76   SpO2: 99%     Vitals and nursing note reviewed.   Constitutional:       Appearance: Normal and healthy appearance. Well-developed and not in distress.   Eyes:      Extraocular Movements: Extraocular movements intact.      Pupils: Pupils are equal, round, and reactive to light.   HENT:      Head: Normocephalic and atraumatic.    Mouth/Throat:      Pharynx: Oropharynx is clear.   Neck:      Vascular: JVD normal.      Trachea: Trachea normal.   Pulmonary:      Effort: Pulmonary effort is normal.      Breath sounds: Normal breath sounds. No wheezing. No rhonchi. No rales.   Cardiovascular:      PMI at left midclavicular line. Normal rate. Regular rhythm. Normal S1. Normal S2.      Murmurs: There is no murmur.      No gallop. No click. No rub.   Pulses:     Dorsalis pedis: 2+ bilaterally.     Posterior tibial: 2+ bilaterally.  Abdominal:      General: Bowel sounds are normal.      Palpations: Abdomen is soft.      Tenderness: There is no abdominal tenderness.   Musculoskeletal: Normal range of motion.      Cervical back: Normal  range of motion and neck supple. Skin:     General: Skin is warm and dry.      Capillary Refill: Capillary refill takes less than 2 seconds.   Feet:      Right foot:      Skin integrity: Skin integrity normal.      Left foot:      Skin integrity: Skin integrity normal.   Neurological:      Mental Status: Alert and oriented to person, place and time.      Cranial Nerves: Cranial nerves are intact.      Sensory: Sensation is intact.      Motor: Motor function is intact.      Coordination: Coordination is intact.   Psychiatric:         Speech: Speech normal.         Behavior: Behavior is cooperative.         Lab Review:       Procedures      Assessment/Plan:     Problem List Items Addressed This Visit        Cardiac and Vasculature    Benign essential HTN - Primary    Overview     cont BP medication the day of procedure         Relevant Medications    cloNIDine (CATAPRES-TTS) 0.2 MG/24HR patch    Mild tricuspid insufficiency    High cholesterol    Mixed hyperlipidemia with pervious statin intolerance    LBBB (left bundle branch block)    Overview     Added automatically from request for surgery [id number]            Endocrine and Metabolic    Type 2 diabetes mellitus, without long-term current use of insulin (HCC) (Chronic)       Genitourinary and Reproductive     CKD (chronic kidney disease) stage 2, GFR 60-89 ml/min            Recommendations/plans:      I spent a great deal of time today discussing patient's blood pressure with her today and the different medications that we have tried.    She did not like the Lasix, Norvasc, hydralazine, Coreg, Cardizem, Imdur, lisinopril or HCTZ.    I am going to double the dose of the clonidine from 0.1 to 0.2 mg.  I told her to continue the Avapro 150 mg twice a day.    I think a lot of this patient's issue with her blood pressure and the medications to treat is related to her significant anxiety.      Follow-up in 1 month.  Continue to log blood pressures at home      Mak PACEHCO  DO Krishan   Interventional cardiology  [facility]  03/01/2022  16:35 CST

## 2022-04-06 ENCOUNTER — OFFICE VISIT (OUTPATIENT)
Dept: CARDIOLOGY | Facility: CLINIC | Age: 87
End: 2022-04-06

## 2022-04-06 VITALS
WEIGHT: 150 LBS | SYSTOLIC BLOOD PRESSURE: 120 MMHG | HEIGHT: 62 IN | BODY MASS INDEX: 27.6 KG/M2 | DIASTOLIC BLOOD PRESSURE: 72 MMHG | HEART RATE: 64 BPM | OXYGEN SATURATION: 97 %

## 2022-04-06 DIAGNOSIS — I44.7 LBBB (LEFT BUNDLE BRANCH BLOCK): ICD-10-CM

## 2022-04-06 DIAGNOSIS — E78.00 HIGH CHOLESTEROL: ICD-10-CM

## 2022-04-06 DIAGNOSIS — E11.21 TYPE 2 DIABETES MELLITUS WITH DIABETIC NEPHROPATHY, WITHOUT LONG-TERM CURRENT USE OF INSULIN: Chronic | ICD-10-CM

## 2022-04-06 DIAGNOSIS — E78.2 MIXED HYPERLIPIDEMIA: ICD-10-CM

## 2022-04-06 DIAGNOSIS — I07.1 MILD TRICUSPID INSUFFICIENCY: ICD-10-CM

## 2022-04-06 DIAGNOSIS — I10 ESSENTIAL HYPERTENSION: Primary | ICD-10-CM

## 2022-04-06 PROCEDURE — 99213 OFFICE O/P EST LOW 20 MIN: CPT | Performed by: EMERGENCY MEDICINE

## 2022-04-06 RX ORDER — CLONIDINE 0.3 MG/24H
1 PATCH, EXTENDED RELEASE TRANSDERMAL WEEKLY
COMMUNITY
End: 2022-10-10 | Stop reason: HOSPADM

## 2022-04-06 NOTE — PROGRESS NOTES
Subjective:     Encounter Date:04/06/2022      Patient ID: Nedra Tolliver is a 86 y.o. female.    Chief Complaint:  History of Present Illness    Patient is a 86-year-old female with difficult to control hypertension who presents to the clinic today for a 1 month follow-up.  She recently switched providers to Dr. Rascon who made adjustments in her blood pressure regimen.  Her blood pressures are much better controlled now.  She is maintained on clonidine 0.3 mg transdermal patches as well as Avapro 150 mg twice daily and is tolerating these agents quite well at this time.    Review of Systems   Constitutional: Negative for diaphoresis, fever and malaise/fatigue.   HENT: Negative for congestion.    Eyes: Positive for visual disturbance. Negative for vision loss in left eye and vision loss in right eye.   Cardiovascular: Negative for chest pain, claudication, dyspnea on exertion, irregular heartbeat, leg swelling, orthopnea, palpitations and syncope.   Respiratory: Negative for cough, shortness of breath and wheezing.    Hematologic/Lymphatic: Negative for adenopathy.   Skin: Negative for rash.   Musculoskeletal: Negative for joint pain and joint swelling.   Gastrointestinal: Negative for abdominal pain, diarrhea, nausea and vomiting.   Neurological: Negative for excessive daytime sleepiness, dizziness, focal weakness, light-headedness, numbness and weakness.   Psychiatric/Behavioral: Negative for depression. The patient does not have insomnia.            Current Outpatient Medications:   •  acetaminophen (TYLENOL) 500 MG tablet, Take 1,000 mg by mouth At Night As Needed for Mild Pain ., Disp: , Rfl:   •  aspirin 81 MG EC tablet, Take 1 tablet by mouth Daily., Disp: , Rfl:   •  Cholecalciferol (Vitamin D3) 1.25 MG (04907 UT) tablet, Take  by mouth., Disp: , Rfl:   •  cloNIDine (CATAPRES-TTS) 0.3 MG/24HR patch, Place 1 patch on the skin as directed by provider 1 (One) Time Per Week., Disp: , Rfl:   •  Coenzyme  Q10 (CO Q 10) 100 MG capsule, Take 1 tablet by mouth Daily., Disp: , Rfl:   •  glimepiride (AMARYL) 4 MG tablet, TAKE 1 TABLET BY MOUTH EVERY MORNING BEFORE BREAKFAST, Disp: 90 tablet, Rfl: 0  •  glucose monitor monitoring kit, 1 each Daily., Disp: 1 each, Rfl: 0  •  irbesartan (AVAPRO) 150 MG tablet, Take 1 tablet by mouth 2 (Two) Times a Day., Disp: 60 tablet, Rfl: 11  •  Lancets misc, 1 each Daily., Disp: 100 each, Rfl: 3  •  levothyroxine (SYNTHROID, LEVOTHROID) 50 MCG tablet, TAKE 1 TABLET BY MOUTH EVERY DAY , Disp: 90 tablet, Rfl: 3  •  Multiple Vitamins-Minerals (CENTRUM SILVER PO), Take 1 tablet by mouth Daily., Disp: , Rfl:   •  True Metrix Blood Glucose Test test strip, TEST TWICE DAILY AS DIRECTED, Disp: 100 each, Rfl: 3       Objective:      Vitals:    04/06/22 1109   BP: 120/72   Pulse: 64   SpO2: 97%     Vitals and nursing note reviewed.   Constitutional:       Appearance: Normal and healthy appearance. Well-developed and not in distress.   Eyes:      Extraocular Movements: Extraocular movements intact.      Pupils: Pupils are equal, round, and reactive to light.   HENT:      Head: Normocephalic and atraumatic.    Mouth/Throat:      Pharynx: Oropharynx is clear.   Neck:      Vascular: JVD normal.      Trachea: Trachea normal.   Pulmonary:      Effort: Pulmonary effort is normal.      Breath sounds: Normal breath sounds. No wheezing. No rhonchi. No rales.   Cardiovascular:      PMI at left midclavicular line. Normal rate. Regular rhythm. Normal S1. Normal S2.      Murmurs: There is a grade 2/6 systolic murmur.      No gallop. No click. No rub.   Pulses:     Dorsalis pedis: 2+ bilaterally.     Posterior tibial: 2+ bilaterally.  Abdominal:      General: Bowel sounds are normal.      Palpations: Abdomen is soft.      Tenderness: There is no abdominal tenderness.   Musculoskeletal: Normal range of motion.      Cervical back: Normal range of motion and neck supple. Skin:     General: Skin is warm and dry.       Capillary Refill: Capillary refill takes less than 2 seconds.   Feet:      Right foot:      Skin integrity: Skin integrity normal.      Left foot:      Skin integrity: Skin integrity normal.   Neurological:      Mental Status: Alert and oriented to person, place and time.      Cranial Nerves: Cranial nerves are intact.      Sensory: Sensation is intact.      Motor: Motor function is intact.      Coordination: Coordination is intact.   Psychiatric:         Speech: Speech normal.         Behavior: Behavior is cooperative.         Lab Review:       Procedures      Assessment/Plan:     Problem List Items Addressed This Visit        Cardiac and Vasculature    Mild tricuspid insufficiency    High cholesterol    Essential hypertension - Primary    Relevant Medications    cloNIDine (CATAPRES-TTS) 0.3 MG/24HR patch    Mixed hyperlipidemia with pervious statin intolerance    LBBB (left bundle branch block)    Overview     Added automatically from request for surgery [id number]              Endocrine and Metabolic    Type 2 diabetes mellitus, without long-term current use of insulin (HCC) (Chronic)            Recommendations/plans:    Patient's blood pressure is much better controlled on her current regimen consisting of clonidine 0.3 mg transdermal patch and Avapro 150 mg twice daily.  She has been on numerous other agents but has significant side effects to all of them.    Recommend she continue on this current regimen.  We will work on getting her set up with a PCSK9 inhibitor for her hyperlipidemia since she cannot tolerate statin therapy.    Mak Nickerson,   04/06/2022  14:08 CDT

## 2022-05-10 ENCOUNTER — TELEPHONE (OUTPATIENT)
Dept: CARDIOLOGY | Facility: CLINIC | Age: 87
End: 2022-05-10

## 2022-05-10 NOTE — TELEPHONE ENCOUNTER
PHARMACY CALLED THEY DO NOT HAVE PREFILLED INJECTIONS FOR REPATHA, WANTS TO MAKE SURE YOU ARE OK WITH GIVING HER SURE INJECTION PENS    PLEASE ADVISE

## 2022-07-07 ENCOUNTER — OFFICE VISIT (OUTPATIENT)
Dept: CARDIOLOGY | Facility: CLINIC | Age: 87
End: 2022-07-07

## 2022-07-07 VITALS
SYSTOLIC BLOOD PRESSURE: 160 MMHG | HEIGHT: 62 IN | WEIGHT: 150 LBS | HEART RATE: 64 BPM | OXYGEN SATURATION: 100 % | BODY MASS INDEX: 27.6 KG/M2 | DIASTOLIC BLOOD PRESSURE: 62 MMHG

## 2022-07-07 DIAGNOSIS — E11.21 TYPE 2 DIABETES MELLITUS WITH DIABETIC NEPHROPATHY, WITHOUT LONG-TERM CURRENT USE OF INSULIN: Chronic | ICD-10-CM

## 2022-07-07 DIAGNOSIS — E78.00 HIGH CHOLESTEROL: ICD-10-CM

## 2022-07-07 DIAGNOSIS — I50.32 CHRONIC DIASTOLIC CONGESTIVE HEART FAILURE: Primary | ICD-10-CM

## 2022-07-07 DIAGNOSIS — E03.9 ACQUIRED HYPOTHYROIDISM: ICD-10-CM

## 2022-07-07 DIAGNOSIS — I10 BENIGN ESSENTIAL HTN: ICD-10-CM

## 2022-07-07 DIAGNOSIS — I44.7 LBBB (LEFT BUNDLE BRANCH BLOCK): ICD-10-CM

## 2022-07-07 DIAGNOSIS — I10 ESSENTIAL HYPERTENSION: ICD-10-CM

## 2022-07-07 DIAGNOSIS — E78.2 MIXED HYPERLIPIDEMIA: ICD-10-CM

## 2022-07-07 PROCEDURE — 93000 ELECTROCARDIOGRAM COMPLETE: CPT | Performed by: EMERGENCY MEDICINE

## 2022-07-07 PROCEDURE — 99214 OFFICE O/P EST MOD 30 MIN: CPT | Performed by: EMERGENCY MEDICINE

## 2022-07-07 RX ORDER — SPIRONOLACTONE 25 MG/1
TABLET ORAL
COMMUNITY
Start: 2022-06-28 | End: 2022-10-10 | Stop reason: HOSPADM

## 2022-07-07 NOTE — PROGRESS NOTES
Subjective:     Encounter Date:07/07/2022      Patient ID: Nedra Tolliver is a 86 y.o. female.    Chief Complaint:  History of Present Illness    Patient is an 86-year-old female with a past medical history of hypertension, hyperlipidemia, left bundle branch block, diabetes, hypothyroidism and chronic kidney disease who presents to the cardiology clinic today for 3-month follow-up.  At her last appointment we started her on Repatha therapy.  Recent lipid panel shows her LDL has trended down from 215-108.  Patient is not having any side effects as of yet from the Repatha.  She denies any chest pain.  She does still have these shortness of breath, dizziness and lightheadedness.    For her blood pressure, she is on clonidine, irbesartan and Aldactone.  We have tried multiple other agents in the past and patient always has side effects that develop.  Her blood pressure is elevated 160/62 today.    She brings the insert from her clonidine and has highlighted a bunch of side effects that she believes may be secondary to this medication.    Review of Systems   Constitutional: Negative for diaphoresis, fever and malaise/fatigue.   HENT: Negative for congestion.    Eyes: Negative for vision loss in left eye and vision loss in right eye.   Cardiovascular: Negative for chest pain, claudication, dyspnea on exertion, irregular heartbeat, leg swelling, orthopnea, palpitations and syncope.   Respiratory: Positive for shortness of breath. Negative for cough and wheezing.    Hematologic/Lymphatic: Negative for adenopathy.   Skin: Negative for rash.   Musculoskeletal: Negative for joint pain and joint swelling.   Gastrointestinal: Negative for abdominal pain, diarrhea, nausea and vomiting.   Neurological: Positive for dizziness and light-headedness. Negative for excessive daytime sleepiness, focal weakness, numbness and weakness.   Psychiatric/Behavioral: Negative for depression. The patient does not have insomnia.             Current Outpatient Medications:   •  acetaminophen (TYLENOL) 500 MG tablet, Take 1,000 mg by mouth At Night As Needed for Mild Pain ., Disp: , Rfl:   •  aspirin 81 MG EC tablet, Take 1 tablet by mouth Daily., Disp: , Rfl:   •  Cholecalciferol (Vitamin D3) 1.25 MG (48585 UT) tablet, Take  by mouth., Disp: , Rfl:   •  cloNIDine (CATAPRES-TTS) 0.3 MG/24HR patch, Place 1 patch on the skin as directed by provider 1 (One) Time Per Week., Disp: , Rfl:   •  Coenzyme Q10 (CO Q 10) 100 MG capsule, Take 1 tablet by mouth Daily., Disp: , Rfl:   •  Evolocumab (REPATHA) solution prefilled syringe injection, Inject 1 mL under the skin into the appropriate area as directed Every 14 (Fourteen) Days., Disp: 1 mL, Rfl: 5  •  glimepiride (AMARYL) 4 MG tablet, TAKE 1 TABLET BY MOUTH EVERY MORNING BEFORE BREAKFAST (Patient taking differently: Take 4 mg by mouth Every Morning Before Breakfast. PT TAKES EXTRA 1/2 A PILL WHEN SUGAR IS RUNNING HIGH), Disp: 90 tablet, Rfl: 0  •  glucose monitor monitoring kit, 1 each Daily., Disp: 1 each, Rfl: 0  •  irbesartan (AVAPRO) 150 MG tablet, Take 1 tablet by mouth 2 (Two) Times a Day., Disp: 60 tablet, Rfl: 11  •  Lancets misc, 1 each Daily., Disp: 100 each, Rfl: 3  •  levothyroxine (SYNTHROID, LEVOTHROID) 50 MCG tablet, TAKE 1 TABLET BY MOUTH EVERY DAY , Disp: 90 tablet, Rfl: 3  •  Multiple Vitamins-Minerals (CENTRUM SILVER PO), Take 1 tablet by mouth Daily., Disp: , Rfl:   •  True Metrix Blood Glucose Test test strip, TEST TWICE DAILY AS DIRECTED, Disp: 100 each, Rfl: 3  •  spironolactone (ALDACTONE) 25 MG tablet, , Disp: , Rfl:        Objective:      Vitals:    07/07/22 1322   BP: 160/62   Pulse: 64   SpO2: 100%     Vitals and nursing note reviewed.   Constitutional:       Appearance: Normal and healthy appearance. Well-developed and not in distress.   Eyes:      Extraocular Movements: Extraocular movements intact.      Pupils: Pupils are equal, round, and reactive to light.   HENT:       Head: Normocephalic and atraumatic.    Mouth/Throat:      Pharynx: Oropharynx is clear.   Neck:      Vascular: JVD normal.      Trachea: Trachea normal.   Pulmonary:      Effort: Pulmonary effort is normal.      Breath sounds: Normal breath sounds. No wheezing. No rhonchi. No rales.   Cardiovascular:      PMI at left midclavicular line. Normal rate. Regular rhythm. Normal S1. Normal S2.      Murmurs: There is a grade 2/6 systolic murmur.      No gallop. No click. No rub.   Pulses:     Dorsalis pedis: 2+ bilaterally.     Posterior tibial: 2+ bilaterally.  Abdominal:      General: Bowel sounds are normal.      Palpations: Abdomen is soft.      Tenderness: There is no abdominal tenderness.   Musculoskeletal: Normal range of motion.      Cervical back: Normal range of motion and neck supple. Skin:     General: Skin is warm and dry.      Capillary Refill: Capillary refill takes less than 2 seconds.   Feet:      Right foot:      Skin integrity: Skin integrity normal.      Left foot:      Skin integrity: Skin integrity normal.   Neurological:      Mental Status: Alert and oriented to person, place and time.      Cranial Nerves: Cranial nerves are intact.      Sensory: Sensation is intact.      Motor: Motor function is intact.      Coordination: Coordination is intact.   Psychiatric:         Speech: Speech normal.         Behavior: Behavior is cooperative.         Lab Review:         ECG 12 Lead    Date/Time: 7/7/2022 3:27 PM  Performed by: Mak Nickerson DO  Authorized by: Mak Nickerson DO   Comparison: compared with previous ECG   Similar to previous ECG  Rhythm: sinus rhythm  Rate: normal  Conduction: conduction normal  ST Segments: ST segments normal  T Waves: T waves normal  QRS axis: normal  Other: no other findings    Clinical impression: normal ECG              Assessment/Plan:     Problem List Items Addressed This Visit        Cardiac and Vasculature    Benign essential HTN    Overview      cont BP medication the day of procedure           Relevant Medications    spironolactone (ALDACTONE) 25 MG tablet    High cholesterol    Essential hypertension    Relevant Medications    spironolactone (ALDACTONE) 25 MG tablet    Mixed hyperlipidemia with pervious statin intolerance    LBBB (left bundle branch block)    Overview     Added automatically from request for surgery [id number]              Endocrine and Metabolic    Type 2 diabetes mellitus, without long-term current use of insulin (HCC) (Chronic)    Hypothyroidism      Other Visit Diagnoses     Chronic diastolic congestive heart failure (HCC)    -  Primary    Relevant Orders    Adult Transthoracic Echo Complete W/ Cont if Necessary Per Protocol            Recommendations/plans:    I instructed the patient to continue on her current cardiac regimen including the aspirin 81, clonidine 0.3, Repatha, irbesartan 150 twice daily, Aldactone 25.  We will repeat a transthoracic echocardiogram since she is having worsening shortness of breath.    Follow-up with cardiology in 6 months or sooner if necessary    Mak Nickerson,   Interventional cardiology  [facility]  07/07/2022  15:30 CDT

## 2022-09-09 ENCOUNTER — HOSPITAL ENCOUNTER (OUTPATIENT)
Dept: CARDIOLOGY | Facility: HOSPITAL | Age: 87
Discharge: HOME | End: 2022-09-09
Admitting: EMERGENCY MEDICINE

## 2022-09-09 VITALS
HEIGHT: 62 IN | BODY MASS INDEX: 27.6 KG/M2 | SYSTOLIC BLOOD PRESSURE: 164 MMHG | WEIGHT: 150 LBS | DIASTOLIC BLOOD PRESSURE: 71 MMHG

## 2022-09-09 DIAGNOSIS — I50.32 CHRONIC DIASTOLIC CONGESTIVE HEART FAILURE: ICD-10-CM

## 2022-09-09 PROCEDURE — 93306 TTE W/DOPPLER COMPLETE: CPT | Performed by: EMERGENCY MEDICINE

## 2022-09-09 PROCEDURE — 93306 TTE W/DOPPLER COMPLETE: CPT

## 2022-09-10 LAB
BH CV ECHO MEAS - AO MAX PG: 9.2 MMHG
BH CV ECHO MEAS - AO MEAN PG: 5 MMHG
BH CV ECHO MEAS - AO ROOT DIAM: 3.7 CM
BH CV ECHO MEAS - AO V2 MAX: 152 CM/SEC
BH CV ECHO MEAS - AO V2 VTI: 42 CM
BH CV ECHO MEAS - AVA(I,D): 1.53 CM2
BH CV ECHO MEAS - EDV(CUBED): 154 ML
BH CV ECHO MEAS - EDV(MOD-SP4): 47.6 ML
BH CV ECHO MEAS - EF(MOD-SP4): 55.5 %
BH CV ECHO MEAS - ESV(CUBED): 24.1 ML
BH CV ECHO MEAS - ESV(MOD-SP4): 21.2 ML
BH CV ECHO MEAS - FS: 46.1 %
BH CV ECHO MEAS - IVS/LVPW: 1.26 CM
BH CV ECHO MEAS - IVSD: 1.13 CM
BH CV ECHO MEAS - LA DIMENSION: 3.4 CM
BH CV ECHO MEAS - LAT PEAK E' VEL: 3.6 CM/SEC
BH CV ECHO MEAS - LV DIASTOLIC VOL/BSA (35-75): 28.1 CM2
BH CV ECHO MEAS - LV MASS(C)D: 207.6 GRAMS
BH CV ECHO MEAS - LV MAX PG: 3 MMHG
BH CV ECHO MEAS - LV MEAN PG: 1 MMHG
BH CV ECHO MEAS - LV SYSTOLIC VOL/BSA (12-30): 12.5 CM2
BH CV ECHO MEAS - LV V1 MAX: 86.5 CM/SEC
BH CV ECHO MEAS - LV V1 VTI: 18.5 CM
BH CV ECHO MEAS - LVIDD: 5.4 CM
BH CV ECHO MEAS - LVIDS: 2.9 CM
BH CV ECHO MEAS - LVOT AREA: 3.5 CM2
BH CV ECHO MEAS - LVOT DIAM: 2.1 CM
BH CV ECHO MEAS - LVPWD: 0.9 CM
BH CV ECHO MEAS - MED PEAK E' VEL: 3.3 CM/SEC
BH CV ECHO MEAS - MV A MAX VEL: 103 CM/SEC
BH CV ECHO MEAS - MV DEC TIME: 0.35 MSEC
BH CV ECHO MEAS - MV E MAX VEL: 47.1 CM/SEC
BH CV ECHO MEAS - MV E/A: 0.46
BH CV ECHO MEAS - PA V2 MAX: 56.8 CM/SEC
BH CV ECHO MEAS - PI END-D VEL: 81.4 CM/SEC
BH CV ECHO MEAS - RAP SYSTOLE: 5 MMHG
BH CV ECHO MEAS - RVSP: 27.8 MMHG
BH CV ECHO MEAS - SI(MOD-SP4): 15.6 ML/M2
BH CV ECHO MEAS - SV(LVOT): 64.1 ML
BH CV ECHO MEAS - SV(MOD-SP4): 26.4 ML
BH CV ECHO MEAS - TR MAX PG: 22.8 MMHG
BH CV ECHO MEAS - TR MAX VEL: 239 CM/SEC
BH CV ECHO MEASUREMENTS AVERAGE E/E' RATIO: 13.65
LEFT ATRIUM VOLUME INDEX: 41.5 ML/M2
LEFT ATRIUM VOLUME: 70.2 ML
MAXIMAL PREDICTED HEART RATE: 134 BPM
STRESS TARGET HR: 114 BPM

## 2022-09-14 ENCOUNTER — TELEPHONE (OUTPATIENT)
Dept: CARDIOLOGY | Facility: CLINIC | Age: 87
End: 2022-09-14

## 2022-09-14 NOTE — TELEPHONE ENCOUNTER
I HAVE TRIED CALLING -[phone] NUMBER AND IT SAYS IT IS NOT A WORKING NUMBER, CELL NUMBER IS THE ONLY ONE I CAN GET TO GO THROUGH.      PLEASE ADVISE ON HAVING A HARD TIME  EATING AND DRINKING

## 2022-09-14 NOTE — TELEPHONE ENCOUNTER
Caller: Nedra Tolliver    Relationship: Self    Best call back number: [phone]    PATIENT IS REQUESTING THE RESULTS OF HER ECHO, STATES THAT SOMEONE HAS BEEN CALLING HER CELL PHONE AND SHE DOES NOT HAVE SERVICE AT HER HOUSE.   SHE ALSO ADVISES SHE IS HAVING A HARD TIME EATING AND DRINKING, THAT IT GETS STUCK IN HER CHEST AND DOESN'T WANT TO GO DOWN

## 2022-09-15 DIAGNOSIS — R13.19 ESOPHAGEAL DYSPHAGIA: Primary | ICD-10-CM

## 2022-09-20 ENCOUNTER — HOSPITAL ENCOUNTER (OUTPATIENT)
Dept: GENERAL RADIOLOGY | Facility: HOSPITAL | Age: 87
Discharge: HOME | End: 2022-09-20
Admitting: EMERGENCY MEDICINE

## 2022-09-20 DIAGNOSIS — R13.19 ESOPHAGEAL DYSPHAGIA: ICD-10-CM

## 2022-09-20 PROCEDURE — 74220 X-RAY XM ESOPHAGUS 1CNTRST: CPT

## 2022-09-20 PROCEDURE — 63710000001 BARIUM SULFATE 98 % RECONSTITUTED SUSPENSION: Performed by: EMERGENCY MEDICINE

## 2022-09-20 PROCEDURE — A9270 NON-COVERED ITEM OR SERVICE: HCPCS | Performed by: EMERGENCY MEDICINE

## 2022-09-20 PROCEDURE — 63710000001 SOD BICARB-CITRIC ACID-SIMETHICONE 2.21-1.53-0.04 G PACK: Performed by: EMERGENCY MEDICINE

## 2022-09-20 RX ADMIN — ANTACID/ANTIFLATULENT 1 PACKET: 380; 550; 10; 10 GRANULE, EFFERVESCENT ORAL at 09:51

## 2022-09-20 RX ADMIN — BARIUM SULFATE 120 ML: 980 POWDER, FOR SUSPENSION ORAL at 09:51

## 2022-09-26 RX ORDER — EVOLOCUMAB 140 MG/ML
INJECTION, SOLUTION SUBCUTANEOUS
Qty: 1 ML | Refills: 14 | Status: ON HOLD | OUTPATIENT
Start: 2022-09-26 | End: 2022-10-10

## 2022-09-26 NOTE — TELEPHONE ENCOUNTER
ED ISAR score of 3 reviewed. Pt was transferred to [facility] Neuro ICU for further evaluation and treatment.    Spoke with pt, she was just wanting to let us know that she is now on 5mg amlodipine as it was changed at visit on 12/08/2020.

## 2022-10-08 ENCOUNTER — HOSPITAL ENCOUNTER (INPATIENT)
Facility: HOSPITAL | Age: 87
LOS: 2 days | Discharge: HOME | End: 2022-10-10
Attending: EMERGENCY MEDICINE | Admitting: FAMILY MEDICINE

## 2022-10-08 ENCOUNTER — APPOINTMENT (OUTPATIENT)
Dept: GENERAL RADIOLOGY | Facility: HOSPITAL | Age: 87
End: 2022-10-08

## 2022-10-08 DIAGNOSIS — I48.19 PERSISTENT ATRIAL FIBRILLATION: Primary | ICD-10-CM

## 2022-10-08 LAB
ALBUMIN SERPL-MCNC: 4.2 G/DL (ref 3.5–5.2)
ALBUMIN/GLOB SERPL: 1.7 G/DL
ALP SERPL-CCNC: 88 U/L (ref 39–117)
ALT SERPL W P-5'-P-CCNC: 12 U/L (ref 1–33)
ANION GAP SERPL CALCULATED.3IONS-SCNC: 10 MMOL/L (ref 5–15)
AST SERPL-CCNC: 15 U/L (ref 1–32)
BASOPHILS # BLD AUTO: 0.04 10*3/MM3 (ref 0–0.2)
BASOPHILS NFR BLD AUTO: 0.6 % (ref 0–1.5)
BILIRUB SERPL-MCNC: 0.2 MG/DL (ref 0–1.2)
BUN SERPL-MCNC: 26 MG/DL (ref 8–23)
BUN/CREAT SERPL: 25.2 (ref 7–25)
CALCIUM SPEC-SCNC: 9.5 MG/DL (ref 8.6–10.5)
CHLORIDE SERPL-SCNC: 99 MMOL/L (ref 98–107)
CO2 SERPL-SCNC: 24 MMOL/L (ref 22–29)
CREAT SERPL-MCNC: 1.03 MG/DL (ref 0.57–1)
D DIMER PPP FEU-MCNC: 0.69 MCGFEU/ML (ref 0–0.5)
DEPRECATED RDW RBC AUTO: 42.6 FL (ref 37–54)
EGFRCR SERPLBLD CKD-EPI 2021: 53.1 ML/MIN/1.73
EOSINOPHIL # BLD AUTO: 0.21 10*3/MM3 (ref 0–0.4)
EOSINOPHIL NFR BLD AUTO: 3 % (ref 0.3–6.2)
ERYTHROCYTE [DISTWIDTH] IN BLOOD BY AUTOMATED COUNT: 12.4 % (ref 12.3–15.4)
GLOBULIN UR ELPH-MCNC: 2.5 GM/DL
GLUCOSE SERPL-MCNC: 325 MG/DL (ref 65–99)
HBA1C MFR BLD: 7.8 % (ref 4.8–5.6)
HCT VFR BLD AUTO: 37.3 % (ref 34–46.6)
HGB BLD-MCNC: 12.3 G/DL (ref 12–15.9)
HOLD SPECIMEN: NORMAL
HOLD SPECIMEN: NORMAL
IMM GRANULOCYTES # BLD AUTO: 0.01 10*3/MM3 (ref 0–0.05)
IMM GRANULOCYTES NFR BLD AUTO: 0.1 % (ref 0–0.5)
LYMPHOCYTES # BLD AUTO: 1.89 10*3/MM3 (ref 0.7–3.1)
LYMPHOCYTES NFR BLD AUTO: 26.8 % (ref 19.6–45.3)
MAGNESIUM SERPL-MCNC: 2 MG/DL (ref 1.6–2.4)
MCH RBC QN AUTO: 30.8 PG (ref 26.6–33)
MCHC RBC AUTO-ENTMCNC: 33 G/DL (ref 31.5–35.7)
MCV RBC AUTO: 93.3 FL (ref 79–97)
MONOCYTES # BLD AUTO: 0.7 10*3/MM3 (ref 0.1–0.9)
MONOCYTES NFR BLD AUTO: 9.9 % (ref 5–12)
NEUTROPHILS NFR BLD AUTO: 4.21 10*3/MM3 (ref 1.7–7)
NEUTROPHILS NFR BLD AUTO: 59.6 % (ref 42.7–76)
NRBC BLD AUTO-RTO: 0 /100 WBC (ref 0–0.2)
NT-PROBNP SERPL-MCNC: 1086 PG/ML (ref 0–1800)
PLATELET # BLD AUTO: 252 10*3/MM3 (ref 140–450)
PMV BLD AUTO: 10.1 FL (ref 6–12)
POTASSIUM SERPL-SCNC: 4.1 MMOL/L (ref 3.5–5.2)
PROT SERPL-MCNC: 6.7 G/DL (ref 6–8.5)
RBC # BLD AUTO: 4 10*6/MM3 (ref 3.77–5.28)
SODIUM SERPL-SCNC: 133 MMOL/L (ref 136–145)
T4 FREE SERPL-MCNC: 1.12 NG/DL (ref 0.93–1.7)
TROPONIN T SERPL-MCNC: <0.01 NG/ML (ref 0–0.03)
TSH SERPL DL<=0.05 MIU/L-ACNC: 0.6 UIU/ML (ref 0.27–4.2)
WBC NRBC COR # BLD: 7.06 10*3/MM3 (ref 3.4–10.8)
WHOLE BLOOD HOLD COAG: NORMAL
WHOLE BLOOD HOLD SPECIMEN: NORMAL

## 2022-10-08 PROCEDURE — 85025 COMPLETE CBC W/AUTO DIFF WBC: CPT | Performed by: EMERGENCY MEDICINE

## 2022-10-08 PROCEDURE — 80053 COMPREHEN METABOLIC PANEL: CPT | Performed by: EMERGENCY MEDICINE

## 2022-10-08 PROCEDURE — 93010 ELECTROCARDIOGRAM REPORT: CPT | Performed by: EMERGENCY MEDICINE

## 2022-10-08 PROCEDURE — 99284 EMERGENCY DEPT VISIT MOD MDM: CPT

## 2022-10-08 PROCEDURE — 85379 FIBRIN DEGRADATION QUANT: CPT | Performed by: EMERGENCY MEDICINE

## 2022-10-08 PROCEDURE — 84484 ASSAY OF TROPONIN QUANT: CPT | Performed by: EMERGENCY MEDICINE

## 2022-10-08 PROCEDURE — 84443 ASSAY THYROID STIM HORMONE: CPT | Performed by: EMERGENCY MEDICINE

## 2022-10-08 PROCEDURE — 71045 X-RAY EXAM CHEST 1 VIEW: CPT

## 2022-10-08 PROCEDURE — 83735 ASSAY OF MAGNESIUM: CPT | Performed by: EMERGENCY MEDICINE

## 2022-10-08 PROCEDURE — 25010000002 ENOXAPARIN PER 10 MG: Performed by: EMERGENCY MEDICINE

## 2022-10-08 PROCEDURE — 84439 ASSAY OF FREE THYROXINE: CPT | Performed by: EMERGENCY MEDICINE

## 2022-10-08 PROCEDURE — 83880 ASSAY OF NATRIURETIC PEPTIDE: CPT | Performed by: EMERGENCY MEDICINE

## 2022-10-08 PROCEDURE — 93005 ELECTROCARDIOGRAM TRACING: CPT

## 2022-10-08 PROCEDURE — 83036 HEMOGLOBIN GLYCOSYLATED A1C: CPT | Performed by: FAMILY MEDICINE

## 2022-10-08 RX ORDER — SODIUM CHLORIDE 0.9 % (FLUSH) 0.9 %
10 SYRINGE (ML) INJECTION AS NEEDED
Status: DISCONTINUED | OUTPATIENT
Start: 2022-10-08 | End: 2022-10-10 | Stop reason: HOSPADM

## 2022-10-08 RX ORDER — NIFEDIPINE 90 MG/1
90 TABLET, EXTENDED RELEASE ORAL DAILY
COMMUNITY
End: 2022-10-10 | Stop reason: HOSPADM

## 2022-10-08 RX ORDER — NICOTINE POLACRILEX 4 MG
15 LOZENGE BUCCAL
Status: DISCONTINUED | OUTPATIENT
Start: 2022-10-08 | End: 2022-10-10 | Stop reason: HOSPADM

## 2022-10-08 RX ORDER — INSULIN LISPRO 100 [IU]/ML
0-9 INJECTION, SOLUTION INTRAVENOUS; SUBCUTANEOUS
Status: DISCONTINUED | OUTPATIENT
Start: 2022-10-09 | End: 2022-10-10 | Stop reason: HOSPADM

## 2022-10-08 RX ORDER — CLONIDINE 0.2 MG/24H
1 PATCH, EXTENDED RELEASE TRANSDERMAL WEEKLY
COMMUNITY
End: 2022-11-10

## 2022-10-08 RX ORDER — SODIUM CHLORIDE 0.9 % (FLUSH) 0.9 %
10 SYRINGE (ML) INJECTION EVERY 12 HOURS SCHEDULED
Status: DISCONTINUED | OUTPATIENT
Start: 2022-10-08 | End: 2022-10-10 | Stop reason: HOSPADM

## 2022-10-08 RX ORDER — POLYETHYLENE GLYCOL 3350 17 G/17G
17 POWDER, FOR SOLUTION ORAL DAILY PRN
Status: DISCONTINUED | OUTPATIENT
Start: 2022-10-08 | End: 2022-10-10 | Stop reason: HOSPADM

## 2022-10-08 RX ORDER — LEVOTHYROXINE SODIUM 0.05 MG/1
50 TABLET ORAL
Status: DISCONTINUED | OUTPATIENT
Start: 2022-10-09 | End: 2022-10-10 | Stop reason: HOSPADM

## 2022-10-08 RX ORDER — BISACODYL 5 MG/1
5 TABLET, DELAYED RELEASE ORAL DAILY PRN
Status: DISCONTINUED | OUTPATIENT
Start: 2022-10-08 | End: 2022-10-10 | Stop reason: HOSPADM

## 2022-10-08 RX ORDER — AMOXICILLIN 250 MG
2 CAPSULE ORAL 2 TIMES DAILY
Status: DISCONTINUED | OUTPATIENT
Start: 2022-10-08 | End: 2022-10-10 | Stop reason: HOSPADM

## 2022-10-08 RX ORDER — DEXTROSE MONOHYDRATE 25 G/50ML
25 INJECTION, SOLUTION INTRAVENOUS
Status: DISCONTINUED | OUTPATIENT
Start: 2022-10-08 | End: 2022-10-10 | Stop reason: HOSPADM

## 2022-10-08 RX ORDER — ENOXAPARIN SODIUM 100 MG/ML
1 INJECTION SUBCUTANEOUS ONCE
Status: COMPLETED | OUTPATIENT
Start: 2022-10-08 | End: 2022-10-08

## 2022-10-08 RX ORDER — ASPIRIN 81 MG/1
81 TABLET ORAL DAILY
Status: DISCONTINUED | OUTPATIENT
Start: 2022-10-09 | End: 2022-10-10 | Stop reason: HOSPADM

## 2022-10-08 RX ORDER — ENOXAPARIN SODIUM 100 MG/ML
1 INJECTION SUBCUTANEOUS EVERY 12 HOURS
Status: DISCONTINUED | OUTPATIENT
Start: 2022-10-09 | End: 2022-10-10 | Stop reason: HOSPADM

## 2022-10-08 RX ORDER — ACETAMINOPHEN 500 MG
1000 TABLET ORAL NIGHTLY PRN
Status: DISCONTINUED | OUTPATIENT
Start: 2022-10-08 | End: 2022-10-10 | Stop reason: HOSPADM

## 2022-10-08 RX ORDER — LOSARTAN POTASSIUM 50 MG/1
50 TABLET ORAL 2 TIMES DAILY
Status: DISCONTINUED | OUTPATIENT
Start: 2022-10-08 | End: 2022-10-10 | Stop reason: HOSPADM

## 2022-10-08 RX ORDER — CLONIDINE 0.2 MG/24H
1 PATCH, EXTENDED RELEASE TRANSDERMAL WEEKLY
Status: DISCONTINUED | OUTPATIENT
Start: 2022-10-15 | End: 2022-10-10 | Stop reason: HOSPADM

## 2022-10-08 RX ORDER — BISACODYL 10 MG
10 SUPPOSITORY, RECTAL RECTAL DAILY PRN
Status: DISCONTINUED | OUTPATIENT
Start: 2022-10-08 | End: 2022-10-10 | Stop reason: HOSPADM

## 2022-10-08 RX ADMIN — Medication 10 ML: at 21:34

## 2022-10-08 RX ADMIN — ENOXAPARIN SODIUM 70 MG: 100 INJECTION SUBCUTANEOUS at 19:10

## 2022-10-08 RX ADMIN — LOSARTAN POTASSIUM 50 MG: 50 TABLET, FILM COATED ORAL at 21:34

## 2022-10-08 NOTE — ED PROVIDER NOTES
Subjective   History of Present Illness  Patient says she had recent changes in her medication.  She has had reactions to the clonidine patch so her doctor was putting on a lower patch to gradually wean her off and then he added a new medication that she says is the milligrams and is some type of calcium channel blocker.  She thinks it was Procardia but is not really certain.  She says today she was reading a book sitting in her chair but says she just felt kind of lightheaded and dizzy.  She checked her blood pressure and it was not too bad but she noticed the heart rate to be in the 90s.  She then checked with a pulse ox got a heart rate in the 120s.  She does have history of atrial fibrillation in the past.  She called EMS and EMS felt like she was in atrial fibrillation with RVR and brought her here for further evaluation.    History provided by:  Patient   used: No    Palpitations  Palpitations quality:  Fast  Onset quality:  Sudden  Duration:  1 hour  Timing:  Constant  Progression:  Partially resolved  Chronicity:  New  Context: not anxiety, not appetite suppressants, not blood loss, not dehydration, not hyperventilation, not illicit drugs, not nicotine and not stimulant use    Relieved by:  Nothing  Worsened by:  Nothing  Ineffective treatments:  None tried  Associated symptoms: weakness    Associated symptoms: no back pain, no chest pressure, no diaphoresis, no dizziness, no hemoptysis, no leg pain, no lower extremity edema, no malaise/fatigue, no nausea, no near-syncope, no orthopnea, no PND, no shortness of breath and no syncope    Risk factors: diabetes mellitus, hx of atrial fibrillation and hx of thyroid disease    Risk factors: no hyperthyroidism and no stress        Review of Systems   Constitutional: Negative for diaphoresis and malaise/fatigue.   HENT: Negative.    Respiratory: Negative.  Negative for hemoptysis and shortness of breath.    Cardiovascular: Positive for  palpitations. Negative for orthopnea, syncope, PND and near-syncope.   Gastrointestinal: Negative.  Negative for nausea.   Genitourinary: Negative.    Musculoskeletal: Negative.  Negative for back pain.   Neurological: Positive for weakness. Negative for dizziness.   Psychiatric/Behavioral: Negative.    All other systems reviewed and are negative.      Past Medical History:   Diagnosis Date   • Arthritis    • Cancer (HCC)     BCC @ nose & Left arm   • Diabetes type 2, controlled (HCC)    • Diverticulitis    • History of GI bleed    • History of transfusion     Has had x 5 units.   • Hypertension     STAGE 3    • Hyponatremia     Required hospitalization   • Hypothyroidism    • Knee arthropathy 09/30/2020   • LPRD (laryngopharyngeal reflux disease)    • Pharyngeal dysphagia    • Thyroid nodule        Allergies   Allergen Reactions   • Codeine Hallucinations   • Iodine Anaphylaxis     Xray dye   • Levaquin [Levofloxacin] Anaphylaxis   • Lortab [Hydrocodone-Acetaminophen] Mental Status Change and Confusion   • Quinolones Anaphylaxis     - CIPRO -    • Shrimp (Diagnostic) Anaphylaxis   • Bactroban [Mupirocin Calcium] Swelling   • Ace Inhibitors Cough   • Norvasc [Amlodipine] Swelling and Headache   • Piperacillin Sod-Tazobactam So Unknown - High Severity   • Statins Myalgia   • Thiazide-Type Diuretics Unknown - High Severity   • Mupirocin Rash       Past Surgical History:   Procedure Laterality Date   • APPENDECTOMY     • BASAL CELL CARCINOMA EXCISION     • BELPHAROPTOSIS REPAIR     • BREAST CYST EXCISION     • CARDIAC CATHETERIZATION     • CARDIAC CATHETERIZATION Right 6/24/2021    Procedure: Left Heart Cath R radial, US guided w poss intervention;  Surgeon: Mak Nickerson DO;  Location:  [facility] CATH [facility]ASIVE [facility];  Service: Cardiovascular;  Laterality: Right;   • CARDIAC CATHETERIZATION     • CATARACT EXTRACTION     • CATARACT EXTRACTION WITH INTRAOCULAR LENS IMPLANT Bilateral    • CHOLECYSTECTOMY     •  COLONOSCOPY Left 11/1/2016    Tubular adenoma cecum, Diverticulosis repeat prn   • SUBTOTAL HYSTERECTOMY     • TOTAL KNEE ARTHROPLASTY Right 9/30/2020    Procedure: TOTAL KNEE ARTHROPLASTY;  Surgeon: Mak Pickens MD;  Location: [facility] OR;  Service: Orthopedics;  Laterality: Right;   • TUMOR EXCISION      under armpits and left breast       Family History   Problem Relation Age of Onset   • Diabetes Mother    • Cancer Mother    • Heart failure Mother    • Diabetes Father    • Colon cancer Neg Hx    • Colon polyps Neg Hx        Social History     Socioeconomic History   • Marital status:    Tobacco Use   • Smoking status: Never   • Smokeless tobacco: Never   Vaping Use   • Vaping Use: Never used   Substance and Sexual Activity   • Alcohol use: No   • Drug use: Never   • Sexual activity: Not Currently     Partners: Male       Prior to Admission medications    Medication Sig Start Date End Date Taking? Authorizing Provider   acetaminophen (TYLENOL) 500 MG tablet Take 1,000 mg by mouth At Night As Needed for Mild Pain .    ProviderElma MD   aspirin 81 MG EC tablet Take 1 tablet by mouth Daily. 1/2/20   Beverly Paz APRN   Cholecalciferol (Vitamin D3) 1.25 MG (69243 UT) tablet Take  by mouth.    ProviderElma MD   cloNIDine (CATAPRES-TTS) 0.3 MG/24HR patch Place 1 patch on the skin as directed by provider 1 (One) Time Per Week.    ProviderElma MD   Coenzyme Q10 (CO Q 10) 100 MG capsule Take 1 tablet by mouth Daily.    ProviderElma MD   glimepiride (AMARYL) 4 MG tablet TAKE 1 TABLET BY MOUTH EVERY MORNING BEFORE BREAKFAST  Patient taking differently: Take 4 mg by mouth Every Morning Before Breakfast. PT TAKES EXTRA 1/2 A PILL WHEN SUGAR IS RUNNING HIGH 2/1/22   Vijay Rodrigues MD   glucose monitor monitoring kit 1 each Daily. 9/23/20   Vijay Rodrigues MD   irbesartan (AVAPRO) 150 MG tablet Take 1 tablet by mouth 2 (Two) Times a Day. 11/11/21   Vijay Rodrigues MD    Lancets misc 1 each Daily. 9/23/20   Vijay Rodrigues MD   levothyroxine (SYNTHROID, LEVOTHROID) 50 MCG tablet TAKE 1 TABLET BY MOUTH EVERY DAY  6/30/21   Jossy Maciel APRN   Multiple Vitamins-Minerals (CENTRUM SILVER PO) Take 1 tablet by mouth Daily.    ProviderElma MD Repatha SureClick solution auto-injector SureClick injection INJECT 1 ML UNDER THE SKIN INTO THE APPROPRIATE AREA AS DIRECTED EVERY 14 (FOURTEEN) DAYS. 9/26/22   Mak Nickerson DO   spironolactone (ALDACTONE) 25 MG tablet  6/28/22   ProviderElma MD   True Metrix Blood Glucose Test test strip TEST TWICE DAILY AS DIRECTED 12/13/21   Norma Coreas APRN       Medications   sodium chloride 0.9 % flush 10 mL (has no administration in time range)   Enoxaparin Sodium (LOVENOX) syringe 70 mg (has no administration in time range)   Enoxaparin Sodium (LOVENOX) syringe 70 mg (has no administration in time range)       Vitals:    10/08/22 1622   BP: 174/69   Pulse: 77   Resp: 17   Temp: 97.9 °F (36.6 °C)   SpO2: 100%         Objective   Physical Exam  Vitals and nursing note reviewed.   Constitutional:       Appearance: Normal appearance.   HENT:      Head: Normocephalic and atraumatic.      Mouth/Throat:      Mouth: Mucous membranes are moist.   Eyes:      Extraocular Movements: Extraocular movements intact.      Pupils: Pupils are equal, round, and reactive to light.   Cardiovascular:      Rate and Rhythm: Normal rate. Rhythm irregular.   Pulmonary:      Effort: Pulmonary effort is normal.      Breath sounds: Normal breath sounds.   Abdominal:      General: Abdomen is flat.      Palpations: Abdomen is soft.   Musculoskeletal:         General: Normal range of motion.      Cervical back: Normal range of motion and neck supple.   Skin:     General: Skin is warm and dry.   Neurological:      General: No focal deficit present.      Mental Status: She is alert and oriented to person, place, and time.   Psychiatric:          Mood and Affect: Mood normal.         Behavior: Behavior normal.         Procedures         Lab Results (last 24 hours)     Procedure Component Value Units Date/Time    CBC & Differential [[id number]]  (Normal) Collected: 10/08/22 1632    Specimen: Blood Updated: 10/08/22 1701    Narrative:      The following orders were created for panel order CBC & Differential.  Procedure                               Abnormality         Status                     ---------                               -----------         ------                     CBC Auto Differential[[id number]]        Normal              Final result                 Please view results for these tests on the individual orders.    D-dimer, Quantitative [[id number]]  (Abnormal) Collected: 10/08/22 1632    Specimen: Blood Updated: 10/08/22 1709     D-Dimer, Quantitative 0.69 MCGFEU/mL     Narrative:      Reference Range is 0-0.50 MCGFEU/mL. However, results <0.50 MCGFEU/mL tends to rule out DVT or PE. Results >0.50 MCGFEU/mL are not useful in predicting absence or presence of DVT or PE.      CBC Auto Differential [[id number]]  (Normal) Collected: 10/08/22 1632    Specimen: Blood Updated: 10/08/22 1701     WBC 7.06 10*3/mm3      RBC 4.00 10*6/mm3      Hemoglobin 12.3 g/dL      Hematocrit 37.3 %      MCV 93.3 fL      MCH 30.8 pg      MCHC 33.0 g/dL      RDW 12.4 %      RDW-SD 42.6 fl      MPV 10.1 fL      Platelets 252 10*3/mm3      Neutrophil % 59.6 %      Lymphocyte % 26.8 %      Monocyte % 9.9 %      Eosinophil % 3.0 %      Basophil % 0.6 %      Immature Grans % 0.1 %      Neutrophils, Absolute 4.21 10*3/mm3      Lymphocytes, Absolute 1.89 10*3/mm3      Monocytes, Absolute 0.70 10*3/mm3      Eosinophils, Absolute 0.21 10*3/mm3      Basophils, Absolute 0.04 10*3/mm3      Immature Grans, Absolute 0.01 10*3/mm3      nRBC 0.0 /100 WBC     Comprehensive Metabolic Panel [[id number]]  (Abnormal) Collected: 10/08/22 1714    Specimen: Blood Updated: 10/08/22 4545      Glucose 325 mg/dL      BUN 26 mg/dL      Creatinine 1.03 mg/dL      Sodium 133 mmol/L      Potassium 4.1 mmol/L      Comment: Slight hemolysis detected by analyzer. Results may be affected.        Chloride 99 mmol/L      CO2 24.0 mmol/L      Calcium 9.5 mg/dL      Total Protein 6.7 g/dL      Albumin 4.20 g/dL      ALT (SGPT) 12 U/L      AST (SGOT) 15 U/L      Alkaline Phosphatase 88 U/L      Total Bilirubin 0.2 mg/dL      Globulin 2.5 gm/dL      A/G Ratio 1.7 g/dL      BUN/Creatinine Ratio 25.2     Anion Gap 10.0 mmol/L      eGFR 53.1 mL/min/1.73      Comment: National Kidney Foundation and American Society of Nephrology (ASN) Task Force recommended calculation based on the Chronic Kidney Disease Epidemiology Collaboration (CKD-EPI) equation refit without adjustment for race.       Narrative:      GFR Normal >60  Chronic Kidney Disease <60  Kidney Failure <15      BNP [[id number]]  (Normal) Collected: 10/08/22 1714    Specimen: Blood Updated: 10/08/22 1743     proBNP 1,086.0 pg/mL     Narrative:      Among patients with dyspnea, NT-proBNP is highly sensitive for the detection of acute congestive heart failure. In addition NT-proBNP of <300 pg/ml effectively rules out acute congestive heart failure with 99% negative predictive value.    Results may be falsely decreased if patient taking Biotin.      Troponin [[id number]]  (Normal) Collected: 10/08/22 1714    Specimen: Blood Updated: 10/08/22 1744     Troponin T <0.010 ng/mL     Narrative:      Troponin T Reference Range:  <= 0.03 ng/mL-   Negative for AMI  >0.03 ng/mL-     Abnormal for myocardial necrosis.  Clinicians would have to utilize clinical acumen, EKG, Troponin and serial changes to determine if it is an Acute Myocardial Infarction or myocardial injury due to an underlying chronic condition.       Results may be falsely decreased if patient taking Biotin.      Magnesium [[id number]]  (Normal) Collected: 10/08/22 1714    Specimen: Blood Updated: 10/08/22 1742      Magnesium 2.0 mg/dL     TSH [[id number]]  (Normal) Collected: 10/08/22 1714    Specimen: Blood Updated: 10/08/22 1754     TSH 0.597 uIU/mL     T4, Free [[id number]]  (Normal) Collected: 10/08/22 1714    Specimen: Blood Updated: 10/08/22 1751     Free T4 1.12 ng/dL     Narrative:      Results may be falsely increased if patient taking Biotin.            XR Chest 1 View   Final Result       No acute findings.   This report was finalized on 10/08/2022 17:24 by Dr. Keith Elias MD.          ED Course  ED Course as of 10/08/22 1830   Sat Oct 08, 2022   1827 Talk with the patient at length.  She insist that she is not normally in atrial fib though she has had it in the past.  She continues to be in fib here but it is rate controlled at the present time.  She is feeling better now but is still concerned because she has occasional pains in her chest also.  I have given her a shot of Lovenox here.  I will admit for further stabilization.  I have spoken with Dr. Mcclain who is on-call for her Dr. North [TR]      ED Course User Index  [TR] Melvin Keller Jr., MD          MDM  Number of Diagnoses or Management Options  Persistent atrial fibrillation (HCC): new and requires workup     Amount and/or Complexity of Data Reviewed  Clinical lab tests: ordered and reviewed  Tests in the radiology section of CPT®: ordered and reviewed  Tests in the medicine section of CPT®: ordered and reviewed  Decide to obtain previous medical records or to obtain history from someone other than the patient: yes  Discuss the patient with other providers: yes    Risk of Complications, Morbidity, and/or Mortality  Presenting problems: moderate  Diagnostic procedures: moderate  Management options: moderate    Patient Progress  Patient progress: stable      Final diagnoses:   Persistent atrial fibrillation (HCC)          Melvin Keller Jr., MD  10/08/22 1830

## 2022-10-09 PROBLEM — I48.91 NEW ONSET ATRIAL FIBRILLATION: Status: RESOLVED | Noted: 2021-05-09 | Resolved: 2022-10-09

## 2022-10-09 LAB
ALBUMIN SERPL-MCNC: 4.4 G/DL (ref 3.5–5.2)
ALBUMIN/GLOB SERPL: 1.7 G/DL
ALP SERPL-CCNC: 86 U/L (ref 39–117)
ALT SERPL W P-5'-P-CCNC: 11 U/L (ref 1–33)
ANION GAP SERPL CALCULATED.3IONS-SCNC: 10 MMOL/L (ref 5–15)
AST SERPL-CCNC: 14 U/L (ref 1–32)
BASOPHILS # BLD AUTO: 0.05 10*3/MM3 (ref 0–0.2)
BASOPHILS NFR BLD AUTO: 0.7 % (ref 0–1.5)
BILIRUB SERPL-MCNC: 0.4 MG/DL (ref 0–1.2)
BUN SERPL-MCNC: 20 MG/DL (ref 8–23)
BUN/CREAT SERPL: 22.7 (ref 7–25)
CALCIUM SPEC-SCNC: 9.9 MG/DL (ref 8.6–10.5)
CHLORIDE SERPL-SCNC: 101 MMOL/L (ref 98–107)
CO2 SERPL-SCNC: 27 MMOL/L (ref 22–29)
CREAT SERPL-MCNC: 0.88 MG/DL (ref 0.57–1)
DEPRECATED RDW RBC AUTO: 43.1 FL (ref 37–54)
EGFRCR SERPLBLD CKD-EPI 2021: 64.1 ML/MIN/1.73
EOSINOPHIL # BLD AUTO: 0.28 10*3/MM3 (ref 0–0.4)
EOSINOPHIL NFR BLD AUTO: 3.7 % (ref 0.3–6.2)
ERYTHROCYTE [DISTWIDTH] IN BLOOD BY AUTOMATED COUNT: 12.6 % (ref 12.3–15.4)
GLOBULIN UR ELPH-MCNC: 2.6 GM/DL
GLUCOSE BLDC GLUCOMTR-MCNC: 141 MG/DL (ref 70–130)
GLUCOSE BLDC GLUCOMTR-MCNC: 145 MG/DL (ref 70–130)
GLUCOSE BLDC GLUCOMTR-MCNC: 147 MG/DL (ref 70–130)
GLUCOSE BLDC GLUCOMTR-MCNC: 210 MG/DL (ref 70–130)
GLUCOSE BLDC GLUCOMTR-MCNC: 235 MG/DL (ref 70–130)
GLUCOSE SERPL-MCNC: 145 MG/DL (ref 65–99)
HCT VFR BLD AUTO: 39.4 % (ref 34–46.6)
HGB BLD-MCNC: 13.2 G/DL (ref 12–15.9)
IMM GRANULOCYTES # BLD AUTO: 0.03 10*3/MM3 (ref 0–0.05)
IMM GRANULOCYTES NFR BLD AUTO: 0.4 % (ref 0–0.5)
LYMPHOCYTES # BLD AUTO: 2.61 10*3/MM3 (ref 0.7–3.1)
LYMPHOCYTES NFR BLD AUTO: 34.9 % (ref 19.6–45.3)
MCH RBC QN AUTO: 31.2 PG (ref 26.6–33)
MCHC RBC AUTO-ENTMCNC: 33.5 G/DL (ref 31.5–35.7)
MCV RBC AUTO: 93.1 FL (ref 79–97)
MONOCYTES # BLD AUTO: 0.89 10*3/MM3 (ref 0.1–0.9)
MONOCYTES NFR BLD AUTO: 11.9 % (ref 5–12)
NEUTROPHILS NFR BLD AUTO: 3.61 10*3/MM3 (ref 1.7–7)
NEUTROPHILS NFR BLD AUTO: 48.4 % (ref 42.7–76)
NRBC BLD AUTO-RTO: 0 /100 WBC (ref 0–0.2)
PLATELET # BLD AUTO: 264 10*3/MM3 (ref 140–450)
PMV BLD AUTO: 10.5 FL (ref 6–12)
POTASSIUM SERPL-SCNC: 4.3 MMOL/L (ref 3.5–5.2)
PROT SERPL-MCNC: 7 G/DL (ref 6–8.5)
QT INTERVAL: 388 MS
QTC INTERVAL: 450 MS
RBC # BLD AUTO: 4.23 10*6/MM3 (ref 3.77–5.28)
SODIUM SERPL-SCNC: 138 MMOL/L (ref 136–145)
TROPONIN T SERPL-MCNC: <0.01 NG/ML (ref 0–0.03)
WBC NRBC COR # BLD: 7.47 10*3/MM3 (ref 3.4–10.8)

## 2022-10-09 PROCEDURE — 25010000002 ENOXAPARIN PER 10 MG: Performed by: FAMILY MEDICINE

## 2022-10-09 PROCEDURE — 63710000001 INSULIN LISPRO (HUMAN) PER 5 UNITS: Performed by: FAMILY MEDICINE

## 2022-10-09 PROCEDURE — 99222 1ST HOSP IP/OBS MODERATE 55: CPT | Performed by: NURSE PRACTITIONER

## 2022-10-09 PROCEDURE — 82962 GLUCOSE BLOOD TEST: CPT

## 2022-10-09 PROCEDURE — 84484 ASSAY OF TROPONIN QUANT: CPT | Performed by: NURSE PRACTITIONER

## 2022-10-09 PROCEDURE — 85025 COMPLETE CBC W/AUTO DIFF WBC: CPT | Performed by: FAMILY MEDICINE

## 2022-10-09 PROCEDURE — 80053 COMPREHEN METABOLIC PANEL: CPT | Performed by: FAMILY MEDICINE

## 2022-10-09 RX ADMIN — INSULIN LISPRO 4 UNITS: 100 INJECTION, SOLUTION INTRAVENOUS; SUBCUTANEOUS at 12:08

## 2022-10-09 RX ADMIN — ENOXAPARIN SODIUM 70 MG: 100 INJECTION SUBCUTANEOUS at 20:01

## 2022-10-09 RX ADMIN — LOSARTAN POTASSIUM 50 MG: 50 TABLET, FILM COATED ORAL at 09:37

## 2022-10-09 RX ADMIN — LEVOTHYROXINE SODIUM 50 MCG: 50 TABLET ORAL at 09:39

## 2022-10-09 RX ADMIN — LOSARTAN POTASSIUM 50 MG: 50 TABLET, FILM COATED ORAL at 20:01

## 2022-10-09 RX ADMIN — Medication 10 ML: at 20:01

## 2022-10-09 RX ADMIN — METOPROLOL TARTRATE 25 MG: 25 TABLET, FILM COATED ORAL at 20:01

## 2022-10-09 RX ADMIN — ENOXAPARIN SODIUM 70 MG: 100 INJECTION SUBCUTANEOUS at 07:00

## 2022-10-09 RX ADMIN — METOPROLOL TARTRATE 25 MG: 25 TABLET, FILM COATED ORAL at 12:07

## 2022-10-09 RX ADMIN — ASPIRIN 81 MG: 81 TABLET, COATED ORAL at 09:37

## 2022-10-09 NOTE — CONSULTS
[facility] HEART GROUP CONSULT NOTE    Referring Provider: Dr. David North    Reason for Consultation: Atrial Fibrillation, RVR    Chief complaint:   Chief Complaint   Patient presents with   • Rapid Heart Rate       History of present illness:   Ms. Nedra Tolliver is an 86 y.o. female with history of HTN, DM, HLD, CKD, and hypothyroidism who developed palpitations, described as her heart pounding out of her chest, with associated dizziness and lightheadedness approximately 2-3 hours after taking her Procardia. She called EMS and was [facility]. EMS documented atrial fibrillation with RVR.     Work-up in the ER, she was noted to have mild JOSE ALEJANDRO on CKD. Thyroid panel was normal. CBC was unremarkable. Her D-dimer was slightly elevated. CXR without acute findings. EKG demonstrated atrial fibrillation with controlled rate.     Patient admitted to Dr. North with cardiology consultation.       History  Past Medical History:   Diagnosis Date   • Arthritis    • Cancer (HCC)     BCC @ nose & Left arm   • Diabetes type 2, controlled (HCC)    • Diverticulitis    • History of GI bleed    • History of transfusion     Has had x 5 units.   • Hypertension     STAGE 3    • Hyponatremia     Required hospitalization   • Hypothyroidism    • Knee arthropathy 09/30/2020   • LPRD (laryngopharyngeal reflux disease)    • Pharyngeal dysphagia    • Thyroid nodule    ,   Past Surgical History:   Procedure Laterality Date   • APPENDECTOMY     • BASAL CELL CARCINOMA EXCISION     • BELPHAROPTOSIS REPAIR     • BREAST CYST EXCISION     • CARDIAC CATHETERIZATION     • CARDIAC CATHETERIZATION Right 6/24/2021    Procedure: Left Heart Cath R radial, US guided w poss intervention;  Surgeon: Mak Nickerson DO;  Location: [facility] INVASIVE LOCATION;  Service: Cardiovascular;  Laterality: Right;   • CARDIAC CATHETERIZATION     • CATARACT EXTRACTION     • CATARACT EXTRACTION WITH INTRAOCULAR LENS  IMPLANT Bilateral    • CHOLECYSTECTOMY     • COLONOSCOPY Left 11/1/2016    Tubular adenoma cecum, Diverticulosis repeat prn   • SUBTOTAL HYSTERECTOMY     • TOTAL KNEE ARTHROPLASTY Right 9/30/2020    Procedure: TOTAL KNEE ARTHROPLASTY;  Surgeon: Mak Pickens MD;  Location: [facility] OR;  Service: Orthopedics;  Laterality: Right;   • TUMOR EXCISION      under armpits and left breast   ,   Family History   Problem Relation Age of Onset   • Diabetes Mother    • Cancer Mother    • Heart failure Mother    • Diabetes Father    • Colon cancer Neg Hx    • Colon polyps Neg Hx    ,   Social History     Tobacco Use   • Smoking status: Never   • Smokeless tobacco: Never   Vaping Use   • Vaping Use: Never used   Substance Use Topics   • Alcohol use: No   • Drug use: Never   ,     Medications  Current Facility-Administered Medications   Medication Dose Route Frequency Provider Last Rate Last Admin   • acetaminophen (TYLENOL) tablet 1,000 mg  1,000 mg Oral Nightly PRN David North MD       • aspirin EC tablet 81 mg  81 mg Oral Daily David North MD       • sennosides-docusate (PERICOLACE) 8.6-50 MG per tablet 2 tablet  2 tablet Oral BID David North MD        And   • polyethylene glycol (MIRALAX) packet 17 g  17 g Oral Daily PRN David North MD        And   • bisacodyl (DULCOLAX) EC tablet 5 mg  5 mg Oral Daily PRN David North MD        And   • bisacodyl (DULCOLAX) suppository 10 mg  10 mg Rectal Daily PRN David North MD       • [START ON 10/15/2022] cloNIDine (CATAPRES-TTS) 0.2 MG/24HR patch 1 patch  1 patch Transdermal Weekly David North MD       • dextrose (D50W) (25 g/50 mL) IV injection 25 g  25 g Intravenous Q15 Min PRN David North MD       • dextrose (GLUTOSE) oral gel 15 g  15 g Oral Q15 Min PRN David North MD       • Enoxaparin Sodium (LOVENOX) syringe 70 mg  1 mg/kg Subcutaneous Q12H David North MD   70 mg at 10/09/22 0700   •  "glucagon (human recombinant) (GLUCAGEN DIAGNOSTIC) injection 1 mg  1 mg Intramuscular Q15 Min PRN David North MD       • Insulin Lispro (humaLOG) injection 0-9 Units  0-9 Units Subcutaneous TID AC David North MD       • levothyroxine (SYNTHROID, LEVOTHROID) tablet 50 mcg  50 mcg Oral QAM AC David North MD       • losartan (COZAAR) tablet 50 mg  50 mg Oral BID David North MD   50 mg at 10/08/22 2134   • NIFEdipine XL (PROCARDIA XL) 24 hr tablet 90 mg  90 mg Oral Daily David North MD       • sodium chloride 0.9 % flush 10 mL  10 mL Intravenous PRN David North MD       • sodium chloride 0.9 % flush 10 mL  10 mL Intravenous Q12H David North MD   10 mL at 10/08/22 2134   • sodium chloride 0.9 % flush 10 mL  10 mL Intravenous PRN David North MD           Allergies:  Codeine, Iodine, Levaquin [levofloxacin], Lortab [hydrocodone-acetaminophen], Quinolones, Shrimp (diagnostic), Bactroban [mupirocin calcium], Ace inhibitors, Norvasc [amlodipine], Piperacillin sod-tazobactam so, Statins, Thiazide-type diuretics, and Mupirocin    REVIEW OF SYSTEMS:  Constitutional: Denies fever or chills. Denies change in energy level or malaise.  HEENT: Denies headaches or visual disturbances. Denies difficulty swallowing or sore throat.  Respiratory: Denies cough or hoarseness. Denies shortness of breath.   Cardiovascular: Palpitations - \"Heart pounding out of my chest.\" Had associated lightheadedness and dizziness. Stated symptoms occurred approximately 3 hours after taking Procardia. Denies presyncope/syncope. Denies orthopnea/PND. Denies lower extremity edema. Hx of atrial fibrillation x 1 in the past after receiving hydralazine for BP.   Gastrointestinal: Denies abdominal pain. Denies nausea/vomiting. Denies change in bowel habits or history of recent GI tract blood loss.   Genitourinary: Denies urinary urgency or frequency. Denies dysuria, " "hematuria.  Musculoskeletal: Denies pain or swelling in joints.  Neurological: Denies paresthesias. Denies headache. Denies seizure or stroke symptoms.  Behavioral/Psych: Denies problems with anxiety or depression.      All other systems are negative except where stated above.        Objective     Physical Exam:    /57 (BP Location: Left arm, Patient Position: Lying)   Pulse 73   Temp 97.9 °F (36.6 °C) (Oral)   Resp 17   Ht 162.6 cm (64\")   Wt 65 kg (143 lb 6.4 oz)   LMP  (LMP Unknown)   SpO2 95%   BMI 24.61 kg/m²        General Appearance:    Alert, cooperative, in no acute distress   HEENT:    Normocephalic. Atraumatic. ENRIQUE.    Neck:   No adenopathy, supple, trachea midline, no thyromegaly, soft left carotid bruit, no JVD   Lungs:     Clear to auscultation, respirations regular, even and unlabored    Heart:    Irregular rhythm and normal rate, normal S1 and S2, no murmur, no gallop, no rub, no click   Abdomen:     Normal bowel sounds, no masses, no organomegaly, soft, non-tender, non-distended, no guarding, no rebound tenderness   Extremities:   Moves all extremities, no edema, no cyanosis, no redness   Pulses:   Pulses palpable and equal bilaterally   Skin:   No bleeding, bruising or rash   Lymph nodes:   No palpable adenopathy   Neurologic:   Grossly intact                     Results Review:   I reviewed the patient's new clinical results.    Lab Results (last 72 hours)     Procedure Component Value Units Date/Time    POC Glucose Once [[id number]]  (Abnormal) Collected: 10/09/22 0803    Specimen: Blood Updated: 10/09/22 0814     Glucose 147 mg/dL      Comment: : [id number] Irish DeannaMeter ID: [id number]       Troponin [[id number]]  (Normal) Collected: 10/09/22 0428    Specimen: Blood Updated: 10/09/22 0719     Troponin T <0.010 ng/mL     Narrative:      Troponin T Reference Range:  <= 0.03 ng/mL-   Negative for AMI  >0.03 ng/mL-     Abnormal for myocardial necrosis.  Clinicians would have to " utilize clinical acumen, EKG, Troponin and serial changes to determine if it is an Acute Myocardial Infarction or myocardial injury due to an underlying chronic condition.       Results may be falsely decreased if patient taking Biotin.      Comprehensive Metabolic Panel [[id number]]  (Abnormal) Collected: 10/09/22 0428    Specimen: Blood Updated: 10/09/22 0548     Glucose 145 mg/dL      BUN 20 mg/dL      Creatinine 0.88 mg/dL      Sodium 138 mmol/L      Potassium 4.3 mmol/L      Chloride 101 mmol/L      CO2 27.0 mmol/L      Calcium 9.9 mg/dL      Total Protein 7.0 g/dL      Albumin 4.40 g/dL      ALT (SGPT) 11 U/L      AST (SGOT) 14 U/L      Alkaline Phosphatase 86 U/L      Total Bilirubin 0.4 mg/dL      Globulin 2.6 gm/dL      A/G Ratio 1.7 g/dL      BUN/Creatinine Ratio 22.7     Anion Gap 10.0 mmol/L      eGFR 64.1 mL/min/1.73      Comment: National Kidney Foundation and American Society of Nephrology (ASN) Task Force recommended calculation based on the Chronic Kidney Disease Epidemiology Collaboration (CKD-EPI) equation refit without adjustment for race.       Narrative:      GFR Normal >60  Chronic Kidney Disease <60  Kidney Failure <15      CBC Auto Differential [[id number]]  (Normal) Collected: 10/09/22 0428    Specimen: Blood Updated: 10/09/22 0522     WBC 7.47 10*3/mm3      RBC 4.23 10*6/mm3      Hemoglobin 13.2 g/dL      Hematocrit 39.4 %      MCV 93.1 fL      MCH 31.2 pg      MCHC 33.5 g/dL      RDW 12.6 %      RDW-SD 43.1 fl      MPV 10.5 fL      Platelets 264 10*3/mm3      Neutrophil % 48.4 %      Lymphocyte % 34.9 %      Monocyte % 11.9 %      Eosinophil % 3.7 %      Basophil % 0.7 %      Immature Grans % 0.4 %      Neutrophils, Absolute 3.61 10*3/mm3      Lymphocytes, Absolute 2.61 10*3/mm3      Monocytes, Absolute 0.89 10*3/mm3      Eosinophils, Absolute 0.28 10*3/mm3      Basophils, Absolute 0.05 10*3/mm3      Immature Grans, Absolute 0.03 10*3/mm3      nRBC 0.0 /100 WBC     Hemoglobin A1c  [[id number]]  (Abnormal) Collected: 10/08/22 1632    Specimen: Blood Updated: 10/08/22 2139     Hemoglobin A1C 7.80 %     Narrative:      Hemoglobin A1C Ranges:    Increased Risk for Diabetes  5.7% to 6.4%  Diabetes                     >= 6.5%  Diabetic Goal                < 7.0%    [place] Draw [[id number]] Collected: 10/08/22 1632    Specimen: Blood Updated: 10/08/22 1815    Narrative:      The following orders were created for panel order [place] Draw.  Procedure                               Abnormality         Status                     ---------                               -----------         ------                     Green Top (Gel)[[id number]]                                  Final result               Lavender Top[[id number]]                                     Final result               Red Top[[id number]]                                          Final result               Light Blue Top[[id number]]                                   Final result                 Please view results for these tests on the individual orders.    Green Top (Gel) [[id number]] Collected: 10/08/22 1714    Specimen: Blood Updated: 10/08/22 1815     Extra Tube Hold for add-ons.     Comment: Auto resulted.       TSH [[id number]]  (Normal) Collected: 10/08/22 1714    Specimen: Blood Updated: 10/08/22 1754     TSH 0.597 uIU/mL     T4, Free [[id number]]  (Normal) Collected: 10/08/22 1714    Specimen: Blood Updated: 10/08/22 1751     Free T4 1.12 ng/dL     Narrative:      Results may be falsely increased if patient taking Biotin.      Comprehensive Metabolic Panel [[id number]]  (Abnormal) Collected: 10/08/22 1714    Specimen: Blood Updated: 10/08/22 1747     Glucose 325 mg/dL      BUN 26 mg/dL      Creatinine 1.03 mg/dL      Sodium 133 mmol/L      Potassium 4.1 mmol/L      Comment: Slight hemolysis detected by analyzer. Results may be affected.        Chloride 99 mmol/L      CO2 24.0 mmol/L      Calcium 9.5 mg/dL      Total Protein 6.7 g/dL       Albumin 4.20 g/dL      ALT (SGPT) 12 U/L      AST (SGOT) 15 U/L      Alkaline Phosphatase 88 U/L      Total Bilirubin 0.2 mg/dL      Globulin 2.5 gm/dL      A/G Ratio 1.7 g/dL      BUN/Creatinine Ratio 25.2     Anion Gap 10.0 mmol/L      eGFR 53.1 mL/min/1.73      Comment: National Kidney Foundation and American Society of Nephrology (ASN) Task Force recommended calculation based on the Chronic Kidney Disease Epidemiology Collaboration (CKD-EPI) equation refit without adjustment for race.       Narrative:      GFR Normal >60  Chronic Kidney Disease <60  Kidney Failure <15      Red Top [[id number]] Collected: 10/08/22 1632    Specimen: Blood Updated: 10/08/22 1745     Extra Tube Hold for add-ons.     Comment: Auto resulted.       Light Blue Top [[id number]] Collected: 10/08/22 1632    Specimen: Blood Updated: 10/08/22 1745     Extra Tube Hold for add-ons.     Comment: Auto resulted       Lavender Top [[id number]] Collected: 10/08/22 1632    Specimen: Blood Updated: 10/08/22 1745     Extra Tube hold for add-on     Comment: Auto resulted       Troponin [[id number]]  (Normal) Collected: 10/08/22 1714    Specimen: Blood Updated: 10/08/22 1744     Troponin T <0.010 ng/mL     Narrative:      Troponin T Reference Range:  <= 0.03 ng/mL-   Negative for AMI  >0.03 ng/mL-     Abnormal for myocardial necrosis.  Clinicians would have to utilize clinical acumen, EKG, Troponin and serial changes to determine if it is an Acute Myocardial Infarction or myocardial injury due to an underlying chronic condition.       Results may be falsely decreased if patient taking Biotin.      BNP [[id number]]  (Normal) Collected: 10/08/22 1714    Specimen: Blood Updated: 10/08/22 1743     proBNP 1,086.0 pg/mL     Narrative:      Among patients with dyspnea, NT-proBNP is highly sensitive for the detection of acute congestive heart failure. In addition NT-proBNP of <300 pg/ml effectively rules out acute congestive heart failure with 99% negative  predictive value.    Results may be falsely decreased if patient taking Biotin.      Magnesium [[id number]]  (Normal) Collected: 10/08/22 1714    Specimen: Blood Updated: 10/08/22 1742     Magnesium 2.0 mg/dL     D-dimer, Quantitative [[id number]]  (Abnormal) Collected: 10/08/22 1632    Specimen: Blood Updated: 10/08/22 1709     D-Dimer, Quantitative 0.69 MCGFEU/mL     Narrative:      Reference Range is 0-0.50 MCGFEU/mL. However, results <0.50 MCGFEU/mL tends to rule out DVT or PE. Results >0.50 MCGFEU/mL are not useful in predicting absence or presence of DVT or PE.      CBC & Differential [[id number]]  (Normal) Collected: 10/08/22 1632    Specimen: Blood Updated: 10/08/22 1701    Narrative:      The following orders were created for panel order CBC & Differential.  Procedure                               Abnormality         Status                     ---------                               -----------         ------                     CBC Auto Differential[[id number]]        Normal              Final result                 Please view results for these tests on the individual orders.    CBC Auto Differential [[id number]]  (Normal) Collected: 10/08/22 1632    Specimen: Blood Updated: 10/08/22 1701     WBC 7.06 10*3/mm3      RBC 4.00 10*6/mm3      Hemoglobin 12.3 g/dL      Hematocrit 37.3 %      MCV 93.3 fL      MCH 30.8 pg      MCHC 33.0 g/dL      RDW 12.4 %      RDW-SD 42.6 fl      MPV 10.1 fL      Platelets 252 10*3/mm3      Neutrophil % 59.6 %      Lymphocyte % 26.8 %      Monocyte % 9.9 %      Eosinophil % 3.0 %      Basophil % 0.6 %      Immature Grans % 0.1 %      Neutrophils, Absolute 4.21 10*3/mm3      Lymphocytes, Absolute 1.89 10*3/mm3      Monocytes, Absolute 0.70 10*3/mm3      Eosinophils, Absolute 0.21 10*3/mm3      Basophils, Absolute 0.04 10*3/mm3      Immature Grans, Absolute 0.01 10*3/mm3      nRBC 0.0 /100 WBC           Imaging Results (Last 72 Hours)     Procedure Component Value Units Date/Time     "XR Chest 1 View [[id number]] Collected: 10/08/22 1723     Updated: 10/08/22 1736    Narrative:      EXAM/TECHNIQUE: XR CHEST 1 VW-     INDICATION: palpitations     COMPARISON: 05/08/2021     FINDINGS:     Cardiac silhouette is normal in size. No pleural effusion, pneumothorax,  or focal consolidation. Mild degenerative change and scoliotic curvature  of the thoracic spine. No acute osseous finding.       Impression:         No acute findings.  This report was finalized on 10/08/2022 17:24 by Dr. Keith Elias MD.        2D Echocardiogram (09/09/2022):  • Left ventricular ejection fraction appears to be 51 - 55%. Left ventricular systolic function is normal.  • The left ventricular cavity is mildly dilated. Left ventricular wall thickness is consistent with mild concentric hypertrophy  • Left ventricular diastolic dysfunction is noted.  • Normal right ventricular cavity size and systolic function noted.  • The left atrial cavity is mildly dilated. Left atrial volume is normal.  • The aortic valve exhibits sclerosis. There is thickening of the aortic valve. Mild aortic valve regurgitation is present.  • There is mild calcification of the mitral valve anterior and posterior leaflet(s).  • Mild tricuspid valve regurgitation is present.  • No evidence of pulmonary hypertension is present.          Assessment & Plan       Persistent atrial fibrillation (HCC) - Rate Controlled, with intermittent RVR with activity. On full dose Lovenox. Will need to transition to Eliquis. D/C Procardia. Add BB. [id number] Score: 8  Recent 2D echocardiogram reviewed - normal LV systolic function. Suspect patient has been having paroxysmal atrial fibrillation as left atrial cavity is mildly dilated.     Essential HTN - patient with allergic \"skin\" reaction to clonidine patch, which is currently in the process of weaning and discontinuation by her PCP. With symptoms causing admission occurring after Procardia, will D/C and start BB which " will help with rate control. She is also on irbesartan and spironolactone at home.     JOSE ALEJANDRO with Underlying CKD, Stage 2 - Repeat CR this morning has returned to normal.     Non-Occlusive CAD - Heart cath done on 06/24/2021. On ASA    Mixed Hyperlipidemia -  in 09/2021. Statin Intolerance. Could try Repatha at discharge.    DM, Type 2 - Fair Control with HgbA1c 7.80. Attending to manage.     Elevated D-dimer - Likely 2' to JOSE ALEJANDRO and atrial fibrillation. She is oxygenating well on RA.     Acquired Hypothyroidism - TSH/free T4 are normal. On Replacement          D/C Procardia. Add BB. Weaning and discontinuation of clonidine patch per attending. Continue to monitor rhythm. Transition to Eliquis.     Further orders per Dr. Nickerson.    Thank you for asking us to follow this patient with you. Please note that this documentation resulted in a face-to-face encounter provided by me.       QUETA Fay

## 2022-10-09 NOTE — H&P
Patient Care Team:  David North MD as PCP - General (Family Medicine)  Manish Smith MD as Consulting Physician (Otolaryngology)  Chandrakant Amin MD (Inactive) as Consulting Physician (Urology)  Yordan Blanton MD as Consulting Physician (Gastroenterology)  Roberto Collins PA as Physician Assistant (Otolaryngology)  Gill Panda APRN as Nurse Practitioner (Nurse Practitioner)    Chief complaint rapid heartbeat    Subjective     Patient is a 86 y.o. female presents with abrupt onset of rapid heartbeat while at home sitting in recliner. Onset of symptoms was abrupt starting several hours ago.  Symptoms are associated with some mild dyspnea palpitation.  Symptoms are aggravated by nothing as it occurred at rest.   Symptoms improve with nothing prior to arrival.  EMS was called and noted her to be in atrial fibrillation with rapid ventricular response. Severity moderate.  Context unknown but has had episode of A. fib several years ago that converted with Cardizem bolus.  Quality described as racing of her heart.  Denies any chest pain.  On arrival to the ER the rapid ventricular response had resolved but she remained in A. fib.  She does report an adjustment in her home antihypertensive regimen recently.    Review of Systems   Pertinent items are noted in HPI, all other systems reviewed and negative    History  Past Medical History:   Diagnosis Date   • Arthritis    • Cancer (HCC)     BCC @ nose & Left arm   • Diabetes type 2, controlled (HCC)    • Diverticulitis    • History of GI bleed    • History of transfusion     Has had x 5 units.   • Hypertension     STAGE 3    • Hyponatremia     Required hospitalization   • Hypothyroidism    • Knee arthropathy 09/30/2020   • LPRD (laryngopharyngeal reflux disease)    • Pharyngeal dysphagia    • Thyroid nodule      Past Surgical History:   Procedure Laterality Date   • APPENDECTOMY     • BASAL CELL CARCINOMA EXCISION     •  BELPHAROPTOSIS REPAIR     • BREAST CYST EXCISION     • CARDIAC CATHETERIZATION     • CARDIAC CATHETERIZATION Right 6/24/2021    Procedure: Left Heart Cath R radial, US guided w poss intervention;  Surgeon: Mak Nickerson DO;  Location:  [facility];  Service: Cardiovascular;  Laterality: Right;   • CARDIAC CATHETERIZATION     • CATARACT EXTRACTION     • CATARACT EXTRACTION WITH INTRAOCULAR LENS IMPLANT Bilateral    • CHOLECYSTECTOMY     • COLONOSCOPY Left 11/1/2016    Tubular adenoma cecum, Diverticulosis repeat prn   • SUBTOTAL HYSTERECTOMY     • TOTAL KNEE ARTHROPLASTY Right 9/30/2020    Procedure: TOTAL KNEE ARTHROPLASTY;  Surgeon: Mak Pickens MD;  Location:  [facility] OR;  Service: Orthopedics;  Laterality: Right;   • TUMOR EXCISION      under armpits and left breast     Family History   Problem Relation Age of Onset   • Diabetes Mother    • Cancer Mother    • Heart failure Mother    • Diabetes Father    • Colon cancer Neg Hx    • Colon polyps Neg Hx      Social History     Tobacco Use   • Smoking status: Never   • Smokeless tobacco: Never   Vaping Use   • Vaping Use: Never used   Substance Use Topics   • Alcohol use: No   • Drug use: Never     E-cigarette/Vaping   • E-cigarette/Vaping Use Never User      E-cigarette/Vaping Substances     Medications Prior to Admission   Medication Sig Dispense Refill Last Dose   • acetaminophen (TYLENOL) 500 MG tablet Take 1,000 mg by mouth At Night As Needed for Mild Pain .   10/7/2022   • aspirin 81 MG EC tablet Take 1 tablet by mouth Daily.   10/7/2022   • Cholecalciferol (Vitamin D3) 1.25 MG (02764 UT) tablet Take  by mouth.   10/7/2022   • cloNIDine (CATAPRES-TTS) 0.2 MG/24HR patch Place 1 patch on the skin as directed by provider 1 (One) Time Per Week. New dose started today   10/8/2022   • cloNIDine (CATAPRES-TTS) 0.3 MG/24HR patch Place 1 patch on the skin as directed by provider 1 (One) Time Per Week.   Past Week   • Coenzyme Q10 (CO Q  10) 100 MG capsule Take 1 tablet by mouth Daily.   10/7/2022   • glimepiride (AMARYL) 4 MG tablet TAKE 1 TABLET BY MOUTH EVERY MORNING BEFORE BREAKFAST (Patient taking differently: Take 1 tablet by mouth Every Morning Before Breakfast. PT TAKES EXTRA 1/2 A PILL WHEN SUGAR IS RUNNING HIGH) 90 tablet 0 10/8/2022   • irbesartan (AVAPRO) 150 MG tablet Take 1 tablet by mouth 2 (Two) Times a Day. 60 tablet 11 10/8/2022   • levothyroxine (SYNTHROID, LEVOTHROID) 50 MCG tablet TAKE 1 TABLET BY MOUTH EVERY DAY  90 tablet 3 10/8/2022   • Multiple Vitamins-Minerals (CENTRUM SILVER PO) Take 1 tablet by mouth Daily.   10/7/2022   • NIFEdipine XL (PROCARDIA XL) 90 MG 24 hr tablet Take 1 tablet by mouth Daily. New medication that she just started today   10/8/2022   • Repatha SureClick solution auto-injector SureClick injection INJECT 1 ML UNDER THE SKIN INTO THE APPROPRIATE AREA AS DIRECTED EVERY 14 (FOURTEEN) DAYS. 1 mL 14 Past Week   • spironolactone (ALDACTONE) 25 MG tablet Does not take anymore   Patient Taking Differently   • glucose monitor monitoring kit 1 each Daily. 1 each 0    • Lancets misc 1 each Daily. 100 each 3    • True Metrix Blood Glucose Test test strip TEST TWICE DAILY AS DIRECTED 100 each 3      Allergies:  Codeine, Iodine, Levaquin [levofloxacin], Lortab [hydrocodone-acetaminophen], Quinolones, Shrimp (diagnostic), Bactroban [mupirocin calcium], Ace inhibitors, Norvasc [amlodipine], Piperacillin sod-tazobactam so, Statins, Thiazide-type diuretics, and Mupirocin    Objective     Vital Signs  Temp:  [97.3 °F (36.3 °C)-97.9 °F (36.6 °C)] 97.9 °F (36.6 °C)  Heart Rate:  [68-97] 73  Resp:  [17] 17  BP: (132-178)/(54-74) 147/57    Physical Exam:    General Appearance: alert, appears stated age and cooperative  Head: normocephalic, without obvious abnormality and atraumatic  Eyes: lids and lashes normal, conjunctivae and sclerae normal and no icterus  Neck: supple, trachea midline and no carotid bruit  Lungs: clear  to auscultation, respirations regular, respirations even and respirations unlabored  Heart: normal S1, S2 and no murmur, no gallop, no rub, normal rate but irregular rhythm  Abdomen: normal bowel sounds, soft non-tender, no guarding and no rebound tenderness  Extremities: no edema, no cyanosis and no redness  Skin: turgor normal and color normal  Neurologic: Mental Status orientated to person, place, time and situation, Cranial Nerves cranial nerves 2 - 12 grossly intact as examined    Results Review:    I reviewed the patient's new clinical results.    Assessment & Plan       Persistent atrial fibrillation (HCC)    Hypothyroidism    Type 2 diabetes mellitus, without long-term current use of insulin (HCC)    Essential hypertension    CKD (chronic kidney disease) stage 2, GFR 60-89 ml/min    Unstable angina (HCC)      Patient admitted for further work-up and evaluation ongoing.  Has been seen by cardiology in consultation.  Remains in A. fib.  Adjustments in medications including discontinuation of Procardia and adding a beta-blocker have been initiated.  Currently on Lovenox for anticoagulation with plans to transition to oral anticoagulation.  Keep on telemetry.    I discussed the patients findings and my recommendations with patient.     Zac Cisneros MD  10/09/22  11:02 CDT    Time: Greater than 40 minutes.

## 2022-10-09 NOTE — PLAN OF CARE
Goal Outcome Evaluation:  Plan of Care Reviewed With: patient        Progress: improving  Outcome Evaluation: Pt admitted to room 406, pt denies any SOB or chest pain. Pt only complaint is she is feeling weak. Pt ambulating well x 1 assist to BR. Pt HR remains controlled other than getting up from bed. HR did jump to 150's while ambulating to bathroom upon arrival from ER. VSS, Tele monitoring showing SR with BBB. No changes since admitted. Safety maintained

## 2022-10-09 NOTE — PLAN OF CARE
Goal Outcome Evaluation:  Plan of Care Reviewed With: patient        Progress: improving  Outcome Evaluation: Heart rate and rhythm improving, Has been sinus arrythmia most of the shift; up with standby assist to BR, voiding; IID: accuchecks with s/s/i, required covering x1 this shift; no c/o pain; safety maintained.

## 2022-10-10 ENCOUNTER — APPOINTMENT (OUTPATIENT)
Dept: CARDIOLOGY | Facility: HOSPITAL | Age: 87
End: 2022-10-10

## 2022-10-10 ENCOUNTER — READMISSION MANAGEMENT (OUTPATIENT)
Dept: CALL CENTER | Facility: HOSPITAL | Age: 87
End: 2022-10-10

## 2022-10-10 VITALS
WEIGHT: 140.2 LBS | BODY MASS INDEX: 23.93 KG/M2 | TEMPERATURE: 97.9 F | HEIGHT: 64 IN | RESPIRATION RATE: 16 BRPM | HEART RATE: 59 BPM | OXYGEN SATURATION: 96 % | DIASTOLIC BLOOD PRESSURE: 75 MMHG | SYSTOLIC BLOOD PRESSURE: 141 MMHG

## 2022-10-10 PROBLEM — R53.83 FATIGUE: Status: ACTIVE | Noted: 2022-10-10

## 2022-10-10 LAB
GLUCOSE BLDC GLUCOMTR-MCNC: 133 MG/DL (ref 70–130)
GLUCOSE BLDC GLUCOMTR-MCNC: 224 MG/DL (ref 70–130)

## 2022-10-10 PROCEDURE — 93246 EXT ECG>7D<15D RECORDING: CPT

## 2022-10-10 PROCEDURE — 63710000001 INSULIN LISPRO (HUMAN) PER 5 UNITS: Performed by: FAMILY MEDICINE

## 2022-10-10 PROCEDURE — 99232 SBSQ HOSP IP/OBS MODERATE 35: CPT | Performed by: NURSE PRACTITIONER

## 2022-10-10 PROCEDURE — 82962 GLUCOSE BLOOD TEST: CPT

## 2022-10-10 PROCEDURE — 25010000002 ENOXAPARIN PER 10 MG: Performed by: FAMILY MEDICINE

## 2022-10-10 RX ORDER — GLIMEPIRIDE 4 MG/1
4 TABLET ORAL
COMMUNITY

## 2022-10-10 RX ORDER — LEVOTHYROXINE SODIUM 0.05 MG/1
50 TABLET ORAL DAILY
COMMUNITY

## 2022-10-10 RX ADMIN — Medication 10 ML: at 08:49

## 2022-10-10 RX ADMIN — INSULIN LISPRO 4 UNITS: 100 INJECTION, SOLUTION INTRAVENOUS; SUBCUTANEOUS at 11:33

## 2022-10-10 RX ADMIN — ENOXAPARIN SODIUM 70 MG: 100 INJECTION SUBCUTANEOUS at 06:30

## 2022-10-10 RX ADMIN — DOCUSATE SODIUM 50 MG AND SENNOSIDES 8.6 MG 2 TABLET: 8.6; 5 TABLET, FILM COATED ORAL at 08:48

## 2022-10-10 RX ADMIN — LEVOTHYROXINE SODIUM 50 MCG: 50 TABLET ORAL at 08:48

## 2022-10-10 RX ADMIN — ASPIRIN 81 MG: 81 TABLET, COATED ORAL at 08:48

## 2022-10-10 RX ADMIN — LOSARTAN POTASSIUM 50 MG: 50 TABLET, FILM COATED ORAL at 08:49

## 2022-10-10 RX ADMIN — METOPROLOL TARTRATE 25 MG: 25 TABLET, FILM COATED ORAL at 08:49

## 2022-10-10 NOTE — PROGRESS NOTES
[facility] -  Progress Note     LOS: 2 days   Patient Care Team:  David North MD as PCP - General (Family Medicine)  Manish Smith MD as Consulting Physician (Otolaryngology)  Chandrakant Amin MD (Inactive) as Consulting Physician (Urology)  Yordan Blanton MD as Consulting Physician (Gastroenterology)  Roberto Collins PA as Physician Assistant (Otolaryngology)  Gill Panda APRN as Nurse Practitioner (Nurse Practitioner)    Chief Complaint: Weakness    Subjective     Interval History:   No further palpitations. Feeling better. Rash is better from clonidine. Tolerating 0.2. BP stable. NSR with  Brief episode of tachycardia that patient is asymptomatic to. Overall feeling better. Patient feels strongly symptoms were secondary to Clonidine patch and Procardia.     Review of Systems:     Review of Systems   Constitutional: Positive for fatigue.   Skin: Positive for rash.     Objective     Vital Sign Min/Max for last 24 hours  Temp  Min: 97.4 °F (36.3 °C)  Max: 98.4 °F (36.9 °C)   BP  Min: 139/61  Max: 170/56   Pulse  Min: 63  Max: 80   Resp  Min: 16  Max: 16   SpO2  Min: 92 %  Max: 97 %   No data recorded   Weight  Min: 63.6 kg (140 lb 3.2 oz)  Max: 63.6 kg (140 lb 3.2 oz)         10/09/22  1942   Weight: 63.6 kg (140 lb 3.2 oz)       Telemetry: NSR with episodes of tachycardia- narrow complex- possible Atrial Flutter.       Physical Exam:    Constitutional:       Appearance: Healthy appearance. Not in distress.   Eyes:      Pupils: Pupils are equal, round, and reactive to light.   HENT:      Head: Normocephalic and atraumatic.      Nose: Nose normal.    Mouth/Throat:      Dentition: Normal.   Pulmonary:      Effort: Pulmonary effort is normal.      Breath sounds: Normal breath sounds.   Chest:      Chest wall: Not tender to palpatation.   Cardiovascular:      PMI at left midclavicular line. Normal rate. Regular rhythm.      Murmurs: There is no murmur.    Pulses:     Intact distal pulses.   Edema:     Peripheral edema absent.   Abdominal:      General: Bowel sounds are normal.      Palpations: Abdomen is soft.   Musculoskeletal: Normal range of motion.      Cervical back: Normal range of motion. Skin:     General: Skin is warm and dry.   Neurological:      Mental Status: Alert, oriented to person, place, and time and oriented to person, place and time.   Psychiatric:         Attention and Perception: Attention normal.         Mood and Affect: Mood normal.       Results Review:   Lab Results (last 72 hours)     Procedure Component Value Units Date/Time    POC Glucose Once [[id number]]  (Abnormal) Collected: 10/10/22 0726    Specimen: Blood Updated: 10/10/22 0756     Glucose 133 mg/dL      Comment: : [id number] Barrett LisaMeter ID: [id number]       POC Glucose Once [[id number]]  (Abnormal) Collected: 10/09/22 1950    Specimen: Blood Updated: 10/09/22 2002     Glucose 210 mg/dL      Comment: : [id number] Rosa BoltonMeter ID: [id number]       POC Glucose Once [[id number]]  (Abnormal) Collected: 10/09/22 1707    Specimen: Blood Updated: 10/09/22 1718     Glucose 141 mg/dL      Comment: : [id number] Scot DeannaMeter ID: [id number]       POC Glucose Once [[id number]]  (Abnormal) Collected: 10/09/22 1647    Specimen: Blood Updated: 10/09/22 1659     Glucose 145 mg/dL      Comment: : [id number] Vasyl VenitaMeter ID: [id number]       POC Glucose Once [[id number]]  (Abnormal) Collected: 10/09/22 1136    Specimen: Blood Updated: 10/09/22 1146     Glucose 235 mg/dL      Comment: : [id number] Scot DeannaMeter ID: [id number]       POC Glucose Once [[id number]]  (Abnormal) Collected: 10/09/22 0803    Specimen: Blood Updated: 10/09/22 0814     Glucose 147 mg/dL      Comment: : [id number] Scot DeannaMeter ID: [id number]       Troponin [[id number]]  (Normal) Collected: 10/09/22 0428    Specimen: Blood Updated: 10/09/22 0719     Troponin T <0.010 ng/mL      Narrative:      Troponin T Reference Range:  <= 0.03 ng/mL-   Negative for AMI  >0.03 ng/mL-     Abnormal for myocardial necrosis.  Clinicians would have to utilize clinical acumen, EKG, Troponin and serial changes to determine if it is an Acute Myocardial Infarction or myocardial injury due to an underlying chronic condition.       Results may be falsely decreased if patient taking Biotin.      Comprehensive Metabolic Panel [[id number]]  (Abnormal) Collected: 10/09/22 0428    Specimen: Blood Updated: 10/09/22 0548     Glucose 145 mg/dL      BUN 20 mg/dL      Creatinine 0.88 mg/dL      Sodium 138 mmol/L      Potassium 4.3 mmol/L      Chloride 101 mmol/L      CO2 27.0 mmol/L      Calcium 9.9 mg/dL      Total Protein 7.0 g/dL      Albumin 4.40 g/dL      ALT (SGPT) 11 U/L      AST (SGOT) 14 U/L      Alkaline Phosphatase 86 U/L      Total Bilirubin 0.4 mg/dL      Globulin 2.6 gm/dL      A/G Ratio 1.7 g/dL      BUN/Creatinine Ratio 22.7     Anion Gap 10.0 mmol/L      eGFR 64.1 mL/min/1.73      Comment: National Kidney Foundation and American Society of Nephrology (ASN) Task Force recommended calculation based on the Chronic Kidney Disease Epidemiology Collaboration (CKD-EPI) equation refit without adjustment for race.       Narrative:      GFR Normal >60  Chronic Kidney Disease <60  Kidney Failure <15      CBC Auto Differential [[id number]]  (Normal) Collected: 10/09/22 0428    Specimen: Blood Updated: 10/09/22 0522     WBC 7.47 10*3/mm3      RBC 4.23 10*6/mm3      Hemoglobin 13.2 g/dL      Hematocrit 39.4 %      MCV 93.1 fL      MCH 31.2 pg      MCHC 33.5 g/dL      RDW 12.6 %      RDW-SD 43.1 fl      MPV 10.5 fL      Platelets 264 10*3/mm3      Neutrophil % 48.4 %      Lymphocyte % 34.9 %      Monocyte % 11.9 %      Eosinophil % 3.7 %      Basophil % 0.7 %      Immature Grans % 0.4 %      Neutrophils, Absolute 3.61 10*3/mm3      Lymphocytes, Absolute 2.61 10*3/mm3      Monocytes, Absolute 0.89 10*3/mm3      Eosinophils,  Absolute 0.28 10*3/mm3      Basophils, Absolute 0.05 10*3/mm3      Immature Grans, Absolute 0.03 10*3/mm3      nRBC 0.0 /100 WBC     Hemoglobin A1c [[id number]]  (Abnormal) Collected: 10/08/22 1632    Specimen: Blood Updated: 10/08/22 2139     Hemoglobin A1C 7.80 %     Narrative:      Hemoglobin A1C Ranges:    Increased Risk for Diabetes  5.7% to 6.4%  Diabetes                     >= 6.5%  Diabetic Goal                < 7.0%    [place] Draw [[id number]] Collected: 10/08/22 1632    Specimen: Blood Updated: 10/08/22 1815    Narrative:      The following orders were created for panel order [place] Draw.  Procedure                               Abnormality         Status                     ---------                               -----------         ------                     Green Top (Gel)[[id number]]                                  Final result               Lavender Top[[id number]]                                     Final result               Red Top[[id number]]                                          Final result               Light Blue Top[[id number]]                                   Final result                 Please view results for these tests on the individual orders.    Green Top (Gel) [[id number]] Collected: 10/08/22 1714    Specimen: Blood Updated: 10/08/22 1815     Extra Tube Hold for add-ons.     Comment: Auto resulted.       TSH [[id number]]  (Normal) Collected: 10/08/22 1714    Specimen: Blood Updated: 10/08/22 1754     TSH 0.597 uIU/mL     T4, Free [[id number]]  (Normal) Collected: 10/08/22 1714    Specimen: Blood Updated: 10/08/22 1751     Free T4 1.12 ng/dL     Narrative:      Results may be falsely increased if patient taking Biotin.      Comprehensive Metabolic Panel [[id number]]  (Abnormal) Collected: 10/08/22 1714    Specimen: Blood Updated: 10/08/22 1747     Glucose 325 mg/dL      BUN 26 mg/dL      Creatinine 1.03 mg/dL      Sodium 133 mmol/L      Potassium 4.1 mmol/L      Comment: Slight hemolysis  detected by analyzer. Results may be affected.        Chloride 99 mmol/L      CO2 24.0 mmol/L      Calcium 9.5 mg/dL      Total Protein 6.7 g/dL      Albumin 4.20 g/dL      ALT (SGPT) 12 U/L      AST (SGOT) 15 U/L      Alkaline Phosphatase 88 U/L      Total Bilirubin 0.2 mg/dL      Globulin 2.5 gm/dL      A/G Ratio 1.7 g/dL      BUN/Creatinine Ratio 25.2     Anion Gap 10.0 mmol/L      eGFR 53.1 mL/min/1.73      Comment: National Kidney Foundation and American Society of Nephrology (ASN) Task Force recommended calculation based on the Chronic Kidney Disease Epidemiology Collaboration (CKD-EPI) equation refit without adjustment for race.       Narrative:      GFR Normal >60  Chronic Kidney Disease <60  Kidney Failure <15      Red Top [[id number]] Collected: 10/08/22 1632    Specimen: Blood Updated: 10/08/22 1745     Extra Tube Hold for add-ons.     Comment: Auto resulted.       Light Blue Top [[id number]] Collected: 10/08/22 1632    Specimen: Blood Updated: 10/08/22 1745     Extra Tube Hold for add-ons.     Comment: Auto resulted       Lavender Top [[id number]] Collected: 10/08/22 1632    Specimen: Blood Updated: 10/08/22 1745     Extra Tube hold for add-on     Comment: Auto resulted       Troponin [[id number]]  (Normal) Collected: 10/08/22 1714    Specimen: Blood Updated: 10/08/22 1744     Troponin T <0.010 ng/mL     Narrative:      Troponin T Reference Range:  <= 0.03 ng/mL-   Negative for AMI  >0.03 ng/mL-     Abnormal for myocardial necrosis.  Clinicians would have to utilize clinical acumen, EKG, Troponin and serial changes to determine if it is an Acute Myocardial Infarction or myocardial injury due to an underlying chronic condition.       Results may be falsely decreased if patient taking Biotin.      BNP [[id number]]  (Normal) Collected: 10/08/22 1714    Specimen: Blood Updated: 10/08/22 1743     proBNP 1,086.0 pg/mL     Narrative:      Among patients with dyspnea, NT-proBNP is highly sensitive for the detection  of acute congestive heart failure. In addition NT-proBNP of <300 pg/ml effectively rules out acute congestive heart failure with 99% negative predictive value.    Results may be falsely decreased if patient taking Biotin.      Magnesium [[id number]]  (Normal) Collected: 10/08/22 1714    Specimen: Blood Updated: 10/08/22 1742     Magnesium 2.0 mg/dL     D-dimer, Quantitative [[id number]]  (Abnormal) Collected: 10/08/22 1632    Specimen: Blood Updated: 10/08/22 1709     D-Dimer, Quantitative 0.69 MCGFEU/mL     Narrative:      Reference Range is 0-0.50 MCGFEU/mL. However, results <0.50 MCGFEU/mL tends to rule out DVT or PE. Results >0.50 MCGFEU/mL are not useful in predicting absence or presence of DVT or PE.      CBC & Differential [[id number]]  (Normal) Collected: 10/08/22 1632    Specimen: Blood Updated: 10/08/22 1701    Narrative:      The following orders were created for panel order CBC & Differential.  Procedure                               Abnormality         Status                     ---------                               -----------         ------                     CBC Auto Differential[[id number]]        Normal              Final result                 Please view results for these tests on the individual orders.    CBC Auto Differential [[id number]]  (Normal) Collected: 10/08/22 1632    Specimen: Blood Updated: 10/08/22 1701     WBC 7.06 10*3/mm3      RBC 4.00 10*6/mm3      Hemoglobin 12.3 g/dL      Hematocrit 37.3 %      MCV 93.3 fL      MCH 30.8 pg      MCHC 33.0 g/dL      RDW 12.4 %      RDW-SD 42.6 fl      MPV 10.1 fL      Platelets 252 10*3/mm3      Neutrophil % 59.6 %      Lymphocyte % 26.8 %      Monocyte % 9.9 %      Eosinophil % 3.0 %      Basophil % 0.6 %      Immature Grans % 0.1 %      Neutrophils, Absolute 4.21 10*3/mm3      Lymphocytes, Absolute 1.89 10*3/mm3      Monocytes, Absolute 0.70 10*3/mm3      Eosinophils, Absolute 0.21 10*3/mm3      Basophils, Absolute 0.04 10*3/mm3      Immature  Grans, Absolute 0.01 10*3/mm3      nRBC 0.0 /100 WBC         Results from last 7 days   Lab Units 10/09/22  0428 10/08/22  1714   SODIUM mmol/L 138 133*   POTASSIUM mmol/L 4.3 4.1   CHLORIDE mmol/L 101 99   CO2 mmol/L 27.0 24.0   BUN mg/dL 20 26*   CREATININE mg/dL 0.88 1.03*   GLUCOSE mg/dL 145* 325*   CALCIUM mg/dL 9.9 9.5        Results for orders placed during the hospital encounter of 09/09/22    Adult Transthoracic Echo Complete W/ Cont if Necessary Per Protocol    Interpretation Summary  · Left ventricular ejection fraction appears to be 51 - 55%. Left ventricular systolic function is normal.  · The left ventricular cavity is mildly dilated. Left ventricular wall thickness is consistent with mild concentric hypertrophy  · Left ventricular diastolic dysfunction is noted.  · Normal right ventricular cavity size and systolic function noted.  · The left atrial cavity is mildly dilated. Left atrial volume is normal.  · The aortic valve exhibits sclerosis. There is thickening of the aortic valve. Mild aortic valve regurgitation is present.  · There is mild calcification of the mitral valve anterior and posterior leaflet(s).  · Mild tricuspid valve regurgitation is present.  · No evidence of pulmonary hypertension is present.      Medication Review: yes  Current Facility-Administered Medications   Medication Dose Route Frequency Provider Last Rate Last Admin   • acetaminophen (TYLENOL) tablet 1,000 mg  1,000 mg Oral Nightly PRN David North MD       • aspirin EC tablet 81 mg  81 mg Oral Daily David North MD   81 mg at 10/10/22 0848   • sennosides-docusate (PERICOLACE) 8.6-50 MG per tablet 2 tablet  2 tablet Oral BID David North MD   2 tablet at 10/10/22 0848    And   • polyethylene glycol (MIRALAX) packet 17 g  17 g Oral Daily PRN David North MD        And   • bisacodyl (DULCOLAX) EC tablet 5 mg  5 mg Oral Daily PRN David North MD        And   • bisacodyl (DULCOLAX)  suppository 10 mg  10 mg Rectal Daily PRN David North MD       • [START ON 10/15/2022] cloNIDine (CATAPRES-TTS) 0.2 MG/24HR patch 1 patch  1 patch Transdermal Weekly David North MD       • dextrose (D50W) (25 g/50 mL) IV injection 25 g  25 g Intravenous Q15 Min PRN David North MD       • dextrose (GLUTOSE) oral gel 15 g  15 g Oral Q15 Min PRN David North MD       • Enoxaparin Sodium (LOVENOX) syringe 70 mg  1 mg/kg Subcutaneous Q12H David North MD   70 mg at 10/10/22 0630   • glucagon (human recombinant) (GLUCAGEN DIAGNOSTIC) injection 1 mg  1 mg Intramuscular Q15 Min PRN David North MD       • Insulin Lispro (humaLOG) injection 0-9 Units  0-9 Units Subcutaneous TID AC David North MD   4 Units at 10/09/22 1208   • levothyroxine (SYNTHROID, LEVOTHROID) tablet 50 mcg  50 mcg Oral QAM AC David North MD   50 mcg at 10/10/22 0848   • losartan (COZAAR) tablet 50 mg  50 mg Oral BID David North MD   50 mg at 10/10/22 0849   • metoprolol tartrate (LOPRESSOR) tablet 25 mg  25 mg Oral Q12H Valarie Cowart APRN   25 mg at 10/10/22 0849   • sodium chloride 0.9 % flush 10 mL  10 mL Intravenous PRN David North MD       • sodium chloride 0.9 % flush 10 mL  10 mL Intravenous Q12H David North MD   10 mL at 10/10/22 0849   • sodium chloride 0.9 % flush 10 mL  10 mL Intravenous PRN David North MD             Assessment & Plan       Persistent atrial fibrillation (HCC)    Hypothyroidism    Type 2 diabetes mellitus, without long-term current use of insulin (HCC)    Essential hypertension    CKD (chronic kidney disease) stage 2, GFR 60-89 ml/min    Fatigue    Plan:  Paroxysmal Atrial Fibrillation - Patient remains in NSR- she does have brief episodes of narrow complex tachycardia- concerning for atrial flutter. Continue Lopressor 25 mg BID as she has tolerated this well. Stop Procardia. Start Eliquis 5 mg BID. [id number].  Stop Aspirin. Will place Zio Patch at discharge. Encouraged her to record symptoms.       Fatigue- chief complaint was weakness and palpitations. Patient is feeling better. She feels strongly this was secondary to clonidine patch being too strong. Also could have been secondary to starting Procardia 90 XL and bradycardia. So far tolerating Lopressor 25 mg BID. Will place monitor at discharge to attempt to correlate symptoms    Chronic Kidney Disease- stable.     Hypertension- longstanding issues with good control. Clonidine patch recently reduced. She notes she has never taken aldactone. Continue Irbesartan which she takes 150 mg BID. Clonidine Patch at 0.2. Monitor.      Patient is planning for discharge today around 1400. Stable for DC from cardiac standpoint. 2 week holter monitor and follow with Dr. Nickerson in 4 weeks.     Electronically signed by QUETA Elena, 10/10/22, 10:17 AM CDT.

## 2022-10-10 NOTE — DISCHARGE SUMMARY
Hospital Discharge Summary    Nedra Tolliver  :  1935  MRN:  [id number]    Admit date:  10/8/2022  Discharge date:  10/10/2022    Admitting Physician:    David North MD    Discharge Diagnoses:      Persistent atrial fibrillation (HCC)    Hypothyroidism    Type 2 diabetes mellitus, without long-term current use of insulin (HCC)    Essential hypertension    CKD (chronic kidney disease) stage 2, GFR 60-89 ml/min    Fatigue      Hospital Course:   Patient is a 86 y.o. female presents with abrupt onset of rapid heartbeat while at home sitting in recliner. Onset of symptoms was abrupt starting several hours prior to presentation.  Symptoms were associated with some mild dyspnea and palpitation.  Symptoms are aggravated by nothing as it occurred at rest.   Symptoms improve with nothing prior to arrival.  EMS was called and noted her to be in atrial fibrillation with rapid ventricular response. Severity moderate.  Context unknown but has had episode of A. fib several years ago that converted with Cardizem bolus.  Quality described as racing of her heart.  Denies any chest pain.  On arrival to the ER the rapid ventricular response had resolved but she remained in A. fib. She recently started procardia. This was stopped and switched to lopressor which she tolerated well. Eliquis started 5 mg BID per cardiology. Zio patch at discharge.    The patient was admitted for the above noted medical/surgical issues. Please see daily progress note for further details concerning their stay. The patient improved throughout their stay and reached maximum medical improvement on the day of discharge. The patient/family agree with the treatment plan as outlined above. All questions concerning their stay were answered prior to discharge. They understand the importance of follow up concerning any abnormal test results.     Physical Exam  Vitals reviewed.   Constitutional:       Appearance: Normal appearance. She is not  ill-appearing.   HENT:      Head: Normocephalic and atraumatic.   Eyes:      General:         Right eye: No discharge.         Left eye: No discharge.      Extraocular Movements: Extraocular movements intact.      Conjunctiva/sclera: Conjunctivae normal.   Cardiovascular:      Rate and Rhythm: Normal rate. Rhythm irregular.      Pulses: Normal pulses.      Heart sounds: No murmur heard.  Pulmonary:      Effort: Pulmonary effort is normal. No respiratory distress.   Abdominal:      General: Abdomen is flat. Bowel sounds are normal. There is no distension.   Musculoskeletal:      Right lower leg: No edema.      Left lower leg: No edema.   Skin:     Capillary Refill: Capillary refill takes less than 2 seconds.   Neurological:      General: No focal deficit present.      Mental Status: She is alert.   Psychiatric:         Mood and Affect: Mood normal.         Behavior: Behavior normal.           Discharge Medications:         Discharge Medications      New Medications      Instructions Start Date   apixaban 5 MG tablet tablet  Commonly known as: ELIQUIS   5 mg, Oral, 2 Times Daily      metoprolol tartrate 25 MG tablet  Commonly known as: LOPRESSOR   25 mg, Oral, Every 12 Hours Scheduled         Changes to Medications      Instructions Start Date   cloNIDine 0.2 MG/24HR patch  Commonly known as: CATAPRES-TTS  What changed: Another medication with the same name was removed. Continue taking this medication, and follow the directions you see here.   1 patch, Transdermal, Weekly, New dose started today         Continue These Medications      Instructions Start Date   acetaminophen 500 MG tablet  Commonly known as: TYLENOL   1,000 mg, Oral, Nightly PRN      Co Q 10 100 MG capsule   1 tablet, Oral, Daily      Evolocumab solution auto-injector SureClick injection  Commonly known as: REPATHA   140 mg, Every 14 Days      glimepiride 4 MG tablet  Commonly known as: AMARYL   4 mg, Oral, Every Morning Before Breakfast, Takes an  extra 1/2 tablet when blood sugar is high.      irbesartan 150 MG tablet  Commonly known as: AVAPRO   150 mg, Oral, 2 Times Daily      levothyroxine 50 MCG tablet  Commonly known as: SYNTHROID, LEVOTHROID   50 mcg, Oral, Daily      multivitamin with minerals tablet tablet   1 tablet, Oral, Daily      Vitamin D3 125 MCG (5000 UT) tablet dispersible   5,000 tablets, Oral, Daily         Stop These Medications    aspirin 81 MG EC tablet     NIFEdipine XL 90 MG 24 hr tablet  Commonly known as: PROCARDIA XL     spironolactone 25 MG tablet  Commonly known as: ALDACTONE            Activity: as tolerated    Diet: as prior    Consults: cardiology    Significant Diagnostic Studies:    XR Chest 1 View    Result Date: 10/8/2022   No acute findings. This report was finalized on 10/08/2022 17:24 by Dr. Keith Elias MD.       Disposition:   Discharge home    Time spent on discharge including discussion with patient/family, SW, and coordination of care.     Follow up with David North MD in 1-2 weeks.    Signed:  Edgar Tolbert MD   10/10/2022, 12:56 CDT

## 2022-10-10 NOTE — PLAN OF CARE
Goal Outcome Evaluation:  Plan of Care Reviewed With: patient        Progress: improving  Outcome Evaluation: Pt rested well overnight, no complaints of pain. Pt verbalizes feeling much better than we she came in. Tele monitoring SR-Sinus arrhythmia rate 83-88. PT ambulates to with standby assist. Safety maintained.

## 2022-10-12 NOTE — ANESTHESIA POSTPROCEDURE EVALUATION
Outpatient Oncology Nutrition Consultation   Type of Consult: Follow Up  Care Location: Telephone Call    Reason for referral: Received notification by [facility] RN, Gerson Vasques , on 7/28/22 that pt has triggered for oncology nutrition care (reason for referral: Malnutrition Screening Tool (MST) Triggers: scored a 2 indicating 2-13# (0 9-6 kg) recent wt loss and is eating poorly due to a decreased appetite  (Date of MST: 7/28/22) and patient request)  Orlando Farrell had previously been seen by oncology RD Ashlee Mandujano on 3/23/22    Nutrition Assessment:   Oncology Diagnosis & Treatments: diagnosed with pancreatic cancer 3/4/22  -started on FOLFIRINOX 4/2022  Was reduced d/t diarrhea, and last cycle was cancelled  Finished 7/12/22  -s/p concurrent chemo (oral chemo Xeloda)/RT  Started 8/22  RT EOT 9/29  Recently stopped Xeloda per [facility] to give him a break    Oncology History Overview Note   3/2022 - locally advanced, unresectable adenocarcinoma of the pancreas     4/19/2022 - start FOLFIRINOX with 20% dose reduction in oxaliplatin and irinotecan due to age     5/2022 - 5FU dose reduced by 20% due to diarrhea     7/12/2022 - cycle 7 delayed due to diarrhea - bolus 5-FU further reduced by a total of 30% and infusional 5-FU dose reduced by 20%    8/2022 - stop FOLFIRINOX due to inadequate response, start xeloda 1000 mg BID M-F concurrently with RT to pancreas    8/22/2022 - start xeloda 1000 mg BID M-F with RT to pancreas     Pancreatic adenocarcinoma ([facility] [address] )   3/4/2022 Biopsy    A-B-C  Pancreas, pancreatic head mass   Malignant (PSC Category VI) -  Adenocarcinoma       3/4/2022 -  Cancer Staged    Staging form: Pancreas, AJCC 8th Edition  - Clinical stage from 3/4/2022: Stage IB (cT2, cN0, cM0) - Signed by Shellie Jack MD on 7/28/2022  Stage prefix: Initial diagnosis  Total positive nodes: 0       3/14/2022 Initial Diagnosis    Pancreatic adenocarcinoma ([address] )     4/19/2022 - 7/20/2022 Chemotherapy    fluorouracil (ADRUCIL), 400 Patient: Nedra Tolliver    Procedure Summary     Date: 09/30/20 Room / Location: [facility] OR  /  [facility] OR    Anesthesia Start: 1421 Anesthesia Stop: 1658    Procedure: TOTAL KNEE ARTHROPLASTY (Right Knee) Diagnosis: (M17.11)    Surgeon: Mak Pickens MD Provider: Vladimir Fields CRNA    Anesthesia Type: general, general with block ASA Status: 3          Anesthesia Type: general, general with block    Vitals  Vitals Value Taken Time   /63 09/30/20 1754   Temp 97.8 °F (36.6 °C) 09/30/20 1804   Pulse 69 09/30/20 1804   Resp 15 09/30/20 1804   SpO2 100 % 09/30/20 1804   Vitals shown include unvalidated device data.        Post Anesthesia Care and Evaluation    Patient location during evaluation: PACU  Patient participation: complete - patient participated  Level of consciousness: awake and alert  Pain management: adequate  Airway patency: patent  Anesthetic complications: No anesthetic complications    Cardiovascular status: acceptable  Respiratory status: acceptable  Hydration status: acceptable    Comments: Blood pressure 167/65, pulse 65, temperature 97.5 °F (36.4 °C), temperature source Oral, resp. rate 16, SpO2 100 %, not currently breastfeeding.    Pt discharged from PACU based on giovanny score >8       mg/m2 = 690 mg, Intravenous, Once, 7 of 8 cycles  Dose modification: 320 mg/m2 (original dose 400 mg/m2, Cycle 4), 280 mg/m2 (original dose 400 mg/m2, Cycle 7, Reason: Dose Not Tolerated, Comment: 30% dose reduction due to diarrhea)  Administration: 690 mg (4/19/2022), 690 mg (5/3/2022), 690 mg (5/17/2022), 555 mg (5/31/2022), 555 mg (6/14/2022), 555 mg (6/28/2022), 485 mg (7/20/2022)  pegfilgrastim (NEULASTA), 6 mg, Subcutaneous, Once, 5 of 6 cycles  Administration: 6 mg (6/2/2022), 6 mg (6/16/2022), 6 mg (6/30/2022), 6 mg (5/20/2022)  irinotecan (CAMPTOSAR) chemo infusion, 125 mg/m2 = 216 mg (69 4 % of original dose 180 mg/m2), Intravenous, Once, 7 of 8 cycles  Dose modification: 125 mg/m2 (original dose 180 mg/m2, Cycle 1, Reason: Anticipated Tolerance, Comment: Anticipated tolerance and Bilirubin being between 1-2), 90 mg/m2 (original dose 180 mg/m2, Cycle 4, Reason: Dose Not Tolerated, Comment: 50% dose reduction from original dose due to diarrhea)  Administration: 216 mg (4/19/2022), 216 mg (5/3/2022), 220 mg (5/17/2022), 156 mg (5/31/2022), 160 mg (6/14/2022), 160 mg (6/28/2022), 160 mg (7/20/2022)  leucovorin calcium IVPB, 692 mg, Intravenous, Once, 7 of 8 cycles  Administration: 700 mg (4/19/2022), 700 mg (5/3/2022), 700 mg (5/17/2022), 700 mg (5/31/2022), 700 mg (6/14/2022), 700 mg (6/28/2022), 700 mg (7/20/2022)  oxaliplatin (ELOXATIN) chemo infusion, 65 mg/m2 = 112 45 mg (76 5 % of original dose 85 mg/m2), Intravenous, Once, 7 of 8 cycles  Dose modification: 65 mg/m2 (original dose 85 mg/m2, Cycle 1, Reason: Anticipated Tolerance)  Administration: 112 45 mg (4/19/2022), 112 45 mg (5/3/2022), 112 45 mg (5/17/2022), 112 45 mg (5/31/2022), 112 45 mg (6/14/2022), 112 45 mg (6/28/2022), 112 45 mg (7/20/2022)  fluorouracil (ADRUCIL) ambulatory infusion Soln, 1,200 mg/m2/day = 4,150 mg, Intravenous, Over 46 hours, 7 of 8 cycles  Dose modification: 1,000 mg/m2/day (original dose 1,200 mg/m2/day, Cycle 8, Reason: Dose Not Tolerated, Comment: dose reduction due to diarrhea), 1,000 mg/m2/day (original dose 1,200 mg/m2/day, Cycle 7, Reason: Dose Not Tolerated, Comment: dose reduction due to diarrhea)     7/22/2022 -  Chemotherapy    pegfilgrastim (NEULASTA), 6 mg, Subcutaneous, Once, 0 of 1 cycle     7/27/2022 Adverse Reaction    3/2022 - locally advanced, unresectable adenocarcinoma of the pancreas     4/19/2022 - start FOLFIRINOX with 20% dose reduction in oxaliplatin and irinotecan due to age    5/2022 - 5FU dose reduced by 20% due to diarrhea    7/12/2022 - cycle 7 delayed due to diarrhea - bolus 5-FU further reduced by a total of 30% and infusional 5-FU dose reduced by 20%        Chemotherapy    Xeloda 1000mg BID M-F concurrent with RT       Past Medical & Surgical Hx:   Patient Active Problem List   Diagnosis   • Type 2 diabetes mellitus without complication, with long-term current use of insulin (HCC)   • GERD (gastroesophageal reflux disease)   • Pancreatic mass   • History of pulmonary embolism   • Hyperbilirubinemia   • Anxiety about health   • Pancreatic adenocarcinoma ([facility] [address] )   • Chemotherapy induced neutropenia (HCC)   • Hypokalemia   • Mild protein-calorie malnutrition ([facility] [address] )   • Acute exacerbation of chronic obstructive pulmonary disease ([facility] [address] )     Past Medical History:   Diagnosis Date   • Ambulates with cane    • Anxiety    • Depression    • Diabetes mellitus ([facility] [address] )    • GERD (gastroesophageal reflux disease)    • History of pulmonary embolus (PE)    • Hyperlipidemia    • Multiple thyroid nodules     pt states about 40yrs ago   • Pancreatic cancer ([facility] [address] )    • Pancreatic mass      Past Surgical History:   Procedure Laterality Date   • APPENDECTOMY     • CATARACT EXTRACTION Right    • FL GUIDED CENTRAL VENOUS ACCESS DEVICE INSERTION  4/11/2022   • TUNNELED VENOUS PORT PLACEMENT Right 4/11/2022    Procedure: INSERTION VENOUS PORT (PORT-A-CATH);   Surgeon: Libby Rueda MD;  Location: [facility] MA[facility] OR;  Service: Surgical Oncology       Review of Medications:   Vitamins, Supplements and Herbals: Med List Reviewed & pt is only taking: potassium    Current Outpatient Medications:   •  Alcohol Swabs 70 % PADS, May substitute brand based on insurance coverage  Check glucose BID , Disp: 100 each, Rfl: 0  •  ALPRAZolam (XANAX) 0 5 mg tablet, Take 0 5 tablets (0 25 mg total) by mouth 3 (three) times a day as needed for anxiety, Disp: 90 tablet, Rfl: 0  •  apixaban (ELIQUIS) 5 mg, Take 5 mg by mouth 2 (two) times a day, Disp: , Rfl:   •  Blood Glucose Monitoring Suppl (OneTouch Verio Reflect) w/Device KIT, May substitute brand based on insurance coverage  Check glucose BID , Disp: 1 kit, Rfl: 0  •  capecitabine (XELODA) 500 MG tablet, Take 2 pills twice a day Monday through Friday while on radiation  , Disp: 120 tablet, Rfl: 0  •  diphenoxylate-atropine (LOMOTIL) 2 5-0 025 mg per tablet, Take 1 tablet by mouth 4 (four) times a day as needed for diarrhea, Disp: 60 tablet, Rfl: 0  •  glucose blood (OneTouch Verio) test strip, May substitute brand based on insurance coverage  Check glucose BID , Disp: 100 each, Rfl: 0  •  Klor-Con M20 20 MEQ tablet, TAKE 2 TABLETS BY MOUTH DAILY, Disp: 180 tablet, Rfl: 1  •  metFORMIN (GLUCOPHAGE) 1000 MG tablet, Take 1,000 mg by mouth 2 (two) times a day with meals, Disp: , Rfl:   •  metFORMIN (GLUCOPHAGE-XR) 500 mg 24 hr tablet, Take 500 mg by mouth 2 (two) times a day, Disp: , Rfl:   •  Multiple Vitamin (MULTIVITAMIN ADULT PO), Take 1 tablet by mouth daily, Disp: , Rfl:   •  omeprazole (PriLOSEC) 20 mg delayed release capsule, , Disp: , Rfl:   •  omeprazole (PriLOSEC) 40 MG capsule, Take 40 mg by mouth every evening, Disp: , Rfl:   •  OneTouch Delica Lancets 80O MISC, May substitute brand based on insurance coverage   Check glucose BID , Disp: 100 each, Rfl: 0  •  polyethylene glycol (MIRALAX) 17 g packet, Take 17 g by mouth daily, Disp: , Rfl:   •  pravastatin (PRAVACHOL) 20 mg tablet, Take 20 mg by mouth daily, Disp: , Rfl:   •  prochlorperazine (COMPAZINE) 10 mg tablet, Take 1 tablet (10 mg total) by mouth every 6 (six) hours as needed for nausea or vomiting, Disp: 30 tablet, Rfl: 0  •  traMADol (ULTRAM) 50 mg tablet, Take 1 tablet (50 mg total) by mouth every 6 (six) hours as needed for moderate pain, Disp: 60 tablet, Rfl: 0    Most Recent Lab Results: hx DM, BG range from 110-140   Doesn't always check blood sugars  Lab Results   Component Value Date    WBC 1 9 (L) 10/03/2022    NEUTROABS 1,136 (L) 10/03/2022    ALT 12 10/03/2022    AST 14 10/03/2022    ALB 4 0 10/03/2022    SODIUM 137 10/03/2022    SODIUM 139 09/19/2022    K 3 1 (L) 10/03/2022    K 3 3 (L) 09/19/2022     10/03/2022    BUN 13 10/03/2022    BUN 16 09/19/2022    CREATININE 0 75 10/03/2022    CREATININE 0 70 09/19/2022    EGFR 91 10/03/2022    PHOS 2 8 03/03/2022    POCGLU 135 04/11/2022    GLUC 206 (H) 10/03/2022    HGBA1C 6 1 08/28/2021    CALCIUM 8 9 10/03/2022    MG 1 8 10/03/2022       Anthropometric Measurements:   Height: 67"  Ht Readings from Last 1 Encounters:   09/28/22 5' 7" (1 702 m)     Wt Readings from Last 20 Encounters:   09/28/22 56 2 kg (124 lb)   09/08/22 59 4 kg (131 lb)   08/29/22 58 1 kg (128 lb)   08/10/22 57 6 kg (127 lb)   07/28/22 56 7 kg (125 lb)   07/20/22 57 3 kg (126 lb 5 2 oz)   07/18/22 57 6 kg (127 lb)   07/12/22 53 8 kg (118 lb 9 7 oz)   07/11/22 52 6 kg (116 lb)   06/30/22 55 3 kg (122 lb)   06/28/22 55 6 kg (122 lb 9 2 oz)   06/27/22 55 3 kg (122 lb)   06/14/22 57 8 kg (127 lb 6 8 oz)   06/13/22 57 2 kg (126 lb)   05/31/22 59 2 kg (130 lb 8 2 oz)   05/18/22 61 7 kg (136 lb)   05/17/22 61 3 kg (135 lb 2 3 oz)   05/03/22 58 4 kg (128 lb 12 8 oz)   04/21/22 62 4 kg (137 lb 9 6 oz)   04/19/22 60 5 kg (133 lb 6 1 oz)       Weight History:   • Usual Weight: 160# beginning of the year 2022  • Varian: (8/29/22) 127#, (9/7/22) 129 2#, (9/19/22) 130 2#  • Home Scale: n/a    Oncology Nutrition-Anthropometrics Flowsheet Row Nutrition from 10/12/2022 in [facility] Nutrition from 9/21/2022 in [facility]   Patient age (years): [de-identified] years [de-identified] years   Patient (male) height (in): 79 in 79 in   Current weight (lbs): 124 lbs 131 lbs   Current weight to be used for anthropometric calculations (kg) 56 4 kg 59 5 kg   BMI: 19 4 20 5   IBW male 148 lb 148 lb   IBW (kg) male 67 3 kg 67 3 kg   IBW % (male) 83 8 % 88 5 %   Adjusted BW (male): 142 lbs 143 8 lbs   Adjusted BW in kg (male): 64 5 kg 65 4 kg   % weight change after 1 month: -3 1 % 3 1 %   Weight change after 1 month (lbs) -4 lbs 4 lbs   % weight change after 3 months: 1 1 % 4 %   Weight change after 3 months (lbs) 1 4 lbs 5 lbs          Nutrition-Focused Physical Findings: n/a due to telephone call    Food/Nutrition-Related History & Client/Social History:    Current Nutrition Impact Symptoms:  [x] Nausea -improving, still gets nauseous occasionally  [x] Reduced Appetite -improving, but not what it used to be [] Acid Reflux    [] Vomiting -resolved [x] Unintended Wt Loss  [] Malabsorption    [x] Diarrhea - has  Lomotil  -not as bad as it used to be per patient [] Unintended Wt Gain  [] Dumping Syndrome    [] Constipation  [] Thick Mucous/Secretions  [] Abdominal Pain    [x] Dysgeusia (Altered Taste) -this is starting to improve, more normal now [] Xerostomia (Dry Mouth)  [] Gas    [] Dysosmia (Altered Smell)  [] Thrush  [] Difficulty Chewing    [] Oral Mucositis (Sore Mouth)  [x] Fatigue  [] Hyperglycemia    [] Odynophagia  [] Esophagitis  [] Other:    [] Dysphagia  [] Early Satiety  [] No Problems Eating      Food Allergies & Intolerances: no    Current Diet: Regular Diet, No Restrictions  Current Nutrition Intake:  Increased since last visit  Appetite: Good, improving now  Nutrition Route: PO  Oral Care: brushes mutiple times per day  Activity level: ADLs, uses a cane, increased fatigue at times  Tries to get out and do things    24 Hr Diet Recall:   Breakfast: CIB with skim milk  Snack: pretzels dipped with mustard  Lunch: went out for a late breakfast- had eggs and ham  Snack: canned fruit, popcorn  Dinner: frozen dinner (hungry man)  Snack: brownie    Beverages: flavored water (8oz x 6-8), sugar-free soda (8oz x2), CIB (8oz x1), chocolate skim milk (8oz x1)  Supplements: usually once/day   • [employer] (0ptr=331 kcal, 5 g pro) mixed with whole milk      Oncology Nutrition-Estimated Needs    Flowsheet Row Nutrition from 2022 in [address] [facility] Nutrition from 3/23/2022 in [facility]   Weight type used Actual weight Actual weight   Weight in kilograms (kg) used for estimated needs 56 8 kg 61 8 kg   Energy needs formula:  30-35 kcal/kg 30-35 kcal/kg   Energy needs based on 30 kcal/k kcal 1855 kcal   Energy needs based on 35 kcal/k kcal 2164 kcal   Protein needs formula: 1 2-1 5 g/kg 1 2-1 5 g/kg   Protein needs based on 1 2 g/k g 74 g   Protein needs based on 1 5 g/kg 85 g 93 g   Fluid needs formula: 30-35 mL/kg 30-35 mL/kg   Fluid needs based on 30 mL/kg 1704 mL 1854 mL   Fluid needs in ounces 58 oz 63 oz   Fluid needs based on 35 mL/kg 1988 mL 2163 mL   Fluid needs in ounces 67 oz 73 oz           Discussion & Intervention:   January Zaragoza was evaluated today for an RD follow up regarding pt request for weight loss and poor PO  January Zaragoza has completed tx for pancreatic cancer  Today, January Zaragoza reports he's been doing better  He states his appetite continues to improve and he feels like he's eating more  He is still experiencing nausea, but he feels that once that resolves he will start eating more  He is eating 2-3 meals/day, drinking CIB once/day  He is now drinking CIB with whole milk which we discussed last visit   Encouraged him to increase shakes to twice daily which he stated he will start doing  His taste is still "off"  He c/o not having much of a taste for it last visit so we reviewed other protein sources  His weight was down on 9/28 from previous weight, but he feels his weight might be trending back up since he feels he's eating a little more  Reviewed 24 hour recall, which revealed an inadequate po intake, and discussed ways to increase kcal, protein, and fluid intakes and optimize nutrient intake  Also reviewed the importance of wt management throughout the tx process and the role of a high kcal/ high protein diet in managing wt and overall health    Based on today's assessment, discussion included: MNT for: wt loss, decreased appetite, xerostomia, diarrhea, nausea, increased protein, how to modify foods for anticipated nutrition impact symptoms pt may experience during CA tx, practicing proper oral care, a high kcal/protein diet & food choices to include at all meals & snacks (Examples of high kcal foods: cheese, full-fat dairy products, nut butter, plant-based fats, coconut oil/milk, avocado, butter, cream soup, etc  Examples of high protein foods: eggs, chicken, fish, beans/legumes, nuts/nut butters, bone broth, etc ) , fortifying foods for added kcal and protein (examples include: adding cheese to foods such as eggs, mashed potatoes, casseroles, etc ; Making oatmeal with whole milk rather than water; Making fortified mashed potatoes with cream, butter, dry milk powder, plain [place] yogurt, and cheese ), adequate hydration & fluid choices, sipping on calorie containing beverages (examples include: adding whole milk or cream to coffee, oral nutrition supplements, juice, electrolyte replacement beverages, milk, etc ), eating smaller more frequent meals every 2-3 hours (5-6 small meals/day), having consistent and planned snacks between meals, increasing oral nutrition supplements, recipe suggestions/resources, trying new recipes, & cooking at home more often and adding extra fat to foods while cooking such as butter, plant-based oil, coconut oil/milk, avocado, nut butters, etc    Moving forward, Blas Leon was encouraged to increase kcal, protein, and fluid intakes    Materials Provided: not applicable  All questions and concerns addressed during today’s visit  Blas Leon has RD contact information  Nutrition Diagnosis:   • Inadequate Energy Intake related to physiological causes, disease state and treatment related issues as evidenced by food recall, wt loss and discussion with pt and/or family  • Increased Nutrient Needs (kcal & pro) related to increased demand for nutrients and disease state as evidenced by cancer dx and pt undergoing tx for cancer  Monitoring & Evaluation:   Goals:  · weight maintenance/stabilization  · adequate nutrition impact symptom management  · pt to meet >/=75% estimated nutrition needs daily    · Progress Towards Goals: Progressing    Nutrition Rx & Recommendations:  · Diet: High Calorie, High Protein (for high calorie foods see pages 52-53, and for high protein foods see pages 49-51 in your Eating Hints book)  · Keep a daily food journal (pen/paper, patti such as IID)  · Nutrition Supplements: increase to 2 CIB per day  May use homemade shakes/smoothies as desired  If using a pre-made shake/bar, choose ones with >300 calories and >10 grams protein (ex  Ensure Complete, Ensure Enlive, Ensure Plus, Boost Plus, Boost Very High Calorie, etc )  · Small, frequent meals/snacks may be easier to tolerate than 3 large daily meals  Aim for 5-6 small meals per day (every 2-3 hours)  · Include protein at all meals/snacks  · Include a variety of foods (as tolerated/allowed by diet)  · Incorporate physical activity as able/allowed  · Stay hydrated by sipping fluids of choice/tolerance throughout the day  · Liquid nutrition may be better tolerated than solids at times  · Alter food choices and eating patterns to accommodate changing needs    · Refer to Eating Hints book for other meal/snack ideas and symptom management  · Follow proper oral care; Try baking soda/salt water rinse recipe (mix 3/4 tsp salt + 1 tsp baking soda + 1 qt water; rinse with plain water after using) in Eating Hints book (pg 18)  Brush your teeth before/after meals & before bed  · For diarrhea: drink plenty of fluids (>64 oz/day), avoid carbonation; eat 5-6 small meals/day; include high sodium & potassium foods/liquids (Sodium: soup/broth; Potassium: bananas, canned apricots, potatoes); eat low-fiber foods; choose foods/liquids that are room temperature; avoid foods/drinks that can make diarrhea worse (ex  high fiber, high sugar, hot/cold drinks, greasy/fatty foods, gas forming foods such as beans, raw produce, milk products, alcohol, spicy foods, caffeine, sugar alcohols (xylitol, sorbitol), and apple juice (high in sorbitol) (see pages 15-16 in your Eating Hints book)  If diarrhea is severe, consider adding Banatrol (banana flakes) 1-3 times per day mixed in applesauce (can be purchased online from [address] [place] [address])  · For nausea/vomiting: try plain/bland foods; try lemon/lime flavors; eat 5-6 small meals/day (except when vomiting); do not skip meals/snacks; choose appealing foods; sip only small amounts of liquids during meals; stay hydrated in between meals; eat foods/drinks that are room temperature; eat dry toast/crackers (see pages  21-22 and 31-32 in your Eating Hints book)  · Weigh yourself regularly  If you notice weight loss, make an effort to increase your daily food/calorie intake  If you continue to notice loss after these efforts, reach out to your dietitian to establish a plan to stabilize weight      · High calorie/high protein snacks between meals: cheese and crackers, oral nutrition supplements, PB on crackers or waffles, cottage cheese and fruit, milk, handful of nuts  · Continue to use whole milk (for more calories) or try whole fairlife milk (for more calories and protein)  · Other sources of protein besides meat: milk, oral nutrition supplements, fish, tuna salad, yogurt, cheese, nuts/nut butter, eggs, beans/legumes     Follow Up Plan: 11/9/22 phone follow up at 3 pm  Recommend Referral to Other Providers: none at this time

## 2022-10-17 ENCOUNTER — TELEPHONE (OUTPATIENT)
Dept: CARDIOLOGY | Facility: CLINIC | Age: 87
End: 2022-10-17

## 2022-10-17 NOTE — TELEPHONE ENCOUNTER
[facility] received a fax that requires your attention. The document has been indexed to the patient’s chart for your review.      Reason for sending: POSSIBLE DRUG INTERACTION    Documents Description: PRESCRIBER RESPONSE FORM    Name of Sender: [facility]    Date Indexed: 10/12/2022    Notes (if needed): POSSIBLE INTERACTION BETWEEN CLONIDINE AND METPROLOL TARTRATE

## 2022-10-20 ENCOUNTER — READMISSION MANAGEMENT (OUTPATIENT)
Dept: CALL CENTER | Facility: HOSPITAL | Age: 87
End: 2022-10-20

## 2022-10-20 NOTE — OUTREACH NOTE
Medical Week 2 Survey    Flowsheet Row Responses   [facility] patient discharged from? [facility]   Does the patient have one of the following disease processes/diagnoses(primary or secondary)? Other   Week 2 attempt successful? Yes   Call start time 1524   Discharge diagnosis rapid heartbeat persistent afib   Call end time 1530   Meds reviewed with patient/caregiver? Yes   Is the patient having any side effects they believe may be caused by any medication additions or changes? No   Does the patient have all medications ordered at discharge? Yes   Is the patient taking all medications as directed (includes completed medication regime)? Yes   Comments regarding appointments Cards appt on 11/10/22   Does the patient have a primary care provider?  Yes   Does the patient have an appointment with their PCP within 7 days of discharge? Yes   Comments regarding PCP 10/17/22   Has the patient kept scheduled appointments due by today? Yes   Has home health visited the patient within 72 hours of discharge? N/A   Psychosocial issues? No   Comments Pt is wearing a heart monitor for 14days   Did the patient receive a copy of their discharge instructions? Yes   Nursing interventions Reviewed instructions with patient   What is the patient's perception of their health status since discharge? Improving   Is the patient/caregiver able to teach back the hierarchy of who to call/visit for symptoms/problems? PCP, Specialist, Home health nurse, Urgent Care, ED, 911 Yes   Week 2 Call Completed? Yes          GLENNY VICKERS - Registered Nurse

## 2022-10-30 LAB
MAXIMAL PREDICTED HEART RATE: 134 BPM
STRESS TARGET HR: 114 BPM

## 2022-10-30 PROCEDURE — 93248 EXT ECG>7D<15D REV&INTERPJ: CPT | Performed by: EMERGENCY MEDICINE

## 2022-11-10 ENCOUNTER — OFFICE VISIT (OUTPATIENT)
Dept: CARDIOLOGY | Facility: CLINIC | Age: 87
End: 2022-11-10

## 2022-11-10 VITALS
HEIGHT: 64 IN | WEIGHT: 144 LBS | BODY MASS INDEX: 24.59 KG/M2 | OXYGEN SATURATION: 98 % | SYSTOLIC BLOOD PRESSURE: 164 MMHG | DIASTOLIC BLOOD PRESSURE: 70 MMHG | HEART RATE: 74 BPM

## 2022-11-10 DIAGNOSIS — G45.9 TRANSIENT ISCHEMIC ATTACK: ICD-10-CM

## 2022-11-10 DIAGNOSIS — I44.7 LBBB (LEFT BUNDLE BRANCH BLOCK): ICD-10-CM

## 2022-11-10 DIAGNOSIS — E11.21 TYPE 2 DIABETES MELLITUS WITH DIABETIC NEPHROPATHY, WITHOUT LONG-TERM CURRENT USE OF INSULIN: Chronic | ICD-10-CM

## 2022-11-10 DIAGNOSIS — E78.2 MIXED HYPERLIPIDEMIA: ICD-10-CM

## 2022-11-10 DIAGNOSIS — R53.83 OTHER FATIGUE: ICD-10-CM

## 2022-11-10 DIAGNOSIS — I10 BENIGN ESSENTIAL HTN: Primary | ICD-10-CM

## 2022-11-10 DIAGNOSIS — I48.19 PERSISTENT ATRIAL FIBRILLATION: ICD-10-CM

## 2022-11-10 DIAGNOSIS — E78.00 HIGH CHOLESTEROL: ICD-10-CM

## 2022-11-10 PROCEDURE — 99214 OFFICE O/P EST MOD 30 MIN: CPT | Performed by: EMERGENCY MEDICINE

## 2022-11-10 RX ORDER — FLECAINIDE ACETATE 50 MG/1
50 TABLET ORAL 2 TIMES DAILY
Qty: 60 TABLET | Refills: 11 | Status: SHIPPED | OUTPATIENT
Start: 2022-11-10

## 2022-11-10 NOTE — PROGRESS NOTES
Subjective:     Encounter Date:11/10/2022      Patient ID: Nedra Tolliver is a 86 y.o. female.    Chief Complaint:  History of Present Illness    The patient is an86-year-old female who presents today for a follow-up.    The patient reports since her recent hospitalization she has episodes of feeling strained and shakiness. She maintains that both her blood pressure and heart rate become elevated when she feels these symptoms. She reports palpitations. Her systolic has been fluctuating today from a range of 116 to 160 mmHg.    She reports no longer taking eloisa medications for pain. When asked the patient confirmed she discontinued her prescription for Aldactone as she had an adverse reaction to it as well and reports that it caused inflammation and swelling in her ankles. She reports having an adverse reaction to the prescription clonidine in the past is at caused her to have a rash.        Review of Systems   Constitutional: Negative for diaphoresis, fever and malaise/fatigue.   HENT: Negative for congestion.    Eyes: Negative for vision loss in left eye and vision loss in right eye.   Cardiovascular: Positive for palpitations. Negative for chest pain, claudication, dyspnea on exertion, irregular heartbeat, leg swelling, orthopnea and syncope.   Respiratory: Negative for cough, shortness of breath and wheezing.    Hematologic/Lymphatic: Negative for adenopathy.   Skin: Negative for rash.   Musculoskeletal: Negative for joint pain and joint swelling.   Gastrointestinal: Negative for abdominal pain, diarrhea, nausea and vomiting.   Neurological: Positive for tremors. Negative for excessive daytime sleepiness, dizziness, focal weakness, light-headedness, numbness and weakness.   Psychiatric/Behavioral: Negative for depression. The patient does not have insomnia.            Current Outpatient Medications:   •  acetaminophen (TYLENOL) 500 MG tablet, Take 1,000 mg by mouth At Night As Needed for Mild Pain ., Disp:  , Rfl:   •  apixaban (ELIQUIS) 5 MG tablet tablet, Take 1 tablet by mouth 2 (Two) Times a Day., Disp: 60 tablet, Rfl: 11  •  Cholecalciferol (Vitamin D3) 125 MCG (5000 UT) tablet dispersible, Take 5,000 tablets by mouth Daily., Disp: , Rfl:   •  Coenzyme Q10 (CO Q 10) 100 MG capsule, Take 1 tablet by mouth Daily., Disp: , Rfl:   •  Evolocumab (REPATHA) solution auto-injector SureClick injection, 1 mL Every 14 (Fourteen) Days., Disp: , Rfl:   •  glimepiride (AMARYL) 4 MG tablet, Take 1 tablet by mouth Every Morning Before Breakfast. Takes an extra 1/2 tablet when blood sugar is high., Disp: , Rfl:   •  irbesartan (AVAPRO) 150 MG tablet, Take 1 tablet by mouth 2 (Two) Times a Day., Disp: 60 tablet, Rfl: 11  •  levothyroxine (SYNTHROID, LEVOTHROID) 50 MCG tablet, Take 1 tablet by mouth Daily., Disp: , Rfl:   •  metoprolol tartrate (LOPRESSOR) 25 MG tablet, Take 1 tablet by mouth Every 12 (Twelve) Hours., Disp: 60 tablet, Rfl: 11  •  Multiple Vitamins-Minerals (CENTRUM SILVER PO), Take 1 tablet by mouth Daily., Disp: , Rfl:   •  flecainide (TAMBOCOR) 50 MG tablet, Take 1 tablet by mouth 2 (Two) Times a Day., Disp: 60 tablet, Rfl: 11       Objective:      Vitals:    11/10/22 0830   BP: 164/70   Pulse: 74   SpO2: 98%     Vitals and nursing note reviewed.   Constitutional:       Appearance: Normal and healthy appearance. Well-developed and not in distress.   Eyes:      Extraocular Movements: Extraocular movements intact.      Pupils: Pupils are equal, round, and reactive to light.   HENT:      Head: Normocephalic and atraumatic.    Mouth/Throat:      Pharynx: Oropharynx is clear.   Neck:      Vascular: JVD normal.      Trachea: Trachea normal.   Pulmonary:      Effort: Pulmonary effort is normal.      Breath sounds: Normal breath sounds. No wheezing. No rhonchi. No rales.   Cardiovascular:      PMI at left midclavicular line. Normal rate. Irregularly irregular rhythm. Normal S1. Normal S2.      Murmurs: There is a grade  2/6 systolic murmur.      No gallop. No rub.   Pulses:     Dorsalis pedis: 2+ bilaterally.     Posterior tibial: 2+ bilaterally.  Abdominal:      General: Bowel sounds are normal.      Palpations: Abdomen is soft.      Tenderness: There is no abdominal tenderness.   Musculoskeletal: Normal range of motion.      Cervical back: Normal range of motion and neck supple. Skin:     General: Skin is warm and dry.      Capillary Refill: Capillary refill takes less than 2 seconds.   Feet:      Right foot:      Skin integrity: Skin integrity normal.      Left foot:      Skin integrity: Skin integrity normal.   Neurological:      Mental Status: Alert and oriented to person, place and time.      Cranial Nerves: Cranial nerves are intact.      Sensory: Sensation is intact.      Motor: Motor function is intact.      Coordination: Coordination is intact.   Psychiatric:         Speech: Speech normal.         Behavior: Behavior is cooperative.         Lab Review:       Procedures      Assessment/Plan:     Problem List Items Addressed This Visit        Cardiac and Vasculature    Benign essential HTN - Primary    Overview     cont BP medication the day of procedure         High cholesterol    Mixed hyperlipidemia with pervious statin intolerance    LBBB (left bundle branch block)    Overview     Added automatically from request for surgery [id number]         Relevant Medications    flecainide (TAMBOCOR) 50 MG tablet    Persistent atrial fibrillation (HCC)       Endocrine and Metabolic    Type 2 diabetes mellitus, without long-term current use of insulin (HCC) (Chronic)       Neuro    Transient ischemic attack       Symptoms and Signs    Fatigue     Cardiac health maintenance  - Ther patient was hospitalized due to an adverse effect with her prescription of Procardia. Her Holter monitor results indicate she is still having  many episodes of supraventricular tachycardia and premature atrial complexes. the patient is prescribed Eliquis,  discontinued clonidine, Repatha injections, Irbesartan, Avapro and Lopressor 25 mg twice a day.     The patient was previously prescribed Norvasc, hydralazine, Cardizem, Imdur, lisinopril, hydrochlorothiazide and Coreg to assist with hypertension control. She is no longer taking any these prescriptions as they caused her blood pressure to fluctuate. Her ECHO indicates a normal systolic rate. Ordered a prescription for Tambocor, once in the morning and once at night. the patient will monitor her blood pressure and heart rate daily. She was advised to contact the office if they remain elevated and she has an averse reaction to the Tambocor.    The patient will follow-up in a month.    Recommendations/plans:    Transcribed from ambient dictation for Mak Nickerson DO by Jasiel Ramos.  11/10/22   11:42 CST    Patient or patient representative verbalized consent to the visit recording.  I have personally performed the services described in this document as transcribed by the above individual, and it is both accurate and complete.  Mak Nickerson DO  11/13/2022  17:57 CST

## 2022-11-29 ENCOUNTER — TELEPHONE (OUTPATIENT)
Dept: CARDIOLOGY | Facility: CLINIC | Age: 87
End: 2022-11-29

## 2022-11-29 NOTE — TELEPHONE ENCOUNTER
PT CALLED IN LAST NIGHT HER BP WAS RUNNING 161/61 HR 31, THIS MORNING BP /71 HR 41     SHE HAS BEEN FEELING DIZZY, AND TIRED       PLEASE ADVISE

## 2022-12-08 ENCOUNTER — OFFICE VISIT (OUTPATIENT)
Dept: CARDIOLOGY | Facility: CLINIC | Age: 87
End: 2022-12-08

## 2022-12-08 VITALS
DIASTOLIC BLOOD PRESSURE: 62 MMHG | HEIGHT: 64 IN | BODY MASS INDEX: 24.59 KG/M2 | HEART RATE: 58 BPM | OXYGEN SATURATION: 99 % | WEIGHT: 144 LBS | SYSTOLIC BLOOD PRESSURE: 148 MMHG

## 2022-12-08 DIAGNOSIS — R55 SYNCOPE AND COLLAPSE: ICD-10-CM

## 2022-12-08 DIAGNOSIS — I10 BENIGN ESSENTIAL HTN: Primary | ICD-10-CM

## 2022-12-08 DIAGNOSIS — I48.19 PERSISTENT ATRIAL FIBRILLATION: ICD-10-CM

## 2022-12-08 DIAGNOSIS — E78.00 HIGH CHOLESTEROL: ICD-10-CM

## 2022-12-08 DIAGNOSIS — E11.21 TYPE 2 DIABETES MELLITUS WITH DIABETIC NEPHROPATHY, WITHOUT LONG-TERM CURRENT USE OF INSULIN: Chronic | ICD-10-CM

## 2022-12-08 DIAGNOSIS — R53.83 OTHER FATIGUE: ICD-10-CM

## 2022-12-08 DIAGNOSIS — E78.2 MIXED HYPERLIPIDEMIA: ICD-10-CM

## 2022-12-08 DIAGNOSIS — I44.7 LBBB (LEFT BUNDLE BRANCH BLOCK): ICD-10-CM

## 2022-12-08 PROCEDURE — 99214 OFFICE O/P EST MOD 30 MIN: CPT | Performed by: EMERGENCY MEDICINE

## 2022-12-08 RX ORDER — CLONIDINE HYDROCHLORIDE 0.1 MG/1
0.1 TABLET ORAL 2 TIMES DAILY
Qty: 60 TABLET | Refills: 3 | Status: SHIPPED | OUTPATIENT
Start: 2022-12-08 | End: 2023-01-10

## 2022-12-08 NOTE — PROGRESS NOTES
Subjective:     Encounter Date:12/08/2022      Patient ID: Nedra Tolliver is a 87 y.o. female.    Chief Complaint:  History of Present Illness    The patient is an 87-year-old female who presents today for a follow-up.    When asked the patient reports she is still having shortness of breath and palpitations. She maintains she is taking her Tambocor as prescribed and the dosage was cut in half as her heart rate was at 31 BPM. She is concerned about her elevated blood pressure and is concerned one of her medications may be causing it.    She maintains she has an appointment in 2 weeks with her nephrologist. She reports an allergy to hydrochlorothiazide which was discovered on a visit to the emergency department which caused her to go into atrial fibrillation. She is also allergic to amlodipine.          Review of Systems   Constitutional: Negative for diaphoresis, fever and malaise/fatigue.   HENT: Negative for congestion.    Eyes: Negative for vision loss in left eye and vision loss in right eye.   Cardiovascular: Positive for palpitations. Negative for chest pain, claudication, dyspnea on exertion, irregular heartbeat, leg swelling, orthopnea and syncope.   Respiratory: Positive for shortness of breath. Negative for cough and wheezing.    Hematologic/Lymphatic: Negative for adenopathy.   Skin: Negative for rash.   Musculoskeletal: Negative for joint pain and joint swelling.   Gastrointestinal: Negative for abdominal pain, diarrhea, nausea and vomiting.   Neurological: Negative for excessive daytime sleepiness, dizziness, focal weakness, light-headedness, numbness and weakness.   Psychiatric/Behavioral: Negative for depression. The patient does not have insomnia.            Current Outpatient Medications:   •  acetaminophen (TYLENOL) 500 MG tablet, Take 1,000 mg by mouth At Night As Needed for Mild Pain ., Disp: , Rfl:   •  apixaban (ELIQUIS) 5 MG tablet tablet, Take 1 tablet by mouth 2 (Two) Times a Day.,  Disp: 60 tablet, Rfl: 11  •  Cholecalciferol (Vitamin D3) 125 MCG (5000 UT) tablet dispersible, Take 5,000 tablets by mouth Daily., Disp: , Rfl:   •  Coenzyme Q10 (CO Q 10) 100 MG capsule, Take 1 tablet by mouth Daily., Disp: , Rfl:   •  Evolocumab (REPATHA) solution auto-injector SureClick injection, 1 mL Every 14 (Fourteen) Days., Disp: , Rfl:   •  flecainide (TAMBOCOR) 50 MG tablet, Take 1 tablet by mouth 2 (Two) Times a Day., Disp: 60 tablet, Rfl: 11  •  glimepiride (AMARYL) 4 MG tablet, Take 1 tablet by mouth Every Morning Before Breakfast. Takes an extra 1/2 tablet when blood sugar is high., Disp: , Rfl:   •  irbesartan (AVAPRO) 150 MG tablet, Take 1 tablet by mouth 2 (Two) Times a Day., Disp: 60 tablet, Rfl: 11  •  levothyroxine (SYNTHROID, LEVOTHROID) 50 MCG tablet, Take 1 tablet by mouth Daily., Disp: , Rfl:   •  metoprolol tartrate (LOPRESSOR) 25 MG tablet, Take 1 tablet by mouth Every 12 (Twelve) Hours. (Patient taking differently: Take 25 mg by mouth Every 12 (Twelve) Hours. PT TAKING 1/2 AM AND 1/2 PM), Disp: 60 tablet, Rfl: 11  •  Multiple Vitamins-Minerals (CENTRUM SILVER PO), Take 1 tablet by mouth Daily., Disp: , Rfl:   •  cloNIDine (Catapres) 0.1 MG tablet, Take 1 tablet by mouth 2 (Two) Times a Day., Disp: 60 tablet, Rfl: 3       Objective:      Vitals:    12/08/22 1026   BP: 148/62   Pulse: 58   SpO2: 99%     Vitals and nursing note reviewed.   Constitutional:       Appearance: Normal and healthy appearance. Well-developed and not in distress.   Eyes:      Extraocular Movements: Extraocular movements intact.      Pupils: Pupils are equal, round, and reactive to light.   HENT:      Head: Normocephalic and atraumatic.    Mouth/Throat:      Pharynx: Oropharynx is clear.   Neck:      Vascular: JVD normal.      Trachea: Trachea normal.   Pulmonary:      Effort: Pulmonary effort is normal.      Breath sounds: Normal breath sounds. No wheezing. No rhonchi. No rales.   Cardiovascular:      PMI at left  midclavicular line. Normal rate. Regular rhythm. Normal S1. Normal S2.      Murmurs: There is a grade 2/6 systolic murmur.      No gallop. No rub.   Pulses:     Dorsalis pedis: 2+ bilaterally.     Posterior tibial: 2+ bilaterally.  Abdominal:      General: Bowel sounds are normal.      Palpations: Abdomen is soft.      Tenderness: There is no abdominal tenderness.   Musculoskeletal: Normal range of motion.      Cervical back: Normal range of motion and neck supple. Skin:     General: Skin is warm and dry.      Capillary Refill: Capillary refill takes less than 2 seconds.   Feet:      Right foot:      Skin integrity: Skin integrity normal.      Left foot:      Skin integrity: Skin integrity normal.   Neurological:      Mental Status: Alert and oriented to person, place and time.      Cranial Nerves: Cranial nerves are intact.      Sensory: Sensation is intact.      Motor: Motor function is intact.      Coordination: Coordination is intact.   Psychiatric:         Speech: Speech normal.         Behavior: Behavior is cooperative.         Lab Review:       Procedures      Assessment/Plan:     Problem List Items Addressed This Visit        Cardiac and Vasculature    Benign essential HTN - Primary    Overview     cont BP medication the day of procedure         Relevant Medications    cloNIDine (Catapres) 0.1 MG tablet    High cholesterol    Mixed hyperlipidemia with pervious statin intolerance    LBBB (left bundle branch block)    Overview     Added automatically from request for surgery [id number]         Persistent atrial fibrillation (HCC)       Endocrine and Metabolic    Type 2 diabetes mellitus, without long-term current use of insulin (HCC) (Chronic)       Symptoms and Signs    Syncope and collapse    Fatigue     Cardiac health maintenance  - The patient presents today for a follow-up for congestive heart failure. Her heart rate has been low and recorded at 31 BPM. Her systolic has been in the 160 to 180 mmHg  range.    She is currently prescribed Tambocor which has had the dosage halved due to low heart rates. She is prescribed Eliquis, Lopressor 25 mg twice daily, Avapro. Her discontinued prescriptions include Norvasc, hydralazine, Cardizem, Imdur, lisinopril and HCTZ. Ordered a prescription for clonidine 1 pill in the morning and 1 pill in the evening.     The patient will contact the office if any side effects occur with her prescription for clonidine. The patient will follow-up on 01/10/2023.      Recommendations/plans:    Transcribed from ambient dictation for Mak Nickerson DO by Jasiel Ramos.  12/08/22   14:13 CST    Patient or patient representative verbalized consent to the visit recording.  I have personally performed the services described in this document as transcribed by the above individual, and it is both accurate and complete.  Mak Nickerson DO  12/13/2022  13:13 CST

## 2022-12-12 ENCOUNTER — TELEPHONE (OUTPATIENT)
Dept: CARDIOLOGY | Facility: CLINIC | Age: 87
End: 2022-12-12

## 2022-12-12 NOTE — TELEPHONE ENCOUNTER
Patient has called in today stating on 12/10 @ 7am she took clonidine as prescribed recently, she states around 8:40am her BP was 118/45 and HR 50, @9:45am her BP was 154/73 HR 30, she states at that time she felt bad and she had an irregular heart beat, She states she has not taken clonidine today and BP is 174/57 HR 56. Please advise.

## 2022-12-14 NOTE — TELEPHONE ENCOUNTER
Dr. Nickerson    Please call the patient and ask her to try breaking the clonidine's in half and only taking half a pill instead of a full pill.          Patient notified.

## 2022-12-16 LAB
ALBUMIN SERPL-MCNC: 4.2 G/DL (ref 3.5–5.2)
ALP BLD-CCNC: 72 U/L (ref 35–104)
ALT SERPL-CCNC: 11 U/L (ref 5–33)
ANION GAP SERPL CALCULATED.3IONS-SCNC: 13 MMOL/L (ref 7–19)
AST SERPL-CCNC: 14 U/L (ref 5–32)
BACTERIA: ABNORMAL /HPF
BASOPHILS ABSOLUTE: 0.1 K/UL (ref 0–0.2)
BASOPHILS RELATIVE PERCENT: 0.6 % (ref 0–1)
BILIRUB SERPL-MCNC: 0.3 MG/DL (ref 0.2–1.2)
BILIRUBIN URINE: NEGATIVE
BLOOD, URINE: NEGATIVE
BUN BLDV-MCNC: 21 MG/DL (ref 8–23)
CALCIUM SERPL-MCNC: 9.7 MG/DL (ref 8.8–10.2)
CHLORIDE BLD-SCNC: 94 MMOL/L (ref 98–111)
CLARITY: ABNORMAL
CO2: 23 MMOL/L (ref 22–29)
COLOR: YELLOW
CREAT SERPL-MCNC: 1 MG/DL (ref 0.5–0.9)
CREATININE URINE: 125 MG/DL (ref 4.2–622)
CRYSTALS, UA: ABNORMAL /HPF
EOSINOPHILS ABSOLUTE: 0.3 K/UL (ref 0–0.6)
EOSINOPHILS RELATIVE PERCENT: 3.2 % (ref 0–5)
EPITHELIAL CELLS, UA: 3 /HPF (ref 0–5)
GFR SERPL CREATININE-BSD FRML MDRD: 54 ML/MIN/{1.73_M2}
GLUCOSE BLD-MCNC: 136 MG/DL (ref 74–109)
GLUCOSE URINE: NEGATIVE MG/DL
HCT VFR BLD CALC: 37.1 % (ref 37–47)
HEMOGLOBIN: 12.4 G/DL (ref 12–16)
HYALINE CASTS: 3 /HPF (ref 0–8)
IMMATURE GRANULOCYTES #: 0 K/UL
KETONES, URINE: NEGATIVE MG/DL
LEUKOCYTE ESTERASE, URINE: ABNORMAL
LYMPHOCYTES ABSOLUTE: 2.5 K/UL (ref 1.1–4.5)
LYMPHOCYTES RELATIVE PERCENT: 30 % (ref 20–40)
MAGNESIUM: 2.1 MG/DL (ref 1.6–2.4)
MCH RBC QN AUTO: 32.6 PG (ref 27–31)
MCHC RBC AUTO-ENTMCNC: 33.4 G/DL (ref 33–37)
MCV RBC AUTO: 97.6 FL (ref 81–99)
MONOCYTES ABSOLUTE: 0.8 K/UL (ref 0–0.9)
MONOCYTES RELATIVE PERCENT: 10.1 % (ref 0–10)
NEUTROPHILS ABSOLUTE: 4.7 K/UL (ref 1.5–7.5)
NEUTROPHILS RELATIVE PERCENT: 56 % (ref 50–65)
NITRITE, URINE: NEGATIVE
PARATHYROID HORMONE INTACT: 76 PG/ML (ref 15–65)
PDW BLD-RTO: 12.7 % (ref 11.5–14.5)
PH UA: 6 (ref 5–8)
PHOSPHORUS: 2.9 MG/DL (ref 2.5–4.5)
PLATELET # BLD: 234 K/UL (ref 130–400)
PMV BLD AUTO: 10.9 FL (ref 9.4–12.3)
POTASSIUM SERPL-SCNC: 4.1 MMOL/L (ref 3.5–5)
PROTEIN PROTEIN: 56 MG/DL (ref 15–45)
PROTEIN UA: 100 MG/DL
RBC # BLD: 3.8 M/UL (ref 4.2–5.4)
RBC UA: 1 /HPF (ref 0–4)
SODIUM BLD-SCNC: 130 MMOL/L (ref 136–145)
SPECIFIC GRAVITY UA: 1.01 (ref 1–1.03)
TOTAL PROTEIN: 6.9 G/DL (ref 6.6–8.7)
URIC ACID, SERUM: 6.2 MG/DL (ref 2.4–5.7)
UROBILINOGEN, URINE: 0.2 E.U./DL
VITAMIN D 25-HYDROXY: 91.6 NG/ML
WBC # BLD: 8.3 K/UL (ref 4.8–10.8)
WBC UA: 51 /HPF (ref 0–5)

## 2023-03-15 ENCOUNTER — LAB (OUTPATIENT)
Dept: LAB | Facility: HOSPITAL | Age: 88
End: 2023-03-15
Payer: MEDICARE

## 2023-03-15 ENCOUNTER — OFFICE VISIT (OUTPATIENT)
Dept: CARDIOLOGY | Facility: CLINIC | Age: 88
End: 2023-03-15
Payer: MEDICARE

## 2023-03-15 ENCOUNTER — HOSPITAL ENCOUNTER (OUTPATIENT)
Dept: GENERAL RADIOLOGY | Facility: HOSPITAL | Age: 88
Discharge: HOME | End: 2023-03-15
Payer: MEDICARE

## 2023-03-15 VITALS
HEIGHT: 64 IN | BODY MASS INDEX: 24.59 KG/M2 | OXYGEN SATURATION: 97 % | WEIGHT: 144 LBS | SYSTOLIC BLOOD PRESSURE: 150 MMHG | HEART RATE: 82 BPM | DIASTOLIC BLOOD PRESSURE: 63 MMHG

## 2023-03-15 DIAGNOSIS — I10 BENIGN ESSENTIAL HTN: ICD-10-CM

## 2023-03-15 DIAGNOSIS — I44.7 LBBB (LEFT BUNDLE BRANCH BLOCK): ICD-10-CM

## 2023-03-15 DIAGNOSIS — R06.02 SHORTNESS OF BREATH: Primary | ICD-10-CM

## 2023-03-15 DIAGNOSIS — R06.02 SHORTNESS OF BREATH: ICD-10-CM

## 2023-03-15 DIAGNOSIS — I07.1 MILD TRICUSPID INSUFFICIENCY: ICD-10-CM

## 2023-03-15 DIAGNOSIS — E78.00 HIGH CHOLESTEROL: ICD-10-CM

## 2023-03-15 DIAGNOSIS — E78.2 MIXED HYPERLIPIDEMIA: ICD-10-CM

## 2023-03-15 DIAGNOSIS — E11.21 TYPE 2 DIABETES MELLITUS WITH DIABETIC NEPHROPATHY, WITHOUT LONG-TERM CURRENT USE OF INSULIN: Chronic | ICD-10-CM

## 2023-03-15 DIAGNOSIS — N18.2 CKD (CHRONIC KIDNEY DISEASE) STAGE 2, GFR 60-89 ML/MIN: ICD-10-CM

## 2023-03-15 DIAGNOSIS — I48.19 PERSISTENT ATRIAL FIBRILLATION: ICD-10-CM

## 2023-03-15 LAB
ANION GAP SERPL CALCULATED.3IONS-SCNC: 11 MMOL/L (ref 5–15)
BUN SERPL-MCNC: 26 MG/DL (ref 8–23)
BUN/CREAT SERPL: 22.6 (ref 7–25)
CALCIUM SPEC-SCNC: 9.8 MG/DL (ref 8.6–10.5)
CHLORIDE SERPL-SCNC: 100 MMOL/L (ref 98–107)
CO2 SERPL-SCNC: 26 MMOL/L (ref 22–29)
CREAT SERPL-MCNC: 1.15 MG/DL (ref 0.57–1)
DEPRECATED RDW RBC AUTO: 45 FL (ref 37–54)
EGFRCR SERPLBLD CKD-EPI 2021: 46.2 ML/MIN/1.73
ERYTHROCYTE [DISTWIDTH] IN BLOOD BY AUTOMATED COUNT: 12.7 % (ref 12.3–15.4)
GLUCOSE SERPL-MCNC: 170 MG/DL (ref 65–99)
HCT VFR BLD AUTO: 38.6 % (ref 34–46.6)
HGB BLD-MCNC: 12.4 G/DL (ref 12–15.9)
MCH RBC QN AUTO: 30.9 PG (ref 26.6–33)
MCHC RBC AUTO-ENTMCNC: 32.1 G/DL (ref 31.5–35.7)
MCV RBC AUTO: 96.3 FL (ref 79–97)
PLATELET # BLD AUTO: 245 10*3/MM3 (ref 140–450)
PMV BLD AUTO: 9.7 FL (ref 6–12)
POTASSIUM SERPL-SCNC: 4.6 MMOL/L (ref 3.5–5.2)
RBC # BLD AUTO: 4.01 10*6/MM3 (ref 3.77–5.28)
SODIUM SERPL-SCNC: 137 MMOL/L (ref 136–145)
WBC NRBC COR # BLD: 6.04 10*3/MM3 (ref 3.4–10.8)

## 2023-03-15 PROCEDURE — 36415 COLL VENOUS BLD VENIPUNCTURE: CPT

## 2023-03-15 PROCEDURE — 71046 X-RAY EXAM CHEST 2 VIEWS: CPT

## 2023-03-15 PROCEDURE — 85027 COMPLETE CBC AUTOMATED: CPT

## 2023-03-15 PROCEDURE — 80048 BASIC METABOLIC PNL TOTAL CA: CPT

## 2023-03-15 PROCEDURE — 99214 OFFICE O/P EST MOD 30 MIN: CPT | Performed by: EMERGENCY MEDICINE

## 2023-03-15 RX ORDER — ATENOLOL 25 MG/1
25 TABLET ORAL DAILY
Qty: 30 TABLET | Refills: 11 | Status: SHIPPED | OUTPATIENT
Start: 2023-03-15

## 2023-03-15 NOTE — PROGRESS NOTES
Subjective:     Encounter Date:03/15/2023      Patient ID: Nedra Tolliver is a 87 y.o. female.    Chief Complaint:  History of Present Illness    Nedra Tolliver is an established female patient who presents today for hypertension and bradycardia.     The patient states that she experienced inconsistent heart rate readings on 03/13/2022. Her heart rate was initially 43 BPM, then it became 30 BPM on her pulse oximeter, and the last heart rate was in the 80s BPM. Currently, her heart rate has remained in the 80s BPM. She believes she was in A. fib all day on 03/13/2023 and felt tightness in her chest. On that day she also experienced a dry cough and slight sputum.     Her blood pressure has been in the 190s, 160s, and 170s mmHg. Today her blood pressure was 150/63 mmHg. She is taking Eliquis, flecainide half a tablet twice a day, Avapro, Minipress, and Repatha. She was initially taking 1 mg of Minipress, but after seeing Dr. Rice on 02/24/2023, Minipress was increased to 2 mg twice a day. She did not notice any improvement. Metoprolol half a tablet caused the patient to feel worsen causing her to discontinue the medication. She has not taken metoprolol since 03/13/2023.     The patient has previously tried carvedilol, Coreg, and diltiazem. She does not recall why they were discontinued, but believes it was due to intolerance. She does not recall taking atenolol in the past.     Her lipid panels were last evaluated in 12/2022 by Dr. Rice.         Review of Systems   Constitutional: Positive for malaise/fatigue. Negative for diaphoresis and fever.   HENT: Negative for congestion.    Eyes: Negative for vision loss in left eye and vision loss in right eye.   Cardiovascular: Positive for irregular heartbeat. Negative for chest pain, claudication, dyspnea on exertion, leg swelling, orthopnea, palpitations and syncope.   Respiratory: Negative for cough, shortness of breath and wheezing.     Hematologic/Lymphatic: Negative for adenopathy.   Skin: Negative for rash.   Musculoskeletal: Negative for joint pain and joint swelling.   Gastrointestinal: Negative for abdominal pain, diarrhea, nausea and vomiting.   Neurological: Negative for excessive daytime sleepiness, dizziness, focal weakness, light-headedness, numbness and weakness.   Psychiatric/Behavioral: Negative for depression. The patient does not have insomnia.        A review of systems was performed, and the pertinent positives are noted in the HPI.     Current Outpatient Medications:   •  acetaminophen (TYLENOL) 500 MG tablet, Take 2 tablets by mouth At Night As Needed for Mild Pain., Disp: , Rfl:   •  apixaban (ELIQUIS) 5 MG tablet tablet, Take 1 tablet by mouth 2 (Two) Times a Day., Disp: 60 tablet, Rfl: 11  •  Cholecalciferol (Vitamin D3) 125 MCG (5000 UT) tablet dispersible, Take 5,000 tablets by mouth Daily., Disp: , Rfl:   •  Coenzyme Q10 (CO Q 10) 100 MG capsule, Take 1 tablet by mouth Daily., Disp: , Rfl:   •  Evolocumab (REPATHA) solution auto-injector SureClick injection, 1 mL Every 14 (Fourteen) Days., Disp: , Rfl:   •  flecainide (TAMBOCOR) 50 MG tablet, Take 1 tablet by mouth 2 (Two) Times a Day. (Patient taking differently: Take 1 tablet by mouth 2 (Two) Times a Day. 1/ 2 TAB BID), Disp: 60 tablet, Rfl: 11  •  glimepiride (AMARYL) 4 MG tablet, Take 1 tablet by mouth Every Morning Before Breakfast. Takes an extra 1/2 tablet when blood sugar is high., Disp: , Rfl:   •  irbesartan (AVAPRO) 150 MG tablet, Take 1 tablet by mouth 2 (Two) Times a Day., Disp: 60 tablet, Rfl: 11  •  levothyroxine (SYNTHROID, LEVOTHROID) 50 MCG tablet, Take 1 tablet by mouth Daily., Disp: , Rfl:   •  Multiple Vitamins-Minerals (CENTRUM SILVER PO), Take 1 tablet by mouth Daily., Disp: , Rfl:   •  prazosin (MINIPRESS) 1 MG capsule, Take 1 capsule by mouth Every Night. (Patient taking differently: Take 2 capsules by mouth 2 (Two) Times a Day.), Disp: 30  capsule, Rfl: 1  •  atenolol (TENORMIN) 25 MG tablet, Take 1 tablet by mouth Daily., Disp: 30 tablet, Rfl: 11       Objective:      Vitals:    03/15/23 0851   BP: 150/63   Pulse: 82   SpO2: 97%     Vitals and nursing note reviewed.   Constitutional:       Appearance: Normal and healthy appearance. Well-developed and not in distress.   Eyes:      Extraocular Movements: Extraocular movements intact.      Pupils: Pupils are equal, round, and reactive to light.   HENT:      Head: Normocephalic and atraumatic.    Mouth/Throat:      Pharynx: Oropharynx is clear.   Neck:      Vascular: JVD normal.      Trachea: Trachea normal.   Pulmonary:      Effort: Pulmonary effort is normal.      Breath sounds: Normal breath sounds. No wheezing. No rhonchi. No rales.   Cardiovascular:      PMI at left midclavicular line. Normal rate. Regular rhythm. Normal S1. Normal S2.      Murmurs: There is a grade 2/6 systolic murmur.      No gallop. No rub.   Pulses:     Dorsalis pedis: 2+ bilaterally.     Posterior tibial: 2+ bilaterally.  Abdominal:      General: Bowel sounds are normal.      Palpations: Abdomen is soft.      Tenderness: There is no abdominal tenderness.   Musculoskeletal: Normal range of motion.      Cervical back: Normal range of motion and neck supple. Skin:     General: Skin is warm and dry.      Capillary Refill: Capillary refill takes less than 2 seconds.   Feet:      Right foot:      Skin integrity: Skin integrity normal.      Left foot:      Skin integrity: Skin integrity normal.   Neurological:      Mental Status: Alert and oriented to person, place and time.      Cranial Nerves: Cranial nerves are intact.      Sensory: Sensation is intact.      Motor: Motor function is intact.      Coordination: Coordination is intact.   Psychiatric:         Speech: Speech normal.         Behavior: Behavior is cooperative.         Lab Review:       Procedures      Assessment/Plan:     Problem List Items Addressed This Visit         Cardiac and Vasculature    Benign essential HTN    Overview     cont BP medication the day of procedure         Relevant Medications    atenolol (TENORMIN) 25 MG tablet    Mild tricuspid insufficiency    Relevant Medications    atenolol (TENORMIN) 25 MG tablet    High cholesterol    Mixed hyperlipidemia with pervious statin intolerance    LBBB (left bundle branch block)    Overview     Added automatically from request for surgery [id number]         Relevant Medications    atenolol (TENORMIN) 25 MG tablet    Persistent atrial fibrillation (HCC)    Relevant Medications    atenolol (TENORMIN) 25 MG tablet       Endocrine and Metabolic    Type 2 diabetes mellitus, without long-term current use of insulin (HCC) (Chronic)       Genitourinary and Reproductive     CKD (chronic kidney disease) stage 2, GFR 60-89 ml/min       Pulmonary and Pneumonias    Shortness of breath - Primary    Relevant Orders    XR Chest 2 View (Completed)    CBC (No Diff) (Completed)    Basic Metabolic Panel (Completed)     Chest x-ray ordered today. Lab studies ordered today. The patient will begin to take atenolol and she will continue Minipress 2 mg twice a day. Lipid panels will be evaluated in 06/2023.     Recommendations/plans:    Transcribed from ambient dictation for Mak Nickerson DO by Sonal Fulton.  03/15/23   13:38 CDT    Patient or patient representative verbalized consent to the visit recording.  I have personally performed the services described in this document as transcribed by the above individual, and it is both accurate and complete.  Mak Nickerson DO  3/20/2023  13:11 CDT

## 2023-03-20 PROBLEM — R06.02 SHORTNESS OF BREATH: Status: ACTIVE | Noted: 2023-03-20

## 2023-05-30 ENCOUNTER — HOSPITAL ENCOUNTER (INPATIENT)
Facility: HOSPITAL | Age: 88
LOS: 2 days | Discharge: HOME | DRG: 291 | End: 2023-06-01
Attending: FAMILY MEDICINE | Admitting: FAMILY MEDICINE
Payer: MEDICARE

## 2023-05-30 ENCOUNTER — APPOINTMENT (OUTPATIENT)
Dept: GENERAL RADIOLOGY | Facility: HOSPITAL | Age: 88
DRG: 291 | End: 2023-05-30
Payer: MEDICARE

## 2023-05-30 PROBLEM — I11.0 ACCELERATED HYPERTENSION WITH DIASTOLIC CONGESTIVE HEART FAILURE, NYHA CLASS 3: Status: ACTIVE | Noted: 2023-05-30

## 2023-05-30 PROBLEM — I50.30 ACCELERATED HYPERTENSION WITH DIASTOLIC CONGESTIVE HEART FAILURE, NYHA CLASS 3: Status: ACTIVE | Noted: 2023-05-30

## 2023-05-30 LAB
ALBUMIN SERPL-MCNC: 4 G/DL (ref 3.5–5.2)
ALBUMIN/GLOB SERPL: 1.6 G/DL
ALP SERPL-CCNC: 69 U/L (ref 39–117)
ALT SERPL W P-5'-P-CCNC: 10 U/L (ref 1–33)
ANION GAP SERPL CALCULATED.3IONS-SCNC: 10 MMOL/L (ref 5–15)
AST SERPL-CCNC: 12 U/L (ref 1–32)
BACTERIA UR QL AUTO: ABNORMAL /HPF
BASOPHILS # BLD AUTO: 0.05 10*3/MM3 (ref 0–0.2)
BASOPHILS NFR BLD AUTO: 0.8 % (ref 0–1.5)
BILIRUB SERPL-MCNC: 0.4 MG/DL (ref 0–1.2)
BILIRUB UR QL STRIP: NEGATIVE
BUN SERPL-MCNC: 21 MG/DL (ref 8–23)
BUN/CREAT SERPL: 21.9 (ref 7–25)
CALCIUM SPEC-SCNC: 9.3 MG/DL (ref 8.6–10.5)
CHLORIDE SERPL-SCNC: 102 MMOL/L (ref 98–107)
CLARITY UR: CLEAR
CO2 SERPL-SCNC: 23 MMOL/L (ref 22–29)
COLOR UR: YELLOW
CREAT SERPL-MCNC: 0.96 MG/DL (ref 0.57–1)
D-LACTATE SERPL-SCNC: 0.6 MMOL/L (ref 0.5–2)
DEPRECATED RDW RBC AUTO: 43.1 FL (ref 37–54)
EGFRCR SERPLBLD CKD-EPI 2021: 57.4 ML/MIN/1.73
EOSINOPHIL # BLD AUTO: 0.29 10*3/MM3 (ref 0–0.4)
EOSINOPHIL NFR BLD AUTO: 4.6 % (ref 0.3–6.2)
ERYTHROCYTE [DISTWIDTH] IN BLOOD BY AUTOMATED COUNT: 12.5 % (ref 12.3–15.4)
GEN 5 2HR TROPONIN T REFLEX: 28 NG/L
GLOBULIN UR ELPH-MCNC: 2.5 GM/DL
GLUCOSE BLDC GLUCOMTR-MCNC: 103 MG/DL (ref 70–130)
GLUCOSE BLDC GLUCOMTR-MCNC: 137 MG/DL (ref 70–130)
GLUCOSE SERPL-MCNC: 103 MG/DL (ref 65–99)
GLUCOSE UR STRIP-MCNC: NEGATIVE MG/DL
HCT VFR BLD AUTO: 32.8 % (ref 34–46.6)
HGB BLD-MCNC: 10.9 G/DL (ref 12–15.9)
HGB UR QL STRIP.AUTO: NEGATIVE
HYALINE CASTS UR QL AUTO: ABNORMAL /LPF
IMM GRANULOCYTES # BLD AUTO: 0.01 10*3/MM3 (ref 0–0.05)
IMM GRANULOCYTES NFR BLD AUTO: 0.2 % (ref 0–0.5)
KETONES UR QL STRIP: NEGATIVE
LEUKOCYTE ESTERASE UR QL STRIP.AUTO: ABNORMAL
LYMPHOCYTES # BLD AUTO: 1.53 10*3/MM3 (ref 0.7–3.1)
LYMPHOCYTES NFR BLD AUTO: 24.2 % (ref 19.6–45.3)
MCH RBC QN AUTO: 31.1 PG (ref 26.6–33)
MCHC RBC AUTO-ENTMCNC: 33.2 G/DL (ref 31.5–35.7)
MCV RBC AUTO: 93.7 FL (ref 79–97)
MONOCYTES # BLD AUTO: 0.77 10*3/MM3 (ref 0.1–0.9)
MONOCYTES NFR BLD AUTO: 12.2 % (ref 5–12)
NEUTROPHILS NFR BLD AUTO: 3.66 10*3/MM3 (ref 1.7–7)
NEUTROPHILS NFR BLD AUTO: 58 % (ref 42.7–76)
NITRITE UR QL STRIP: NEGATIVE
NRBC BLD AUTO-RTO: 0 /100 WBC (ref 0–0.2)
NT-PROBNP SERPL-MCNC: 1342 PG/ML (ref 0–1800)
PH UR STRIP.AUTO: 7 [PH] (ref 5–8)
PLATELET # BLD AUTO: 210 10*3/MM3 (ref 140–450)
PMV BLD AUTO: 10.5 FL (ref 6–12)
POTASSIUM SERPL-SCNC: 4.4 MMOL/L (ref 3.5–5.2)
PROT SERPL-MCNC: 6.5 G/DL (ref 6–8.5)
PROT UR QL STRIP: NEGATIVE
RBC # BLD AUTO: 3.5 10*6/MM3 (ref 3.77–5.28)
RBC # UR STRIP: ABNORMAL /HPF
REF LAB TEST METHOD: ABNORMAL
SODIUM SERPL-SCNC: 135 MMOL/L (ref 136–145)
SP GR UR STRIP: <=1.005 (ref 1–1.03)
SQUAMOUS #/AREA URNS HPF: ABNORMAL /HPF
TROPONIN T DELTA: 1 NG/L
TROPONIN T SERPL HS-MCNC: 27 NG/L
UROBILINOGEN UR QL STRIP: ABNORMAL
WBC # UR STRIP: ABNORMAL /HPF
WBC CLUMPS # UR AUTO: ABNORMAL /HPF
WBC NRBC COR # BLD: 6.31 10*3/MM3 (ref 3.4–10.8)

## 2023-05-30 PROCEDURE — 25010000002 FUROSEMIDE PER 20 MG: Performed by: PHYSICIAN ASSISTANT

## 2023-05-30 PROCEDURE — 84484 ASSAY OF TROPONIN QUANT: CPT | Performed by: PHYSICIAN ASSISTANT

## 2023-05-30 PROCEDURE — 83880 ASSAY OF NATRIURETIC PEPTIDE: CPT | Performed by: PHYSICIAN ASSISTANT

## 2023-05-30 PROCEDURE — 25010000002 FUROSEMIDE PER 20 MG: Performed by: FAMILY MEDICINE

## 2023-05-30 PROCEDURE — 84443 ASSAY THYROID STIM HORMONE: CPT | Performed by: PHYSICIAN ASSISTANT

## 2023-05-30 PROCEDURE — 93005 ELECTROCARDIOGRAM TRACING: CPT | Performed by: PHYSICIAN ASSISTANT

## 2023-05-30 PROCEDURE — 71045 X-RAY EXAM CHEST 1 VIEW: CPT

## 2023-05-30 PROCEDURE — 25010000002 CEFTRIAXONE PER 250 MG: Performed by: FAMILY MEDICINE

## 2023-05-30 PROCEDURE — 81001 URINALYSIS AUTO W/SCOPE: CPT | Performed by: PHYSICIAN ASSISTANT

## 2023-05-30 PROCEDURE — 87040 BLOOD CULTURE FOR BACTERIA: CPT | Performed by: FAMILY MEDICINE

## 2023-05-30 PROCEDURE — 85025 COMPLETE CBC W/AUTO DIFF WBC: CPT | Performed by: PHYSICIAN ASSISTANT

## 2023-05-30 PROCEDURE — 80053 COMPREHEN METABOLIC PANEL: CPT | Performed by: PHYSICIAN ASSISTANT

## 2023-05-30 PROCEDURE — 82948 REAGENT STRIP/BLOOD GLUCOSE: CPT

## 2023-05-30 PROCEDURE — 83605 ASSAY OF LACTIC ACID: CPT | Performed by: FAMILY MEDICINE

## 2023-05-30 PROCEDURE — 93010 ELECTROCARDIOGRAM REPORT: CPT | Performed by: INTERNAL MEDICINE

## 2023-05-30 RX ORDER — NICOTINE POLACRILEX 4 MG
15 LOZENGE BUCCAL
Status: DISCONTINUED | OUTPATIENT
Start: 2023-05-30 | End: 2023-06-01 | Stop reason: HOSPADM

## 2023-05-30 RX ORDER — SODIUM CHLORIDE 9 MG/ML
40 INJECTION, SOLUTION INTRAVENOUS AS NEEDED
Status: DISCONTINUED | OUTPATIENT
Start: 2023-05-30 | End: 2023-06-01 | Stop reason: HOSPADM

## 2023-05-30 RX ORDER — SODIUM CHLORIDE 0.9 % (FLUSH) 0.9 %
10 SYRINGE (ML) INJECTION EVERY 12 HOURS SCHEDULED
Status: DISCONTINUED | OUTPATIENT
Start: 2023-05-30 | End: 2023-06-01 | Stop reason: HOSPADM

## 2023-05-30 RX ORDER — NITROGLYCERIN 0.4 MG/1
0.4 TABLET SUBLINGUAL
Status: DISCONTINUED | OUTPATIENT
Start: 2023-05-30 | End: 2023-06-01 | Stop reason: HOSPADM

## 2023-05-30 RX ORDER — POLYETHYLENE GLYCOL 3350 17 G/17G
17 POWDER, FOR SOLUTION ORAL DAILY PRN
Status: DISCONTINUED | OUTPATIENT
Start: 2023-05-30 | End: 2023-06-01 | Stop reason: HOSPADM

## 2023-05-30 RX ORDER — ATENOLOL 25 MG/1
25 TABLET ORAL DAILY
Status: DISCONTINUED | OUTPATIENT
Start: 2023-05-30 | End: 2023-05-30

## 2023-05-30 RX ORDER — INSULIN LISPRO 100 [IU]/ML
2-7 INJECTION, SOLUTION INTRAVENOUS; SUBCUTANEOUS
Status: DISCONTINUED | OUTPATIENT
Start: 2023-05-30 | End: 2023-06-01 | Stop reason: HOSPADM

## 2023-05-30 RX ORDER — ONDANSETRON 4 MG/1
4 TABLET, FILM COATED ORAL EVERY 6 HOURS PRN
Status: DISCONTINUED | OUTPATIENT
Start: 2023-05-30 | End: 2023-06-01 | Stop reason: HOSPADM

## 2023-05-30 RX ORDER — ATENOLOL 50 MG/1
50 TABLET ORAL DAILY
Status: DISCONTINUED | OUTPATIENT
Start: 2023-05-31 | End: 2023-06-01 | Stop reason: HOSPADM

## 2023-05-30 RX ORDER — BISACODYL 10 MG
10 SUPPOSITORY, RECTAL RECTAL DAILY PRN
Status: DISCONTINUED | OUTPATIENT
Start: 2023-05-30 | End: 2023-06-01 | Stop reason: HOSPADM

## 2023-05-30 RX ORDER — DEXTROSE MONOHYDRATE 25 G/50ML
25 INJECTION, SOLUTION INTRAVENOUS
Status: DISCONTINUED | OUTPATIENT
Start: 2023-05-30 | End: 2023-06-01 | Stop reason: HOSPADM

## 2023-05-30 RX ORDER — FUROSEMIDE 10 MG/ML
20 INJECTION INTRAMUSCULAR; INTRAVENOUS DAILY
Status: DISCONTINUED | OUTPATIENT
Start: 2023-05-30 | End: 2023-05-30

## 2023-05-30 RX ORDER — ACETAMINOPHEN 325 MG/1
650 TABLET ORAL EVERY 4 HOURS PRN
Status: DISCONTINUED | OUTPATIENT
Start: 2023-05-30 | End: 2023-06-01 | Stop reason: HOSPADM

## 2023-05-30 RX ORDER — LEVOTHYROXINE SODIUM 0.05 MG/1
50 TABLET ORAL
Status: DISCONTINUED | OUTPATIENT
Start: 2023-05-31 | End: 2023-06-01 | Stop reason: HOSPADM

## 2023-05-30 RX ORDER — TERAZOSIN 2 MG/1
2 CAPSULE ORAL NIGHTLY
Status: DISCONTINUED | OUTPATIENT
Start: 2023-05-30 | End: 2023-06-01 | Stop reason: HOSPADM

## 2023-05-30 RX ORDER — LOSARTAN POTASSIUM 50 MG/1
100 TABLET ORAL
Status: DISCONTINUED | OUTPATIENT
Start: 2023-05-31 | End: 2023-06-01 | Stop reason: HOSPADM

## 2023-05-30 RX ORDER — SODIUM CHLORIDE 0.9 % (FLUSH) 0.9 %
10 SYRINGE (ML) INJECTION AS NEEDED
Status: DISCONTINUED | OUTPATIENT
Start: 2023-05-30 | End: 2023-06-01 | Stop reason: HOSPADM

## 2023-05-30 RX ORDER — FLECAINIDE ACETATE 50 MG/1
50 TABLET ORAL 2 TIMES DAILY
Status: DISCONTINUED | OUTPATIENT
Start: 2023-05-30 | End: 2023-06-01 | Stop reason: HOSPADM

## 2023-05-30 RX ORDER — AMOXICILLIN 250 MG
2 CAPSULE ORAL 2 TIMES DAILY
Status: DISCONTINUED | OUTPATIENT
Start: 2023-05-30 | End: 2023-06-01 | Stop reason: HOSPADM

## 2023-05-30 RX ORDER — BISACODYL 5 MG/1
5 TABLET, DELAYED RELEASE ORAL DAILY PRN
Status: DISCONTINUED | OUTPATIENT
Start: 2023-05-30 | End: 2023-06-01 | Stop reason: HOSPADM

## 2023-05-30 RX ADMIN — FUROSEMIDE 20 MG: 10 INJECTION, SOLUTION INTRAMUSCULAR; INTRAVENOUS at 18:53

## 2023-05-30 RX ADMIN — ATENOLOL 25 MG: 25 TABLET ORAL at 18:12

## 2023-05-30 RX ADMIN — DOCUSATE SODIUM 50 MG AND SENNOSIDES 8.6 MG 2 TABLET: 8.6; 5 TABLET, FILM COATED ORAL at 21:29

## 2023-05-30 RX ADMIN — Medication 10 ML: at 21:33

## 2023-05-30 RX ADMIN — TERAZOSIN HYDROCHLORIDE 2 MG: 2 CAPSULE ORAL at 21:29

## 2023-05-30 RX ADMIN — FLECAINIDE ACETATE 50 MG: 50 TABLET ORAL at 21:34

## 2023-05-30 RX ADMIN — ACETAMINOPHEN 650 MG: 325 TABLET, FILM COATED ORAL at 18:12

## 2023-05-30 RX ADMIN — APIXABAN 5 MG: 5 TABLET, FILM COATED ORAL at 21:30

## 2023-05-30 RX ADMIN — FUROSEMIDE 10 MG/HR: 10 INJECTION, SOLUTION INTRAVENOUS at 22:56

## 2023-05-30 RX ADMIN — CEFTRIAXONE 1 G: 1 INJECTION, POWDER, FOR SOLUTION INTRAMUSCULAR; INTRAVENOUS at 22:56

## 2023-05-30 NOTE — PLAN OF CARE
Goal Outcome Evaluation:  Plan of Care Reviewed With: patient        Progress: no change  Outcome Evaluation: Pt direct admit from MD office. Slightly anxious concerning admit. B/p 200 dys. Lasix and Atenolol given. Reports chronic back and joint pain. Reposition hels a little. Ate 100% of supper. Safety maintained.

## 2023-05-30 NOTE — H&P
[facility]  History and Physical    Patient:  Nedra Tolliver  MRN: [id number]    CHIEF COMPLAINT:  SOB, chest pain    History Obtained From: the patient   PCP: Abiodun    HISTORY OF PRESENT ILLNESS:   The patient is a 87 y.o. female who presents c/o chest discomfort with shortness of air the past couple days. Has gained 9 lbs since OV last wk, pitting edema bilat LE. EKG without acute ST changes. Bp uncontrolled with current BP meds. Patient direct admit to [facility] for further eval. No acute distress noted at time of D/C-pt has  to hospital.  REVIEW OF SYSTEMS:    Constitutional: Negative for activity change, appetite change, chills, fatigue and fever.   HENT: Negative.  Negative for congestion, sinus pressure and sore throat.    Eyes: Negative for pain and discharge.   Respiratory: Negative.  Negative for cough, chest tightness, shortness of breath and wheezing.    Cardiovascular: Negative for chest pain, + edema.   Gastrointestinal: Negative for abdominal distention, abdominal pain, diarrhea, nausea and vomiting.   Genitourinary: Negative for difficulty urinating, flank pain, hematuria and urgency.   Musculoskeletal: Negative for arthralgias and back pain.   Skin: Negative for color change, pallor and rash.   Neurological: Negative for dizziness, syncope, numbness and headaches.   Psychiatric/Behavioral: Negative for agitation, behavioral problems and confusion.       Past Medical History:  Past Medical History:   Diagnosis Date    Arthritis     Cancer     BCC @ nose & Left arm    Diabetes type 2, controlled     Diverticulitis     History of GI bleed     History of transfusion     Has had x 5 units.    Hypertension     STAGE 3     Hyponatremia     Required hospitalization    Hypothyroidism     Knee arthropathy 09/30/2020    LPRD (laryngopharyngeal reflux disease)     Pharyngeal dysphagia     Thyroid nodule        Past Surgical History:  Past Surgical History:   Procedure Laterality Date     APPENDECTOMY      BASAL CELL CARCINOMA EXCISION      BELPHAROPTOSIS REPAIR      BREAST CYST EXCISION      CARDIAC CATHETERIZATION      CARDIAC CATHETERIZATION Right 6/24/2021    Procedure: Left Heart Cath R radial, US guided w poss intervention;  Surgeon: Mak Nickerson DO;  Location:  [facility] CATH INVASIVE LOCATION;  Service: Cardiovascular;  Laterality: Right;    CARDIAC CATHETERIZATION      CATARACT EXTRACTION      CATARACT EXTRACTION WITH INTRAOCULAR LENS IMPLANT Bilateral     CHOLECYSTECTOMY      COLONOSCOPY Left 11/1/2016    Tubular adenoma cecum, Diverticulosis repeat prn    SUBTOTAL HYSTERECTOMY      TOTAL KNEE ARTHROPLASTY Right 9/30/2020    Procedure: TOTAL KNEE ARTHROPLASTY;  Surgeon: Mak Pickens MD;  Location:  [facility] OR;  Service: Orthopedics;  Laterality: Right;    TUMOR EXCISION      under armpits and left breast       Medications Prior to Admission:    Medications Prior to Admission   Medication Sig Dispense Refill Last Dose    acetaminophen (TYLENOL) 500 MG tablet Take 2 tablets by mouth At Night As Needed for Mild Pain.   Past Week    apixaban (ELIQUIS) 5 MG tablet tablet Take 1 tablet by mouth 2 (Two) Times a Day. 60 tablet 11 5/30/2023    atenolol (TENORMIN) 25 MG tablet Take 1 tablet by mouth Daily. 30 tablet 11 5/29/2023    Cholecalciferol (Vitamin D3) 125 MCG (5000 UT) tablet dispersible Take 5,000 tablets by mouth Daily.   5/30/2023    Coenzyme Q10 (CO Q 10) 100 MG capsule Take 1 tablet by mouth Daily.   5/30/2023    Evolocumab (REPATHA) solution auto-injector SureClick injection 1 mL Every 14 (Fourteen) Days.   Past Week    flecainide (TAMBOCOR) 50 MG tablet Take 1 tablet by mouth 2 (Two) Times a Day. (Patient taking differently: Take 1 tablet by mouth 2 (Two) Times a Day. 1/ 2 TAB BID) 60 tablet 11 5/30/2023    glimepiride (AMARYL) 4 MG tablet Take 1 tablet by mouth Every Morning Before Breakfast. Takes an extra 1/2 tablet when blood sugar is high.   5/30/2023    irbesartan  "(AVAPRO) 150 MG tablet Take 1 tablet by mouth 2 (Two) Times a Day. 60 tablet 11 5/30/2023    levothyroxine (SYNTHROID, LEVOTHROID) 50 MCG tablet Take 1 tablet by mouth Daily.   5/30/2023    Multiple Vitamins-Minerals (CENTRUM SILVER PO) Take 1 tablet by mouth Daily.   Past Week    prazosin (MINIPRESS) 1 MG capsule Take 1 capsule by mouth Every Night. (Patient taking differently: Take 2 capsules by mouth 2 (Two) Times a Day.) 30 capsule 1 Patient Taking Differently at bed       Allergies:  Codeine, Iodine, Levaquin [levofloxacin], Lortab [hydrocodone-acetaminophen], Quinolones, Shrimp (diagnostic), Bactroban [mupirocin calcium], Ace inhibitors, Norvasc [amlodipine], Piperacillin sod-tazobactam so, Procardia xl [nifedipine er], Statins, Thiazide-type diuretics, and Mupirocin    Social History:   Social History     Socioeconomic History    Marital status:    Tobacco Use    Smoking status: Never    Smokeless tobacco: Never   Vaping Use    Vaping Use: Never used   Substance and Sexual Activity    Alcohol use: No    Drug use: Never    Sexual activity: Defer     Partners: Male       Family History:   Family History   Problem Relation Age of Onset    Diabetes Mother     Cancer Mother     Heart failure Mother     Diabetes Father     Colon cancer Neg Hx     Colon polyps Neg Hx            Physical Exam:    Vitals: Ht 153.7 cm (60.5\")   Wt 73.7 kg (162 lb 8 oz)   LMP  (LMP Unknown)   BMI 31.21 kg/m²        General Appearance:    Alert, cooperative, in no acute distress   Head:    Normocephalic, without obvious abnormality, atraumatic   Eyes:            Lids and lashes normal, conjunctivae and sclerae normal, no   icterus, no pallor, corneas clear, PERRLA   Ears:    Ears appear intact with no abnormalities noted   Throat:   No oral lesions, no thrush, oral mucosa moist   Neck:   No adenopathy, supple, trachea midline, no thyromegaly, no   carotid bruit, no JVD   Back:     No kyphosis present, no scoliosis present, no " skin lesions,      erythema or scars, no tenderness to percussion or                   palpation,   range of motion normal   Lungs:     Clear to auscultation,respirations regular, even and                  unlabored    Heart:    Regular rhythm and normal rate, normal S1 and S2, no            murmur, no gallop, no rub, no click   Chest Wall:    No abnormalities observed   Abdomen:     Normal bowel sounds, no masses, no organomegaly, soft        non-tender, non-distended, no guarding, no rebound                tenderness   Rectal:     Deferred   Extremities:   Moves all extremities well, 1+ LE edema, no cyanosis, no             redness   Pulses:   Pulses palpable and equal bilaterally   Skin:   No bleeding, bruising or rash   Lymph nodes:   No palpable adenopathy   Neurologic:   Cranial nerves 2 - 12 grossly intact, sensation intact, DTR       present and equal bilaterally       Lab Results (last 24 hours)       ** No results found for the last 24 hours. **             -----------------------------------------------------------------  EKG: pending  Radiology:     No results found.    Assessment and Plan   CHF exacerbation, pt follows with Dr Nickerson, will consult  Chest pain-stat ekg and trop  Accelerated HTN-pt does not take meds as directed at home, will resume home meds and follow.   Chronic anticoagulation-pt currently tolerating Eliquis at this time.   pafib    Will consult Cardiology, stat labs, gentle diuresis        ANABELLA Boss    Pt. was seen and independently examined by me.  I have reviewed the PA/ARNP's note and agree with above.      Electronically signed by Hank Rascon MD on 5/30/2023 at 21:56 CDT

## 2023-05-31 ENCOUNTER — APPOINTMENT (OUTPATIENT)
Dept: CARDIOLOGY | Facility: HOSPITAL | Age: 88
DRG: 291 | End: 2023-05-31
Payer: MEDICARE

## 2023-05-31 PROBLEM — N18.31 STAGE 3A CHRONIC KIDNEY DISEASE (CKD): Status: ACTIVE | Noted: 2021-05-09

## 2023-05-31 PROBLEM — I48.0 PAROXYSMAL A-FIB: Status: ACTIVE | Noted: 2023-05-31

## 2023-05-31 LAB
ANION GAP SERPL CALCULATED.3IONS-SCNC: 9 MMOL/L (ref 5–15)
BH CV ECHO MEAS - AO MAX PG: 7.8 MMHG
BH CV ECHO MEAS - AO MEAN PG: 4 MMHG
BH CV ECHO MEAS - AO ROOT DIAM: 3.2 CM
BH CV ECHO MEAS - AO V2 MAX: 140 CM/SEC
BH CV ECHO MEAS - AO V2 VTI: 39.9 CM
BH CV ECHO MEAS - AVA(I,D): 2.3 CM2
BH CV ECHO MEAS - EDV(CUBED): 97.3 ML
BH CV ECHO MEAS - EDV(MOD-SP2): 53.3 ML
BH CV ECHO MEAS - EDV(MOD-SP4): 55.9 ML
BH CV ECHO MEAS - EF(MOD-BP): 71.8 %
BH CV ECHO MEAS - EF(MOD-SP2): 74.7 %
BH CV ECHO MEAS - EF(MOD-SP4): 66.7 %
BH CV ECHO MEAS - ESV(CUBED): 42.9 ML
BH CV ECHO MEAS - ESV(MOD-SP2): 13.5 ML
BH CV ECHO MEAS - ESV(MOD-SP4): 18.6 ML
BH CV ECHO MEAS - FS: 23.9 %
BH CV ECHO MEAS - IVS/LVPW: 2.22 CM
BH CV ECHO MEAS - IVSD: 1.6 CM
BH CV ECHO MEAS - LA DIMENSION: 3.9 CM
BH CV ECHO MEAS - LAT PEAK E' VEL: 3.8 CM/SEC
BH CV ECHO MEAS - LV DIASTOLIC VOL/BSA (35-75): 32.8 CM2
BH CV ECHO MEAS - LV MASS(C)D: 270.6 GRAMS
BH CV ECHO MEAS - LV MAX PG: 3.3 MMHG
BH CV ECHO MEAS - LV MEAN PG: 2 MMHG
BH CV ECHO MEAS - LV SYSTOLIC VOL/BSA (12-30): 10.9 CM2
BH CV ECHO MEAS - LV V1 MAX: 91.3 CM/SEC
BH CV ECHO MEAS - LV V1 VTI: 24.1 CM
BH CV ECHO MEAS - LVIDD: 4.6 CM
BH CV ECHO MEAS - LVIDS: 3.5 CM
BH CV ECHO MEAS - LVOT AREA: 3.8 CM2
BH CV ECHO MEAS - LVOT DIAM: 2.2 CM
BH CV ECHO MEAS - LVPWD: 1.3 CM
BH CV ECHO MEAS - MED PEAK E' VEL: 3.7 CM/SEC
BH CV ECHO MEAS - MR MAX PG: 29.3 MMHG
BH CV ECHO MEAS - MR MAX VEL: 270 CM/SEC
BH CV ECHO MEAS - MV A MAX VEL: 110 CM/SEC
BH CV ECHO MEAS - MV DEC TIME: 0.27 MSEC
BH CV ECHO MEAS - MV E MAX VEL: 90.4 CM/SEC
BH CV ECHO MEAS - MV E/A: 0.82
BH CV ECHO MEAS - PI END-D VEL: 61.1 CM/SEC
BH CV ECHO MEAS - SI(MOD-SP2): 23.3 ML/M2
BH CV ECHO MEAS - SI(MOD-SP4): 21.9 ML/M2
BH CV ECHO MEAS - SV(LVOT): 91.6 ML
BH CV ECHO MEAS - SV(MOD-SP2): 39.8 ML
BH CV ECHO MEAS - SV(MOD-SP4): 37.3 ML
BH CV ECHO MEAS - TR MAX PG: 26 MMHG
BH CV ECHO MEAS - TR MAX VEL: 255 CM/SEC
BH CV ECHO MEASUREMENTS AVERAGE E/E' RATIO: 24.11
BUN SERPL-MCNC: 20 MG/DL (ref 8–23)
BUN/CREAT SERPL: 19.2 (ref 7–25)
CALCIUM SPEC-SCNC: 9 MG/DL (ref 8.6–10.5)
CHLORIDE SERPL-SCNC: 103 MMOL/L (ref 98–107)
CO2 SERPL-SCNC: 24 MMOL/L (ref 22–29)
CREAT SERPL-MCNC: 1.04 MG/DL (ref 0.57–1)
EGFRCR SERPLBLD CKD-EPI 2021: 52.1 ML/MIN/1.73
GLUCOSE BLDC GLUCOMTR-MCNC: 113 MG/DL (ref 70–130)
GLUCOSE BLDC GLUCOMTR-MCNC: 141 MG/DL (ref 70–130)
GLUCOSE BLDC GLUCOMTR-MCNC: 195 MG/DL (ref 70–130)
GLUCOSE BLDC GLUCOMTR-MCNC: 196 MG/DL (ref 70–130)
GLUCOSE SERPL-MCNC: 98 MG/DL (ref 65–99)
LEFT ATRIUM VOLUME INDEX: 38.4 ML/M2
LEFT ATRIUM VOLUME: 65.6 ML
POTASSIUM SERPL-SCNC: 4.1 MMOL/L (ref 3.5–5.2)
QT INTERVAL: 460 MS
QTC INTERVAL: 478 MS
SODIUM SERPL-SCNC: 136 MMOL/L (ref 136–145)
TSH SERPL DL<=0.05 MIU/L-ACNC: 1.12 UIU/ML (ref 0.27–4.2)

## 2023-05-31 PROCEDURE — 82948 REAGENT STRIP/BLOOD GLUCOSE: CPT

## 2023-05-31 PROCEDURE — 93306 TTE W/DOPPLER COMPLETE: CPT

## 2023-05-31 PROCEDURE — 99222 1ST HOSP IP/OBS MODERATE 55: CPT | Performed by: NURSE PRACTITIONER

## 2023-05-31 PROCEDURE — 97166 OT EVAL MOD COMPLEX 45 MIN: CPT

## 2023-05-31 PROCEDURE — 80048 BASIC METABOLIC PNL TOTAL CA: CPT | Performed by: PHYSICIAN ASSISTANT

## 2023-05-31 PROCEDURE — 25010000002 FUROSEMIDE PER 20 MG: Performed by: FAMILY MEDICINE

## 2023-05-31 PROCEDURE — 25010000002 CEFTRIAXONE PER 250 MG: Performed by: FAMILY MEDICINE

## 2023-05-31 PROCEDURE — 93306 TTE W/DOPPLER COMPLETE: CPT | Performed by: INTERNAL MEDICINE

## 2023-05-31 RX ORDER — FUROSEMIDE 10 MG/ML
20 INJECTION INTRAMUSCULAR; INTRAVENOUS DAILY
Status: DISCONTINUED | OUTPATIENT
Start: 2023-05-31 | End: 2023-05-31

## 2023-05-31 RX ORDER — PRAZOSIN HYDROCHLORIDE 2 MG/1
1 CAPSULE ORAL 2 TIMES DAILY
COMMUNITY
End: 2023-06-01 | Stop reason: HOSPADM

## 2023-05-31 RX ORDER — MINOXIDIL 10 MG/1
10 TABLET ORAL DAILY
COMMUNITY

## 2023-05-31 RX ADMIN — ATENOLOL 50 MG: 50 TABLET ORAL at 08:38

## 2023-05-31 RX ADMIN — FLECAINIDE ACETATE 50 MG: 50 TABLET ORAL at 21:06

## 2023-05-31 RX ADMIN — FLECAINIDE ACETATE 50 MG: 50 TABLET ORAL at 08:38

## 2023-05-31 RX ADMIN — Medication 10 ML: at 23:25

## 2023-05-31 RX ADMIN — APIXABAN 5 MG: 5 TABLET, FILM COATED ORAL at 08:38

## 2023-05-31 RX ADMIN — DOCUSATE SODIUM 50 MG AND SENNOSIDES 8.6 MG 2 TABLET: 8.6; 5 TABLET, FILM COATED ORAL at 21:06

## 2023-05-31 RX ADMIN — DOCUSATE SODIUM 50 MG AND SENNOSIDES 8.6 MG 2 TABLET: 8.6; 5 TABLET, FILM COATED ORAL at 08:38

## 2023-05-31 RX ADMIN — Medication 10 ML: at 08:38

## 2023-05-31 RX ADMIN — Medication 5000 UNITS: at 08:38

## 2023-05-31 RX ADMIN — LEVOTHYROXINE SODIUM 50 MCG: 50 TABLET ORAL at 05:48

## 2023-05-31 RX ADMIN — TERAZOSIN HYDROCHLORIDE 2 MG: 2 CAPSULE ORAL at 21:06

## 2023-05-31 RX ADMIN — CEFTRIAXONE 1 G: 1 INJECTION, POWDER, FOR SOLUTION INTRAMUSCULAR; INTRAVENOUS at 23:25

## 2023-05-31 RX ADMIN — FUROSEMIDE 20 MG: 10 INJECTION, SOLUTION INTRAMUSCULAR; INTRAVENOUS at 08:38

## 2023-05-31 RX ADMIN — APIXABAN 5 MG: 5 TABLET, FILM COATED ORAL at 21:06

## 2023-05-31 RX ADMIN — LOSARTAN POTASSIUM 100 MG: 50 TABLET, FILM COATED ORAL at 08:39

## 2023-05-31 NOTE — PLAN OF CARE
Goal Outcome Evaluation:  Plan of Care Reviewed With: patient        Progress: no change  Outcome Evaluation: OT eval completed. Pt A&Ox4 with c/o chronic middle back pain. Pt completed supine<>sit with Supervision. Upon sitting EOB, pt c/o lightheadedness, BP of 151/50 noted. Pt reported improvements following rest break. Pt adjusted B socks with SBA while seated EOB. Pt completed sit<>stand t/f with CGA, but demo posterior LOB upon standing, requiring Min A for correction. Pt reported LOB d/t lightheadedness, BP of 153/47 noted upon return to sitting. Pt completed fxl mobility with HHA and CGA. Unsteadiness noted with pt continuing to c/o lightheadedness, RN notified. Pt returned to supine with BP of 152/48 noted and improvements reported in c/o. Skilled OT warranted to provide education and address pain, balance, activity tolerance, and strength in order to increase pt safety and I during ADLs, fxl mobility, and fxl t/f's. Pt educated on safety concerns during episodes of lightheadedness secondary to anticipation of returning home soon. Communication completed regarding rehab placement pending progress, but pt unsure at this time.

## 2023-05-31 NOTE — PLAN OF CARE
Goal Outcome Evaluation:  Plan of Care Reviewed With: patient        Progress: no change   Pt c/o weakness and dizziness when standing. SB/S 51-61 per tele. Will continue to monitor.

## 2023-05-31 NOTE — PROGRESS NOTES
Daily Progress Note  Nedra Tolliver  MRN: [id number] LOS: 1    Admit Date: 5/30/2023 5/31/2023 07:53 CDT    Subjective:          Chief Complaint:  No chief complaint on file.      Interval History:    Reviewed overnight events and nursing notes.   Improved overnight in fluid status shortness of breath.  Did not tolerate Lasix drip for very long.    Review of Systems   Constitutional:  Positive for activity change.   Respiratory:  Positive for cough and shortness of breath.    Cardiovascular:  Positive for palpitations and leg swelling.   Gastrointestinal:  Negative for abdominal pain, constipation, diarrhea and vomiting.   Genitourinary:  Positive for dysuria.     DIET:  NPO Diet NPO Type: Strict NPO    Medications:   furosemide (LASIX) 100 mg in 100mL NS infusion, 10 mg/hr, Last Rate: Stopped (05/31/23 0000)      apixaban, 5 mg, Oral, BID  atenolol, 50 mg, Oral, Daily  cefTRIAXone, 1 g, Intravenous, Q24H  flecainide, 50 mg, Oral, BID  insulin lispro, 2-7 Units, Subcutaneous, 4x Daily AC & at Bedtime  levothyroxine, 50 mcg, Oral, Q AM  losartan, 100 mg, Oral, Q24H  senna-docusate sodium, 2 tablet, Oral, BID  sodium chloride, 10 mL, Intravenous, Q12H  terazosin, 2 mg, Oral, Nightly  vitamin D3, 5,000 Units, Oral, Daily        Data:     Code Status:   Code Status and Medical Interventions:   Ordered at: 05/30/23 2202     Level Of Support Discussed With:    Patient     Code Status (Patient has no pulse and is not breathing):    CPR (Attempt to Resuscitate)     Medical Interventions (Patient has pulse or is breathing):    Full Support     Release to patient:    Routine Release       Family History   Problem Relation Age of Onset    Diabetes Mother     Cancer Mother     Heart failure Mother     Diabetes Father     Colon cancer Neg Hx     Colon polyps Neg Hx      Social History     Socioeconomic History    Marital status:    Tobacco Use    Smoking status: Never    Smokeless tobacco: Never   Vaping Use     Vaping Use: Never used   Substance and Sexual Activity    Alcohol use: No    Drug use: Never    Sexual activity: Defer     Partners: Male       Labs:    Lab Results (last 72 hours)       Procedure Component Value Units Date/Time    Basic Metabolic Panel [[id number]]  (Abnormal) Collected: 05/31/23 0312    Specimen: Blood Updated: 05/31/23 0423     Glucose 98 mg/dL      BUN 20 mg/dL      Creatinine 1.04 mg/dL      Sodium 136 mmol/L      Potassium 4.1 mmol/L      Chloride 103 mmol/L      CO2 24.0 mmol/L      Calcium 9.0 mg/dL      BUN/Creatinine Ratio 19.2     Anion Gap 9.0 mmol/L      eGFR 52.1 mL/min/1.73     Narrative:      GFR Normal >60  Chronic Kidney Disease <60  Kidney Failure <15    The GFR formula is only valid for adults with stable renal function between ages 18 and 70.    TSH Rfx On Abnormal To Free T4 [[id number]]  (Normal) Collected: 05/30/23 1715    Specimen: Blood Updated: 05/31/23 0143     TSH 1.120 uIU/mL     Lactic Acid, Plasma [[id number]]  (Normal) Collected: 05/30/23 2211    Specimen: Blood Updated: 05/30/23 2238     Lactate 0.6 mmol/L     Blood Culture - Blood, Arm, Left [[id number]] Collected: 05/30/23 2211    Specimen: Blood from Arm, Left Updated: 05/30/23 2224    Blood Culture - Blood, Arm, Right [[id number]] Collected: 05/30/23 2217    Specimen: Blood from Arm, Right Updated: 05/30/23 2224    High Sensitivity Troponin T 2Hr [[id number]]  (Abnormal) Collected: 05/30/23 1849    Specimen: Blood Updated: 05/30/23 2004     HS Troponin T 28 ng/L      Troponin T Delta 1 ng/L     Narrative:      High Sensitive Troponin T Reference Range:  <10.0 ng/L- Negative Female for AMI  <15.0 ng/L- Negative Male for AMI  >=10 - Abnormal Female indicating possible myocardial injury.  >=15 - Abnormal Male indicating possible myocardial injury.   Clinicians would have to utilize clinical acumen, EKG, Troponin, and serial changes to determine if it is an Acute Myocardial Infarction or myocardial injury due to an  underlying chronic condition.         POC Glucose Once [[id number]]  (Abnormal) Collected: 05/30/23 1927    Specimen: Blood Updated: 05/30/23 1939     Glucose 137 mg/dL      Comment: : [id number] Ramona RebeccaMeter ID: [id number]       Urinalysis With Microscopic If Indicated (No Culture) - Urine, Clean Catch [[id number]]  (Abnormal) Collected: 05/30/23 1751    Specimen: Urine, Clean Catch Updated: 05/30/23 1817     Color, UA Yellow     Appearance, UA Clear     pH, UA 7.0     Specific Gravity, UA <=1.005     Glucose, UA Negative     Ketones, UA Negative     Bilirubin, UA Negative     Blood, UA Negative     Protein, UA Negative     Leuk Esterase, UA Small (1+)     Nitrite, UA Negative     Urobilinogen, UA 0.2 E.U./dL    Urinalysis, Microscopic Only - Urine, Clean Catch [[id number]]  (Abnormal) Collected: 05/30/23 1751    Specimen: Urine, Clean Catch Updated: 05/30/23 1817     RBC, UA 0-2 /HPF      WBC, UA 6-12 /HPF      Bacteria, UA None Seen /HPF      Squamous Epithelial Cells, UA None Seen /HPF      Hyaline Casts, UA None Seen /LPF      WBC Clumps, UA Small/1+ /HPF      Methodology Manual Light Microscopy    Comprehensive Metabolic Panel [[id number]]  (Abnormal) Collected: 05/30/23 1715    Specimen: Blood Updated: 05/30/23 1810     Glucose 103 mg/dL      BUN 21 mg/dL      Creatinine 0.96 mg/dL      Sodium 135 mmol/L      Potassium 4.4 mmol/L      Chloride 102 mmol/L      CO2 23.0 mmol/L      Calcium 9.3 mg/dL      Total Protein 6.5 g/dL      Albumin 4.0 g/dL      ALT (SGPT) 10 U/L      AST (SGOT) 12 U/L      Alkaline Phosphatase 69 U/L      Total Bilirubin 0.4 mg/dL      Globulin 2.5 gm/dL      A/G Ratio 1.6 g/dL      BUN/Creatinine Ratio 21.9     Anion Gap 10.0 mmol/L      eGFR 57.4 mL/min/1.73     Narrative:      GFR Normal >60  Chronic Kidney Disease <60  Kidney Failure <15    The GFR formula is only valid for adults with stable renal function between ages 18 and 70.    BNP [[id number]]  (Normal) Collected:  05/30/23 1715    Specimen: Blood Updated: 05/30/23 1808     proBNP 1,342.0 pg/mL     Narrative:      Among patients with dyspnea, NT-proBNP is highly sensitive for the detection of acute congestive heart failure. In addition NT-proBNP of <300 pg/ml effectively rules out acute congestive heart failure with 99% negative predictive value.    Results may be falsely decreased if patient taking Biotin.      High Sensitivity Troponin T [[id number]]  (Abnormal) Collected: 05/30/23 1715    Specimen: Blood Updated: 05/30/23 1807     HS Troponin T 27 ng/L     Narrative:      High Sensitive Troponin T Reference Range:  <10.0 ng/L- Negative Female for AMI  <15.0 ng/L- Negative Male for AMI  >=10 - Abnormal Female indicating possible myocardial injury.  >=15 - Abnormal Male indicating possible myocardial injury.   Clinicians would have to utilize clinical acumen, EKG, Troponin, and serial changes to determine if it is an Acute Myocardial Infarction or myocardial injury due to an underlying chronic condition.         CBC Auto Differential [[id number]]  (Abnormal) Collected: 05/30/23 1715    Specimen: Blood Updated: 05/30/23 1750     WBC 6.31 10*3/mm3      RBC 3.50 10*6/mm3      Hemoglobin 10.9 g/dL      Hematocrit 32.8 %      MCV 93.7 fL      MCH 31.1 pg      MCHC 33.2 g/dL      RDW 12.5 %      RDW-SD 43.1 fl      MPV 10.5 fL      Platelets 210 10*3/mm3      Neutrophil % 58.0 %      Lymphocyte % 24.2 %      Monocyte % 12.2 %      Eosinophil % 4.6 %      Basophil % 0.8 %      Immature Grans % 0.2 %      Neutrophils, Absolute 3.66 10*3/mm3      Lymphocytes, Absolute 1.53 10*3/mm3      Monocytes, Absolute 0.77 10*3/mm3      Eosinophils, Absolute 0.29 10*3/mm3      Basophils, Absolute 0.05 10*3/mm3      Immature Grans, Absolute 0.01 10*3/mm3      nRBC 0.0 /100 WBC     POC Glucose Once [[id number]]  (Normal) Collected: 05/30/23 1727    Specimen: Blood Updated: 05/30/23 1739     Glucose 103 mg/dL      Comment: : [id number] Graves  [facility] [place] [facility] ID: [id number]                 Objective:     Vitals: /43 (BP Location: Left arm, Patient Position: Lying)   Pulse 52   Temp 98.5 °F (36.9 °C) (Oral)   Resp 18   Ht 153.7 cm (60.5\")   Wt 73.7 kg (162 lb 8 oz)   LMP  (LMP Unknown)   SpO2 96%   BMI 31.21 kg/m²    Intake/Output Summary (Last 24 hours) at 2023 0759  Last data filed at 2023 0645  Gross per 24 hour   Intake 240 ml   Output 2000 ml   Net -1760 ml    Temp (24hrs), Av.1 °F (36.7 °C), Min:97.8 °F (36.6 °C), Max:98.5 °F (36.9 °C)      Physical Exam  Vitals reviewed.   Constitutional:       Appearance: Normal appearance. She is not ill-appearing.   HENT:      Head: Normocephalic and atraumatic.   Eyes:      General:         Right eye: No discharge.         Left eye: No discharge.      Extraocular Movements: Extraocular movements intact.      Conjunctiva/sclera: Conjunctivae normal.   Cardiovascular:      Rate and Rhythm: Normal rate. Rhythm irregular.      Pulses: Normal pulses.      Heart sounds: No murmur heard.  Pulmonary:      Effort: Pulmonary effort is normal. No respiratory distress.   Abdominal:      General: Abdomen is flat. Bowel sounds are normal. There is no distension.   Musculoskeletal:      Right lower leg: No edema.      Left lower leg: No edema.   Skin:     Capillary Refill: Capillary refill takes less than 2 seconds.   Neurological:      General: No focal deficit present.      Mental Status: She is alert.   Psychiatric:         Mood and Affect: Mood normal.         Behavior: Behavior normal.           Assessment and Plan:     Primary Problem:  Accelerated hypertension with diastolic congestive heart failure, NYHA class 3    Hospital Problem list:    Accelerated hypertension with diastolic congestive heart failure, NYHA class 3      PMH:  Past Medical History:   Diagnosis Date    Arthritis     Cancer     BCC @ nose & Left arm    Diabetes type 2, controlled     Diverticulitis     History of GI bleed     " History of transfusion     Has had x 5 units.    Hypertension     STAGE 3     Hyponatremia     Required hospitalization    Hypothyroidism     Knee arthropathy 09/30/2020    LPRD (laryngopharyngeal reflux disease)     Pharyngeal dysphagia     Thyroid nodule        Treatment Plan:  CHF exacerbation: Follows with Dr. Nickerson.  Cardiology has been consulted.  Improvement in shortness of breath and lower extremity edema with Lasix drip, but did not tolerate.  We will switch to once daily IV Lasix, monitor strict I's/O's.    Hypertension: Initially very uncontrolled and admitted to noncompliance with home medicines, significantly proved this morning.  Continue home blood pressure medications.    Atrial fibrillation: Continue home Eliquis, currently rate controlled.    UTI: Was treated with Macrobid outpatient, persistent abnormal urinalysis.  Rocephin started 5/30/2023.    Disposition: Inpatient    Reviewed treatment plans with the patient and/or family.   45 minutes spent in face to face interaction and coordination of care.     Electronically signed by Edgar Tolbert MD on 5/31/2023 at 07:53 CDT

## 2023-05-31 NOTE — CONSULTS
Patient Care Team:  Hank Rascon MD as PCP - General (Family Medicine)  Manish Smith MD as Consulting Physician (Otolaryngology)  Chandrakant Amin MD (Inactive) as Consulting Physician (Urology)  Yordan Blanton MD as Consulting Physician (Gastroenterology)  Roberto Collins PA as Physician Assistant (Otolaryngology)  Gill Panda APRN as Nurse Practitioner (Nurse Practitioner)  Hank Rascon MD  REASON FOR REFERRAL: CHF   Chief complaint: Shortness of breath, swelling, elevated blood pressure    Subjective     Patient is a 87 y.o. female presents with leg swelling, shortness of breath, chest tightness.  She starts by telling me she had low back pain and went to see her PCP this past Tuesday and received treatment for urinary tract infection.  In more recent days she developed worsening lower extremity edema, dry cough, orthopnea, shortness of breath, chest tightness.    Her systolic blood pressure was 200 on arrival.  BNP was normal.  EKG revealed sinus rhythm with a chronic left bundle branch block.  Troponins were flat trending at 27 and 28.  Chest x-ray was negative.  She was started on a Lasix drip due to concern for volume overload.  She states that she started feeling dizzy and was seeing spots and this was discontinued last night.  She does report she had good urine output with this and her chest tightness and cough and shortness of breath resolved.  Peripheral edema has resolved as well.    Blood pressure has improved.    She follows with Dr. Nickerson for paroxysmal atrial fibrillation.  She takes flecainide for rhythm control and Eliquis for anticoagulation.    It appears she had a cardiac catheterization June 2021 which revealed nonobstructive coronary artery disease.    Her last echocardiogram September 2022:      Left ventricular ejection fraction appears to be 51 - 55%. Left ventricular systolic function is normal.  The left ventricular cavity is mildly  dilated. Left ventricular wall thickness is consistent with mild concentric hypertrophy  Left ventricular diastolic dysfunction is noted.  Normal right ventricular cavity size and systolic function noted.  The left atrial cavity is mildly dilated. Left atrial volume is normal.  The aortic valve exhibits sclerosis. There is thickening of the aortic valve. Mild aortic valve regurgitation is present.  There is mild calcification of the mitral valve anterior and posterior leaflet(s).  Mild tricuspid valve regurgitation is present.  No evidence of pulmonary hypertension is present.      Review of Systems   Review of Systems   Constitutional:  Negative for diaphoresis, fatigue, fever and unexpected weight change.   HENT:  Negative for nosebleeds.    Respiratory:  Positive for cough and shortness of breath. Negative for apnea, chest tightness and wheezing.    Cardiovascular:  Positive for chest pain and leg swelling. Negative for palpitations.   Gastrointestinal:  Negative for abdominal distention, nausea and vomiting.   Genitourinary:  Negative for hematuria.   Musculoskeletal:  Negative for gait problem.   Skin:  Negative for color change.   Neurological:  Negative for dizziness, syncope, weakness and light-headedness.     History  Past Medical History:   Diagnosis Date    Arthritis     Cancer     BCC @ nose & Left arm    Diabetes type 2, controlled     Diverticulitis     History of GI bleed     History of transfusion     Has had x 5 units.    Hypertension     STAGE 3     Hyponatremia     Required hospitalization    Hypothyroidism     Knee arthropathy 09/30/2020    LPRD (laryngopharyngeal reflux disease)     Pharyngeal dysphagia     Thyroid nodule      Past Surgical History:   Procedure Laterality Date    APPENDECTOMY      BASAL CELL CARCINOMA EXCISION      BELPHAROPTOSIS REPAIR      BREAST CYST EXCISION      CARDIAC CATHETERIZATION      CARDIAC CATHETERIZATION Right 6/24/2021    Procedure: Left Heart Cath R radial, US  guided w poss intervention;  Surgeon: Mak Nickerson DO;  Location:  [facility]ATH [facility];  Service: Cardiovascular;  Laterality: Right;    CARDIAC CATHETERIZATION      CATARACT EXTRACTION      CATARACT EXTRACTION WITH INTRAOCULAR LENS IMPLANT Bilateral     CHOLECYSTECTOMY      COLONOSCOPY Left 11/1/2016    Tubular adenoma cecum, Diverticulosis repeat prn    SUBTOTAL HYSTERECTOMY      TOTAL KNEE ARTHROPLASTY Right 9/30/2020    Procedure: TOTAL KNEE ARTHROPLASTY;  Surgeon: Mak Pickens MD;  Location:  [facility] OR;  Service: Orthopedics;  Laterality: Right;    TUMOR EXCISION      under armpits and left breast     Family History   Problem Relation Age of Onset    Diabetes Mother     Cancer Mother     Heart failure Mother     Diabetes Father     Colon cancer Neg Hx     Colon polyps Neg Hx      Social History     Tobacco Use    Smoking status: Never    Smokeless tobacco: Never   Vaping Use    Vaping Use: Never used   Substance Use Topics    Alcohol use: No    Drug use: Never     Medications Prior to Admission   Medication Sig Dispense Refill Last Dose    acetaminophen (TYLENOL) 500 MG tablet Take 2 tablets by mouth At Night As Needed for Mild Pain.   Past Week    apixaban (ELIQUIS) 5 MG tablet tablet Take 1 tablet by mouth 2 (Two) Times a Day. 60 tablet 11 5/30/2023    atenolol (TENORMIN) 25 MG tablet Take 1 tablet by mouth Daily. 30 tablet 11 Past Week    Cholecalciferol (Vitamin D3) 125 MCG (5000 UT) tablet dispersible Take 5,000 tablets by mouth Daily.   5/30/2023    Coenzyme Q10 (CO Q 10) 100 MG capsule Take 1 tablet by mouth Daily.   5/30/2023    Evolocumab (REPATHA) solution auto-injector SureClick injection 1 mL Every 14 (Fourteen) Days.   Past Week    flecainide (TAMBOCOR) 50 MG tablet Take 1 tablet by mouth 2 (Two) Times a Day. (Patient taking differently: Take 1 tablet by mouth 2 (Two) Times a Day. 1/ 2 TAB BID) 60 tablet 11 Patient Taking Differently    Garlic 2 MG capsule Take 1 tablet  by mouth Daily.   Past Week    glimepiride (AMARYL) 4 MG tablet Take 1 tablet by mouth Every Morning Before Breakfast. Takes an extra 1/2 tablet when blood sugar is high.   5/30/2023    irbesartan (AVAPRO) 150 MG tablet Take 1 tablet by mouth 2 (Two) Times a Day. 60 tablet 11 5/30/2023    levothyroxine (SYNTHROID, LEVOTHROID) 50 MCG tablet Take 1 tablet by mouth Daily.   5/30/2023    minoxidil (LONITEN) 10 MG tablet Take 1 tablet by mouth Daily.   Past Week    Multiple Vitamins-Minerals (CENTRUM SILVER PO) Take 1 tablet by mouth Daily.   Past Week    prazosin (MINIPRESS) 2 MG capsule Take 1 mg by mouth 2 (Two) Times a Day.   Patient Taking Differently       Current Facility-Administered Medications:     acetaminophen (TYLENOL) tablet 650 mg, 650 mg, Oral, Q4H PRN, Kalyani Ferrer PA, 650 mg at 05/30/23 1812    apixaban (ELIQUIS) tablet 5 mg, 5 mg, Oral, BID, Kalyani Ferrer PA, 5 mg at 05/31/23 0838    atenolol (TENORMIN) tablet 50 mg, 50 mg, Oral, Daily, Hank Rascon MD, 50 mg at 05/31/23 0838    sennosides-docusate (PERICOLACE) 8.6-50 MG per tablet 2 tablet, 2 tablet, Oral, BID, 2 tablet at 05/31/23 0838 **AND** polyethylene glycol (MIRALAX) packet 17 g, 17 g, Oral, Daily PRN **AND** bisacodyl (DULCOLAX) EC tablet 5 mg, 5 mg, Oral, Daily PRN **AND** bisacodyl (DULCOLAX) suppository 10 mg, 10 mg, Rectal, Daily PRN, Kalyani Ferrer PA    cefTRIAXone (ROCEPHIN) 1 g in sodium chloride 0.9 % 100 mL IVPB, 1 g, Intravenous, Q24H, Hank Rascon MD, Last Rate: 200 mL/hr at 05/30/23 2256, 1 g at 05/30/23 2256    dextrose (D50W) (25 g/50 mL) IV injection 25 g, 25 g, Intravenous, Q15 Min PRN, Kalyani Ferrer PA    dextrose (GLUTOSE) oral gel 15 g, 15 g, Oral, Q15 Min PRN, Kalyani Ferrer PA    flecainide (TAMBOCOR) tablet 50 mg, 50 mg, Oral, BID, Kalyani Ferrer PA, 50 mg at 05/31/23 0838    furosemide (LASIX) injection 20 mg, 20 mg, Intravenous, Daily, Didi  Edgar DOBBINS MD, 20 mg at 05/31/23 0838    glucagon (GLUCAGEN) injection 1 mg, 1 mg, Intramuscular, Q15 Min PRN, Kalyani Ferrer PA    Insulin Lispro (humaLOG) injection 2-7 Units, 2-7 Units, Subcutaneous, 4x Daily AC & at Bedtime, Kalyani Ferrer PA    levothyroxine (SYNTHROID, LEVOTHROID) tablet 50 mcg, 50 mcg, Oral, Q AM, Kalyani Ferrer PA, 50 mcg at 05/31/23 0548    losartan (COZAAR) tablet 100 mg, 100 mg, Oral, Q24H, Kalyani Ferrer PA, 100 mg at 05/31/23 0839    nitroglycerin (NITROSTAT) SL tablet 0.4 mg, 0.4 mg, Sublingual, Q5 Min PRN, Kalyani Ferrer PA    ondansetron (ZOFRAN) tablet 4 mg, 4 mg, Oral, Q6H PRN, Kalyani Ferrer PA    sodium chloride 0.9 % flush 10 mL, 10 mL, Intravenous, Q12H, Kalyani Ferrer PA, 10 mL at 05/31/23 0838    sodium chloride 0.9 % flush 10 mL, 10 mL, Intravenous, PRN, Kalyani Ferrer PA    sodium chloride 0.9 % infusion 40 mL, 40 mL, Intravenous, PRN, Kalyani Ferrer PA    terazosin (HYTRIN) capsule 2 mg, 2 mg, Oral, Nightly, Kalyani Ferrer PA, 2 mg at 05/30/23 2129    vitamin D3 capsule 5,000 Units, 5,000 Units, Oral, Daily, Kalyani Ferrer PA, 5,000 Units at 05/31/23 0838  Allergies:  Codeine, Iodine, Levaquin [levofloxacin], Lortab [hydrocodone-acetaminophen], Quinolones, Shrimp (diagnostic), Bactroban [mupirocin calcium], Ace inhibitors, Norvasc [amlodipine], Piperacillin sod-tazobactam so, Procardia xl [nifedipine er], Statins, Thiazide-type diuretics, and Mupirocin    Objective     Vital Signs  Temp:  [97.8 °F (36.6 °C)-98.5 °F (36.9 °C)] 97.9 °F (36.6 °C)  Heart Rate:  [46-70] 46  Resp:  [16-18] 16  BP: (131-200)/(37-72) 152/42    Physical Exam:   Vitals and nursing note reviewed.   Constitutional:       General: Not in acute distress.     Appearance: Well-developed and not in distress. Not diaphoretic.   Neck:      Vascular: No JVD.   Pulmonary:      Effort: Pulmonary effort is normal.  No respiratory distress.      Breath sounds: Normal breath sounds.   Cardiovascular:      Bradycardia present. Regular rhythm.      Murmurs: There is a systolic murmur.   Edema:     Peripheral edema absent.   Abdominal:      Tenderness: There is no abdominal tenderness.   Skin:     General: Skin is warm and dry.   Neurological:      Mental Status: Alert and oriented to person, place, and time.     Results Review:     Lab Results (last 72 hours)       Procedure Component Value Units Date/Time    POC Glucose Once [[id number]]  (Abnormal) Collected: 05/31/23 1122    Specimen: Blood Updated: 05/31/23 1133     Glucose 141 mg/dL      Comment: : [id number] Chapin AdrianaMeter ID: [id number]       POC Glucose Once [[id number]]  (Normal) Collected: 05/31/23 0749    Specimen: Blood Updated: 05/31/23 0801     Glucose 113 mg/dL      Comment: : [id number] Chapin AdrianaMeter ID: [id number]       Basic Metabolic Panel [[id number]]  (Abnormal) Collected: 05/31/23 0312    Specimen: Blood Updated: 05/31/23 0423     Glucose 98 mg/dL      BUN 20 mg/dL      Creatinine 1.04 mg/dL      Sodium 136 mmol/L      Potassium 4.1 mmol/L      Chloride 103 mmol/L      CO2 24.0 mmol/L      Calcium 9.0 mg/dL      BUN/Creatinine Ratio 19.2     Anion Gap 9.0 mmol/L      eGFR 52.1 mL/min/1.73     Narrative:      GFR Normal >60  Chronic Kidney Disease <60  Kidney Failure <15    The GFR formula is only valid for adults with stable renal function between ages 18 and 70.    TSH Rfx On Abnormal To Free T4 [[id number]]  (Normal) Collected: 05/30/23 1715    Specimen: Blood Updated: 05/31/23 0143     TSH 1.120 uIU/mL     Lactic Acid, Plasma [[id number]]  (Normal) Collected: 05/30/23 2211    Specimen: Blood Updated: 05/30/23 2238     Lactate 0.6 mmol/L     Blood Culture - Blood, Arm, Left [[id number]] Collected: 05/30/23 2211    Specimen: Blood from Arm, Left Updated: 05/30/23 2224    Blood Culture - Blood, Arm, Right [[id number]] Collected: 05/30/23 2217     Specimen: Blood from Arm, Right Updated: 05/30/23 2224    High Sensitivity Troponin T 2Hr [[id number]]  (Abnormal) Collected: 05/30/23 1849    Specimen: Blood Updated: 05/30/23 2004     HS Troponin T 28 ng/L      Troponin T Delta 1 ng/L     Narrative:      High Sensitive Troponin T Reference Range:  <10.0 ng/L- Negative Female for AMI  <15.0 ng/L- Negative Male for AMI  >=10 - Abnormal Female indicating possible myocardial injury.  >=15 - Abnormal Male indicating possible myocardial injury.   Clinicians would have to utilize clinical acumen, EKG, Troponin, and serial changes to determine if it is an Acute Myocardial Infarction or myocardial injury due to an underlying chronic condition.         POC Glucose Once [[id number]]  (Abnormal) Collected: 05/30/23 1927    Specimen: Blood Updated: 05/30/23 1939     Glucose 137 mg/dL      Comment: : [id number] Crockett RebeccaMeter ID: [id number]       Urinalysis With Microscopic If Indicated (No Culture) - Urine, Clean Catch [[id number]]  (Abnormal) Collected: 05/30/23 1751    Specimen: Urine, Clean Catch Updated: 05/30/23 1817     Color, UA Yellow     Appearance, UA Clear     pH, UA 7.0     Specific Gravity, UA <=1.005     Glucose, UA Negative     Ketones, UA Negative     Bilirubin, UA Negative     Blood, UA Negative     Protein, UA Negative     Leuk Esterase, UA Small (1+)     Nitrite, UA Negative     Urobilinogen, UA 0.2 E.U./dL    Urinalysis, Microscopic Only - Urine, Clean Catch [[id number]]  (Abnormal) Collected: 05/30/23 1751    Specimen: Urine, Clean Catch Updated: 05/30/23 1817     RBC, UA 0-2 /HPF      WBC, UA 6-12 /HPF      Bacteria, UA None Seen /HPF      Squamous Epithelial Cells, UA None Seen /HPF      Hyaline Casts, UA None Seen /LPF      WBC Clumps, UA Small/1+ /HPF      Methodology Manual Light Microscopy    Comprehensive Metabolic Panel [[id number]]  (Abnormal) Collected: 05/30/23 1715    Specimen: Blood Updated: 05/30/23 1810     Glucose 103 mg/dL      BUN 21  mg/dL      Creatinine 0.96 mg/dL      Sodium 135 mmol/L      Potassium 4.4 mmol/L      Chloride 102 mmol/L      CO2 23.0 mmol/L      Calcium 9.3 mg/dL      Total Protein 6.5 g/dL      Albumin 4.0 g/dL      ALT (SGPT) 10 U/L      AST (SGOT) 12 U/L      Alkaline Phosphatase 69 U/L      Total Bilirubin 0.4 mg/dL      Globulin 2.5 gm/dL      A/G Ratio 1.6 g/dL      BUN/Creatinine Ratio 21.9     Anion Gap 10.0 mmol/L      eGFR 57.4 mL/min/1.73     Narrative:      GFR Normal >60  Chronic Kidney Disease <60  Kidney Failure <15    The GFR formula is only valid for adults with stable renal function between ages 18 and 70.    BNP [[id number]]  (Normal) Collected: 05/30/23 1715    Specimen: Blood Updated: 05/30/23 1808     proBNP 1,342.0 pg/mL     Narrative:      Among patients with dyspnea, NT-proBNP is highly sensitive for the detection of acute congestive heart failure. In addition NT-proBNP of <300 pg/ml effectively rules out acute congestive heart failure with 99% negative predictive value.    Results may be falsely decreased if patient taking Biotin.      High Sensitivity Troponin T [[id number]]  (Abnormal) Collected: 05/30/23 1715    Specimen: Blood Updated: 05/30/23 1807     HS Troponin T 27 ng/L     Narrative:      High Sensitive Troponin T Reference Range:  <10.0 ng/L- Negative Female for AMI  <15.0 ng/L- Negative Male for AMI  >=10 - Abnormal Female indicating possible myocardial injury.  >=15 - Abnormal Male indicating possible myocardial injury.   Clinicians would have to utilize clinical acumen, EKG, Troponin, and serial changes to determine if it is an Acute Myocardial Infarction or myocardial injury due to an underlying chronic condition.         CBC Auto Differential [[id number]]  (Abnormal) Collected: 05/30/23 1715    Specimen: Blood Updated: 05/30/23 1750     WBC 6.31 10*3/mm3      RBC 3.50 10*6/mm3      Hemoglobin 10.9 g/dL      Hematocrit 32.8 %      MCV 93.7 fL      MCH 31.1 pg      MCHC 33.2 g/dL      RDW  12.5 %      RDW-SD 43.1 fl      MPV 10.5 fL      Platelets 210 10*3/mm3      Neutrophil % 58.0 %      Lymphocyte % 24.2 %      Monocyte % 12.2 %      Eosinophil % 4.6 %      Basophil % 0.8 %      Immature Grans % 0.2 %      Neutrophils, Absolute 3.66 10*3/mm3      Lymphocytes, Absolute 1.53 10*3/mm3      Monocytes, Absolute 0.77 10*3/mm3      Eosinophils, Absolute 0.29 10*3/mm3      Basophils, Absolute 0.05 10*3/mm3      Immature Grans, Absolute 0.01 10*3/mm3      nRBC 0.0 /100 WBC     POC Glucose Once [[id number]]  (Normal) Collected: 05/30/23 1727    Specimen: Blood Updated: 05/30/23 1739     Glucose 103 mg/dL      Comment: : [id number] Jennifer Balderas ID: [id number]               Assessment & Plan     1.  Hypertension: Uncontrolled.  Upon presentation systolic blood pressure was 200.  Now improved.  Likely contributing to acute diastolic congestive heart failure.  She has numerous intolerances to various antihypertensive medication.    -She is currently taking atenolol 50 mg daily, losartan 100 mg daily and terazosin 2 mg nightly.  Given her sinus bradycardia I would not increase atenolol further.  She states that she did not tolerate higher doses of alpha-blocker.  She is on max dose ARB.  She tells me she is intolerant to thiazide diuretics, loop diuretics, and calcium channel blockers.  Could consider adding a nitrate or hydralazine.    2.  Acute diastolic congestive heart failure: Repeat echo is pending.  She now appears euvolemic on exam she briefly received a Lasix drip.  She reports intolerance to oral daily Lasix in the past.  I would consider discharging her home with Lasix 40 mg to be used on an as-needed basis only for shortness of breath/orthopnea/PND or weight gain of greater than 2 pounds overnight or 4 pounds in 2 days.  BNP is actually normal.    3.  Paroxysmal atrial fibrillation: Maintaining sinus bradycardia.  Continue Eliquis, flecainide, atenolol    4.  Chest pain: She described a  tightness associated with shortness of breath that resolved with diuresis.  No evidence of ACS.  Troponins are flat trending at 27 and 28.  Left bundle branch block is chronic.  She had nonobstructive coronary disease at the time of cardiac catheterization June 2021.    5.  UTI: Treatment per primary team    Follow-up with primary cardiologist Dr. Nickerson following discharge.    I discussed the patient's findings and my recommendations with patient.     Electronically signed by QUETA Baugh, 05/31/23, 12:17 PM CDT.

## 2023-05-31 NOTE — PLAN OF CARE
Goal Outcome Evaluation:           Progress: improving  Outcome Evaluation: Pt a&ox4, /49 at beginning of shift, systolic has improved throughout the shift but diastolic cont to be low, SB/NSR 51-81 w/ BBB,PAC,PVC; started lasix gtt @ 10ml/hr, shortly after pt started complaining of blurred vision, seeing spots, nausea and abdominal discomfort, pt visibly anxious, pt requested it be stopped. Pt returned to baseline within an hour and rested well throughout the night. Edema appears to be improving, no c/o soa or pain. Safety maintained. Will continue to monitor.

## 2023-05-31 NOTE — CASE MANAGEMENT/SOCIAL WORK
Discharge Planning Assessment  [facility]     Patient Name: Nedra Tolliver  MRN: [id number]  Today's Date: 5/31/2023    Admit Date: 5/30/2023    Plan: Spoke with patient at bedside for dc planning. Patient lives alone. Son lives close and checks on patient daily. Independent prior to illness and still drives. C/o dizziness and weakness. PT  eval pending. Will continue to follow for PT recommendation and dc disposition. Possible need for home health.   Discharge Needs Assessment       Row Name 05/31/23 1442       Living Environment    People in Home alone    Current Living Arrangements home    Potentially Unsafe Housing Conditions none    Primary Care Provided by self       Transition Planning    Patient/Family Anticipates Transition to home       Discharge Needs Assessment    Equipment Currently Used at Home cane, straight;shower chair;rollator    Current Discharge Risk lives alone                   Discharge Plan       Row Name 05/31/23 1444       Plan    Plan Spoke with patient at bedside for dc planning. Patient lives alone. Son lives close and checks on patient daily. Independent prior to illness and still drives. C/o dizziness and weakness. PT  eval pending. Will continue to follow for PT recommendation and dc disposition. Possible need for home health.                  Continued Care and Services - Admitted Since 5/30/2023    Coordination has not been started for this encounter.       Expected Discharge Date and Time       Expected Discharge Date Expected Discharge Time    Jun 2, 2023            Demographic Summary    No documentation.                  Functional Status    No documentation.                  Psychosocial    No documentation.                  Abuse/Neglect    No documentation.                  Legal    No documentation.                  Substance Abuse    No documentation.                  Patient Forms    No documentation.                     Merlina A Fletcher, RN

## 2023-05-31 NOTE — THERAPY EVALUATION
Patient Name: Nedra Tolliver  : 1935    MRN: [id number]                              Today's Date: 2023       Admit Date: 2023    Visit Dx: No diagnosis found.  Patient Active Problem List   Diagnosis    Urinary tract infection without hematuria    Dysuria    Vaginal atrophy    Overactive bladder    Diverticulitis    Benign essential HTN    Thyroid nodule    Hypothyroidism    Hyponatremia    Syncope and collapse    Volume depletion    Type 2 diabetes mellitus, without long-term current use of insulin    Moderate left ankle sprain    Left hip pain    Acute pain of left knee    Pain of left thumb    Nondisplaced fracture of navicular bone of left foot with routine healing    Chest pain at rest    Acute cystitis without hematuria    H/O thyroid nodule    Hypoglycemia    IBS (irritable bowel syndrome)    Idiopathic scoliosis    Insomnia    Mild tricuspid insufficiency    Neuropathy due to type 2 diabetes mellitus    Onychomycosis    Osteopenia    Pericarditis secondary to uremia    Primary osteoarthritis of right knee    Proteinuria    Vitamin D deficiency    High cholesterol    Lumbar radiculopathy    Non-toxic multinodular goiter    Transient ischemic attack    Acute gastritis without hemorrhage    UTI symptoms    Absolute anemia    Essential hypertension    Hypertensive emergency with headache    New Left bundle branch block (LBBB) on electrocardiogram    Mixed hyperlipidemia with pervious statin intolerance    Stage 3a chronic kidney disease (CKD)    Cervical radiculopathy    Unstable angina (HCC)    LBBB (left bundle branch block)    Persistent atrial fibrillation    Fatigue    Shortness of breath    Accelerated hypertension with diastolic congestive heart failure, NYHA class 3    Paroxysmal A-fib     Past Medical History:   Diagnosis Date    Arthritis     Cancer     BCC @ nose & Left arm    Diabetes type 2, controlled     Diverticulitis     History of GI bleed     History of transfusion      Has had x 5 units.    Hypertension     STAGE 3     Hyponatremia     Required hospitalization    Hypothyroidism     Knee arthropathy 09/30/2020    LPRD (laryngopharyngeal reflux disease)     Pharyngeal dysphagia     Thyroid nodule      Past Surgical History:   Procedure Laterality Date    APPENDECTOMY      BASAL CELL CARCINOMA EXCISION      BELPHAROPTOSIS REPAIR      BREAST CYST EXCISION      CARDIAC CATHETERIZATION      CARDIAC CATHETERIZATION Right 6/24/2021    Procedure: Left Heart Cath R radial, US guided w poss intervention;  Surgeon: Mak Nickerson DO;  Location:  [facility];  Service: Cardiovascular;  Laterality: Right;    CARDIAC CATHETERIZATION      CATARACT EXTRACTION      CATARACT EXTRACTION WITH INTRAOCULAR LENS IMPLANT Bilateral     CHOLECYSTECTOMY      COLONOSCOPY Left 11/1/2016    Tubular adenoma cecum, Diverticulosis repeat prn    SUBTOTAL HYSTERECTOMY      TOTAL KNEE ARTHROPLASTY Right 9/30/2020    Procedure: TOTAL KNEE ARTHROPLASTY;  Surgeon: Mak Pickens MD;  Location:  [facility] OR;  Service: Orthopedics;  Laterality: Right;    TUMOR EXCISION      under armpits and left breast      General Information       Row Name 05/31/23 1304          OT Time and Intention    Document Type evaluation  Pt presents with chest discomfort, SOA, pitting edema B LE. Dx: CHF exacerbation, accelerated HTN, a-fib. Hx includes: CA, T2DM, HTN.  -LR     Mode of Treatment occupational therapy  -LR       Row Name 05/31/23 130          General Information    Patient Profile Reviewed yes  -LR     Prior Level of Function independent:;all household mobility;community mobility;transfer;ADL's;home management;cooking;cleaning;driving;shopping  -LR     Existing Precautions/Restrictions fall  -LR     Barriers to Rehab medically complex;physical barrier  -LR       Row Name 05/31/23 4903          Occupational Profile    Environmental Supports and Barriers (Occupational Profile) tub/shower combo,  "shower chair, grab bars by shower, BSC, rollator, cane  -LR       Row Name 05/31/23 1303          Living Environment    People in Home alone  -LR       Row Name 05/31/23 1303          Home Main Entrance    Number of Stairs, Main Entrance five  -LR     Stair Railings, Main Entrance railing on left side (ascending)  -LR       Row Name 05/31/23 1303          Stairs Within Home, Primary    Number of Stairs, Within Home, Primary none  -LR       Row Name 05/31/23 1303          Cognition    Orientation Status (Cognition) oriented x 4  -LR       Row Name 05/31/23 1303          Safety Issues, Functional Mobility    Impairments Affecting Function (Mobility) balance;endurance/activity tolerance;pain;strength  -LR               User Key  (r) = Recorded By, (t) = Taken By, (c) = Cosigned By      Initials Name Provider Type    LR Ade Rodriguez, OTR/L Occupational Therapist                     Mobility/ADL's       Row Name 05/31/23 1303          Bed Mobility    Bed Mobility supine-sit;sit-supine  -LR     Supine-Sit [place] (Bed Mobility) supervision  -LR     Sit-Supine [place] (Bed Mobility) supervision  -LR     Assistive Device (Bed Mobility) bed rails;head of bed elevated  -LR     Comment, (Bed Mobility) \"lightheaded\" upon sitting EOB  -LR       Row Name 05/31/23 1303          Transfers    Transfers sit-stand transfer;stand-sit transfer  -       Row Name 05/31/23 1303          Sit-Stand Transfer    Sit-Stand [place] (Transfers) contact guard;verbal cues  -LR     Assistive Device (Sit-Stand Transfers) --  -LR     Comment, (Sit-Stand Transfer) posterior LOB upon standing d/t \"lightheadedness\"  -       Row Name 05/31/23 1303          Stand-Sit Transfer    Stand-Sit [place] (Transfers) contact guard;verbal cues  -LR     Assistive Device (Stand-Sit Transfers) --  -LR       Row Name 05/31/23 1303          Functional Mobility    Functional Mobility- Ind. Level contact guard assist;verbal cues required  -LR "     Functional Mobility- Device other (see comments)  HHA  -LR     Functional Mobility- Comment unsteadiness noted, c/o lightheadedness  -LR       Row Name 05/31/23 1303          Activities of Daily Living    BADL Assessment/Intervention lower body dressing  -LR       Row Name 05/31/23 1303          Lower Body Dressing Assessment/Training    [place] Level (Lower Body Dressing) socks;standby assist  -LR     Position (Lower Body Dressing) edge of bed sitting  -LR     Comment, (Lower Body Dressing) adjust B socks  -LR               User Key  (r) = Recorded By, (t) = Taken By, (c) = Cosigned By      Initials Name Provider Type    LR Ade Rodriguez, OTR/L Occupational Therapist                   Obj/Interventions       Row Name 05/31/23 1303          Sensory Assessment (Somatosensory)    Sensory Assessment (Somatosensory) UE sensation intact  -LR       Row Name 05/31/23 1303          Vision Assessment/Intervention    Visual Impairment/Limitations corrective lenses full-time  -LR       Row Name 05/31/23 1303          Range of Motion Comprehensive    General Range of Motion bilateral upper extremity ROM WFL  -LR       Row Name 05/31/23 1303          Strength Comprehensive (MMT)    General Manual Muscle Testing (MMT) Assessment upper extremity strength deficits identified  -LR     Comment, General Manual Muscle Testing (MMT) Assessment B UE 4/5, except R  4+/5  -LR       Row Name 05/31/23 1303          Motor Skills    Motor Skills coordination  -LR     Coordination WFL;bilateral;upper extremity  -LR       Row Name 05/31/23 1303          Balance    Balance Assessment sitting static balance;sitting dynamic balance;standing static balance;standing dynamic balance  -LR     Static Sitting Balance supervision  -LR     Dynamic Sitting Balance standby assist  -LR     Position, Sitting Balance unsupported;sitting edge of bed  -LR     Static Standing Balance contact guard  -LR     Dynamic Standing Balance contact  guard;minimal assist  -LR     Position/Device Used, Standing Balance supported  HHA  -LR     Comment, Balance posterior LOB upon standing, requiring Min A for correction  -LR               User Key  (r) = Recorded By, (t) = Taken By, (c) = Cosigned By      Initials Name Provider Type    LR Ade Rodriguez, OTR/L Occupational Therapist                   Goals/Plan       Row Name 05/31/23 1303          Transfer Goal 1 (OT)    Activity/Assistive Device (Transfer Goal 1, OT) bed-to-chair/chair-to-bed;toilet;shower chair;tub  -LR     [place] Level/Cues Needed (Transfer Goal 1, OT) standby assist  -LR     Time Frame (Transfer Goal 1, OT) long term goal (LTG);by discharge  -LR     Progress/Outcome (Transfer Goal 1, OT) new goal  -LR       Row Name 05/31/23 1303          Bathing Goal 1 (OT)    Activity/Device (Bathing Goal 1, OT) bathing skills, all;shower chair  -LR     [place] Level/Cues Needed (Bathing Goal 1, OT) standby assist  -LR     Time Frame (Bathing Goal 1, OT) long term goal (LTG);by discharge  -LR     Progress/Outcomes (Bathing Goal 1, OT) new goal  -LR       Row Name 05/31/23 1303          Toileting Goal 1 (OT)    Activity/Device (Toileting Goal 1, OT) toileting skills, all  -LR     [place] Level/Cues Needed (Toileting Goal 1, OT) standby assist  -LR     Time Frame (Toileting Goal 1, OT) long term goal (LTG);by discharge  -LR     Progress/Outcome (Toileting Goal 1, OT) new goal  -LR       Row Name 05/31/23 1303          Therapy Assessment/Plan (OT)    Planned Therapy Interventions (OT) activity tolerance training;adaptive equipment training;BADL retraining;functional balance retraining;IADL retraining;neuromuscular control/coordination retraining;occupation/activity based interventions;patient/caregiver education/training;ROM/therapeutic exercise;strengthening exercise;transfer/mobility retraining;edema control/reduction  -LR               User Key  (r) = Recorded By, (t) = Taken By, (c) =  Cosigned By      Initials Name Provider Type    LR Ade Rodriguez, OTR/L Occupational Therapist                   Clinical Impression       Row Name 05/31/23 1303          Pain Assessment    Pretreatment Pain Rating 4/10  -LR     Posttreatment Pain Rating 3/10  -LR     Pain Location - back  -LR     Pre/Posttreatment Pain Comment middle back, reports chronic; c/o lightheadedness while sitting and standing  -LR     Pain Intervention(s) Medication (See MAR);Repositioned;Ambulation/increased activity  -LR       Row Name 05/31/23 1303          Plan of Care Review    Plan of Care Reviewed With patient  -LR     Progress no change  -LR     Outcome Evaluation OT eval completed. Pt A&Ox4 with c/o chronic middle back pain. Pt completed supine<>sit with Supervision. Upon sitting EOB, pt c/o lightheadedness, BP of 151/50 noted. Pt reported improvements following rest break. Pt adjusted B socks with SBA while seated EOB. Pt completed sit<>stand t/f with CGA, but demo posterior LOB upon standing, requiring Min A for correction. Pt reported LOB d/t lightheadedness, BP of 153/47 noted upon return to sitting. Pt completed fxl mobility with HHA and CGA. Unsteadiness noted with pt continuing to c/o lightheadedness, RN notified. Pt returned to supine with BP of 152/48 noted and improvements reported in c/o. Skilled OT warranted to provide education and address pain, balance, activity tolerance, and strength in order to increase pt safety and I during ADLs, fxl mobility, and fxl t/f's. Pt educated on safety concerns during episodes of lightheadedness secondary to anticipation of returning home soon. Communication completed regarding rehab placement pending progress, but pt unsure at this time.  -LR       Row Name 05/31/23 1303          Therapy Assessment/Plan (OT)    Rehab Potential (OT) good, to achieve stated therapy goals  -LR     Criteria for Skilled Therapeutic Interventions Met (OT) yes;skilled treatment is necessary  -LR      Therapy Frequency (OT) 5 times/wk  -LR     Predicted Duration of Therapy Intervention (OT) until hospital D/C  -LR       Row Name 05/31/23 1303          Therapy Plan Review/Discharge Plan (OT)    Anticipated Discharge Disposition (OT) sub acute care setting;home with assist;home with home health  -LR       Row Name 05/31/23 1303          Vital Signs    Pre Systolic BP Rehab 151  -LR     Pre Treatment Diastolic BP 50  sitting EOB  -LR     Intra Systolic BP Rehab 153  -LR     Intra Treatment Diastolic BP 47  following standing  -LR     Post Systolic BP Rehab 152  -LR     Post Treatment Diastolic BP 48  supine  -LR     Pretreatment Heart Rate (beats/min) 52  -LR     Intratreatment Heart Rate (beats/min) 54  -LR     Posttreatment Heart Rate (beats/min) 52  -LR     O2 Delivery Pre Treatment room air  -LR     O2 Delivery Intra Treatment room air  -LR     O2 Delivery Post Treatment room air  -LR     Pre Patient Position Supine  -LR     Intra Patient Position Standing  -LR     Post Patient Position Supine  -LR       Row Name 05/31/23 1303          Positioning and Restraints    Pre-Treatment Position in bed  -LR     Post Treatment Position bed  -LR     In Bed notified nsg;fowlers;call light within reach;encouraged to call for assist;side rails up x2  -LR               User Key  (r) = Recorded By, (t) = Taken By, (c) = Cosigned By      Initials Name Provider Type    LR Ade Rodriguez, JUANR/L Occupational Therapist                   Outcome Measures       Row Name 05/31/23 1303          How much help from another is currently needed...    Putting on and taking off regular lower body clothing? 3  -LR     Bathing (including washing, rinsing, and drying) 3  -LR     Toileting (which includes using toilet bed pan or urinal) 3  -LR     Putting on and taking off regular upper body clothing 3  -LR     Taking care of personal grooming (such as brushing teeth) 4  -LR     Eating meals 4  -LR     AM-PAC 6 Clicks Score (OT) 20  -LR        Row Name 05/31/23 1303          Functional Assessment    Outcome Measure Options AM-PAC 6 Clicks Daily Activity (OT)  -LR               User Key  (r) = Recorded By, (t) = Taken By, (c) = Cosigned By      Initials Name Provider Type    LR Ade Rodriguez OTR/L Occupational Therapist                    Occupational Therapy Education       Title: PT OT SLP Therapies (In Progress)       Topic: Occupational Therapy (In Progress)       Point: ADL training (Done)       Description:   Instruct learner(s) on proper safety adaptation and remediation techniques during self care or transfers.   Instruct in proper use of assistive devices.                  Learning Progress Summary             Patient Acceptance, E,D, VU,NR by LR at 5/31/2023 1412                         Point: Home exercise program (Not Started)       Description:   Instruct learner(s) on appropriate technique for monitoring, assisting and/or progressing therapeutic exercises/activities.                  Learner Progress:  Not documented in this visit.              Point: Precautions (Done)       Description:   Instruct learner(s) on prescribed precautions during self-care and functional transfers.                  Learning Progress Summary             Patient Acceptance, E,D, VU,NR by LR at 5/31/2023 1412                         Point: Body mechanics (Done)       Description:   Instruct learner(s) on proper positioning and spine alignment during self-care, functional mobility activities and/or exercises.                  Learning Progress Summary             Patient Acceptance, E,D, VU,NR by LR at 5/31/2023 1412                                         User Key       Initials Effective Dates Name Provider Type [facility] 04/25/23 -  Ade Rodriguez OTR/L Occupational Therapist OT                  OT Recommendation and Plan  Planned Therapy Interventions (OT): activity tolerance training, adaptive equipment training, BADL retraining,  functional balance retraining, IADL retraining, neuromuscular control/coordination retraining, occupation/activity based interventions, patient/caregiver education/training, ROM/therapeutic exercise, strengthening exercise, transfer/mobility retraining, edema control/reduction  Therapy Frequency (OT): 5 times/wk  Plan of Care Review  Plan of Care Reviewed With: patient  Progress: no change  Outcome Evaluation: OT eval completed. Pt A&Ox4 with c/o chronic middle back pain. Pt completed supine<>sit with Supervision. Upon sitting EOB, pt c/o lightheadedness, BP of 151/50 noted. Pt reported improvements following rest break. Pt adjusted B socks with SBA while seated EOB. Pt completed sit<>stand t/f with CGA, but demo posterior LOB upon standing, requiring Min A for correction. Pt reported LOB d/t lightheadedness, BP of 153/47 noted upon return to sitting. Pt completed fxl mobility with HHA and CGA. Unsteadiness noted with pt continuing to c/o lightheadedness, RN notified. Pt returned to supine with BP of 152/48 noted and improvements reported in c/o. Skilled OT warranted to provide education and address pain, balance, activity tolerance, and strength in order to increase pt safety and I during ADLs, fxl mobility, and fxl t/f's. Pt educated on safety concerns during episodes of lightheadedness secondary to anticipation of returning home soon. Communication completed regarding rehab placement pending progress, but pt unsure at this time.     Time Calculation:    Time Calculation- OT       Row Name 05/31/23 1303             Time Calculation- OT    OT Start Time 1303  +14 min chart review and AM attempt  -LR      OT Stop Time 1350  -LR      OT Time Calculation (min) 47 min  -LR      OT Received On 05/31/23  -LR      OT Goal Re-Cert Due Date 06/10/23  -LR         Untimed Charges    OT Eval/Re-eval Minutes 61  -LR         Total Minutes    Untimed Charges Total Minutes 61  -LR       Total Minutes 61  -LR                User  Key  (r) = Recorded By, (t) = Taken By, (c) = Cosigned By      Initials Name Provider Type    LR Ade Rodriguez OTR/L Occupational Therapist                  Therapy Charges for Today       Code Description Service Date Service Provider Modifiers Qty    14886421064 HC OT EVAL MOD COMPLEXITY 4 5/31/2023 Ade Rodriguez OTR/L GO 1                 THEODORE Kumar/MARTHA  5/31/2023

## 2023-06-01 ENCOUNTER — READMISSION MANAGEMENT (OUTPATIENT)
Dept: CALL CENTER | Facility: HOSPITAL | Age: 88
End: 2023-06-01

## 2023-06-01 VITALS
TEMPERATURE: 97.6 F | HEART RATE: 60 BPM | SYSTOLIC BLOOD PRESSURE: 158 MMHG | OXYGEN SATURATION: 97 % | RESPIRATION RATE: 18 BRPM | WEIGHT: 162.5 LBS | DIASTOLIC BLOOD PRESSURE: 45 MMHG | HEIGHT: 61 IN | BODY MASS INDEX: 30.68 KG/M2

## 2023-06-01 LAB
ANION GAP SERPL CALCULATED.3IONS-SCNC: 10 MMOL/L (ref 5–15)
BUN SERPL-MCNC: 23 MG/DL (ref 8–23)
BUN/CREAT SERPL: 19.7 (ref 7–25)
CALCIUM SPEC-SCNC: 9.1 MG/DL (ref 8.6–10.5)
CHLORIDE SERPL-SCNC: 104 MMOL/L (ref 98–107)
CO2 SERPL-SCNC: 24 MMOL/L (ref 22–29)
CREAT SERPL-MCNC: 1.17 MG/DL (ref 0.57–1)
EGFRCR SERPLBLD CKD-EPI 2021: 45.3 ML/MIN/1.73
GLUCOSE BLDC GLUCOMTR-MCNC: 126 MG/DL (ref 70–130)
GLUCOSE SERPL-MCNC: 115 MG/DL (ref 65–99)
POTASSIUM SERPL-SCNC: 4.1 MMOL/L (ref 3.5–5.2)
SODIUM SERPL-SCNC: 138 MMOL/L (ref 136–145)

## 2023-06-01 PROCEDURE — 82948 REAGENT STRIP/BLOOD GLUCOSE: CPT

## 2023-06-01 PROCEDURE — 80048 BASIC METABOLIC PNL TOTAL CA: CPT | Performed by: PHYSICIAN ASSISTANT

## 2023-06-01 RX ORDER — TERAZOSIN 2 MG/1
2 CAPSULE ORAL NIGHTLY
Qty: 30 CAPSULE | Refills: 0 | Status: SHIPPED | OUTPATIENT
Start: 2023-06-01

## 2023-06-01 RX ORDER — FUROSEMIDE 20 MG/1
20 TABLET ORAL DAILY PRN
Qty: 30 TABLET | Refills: 0 | Status: SHIPPED | OUTPATIENT
Start: 2023-06-01

## 2023-06-01 RX ADMIN — FLECAINIDE ACETATE 50 MG: 50 TABLET ORAL at 09:06

## 2023-06-01 RX ADMIN — ATENOLOL 50 MG: 50 TABLET ORAL at 09:06

## 2023-06-01 RX ADMIN — APIXABAN 5 MG: 5 TABLET, FILM COATED ORAL at 09:06

## 2023-06-01 RX ADMIN — LEVOTHYROXINE SODIUM 50 MCG: 50 TABLET ORAL at 06:23

## 2023-06-01 RX ADMIN — Medication 5000 UNITS: at 09:06

## 2023-06-01 RX ADMIN — LOSARTAN POTASSIUM 100 MG: 50 TABLET, FILM COATED ORAL at 09:06

## 2023-06-01 NOTE — DISCHARGE SUMMARY
Hospital Discharge Summary    Nedra Tolliver  :  1935  MRN:  [id number]    Admit date:  2023  Discharge date: 2023    Admitting Physician:    Hank Rascon MD    Discharge Diagnoses:      Accelerated hypertension with diastolic congestive heart failure, NYHA class 3    Urinary tract infection without hematuria    Benign essential HTN    Stage 3a chronic kidney disease (CKD)    Paroxysmal A-fib      Hospital Course:   The patient is a 87 y.o. female who presents c/o chest discomfort with shortness of air the past couple days. Has gained 9 lbs since OV last wk, pitting edema bilat LE. EKG without acute ST changes. Bp uncontrolled with current BP meds. Patient direct admit to [facility] for further eval. No acute distress noted at time of D/C from the office.  She had also been treated outpatient for UTI, mild persistent symptoms and was treated with Rocephin while in the hospital.  Significant improvement with IV Lasix drip, but did not tolerate very long due to side effects.  Euvolemic on exam the following morning.  Cardiology consulted as she follows with him outpatient.  Has had multiple intolerances to different medications.  Updated echo shows EF 66 to 70%.  Continues to be euvolemic with improvement in breathing status at time of discharge.    The patient was admitted for the above noted medical/surgical issues. Please see daily progress note for further details concerning their stay. The patient improved throughout their stay and reached maximum medical improvement on the day of discharge. The patient/family agree with the treatment plan as outlined above. All questions concerning their stay were answered prior to discharge. They understand the importance of follow up concerning any abnormal test results.     Physical Exam  Vitals reviewed.   Constitutional:       Appearance: Normal appearance. She is not ill-appearing.   HENT:      Head: Normocephalic and atraumatic.   Eyes:       General:         Right eye: No discharge.         Left eye: No discharge.      Extraocular Movements: Extraocular movements intact.      Conjunctiva/sclera: Conjunctivae normal.   Cardiovascular:      Rate and Rhythm: Normal rate and regular rhythm.      Pulses: Normal pulses.      Heart sounds: No murmur heard.  Pulmonary:      Effort: Pulmonary effort is normal. No respiratory distress.   Abdominal:      General: Abdomen is flat. Bowel sounds are normal. There is no distension.   Musculoskeletal:      Right lower leg: No edema.      Left lower leg: No edema.   Skin:     Capillary Refill: Capillary refill takes less than 2 seconds.   Neurological:      General: No focal deficit present.      Mental Status: She is alert.   Psychiatric:         Mood and Affect: Mood normal.         Behavior: Behavior normal.         Discharge Medications:         Discharge Medications        New Medications        Instructions Start Date   furosemide 20 MG tablet  Commonly known as: Lasix   20 mg, Oral, Daily PRN      terazosin 2 MG capsule  Commonly known as: HYTRIN  Replaces: prazosin 1 MG capsule   2 mg, Oral, Nightly             Continue These Medications        Instructions Start Date   acetaminophen 500 MG tablet  Commonly known as: TYLENOL   1,000 mg, Oral, Nightly PRN      apixaban 5 MG tablet tablet  Commonly known as: ELIQUIS   5 mg, Oral, 2 Times Daily      atenolol 25 MG tablet  Commonly known as: TENORMIN   25 mg, Oral, Daily      Co Q 10 100 MG capsule   1 tablet, Oral, Daily      Evolocumab solution auto-injector SureClick injection  Commonly known as: REPATHA   140 mg, Every 14 Days      Garlic 2 MG capsule   1 tablet, Oral, Daily      glimepiride 4 MG tablet  Commonly known as: AMARYL   4 mg, Oral, Every Morning Before Breakfast, Takes an extra 1/2 tablet when blood sugar is high.      irbesartan 150 MG tablet  Commonly known as: AVAPRO   150 mg, Oral, 2 Times Daily      levothyroxine 50 MCG tablet  Commonly known  as: SYNTHROID, LEVOTHROID   50 mcg, Oral, Daily      minoxidil 10 MG tablet  Commonly known as: LONITEN   10 mg, Oral, Daily      multivitamin with minerals tablet tablet   1 tablet, Oral, Daily      Vitamin D3 125 MCG (5000 UT) tablet dispersible   5,000 tablets, Oral, Daily             Stop These Medications      prazosin 1 MG capsule  Commonly known as: MINIPRESS  Replaced by: terazosin 2 MG capsule     prazosin 2 MG capsule  Commonly known as: MINIPRESS            ASK your doctor about these medications        Instructions Start Date   flecainide 50 MG tablet  Commonly known as: TAMBOCOR   50 mg, Oral, 2 Times Daily               Activity: As tolerated    Diet: Heart healthy diet low and added sodium and cholesterol    Consults: Cardiology    Significant Diagnostic Studies:    XR Chest 1 View    Result Date: 5/30/2023  No acute cardiopulmonary abnormality. No significant interval change. This report was finalized on 05/30/2023 18:21 by Dr. Hank Sauer MD.          Treatments:   IV Lasix, Rocephin    Disposition:   Discharge home    Time spent on discharge including discussion with patient/family, SW, and coordination of care.     Follow up with Hank Rascon MD in 1 weeks.    Signed:  Edgar Tolbert MD   6/1/2023, 08:01 CDT

## 2023-06-01 NOTE — THERAPY DISCHARGE NOTE
Acute Care - Occupational Therapy Discharge Summary  [facility]     Patient Name: Nedra Tolliver  : 1935  MRN: [id number]    Today's Date: 2023                 Admit Date: 2023        OT Recommendation and Plan    Visit Dx:  No diagnosis found.             OT Rehab Goals       Row Name 23 1300             Transfer Goal 1 (OT)    Activity/Assistive Device (Transfer Goal 1, OT) bed-to-chair/chair-to-bed;toilet;shower chair;tub  -LS      [place] Level/Cues Needed (Transfer Goal 1, OT) standby assist  -LS      Time Frame (Transfer Goal 1, OT) long term goal (LTG);by discharge  -LS      Progress/Outcome (Transfer Goal 1, OT) goal not met  -LS         Bathing Goal 1 (OT)    Activity/Device (Bathing Goal 1, OT) bathing skills, all;shower chair  -LS      [place] Level/Cues Needed (Bathing Goal 1, OT) standby assist  -LS      Time Frame (Bathing Goal 1, OT) long term goal (LTG);by discharge  -LS      Progress/Outcomes (Bathing Goal 1, OT) goal not met  -LS         Toileting Goal 1 (OT)    Activity/Device (Toileting Goal 1, OT) toileting skills, all  -LS      [place] Level/Cues Needed (Toileting Goal 1, OT) standby assist  -LS      Time Frame (Toileting Goal 1, OT) long term goal (LTG);by discharge  -LS      Progress/Outcome (Toileting Goal 1, OT) goal not met  -LS                User Key  (r) = Recorded By, (t) = Taken By, (c) = Cosigned By      Initials Name Provider Type Discipline    LS Karolina Tobin COTA Occupational Therapist Assistant THERAPIES                                OT Discharge Summary  Anticipated Discharge Disposition (OT): home with home health  Reason for Discharge: Discharge from facility  Outcomes Achieved: Refer to plan of care for updates on goals achieved  Discharge Destination: Home with home health      YAW Alegria  2023

## 2023-06-01 NOTE — PLAN OF CARE
Goal Outcome Evaluation:              Outcome Evaluation: SWELLING DOWN NO C/O PAIN NO FALLS OR INJURIES , CONT TO MONITOR  POSS D/C HOME TODAY.

## 2023-06-02 NOTE — OUTREACH NOTE
Prep Survey      Flowsheet Row Responses   [facility] facility patient discharged from? [place]   Is LACE score < 7 ? No   Eligibility Readm Mgmt   Discharge diagnosis Accelerated hypertension with diastolic congestive heart failure, NYHA class 3   Does the patient have one of the following disease processes/diagnoses(primary or secondary)? CHF   Does the patient have Home health ordered? No   Is there a DME ordered? No   Medication alerts for this patient Lasix   Prep survey completed? Yes            Kalyani DOBBINS - Registered Nurse

## 2023-06-04 LAB
BACTERIA SPEC AEROBE CULT: NORMAL
BACTERIA SPEC AEROBE CULT: NORMAL

## 2023-06-08 ENCOUNTER — READMISSION MANAGEMENT (OUTPATIENT)
Dept: CALL CENTER | Facility: HOSPITAL | Age: 88
End: 2023-06-08
Payer: MEDICARE

## 2023-06-08 NOTE — OUTREACH NOTE
CHF Week 1 Survey      Flowsheet Row Responses   [facility] patient discharged from? [place]   Does the patient have one of the following disease processes/diagnoses(primary or secondary)? CHF   CHF Week 1 attempt successful? Yes   Call start time 0920   Call end time 0942   Discharge diagnosis Accelerated hypertension with diastolic congestive heart failure, NYHA class 3   Person spoke with today (if not patient) and relationship pt   Meds reviewed with patient/caregiver? Yes   Is the patient having any side effects they believe may be caused by any medication additions or changes? No   Does the patient have all medications ordered at discharge? Yes   Is the patient taking all medications as directed (includes completed medication regime)? Yes   Does the patient have a primary care provider?  Yes   Does the patient have an appointment with their PCP within 7 days of discharge? Yes   Comments regarding PCP 6/8/23   Has the patient kept scheduled appointments due by today? N/A   Comments Cardiology 6/15/23   Psychosocial issues? No   Did the patient receive a copy of their discharge instructions? Yes   Nursing interventions Reviewed instructions with patient   What is the patient's perception of their health status since discharge? Same   Nursing interventions Nurse provided patient education   Is the patient able to teach back signs and symptoms of worsening condition? (i.e. weight gain, shortness of air, etc.) Yes   If the patient is a current smoker, are they able to teach back resources for cessation? Not a smoker   Is the patient/caregiver able to teach back the hierarchy of who to call/visit for symptoms/problems? PCP, Specialist, Home health nurse, Urgent Care, ED, 911 Yes   Is the patient able to teach back Heart Failure Zones? Yes   CHF Nursing Interventions Education provided on various zones   CHF Zone this Call Green Zone   Green Zone Patient reports doing well, No new or worsening shortness of  Pre-operative Bariatric Nutrition Evaluation ()    Date: 2018   Dereck Slaughter M.D. Name: Hortencia Jones  :  1979  Age:  44  breath, Physical activity level is normal for you, No new swelling -  feet, ankles and legs look normal for you, Weight check stable, No chest pain   Green Zone Interventions Daily weight check, Meds as directed, Follow up visits planned, Low sodium diet    CHF Week 1 call completed? Yes   Is the patient interested in additional calls from an ambulatory ?  NOTE:  applies to high risk patients requiring additional follow-up. No   Would this patient benefit from a Referral to [facility]? No   Wrap up additional comments Pt states she is not feeling well r/t medications. Pt shares she has cont LE swelling that has not worsened since hospital dc, and is taking Bumex as Lasix is on allergy list. Pt advised to inquire about SCDs for home use at PCP fu appt today, 6/8/23. Reviewed AVS/medications with pt. Pt advised to write down questions/concerns for PCP fu appt today. Pt verified Cardiologist fu appt on 6/15/23.   Call end time [id number]            Eri VICKERS - Registered Nurse   Sammye Bence, RD

## 2023-06-15 ENCOUNTER — OFFICE VISIT (OUTPATIENT)
Dept: CARDIOLOGY | Facility: CLINIC | Age: 88
End: 2023-06-15
Payer: MEDICARE

## 2023-06-15 VITALS
DIASTOLIC BLOOD PRESSURE: 70 MMHG | HEART RATE: 69 BPM | BODY MASS INDEX: 29.64 KG/M2 | SYSTOLIC BLOOD PRESSURE: 158 MMHG | HEIGHT: 60 IN | OXYGEN SATURATION: 99 % | WEIGHT: 151 LBS

## 2023-06-15 DIAGNOSIS — I48.0 PAROXYSMAL A-FIB: ICD-10-CM

## 2023-06-15 DIAGNOSIS — I50.30 ACCELERATED HYPERTENSION WITH DIASTOLIC CONGESTIVE HEART FAILURE, NYHA CLASS 3: Primary | ICD-10-CM

## 2023-06-15 DIAGNOSIS — E11.21 TYPE 2 DIABETES MELLITUS WITH DIABETIC NEPHROPATHY, WITHOUT LONG-TERM CURRENT USE OF INSULIN: Chronic | ICD-10-CM

## 2023-06-15 DIAGNOSIS — I07.1 MILD TRICUSPID INSUFFICIENCY: ICD-10-CM

## 2023-06-15 DIAGNOSIS — E78.00 HIGH CHOLESTEROL: ICD-10-CM

## 2023-06-15 DIAGNOSIS — I11.0 ACCELERATED HYPERTENSION WITH DIASTOLIC CONGESTIVE HEART FAILURE, NYHA CLASS 3: Primary | ICD-10-CM

## 2023-06-15 DIAGNOSIS — N18.31 STAGE 3A CHRONIC KIDNEY DISEASE (CKD): ICD-10-CM

## 2023-06-15 DIAGNOSIS — I10 ESSENTIAL HYPERTENSION: ICD-10-CM

## 2023-06-15 DIAGNOSIS — I44.7 LBBB (LEFT BUNDLE BRANCH BLOCK): ICD-10-CM

## 2023-06-15 RX ORDER — CLONIDINE HYDROCHLORIDE 0.1 MG/1
0.1 TABLET ORAL 2 TIMES DAILY PRN
Qty: 30 TABLET | Refills: 5 | Status: SHIPPED | OUTPATIENT
Start: 2023-06-15

## 2023-06-15 RX ORDER — BUMETANIDE 1 MG/1
1 TABLET ORAL DAILY
COMMUNITY
End: 2023-06-15

## 2023-06-15 NOTE — PROGRESS NOTES
"     Subjective:     Encounter Date:06/15/2023      Patient ID: Nedra Tolliver is a 87 y.o. female.    Chief Complaint:  History of Present Illness  Nedra Tolliver presents to cardiology for a follow up.    The patient reports she has been better. She states that she recently got out of the hospital. She has been \"shaky\" and experiencing shortness of breath. She adds that she has been having difficult breathing, and dizzy spells.    She experiences chest tightness when she gets up and turns around to much. She describes it as someone pushing on her chest.    She states that she went to [facility] on 05/30/2023. She notes that she had severe edema in her bilateral lower extremity. They checked her blood pressure and her systolic was in the 200s mmHg. They sent her to the hospital, and she was admitted for 2 days, from 05/30/2023 to 06/01/2023. The hospital was able to remove her fluid build up by placing her on a fluid pill. She reports that she lost 7 pounds in 2 days. She states that when she was in the hospital her blood pressure was really low. They placed her on metoprolol and another medication, that she does not recall. She states that she discontinued the medications because it was causing her to feel ill, and have short of breath, dizziness, and difficult breathing.    She notes that Saturday night, 06/10/2023 after she took that medicine, she tried to go to bed, but could not lay down, and could not hold her head up high enough, so she slept on the couch.    The patient has been experiencing chest pain. She adds that she seen Dr. Sheriff on 06/05/2023 and on 06/08/2023. He advised her to discontinue the atenolol, which has improved her lower extremity edema. She states that she has not been on any medication for the last couple days. She only takes the budesonide as needed for swelling. She states that she has an artificial knee, and she has swelling around it. She adds that one of her ankles will " occasionally have swelling, and the other does not. She believes it is from the surgery    The patient reports that she has been struggling with high blood pressure for over 2 years. She notes that all the medication she has at home does not help her. She adds that there is a lot of medication she can not tolerate such as beta blockers. She inquires if Repatha can elevate blood pressure.    She maintains that the other night, she bent over to pick something up, then she checked her blood pressure, her diastolic pressure was 125 mmHg. She reports that it lowered quickly, shortly after checking it. She adds that Dr. Garcia advised her to keep a log of her blood pressure. She reports that she checks it twice, the second time she checks her blood pressure, it is usually lower then the first time. She has an appointment with Dr. Garcia next week.    The patient confirms she is on Eliquis for a blood thinner, bumetanide as needed, Repatha for cholesterol, flecainide; half a pill twice daily,  Avapro 150 mg twice daily, and Synthriod.    She states she wore a heart monitor for 14 days, and she had an echocardiogram the other day. She would like to wait on the heart catheterization until after she tries the clonidine.    The patient confirms that she has tried hydralazine, Imdur, lisinopril and HCT, in the past. She is ALLERGIC to most ACE inhibitors. She had the clonidine patch in the past, and she developed a rash, and had to go to the hospital. They informed her to discontinue it. She believes that she had the clonidine pill back in 2021, but does not recall why she discontinued it.       ROS  All negative unless listed in the HPI.      Current Outpatient Medications:   •  apixaban (ELIQUIS) 5 MG tablet tablet, Take 1 tablet by mouth 2 (Two) Times a Day., Disp: 60 tablet, Rfl: 11  •  Cholecalciferol (Vitamin D3) 125 MCG (5000 UT) tablet dispersible, Take 5,000 tablets by mouth Daily., Disp: , Rfl:   •  Coenzyme Q10 (CO Q  10) 100 MG capsule, Take 1 tablet by mouth Daily., Disp: , Rfl:   •  Evolocumab (REPATHA) solution auto-injector SureClick injection, 1 mL Every 14 (Fourteen) Days., Disp: , Rfl:   •  flecainide (TAMBOCOR) 50 MG tablet, Take 1 tablet by mouth 2 (Two) Times a Day. (Patient taking differently: Take 1 tablet by mouth 2 (Two) Times a Day. 1/ 2 TAB BID), Disp: 60 tablet, Rfl: 11  •  Garlic 2 MG capsule, Take 1 tablet by mouth Daily., Disp: , Rfl:   •  glimepiride (AMARYL) 4 MG tablet, Take 1 tablet by mouth Every Morning Before Breakfast. Takes an extra 1/2 tablet when blood sugar is high., Disp: , Rfl:   •  irbesartan (AVAPRO) 150 MG tablet, Take 1 tablet by mouth 2 (Two) Times a Day., Disp: 60 tablet, Rfl: 11  •  levothyroxine (SYNTHROID, LEVOTHROID) 50 MCG tablet, Take 1 tablet by mouth Daily., Disp: , Rfl:   •  Multiple Vitamins-Minerals (CENTRUM SILVER PO), Take 1 tablet by mouth Daily., Disp: , Rfl:   •  cloNIDine (Catapres) 0.1 MG tablet, Take 1 tablet by mouth 2 (Two) Times a Day As Needed for High Blood Pressure (sbp > 160mmHg)., Disp: 30 tablet, Rfl: 5       Objective:      Vitals:    06/15/23 1055   BP: 158/70   Pulse: 69   SpO2: 99%     Vitals and nursing note reviewed.   Constitutional:       Appearance: Normal and healthy appearance. Well-developed and not in distress.   Eyes:      Extraocular Movements: Extraocular movements intact.      Pupils: Pupils are equal, round, and reactive to light.   HENT:      Head: Normocephalic and atraumatic.    Mouth/Throat:      Pharynx: Oropharynx is clear.   Neck:      Vascular: JVD normal.      Trachea: Trachea normal.   Pulmonary:      Effort: Pulmonary effort is normal.      Breath sounds: Normal breath sounds. No wheezing. No rhonchi. No rales.   Cardiovascular:      PMI at left midclavicular line. Normal rate. Regular rhythm. Normal S1. Normal S2.      Murmurs: There is a grade 2/6 systolic murmur.      No gallop. No rub.   Pulses:     Dorsalis pedis: 2+  bilaterally.     Posterior tibial: 2+ bilaterally.  Abdominal:      General: Bowel sounds are normal.      Palpations: Abdomen is soft.      Tenderness: There is no abdominal tenderness.   Musculoskeletal: Normal range of motion.      Cervical back: Normal range of motion and neck supple. Skin:     General: Skin is warm and dry.      Capillary Refill: Capillary refill takes less than 2 seconds.   Feet:      Right foot:      Skin integrity: Skin integrity normal.      Left foot:      Skin integrity: Skin integrity normal.   Neurological:      Mental Status: Alert and oriented to person, place and time.      Cranial Nerves: Cranial nerves are intact.      Sensory: Sensation is intact.      Motor: Motor function is intact.      Coordination: Coordination is intact.   Psychiatric:         Speech: Speech normal.         Behavior: Behavior is cooperative.         Lab Review:     The patient had a heart catheterization 2 years ago, which showed she had boarder line blockages. Her carotid were checked in 2018, they were normal. While in the hospital, they checked her troponins; which were positive.     Echocardiogram showed EF in the 60s, grade 1 diastolic, left atrium is slightly enlarged.  Procedures      Assessment/Plan:     Problem List Items Addressed This Visit        Cardiac and Vasculature    Mild tricuspid insufficiency    High cholesterol    Essential hypertension    Relevant Medications    cloNIDine (Catapres) 0.1 MG tablet    LBBB (left bundle branch block)    Overview     Added automatically from request for surgery [id number]         Accelerated hypertension with diastolic congestive heart failure, NYHA class 3 - Primary    Relevant Medications    cloNIDine (Catapres) 0.1 MG tablet    Paroxysmal A-fib       Endocrine and Metabolic    Type 2 diabetes mellitus, without long-term current use of insulin (Chronic)       Other    Stage 3a chronic kidney disease (CKD)       1. Hypertension  2. Chest pressure  3. Chest  tightness  4. Dizziness  5. Dyspnea    Plan  The patient is prescribed a clonidine pill 0.1 mg twice daily for a couple of weeks. She was advised to monitor and check her blood pressure in the morning and at nighttime. If her systolic blood pressure is 160 mmHg or greater, she is advised to take a clonidine. She is informed if she is still feeling bad, chest tightness, short of breath, or her symptoms worsen to contact the office.    Lower extremity edema  The patient is informed to keep taking the bumetanide as needed for edema.    Recommendations/plans:    Transcribed from ambient dictation for Mak Nickerson DO by Hillary Townsend.  06/15/23   14:47 CDT    Patient or patient representative verbalized consent to the visit recording.  I have personally performed the services described in this document as transcribed by the above individual, and it is both accurate and complete.  Mak Nickerson DO  6/18/2023  22:01 CDT

## 2023-09-29 RX ORDER — EVOLOCUMAB 140 MG/ML
140 INJECTION, SOLUTION SUBCUTANEOUS
Qty: 2 ML | Refills: 11 | Status: SHIPPED | OUTPATIENT
Start: 2023-09-29

## 2023-10-17 RX ORDER — APIXABAN 5 MG/1
5 TABLET, FILM COATED ORAL 2 TIMES DAILY
Qty: 60 TABLET | Refills: 11 | Status: SHIPPED | OUTPATIENT
Start: 2023-10-17

## 2023-11-14 RX ORDER — CLONIDINE HYDROCHLORIDE 0.1 MG/1
TABLET ORAL
Qty: 30 TABLET | Refills: 5 | Status: SHIPPED | OUTPATIENT
Start: 2023-11-14

## 2023-11-27 ENCOUNTER — TELEPHONE (OUTPATIENT)
Dept: CARDIOLOGY | Facility: CLINIC | Age: 88
End: 2023-11-27
Payer: MEDICARE

## 2023-11-27 NOTE — TELEPHONE ENCOUNTER
Caller: Nedra Tolliver    Relationship to patient: Self    Best call back number: [phone]    Type of visit: FOLLOW UP     Requested date: DECEMBER      If rescheduling, when is the original appointment: 1.15.23     Additional notes:PT STATES SHE HAD A FOLLOW UP APPT WITH DR MACKAY ON 12.15.23, THE CLINIC HAD CALLED AND MOVED HER APPT OUT TO 1.15.23. PT STATES SHE WAS REALLY NEEDING TO HAVE THAT APPT IN DECEMBER DUE TO BEING ABLE TO FIT APPT ON THE END OF THE YEAR INS COVERAGE, AND IF IT'S IN JAN SHE WILL HAVE TO PAY OUT OF POCKET. PT IS WILLING TO SEE AN APRN IF NEEDED. PLEASE ADVISE IF WE CAN POSSIBLY MOVE PT'S APPT DATE TO SOMETIME IN JAN AND CALL BACK TO R/S.

## 2023-12-04 ENCOUNTER — OFFICE VISIT (OUTPATIENT)
Dept: CARDIOLOGY | Facility: CLINIC | Age: 88
End: 2023-12-04
Payer: MEDICARE

## 2023-12-04 VITALS
HEIGHT: 60 IN | RESPIRATION RATE: 18 BRPM | SYSTOLIC BLOOD PRESSURE: 150 MMHG | BODY MASS INDEX: 29.84 KG/M2 | OXYGEN SATURATION: 98 % | WEIGHT: 152 LBS | HEART RATE: 72 BPM | DIASTOLIC BLOOD PRESSURE: 82 MMHG

## 2023-12-04 DIAGNOSIS — I10 ESSENTIAL HYPERTENSION: ICD-10-CM

## 2023-12-04 DIAGNOSIS — I25.10 CORONARY ARTERY DISEASE INVOLVING NATIVE CORONARY ARTERY OF NATIVE HEART WITHOUT ANGINA PECTORIS: ICD-10-CM

## 2023-12-04 DIAGNOSIS — I50.32 CHRONIC DIASTOLIC CONGESTIVE HEART FAILURE: ICD-10-CM

## 2023-12-04 DIAGNOSIS — E78.2 MIXED HYPERLIPIDEMIA: ICD-10-CM

## 2023-12-04 DIAGNOSIS — I48.0 PAROXYSMAL A-FIB: Primary | ICD-10-CM

## 2023-12-04 DIAGNOSIS — I47.10 PAROXYSMAL SVT (SUPRAVENTRICULAR TACHYCARDIA): ICD-10-CM

## 2023-12-04 DIAGNOSIS — I49.1 PAC (PREMATURE ATRIAL CONTRACTION): ICD-10-CM

## 2023-12-04 DIAGNOSIS — I44.7 LBBB (LEFT BUNDLE BRANCH BLOCK): ICD-10-CM

## 2023-12-04 RX ORDER — HYDRALAZINE HYDROCHLORIDE 10 MG/1
10 TABLET, FILM COATED ORAL 3 TIMES DAILY
Qty: 90 TABLET | Refills: 11 | Status: SHIPPED | OUTPATIENT
Start: 2023-12-04

## 2023-12-04 NOTE — PROGRESS NOTES
Subjective:     Encounter Date:12/04/2023      Patient ID: Nedra Tolliver is a 88 y.o. female     Chief Complaint:  History of Present Illness  Patient presents today for routine cardiology follow up. Patient is followed for hypertension, diastolic congestive heart failure/moderate asymmetric hypertrophy, paroxysmal atrial fibrillation, PSVT, PACs and LBBB. Patient has hypothyroidism and hyperlipidemia. Patient is followed by Dr. Rascon as her PCP. Patient was diagnosed with A-fib in 2021. She had frequent episodes of paroxysmal SVT on last monitor. On Flecainide and Eliquis. She has had issues with recurrent hypertensive emergency episodes. She had a heart cath in 2021 for new onset LBBB. She was found to have nonobstructive disease. She has had trouble tolerating statins and is on Repatha. Patient has several allergies. She brings her home blood pressure readings which are very high. She notes occasional chest pain.     The following portions of the patient's history were reviewed and updated as appropriate: allergies, current medications, past medical history, past social history, past and problem list.    Allergies   Allergen Reactions    Codeine Hallucinations    Iodine Anaphylaxis     Xray dye    Levaquin [Levofloxacin] Anaphylaxis    Lortab [Hydrocodone-Acetaminophen] Mental Status Change and Confusion    Quinolones Anaphylaxis     - CIPRO -     Shrimp (Diagnostic) Anaphylaxis    Bactroban [Mupirocin Calcium] Swelling    Ace Inhibitors Cough    Norvasc [Amlodipine] Swelling and Headache    Piperacillin Sod-Tazobactam So Unknown - High Severity    Procardia Xl [Nifedipine Er] Arrhythmia    Statins Myalgia    Thiazide-Type Diuretics Unknown - High Severity    Mupirocin Rash       Current Outpatient Medications:     Cholecalciferol (Vitamin D3) 125 MCG (5000 UT) tablet dispersible, Take 5,000 tablets by mouth Daily., Disp: , Rfl:     cloNIDine (CATAPRES) 0.1 MG tablet, TAKE 1 TABLET BY MOUTH 2 (TWO) TIMES A  DAY AS NEEDED FOR HIGH BLOOD PRESSURE (SBP >160MMHG)., Disp: 30 tablet, Rfl: 5    Coenzyme Q10 (CO Q 10) 100 MG capsule, Take 1 tablet by mouth Daily., Disp: , Rfl:     Eliquis 5 MG tablet tablet, TAKE 1 TABLET BY MOUTH 2 (TWO) TIMES A DAY., Disp: 60 tablet, Rfl: 11    Evolocumab (Repatha SureClick) solution auto-injector SureClick injection, Inject 1 mL under the skin into the appropriate area as directed Every 14 (Fourteen) Days., Disp: 2 mL, Rfl: 11    flecainide (TAMBOCOR) 50 MG tablet, Take 1 tablet by mouth 2 (Two) Times a Day. (Patient taking differently: Take 1 tablet by mouth 2 (Two) Times a Day. 1/ 2 TAB BID), Disp: 60 tablet, Rfl: 11    Garlic 2 MG capsule, Take 1 tablet by mouth Daily., Disp: , Rfl:     glimepiride (AMARYL) 4 MG tablet, Take 1 tablet by mouth Every Morning Before Breakfast. Takes an extra 1/2 tablet when blood sugar is high., Disp: , Rfl:     irbesartan (AVAPRO) 150 MG tablet, Take 1 tablet by mouth 2 (Two) Times a Day., Disp: 60 tablet, Rfl: 11    levothyroxine (SYNTHROID, LEVOTHROID) 50 MCG tablet, Take 1 tablet by mouth Daily., Disp: , Rfl:     Multiple Vitamins-Minerals (CENTRUM SILVER PO), Take 1 tablet by mouth Daily., Disp: , Rfl:     Social History     Socioeconomic History    Marital status:    Tobacco Use    Smoking status: Never    Smokeless tobacco: Never   Vaping Use    Vaping Use: Never used   Substance and Sexual Activity    Alcohol use: No    Drug use: Never    Sexual activity: Defer     Partners: Male       Review of Systems   Constitutional: Positive for malaise/fatigue. Negative for chills, decreased appetite, fever, weight gain and weight loss.   HENT:  Negative for nosebleeds.    Eyes:  Negative for visual disturbance.   Cardiovascular:  Positive for chest pain. Negative for dyspnea on exertion, leg swelling, near-syncope, orthopnea, palpitations, paroxysmal nocturnal dyspnea and syncope.        Occasional chest pain   Respiratory:  Positive for shortness of  "breath. Negative for cough, hemoptysis and snoring.    Endocrine: Negative for cold intolerance and heat intolerance.   Hematologic/Lymphatic: Negative for bleeding problem. Does not bruise/bleed easily.   Skin:  Negative for rash.   Musculoskeletal:  Negative for back pain and falls.   Gastrointestinal:  Negative for abdominal pain, constipation, diarrhea, heartburn, melena, nausea and vomiting.   Genitourinary:  Negative for hematuria.   Neurological:  Negative for dizziness, headaches and light-headedness.   Psychiatric/Behavioral:  Negative for altered mental status.    Allergic/Immunologic: Negative for persistent infections.              Objective:     Constitutional:       Appearance: Healthy appearance. Not in distress. Frail. Chronically ill-appearing.   Eyes:      Pupils: Pupils are equal, round, and reactive to light.   HENT:      Head: Normocephalic and atraumatic.      Nose: Nose normal.    Mouth/Throat:      Dentition: Normal.   Pulmonary:      Effort: Pulmonary effort is normal.      Breath sounds: Normal breath sounds.   Chest:      Chest wall: Not tender to palpatation.   Cardiovascular:      PMI at left midclavicular line. Normal rate. Regular rhythm.      Murmurs: There is no murmur.   Pulses:     Intact distal pulses.   Edema:     Peripheral edema absent.   Abdominal:      General: Bowel sounds are normal.      Palpations: Abdomen is soft.   Musculoskeletal: Normal range of motion.      Cervical back: Normal range of motion. Skin:     General: Skin is warm and dry.   Neurological:      Mental Status: Alert, oriented to person, place, and time and oriented to person, place and time.   Psychiatric:         Attention and Perception: Attention normal.         Mood and Affect: Mood normal.           Procedures  /82   Pulse 72   Resp 18   Ht 152.4 cm (60\")   Wt 68.9 kg (152 lb)   LMP  (LMP Unknown)   SpO2 98%   BMI 29.69 kg/m²   Lab Review:   I have reviewed   Lab Results   Component Value " Date    GLUCOSE 115 (H) 06/01/2023    CALCIUM 9.1 06/01/2023     06/01/2023    K 4.1 06/01/2023    CO2 24.0 06/01/2023     06/01/2023    BUN 23 06/01/2023    CREATININE 1.17 (H) 06/01/2023    EGFR 45.3 (L) 06/01/2023    BCR 19.7 06/01/2023    ANIONGAP 10.0 06/01/2023          Lab Results   Component Value Date    CHOL 302 (H) 05/09/2021    CHLPL 370 (H) 09/09/2021    TRIG 281 (H) 09/09/2021    HDL 58 09/09/2021     (H) 09/09/2021      Results for orders placed during the hospital encounter of 05/30/23    Adult Transthoracic Echo Complete w/ Color, Spectral and Contrast if necessary per protocol    Interpretation Summary    Left ventricular systolic function is normal. Left ventricular ejection fraction appears to be 66 - 70%.    Left ventricular wall thickness is consistent with moderate septal asymmetric hypertrophy.    Left ventricular diastolic function is consistent with (grade Ia w/high LAP) impaired relaxation and age.    The left atrial cavity is moderately dilated.    Estimated right ventricular systolic pressure from tricuspid regurgitation is normal (<35 mmHg).    Normal size and function the right ventricle.    No significant (greater than mild) valvular pathology.    Compared to prior exam from 9/9/2022, no significant change though EF appears better on current exam.     Assessment:          Diagnosis Plan   1. Paroxysmal A-fib        2. Paroxysmal SVT (supraventricular tachycardia)        3. PAC (premature atrial contraction)        4. LBBB (left bundle branch block)        5. Essential hypertension        6. Mixed hyperlipidemia with pervious statin intolerance        7. Chronic diastolic congestive heart failure        8. Coronary artery disease involving native coronary artery of native heart without angina pectoris               Plan:       Paroxysmal Atrial Fibrillation - diagnosed in 2021. No doucmented recurrence. On Flecainide. Anticoagulated with Eliquis.   Paroxysmal SVT-  frequent but brief episodes on most recent monitor. Overall appears asymptomatic. Intolerant to beta blockers per patient and not willing to try another.   PACs - 5% burden on last monitor  LBBB - diagnosed in 2021  Hypertension - blood pressure consistnently elevated at home and requiring clonidine. Which she notes make her feel poorly. Will add Hydralazine.   Hyperlipidemia - on repatha   Chronic Diastolic Congestive Heart Failure - thought secondary to hypertension. On irbesartan.   Coronary Artery Disease- nonobstructive cath in 2021. On repatha. On Eliquis for A-fib    Follow up in 3 month with Dr. Nickerson

## 2023-12-06 ENCOUNTER — TELEPHONE (OUTPATIENT)
Dept: CARDIOLOGY | Facility: CLINIC | Age: 88
End: 2023-12-06
Payer: MEDICARE

## 2023-12-06 NOTE — TELEPHONE ENCOUNTER
"Patient called to let you you know she does not want to take hydralazine feels like it causes her head to be \"full\" and weak. Per Saud BIRCH she needs to follow-up with her PCP.  Pt has been notified and verbalized understanding.  "

## 2024-01-16 ENCOUNTER — TELEPHONE (OUTPATIENT)
Dept: CARDIOLOGY | Facility: CLINIC | Age: 89
End: 2024-01-16
Payer: MEDICARE

## 2024-01-16 RX ORDER — FLECAINIDE ACETATE 50 MG/1
25 TABLET ORAL 2 TIMES DAILY
Qty: 90 TABLET | Refills: 3 | Status: ON HOLD | OUTPATIENT
Start: 2024-01-16 | End: 2024-01-17 | Stop reason: DRUGHIGH

## 2024-01-17 ENCOUNTER — HOSPITAL ENCOUNTER (INPATIENT)
Facility: HOSPITAL | Age: 89
LOS: 2 days | Discharge: HOME | End: 2024-01-19
Attending: FAMILY MEDICINE | Admitting: FAMILY MEDICINE
Payer: MEDICARE

## 2024-01-17 ENCOUNTER — APPOINTMENT (OUTPATIENT)
Dept: GENERAL RADIOLOGY | Facility: HOSPITAL | Age: 89
End: 2024-01-17
Payer: MEDICARE

## 2024-01-17 DIAGNOSIS — I10 HYPERTENSION, UNSPECIFIED TYPE: ICD-10-CM

## 2024-01-17 DIAGNOSIS — I21.4 NSTEMI (NON-ST ELEVATION MYOCARDIAL INFARCTION): ICD-10-CM

## 2024-01-17 DIAGNOSIS — Z74.09 IMPAIRED MOBILITY: ICD-10-CM

## 2024-01-17 DIAGNOSIS — R07.9 CHEST PAIN, UNSPECIFIED TYPE: Primary | ICD-10-CM

## 2024-01-17 DIAGNOSIS — Z78.9 DECREASED ACTIVITIES OF DAILY LIVING (ADL): ICD-10-CM

## 2024-01-17 LAB
ALBUMIN SERPL-MCNC: 4.2 G/DL (ref 3.5–5.2)
ALBUMIN/GLOB SERPL: 1.4 G/DL
ALP SERPL-CCNC: 77 U/L (ref 39–117)
ALT SERPL W P-5'-P-CCNC: 10 U/L (ref 1–33)
ANION GAP SERPL CALCULATED.3IONS-SCNC: 11 MMOL/L (ref 5–15)
ANION GAP SERPL CALCULATED.3IONS-SCNC: 11 MMOL/L (ref 5–15)
APTT PPP: 29 SECONDS (ref 24.5–36)
AST SERPL-CCNC: 13 U/L (ref 1–32)
BASOPHILS # BLD AUTO: 0.03 10*3/MM3 (ref 0–0.2)
BASOPHILS # BLD AUTO: 0.04 10*3/MM3 (ref 0–0.2)
BASOPHILS NFR BLD AUTO: 0.5 % (ref 0–1.5)
BASOPHILS NFR BLD AUTO: 0.6 % (ref 0–1.5)
BILIRUB SERPL-MCNC: 0.5 MG/DL (ref 0–1.2)
BUN SERPL-MCNC: 18 MG/DL (ref 8–23)
BUN SERPL-MCNC: 22 MG/DL (ref 8–23)
BUN/CREAT SERPL: 19.8 (ref 7–25)
BUN/CREAT SERPL: 22 (ref 7–25)
CALCIUM SPEC-SCNC: 10.1 MG/DL (ref 8.6–10.5)
CALCIUM SPEC-SCNC: 9.5 MG/DL (ref 8.6–10.5)
CHLORIDE SERPL-SCNC: 97 MMOL/L (ref 98–107)
CHLORIDE SERPL-SCNC: 98 MMOL/L (ref 98–107)
CO2 SERPL-SCNC: 24 MMOL/L (ref 22–29)
CO2 SERPL-SCNC: 26 MMOL/L (ref 22–29)
CREAT SERPL-MCNC: 0.91 MG/DL (ref 0.57–1)
CREAT SERPL-MCNC: 1 MG/DL (ref 0.57–1)
DEPRECATED RDW RBC AUTO: 41.7 FL (ref 37–54)
DEPRECATED RDW RBC AUTO: 42 FL (ref 37–54)
EGFRCR SERPLBLD CKD-EPI 2021: 54.3 ML/MIN/1.73
EGFRCR SERPLBLD CKD-EPI 2021: 60.8 ML/MIN/1.73
EOSINOPHIL # BLD AUTO: 0.17 10*3/MM3 (ref 0–0.4)
EOSINOPHIL # BLD AUTO: 0.18 10*3/MM3 (ref 0–0.4)
EOSINOPHIL NFR BLD AUTO: 2.6 % (ref 0.3–6.2)
EOSINOPHIL NFR BLD AUTO: 2.9 % (ref 0.3–6.2)
ERYTHROCYTE [DISTWIDTH] IN BLOOD BY AUTOMATED COUNT: 12.1 % (ref 12.3–15.4)
ERYTHROCYTE [DISTWIDTH] IN BLOOD BY AUTOMATED COUNT: 12.2 % (ref 12.3–15.4)
FLUAV RNA RESP QL NAA+PROBE: NOT DETECTED
FLUBV RNA RESP QL NAA+PROBE: NOT DETECTED
GEN 5 2HR TROPONIN T REFLEX: 27 NG/L
GLOBULIN UR ELPH-MCNC: 2.9 GM/DL
GLUCOSE SERPL-MCNC: 116 MG/DL (ref 65–99)
GLUCOSE SERPL-MCNC: 156 MG/DL (ref 65–99)
HCT VFR BLD AUTO: 38.7 % (ref 34–46.6)
HCT VFR BLD AUTO: 39.9 % (ref 34–46.6)
HGB BLD-MCNC: 13.2 G/DL (ref 12–15.9)
HGB BLD-MCNC: 13.6 G/DL (ref 12–15.9)
HOLD SPECIMEN: NORMAL
IMM GRANULOCYTES # BLD AUTO: 0.01 10*3/MM3 (ref 0–0.05)
IMM GRANULOCYTES # BLD AUTO: 0.02 10*3/MM3 (ref 0–0.05)
IMM GRANULOCYTES NFR BLD AUTO: 0.2 % (ref 0–0.5)
IMM GRANULOCYTES NFR BLD AUTO: 0.3 % (ref 0–0.5)
INR PPP: 1.05 (ref 0.91–1.09)
LYMPHOCYTES # BLD AUTO: 1.48 10*3/MM3 (ref 0.7–3.1)
LYMPHOCYTES # BLD AUTO: 1.75 10*3/MM3 (ref 0.7–3.1)
LYMPHOCYTES NFR BLD AUTO: 23.6 % (ref 19.6–45.3)
LYMPHOCYTES NFR BLD AUTO: 27.3 % (ref 19.6–45.3)
MCH RBC QN AUTO: 31.6 PG (ref 26.6–33)
MCH RBC QN AUTO: 31.6 PG (ref 26.6–33)
MCHC RBC AUTO-ENTMCNC: 34.1 G/DL (ref 31.5–35.7)
MCHC RBC AUTO-ENTMCNC: 34.1 G/DL (ref 31.5–35.7)
MCV RBC AUTO: 92.6 FL (ref 79–97)
MCV RBC AUTO: 92.8 FL (ref 79–97)
MONOCYTES # BLD AUTO: 0.62 10*3/MM3 (ref 0.1–0.9)
MONOCYTES # BLD AUTO: 0.66 10*3/MM3 (ref 0.1–0.9)
MONOCYTES NFR BLD AUTO: 10.5 % (ref 5–12)
MONOCYTES NFR BLD AUTO: 9.7 % (ref 5–12)
NEUTROPHILS NFR BLD AUTO: 3.84 10*3/MM3 (ref 1.7–7)
NEUTROPHILS NFR BLD AUTO: 3.9 10*3/MM3 (ref 1.7–7)
NEUTROPHILS NFR BLD AUTO: 59.7 % (ref 42.7–76)
NEUTROPHILS NFR BLD AUTO: 62.1 % (ref 42.7–76)
NRBC BLD AUTO-RTO: 0 /100 WBC (ref 0–0.2)
NRBC BLD AUTO-RTO: 0 /100 WBC (ref 0–0.2)
NT-PROBNP SERPL-MCNC: 811.2 PG/ML (ref 0–1800)
PLATELET # BLD AUTO: 238 10*3/MM3 (ref 140–450)
PLATELET # BLD AUTO: 238 10*3/MM3 (ref 140–450)
PMV BLD AUTO: 10.2 FL (ref 6–12)
PMV BLD AUTO: 9.7 FL (ref 6–12)
POTASSIUM SERPL-SCNC: 4.6 MMOL/L (ref 3.5–5.2)
POTASSIUM SERPL-SCNC: 4.7 MMOL/L (ref 3.5–5.2)
PROT SERPL-MCNC: 7.1 G/DL (ref 6–8.5)
PROTHROMBIN TIME: 13.8 SECONDS (ref 11.8–14.8)
RBC # BLD AUTO: 4.18 10*6/MM3 (ref 3.77–5.28)
RBC # BLD AUTO: 4.3 10*6/MM3 (ref 3.77–5.28)
RSV RNA NPH QL NAA+NON-PROBE: NOT DETECTED
SARS-COV-2 RNA RESP QL NAA+PROBE: NOT DETECTED
SODIUM SERPL-SCNC: 133 MMOL/L (ref 136–145)
SODIUM SERPL-SCNC: 134 MMOL/L (ref 136–145)
T3FREE SERPL-MCNC: 2.18 PG/ML (ref 2–4.4)
T4 FREE SERPL-MCNC: 1.02 NG/DL (ref 0.93–1.7)
TROPONIN T DELTA: 11 NG/L
TROPONIN T SERPL HS-MCNC: 16 NG/L
TSH SERPL DL<=0.05 MIU/L-ACNC: 1.28 UIU/ML (ref 0.27–4.2)
WBC NRBC COR # BLD AUTO: 6.28 10*3/MM3 (ref 3.4–10.8)
WBC NRBC COR # BLD AUTO: 6.42 10*3/MM3 (ref 3.4–10.8)
WHOLE BLOOD HOLD COAG: NORMAL
WHOLE BLOOD HOLD SPECIMEN: NORMAL

## 2024-01-17 PROCEDURE — 93005 ELECTROCARDIOGRAM TRACING: CPT

## 2024-01-17 PROCEDURE — 99222 1ST HOSP IP/OBS MODERATE 55: CPT | Performed by: INTERNAL MEDICINE

## 2024-01-17 PROCEDURE — 80053 COMPREHEN METABOLIC PANEL: CPT | Performed by: NURSE PRACTITIONER

## 2024-01-17 PROCEDURE — 85730 THROMBOPLASTIN TIME PARTIAL: CPT | Performed by: NURSE PRACTITIONER

## 2024-01-17 PROCEDURE — 93010 ELECTROCARDIOGRAM REPORT: CPT | Performed by: INTERNAL MEDICINE

## 2024-01-17 PROCEDURE — 99285 EMERGENCY DEPT VISIT HI MDM: CPT

## 2024-01-17 PROCEDURE — 85025 COMPLETE CBC W/AUTO DIFF WBC: CPT | Performed by: NURSE PRACTITIONER

## 2024-01-17 PROCEDURE — 85610 PROTHROMBIN TIME: CPT | Performed by: NURSE PRACTITIONER

## 2024-01-17 PROCEDURE — 85025 COMPLETE CBC W/AUTO DIFF WBC: CPT | Performed by: FAMILY MEDICINE

## 2024-01-17 PROCEDURE — 84443 ASSAY THYROID STIM HORMONE: CPT | Performed by: NURSE PRACTITIONER

## 2024-01-17 PROCEDURE — 93005 ELECTROCARDIOGRAM TRACING: CPT | Performed by: FAMILY MEDICINE

## 2024-01-17 PROCEDURE — 71045 X-RAY EXAM CHEST 1 VIEW: CPT

## 2024-01-17 PROCEDURE — 36415 COLL VENOUS BLD VENIPUNCTURE: CPT

## 2024-01-17 PROCEDURE — 83880 ASSAY OF NATRIURETIC PEPTIDE: CPT | Performed by: NURSE PRACTITIONER

## 2024-01-17 PROCEDURE — 87637 SARSCOV2&INF A&B&RSV AMP PRB: CPT | Performed by: NURSE PRACTITIONER

## 2024-01-17 PROCEDURE — 84481 FREE ASSAY (FT-3): CPT | Performed by: NURSE PRACTITIONER

## 2024-01-17 PROCEDURE — 84484 ASSAY OF TROPONIN QUANT: CPT

## 2024-01-17 PROCEDURE — 84439 ASSAY OF FREE THYROXINE: CPT | Performed by: NURSE PRACTITIONER

## 2024-01-17 RX ORDER — BISACODYL 5 MG/1
5 TABLET, DELAYED RELEASE ORAL DAILY PRN
Status: DISCONTINUED | OUTPATIENT
Start: 2024-01-17 | End: 2024-01-19 | Stop reason: HOSPADM

## 2024-01-17 RX ORDER — FLECAINIDE ACETATE 50 MG/1
25 TABLET ORAL 2 TIMES DAILY
COMMUNITY

## 2024-01-17 RX ORDER — HYDRALAZINE HYDROCHLORIDE 10 MG/1
10 TABLET, FILM COATED ORAL 3 TIMES DAILY
Status: DISCONTINUED | OUTPATIENT
Start: 2024-01-17 | End: 2024-01-17

## 2024-01-17 RX ORDER — NITROGLYCERIN 0.4 MG/1
0.4 TABLET SUBLINGUAL
Status: DISCONTINUED | OUTPATIENT
Start: 2024-01-17 | End: 2024-01-19 | Stop reason: HOSPADM

## 2024-01-17 RX ORDER — LEVOTHYROXINE SODIUM 0.05 MG/1
50 TABLET ORAL
Status: DISCONTINUED | OUTPATIENT
Start: 2024-01-18 | End: 2024-01-19 | Stop reason: HOSPADM

## 2024-01-17 RX ORDER — GLIPIZIDE 5 MG/1
2.5 TABLET ORAL
Status: DISCONTINUED | OUTPATIENT
Start: 2024-01-18 | End: 2024-01-19 | Stop reason: HOSPADM

## 2024-01-17 RX ORDER — CLONIDINE HYDROCHLORIDE 0.1 MG/1
0.1 TABLET ORAL ONCE
Status: COMPLETED | OUTPATIENT
Start: 2024-01-17 | End: 2024-01-17

## 2024-01-17 RX ORDER — SODIUM CHLORIDE 0.9 % (FLUSH) 0.9 %
10 SYRINGE (ML) INJECTION AS NEEDED
Status: DISCONTINUED | OUTPATIENT
Start: 2024-01-17 | End: 2024-01-19 | Stop reason: HOSPADM

## 2024-01-17 RX ORDER — SODIUM CHLORIDE 0.9 % (FLUSH) 0.9 %
10 SYRINGE (ML) INJECTION AS NEEDED
Status: DISCONTINUED | OUTPATIENT
Start: 2024-01-17 | End: 2024-01-17 | Stop reason: SDUPTHER

## 2024-01-17 RX ORDER — CLONIDINE HYDROCHLORIDE 0.1 MG/1
0.1 TABLET ORAL 2 TIMES DAILY PRN
COMMUNITY
End: 2024-01-19 | Stop reason: HOSPADM

## 2024-01-17 RX ORDER — POLYETHYLENE GLYCOL 3350 17 G/17G
17 POWDER, FOR SOLUTION ORAL DAILY PRN
Status: DISCONTINUED | OUTPATIENT
Start: 2024-01-17 | End: 2024-01-19 | Stop reason: HOSPADM

## 2024-01-17 RX ORDER — SODIUM CHLORIDE 9 MG/ML
40 INJECTION, SOLUTION INTRAVENOUS AS NEEDED
Status: DISCONTINUED | OUTPATIENT
Start: 2024-01-17 | End: 2024-01-19 | Stop reason: HOSPADM

## 2024-01-17 RX ORDER — AMOXICILLIN 250 MG
2 CAPSULE ORAL 2 TIMES DAILY
Status: DISCONTINUED | OUTPATIENT
Start: 2024-01-17 | End: 2024-01-19 | Stop reason: HOSPADM

## 2024-01-17 RX ORDER — FLECAINIDE ACETATE 50 MG/1
50 TABLET ORAL EVERY 12 HOURS SCHEDULED
Status: DISCONTINUED | OUTPATIENT
Start: 2024-01-17 | End: 2024-01-19 | Stop reason: HOSPADM

## 2024-01-17 RX ORDER — BISACODYL 10 MG
10 SUPPOSITORY, RECTAL RECTAL DAILY PRN
Status: DISCONTINUED | OUTPATIENT
Start: 2024-01-17 | End: 2024-01-19 | Stop reason: HOSPADM

## 2024-01-17 RX ORDER — SODIUM CHLORIDE 0.9 % (FLUSH) 0.9 %
10 SYRINGE (ML) INJECTION EVERY 12 HOURS SCHEDULED
Status: DISCONTINUED | OUTPATIENT
Start: 2024-01-17 | End: 2024-01-19 | Stop reason: HOSPADM

## 2024-01-17 RX ORDER — LOSARTAN POTASSIUM 50 MG/1
100 TABLET ORAL
Status: DISCONTINUED | OUTPATIENT
Start: 2024-01-17 | End: 2024-01-19 | Stop reason: HOSPADM

## 2024-01-17 RX ADMIN — APIXABAN 5 MG: 5 TABLET, FILM COATED ORAL at 20:35

## 2024-01-17 RX ADMIN — FLECAINIDE ACETATE 50 MG: 50 TABLET ORAL at 20:35

## 2024-01-17 RX ADMIN — Medication 10 ML: at 20:34

## 2024-01-17 RX ADMIN — CLONIDINE HYDROCHLORIDE 0.1 MG: 0.1 TABLET ORAL at 11:38

## 2024-01-17 RX ADMIN — LOSARTAN POTASSIUM 100 MG: 50 TABLET, FILM COATED ORAL at 18:39

## 2024-01-17 NOTE — CONSULTS
"[facility] HEART GROUP CONSULT NOTE    Referring Provider: Edgar Tolbert MD    Reason for Consultation: \"admission\"    Chief Complaint   Patient presents with    Irregular Heart Beat       Subjective .     History of present illness:  Nedra Tolliver is a 88 y.o. female with a known PMH significant for paroxysmal atrial fibrillation, paroxysmal supraventricular tachycardia, chronic anticoagulation with Eliquis, hypertension, hyperlipidemia, statin intolerance, nonobstructive coronary artery disease, left bundle branch block, and medication sensitivities with multiple reported allergies and intolerances.  She presented to the emergency department after phoning EMS at home after waking up feeling poorly this morning.  She generally wakes up in the morning takes her thyroid medications later eats breakfast and takes her other morning medications.      This morning she did not feel well she was somewhat nauseated and lightheaded and thought her blood sugar was low.  She also reported chest tightness.  She took her blood sugar this morning and noted it to be normal.  Because she felt bad she then went to take her blood pressure.  Her monitor at home would not detect her blood pressure.  She used an O2 saturation monitor that she has at home which noted a heart rate of 32 and 29.  She reports a normal oxygen saturation.  Because of her low pulse rate and not feeling well with a normal blood sugar she found EMS.  She tells me that EMS was having difficulty reading a blood pressure on her as well and is unsure if they were able to get one or not.    Upon arrival to the emergency department blood pressure was obtained and noted to be significantly elevated, 200/122.  Heart rate was 88.  Oxygen saturation was normal.  She has had 3 other blood pressures documented since her arrival to the emergency department with varying systolic readings.  I am uncertain the accuracy of these but follow-up readings included " systolic pressure of 180, 108, and 166.    Lab work was stable.  They did draw a troponin in the emergency department given her complaints of chest tightness.  Troponin was elevated at 16 with subsequent recheck elevated at 27.  She was ultimately admitted to the hospital by her primary care doctor and cardiac consultation was placed.  ECG was obtained upon admission.  ECG appears to demonstrate sinus rhythm with PACs and a left bundle branch block.  She was placed on cardiac telemetry upon arrival to the floor.  Sinus rhythm in the 70s noted on telemetry at the time of my evaluation.    She does follow in our office and is a patient of Dr. Gonzalez.  She has normal left ventricular systolic function.  She had cardiac catheterization in 2021 where she was described to have nonobstructive coronary artery disease.  She has been managed medically.    She currently reports that she is feeling improved from how she felt this morning.  She denies any current chest tightness but reports that she has difficulty taking in a deep breath at times.  She is concerned about medication management of high blood pressure as she is intolerant to multiple medications.  She had previously been seen in our office in December with reports of high blood pressure at home.  Hydralazine was added to her medications that day however the patient has not taken this as she has had intolerances with this medication in the past and subsequently has likely had ongoing elevated blood pressure since that visit.  She had previously been placed on clonidine transdermal patches however says that she cannot tolerate these due to skin sensitivity.  She was on clonidine scheduled twice daily dosing however complained that this made her mouth significantly dry and recently reduced back down to once daily at the recommendations of her primary care office.  She is concerned about being placed on too many blood pressure medications during her hospitalization  or medicines that she has previously failed.    History  Past Medical History:   Diagnosis Date    Arthritis     Cancer     BCC @ nose & Left arm    Diabetes type 2, controlled     Diverticulitis     History of GI bleed     History of transfusion     Has had x 5 units.    Hypertension     STAGE 3     Hyponatremia     Required hospitalization    Hypothyroidism     Knee arthropathy 09/30/2020    LPRD (laryngopharyngeal reflux disease)     Pharyngeal dysphagia     Thyroid nodule    ,   Past Surgical History:   Procedure Laterality Date    APPENDECTOMY      BASAL CELL CARCINOMA EXCISION      BELPHAROPTOSIS REPAIR      BREAST CYST EXCISION      CARDIAC CATHETERIZATION      CARDIAC CATHETERIZATION Right 6/24/2021    Procedure: Left Heart Cath R radial, US guided w poss intervention;  Surgeon: Mak Nickerson DO;  Location:  [facility] CATH INVASIVE LOCATION;  Service: Cardiovascular;  Laterality: Right;    CARDIAC CATHETERIZATION      CATARACT EXTRACTION      CATARACT EXTRACTION WITH INTRAOCULAR LENS IMPLANT Bilateral     CHOLECYSTECTOMY      COLONOSCOPY Left 11/1/2016    Tubular adenoma cecum, Diverticulosis repeat prn    SUBTOTAL HYSTERECTOMY      TOTAL KNEE ARTHROPLASTY Right 9/30/2020    Procedure: TOTAL KNEE ARTHROPLASTY;  Surgeon: Mak Pickens MD;  Location:  [facility] OR;  Service: Orthopedics;  Laterality: Right;    TUMOR EXCISION      under armpits and left breast   ,   Family History   Problem Relation Age of Onset    Diabetes Mother     Cancer Mother     Heart failure Mother     Diabetes Father     Colon cancer Neg Hx     Colon polyps Neg Hx    ,   Social History     Tobacco Use    Smoking status: Never    Smokeless tobacco: Never   Vaping Use    Vaping Use: Never used   Substance Use Topics    Alcohol use: No    Drug use: Never   ,     Medications  Current Facility-Administered Medications   Medication Dose Route Frequency Provider Last Rate Last Admin    sodium chloride 0.9 % flush 10 mL  10 mL  "Intravenous PRN Sayra Raygoza APRN           Allergies:  Codeine, Iodine, Levaquin [levofloxacin], Lortab [hydrocodone-acetaminophen], Quinolones, Shrimp (diagnostic), Bactroban [mupirocin calcium], Ace inhibitors, Beta adrenergic blockers, Norvasc [amlodipine], Piperacillin sod-tazobactam so, Procardia xl [nifedipine er], Statins, Thiazide-type diuretics, and Mupirocin    Review of Systems  Review of Systems   Constitutional: Positive for malaise/fatigue. Negative for diaphoresis and fever.   HENT:  Negative for congestion.    Cardiovascular:  Positive for chest pain. Negative for dyspnea on exertion, leg swelling, near-syncope, orthopnea, palpitations, paroxysmal nocturnal dyspnea and syncope.   Respiratory:  Negative for cough and wheezing.    Hematologic/Lymphatic: Negative for bleeding problem. Bruises/bleeds easily.   Gastrointestinal:  Positive for nausea.   Neurological:  Positive for light-headedness. Negative for headaches and weakness.   Psychiatric/Behavioral:  Negative for altered mental status. The patient is nervous/anxious.        Objective     Physical Exam:  Patient Vitals for the past 24 hrs:   BP Temp Pulse Resp SpO2 Height Weight   01/17/24 1454 166/70 -- 72 -- 100 % -- --   01/17/24 1431 108/87 -- -- -- 97 % -- --   01/17/24 1131 180/72 -- 87 -- 100 % -- --   01/17/24 0931 (!) 200/122 -- -- -- -- -- --   01/17/24 0928 -- -- -- -- -- 162.6 cm (64\") 68 kg (150 lb)   01/17/24 0917 -- 98 °F (36.7 °C) 88 20 98 % -- --     Vitals reviewed.   Constitutional:       General: Awake. Not in acute distress.     Appearance: Well-developed, well-groomed, overweight and not in distress.   HENT:      Head: Normocephalic and atraumatic.   Pulmonary:      Effort: Pulmonary effort is normal.      Breath sounds: No wheezing. No rhonchi. No rales.   Cardiovascular:      Normal rate. Regular rhythm.   Edema:     Peripheral edema absent.   Musculoskeletal:      Cervical back: Normal range of motion and neck " supple. Skin:     General: Skin is warm and dry.   Neurological:      General: No focal deficit present.      Mental Status: Alert, oriented to person, place, and time and oriented to person, place and time.   Psychiatric:         Attention and Perception: Attention normal.         Mood and Affect: Mood normal.         Speech: Speech normal.         Behavior: Behavior normal. Behavior is cooperative.         Cognition and Memory: Cognition and memory normal.         Judgment: Judgment normal.         Results Review:   I reviewed the patient's new clinical results.    Lab Results (last 72 hours)       Procedure Component Value Units Date/Time    COVID-19, FLU A/B, RSV PCR 1 HR TAT - Swab, Nasopharynx [[id number]]  (Normal) Collected: 01/17/24 1437    Specimen: Swab from Nasopharynx Updated: 01/17/24 1532     COVID19 Not Detected     Influenza A PCR Not Detected     Influenza B PCR Not Detected     RSV, PCR Not Detected    Narrative:      Fact sheet for providers: https://www.fda.gov/media/798842/download    Fact sheet for patients: https://www.fda.gov/media/099559/download    Test performed by PCR.    High Sensitivity Troponin T 2Hr [[id number]]  (Abnormal) Collected: 01/17/24 1138    Specimen: Blood Updated: 01/17/24 1237     HS Troponin T 27 ng/L      Troponin T Delta 11 ng/L     Narrative:      High Sensitive Troponin T Reference Range:  <14.0 ng/L- Negative Female for AMI  <22.0 ng/L- Negative Male for AMI  >=14 - Abnormal Female indicating possible myocardial injury.  >=22 - Abnormal Male indicating possible myocardial injury.   Clinicians would have to utilize clinical acumen, EKG, Troponin, and serial changes to determine if it is an Acute Myocardial Infarction or myocardial injury due to an underlying chronic condition.         T3, Free [[id number]] Collected: 01/17/24 0923    Specimen: Blood Updated: 01/17/24 1204    TSH [[id number]]  (Normal) Collected: 01/17/24 0923    Specimen: Blood Updated: 01/17/24 1029      TSH 1.280 uIU/mL     T4, Free [[id number]]  (Normal) Collected: 01/17/24 0923    Specimen: Blood Updated: 01/17/24 1029     Free T4 1.02 ng/dL     Narrative:      Results may be falsely increased if patient taking Biotin.      Comprehensive Metabolic Panel [[id number]]  (Abnormal) Collected: 01/17/24 0923    Specimen: Blood Updated: 01/17/24 1025     Glucose 156 mg/dL      BUN 22 mg/dL      Creatinine 1.00 mg/dL      Sodium 133 mmol/L      Potassium 4.7 mmol/L      Comment: Slight hemolysis detected by analyzer. Result may be falsely elevated.        Chloride 98 mmol/L      CO2 24.0 mmol/L      Calcium 9.5 mg/dL      Total Protein 7.1 g/dL      Albumin 4.2 g/dL      ALT (SGPT) 10 U/L      AST (SGOT) 13 U/L      Alkaline Phosphatase 77 U/L      Total Bilirubin 0.5 mg/dL      Globulin 2.9 gm/dL      A/G Ratio 1.4 g/dL      BUN/Creatinine Ratio 22.0     Anion Gap 11.0 mmol/L      eGFR 54.3 mL/min/1.73     Narrative:      GFR Normal >60  Chronic Kidney Disease <60  Kidney Failure <15    The GFR formula is only valid for adults with stable renal function between ages 18 and 70.    BNP [[id number]]  (Normal) Collected: 01/17/24 0923    Specimen: Blood Updated: 01/17/24 1022     proBNP 811.2 pg/mL     Narrative:      This assay is used as an aid in the diagnosis of individuals suspected of having heart failure. It can be used as an aid in the diagnosis of acute decompensated heart failure (ADHF) in patients presenting with signs and symptoms of ADHF to the emergency department (ED). In addition, NT-proBNP of <300 pg/mL indicates ADHF is not likely.    Age Range Result Interpretation  NT-proBNP Concentration (pg/mL:      <50             Positive            >450                   Gray                 300-450                    Negative             <300    50-75           Positive            >900                  Gray                300-900                  Negative            <300      >75             Positive             >1800                  Chong                300-1800                  Negative            <300    aPTT [[id number]]  (Normal) Collected: 01/17/24 0923    Specimen: Blood Updated: 01/17/24 1011     PTT 29.0 seconds     Protime-INR [[id number]]  (Normal) Collected: 01/17/24 0923    Specimen: Blood Updated: 01/17/24 1011     Protime 13.8 Seconds      INR 1.05    CBC & Differential [[id number]]  (Abnormal) Collected: 01/17/24 0923    Specimen: Blood Updated: 01/17/24 1005    Narrative:      The following orders were created for panel order CBC & Differential.  Procedure                               Abnormality         Status                     ---------                               -----------         ------                     CBC Auto Differential[[id number]]        Abnormal            Final result                 Please view results for these tests on the individual orders.    CBC Auto Differential [[id number]]  (Abnormal) Collected: 01/17/24 0923    Specimen: Blood Updated: 01/17/24 1005     WBC 6.28 10*3/mm3      RBC 4.18 10*6/mm3      Hemoglobin 13.2 g/dL      Hematocrit 38.7 %      MCV 92.6 fL      MCH 31.6 pg      MCHC 34.1 g/dL      RDW 12.1 %      RDW-SD 42.0 fl      MPV 10.2 fL      Platelets 238 10*3/mm3      Neutrophil % 62.1 %      Lymphocyte % 23.6 %      Monocyte % 10.5 %      Eosinophil % 2.9 %      Basophil % 0.6 %      Immature Grans % 0.3 %      Neutrophils, Absolute 3.90 10*3/mm3      Lymphocytes, Absolute 1.48 10*3/mm3      Monocytes, Absolute 0.66 10*3/mm3      Eosinophils, Absolute 0.18 10*3/mm3      Basophils, Absolute 0.04 10*3/mm3      Immature Grans, Absolute 0.02 10*3/mm3      nRBC 0.0 /100 WBC     High Sensitivity Troponin T [[id number]]  (Abnormal) Collected: 01/17/24 0923    Specimen: Blood Updated: 01/17/24 0959     HS Troponin T 16 ng/L     Narrative:      High Sensitive Troponin T Reference Range:  <14.0 ng/L- Negative Female for AMI  <22.0 ng/L- Negative Male for AMI  >=14 - Abnormal  Female indicating possible myocardial injury.  >=22 - Abnormal Male indicating possible myocardial injury.   Clinicians would have to utilize clinical acumen, EKG, Troponin, and serial changes to determine if it is an Acute Myocardial Infarction or myocardial injury due to an underlying chronic condition.                   Imaging Results (Last 72 Hours)       Procedure Component Value Units Date/Time    XR Chest 1 View [[id number]] Collected: 01/17/24 1021     Updated: 01/17/24 1025    Narrative:      EXAMINATION:  XR CHEST 1 VW-  1/17/2024 9:04 AM     HISTORY: Cardiac work-up. Palpitations.     COMPARISON: 5/30/2023.     TECHNIQUE: Single view AP image.     FINDINGS:  The lungs are expanded bilaterally and clear. The pleural  spaces are clear. Heart size is normal. There is atheromatous  calcification of the aortic arch. There is scoliosis and degenerative  change of the visualized spine. There are degenerative changes of both  shoulders. No acute bony abnormality is seen.          Impression:      No active disease is seen.           This report was signed and finalized on 1/17/2024 10:22 AM by Dr. Doyle Dixon MD.             Results for orders placed during the hospital encounter of 05/30/23    Adult Transthoracic Echo Complete w/ Color, Spectral and Contrast if necessary per protocol    Interpretation Summary    Left ventricular systolic function is normal. Left ventricular ejection fraction appears to be 66 - 70%.    Left ventricular wall thickness is consistent with moderate septal asymmetric hypertrophy.    Left ventricular diastolic function is consistent with (grade Ia w/high LAP) impaired relaxation and age.    The left atrial cavity is moderately dilated.    Estimated right ventricular systolic pressure from tricuspid regurgitation is normal (<35 mmHg).    Normal size and function the right ventricle.    No significant (greater than mild) valvular pathology.    Compared to prior exam from 9/9/2022, no  significant change though EF appears better on current exam.        Assessment     1.  Hypertensive Urgency  2.  Elevated troponin secondary to number 1  3.  Nonobstructive coronary artery disease  4.  Paroxysmal atrial fibrillation and paroxysmal supraventricular tachycardia: on AAD therapy  5.  Chronic anticoagulation: On Eliquis  6.  Hyperlipidemia with statin intolerance: On PCSK9 inhibition therapy  7.  Chronic left bundle branch block  8.  Hypothyroidism: On replacement therapy  9.  Diabetes mellitus, type II  10.  Multiple medication intolerances    Plan     Cardiology has been consulted to evaluate the patient after being admitted with hypertensive urgency and noted to have an elevated troponin.  Given the recent events, with multiple medication adjustments and difficulty tolerating certain medications I suspect her symptoms this morning related to the findings of elevated blood pressure noted at her arrival.  She had called our office in early December after being seen in the office with medication adjustments that were recommended to improve her blood pressure stating that she could not tolerate the new medicine as she had previously been on it and made her head feel full and her body feel weak.  She was recommended to follow-up with her primary care doctor at that time.  She has not had further dose adjustments to other medicines or new medications added based on our conversation this morning.  She also indicates at some point that her clonidine dose had previously been twice daily and had been reduced to daily due to complaints of dry mouth.    Her chest discomfort that she describes as intermittent chest tightness.  This is what she had felt this morning which is somewhat improved after being here in the hospital today.  I suspect this is directly related to her elevated blood pressure.  She has previously been hospitalized (June 2023) with uncontrolled hypertension and similar trending troponins.    In  regards to her reported bradycardia at home this is likely an error on the home monitoring device.  ECG here noted normal sinus rhythm with PACs.  She has been placed on telemetry during her hospitalization which is reasonable to monitor for any rhythm changes or bradycardia.    In regards to blood pressure management would defer this to the primary service as the patient has been on multiple medications in the past and reports intolerances to several medications.  She has nonobstructive coronary artery disease and normal left ventricular systolic function.  Would recommend management of blood pressure with any medication that the patient can tolerate and subsequently would lower her systolic pressures.  She has asked me to discontinue the hydralazine that was reordered upon admission as she has not been taking this medication.  Other intolerances that she reports are to beta-blockers, amlodipine, ACE inhibitor's, thiazide diuretics, and Procardia.    No further cardiac testing would be necessary at this time as she likely just needs improvement of blood pressure control.    Thank you for this consultation.  Cardiology will sign off and be available as needed.  She can follow-up in our office for routine follow-up as scheduled in March with her primary cardiologist.    Electronically signed by QUETA Trinh, 01/17/24, 3:35 PM CST.      Please note this cardiology consultation note is the result of a face to face consultation with the patient, in addition to reviewing medical records at length by myself, QUETA Trinh.       Time: 40 minutes

## 2024-01-17 NOTE — ED PROVIDER NOTES
Subjective   History of Present Illness  Patient is an 88-year-old female who presents to the ER via EMS due to hypertension.  Patient states she got up this morning and felt strange.  She reports feeling lightheaded and having nausea.  She denies any vomiting or abdominal pain.  She states that she took her blood pressure and it was reading high and her heart rate was low on her home monitor at 29.  She states that she felt weak and describes having chest tightness.  Per EMS patient's heart rate was normal sinus rhythm with elevated blood pressure.  Patient admits she did not take her clonidine this morning.  Additionally patient takes flecainide for history of paroxysmal atrial fibrillation.  She states she was about to run out of her medication during the recent snowstorm therefore the past 3 days has only taken half of her flecainide dose.  Patient describes having chronic shortness of breath with activity which is not new for her.  She denies any cough or congestion.  She denies any recorded fevers.  Currently she is not experiencing any chest pain.  She states the chest tightness is more with activity and admittedly just hasn't felt well within the past few days.  Past medical history significant for arthritis, skin cancer, diabetes, diverticulitis, GI bleed, hypertension, hyponatremia, hypothyroidism, laryngopharyngeal reflux disease, hypothyroidism, paroxysmal atrial fibrillation, paroxysmal SVT, CHF.    Of note: Patient is followed by Dr. Nickerson with cardiology and per review recently evaluated in the office December 4, 2023 for routine visit.  Note mentions patient has history of paroxysmal atrial fibrillation diagnosed in 2021 as well as paroxysmal SVT.  She has a left bundle branch block diagnosed in 2021.  Patient is on Eliquis and flecainide.  Last cardiac catheterization was in 2021 revealing coronary artery disease nonobstructive.        Review of Systems   Constitutional:  Positive for fatigue.  Negative for fever.   HENT: Negative.  Negative for congestion.    Eyes: Negative.    Respiratory:  Positive for shortness of breath. Negative for cough.    Cardiovascular:  Positive for chest pain and palpitations.   Gastrointestinal:  Positive for nausea. Negative for abdominal pain, constipation, diarrhea and vomiting.   Genitourinary: Negative.  Negative for dysuria.   Musculoskeletal: Negative.  Negative for back pain.   Skin: Negative.    Neurological:  Positive for weakness.   All other systems reviewed and are negative.      Past Medical History:   Diagnosis Date    Arthritis     Cancer     BCC @ nose & Left arm    Diabetes type 2, controlled     Diverticulitis     History of GI bleed     History of transfusion     Has had x 5 units.    Hypertension     STAGE 3     Hyponatremia     Required hospitalization    Hypothyroidism     Knee arthropathy 09/30/2020    LPRD (laryngopharyngeal reflux disease)     Pharyngeal dysphagia     Thyroid nodule        Allergies   Allergen Reactions    Codeine Hallucinations    Iodine Anaphylaxis     Xray dye    Levaquin [Levofloxacin] Anaphylaxis    Lortab [Hydrocodone-Acetaminophen] Mental Status Change and Confusion    Quinolones Anaphylaxis     - CIPRO -     Shrimp (Diagnostic) Anaphylaxis    Bactroban [Mupirocin Calcium] Swelling    Ace Inhibitors Cough    Beta Adrenergic Blockers Other (See Comments)     Pt can't remember reaction type    Norvasc [Amlodipine] Swelling and Headache    Piperacillin Sod-Tazobactam So Unknown - High Severity    Procardia Xl [Nifedipine Er] Arrhythmia    Statins Myalgia    Thiazide-Type Diuretics Unknown - High Severity    Mupirocin Rash       Past Surgical History:   Procedure Laterality Date    APPENDECTOMY      BASAL CELL CARCINOMA EXCISION      BELPHAROPTOSIS REPAIR      BREAST CYST EXCISION      CARDIAC CATHETERIZATION      CARDIAC CATHETERIZATION Right 6/24/2021    Procedure: Left Heart Cath R radial, US guided w poss intervention;   Surgeon: Mak Nickerson DO;  Location:  [facility]ATH [facility]ASIVE [facility];  Service: Cardiovascular;  Laterality: Right;    CARDIAC CATHETERIZATION      CATARACT EXTRACTION      CATARACT EXTRACTION WITH INTRAOCULAR LENS IMPLANT Bilateral     CHOLECYSTECTOMY      COLONOSCOPY Left 11/1/2016    Tubular adenoma cecum, Diverticulosis repeat prn    SUBTOTAL HYSTERECTOMY      TOTAL KNEE ARTHROPLASTY Right 9/30/2020    Procedure: TOTAL KNEE ARTHROPLASTY;  Surgeon: Mak Pickens MD;  Location:  [facility] OR;  Service: Orthopedics;  Laterality: Right;    TUMOR EXCISION      under armpits and left breast       Family History   Problem Relation Age of Onset    Diabetes Mother     Cancer Mother     Heart failure Mother     Diabetes Father     Colon cancer Neg Hx     Colon polyps Neg Hx        Social History     Socioeconomic History    Marital status:    Tobacco Use    Smoking status: Never    Smokeless tobacco: Never   Vaping Use    Vaping Use: Never used   Substance and Sexual Activity    Alcohol use: No    Drug use: Never    Sexual activity: Defer     Partners: Male           Objective   Physical Exam  Vitals and nursing note reviewed.   Constitutional:       Appearance: She is well-developed.   HENT:      Head: Normocephalic and atraumatic.      Nose: Nose normal.      Mouth/Throat:      Pharynx: Oropharynx is clear.   Eyes:      Extraocular Movements: Extraocular movements intact.      Conjunctiva/sclera: Conjunctivae normal.      Pupils: Pupils are equal, round, and reactive to light.   Cardiovascular:      Rate and Rhythm: Normal rate and regular rhythm.      Heart sounds: Normal heart sounds.   Pulmonary:      Effort: Pulmonary effort is normal.      Breath sounds: Normal breath sounds.   Abdominal:      General: Bowel sounds are normal.      Palpations: Abdomen is soft.   Musculoskeletal:         General: Normal range of motion.      Cervical back: Normal range of motion and neck supple.   Skin:      General: Skin is warm and dry.   Neurological:      Mental Status: She is alert and oriented to person, place, and time.   Psychiatric:         Mood and Affect: Mood normal.         Behavior: Behavior normal.         Thought Content: Thought content normal.         Judgment: Judgment normal.         Procedures           ED Course                HEART Score: 5                              Medical Decision Making  Patient is an 88-year-old female who presents to the ER via EMS due to hypertension.  Patient states she got up this morning and felt strange.  She reports feeling lightheaded and having nausea.  She denies any vomiting or abdominal pain.  She states that she took her blood pressure and it was reading high and her heart rate was low on her home monitor at 29.  She states that she felt weak and describes having chest tightness.  Per EMS patient's heart rate was normal sinus rhythm with elevated blood pressure.  Patient admits she did not take her clonidine this morning.  Additionally patient takes flecainide for history of paroxysmal atrial fibrillation.  She states she was about to run out of her medication during the recent snowstorm there for the past 3 days has only taken half of her flecainide dose.  Patient describes having chronic shortness of breath with activity which is not new for her.  She denies any cough or congestion.  She denies any recorded fevers.  Currently she is not experiencing any chest pain.  She states the chest tightness is more with activity and admittedly just hasn't felt well within the past few days.  Past medical history significant for arthritis, skin cancer, diabetes, diverticulitis, GI bleed, hypertension, hyponatremia, hypothyroidism, laryngopharyngeal reflux disease, hypothyroidism, paroxysmal atrial fibrillation, paroxysmal SVT, CHF.    Of note: Patient is followed by Dr. Nickerson with cardiology and per review recently evaluated in the office December 4, 2023 for routine  visit.  Note mentions patient has history of paroxysmal atrial fibrillation diagnosed in 2021 as well as paroxysmal SVT.  She has a left bundle branch block diagnosed in 2021.  Patient is on Eliquis and flecainide.  Last cardiac catheterization was in 2021 revealing coronary artery disease nonobstructive.    Differential diagnosis: Atrial fibrillation, SVT, bradycardia, hypertension, ACS, angina, and other    HEART Score for Major Cardiac Events - MDCalc  Calculated on Jan 17 2024 11:15 AM  5 points -> Moderate Score (4-6 points) Risk of MACE of 12-16.6%.  If troponin is positive, many experts recommend further workup and admission even with a low HEART Score.    Patient has elevated troponins with positive delta of 11. She states she hasn't felt well in past few days and had bradycardia and hypertension this morning. She admits to taking half of her dosage of flecainide for the past few days due to the snowstorm.  She was afraid to get out due to bad weather to  prescription refill.  EMS noted normal sinus rhythm on EKG.  She did not take her medications prior to arrival. She describes having intermittent chest tightness past few days however none in the emergency department.  COVID and influenza remain pending.  Spoke with Dr. Rascon on-call for Dr. Tolbert who is agreeable for admission.     Problems Addressed:  Chest pain, unspecified type: acute illness or injury  Hypertension, unspecified type: acute illness or injury  NSTEMI (non-ST elevation myocardial infarction): acute illness or injury    Amount and/or Complexity of Data Reviewed  Labs: ordered. Decision-making details documented in ED Course.  Radiology: ordered. Decision-making details documented in ED Course.  ECG/medicine tests: ordered. Decision-making details documented in ED Course.  Discussion of management or test interpretation with external provider(s): Discussed with Dr. Rascon on call for Dr. Tolbert    Risk  Prescription drug  management.  Decision regarding hospitalization.        Final diagnoses:   Chest pain, unspecified type   Hypertension, unspecified type   NSTEMI (non-ST elevation myocardial infarction)       ED Disposition  ED Disposition       ED Disposition   Decision to Admit    Condition   --    Comment   Level of Care: Telemetry [5]   Diagnosis: Chest pain [046244]   Admitting Physician: RAD LARSON [[id number]]   Attending Physician: RAD LARSON [[id number]]   Certification: I Certify That Inpatient Hospital Services Are Medically Necessary For Greater Than 2 Midnights                 No follow-up provider specified.       Medication List      No changes were made to your prescriptions during this visit.            Sayra Raygoza, APRN  01/17/24 1425

## 2024-01-17 NOTE — H&P
History and Physical    Patient:  Nedra Tolliver  MRN: [id number]    CHIEF COMPLAINT:  weak, sob and dizzy    History Obtained From: the patient   PCP: Hank Rascon MD    HISTORY OF PRESENT ILLNESS:   The patient is a 88 y.o. female who presents with a history of afib, SVT, hypertension and diabetes presented to ER via EMS with feeling weak and dizzy. Noted to be in NSR but to have accelerated hypertension at >200/100. Troponin's in ER were positive. Admitted to our service to rule out myocardial ischemia and control accelerated  hypertension/Hypertensive urgency.     REVIEW OF SYSTEMS:    Review of Systems   Constitutional:  Positive for activity change and fatigue.   HENT: Negative.     Respiratory: Negative.     Cardiovascular: Negative.    Gastrointestinal: Negative.    Genitourinary: Negative.    Musculoskeletal:  Positive for arthralgias.   Neurological:  Positive for dizziness.          Past Medical History:  Past Medical History:   Diagnosis Date    Arthritis     Cancer     BCC @ nose & Left arm    Diabetes type 2, controlled     Diverticulitis     History of GI bleed     History of transfusion     Has had x 5 units.    Hypertension     STAGE 3     Hyponatremia     Required hospitalization    Hypothyroidism     Knee arthropathy 09/30/2020    LPRD (laryngopharyngeal reflux disease)     Pharyngeal dysphagia     Thyroid nodule        Past Surgical History:  Past Surgical History:   Procedure Laterality Date    APPENDECTOMY      BASAL CELL CARCINOMA EXCISION      BELPHAROPTOSIS REPAIR      BREAST CYST EXCISION      CARDIAC CATHETERIZATION      CARDIAC CATHETERIZATION Right 6/24/2021    Procedure: Left Heart Cath R radial, US guided w poss intervention;  Surgeon: Mak Nickerson DO;  Location:  [facility];  Service: Cardiovascular;  Laterality: Right;    CARDIAC CATHETERIZATION      CATARACT EXTRACTION      CATARACT EXTRACTION WITH INTRAOCULAR LENS IMPLANT Bilateral      "CHOLECYSTECTOMY      COLONOSCOPY Left 11/1/2016    Tubular adenoma cecum, Diverticulosis repeat prn    SUBTOTAL HYSTERECTOMY      TOTAL KNEE ARTHROPLASTY Right 9/30/2020    Procedure: TOTAL KNEE ARTHROPLASTY;  Surgeon: Mak Pickens MD;  Location: [facility];  Service: Orthopedics;  Laterality: Right;    TUMOR EXCISION      under armpits and left breast       Medications Prior to Admission:    (Not in a hospital admission)      Allergies:  Codeine, Iodine, Levaquin [levofloxacin], Lortab [hydrocodone-acetaminophen], Quinolones, Shrimp (diagnostic), Bactroban [mupirocin calcium], Ace inhibitors, Beta adrenergic blockers, Norvasc [amlodipine], Piperacillin sod-tazobactam so, Procardia xl [nifedipine er], Statins, Thiazide-type diuretics, and Mupirocin    Social History:   Social History     Socioeconomic History    Marital status:    Tobacco Use    Smoking status: Never    Smokeless tobacco: Never   Vaping Use    Vaping Use: Never used   Substance and Sexual Activity    Alcohol use: No    Drug use: Never    Sexual activity: Defer     Partners: Male       Family History:   Family History   Problem Relation Age of Onset    Diabetes Mother     Cancer Mother     Heart failure Mother     Diabetes Father     Colon cancer Neg Hx     Colon polyps Neg Hx            Physical Exam:    Vitals: /72   Pulse 87   Temp 98 °F (36.7 °C)   Resp 20   Ht 162.6 cm (64\")   Wt 68 kg (150 lb)   LMP  (LMP Unknown)   SpO2 100%   BMI 25.75 kg/m²   Physical Exam  Vitals and nursing note reviewed.   Constitutional:       Appearance: Normal appearance.   HENT:      Head: Normocephalic and atraumatic.   Eyes:      Pupils: Pupils are equal, round, and reactive to light.   Cardiovascular:      Rate and Rhythm: Normal rate and regular rhythm.      Pulses: Normal pulses.   Pulmonary:      Effort: Pulmonary effort is normal.      Breath sounds: Normal breath sounds.   Abdominal:      General: Abdomen is flat. Bowel sounds are " normal.      Palpations: Abdomen is soft.   Musculoskeletal:         General: Normal range of motion.   Skin:     General: Skin is warm.      Capillary Refill: Capillary refill takes less than 2 seconds.   Neurological:      General: No focal deficit present.      Mental Status: She is alert.           Lab Results (last 24 hours)       Procedure Component Value Units Date/Time    [place] Draw [[id number]] Collected: 01/17/24 0923    Specimen: Blood Updated: 01/17/24 1331    Narrative:      The following orders were created for panel order [place] Draw.  Procedure                               Abnormality         Status                     ---------                               -----------         ------                     Green Top (Gel)[[id number]]                                  Final result               Lavender Top[[id number]]                                     Final result               Chong Top[[id number]]                                         Final result               Light Blue Top[[id number]]                                   Final result                 Please view results for these tests on the individual orders.    Gray Top [[id number]] Collected: 01/17/24 0923    Specimen: Blood Updated: 01/17/24 1331     Extra Tube Hold for add-ons.     Comment: Auto resulted.       High Sensitivity Troponin T 2Hr [[id number]]  (Abnormal) Collected: 01/17/24 1138    Specimen: Blood Updated: 01/17/24 1237     HS Troponin T 27 ng/L      Troponin T Delta 11 ng/L     Narrative:      High Sensitive Troponin T Reference Range:  <14.0 ng/L- Negative Female for AMI  <22.0 ng/L- Negative Male for AMI  >=14 - Abnormal Female indicating possible myocardial injury.  >=22 - Abnormal Male indicating possible myocardial injury.   Clinicians would have to utilize clinical acumen, EKG, Troponin, and serial changes to determine if it is an Acute Myocardial Infarction or myocardial injury due to an underlying chronic condition.          T3, Free [[id number]] Collected: 01/17/24 0923    Specimen: Blood Updated: 01/17/24 1204    [place] Urine - Urine, Clean Catch [[id number]] Collected: 01/17/24 0943    Specimen: Urine, Clean Catch Updated: 01/17/24 1046     Extra Tube Hold for add-ons.     Comment: Auto resulted.       Green Top (Gel) [[id number]] Collected: 01/17/24 0923    Specimen: Blood Updated: 01/17/24 1031     Extra Tube Hold for add-ons.     Comment: Auto resulted.       Lavender Top [[id number]] Collected: 01/17/24 0923    Specimen: Blood Updated: 01/17/24 1031     Extra Tube hold for add-on     Comment: Auto resulted       Light Blue Top [[id number]] Collected: 01/17/24 0923    Specimen: Blood Updated: 01/17/24 1031     Extra Tube Hold for add-ons.     Comment: Auto resulted       TSH [[id number]]  (Normal) Collected: 01/17/24 0923    Specimen: Blood Updated: 01/17/24 1029     TSH 1.280 uIU/mL     T4, Free [[id number]]  (Normal) Collected: 01/17/24 0923    Specimen: Blood Updated: 01/17/24 1029     Free T4 1.02 ng/dL     Narrative:      Results may be falsely increased if patient taking Biotin.      Comprehensive Metabolic Panel [[id number]]  (Abnormal) Collected: 01/17/24 0923    Specimen: Blood Updated: 01/17/24 1025     Glucose 156 mg/dL      BUN 22 mg/dL      Creatinine 1.00 mg/dL      Sodium 133 mmol/L      Potassium 4.7 mmol/L      Comment: Slight hemolysis detected by analyzer. Result may be falsely elevated.        Chloride 98 mmol/L      CO2 24.0 mmol/L      Calcium 9.5 mg/dL      Total Protein 7.1 g/dL      Albumin 4.2 g/dL      ALT (SGPT) 10 U/L      AST (SGOT) 13 U/L      Alkaline Phosphatase 77 U/L      Total Bilirubin 0.5 mg/dL      Globulin 2.9 gm/dL      A/G Ratio 1.4 g/dL      BUN/Creatinine Ratio 22.0     Anion Gap 11.0 mmol/L      eGFR 54.3 mL/min/1.73     Narrative:      GFR Normal >60  Chronic Kidney Disease <60  Kidney Failure <15    The GFR formula is only valid for adults with stable renal function between ages 18 and 70.     BNP [[id number]]  (Normal) Collected: 01/17/24 0923    Specimen: Blood Updated: 01/17/24 1022     proBNP 811.2 pg/mL     Narrative:      This assay is used as an aid in the diagnosis of individuals suspected of having heart failure. It can be used as an aid in the diagnosis of acute decompensated heart failure (ADHF) in patients presenting with signs and symptoms of ADHF to the emergency department (ED). In addition, NT-proBNP of <300 pg/mL indicates ADHF is not likely.    Age Range Result Interpretation  NT-proBNP Concentration (pg/mL:      <50             Positive            >450                   Gray                 300-450                    Negative             <300    50-75           Positive            >900                  Gray                300-900                  Negative            <300      >75             Positive            >1800                  Gray                300-1800                  Negative            <300    aPTT [[id number]]  (Normal) Collected: 01/17/24 0923    Specimen: Blood Updated: 01/17/24 1011     PTT 29.0 seconds     Protime-INR [[id number]]  (Normal) Collected: 01/17/24 0923    Specimen: Blood Updated: 01/17/24 1011     Protime 13.8 Seconds      INR 1.05    CBC & Differential [[id number]]  (Abnormal) Collected: 01/17/24 0923    Specimen: Blood Updated: 01/17/24 1005    Narrative:      The following orders were created for panel order CBC & Differential.  Procedure                               Abnormality         Status                     ---------                               -----------         ------                     CBC Auto Differential[[id number]]        Abnormal            Final result                 Please view results for these tests on the individual orders.    CBC Auto Differential [[id number]]  (Abnormal) Collected: 01/17/24 0923    Specimen: Blood Updated: 01/17/24 1005     WBC 6.28 10*3/mm3      RBC 4.18 10*6/mm3      Hemoglobin 13.2 g/dL      Hematocrit 38.7 %       MCV 92.6 fL      MCH 31.6 pg      MCHC 34.1 g/dL      RDW 12.1 %      RDW-SD 42.0 fl      MPV 10.2 fL      Platelets 238 10*3/mm3      Neutrophil % 62.1 %      Lymphocyte % 23.6 %      Monocyte % 10.5 %      Eosinophil % 2.9 %      Basophil % 0.6 %      Immature Grans % 0.3 %      Neutrophils, Absolute 3.90 10*3/mm3      Lymphocytes, Absolute 1.48 10*3/mm3      Monocytes, Absolute 0.66 10*3/mm3      Eosinophils, Absolute 0.18 10*3/mm3      Basophils, Absolute 0.04 10*3/mm3      Immature Grans, Absolute 0.02 10*3/mm3      nRBC 0.0 /100 WBC     High Sensitivity Troponin T [[id number]]  (Abnormal) Collected: 01/17/24 0923    Specimen: Blood Updated: 01/17/24 0959     HS Troponin T 16 ng/L     Narrative:      High Sensitive Troponin T Reference Range:  <14.0 ng/L- Negative Female for AMI  <22.0 ng/L- Negative Male for AMI  >=14 - Abnormal Female indicating possible myocardial injury.  >=22 - Abnormal Male indicating possible myocardial injury.   Clinicians would have to utilize clinical acumen, EKG, Troponin, and serial changes to determine if it is an Acute Myocardial Infarction or myocardial injury due to an underlying chronic condition.                  -----------------------------------------------------------------  EKG: non-acute  Radiology:     XR Chest 1 View    Result Date: 1/17/2024  EXAMINATION:  XR CHEST 1 VW-  1/17/2024 9:04 AM  HISTORY: Cardiac work-up. Palpitations.  COMPARISON: 5/30/2023.  TECHNIQUE: Single view AP image.  FINDINGS:  The lungs are expanded bilaterally and clear. The pleural spaces are clear. Heart size is normal. There is atheromatous calcification of the aortic arch. There is scoliosis and degenerative change of the visualized spine. There are degenerative changes of both shoulders. No acute bony abnormality is seen.       No active disease is seen.    This report was signed and finalized on 1/17/2024 10:22 AM by Dr. Doyle Dixon MD.        Assessment and Plan     Primary  Problem:  Chest pain  Accelerated hypertension/Hypertensive Urgency  Elevated Troponin  PAF  DMII  Hospital Problem list:    Chest pain      PMH:  Past Medical History:   Diagnosis Date    Arthritis     Cancer     BCC @ nose & Left arm    Diabetes type 2, controlled     Diverticulitis     History of GI bleed     History of transfusion     Has had x 5 units.    Hypertension     STAGE 3     Hyponatremia     Required hospitalization    Hypothyroidism     Knee arthropathy 09/30/2020    LPRD (laryngopharyngeal reflux disease)     Pharyngeal dysphagia     Thyroid nodule        Treatment Plan:  Admit   Serial enzymes  Cardiac monitoring  Home bp meds and use prn hydralazine for additional coverage   Cardiology eval.       Disposition: home    Hank Rascon MD 1/17/2024 14:10 CST

## 2024-01-17 NOTE — ED NOTES
"Nursing report ED to floor  Nedra Tolliver  88 y.o.  female    HPI:   Chief Complaint   Patient presents with    Irregular Heart Beat       Admitting doctor:   Edgar Tolbert MD    Consulting provider(s):  Consults       Date and Time Order Name Status Description    1/17/2024  2:02 PM Cardiology (on-call MD unless specified)               Admitting diagnosis:   The primary encounter diagnosis was Chest pain, unspecified type. Diagnoses of Hypertension, unspecified type and NSTEMI (non-ST elevation myocardial infarction) were also pertinent to this visit.    Code status:   Current Code Status       Date Active Code Status Order ID Comments User Context       1/17/2024 1410 CPR (Attempt to Resuscitate) [id number]  Hank Rascon MD ED        Question Answer    Code Status (Patient has no pulse and is not breathing) CPR (Attempt to Resuscitate)    Medical Interventions (Patient has pulse or is breathing) Full Support    Level Of Support Discussed With Patient                    Allergies:   Codeine, Iodine, Levaquin [levofloxacin], Lortab [hydrocodone-acetaminophen], Quinolones, Shrimp (diagnostic), Bactroban [mupirocin calcium], Ace inhibitors, Beta adrenergic blockers, Norvasc [amlodipine], Piperacillin sod-tazobactam so, Procardia xl [nifedipine er], Statins, Thiazide-type diuretics, and Mupirocin    Intake and Output  No intake or output data in the 24 hours ending 01/17/24 1454    Weight:       01/17/24  0928   Weight: 68 kg (150 lb)       Most recent vitals:   Vitals:    01/17/24 0917 01/17/24 0928 01/17/24 0931 01/17/24 1131   BP:   (!) 200/122 180/72   Pulse: 88   87   Resp: 20      Temp: 98 °F (36.7 °C)      SpO2: 98%   100%   Weight:  68 kg (150 lb)     Height:  162.6 cm (64\")       Oxygen Therapy: .    Active LDAs/IV Access:   Lines, Drains & Airways       Active LDAs       None                    Labs (abnormal labs have a star):   Labs Reviewed   TROPONIN - Abnormal; Notable for the following " components:       Result Value    HS Troponin T 16 (*)     All other components within normal limits    Narrative:     High Sensitive Troponin T Reference Range:  <14.0 ng/L- Negative Female for AMI  <22.0 ng/L- Negative Male for AMI  >=14 - Abnormal Female indicating possible myocardial injury.  >=22 - Abnormal Male indicating possible myocardial injury.   Clinicians would have to utilize clinical acumen, EKG, Troponin, and serial changes to determine if it is an Acute Myocardial Infarction or myocardial injury due to an underlying chronic condition.        HIGH SENSITIVITIY TROPONIN T 2HR - Abnormal; Notable for the following components:    HS Troponin T 27 (*)     Troponin T Delta 11 (*)     All other components within normal limits    Narrative:     High Sensitive Troponin T Reference Range:  <14.0 ng/L- Negative Female for AMI  <22.0 ng/L- Negative Male for AMI  >=14 - Abnormal Female indicating possible myocardial injury.  >=22 - Abnormal Male indicating possible myocardial injury.   Clinicians would have to utilize clinical acumen, EKG, Troponin, and serial changes to determine if it is an Acute Myocardial Infarction or myocardial injury due to an underlying chronic condition.        COMPREHENSIVE METABOLIC PANEL - Abnormal; Notable for the following components:    Glucose 156 (*)     Sodium 133 (*)     eGFR 54.3 (*)     All other components within normal limits    Narrative:     GFR Normal >60  Chronic Kidney Disease <60  Kidney Failure <15    The GFR formula is only valid for adults with stable renal function between ages 18 and 70.   CBC WITH AUTO DIFFERENTIAL - Abnormal; Notable for the following components:    RDW 12.1 (*)     All other components within normal limits   APTT - Normal   PROTIME-INR - Normal   BNP (IN-HOUSE) - Normal    Narrative:     This assay is used as an aid in the diagnosis of individuals suspected of having heart failure. It can be used as an aid in the diagnosis of acute  decompensated heart failure (ADHF) in patients presenting with signs and symptoms of ADHF to the emergency department (ED). In addition, NT-proBNP of <300 pg/mL indicates ADHF is not likely.    Age Range Result Interpretation  NT-proBNP Concentration (pg/mL:      <50             Positive            >450                   Gray                 300-450                    Negative             <300    50-75           Positive            >900                  Gray                300-900                  Negative            <300      >75             Positive            >1800                  Gray                300-1800                  Negative            <300   TSH - Normal   T4, FREE - Normal    Narrative:     Results may be falsely increased if patient taking Biotin.     COVID-19/FLUA&B/RSV, NP SWAB IN TRANSPORT MEDIA 1 HR TAT   RAINBOW DRAW    Narrative:     The following orders were created for panel order [place] Draw.  Procedure                               Abnormality         Status                     ---------                               -----------         ------                     Green Top (Gel)[[id number]]                                  Final result               Lavender Top[[id number]]                                     Final result               Chong Top[[id number]]                                         Final result               Light Blue Top[[id number]]                                   Final result                 Please view results for these tests on the individual orders.   RAINBOW URINE   T3, FREE   GREEN TOP   LAVENDER TOP   GRAY TOP   LIGHT BLUE TOP   CBC AND DIFFERENTIAL    Narrative:     The following orders were created for panel order CBC & Differential.  Procedure                               Abnormality         Status                     ---------                               -----------         ------                     CBC Auto Differential[[id number]]        Abnormal             Final result                 Please view results for these tests on the individual orders.       Meds given in ED:   Medications   sodium chloride 0.9 % flush 10 mL (has no administration in time range)   cloNIDine (CATAPRES) tablet 0.1 mg (0.1 mg Oral Given 1/17/24 4936)           NIH Stroke Scale:       Isolation/Infection(s):  No active isolations   COVID (rule out)     COVID Testing  Collected .  Resulted .    Nursing report ED to floor:  Mental status: .  Ambulatory status: .  Precautions: .    ED nurse phone extentsion- ..

## 2024-01-18 LAB
ANION GAP SERPL CALCULATED.3IONS-SCNC: 11 MMOL/L (ref 5–15)
BASOPHILS # BLD AUTO: 0.05 10*3/MM3 (ref 0–0.2)
BASOPHILS NFR BLD AUTO: 0.8 % (ref 0–1.5)
BUN SERPL-MCNC: 22 MG/DL (ref 8–23)
BUN/CREAT SERPL: 20.6 (ref 7–25)
CALCIUM SPEC-SCNC: 9.5 MG/DL (ref 8.6–10.5)
CHLORIDE SERPL-SCNC: 98 MMOL/L (ref 98–107)
CO2 SERPL-SCNC: 24 MMOL/L (ref 22–29)
CREAT SERPL-MCNC: 1.07 MG/DL (ref 0.57–1)
DEPRECATED RDW RBC AUTO: 41.1 FL (ref 37–54)
EGFRCR SERPLBLD CKD-EPI 2021: 50.1 ML/MIN/1.73
EOSINOPHIL # BLD AUTO: 0.3 10*3/MM3 (ref 0–0.4)
EOSINOPHIL NFR BLD AUTO: 5.1 % (ref 0.3–6.2)
ERYTHROCYTE [DISTWIDTH] IN BLOOD BY AUTOMATED COUNT: 12.1 % (ref 12.3–15.4)
GLUCOSE BLDC GLUCOMTR-MCNC: 121 MG/DL (ref 70–130)
GLUCOSE SERPL-MCNC: 105 MG/DL (ref 65–99)
HCT VFR BLD AUTO: 37.3 % (ref 34–46.6)
HGB BLD-MCNC: 12.8 G/DL (ref 12–15.9)
IMM GRANULOCYTES # BLD AUTO: 0.02 10*3/MM3 (ref 0–0.05)
IMM GRANULOCYTES NFR BLD AUTO: 0.3 % (ref 0–0.5)
LYMPHOCYTES # BLD AUTO: 1.97 10*3/MM3 (ref 0.7–3.1)
LYMPHOCYTES NFR BLD AUTO: 33.4 % (ref 19.6–45.3)
MCH RBC QN AUTO: 31.6 PG (ref 26.6–33)
MCHC RBC AUTO-ENTMCNC: 34.3 G/DL (ref 31.5–35.7)
MCV RBC AUTO: 92.1 FL (ref 79–97)
MONOCYTES # BLD AUTO: 0.76 10*3/MM3 (ref 0.1–0.9)
MONOCYTES NFR BLD AUTO: 12.9 % (ref 5–12)
NEUTROPHILS NFR BLD AUTO: 2.79 10*3/MM3 (ref 1.7–7)
NEUTROPHILS NFR BLD AUTO: 47.5 % (ref 42.7–76)
NRBC BLD AUTO-RTO: 0 /100 WBC (ref 0–0.2)
PLATELET # BLD AUTO: 230 10*3/MM3 (ref 140–450)
PMV BLD AUTO: 10 FL (ref 6–12)
POTASSIUM SERPL-SCNC: 4.2 MMOL/L (ref 3.5–5.2)
RBC # BLD AUTO: 4.05 10*6/MM3 (ref 3.77–5.28)
SODIUM SERPL-SCNC: 133 MMOL/L (ref 136–145)
WBC NRBC COR # BLD AUTO: 5.89 10*3/MM3 (ref 3.4–10.8)

## 2024-01-18 PROCEDURE — 85025 COMPLETE CBC W/AUTO DIFF WBC: CPT | Performed by: FAMILY MEDICINE

## 2024-01-18 PROCEDURE — 80048 BASIC METABOLIC PNL TOTAL CA: CPT | Performed by: FAMILY MEDICINE

## 2024-01-18 PROCEDURE — 97165 OT EVAL LOW COMPLEX 30 MIN: CPT | Performed by: OCCUPATIONAL THERAPIST

## 2024-01-18 PROCEDURE — 82948 REAGENT STRIP/BLOOD GLUCOSE: CPT

## 2024-01-18 PROCEDURE — 97162 PT EVAL MOD COMPLEX 30 MIN: CPT

## 2024-01-18 RX ORDER — ACETAMINOPHEN 325 MG/1
650 TABLET ORAL EVERY 6 HOURS PRN
Status: DISCONTINUED | OUTPATIENT
Start: 2024-01-18 | End: 2024-01-19 | Stop reason: HOSPADM

## 2024-01-18 RX ORDER — CLONIDINE HYDROCHLORIDE 0.1 MG/1
0.1 TABLET ORAL 3 TIMES DAILY PRN
Status: DISCONTINUED | OUTPATIENT
Start: 2024-01-18 | End: 2024-01-19 | Stop reason: HOSPADM

## 2024-01-18 RX ORDER — DILTIAZEM HYDROCHLORIDE 120 MG/1
120 CAPSULE, COATED, EXTENDED RELEASE ORAL
Status: DISCONTINUED | OUTPATIENT
Start: 2024-01-18 | End: 2024-01-19

## 2024-01-18 RX ADMIN — LEVOTHYROXINE SODIUM 50 MCG: 50 TABLET ORAL at 06:45

## 2024-01-18 RX ADMIN — FLECAINIDE ACETATE 50 MG: 50 TABLET ORAL at 08:00

## 2024-01-18 RX ADMIN — APIXABAN 5 MG: 5 TABLET, FILM COATED ORAL at 20:11

## 2024-01-18 RX ADMIN — GLIPIZIDE 2.5 MG: 5 TABLET ORAL at 08:00

## 2024-01-18 RX ADMIN — APIXABAN 5 MG: 5 TABLET, FILM COATED ORAL at 08:00

## 2024-01-18 RX ADMIN — LOSARTAN POTASSIUM 100 MG: 50 TABLET, FILM COATED ORAL at 08:00

## 2024-01-18 RX ADMIN — DILTIAZEM HYDROCHLORIDE 120 MG: 120 CAPSULE, COATED, EXTENDED RELEASE ORAL at 11:52

## 2024-01-18 RX ADMIN — CLONIDINE HYDROCHLORIDE 0.1 MG: 0.1 TABLET ORAL at 20:11

## 2024-01-18 RX ADMIN — ACETAMINOPHEN 650 MG: 325 TABLET, FILM COATED ORAL at 07:03

## 2024-01-18 RX ADMIN — FLECAINIDE ACETATE 50 MG: 50 TABLET ORAL at 20:11

## 2024-01-18 RX ADMIN — Medication 10 ML: at 08:01

## 2024-01-18 NOTE — CASE MANAGEMENT/SOCIAL WORK
Discharge Planning Assessment  [facility]     Patient Name: Nedra Tolliver  MRN: [id number]  Today's Date: 1/18/2024    Admit Date: 1/17/2024        Discharge Needs Assessment       Row Name 01/18/24 1017       Living Environment    People in Home alone    Current Living Arrangements home    Primary Care Provided by self    Provides Primary Care For no one    Family Caregiver if Needed child(lashonda), adult    Family Caregiver Names Harinder    Able to Return to Prior Arrangements yes       Resource/Environmental Concerns    Resource/Environmental Concerns none       Transition Planning    Patient/Family Anticipates Transition to home    Transportation Anticipated family or friend will provide       Discharge Needs Assessment    Readmission Within the Last 30 Days no previous admission in last 30 days    Equipment Currently Used at Home rollator;bath bench    Concerns to be Addressed no discharge needs identified    Equipment Needed After Discharge none    Discharge Coordination/Progress spoke to patient who lives alone and is independent at home prior to illness; has RX coverage and PCP; will follow for DC needs                   Discharge Plan    No documentation.                 Continued Care and Services - Admitted Since 1/17/2024    Coordination has not been started for this encounter.          Demographic Summary    No documentation.                  Functional Status    No documentation.                  Psychosocial    No documentation.                  Abuse/Neglect    No documentation.                  Legal    No documentation.                  Substance Abuse    No documentation.                  Patient Forms    No documentation.                     Zuleyma Amaya RN

## 2024-01-18 NOTE — THERAPY EVALUATION
Patient Name: Nedra Tolliver  : 1935    MRN: [id number]                              Today's Date: 2024       Admit Date: 2024    Visit Dx:     ICD-10-CM ICD-9-CM   1. Chest pain, unspecified type  R07.9 786.50   2. Hypertension, unspecified type  I10 401.9   3. NSTEMI (non-ST elevation myocardial infarction)  I21.4 410.70   4. Impaired mobility [Z74.09]  Z74.09 799.89     Patient Active Problem List   Diagnosis    Urinary tract infection without hematuria    Dysuria    Vaginal atrophy    Overactive bladder    Diverticulitis    Benign essential HTN    Thyroid nodule    Hypothyroidism    Hyponatremia    Syncope and collapse    Volume depletion    Type 2 diabetes mellitus, without long-term current use of insulin    Moderate left ankle sprain    Left hip pain    Acute pain of left knee    Pain of left thumb    Nondisplaced fracture of navicular bone of left foot with routine healing    Chest pain at rest    Acute cystitis without hematuria    H/O thyroid nodule    Hypoglycemia    IBS (irritable bowel syndrome)    Idiopathic scoliosis    Insomnia    Mild tricuspid insufficiency    Neuropathy due to type 2 diabetes mellitus    Onychomycosis    Osteopenia    Pericarditis secondary to uremia    Primary osteoarthritis of right knee    Proteinuria    Vitamin D deficiency    High cholesterol    Lumbar radiculopathy    Non-toxic multinodular goiter    Transient ischemic attack    Acute gastritis without hemorrhage    UTI symptoms    Absolute anemia    Essential hypertension    Mixed hyperlipidemia with pervious statin intolerance    Stage 3a chronic kidney disease (CKD)    Cervical radiculopathy    LBBB (left bundle branch block)    Persistent atrial fibrillation    Fatigue    Shortness of breath    Accelerated hypertension with diastolic congestive heart failure, NYHA class 3    Paroxysmal A-fib    Chest pain     Past Medical History:   Diagnosis Date    Arthritis     Cancer     BCC @ nose & Left arm     Diabetes type 2, controlled     Diverticulitis     History of GI bleed     History of transfusion     Has had x 5 units.    Hypertension     STAGE 3     Hyponatremia     Required hospitalization    Hypothyroidism     Knee arthropathy 09/30/2020    LPRD (laryngopharyngeal reflux disease)     Pharyngeal dysphagia     Thyroid nodule      Past Surgical History:   Procedure Laterality Date    APPENDECTOMY      BASAL CELL CARCINOMA EXCISION      BELPHAROPTOSIS REPAIR      BREAST CYST EXCISION      CARDIAC CATHETERIZATION      CARDIAC CATHETERIZATION Right 6/24/2021    Procedure: Left Heart Cath R radial, US guided w poss intervention;  Surgeon: Mak Nickerson DO;  Location:  [facility]ATH [facility];  Service: Cardiovascular;  Laterality: Right;    CARDIAC CATHETERIZATION      CATARACT EXTRACTION      CATARACT EXTRACTION WITH INTRAOCULAR LENS IMPLANT Bilateral     CHOLECYSTECTOMY      COLONOSCOPY Left 11/1/2016    Tubular adenoma cecum, Diverticulosis repeat prn    SUBTOTAL HYSTERECTOMY      TOTAL KNEE ARTHROPLASTY Right 9/30/2020    Procedure: TOTAL KNEE ARTHROPLASTY;  Surgeon: Mak Pickens MD;  Location:  [facility] OR;  Service: Orthopedics;  Laterality: Right;    TUMOR EXCISION      under armpits and left breast      General Information       Row Name 01/18/24 0927          OT Time and Intention    Document Type evaluation  -MORALES (r) JUANCARLOS (t) MORALES (c)     Mode of Treatment occupational therapy  -MORALES (r) JUANCARLOS (t) MORALES (c)       Row Name 01/18/24 0927          General Information    Patient Profile Reviewed yes  Pt. admitted to ED d/t chest pain and was dx with HTN and bradycardia. Pt. reports common impairments of SOA and chest pain during ax. Pt. hx of R CVD.  -MORALES (r) JUANCARLOS (t) MORALES (c)     Prior Level of Function independent:;all household mobility;community mobility;ADL's;home management;cooking;cleaning;driving;shopping  -MORALES (r) JUANCARLOS (t) MORALES (c)     Existing Precautions/Restrictions fall  Pt. c/o lightheadedness  upon sit>stand but often improves after standing.  -JJ (r) AW (t) JJ (c)     Barriers to Rehab medically complex  -JJ (r) AW (t) JJ (c)       Row Name 01/18/24 0927          Occupational Profile    Environmental Supports and Barriers (Occupational Profile) Pt. lives I at home where BR has a tub shower, with a shower bench, and GBs. She uses a rollator for household and community mobility. Pt. reports that her son lives close but is often busy. Her grandson also lives close by and can assist prn.  -JJ (r) AW (t) JJ (c)       Row Name 01/18/24 0927          Living Environment    People in Home alone  -JJ (r) AW (t) JJ (c)       Row Name 01/18/24 0927          Home Main Entrance    Number of Stairs, Main Entrance five  Pt. reports stairs are short in height.  -JJ (r) AW (t) JJ (c)     Stair Railings, Main Entrance none  -JJ (r) AW (t) JJ (c)       Row Name 01/18/24 0927          Stairs Within Home, Primary    Number of Stairs, Within Home, Primary none  -JJ (r) AW (t) JJ (c)       Row Name 01/18/24 0927          Cognition    Orientation Status (Cognition) oriented x 4  -JJ (r) AW (t) JJ (c)       Row Name 01/18/24 0927          Safety Issues, Functional Mobility    Safety Issues Affecting Function (Mobility) at risk behavior observed;other (see comments)  Lightheadedness is commonly experienced by pt. upon sit>stand. Pt. also has decreased ax. tolerance and c/o weakness.  -JJ (r) AW (t) JJ (c)     Impairments Affecting Function (Mobility) balance;endurance/activity tolerance;shortness of breath  -JJ (r) AW (t) JJ (c)               User Key  (r) = Recorded By, (t) = Taken By, (c) = Cosigned By      Initials Name Provider Type    Arianne Ortez, OTR/L, CSRS Occupational Therapist    Renetta Michaud, OT Student OT Student                     Mobility/ADL's       Row Name 01/18/24 0927          Bed Mobility    Bed Mobility scooting/bridging;sit-supine  -JJESSI (r) AW (t) MORALES (c)     Scooting/Bridging [place] (Bed  Mobility) modified independence  -JJ (r) AW (t) JJ (c)     Sit-Supine [place] (Bed Mobility) modified independence  -JJ (r) AW (t) JJ (c)     Assistive Device (Bed Mobility) head of bed elevated  -JJ (r) AW (t) JJ (c)       Row Name 01/18/24 0927          Transfers    Transfers sit-stand transfer;stand-sit transfer  -JJ (r) AW (t) JJ (c)       Row Name 01/18/24 0927          Sit-Stand Transfer    Sit-Stand [place] (Transfers) contact guard  -JJ (r) AW (t) JJ (c)     Comment, (Sit-Stand Transfer) Pt. completed sit>stand x2.  -JJ (r) AW (t) JJ (c)       Row Name 01/18/24 0927          Stand-Sit Transfer    Stand-Sit [place] (Transfers) verbal cues;contact guard  -JJ (r) AW (t) JJ (c)       Row Name 01/18/24 0927          Functional Mobility    Functional Mobility- Ind. Level verbal cues required;contact guard assist  -JJ (r) AW (t) JJ (c)     Functional Mobility- Safety Issues other (see comments)  Impaired ax. sergei., SOA, and c/o of weakness and lightheadedness upon standing.  -JJ (r) AW (t) JJ (c)     Functional Mobility- Comment Pt. amb. CGA with v/c down the blanc to stairs and back requiring one rest break in chair. Pt. completed full flight of stairs.  -JJ (r) AW (t) JJ (c)     Patient was able to Ambulate yes  -JJ (r) AW (t) JJ (c)       Row Name 01/18/24 0927          Activities of Daily Living    BADL Assessment/Intervention lower body dressing  -JJ (r) AW (t) JJ (c)       Row Name 01/18/24 0927          Lower Body Dressing Assessment/Training    [place] Level (Lower Body Dressing) independent;doff;don;socks  -JJ (r) AW (t) JJ (c)     Position (Lower Body Dressing) edge of bed sitting;unsupported sitting  -JJ (r) AW (t) JJ (c)               User Key  (r) = Recorded By, (t) = Taken By, (c) = Cosigned By      Initials Name Provider Type    Arianne Ortez, OTR/L, CSRS Occupational Therapist    Renetta Michaud, OT Student OT Student                   Obj/Interventions       Row Name  01/18/24 0927          Range of Motion Comprehensive    General Range of Motion bilateral upper extremity ROM WFL  -JJ (r) AW (t) JJ (c)       Row Name 01/18/24 0927          Strength Comprehensive (MMT)    Comment, General Manual Muscle Testing (MMT) Assessment R UE 5/5. L deltoid 4/5, remaining L UE 5/5. Pt. reports prior CVA impacting L UE strength.  -JJ (r) AW (t) JJESSI (c)       Row Name 01/18/24 0927          Balance    Balance Assessment sitting static balance;sitting dynamic balance;standing static balance;standing dynamic balance  -JJ (r) AW (t) JJ (c)     Static Sitting Balance supervision  -JJ (r) AW (t) JJ (c)     Dynamic Sitting Balance supervision  -JJ (r) AW (t) JJ (c)     Position, Sitting Balance unsupported;sitting edge of bed  -JJ (r) AW (t) JJ (c)     Static Standing Balance verbal cues;contact guard  Required v/c for breathing d/t SOA.  -JJ (r) AW (t) JJ (c)     Dynamic Standing Balance verbal cues;contact guard  -JJ (r) AW (t) JJ (c)     Balance Interventions static;dynamic;sitting;standing;sit to stand;occupation based/functional task  -JJ (r) AW (t) JJ (c)               User Key  (r) = Recorded By, (t) = Taken By, (c) = Cosigned By      Initials Name Provider Type    Arianne Ortez, OTR/L, CSRS Occupational Therapist    Renetta Michaud, OT Student OT Student                   Goals/Plan       Row Name 01/18/24 0927          Bathing Goal 1 (OT)    Activity/Device (Bathing Goal 1, OT) bathing skills, all;tub bench;grab bar, tub/shower  -JJESSI (r) AW (t) JJESSI (c)     [place] Level/Cues Needed (Bathing Goal 1, OT) standby assist  -MORALES (r) AW (t) JJESSI (c)     Time Frame (Bathing Goal 1, OT) long term goal (LTG);5 days  -MORALES (r) AW (t) MORALES (c)     Progress/Outcomes (Bathing Goal 1, OT) new goal  -MORALES (r) AW (t) JJ (c)       Row Name 01/18/24 0927          Problem Specific Goal 1 (OT)    Problem Specific Goal 1 (OT) Pt. will perform full ADL routine in BR without seated rest breaks to increase ax  tolerance.  -JJ (r) AW (t) JJ (c)     Time Frame (Problem Specific Goal 1, OT) long term goal (LTG);5 days  -JJ (r) AW (t) JJ (c)     Progress/Outcome (Problem Specific Goal 1, OT) new goal  -JJ (r) AW (t) JJ (c)       Row Name 01/18/24 0927          Therapy Assessment/Plan (OT)    Planned Therapy Interventions (OT) activity tolerance training;BADL retraining;functional balance retraining;occupation/activity based interventions;patient/caregiver education/training;strengthening exercise;transfer/mobility retraining  -JJ (r) AW (t) JJ (c)               User Key  (r) = Recorded By, (t) = Taken By, (c) = Cosigned By      Initials Name Provider Type    Arianne Ortez, OTR/L, CSRS Occupational Therapist    Renetta Michaud, OT Student OT Student                   Clinical Impression       Row Name 01/18/24 0927          Pain Assessment    Pretreatment Pain Rating 0/10 - no pain  -JJ (r) AW (t) JJ (c)     Posttreatment Pain Rating 0/10 - no pain  -JJ (r) AW (t) JJ (c)       Row Name 01/18/24 0927          Plan of Care Review    Plan of Care Reviewed With patient  -JJ (r) AW (t) JJ (c)     Outcome Evaluation OT eval completed. Pt. was found sitting EOB demonstrating dynamic sitting balance. She was A&Ox4. CGA was required for amb. to the end of the blanc, for stairs, and back, with one rest break in the chair. Pt. had one misstep but self-corrected. During amb. to return to the room pt. showed fatigue and c/o SOA and weakness. Once seated EOB, O2 and HR were determined to be WNL. Bed mobility was performed mod I with the HOB elevated. She was left in fowlers with the call light within reach. Pt. would benefit from skilled OT to increase ax. tolerance and balance during fx mobility for safety in ADLs. Upon d/c, pt. rec. to return home with assist.  -JJ (r) AW (t) JJ (c)       Row Name 01/18/24 0927          Therapy Assessment/Plan (OT)    Rehab Potential (OT) good, to achieve stated therapy goals  -MORALES (r) JUANCARLOS (t) MORALES  (c)     Criteria for Skilled Therapeutic Interventions Met (OT) yes;skilled treatment is necessary  -JJ (r) AW (t) JJ (c)     Therapy Frequency (OT) 5 times/wk  -JJ (r) AW (t) JJ (c)     Predicted Duration of Therapy Intervention (OT) 10 days  -JJ (r) AW (t) JJ (c)       Row Name 01/18/24 0927          Therapy Plan Review/Discharge Plan (OT)    Anticipated Discharge Disposition (OT) home with assist  -JJ (r) AW (t) JJ (c)       Row Name 01/18/24 0927          Positioning and Restraints    Pre-Treatment Position in bed  -JJ (r) AW (t) JJ (c)     Post Treatment Position bed  -JJ (r) AW (t) JJ (c)     In Bed supine;fowlers;call light within reach;encouraged to call for assist;side rails up x2  -JJ (r) AW (t) JJ (c)               User Key  (r) = Recorded By, (t) = Taken By, (c) = Cosigned By      Initials Name Provider Type    Arianne Ortez, OTR/L, CSRS Occupational Therapist    Renetta Michaud, OT Student OT Student                   Outcome Measures       Row Name 01/18/24 0927          How much help from another is currently needed...    Putting on and taking off regular lower body clothing? 4  -JJ (r) AW (t) JJ (c)     Bathing (including washing, rinsing, and drying) 3  -JJ (r) AW (t) JJ (c)     Toileting (which includes using toilet bed pan or urinal) 3  -JJ (r) AW (t) JJ (c)     Putting on and taking off regular upper body clothing 4  -JJ (r) AW (t) JJ (c)     Taking care of personal grooming (such as brushing teeth) 4  -JJ (r) AW (t) JJ (c)     Eating meals 4  -JJ (r) AW (t) JJ (c)     AM-PAC 6 Clicks Score (OT) 22  -JJ (r) AW (t)       Row Name 01/18/24 0909 01/18/24 0800       How much help from another person do you currently need...    Turning from your back to your side while in flat bed without using bedrails? 4  -CHARLES (r) JROEL (t) CHARLES (c) 4  -AG    Moving from lying on back to sitting on the side of a flat bed without bedrails? 4  -CHARLES (r) JROEL (t) CHARLES (c) 4  -AG    Moving to and from a bed to a chair  (including a wheelchair)? 3  -CHARLES (r) JT (t) CHARLES (c) 3  -AG    Standing up from a chair using your arms (e.g., wheelchair, bedside chair)? 3  -CHARLES (r) JT (t) CHARLES (c) 3  -AG    Climbing 3-5 steps with a railing? 3  -CHARLES (r) JT (t) CHARLES (c) 2  -AG    To walk in hospital room? 3  -CHARLES (r) JT (t) CHARLES (c) 3  -AG    AM-PAC 6 Clicks Score (PT) 20  -CHARLES (r) JT (t) 19  -AG    Highest Level of Mobility Goal 6 --> Walk 10 steps or more  -CHARLES (r) JT (t) 6 --> Walk 10 steps or more  -AG      Row Name 01/18/24 0927 01/18/24 0909       Functional Assessment    Outcome Measure Options AM-PAC 6 Clicks Daily Activity (OT)  -JJ (r) AW (t) JJ (c) AM-PAC 6 Clicks Basic Mobility (PT)  -CHARLES (r) JT (t) CHARLES (c)              User Key  (r) = Recorded By, (t) = Taken By, (c) = Cosigned By      Initials Name Provider Type    CHARLES Dioni Jones, PT DPT Physical Therapist    Arianne Ortez, OTR/L, CSRS Occupational Therapist    Kimberlee Crum, RN Registered Nurse    Oksana Lynn, PT Student PT Student    Renetta Michaud, OT Student OT Student                    Occupational Therapy Education       Title: PT OT SLP Therapies (In Progress)       Topic: Occupational Therapy (In Progress)       Point: ADL training (Not Started)       Description:   Instruct learner(s) on proper safety adaptation and remediation techniques during self care or transfers.   Instruct in proper use of assistive devices.                  Learner Progress:  Not documented in this visit.              Point: Home exercise program (Not Started)       Description:   Instruct learner(s) on appropriate technique for monitoring, assisting and/or progressing therapeutic exercises/activities.                  Learner Progress:  Not documented in this visit.              Point: Precautions (Done)       Description:   Instruct learner(s) on prescribed precautions during self-care and functional transfers.                  Learning Progress Summary             Patient  Acceptance, E, VU by  at 1/18/2024 0927                         Point: Body mechanics (Done)       Description:   Instruct learner(s) on proper positioning and spine alignment during self-care, functional mobility activities and/or exercises.                  Learning Progress Summary             Patient Acceptance, E, VU by  at 1/18/2024 0927                                         User Key       Initials Effective Dates Name Provider Type Discipline     12/14/23 -  Renetta Daniel, JUAN Student OT Student OT                  OT Recommendation and Plan  Planned Therapy Interventions (OT): activity tolerance training, BADL retraining, functional balance retraining, occupation/activity based interventions, patient/caregiver education/training, strengthening exercise, transfer/mobility retraining  Therapy Frequency (OT): 5 times/wk  Plan of Care Review  Plan of Care Reviewed With: patient  Outcome Evaluation: OT sabino completed. Pt. was found sitting EOB demonstrating dynamic sitting balance. She was A&Ox4. CGA was required for amb. to the end of the blanc, for stairs, and back, with one rest break in the chair. Pt. had one misstep but self-corrected. During amb. to return to the room pt. showed fatigue and c/o SOA and weakness. Once seated EOB, O2 and HR were determined to be WNL. Bed mobility was performed mod I with the HOB elevated. She was left in fowlers with the call light within reach. Pt. would benefit from skilled OT to increase ax. tolerance and balance during fx mobility for safety in ADLs. Upon d/c, pt. rec. to return home with assist.     Time Calculation:         Time Calculation- OT       Row Name 01/18/24 0927             Time Calculation- OT    OT Start Time 0922  -JJ (r) AW (t) JJ (c)      OT Stop Time 1010  -JJ (r) AW (t) JJ (c)      OT Time Calculation (min) 48 min  -JJ (r) AW (t)      Total Timed Code Minutes- OT 48 minute(s)  -JJ (r) AW (t) JJ (c)      OT Received On 01/18/24  -JJ (r) AW (t) JJ  (c)      OT Goal Re-Cert Due Date 01/28/24  -JJ (r) AW (t) JJ (c)         Untimed Charges    OT Eval/Re-eval Minutes 48  -JJ (r) AW (t) JJ (c)         Total Minutes    Untimed Charges Total Minutes 48  -JJ (r) AW (t)       Total Minutes 48  -JJ (r) AW (t)                User Key  (r) = Recorded By, (t) = Taken By, (c) = Cosigned By      Initials Name Provider Type    Arianne Ortez, [place]/L, CSRS Occupational Therapist    Renetta Michaud, OT Student OT Student                           Renetta Daniel, OT Student  1/18/2024

## 2024-01-18 NOTE — PLAN OF CARE
Goal Outcome Evaluation:  Plan of Care Reviewed With: patient           Outcome Evaluation: OT sabino completed. Pt. was found sitting EOB demonstrating dynamic sitting balance. She was A&Ox4. CGA was required for amb. to the end of the blanc, for stairs, and back, with one rest break in the chair. Pt. had one misstep but self-corrected. During amb. to return to the room pt. showed fatigue and c/o SOA and weakness. Once seated EOB, O2 and HR were determined to be WNL. Bed mobility was performed mod I with the HOB elevated. She was left in fowlers with the call light within reach. Pt. would benefit from skilled OT to increase ax. tolerance and balance during fx mobility for safety in ADLs. Upon d/c, pt. rec. to return home with assist.      Anticipated Discharge Disposition (OT): home with assist

## 2024-01-18 NOTE — THERAPY EVALUATION
Patient Name: Nedra Tolliver  : 1935    MRN: [id number]                              Today's Date: 2024       Admit Date: 2024    Visit Dx:     ICD-10-CM ICD-9-CM   1. Chest pain, unspecified type  R07.9 786.50   2. Hypertension, unspecified type  I10 401.9   3. NSTEMI (non-ST elevation myocardial infarction)  I21.4 410.70   4. Impaired mobility [Z74.09]  Z74.09 799.89     Patient Active Problem List   Diagnosis    Urinary tract infection without hematuria    Dysuria    Vaginal atrophy    Overactive bladder    Diverticulitis    Benign essential HTN    Thyroid nodule    Hypothyroidism    Hyponatremia    Syncope and collapse    Volume depletion    Type 2 diabetes mellitus, without long-term current use of insulin    Moderate left ankle sprain    Left hip pain    Acute pain of left knee    Pain of left thumb    Nondisplaced fracture of navicular bone of left foot with routine healing    Chest pain at rest    Acute cystitis without hematuria    H/O thyroid nodule    Hypoglycemia    IBS (irritable bowel syndrome)    Idiopathic scoliosis    Insomnia    Mild tricuspid insufficiency    Neuropathy due to type 2 diabetes mellitus    Onychomycosis    Osteopenia    Pericarditis secondary to uremia    Primary osteoarthritis of right knee    Proteinuria    Vitamin D deficiency    High cholesterol    Lumbar radiculopathy    Non-toxic multinodular goiter    Transient ischemic attack    Acute gastritis without hemorrhage    UTI symptoms    Absolute anemia    Essential hypertension    Mixed hyperlipidemia with pervious statin intolerance    Stage 3a chronic kidney disease (CKD)    Cervical radiculopathy    LBBB (left bundle branch block)    Persistent atrial fibrillation    Fatigue    Shortness of breath    Accelerated hypertension with diastolic congestive heart failure, NYHA class 3    Paroxysmal A-fib    Chest pain     Past Medical History:   Diagnosis Date    Arthritis     Cancer     BCC @ nose & Left arm     Diabetes type 2, controlled     Diverticulitis     History of GI bleed     History of transfusion     Has had x 5 units.    Hypertension     STAGE 3     Hyponatremia     Required hospitalization    Hypothyroidism     Knee arthropathy 09/30/2020    LPRD (laryngopharyngeal reflux disease)     Pharyngeal dysphagia     Thyroid nodule      Past Surgical History:   Procedure Laterality Date    APPENDECTOMY      BASAL CELL CARCINOMA EXCISION      BELPHAROPTOSIS REPAIR      BREAST CYST EXCISION      CARDIAC CATHETERIZATION      CARDIAC CATHETERIZATION Right 6/24/2021    Procedure: Left Heart Cath R radial, US guided w poss intervention;  Surgeon: Mak Nickerson DO;  Location:  [facility];  Service: Cardiovascular;  Laterality: Right;    CARDIAC CATHETERIZATION      CATARACT EXTRACTION      CATARACT EXTRACTION WITH INTRAOCULAR LENS IMPLANT Bilateral     CHOLECYSTECTOMY      COLONOSCOPY Left 11/1/2016    Tubular adenoma cecum, Diverticulosis repeat prn    SUBTOTAL HYSTERECTOMY      TOTAL KNEE ARTHROPLASTY Right 9/30/2020    Procedure: TOTAL KNEE ARTHROPLASTY;  Surgeon: Mak Pickens MD;  Location:  [facility] OR;  Service: Orthopedics;  Laterality: Right;    TUMOR EXCISION      under armpits and left breast      General Information       Row Name 01/18/24 0909          Physical Therapy Time and Intention    Document Type evaluation  Pt admitted 1/17 with medical dx of chest pain, HTN, and NSTEMI. PMH significant for afib, svt, and HTN. cc weakness, SOA, dizziness.  -CHARLES (r) JT (t) CHARLES (c)     Mode of Treatment physical therapy  -CHARLES (r) JT (t) CHARLES (c)       Row Name 01/18/24 0909          General Information    Patient Profile Reviewed yes  -CHARLES (r) JT (t) CHARLES (c)     Prior Level of Function independent:;all household mobility;gait;ADL's;dressing;bathing;grooming;driving;using stairs  ind/ modified independent: pt has rollator for home use, cane for community use, and occassionally no device  -CHARLES (r)  ELEAZAR (t) CHARLES (c)     Existing Precautions/Restrictions fall  -CHARLES (r) JT (t) CHARLES (c)     Barriers to Rehab none identified  -CHARLES (r) ELEAZAR (t) CHARLES (c)       Row Name 01/18/24 0909          Living Environment    People in Home alone  -CHARLES (r) ELEAZAR (t) CHARLES (c)       Row Name 01/18/24 0909          Home Main Entrance    Number of Stairs, Main Entrance five  5 broken into 3 steps,2 steps,1 step into house  -CHARLES (r) ELEAZAR (t) CHARLES (c)     Stair Railings, Main Entrance none;other (see comments)  has post she holds onto  -CHARLES (r) ELEAZAR (t) CHARLES (c)       Row Name 01/18/24 0909          Stairs Within Home, Primary    Number of Stairs, Within Home, Primary none  -CHARLES (r) ELEAZAR (t) CHARLES (c)       Row Name 01/18/24 0909          Cognition    Orientation Status (Cognition) oriented x 4  -CHARLES (r) ELEAZAR (t) CHARLES (c)       Row Name 01/18/24 0909          Safety Issues, Functional Mobility    Impairments Affecting Function (Mobility) coordination;endurance/activity tolerance;shortness of breath;strength  coordination affected with stair climbing  -CHARLES (r) JT (t) CHARLES (c)               User Key  (r) = Recorded By, (t) = Taken By, (c) = Cosigned By      Initials Name Provider Type    Dioni Glass, PT DPT Physical Therapist    Oksana Lynn, IRINA Student PT Student                   Mobility       Row Name 01/18/24 0909          Bed Mobility    Bed Mobility sit-supine  in chair upon arrival  -CHARLES (r) ELEAZAR (t) CHARLES (c)     Sit-Supine [place] (Bed Mobility) independent  -CHARLES (r) ELEAZAR (t) CHARLES (c)       Row Name 01/18/24 0909          Sit-Stand Transfer    Sit-Stand [place] (Transfers) standby assist;contact guard  -CHARLES (r) JROEL (t) CHARLES (c)       Row Name 01/18/24 0909          Gait/Stairs (Locomotion)    [place] Level (Gait) contact guard;1 person assist;verbal cues  V/C with stairs for appropriate hand placement;  CGA with handheld assist during 118ft ambulation to room with increased fatigue.  -CHARLES (r) ELEAZAR (t) CHARLES (c)     Patient was able to Ambulate --  -CHARLES      Distance in Feet (Gait) 98 ft, 20ft + 10 stairs, 118 ft w/10 stairs  As documented; 2 standing rest breaks with 118ft ambulation  -CHARLES (r) JT (t) CHARLES (c)     Deviations/Abnormal Patterns (Gait) gait speed decreased  -CHARLES (r) JT (t) CHARLES (c)     [place] Level (Stairs) 1 person assist;contact guard;minimum assist (75% patient effort)  Ascending: CGA; Descending: min A/ CGA with v/c  -CHARLES (r) JT (t) CHARLES (c)     Handrail Location (Stairs) right side (ascending);right side (descending)  -CHARLES (r) JT (t) CHARLES (c)     Number of Steps (Stairs) 10  -CHARLES (r) JT (t) CHARLES (c)     Ascending Technique (Stairs) step-to-step  -CHARLES (r) JT (t) CHARLES (c)     Descending Technique (Stairs) step-to-step  switches from step down with RLE first and LLE first  -CHARLES (r) JT (t) CHARLES (c)     Stairs, Safety Issues sequencing ability decreased  -CHARLES (r) JT (t) CHARLES (c)     Stairs, Impairments strength decreased  -CHARLES (r) JT (t) CHARLES (c)               User Key  (r) = Recorded By, (t) = Taken By, (c) = Cosigned By      Initials Name Provider Type    Dioni Glass, PT DPT Physical Therapist    Oksana Lynn, PT Student PT Student                   Obj/Interventions       Row Name 01/18/24 0909          Range of Motion Comprehensive    General Range of Motion bilateral lower extremity ROM WFL  -CHARLES (r) JT (t) CHARLES (c)       Row Name 01/18/24 0909          Strength Comprehensive (MMT)    Comment, General Manual Muscle Testing (MMT) Assessment RLE MMT grossly 5/5; LLE MMT grossly 3+ to 4/5  Pt reports having stroke many years ago affecting L body  -CHARLES (r) JT (t) CHARLES (c)       Row Name 01/18/24 0909          Balance    Balance Assessment sitting static balance;sitting dynamic balance;standing static balance;standing dynamic balance  -CHARLES (r) JT (t) CHARLES (c)     Static Sitting Balance independent  -CHARLES (r) JT (t) CHARLES (c)     Dynamic Sitting Balance independent  -CHARLES (r) JT (t) CHARLES (c)     Position, Sitting Balance unsupported;sitting edge of bed  -CHARLES (r) JT (t) CHARLES (c)      Static Standing Balance standby assist  -CHARLES (r) JT (t) CHARLES (c)     Dynamic Standing Balance standby assist;contact guard  -CHARLES (r) JT (t) CHARLES (c)     Position/Device Used, Standing Balance unsupported  -CHARLES (r) JT (t) CHARLES (c)               User Key  (r) = Recorded By, (t) = Taken By, (c) = Cosigned By      Initials Name Provider Type    Dioni Glass, PT DPT Physical Therapist    Oksana Lynn, PT Student PT Student                   Goals/Plan       Row Name 01/18/24 0909          Transfer Goal 1 (PT)    Activity/Assistive Device (Transfer Goal 1, PT) sit-to-stand/stand-to-sit;bed-to-chair/chair-to-bed  -CHARLES     [place] Level/Cues Needed (Transfer Goal 1, PT) supervision required  -CHARLES     Time Frame (Transfer Goal 1, PT) long term goal (LTG);10 days  -CHARLES (r) JT (t) CHARLES (c)     Progress/Outcome (Transfer Goal 1, PT) new goal  -CHARLES (r) JT (t) CHARLES (c)       Row Name 01/18/24 0909          Gait Training Goal 1 (PT)    Activity/Assistive Device (Gait Training Goal 1, PT) gait (walking locomotion);assistive device use;decrease fall risk;increase endurance/gait distance  -CHARLES (r) JT (t) CHARLES (c)     [place] Level (Gait Training Goal 1, PT) supervision required  -CHARLES     Distance (Gait Training Goal 1, PT) 150 ft  -CHARLES (r) JT (t) CHARLES (c)     Time Frame (Gait Training Goal 1, PT) long term goal (LTG);10 days  -CHARLES (r) JT (t) CHARLES (c)     Progress/Outcome (Gait Training Goal 1, PT) new goal  -CHARLES (r) JT (t) CHARLES (c)       Row Name 01/18/24 0909          Stairs Goal 1 (PT)    Activity/Assistive Device (Stairs Goal 1, PT) ascending stairs;descending stairs;step-to-step;decrease fall risk  -CHARLES (r) JT (t) CHARLES (c)     [place] Level/Cues Needed (Stairs Goal 1, PT) standby assist  -CHARLES (r) JT (t) CHARLES (c)     Number of Stairs (Stairs Goal 1, PT) 5  -CHARLES (r) JT (t) CHARLES (c)     Time Frame (Stairs Goal 1, PT) long term goal (LTG);10 days  -CHARLES (r) JT (t) CHARLES (c)     Progress/Outcome (Stairs Goal 1, PT) new goal  -CHARLES (r) JT (t) CHARLES (c)   "     Row Name 01/18/24 0909          Therapy Assessment/Plan (PT)    Planned Therapy Interventions (PT) balance training;gait training;home exercise program;patient/family education;stair training;strengthening;transfer training;bed mobility training;postural re-education  -CHARLES               User Key  (r) = Recorded By, (t) = Taken By, (c) = Cosigned By      Initials Name Provider Type    Dioni Glass, PT DPT Physical Therapist    Oksana Lynn, PT Student PT Student                   Clinical Impression       Row Name 01/18/24 1043          Pain    Pretreatment Pain Rating 0/10 - no pain  -CHARLES (r) JT (t) CHARLES (c)     Posttreatment Pain Rating 0/10 - no pain  -CHARLES (r) ELEAZAR (t) CHARLES (c)       Row Name 01/18/24 1043          Plan of Care Review    Plan of Care Reviewed With patient  -CHARLES (r) ELEAZAR (t) CHARLES (c)     Outcome Evaluation Pt evaluated. Pt on sitting EOB upon entering, reports just washing up with nurse prior to sitting. Pt reports living alone and independent with all household mobility and local driving. Has 5 total steps to get into house and uses post for assistance as there are no handrails. Pt reports mod ind/ ind with amulation: rollator vs cane vs no AD. BLE ROM and MMT performed. BLE ROM WFL. RLE MMT 5/5 and LLE MMT 3+-4 /5. Pt reports PMH of stroke affecting L extremities, but feels that impairments have diminished. Pt performed sit>stand with CGA and maintained static balance. Gait assessed without device at this time. Pt ambulated 98 ft with CGA and had 1 incident of uncoordinated stepping with LOB self-corrected. Pt stated \"my brain just stopped working right there\" and continued ambulation with no additional incidents. After seated rest, pt ambulated 20ft to stairwell and descended 10 steps with v/c of appropriate hand placement, step-to uncoordinated pattern CGA. After standing rest, pt ascended 10 steps step-to pattern with R handrail and contined 118ft to room CGA requiring handheld assist with " onset of fatigue. Pt performed sit to supine w/head elevated ind. HR at 78 and SpO2 at 99%. Pt educated on appropriate breathing when feeling SOA and appropriate hand placement when transfering sit<>stand. Physical therapy appropriate until d/c to increase endurance, increae strength, improve stair ascending/descending, and assess ambulation with alternative ADs as she uses at home to assure safety and appropriate use. Recommended D/C home v home with assist from son as needed.  -CHARLES (r) JT (t) CHARLES (c)       Row Name 01/18/24 1043          Therapy Assessment/Plan (PT)    Patient/Family Therapy Goals Statement (PT) none stated  -CHARLES (r) JT (t) CHARLES (c)     Rehab Potential (PT) good, to achieve stated therapy goals  -CHARLES (r) JT (t) CHARLES (c)     Criteria for Skilled Interventions Met (PT) yes;meets criteria  -CHARLES     Therapy Frequency (PT) 2 times/day  -CHARLES (r) JT (t) CHARLES (c)     Predicted Duration of Therapy Intervention (PT) until D/C  -CHARLES (r) JT (t) CHARLES (c)       Row Name 01/18/24 1043          Vital Signs    Posttreatment Heart Rate (beats/min) 78  -CHARLES (r) JT (t) CHARLES (c)     Post SpO2 (%) 99  -CHARLES (r) JT (t) CHARLES (c)     O2 Delivery Post Treatment room air  -CHARLES (r) JT (t) CHARLES (c)       Row Name 01/18/24 1043          Positioning and Restraints    Pre-Treatment Position in bed  Seated at EOB  -CHARLES (r) JT (t) CHARLES (c)     Post Treatment Position bed  -CHARLES (r) JT (t) CHARLES (c)     In Bed supine;call light within reach;encouraged to call for assist  -CHARLES (r) JT (t) CHARLES (c)               User Key  (r) = Recorded By, (t) = Taken By, (c) = Cosigned By      Initials Name Provider Type    Dioni Glass, PT DPT Physical Therapist    Oksana Lynn, PT Student PT Student                   Outcome Measures       [facility] Name 01/18/24 0909 01/18/24 0800       How much help from another person do you currently need...    Turning from your back to your side while in flat bed without using bedrails? 4  -CHARLES (r) ELEAZAR (t) CHARLES (c) 4  -AG    Moving from  lying on back to sitting on the side of a flat bed without bedrails? 4  -CHARLES (r) JT (t) CHARLES (c) 4  -AG    Moving to and from a bed to a chair (including a wheelchair)? 3  -CHARLES (r) JT (t) CHARLES (c) 3  -AG    Standing up from a chair using your arms (e.g., wheelchair, bedside chair)? 3  -CHARLES (r) JT (t) CHARLES (c) 3  -AG    Climbing 3-5 steps with a railing? 3  -CHARLES (r) JT (t) CHARLES (c) 2  -AG    To walk in hospital room? 3  -CHARLES (r) JT (t) CHARLES (c) 3  -AG    AM-PAC 6 Clicks Score (PT) 20  -CHARLES (r) JT (t) 19  -AG    Highest Level of Mobility Goal 6 --> Walk 10 steps or more  -CHARLES (r) JT (t) 6 --> Walk 10 steps or more  -AG      Row Name 01/18/24 0927 01/18/24 0909       Functional Assessment    Outcome Measure Options AM-PAC 6 Clicks Daily Activity (OT) (P)   -AW AM-PAC 6 Clicks Basic Mobility (PT)  -CHARLES (r) JT (t) CHARLES (c)              User Key  (r) = Recorded By, (t) = Taken By, (c) = Cosigned By      Initials Name Provider Type    Dioni Glass, PT DPT Physical Therapist    Kimberlee Crum, RN Registered Nurse    Oksana Lynn, PT Student PT Student    Renetta Michaud, OT Student OT Student                                 Physical Therapy Education       Title: PT OT SLP Therapies (In Progress)       Topic: Physical Therapy (In Progress)       Point: Mobility training (Done)       Learning Progress Summary             Patient Acceptance, E, VU by ELEAZAR at 1/18/2024 0909    Comment: Pt educated on appropriate hand placement during sit<>stand transfers, approriate hand placement during stair climbing. Pt educated on approptiate breathing technique when feeling short of air.                         Point: Home exercise program (Not Started)       Learner Progress:  Not documented in this visit.              Point: Body mechanics (Not Started)       Learner Progress:  Not documented in this visit.              Point: Precautions (Done)       Learning Progress Summary             Patient Acceptance, E, VU by JT at 1/18/2024 0909  "   Comment: Pt educated on appropriate hand placement during sit<>stand transfers, approriate hand placement during stair climbing. Pt educated on approptiate breathing technique when feeling short of air.                                         User Key       Initials Effective Dates Name Provider Type Discipline    ELEAZAR 11/30/23 -  Oksana Menjivar, PT Student PT Student PT                  PT Recommendation and Plan     Plan of Care Reviewed With: patient  Outcome Evaluation: Pt evaluated. Pt on sitting EOB upon entering, reports just washing up with nurse prior to sitting. Pt reports living alone and independent with all household mobility and local driving. Has 5 total steps to get into house and uses post for assistance as there are no handrails. Pt reports mod ind/ ind with amulation: rollator vs cane vs no AD. BLE ROM and MMT performed. BLE ROM WFL. RLE MMT 5/5 and LLE MMT 3+-4 /5. Pt reports PMH of stroke affecting L extremities, but feels that impairments have diminished. Pt performed sit>stand with CGA and maintained static balance. Gait assessed without device at this time. Pt ambulated 98 ft with CGA and had 1 incident of uncoordinated stepping with LOB self-corrected. Pt stated \"my brain just stopped working right there\" and continued ambulation with no additional incidents. After seated rest, pt ambulated 20ft to stairwell and descended 10 steps with v/c of appropriate hand placement, step-to uncoordinated pattern CGA. After standing rest, pt ascended 10 steps step-to pattern with R handrail and contined 118ft to room CGA requiring handheld assist with onset of fatigue. Pt performed sit to supine w/head elevated ind. HR at 78 and SpO2 at 99%. Pt educated on appropriate breathing when feeling SOA and appropriate hand placement when transfering sit<>stand. Physical therapy appropriate until d/c to increase endurance, increae strength, improve stair ascending/descending, and assess ambulation with " alternative ADs as she uses at home to assure safety and appropriate use. Recommended D/C home v home with assist from son as needed.     Time Calculation:         PT Charges       Row Name 01/18/24 1108             Time Calculation    Start Time 0909  -CHARLES (r) JT (t) CHARLES (c)      Stop Time 1004  0909- 0921 chart review; 0927-1010 in room  -CHARLES (r) JT (t) CHARLES (c)      Time Calculation (min) 55 min  -CHARLES (r) JT (t)      PT Received On 01/18/24  -CHARLES (r) JT (t) CHARLES (c)      PT Goal Re-Cert Due Date 01/28/24  -CHARLES (r) JT (t) CHARLES (c)                User Key  (r) = Recorded By, (t) = Taken By, (c) = Cosigned By      Initials Name Provider Type    Dioni Glass, PT DPT Physical Therapist    Oksana Lynn, PT Student PT Student                      PT G-Codes  Outcome Measure Options: (P) AM-PAC 6 Clicks Daily Activity (OT)  AM-PAC 6 Clicks Score (PT): 20  AM-PAC 6 Clicks Score (OT): (P) 22       Oksana Menjivar, IRINA Student  1/18/2024

## 2024-01-18 NOTE — PLAN OF CARE
"Goal Outcome Evaluation:  Plan of Care Reviewed With: patient           Outcome Evaluation: Pt evaluated. Pt on sitting EOB upon entering, reports just washing up with nurse prior to sitting. Pt reports living alone and independent with all household mobility and local driving. Has 5 total steps to get into house and uses post for assistance as there are no handrails. Pt reports mod ind/ ind with amulation: rollator vs cane vs no AD. BLE ROM and MMT performed. BLE ROM WFL. RLE MMT 5/5 and LLE MMT 3+-4 /5. Pt reports PMH of stroke affecting L extremities, but feels that impairments have diminished. Pt performed sit>stand with CGA and maintained static balance. Gait assessed without device at this time. Pt ambulated 98 ft with CGA and had 1 incident of uncoordinated stepping with LOB self-corrected. Pt stated \"my brain just stopped working right there\" and continued ambulation with no additional incidents. After seated rest, pt ambulated 20ft to stairwell and descended 10 steps with v/c of appropriate hand placement, step-to uncoordinated pattern CGA. After standing rest, pt ascended 10 steps step-to pattern with R handrail and contined 118ft to room CGA requiring handheld assist with onset of fatigue. Pt performed sit to supine w/head elevated ind. HR at 78 and SpO2 at 99%. Pt educated on appropriate breathing when feeling SOA and appropriate hand placement when transfering sit<>stand. Physical therapy appropriate until d/c to increase endurance, increae strength, improve stair ascending/descending, and assess ambulation with alternative ADs as she uses at home to assure safety and appropriate use. Recommended D/C home v home with assist from son as needed.                               "

## 2024-01-18 NOTE — PLAN OF CARE
Goal Outcome Evaluation:                 SB/S 56-96 with BBB. SBP max in 170s early in shift, but improved after meds (and f/u with manual BP check to ensure accuracy). T max 100.1 overnight. % on RA Will continue to monitor.

## 2024-01-18 NOTE — PROGRESS NOTES
Daily Progress Note  Nedra Tolliver  MRN: [id number] LOS: 1    Admit Date: 1/17/2024 1/18/2024 09:02 CST    Subjective:      Chief Complaint:  Chief Complaint   Patient presents with    Irregular Heart Beat       Interval History:    Reviewed overnight events and nursing notes.       Review of Systems   Constitutional:  Positive for activity change and fatigue.   HENT: Negative.     Respiratory: Negative.     Cardiovascular:  Positive for chest pain.   Gastrointestinal: Negative.    Genitourinary: Negative.    Musculoskeletal: Negative.    Neurological:  Positive for dizziness.       DIET:  Diet: Cardiac Diets, Diabetic Diets; Healthy Heart (2-3 Na+); Consistent Carbohydrate; Texture: Regular Texture (IDDSI 7); Fluid Consistency: Thin (IDDSI 0)    Medications:      apixaban, 5 mg, Oral, BID  flecainide, 50 mg, Oral, Q12H  glipizide, 2.5 mg, Oral, QAM AC  levothyroxine, 50 mcg, Oral, Q AM  losartan, 100 mg, Oral, Q24H  senna-docusate sodium, 2 tablet, Oral, BID  sodium chloride, 10 mL, Intravenous, Q12H        Data:     Code Status:   Code Status and Medical Interventions:   Ordered at: 01/17/24 1410     Level Of Support Discussed With:    Patient     Code Status (Patient has no pulse and is not breathing):    CPR (Attempt to Resuscitate)     Medical Interventions (Patient has pulse or is breathing):    Full Support       Family History   Problem Relation Age of Onset    Diabetes Mother     Cancer Mother     Heart failure Mother     Diabetes Father     Colon cancer Neg Hx     Colon polyps Neg Hx      Social History     Socioeconomic History    Marital status:    Tobacco Use    Smoking status: Never    Smokeless tobacco: Never   Vaping Use    Vaping Use: Never used   Substance and Sexual Activity    Alcohol use: No    Drug use: Never    Sexual activity: Defer     Partners: Male       Labs:    Lab Results (last 72 hours)       Procedure Component Value Units Date/Time    Basic Metabolic Panel [[id number]]   (Abnormal) Collected: 01/18/24 0253    Specimen: Blood Updated: 01/18/24 0350     Glucose 105 mg/dL      BUN 22 mg/dL      Creatinine 1.07 mg/dL      Sodium 133 mmol/L      Potassium 4.2 mmol/L      Chloride 98 mmol/L      CO2 24.0 mmol/L      Calcium 9.5 mg/dL      BUN/Creatinine Ratio 20.6     Anion Gap 11.0 mmol/L      eGFR 50.1 mL/min/1.73     Narrative:      GFR Normal >60  Chronic Kidney Disease <60  Kidney Failure <15    The GFR formula is only valid for adults with stable renal function between ages 18 and 70.    CBC & Differential [[id number]]  (Abnormal) Collected: 01/18/24 0253    Specimen: Blood Updated: 01/18/24 0320    Narrative:      The following orders were created for panel order CBC & Differential.  Procedure                               Abnormality         Status                     ---------                               -----------         ------                     CBC Auto Differential[[id number]]        Abnormal            Final result                 Please view results for these tests on the individual orders.    CBC Auto Differential [[id number]]  (Abnormal) Collected: 01/18/24 0253    Specimen: Blood Updated: 01/18/24 0320     WBC 5.89 10*3/mm3      RBC 4.05 10*6/mm3      Hemoglobin 12.8 g/dL      Hematocrit 37.3 %      MCV 92.1 fL      MCH 31.6 pg      MCHC 34.3 g/dL      RDW 12.1 %      RDW-SD 41.1 fl      MPV 10.0 fL      Platelets 230 10*3/mm3      Neutrophil % 47.5 %      Lymphocyte % 33.4 %      Monocyte % 12.9 %      Eosinophil % 5.1 %      Basophil % 0.8 %      Immature Grans % 0.3 %      Neutrophils, Absolute 2.79 10*3/mm3      Lymphocytes, Absolute 1.97 10*3/mm3      Monocytes, Absolute 0.76 10*3/mm3      Eosinophils, Absolute 0.30 10*3/mm3      Basophils, Absolute 0.05 10*3/mm3      Immature Grans, Absolute 0.02 10*3/mm3      nRBC 0.0 /100 WBC     T3, Free [[id number]]  (Normal) Collected: 01/17/24 0923    Specimen: Blood Updated: 01/17/24 1931     T3, Free 2.18 pg/mL      Narrative:      Results may be falsely increased if patient taking Biotin.      Basic Metabolic Panel [[id number]]  (Abnormal) Collected: 01/17/24 1702    Specimen: Blood Updated: 01/17/24 1732     Glucose 116 mg/dL      BUN 18 mg/dL      Creatinine 0.91 mg/dL      Sodium 134 mmol/L      Potassium 4.6 mmol/L      Comment: Slight hemolysis detected by analyzer. Result may be falsely elevated.        Chloride 97 mmol/L      CO2 26.0 mmol/L      Calcium 10.1 mg/dL      BUN/Creatinine Ratio 19.8     Anion Gap 11.0 mmol/L      eGFR 60.8 mL/min/1.73     Narrative:      GFR Normal >60  Chronic Kidney Disease <60  Kidney Failure <15    The GFR formula is only valid for adults with stable renal function between ages 18 and 70.    CBC & Differential [[id number]]  (Abnormal) Collected: 01/17/24 1702    Specimen: Blood Updated: 01/17/24 1709    Narrative:      The following orders were created for panel order CBC & Differential.  Procedure                               Abnormality         Status                     ---------                               -----------         ------                     CBC Auto Differential[[id number]]        Abnormal            Final result                 Please view results for these tests on the individual orders.    CBC Auto Differential [[id number]]  (Abnormal) Collected: 01/17/24 1702    Specimen: Blood Updated: 01/17/24 1709     WBC 6.42 10*3/mm3      RBC 4.30 10*6/mm3      Hemoglobin 13.6 g/dL      Hematocrit 39.9 %      MCV 92.8 fL      MCH 31.6 pg      MCHC 34.1 g/dL      RDW 12.2 %      RDW-SD 41.7 fl      MPV 9.7 fL      Platelets 238 10*3/mm3      Neutrophil % 59.7 %      Lymphocyte % 27.3 %      Monocyte % 9.7 %      Eosinophil % 2.6 %      Basophil % 0.5 %      Immature Grans % 0.2 %      Neutrophils, Absolute 3.84 10*3/mm3      Lymphocytes, Absolute 1.75 10*3/mm3      Monocytes, Absolute 0.62 10*3/mm3      Eosinophils, Absolute 0.17 10*3/mm3      Basophils, Absolute 0.03 10*3/mm3       Immature Grans, Absolute 0.01 10*3/mm3      nRBC 0.0 /100 WBC     COVID-19, FLU A/B, RSV PCR 1 HR TAT - Swab, Nasopharynx [[id number]]  (Normal) Collected: 01/17/24 1437    Specimen: Swab from Nasopharynx Updated: 01/17/24 1532     COVID19 Not Detected     Influenza A PCR Not Detected     Influenza B PCR Not Detected     RSV, PCR Not Detected    Narrative:      Fact sheet for providers: https://www.fda.gov/media/651332/download    Fact sheet for patients: https://www.fda.gov/media/429422/download    Test performed by PCR.    [place] Draw [[id number]] Collected: 01/17/24 0923    Specimen: Blood Updated: 01/17/24 1331    Narrative:      The following orders were created for panel order [place] Draw.  Procedure                               Abnormality         Status                     ---------                               -----------         ------                     Green Top (Gel)[[id number]]                                  Final result               Lavender Top[[id number]]                                     Final result               Chong Top[[id number]]                                         Final result               Light Blue Top[[id number]]                                   Final result                 Please view results for these tests on the individual orders.    Gray Top [[id number]] Collected: 01/17/24 0923    Specimen: Blood Updated: 01/17/24 1331     Extra Tube Hold for add-ons.     Comment: Auto resulted.       High Sensitivity Troponin T 2Hr [[id number]]  (Abnormal) Collected: 01/17/24 1138    Specimen: Blood Updated: 01/17/24 1237     HS Troponin T 27 ng/L      Troponin T Delta 11 ng/L     Narrative:      High Sensitive Troponin T Reference Range:  <14.0 ng/L- Negative Female for AMI  <22.0 ng/L- Negative Male for AMI  >=14 - Abnormal Female indicating possible myocardial injury.  >=22 - Abnormal Male indicating possible myocardial injury.   Clinicians would have to utilize clinical acumen, EKG,  Troponin, and serial changes to determine if it is an Acute Myocardial Infarction or myocardial injury due to an underlying chronic condition.         [place] Urine - Urine, Clean Catch [[id number]] Collected: 01/17/24 0943    Specimen: Urine, Clean Catch Updated: 01/17/24 1046     Extra Tube Hold for add-ons.     Comment: Auto resulted.       Green Top (Gel) [[id number]] Collected: 01/17/24 0923    Specimen: Blood Updated: 01/17/24 1031     Extra Tube Hold for add-ons.     Comment: Auto resulted.       Lavender Top [[id number]] Collected: 01/17/24 0923    Specimen: Blood Updated: 01/17/24 1031     Extra Tube hold for add-on     Comment: Auto resulted       Light Blue Top [[id number]] Collected: 01/17/24 0923    Specimen: Blood Updated: 01/17/24 1031     Extra Tube Hold for add-ons.     Comment: Auto resulted       TSH [[id number]]  (Normal) Collected: 01/17/24 0923    Specimen: Blood Updated: 01/17/24 1029     TSH 1.280 uIU/mL     T4, Free [[id number]]  (Normal) Collected: 01/17/24 0923    Specimen: Blood Updated: 01/17/24 1029     Free T4 1.02 ng/dL     Narrative:      Results may be falsely increased if patient taking Biotin.      Comprehensive Metabolic Panel [[id number]]  (Abnormal) Collected: 01/17/24 0923    Specimen: Blood Updated: 01/17/24 1025     Glucose 156 mg/dL      BUN 22 mg/dL      Creatinine 1.00 mg/dL      Sodium 133 mmol/L      Potassium 4.7 mmol/L      Comment: Slight hemolysis detected by analyzer. Result may be falsely elevated.        Chloride 98 mmol/L      CO2 24.0 mmol/L      Calcium 9.5 mg/dL      Total Protein 7.1 g/dL      Albumin 4.2 g/dL      ALT (SGPT) 10 U/L      AST (SGOT) 13 U/L      Alkaline Phosphatase 77 U/L      Total Bilirubin 0.5 mg/dL      Globulin 2.9 gm/dL      A/G Ratio 1.4 g/dL      BUN/Creatinine Ratio 22.0     Anion Gap 11.0 mmol/L      eGFR 54.3 mL/min/1.73     Narrative:      GFR Normal >60  Chronic Kidney Disease <60  Kidney Failure <15    The GFR formula is only valid for  adults with stable renal function between ages 18 and 70.    BNP [[id number]]  (Normal) Collected: 01/17/24 0923    Specimen: Blood Updated: 01/17/24 1022     proBNP 811.2 pg/mL     Narrative:      This assay is used as an aid in the diagnosis of individuals suspected of having heart failure. It can be used as an aid in the diagnosis of acute decompensated heart failure (ADHF) in patients presenting with signs and symptoms of ADHF to the emergency department (ED). In addition, NT-proBNP of <300 pg/mL indicates ADHF is not likely.    Age Range Result Interpretation  NT-proBNP Concentration (pg/mL:      <50             Positive            >450                   Gray                 300-450                    Negative             <300    50-75           Positive            >900                  Gray                300-900                  Negative            <300      >75             Positive            >1800                  Gray                300-1800                  Negative            <300    aPTT [[id number]]  (Normal) Collected: 01/17/24 0923    Specimen: Blood Updated: 01/17/24 1011     PTT 29.0 seconds     Protime-INR [[id number]]  (Normal) Collected: 01/17/24 0923    Specimen: Blood Updated: 01/17/24 1011     Protime 13.8 Seconds      INR 1.05    CBC & Differential [[id number]]  (Abnormal) Collected: 01/17/24 0923    Specimen: Blood Updated: 01/17/24 1005    Narrative:      The following orders were created for panel order CBC & Differential.  Procedure                               Abnormality         Status                     ---------                               -----------         ------                     CBC Auto Differential[[id number]]        Abnormal            Final result                 Please view results for these tests on the individual orders.    CBC Auto Differential [[id number]]  (Abnormal) Collected: 01/17/24 0923    Specimen: Blood Updated: 01/17/24 1005     WBC 6.28 10*3/mm3      RBC 4.18  "10*6/mm3      Hemoglobin 13.2 g/dL      Hematocrit 38.7 %      MCV 92.6 fL      MCH 31.6 pg      MCHC 34.1 g/dL      RDW 12.1 %      RDW-SD 42.0 fl      MPV 10.2 fL      Platelets 238 10*3/mm3      Neutrophil % 62.1 %      Lymphocyte % 23.6 %      Monocyte % 10.5 %      Eosinophil % 2.9 %      Basophil % 0.6 %      Immature Grans % 0.3 %      Neutrophils, Absolute 3.90 10*3/mm3      Lymphocytes, Absolute 1.48 10*3/mm3      Monocytes, Absolute 0.66 10*3/mm3      Eosinophils, Absolute 0.18 10*3/mm3      Basophils, Absolute 0.04 10*3/mm3      Immature Grans, Absolute 0.02 10*3/mm3      nRBC 0.0 /100 WBC     High Sensitivity Troponin T [[id number]]  (Abnormal) Collected: 24    Specimen: Blood Updated: 24     HS Troponin T 16 ng/L     Narrative:      High Sensitive Troponin T Reference Range:  <14.0 ng/L- Negative Female for AMI  <22.0 ng/L- Negative Male for AMI  >=14 - Abnormal Female indicating possible myocardial injury.  >=22 - Abnormal Male indicating possible myocardial injury.   Clinicians would have to utilize clinical acumen, EKG, Troponin, and serial changes to determine if it is an Acute Myocardial Infarction or myocardial injury due to an underlying chronic condition.                   Objective:     Vitals: /66 (BP Location: Left arm, Patient Position: Lying)   Pulse 63   Temp 97.5 °F (36.4 °C) (Oral)   Resp 20   Ht 162.6 cm (64\")   Wt 65.5 kg (144 lb 6.4 oz)   LMP  (LMP Unknown)   SpO2 95%   BMI 24.79 kg/m²    Intake/Output Summary (Last 24 hours) at 2024 0902  Last data filed at 2024 0600  Gross per 24 hour   Intake 600 ml   Output 600 ml   Net 0 ml    Temp (24hrs), Av.5 °F (36.9 °C), Min:97.5 °F (36.4 °C), Max:100.1 °F (37.8 °C)      Physical Exam  Vitals and nursing note reviewed.   Constitutional:       Appearance: Normal appearance.   HENT:      Head: Normocephalic and atraumatic.   Eyes:      Pupils: Pupils are equal, round, and reactive to light. "   Cardiovascular:      Rate and Rhythm: Normal rate and regular rhythm.      Pulses: Normal pulses.   Pulmonary:      Effort: Pulmonary effort is normal.      Breath sounds: Normal breath sounds.   Abdominal:      General: Abdomen is flat. Bowel sounds are normal.      Palpations: Abdomen is soft.   Musculoskeletal:         General: Normal range of motion.   Skin:     General: Skin is warm.      Capillary Refill: Capillary refill takes less than 2 seconds.   Neurological:      General: No focal deficit present.      Mental Status: She is alert.             Assessment and Plan:     Primary Problem:  Chest pain  Accelerated hypertension/Hypertensive Urgency  Elevated Troponin  PAF  DMII    Hospital Problem list:    Chest pain      PMH:  Past Medical History:   Diagnosis Date    Arthritis     Cancer     BCC @ nose & Left arm    Diabetes type 2, controlled     Diverticulitis     History of GI bleed     History of transfusion     Has had x 5 units.    Hypertension     STAGE 3     Hyponatremia     Required hospitalization    Hypothyroidism     Knee arthropathy 09/30/2020    LPRD (laryngopharyngeal reflux disease)     Pharyngeal dysphagia     Thyroid nodule        Treatment Plan:  Add cardizem CD  Follow bp   UOOB with PT/OT today      Disposition: home tomorrow if bp remains stable on new meds and she tolerates it.     Reviewed treatment plans with the patient and/or family.   20 minutes spent in face to face interaction and coordination of care.     Electronically signed by Hank Rascon MD on 1/18/2024 at 09:02 CST

## 2024-01-19 ENCOUNTER — READMISSION MANAGEMENT (OUTPATIENT)
Dept: CALL CENTER | Facility: HOSPITAL | Age: 89
End: 2024-01-19
Payer: MEDICARE

## 2024-01-19 VITALS
HEART RATE: 55 BPM | WEIGHT: 143.31 LBS | TEMPERATURE: 98.5 F | SYSTOLIC BLOOD PRESSURE: 141 MMHG | BODY MASS INDEX: 24.46 KG/M2 | DIASTOLIC BLOOD PRESSURE: 63 MMHG | RESPIRATION RATE: 16 BRPM | OXYGEN SATURATION: 97 % | HEIGHT: 64 IN

## 2024-01-19 LAB
ANION GAP SERPL CALCULATED.3IONS-SCNC: 10 MMOL/L (ref 5–15)
BASOPHILS # BLD AUTO: 0.06 10*3/MM3 (ref 0–0.2)
BASOPHILS NFR BLD AUTO: 1 % (ref 0–1.5)
BUN SERPL-MCNC: 31 MG/DL (ref 8–23)
BUN/CREAT SERPL: 21.8 (ref 7–25)
CALCIUM SPEC-SCNC: 9.5 MG/DL (ref 8.6–10.5)
CHLORIDE SERPL-SCNC: 98 MMOL/L (ref 98–107)
CO2 SERPL-SCNC: 24 MMOL/L (ref 22–29)
CREAT SERPL-MCNC: 1.42 MG/DL (ref 0.57–1)
DEPRECATED RDW RBC AUTO: 42.5 FL (ref 37–54)
EGFRCR SERPLBLD CKD-EPI 2021: 35.6 ML/MIN/1.73
EOSINOPHIL # BLD AUTO: 0.32 10*3/MM3 (ref 0–0.4)
EOSINOPHIL NFR BLD AUTO: 5.2 % (ref 0.3–6.2)
ERYTHROCYTE [DISTWIDTH] IN BLOOD BY AUTOMATED COUNT: 12.2 % (ref 12.3–15.4)
GLUCOSE BLDC GLUCOMTR-MCNC: 166 MG/DL (ref 70–130)
GLUCOSE SERPL-MCNC: 134 MG/DL (ref 65–99)
HCT VFR BLD AUTO: 36.4 % (ref 34–46.6)
HGB BLD-MCNC: 12.1 G/DL (ref 12–15.9)
IMM GRANULOCYTES # BLD AUTO: 0.01 10*3/MM3 (ref 0–0.05)
IMM GRANULOCYTES NFR BLD AUTO: 0.2 % (ref 0–0.5)
LYMPHOCYTES # BLD AUTO: 2.38 10*3/MM3 (ref 0.7–3.1)
LYMPHOCYTES NFR BLD AUTO: 39 % (ref 19.6–45.3)
MCH RBC QN AUTO: 31.2 PG (ref 26.6–33)
MCHC RBC AUTO-ENTMCNC: 33.2 G/DL (ref 31.5–35.7)
MCV RBC AUTO: 93.8 FL (ref 79–97)
MONOCYTES # BLD AUTO: 0.85 10*3/MM3 (ref 0.1–0.9)
MONOCYTES NFR BLD AUTO: 13.9 % (ref 5–12)
NEUTROPHILS NFR BLD AUTO: 2.49 10*3/MM3 (ref 1.7–7)
NEUTROPHILS NFR BLD AUTO: 40.7 % (ref 42.7–76)
NRBC BLD AUTO-RTO: 0 /100 WBC (ref 0–0.2)
PLATELET # BLD AUTO: 237 10*3/MM3 (ref 140–450)
PMV BLD AUTO: 10.4 FL (ref 6–12)
POTASSIUM SERPL-SCNC: 4.5 MMOL/L (ref 3.5–5.2)
RBC # BLD AUTO: 3.88 10*6/MM3 (ref 3.77–5.28)
SODIUM SERPL-SCNC: 132 MMOL/L (ref 136–145)
WBC NRBC COR # BLD AUTO: 6.11 10*3/MM3 (ref 3.4–10.8)

## 2024-01-19 PROCEDURE — 97116 GAIT TRAINING THERAPY: CPT

## 2024-01-19 PROCEDURE — 97535 SELF CARE MNGMENT TRAINING: CPT

## 2024-01-19 PROCEDURE — 80048 BASIC METABOLIC PNL TOTAL CA: CPT | Performed by: FAMILY MEDICINE

## 2024-01-19 PROCEDURE — 85025 COMPLETE CBC W/AUTO DIFF WBC: CPT | Performed by: FAMILY MEDICINE

## 2024-01-19 PROCEDURE — 82948 REAGENT STRIP/BLOOD GLUCOSE: CPT

## 2024-01-19 PROCEDURE — 97110 THERAPEUTIC EXERCISES: CPT

## 2024-01-19 RX ORDER — LOSARTAN POTASSIUM 100 MG/1
100 TABLET ORAL
Qty: 30 TABLET | Refills: 0 | Status: SHIPPED | OUTPATIENT
Start: 2024-01-20 | End: 2024-01-25 | Stop reason: HOSPADM

## 2024-01-19 RX ADMIN — GLIPIZIDE 2.5 MG: 5 TABLET ORAL at 08:11

## 2024-01-19 RX ADMIN — LOSARTAN POTASSIUM 100 MG: 50 TABLET, FILM COATED ORAL at 08:11

## 2024-01-19 RX ADMIN — APIXABAN 5 MG: 5 TABLET, FILM COATED ORAL at 08:11

## 2024-01-19 RX ADMIN — Medication 10 ML: at 08:11

## 2024-01-19 RX ADMIN — LEVOTHYROXINE SODIUM 50 MCG: 50 TABLET ORAL at 06:59

## 2024-01-19 RX ADMIN — DILTIAZEM HYDROCHLORIDE 120 MG: 120 CAPSULE, COATED, EXTENDED RELEASE ORAL at 08:10

## 2024-01-19 RX ADMIN — FLECAINIDE ACETATE 50 MG: 50 TABLET ORAL at 08:11

## 2024-01-19 NOTE — PLAN OF CARE
"Goal Outcome Evaluation:  Plan of Care Reviewed With: patient        Progress: improving  Outcome Evaluation: Pt reports feeling \"funny\" this morning, stated had some problems w/ her BP last night. Pt sitting up in chair. sit-stand cga. Pt did c/o some dizziness upon standing but was able to amb 50'x4 HH cues for safety. Pt tolerated AROM BLEs. Feel pt would benfit from  upon d/c to cont strength, moiblity and safety                               "

## 2024-01-19 NOTE — CASE MANAGEMENT/SOCIAL WORK
Continued Stay Note   [place]     Patient Name: Nedra Tolliver  MRN: [id number]  Today's Date: 1/19/2024    Admit Date: 1/17/2024    Plan: Home   Discharge Plan       Row Name 01/19/24 1159       Plan    Plan Home    Final Discharge Disposition Code 01 - home or self-care    Final Note Patient is being discharged home today with no dc planning needs/orders.               Expected Discharge Date and Time       Expected Discharge Date Expected Discharge Time    Jan 19, 2024         KONG Sanchez

## 2024-01-19 NOTE — THERAPY TREATMENT NOTE
Acute Care - Physical Therapy Treatment Note  [facility]     Patient Name: Nedra Tolliver  : 1935  MRN: [id number]  Today's Date: 2024      Visit Dx:     ICD-10-CM ICD-9-CM   1. Chest pain, unspecified type  R07.9 786.50   2. Hypertension, unspecified type  I10 401.9   3. NSTEMI (non-ST elevation myocardial infarction)  I21.4 410.70   4. Impaired mobility [Z74.09]  Z74.09 799.89     Patient Active Problem List   Diagnosis    Urinary tract infection without hematuria    Dysuria    Vaginal atrophy    Overactive bladder    Diverticulitis    Benign essential HTN    Thyroid nodule    Hypothyroidism    Hyponatremia    Syncope and collapse    Volume depletion    Type 2 diabetes mellitus, without long-term current use of insulin    Moderate left ankle sprain    Left hip pain    Acute pain of left knee    Pain of left thumb    Nondisplaced fracture of navicular bone of left foot with routine healing    Chest pain at rest    Acute cystitis without hematuria    H/O thyroid nodule    Hypoglycemia    IBS (irritable bowel syndrome)    Idiopathic scoliosis    Insomnia    Mild tricuspid insufficiency    Neuropathy due to type 2 diabetes mellitus    Onychomycosis    Osteopenia    Pericarditis secondary to uremia    Primary osteoarthritis of right knee    Proteinuria    Vitamin D deficiency    High cholesterol    Lumbar radiculopathy    Non-toxic multinodular goiter    Transient ischemic attack    Acute gastritis without hemorrhage    UTI symptoms    Absolute anemia    Essential hypertension    Mixed hyperlipidemia with pervious statin intolerance    Stage 3a chronic kidney disease (CKD)    Cervical radiculopathy    LBBB (left bundle branch block)    Persistent atrial fibrillation    Fatigue    Shortness of breath    Accelerated hypertension with diastolic congestive heart failure, NYHA class 3    Paroxysmal A-fib    Chest pain     Past Medical History:   Diagnosis Date    Arthritis     Cancer     BCC @ nose & Left  "arm    Diabetes type 2, controlled     Diverticulitis     History of GI bleed     History of transfusion     Has had x 5 units.    Hypertension     STAGE 3     Hyponatremia     Required hospitalization    Hypothyroidism     Knee arthropathy 09/30/2020    LPRD (laryngopharyngeal reflux disease)     Pharyngeal dysphagia     Thyroid nodule      Past Surgical History:   Procedure Laterality Date    APPENDECTOMY      BASAL CELL CARCINOMA EXCISION      BELPHAROPTOSIS REPAIR      BREAST CYST EXCISION      CARDIAC CATHETERIZATION      CARDIAC CATHETERIZATION Right 6/24/2021    Procedure: Left Heart Cath R radial, US guided w poss intervention;  Surgeon: Mak Nickerson DO;  Location:  [facility];  Service: Cardiovascular;  Laterality: Right;    CARDIAC CATHETERIZATION      CATARACT EXTRACTION      CATARACT EXTRACTION WITH INTRAOCULAR LENS IMPLANT Bilateral     CHOLECYSTECTOMY      COLONOSCOPY Left 11/1/2016    Tubular adenoma cecum, Diverticulosis repeat prn    SUBTOTAL HYSTERECTOMY      TOTAL KNEE ARTHROPLASTY Right 9/30/2020    Procedure: TOTAL KNEE ARTHROPLASTY;  Surgeon: Mak Pickens MD;  Location: [facility] OR;  Service: Orthopedics;  Laterality: Right;    TUMOR EXCISION      under armpits and left breast     PT Assessment (last 12 hours)       PT Evaluation and Treatment       Row Name 01/19/24 1012          Physical Therapy Time and Intention    Subjective Information complains of;weakness  -NW     Document Type therapy note (daily note)  -NW     Mode of Treatment physical therapy  -NW     Comment pt reports having BP problems last night and feels \"funny\" today.  -NW       Row Name 01/19/24 1012          General Information    Existing Precautions/Restrictions fall  -NW       Row Name 01/19/24 1012          Pain    Pretreatment Pain Rating 0/10 - no pain  -NW       Row Name 01/19/24 1012          Bed Mobility    Comment, (Bed Mobility) chair  -NW       Row Name 01/19/24 1012          " Sit-Stand Transfer    Sit-Stand [place] (Transfers) verbal cues;contact guard  -NW       Row Name 01/19/24 1012          Stand-Sit Transfer    Stand-Sit [place] (Transfers) verbal cues;standby assist  -NW       Row Name 01/19/24 1012          Gait/Stairs (Locomotion)    [place] Level (Gait) verbal cues;contact guard  -NW     Assistive Device (Gait) --  HH  -NW     Distance in Feet (Gait) 50x4  -NW     Deviations/Abnormal Patterns (Gait) trang decreased;stride length decreased  -NW     Comment, (Gait/Stairs) cues for safety, reviewed POC  -NW       Row Name 01/19/24 1012          Safety Issues, Functional Mobility    Impairments Affecting Function (Mobility) balance;strength;endurance/activity tolerance  -NW       Row Name 01/19/24 1012          Motor Skills    Therapeutic Exercise aerobic  -NW       Row Name 01/19/24 1012          Aerobic Exercise    Time Performed (Aerobic Exercise) AROM BLEs x15  -NW       Row Name 01/19/24 1012          Positioning and Restraints    Pre-Treatment Position sitting in chair/recliner  -NW     Post Treatment Position chair  -NW     In Chair sitting;call light within reach;encouraged to call for assist  -NW               User Key  (r) = Recorded By, (t) = Taken By, (c) = Cosigned By      Initials Name Provider Type    Lindy Robbins, PTA Physical Therapist Assistant                    Physical Therapy Education       Title: PT OT SLP Therapies (In Progress)       Topic: Physical Therapy (In Progress)       Point: Mobility training (Done)       Learning Progress Summary             Patient Acceptance, E, VU by ELEAZAR at 1/18/2024 0909    Comment: Pt educated on appropriate hand placement during sit<>stand transfers, approriate hand placement during stair climbing. Pt educated on approptiate breathing technique when feeling short of air.                         Point: Home exercise program (Not Started)       Learner Progress:  Not documented in this visit.          "     Point: Body mechanics (Not Started)       Learner Progress:  Not documented in this visit.              Point: Precautions (Done)       Learning Progress Summary             Patient Acceptance, HERBER VU by JROEL at 1/18/2024 0909    Comment: Pt educated on appropriate hand placement during sit<>stand transfers, approriate hand placement during stair climbing. Pt educated on approptiate breathing technique when feeling short of air.                                         User Key       Initials Effective Dates Name Provider Type Discipline    JT 11/30/23 -  Oksana Menjivar, PT Student PT Student PT                  PT Recommendation and Plan     Plan of Care Reviewed With: patient  Progress: improving  Outcome Evaluation: Pt reports feeling \"funny\" this morning, stated had some problems w/ her BP last night. Pt sitting up in chair. sit-stand cga. Pt did c/o some dizziness upon standing but was able to amb 50'x4 HH cues for safety. Pt tolerated AROM BLEs. Feel pt would benfit from  upon d/c to cont strength, moiblity and safety       Time Calculation:    PT Charges       Row Name 01/19/24 1058             Time Calculation    Start Time 1012  -NW      Stop Time 1042  -NW      Time Calculation (min) 30 min  -NW      PT Received On 01/19/24  -NW      PT Goal Re-Cert Due Date 01/28/24  -NW         Time Calculation- PT    Total Timed Code Minutes- PT 30 minute(s)  -NW         Timed Charges    44576 - PT Therapeutic Exercise Minutes 15  -NW      96601 - Gait Training Minutes  15  -NW         Total Minutes    Timed Charges Total Minutes 30  -NW       Total Minutes 30  -NW                User Key  (r) = Recorded By, (t) = Taken By, (c) = Cosigned By      Initials Name Provider Type    NW Lindy Perez PTA Physical Therapist Assistant                  Therapy Charges for Today       Code Description Service Date Service Provider Modifiers Qty    12016901745 HC PT THER PROC EA 15 MIN 1/19/2024 Lindy Perez PTA GP 1    " [id number]  GAIT TRAINING EA 15 MIN 1/19/2024 Lindy Perez, PTA GP 1            PT G-Codes  Outcome Measure Options: AM-PAC 6 Clicks Daily Activity (OT)  AM-PAC 6 Clicks Score (PT): 20  AM-PAC 6 Clicks Score (OT): 22    Lindy Perez PTA  1/19/2024

## 2024-01-19 NOTE — PLAN OF CARE
Goal Outcome Evaluation:  Plan of Care Reviewed With: patient        Progress: no change  Outcome Evaluation: Patients BP has been trending back up since day shift yesterday.  BP max 188/96.  MD added PRN clonidine with parameters - given x1.  Still remains 170s/60s, but HR has run lower remainder of shift (SB-SR 47-66).  Also of note, creatinine trending up - 1.42, up from 0.91 on admission. GFR 35.6, down from 60.8.  Slightly hyponatremic at 132, which patient says she has hx of.  Labs otherwise unremarkable.  Will continue to monitor

## 2024-01-19 NOTE — PLAN OF CARE
Goal Outcome Evaluation:  Plan of Care Reviewed With: patient        Progress: improving  Outcome Evaluation: OT tx completed on this date. Pt in bathroom sitting on shower chair completing grooming tasks of washing face/hands and oral regimen requiring SBA. Pt completed sit to stands from multiple level surfaces in bathroom and room requiring SBS. Pt completed toilet transfer requiring SBA. Pt completed UB dressing tasks of donning/doffing house coat and LB dressing tasks of donning shoes requiring SBA for all aspects. Pt reports feeling light headed during activities, however, no LOB. BP meds being addressed by MD. Pt being discharged today per MD.      Anticipated Discharge Disposition (OT): home with assist

## 2024-01-19 NOTE — THERAPY DISCHARGE NOTE
Acute Care - Occupational Therapy Treatment Note/Discharge  [facility]     Patient Name: Nedra Tolliver  : 1935  MRN: [id number]  Today's Date: 2024               Admit Date: 2024       ICD-10-CM ICD-9-CM   1. Chest pain, unspecified type  R07.9 786.50   2. Hypertension, unspecified type  I10 401.9   3. NSTEMI (non-ST elevation myocardial infarction)  I21.4 410.70   4. Impaired mobility [Z74.09]  Z74.09 799.89   5. Decreased activities of daily living (ADL)  Z78.9 V49.89     Patient Active Problem List   Diagnosis    Urinary tract infection without hematuria    Dysuria    Vaginal atrophy    Overactive bladder    Diverticulitis    Benign essential HTN    Thyroid nodule    Hypothyroidism    Hyponatremia    Syncope and collapse    Volume depletion    Type 2 diabetes mellitus, without long-term current use of insulin    Moderate left ankle sprain    Left hip pain    Acute pain of left knee    Pain of left thumb    Nondisplaced fracture of navicular bone of left foot with routine healing    Chest pain at rest    Acute cystitis without hematuria    H/O thyroid nodule    Hypoglycemia    IBS (irritable bowel syndrome)    Idiopathic scoliosis    Insomnia    Mild tricuspid insufficiency    Neuropathy due to type 2 diabetes mellitus    Onychomycosis    Osteopenia    Pericarditis secondary to uremia    Primary osteoarthritis of right knee    Proteinuria    Vitamin D deficiency    High cholesterol    Lumbar radiculopathy    Non-toxic multinodular goiter    Transient ischemic attack    Acute gastritis without hemorrhage    UTI symptoms    Absolute anemia    Essential hypertension    Mixed hyperlipidemia with pervious statin intolerance    Stage 3a chronic kidney disease (CKD)    Cervical radiculopathy    LBBB (left bundle branch block)    Persistent atrial fibrillation    Fatigue    Shortness of breath    Accelerated hypertension with diastolic congestive heart failure, NYHA class 3    Paroxysmal A-fib     Chest pain     Past Medical History:   Diagnosis Date    Arthritis     Cancer     BCC @ nose & Left arm    Diabetes type 2, controlled     Diverticulitis     History of GI bleed     History of transfusion     Has had x 5 units.    Hypertension     STAGE 3     Hyponatremia     Required hospitalization    Hypothyroidism     Knee arthropathy 09/30/2020    LPRD (laryngopharyngeal reflux disease)     Pharyngeal dysphagia     Thyroid nodule      Past Surgical History:   Procedure Laterality Date    APPENDECTOMY      BASAL CELL CARCINOMA EXCISION      BELPHAROPTOSIS REPAIR      BREAST CYST EXCISION      CARDIAC CATHETERIZATION      CARDIAC CATHETERIZATION Right 6/24/2021    Procedure: Left Heart Cath R radial, US guided w poss intervention;  Surgeon: Mak Nickerson DO;  Location:  [facility];  Service: Cardiovascular;  Laterality: Right;    CARDIAC CATHETERIZATION      CATARACT EXTRACTION      CATARACT EXTRACTION WITH INTRAOCULAR LENS IMPLANT Bilateral     CHOLECYSTECTOMY      COLONOSCOPY Left 11/1/2016    Tubular adenoma cecum, Diverticulosis repeat prn    SUBTOTAL HYSTERECTOMY      TOTAL KNEE ARTHROPLASTY Right 9/30/2020    Procedure: TOTAL KNEE ARTHROPLASTY;  Surgeon: Mak Pickens MD;  Location: [facility] OR;  Service: Orthopedics;  Laterality: Right;    TUMOR EXCISION      under armpits and left breast       OT ASSESSMENT FLOWSHEET (last 12 hours)       OT Evaluation and Treatment       Row Name 01/19/24 1122                   OT Time and Intention    Subjective Information complains of;weakness  -LS        Document Type therapy note (daily note)  -LS        Mode of Treatment occupational therapy  -LS        Patient Effort good  -LS           General Information    Patient Profile Reviewed yes  -LS        Existing Precautions/Restrictions fall  -LS        Barriers to Rehab medically complex  -LS           Cognition    Orientation Status (Cognition) oriented x 4  -LS           Plan of  Care Review    Plan of Care Reviewed With patient  -LS        Progress improving  -LS        Outcome Evaluation OT tx completed on this date. Pt in bathroom sitting on shower chair completing grooming tasks of washing face/hands and oral regimen requiring SBA. Pt completed sit to stands from multiple level surfaces in bathroom and room requiring SBS. Pt completed toilet transfer requiring SBA. Pt completed UB dressing tasks of donning/doffing house coat and LB dressing tasks of donning shoes requiring SBA for all aspects. Pt reports feeling light headed during activities, however, no LOB. BP meds being addressed by MD. Pt being discharged today per MD.  -LS           Therapy Plan Review/Discharge Plan (OT)    Anticipated Discharge Disposition (OT) home with assist  -                  User Key  (r) = Recorded By, (t) = Taken By, (c) = Cosigned By      Initials Name Effective Dates    Karolina Raya COTA 09/22/22 -                     Occupational Therapy Education       Title: PT OT SLP Therapies (In Progress)       Topic: Occupational Therapy (In Progress)       Point: ADL training (Done)       Description:   Instruct learner(s) on proper safety adaptation and remediation techniques during self care or transfers.   Instruct in proper use of assistive devices.                  Learning Progress Summary             Patient Acceptance, D,TB, VU by  at 1/19/2024 1154    Comment: Pt educated on reaching back for safety with transfers.                         Point: Home exercise program (Not Started)       Description:   Instruct learner(s) on appropriate technique for monitoring, assisting and/or progressing therapeutic exercises/activities.                  Learner Progress:  Not documented in this visit.              Point: Precautions (Done)       Description:   Instruct learner(s) on prescribed precautions during self-care and functional transfers.                  Learning Progress Summary             Patient  Acceptance, E, VU by AW at 1/18/2024 0927                         Point: Body mechanics (Done)       Description:   Instruct learner(s) on proper positioning and spine alignment during self-care, functional mobility activities and/or exercises.                  Learning Progress Summary             Patient Acceptance, E, VU by AW at 1/18/2024 0927                                         User Key       Initials Effective Dates Name Provider Type Discipline     09/22/22 -  Karolina Tobin COTA Occupational Therapist Assistant THERAPIES     12/14/23 -  Renetta Daniel OT Student OT Student OT                    OT Recommendation and Plan     Plan of Care Review  Plan of Care Reviewed With: patient  Progress: improving  Outcome Evaluation: OT tx completed on this date. Pt in bathroom sitting on shower chair completing grooming tasks of washing face/hands and oral regimen requiring SBA. Pt completed sit to stands from multiple level surfaces in bathroom and room requiring SBS. Pt completed toilet transfer requiring SBA. Pt completed UB dressing tasks of donning/doffing house coat and LB dressing tasks of donning shoes requiring SBA for all aspects. Pt reports feeling light headed during activities, however, no LOB. BP meds being addressed by MD. Pt being discharged today per MD.  Plan of Care Reviewed With: patient  Outcome Evaluation: OT tx completed on this date. Pt in bathroom sitting on shower chair completing grooming tasks of washing face/hands and oral regimen requiring SBA. Pt completed sit to stands from multiple level surfaces in bathroom and room requiring SBS. Pt completed toilet transfer requiring SBA. Pt completed UB dressing tasks of donning/doffing house coat and LB dressing tasks of donning shoes requiring SBA for all aspects. Pt reports feeling light headed during activities, however, no LOB. BP meds being addressed by MD. Pt being discharged today per MD.          Outcome Measures       Row Name  01/19/24 1100             How much help from another is currently needed...    Putting on and taking off regular lower body clothing? 4  -LS      Bathing (including washing, rinsing, and drying) 4  -LS      Toileting (which includes using toilet bed pan or urinal) 4  -LS      Putting on and taking off regular upper body clothing 4  -LS      Taking care of personal grooming (such as brushing teeth) 4  -LS      Eating meals 4  -LS      AM-PAC 6 Clicks Score (OT) 24  -LS         Functional Assessment    Outcome Measure Options AM-PAC 6 Clicks Daily Activity (OT)  -LS                User Key  (r) = Recorded By, (t) = Taken By, (c) = Cosigned By      Initials Name Provider Type    Karolina Raya COTA Occupational Therapist Assistant                    Time Calculation:    Time Calculation- OT       Row Name 01/19/24 1155 01/19/24 1058          Time Calculation- OT    OT Start Time 1122  -LS --     OT Stop Time 1155  -LS --     OT Time Calculation (min) 33 min  -LS --     Total Timed Code Minutes- OT 33 minute(s)  -LS --     OT Received On 01/19/24  - --        Timed Charges    89181 - Gait Training Minutes  -- 15  -NW        Total Minutes    Timed Charges Total Minutes -- 15  -NW      Total Minutes -- 15  -NW               User Key  (r) = Recorded By, (t) = Taken By, (c) = Cosigned By      Initials Name Provider Type     Lindy Perez, PTA Physical Therapist Assistant    Karolina Raya COTA Occupational Therapist Assistant                    Therapy Charges for Today       Code Description Service Date Service Provider Modifiers Qty    [id number]  OT SELF CARE/MGMT/TRAIN EA 15 MIN 1/19/2024 Karolina Tobin COTA GO 2                 OT Discharge Summary  Anticipated Discharge Disposition (OT): home with assist  Reason for Discharge: Discharge from facility  Outcomes Achieved: Refer to plan of care for updates on goals achieved  Discharge Destination: Home with assist    YAW Alegria  1/19/2024

## 2024-01-19 NOTE — DISCHARGE SUMMARY
Hospital Discharge Summary    Nedra Tolliver  :  1935  MRN:  [id number]    Admit date:  2024  Discharge date:  2024    Admitting Physician:    Edgar Tolbert MD    Discharge Diagnoses:      Chest pain      Hospital Course:   The patient is a 88 y.o. female who presents with a history of afib, SVT, hypertension and diabetes presented to ER via EMS with feeling weak and dizzy. Noted to be in NSR but to have accelerated hypertension at >200/100. Troponin's in ER were positive. Admitted to our service to rule out myocardial ischemia and control accelerated  hypertension/Hypertensive urgency. Meds adjusted and work up otherwise negative.        The patient was admitted for the above noted medical/surgical issues. Please see daily progress note for further details concerning their stay. The patient improved throughout their stay and reached maximum medical improvement on the day of discharge. The patient/family agree with the treatment plan as outlined above. All questions concerning their stay were answered prior to discharge. They understand the importance of follow up concerning any abnormal test results.     Physical Exam  Vitals and nursing note reviewed.   Constitutional:       Appearance: Normal appearance.   HENT:      Head: Normocephalic.   Eyes:      Pupils: Pupils are equal, round, and reactive to light.   Cardiovascular:      Rate and Rhythm: Normal rate and regular rhythm.      Pulses: Normal pulses.   Pulmonary:      Effort: Pulmonary effort is normal.   Abdominal:      General: Abdomen is flat. Bowel sounds are normal.      Palpations: Abdomen is soft.   Musculoskeletal:         General: Normal range of motion.   Skin:     General: Skin is warm.      Capillary Refill: Capillary refill takes less than 2 seconds.   Neurological:      General: No focal deficit present.      Mental Status: She is alert.           Discharge Medications:         Discharge Medications        New  Medications        Instructions Start Date   losartan 100 MG tablet  Commonly known as: COZAAR  Replaces: irbesartan 150 MG tablet   100 mg, Oral, Every 24 Hours Scheduled   Start Date: January 20, 2024            Continue These Medications        Instructions Start Date   apixaban 5 MG tablet tablet  Commonly known as: ELIQUIS   5 mg, Oral, 2 Times Daily      Co Q 10 100 MG capsule   1 tablet, Oral, Daily      flecainide 50 MG tablet  Commonly known as: TAMBOCOR   50 mg, Oral, Every 12 Hours      glimepiride 4 MG tablet  Commonly known as: AMARYL   4 mg, Oral, Every Morning Before Breakfast, Takes an extra 1/2 tablet when blood sugar is high.      levothyroxine 50 MCG tablet  Commonly known as: SYNTHROID, LEVOTHROID   50 mcg, Oral, Daily      multivitamin with minerals tablet tablet   1 tablet, Oral, Daily      Repatha SureClick solution auto-injector SureClick injection  Generic drug: Evolocumab   140 mg, Subcutaneous, Every 14 Days      Vitamin D3 125 MCG (5000 UT) tablet dispersible   5,000 tablets, Oral, Daily             Stop These Medications      cloNIDine 0.1 MG tablet  Commonly known as: CATAPRES     Garlic 2 MG capsule     irbesartan 150 MG tablet  Commonly known as: AVAPRO  Replaced by: losartan 100 MG tablet              Activity: as tolerated    Diet: healthy heart    Consults: cardiology    Significant Diagnostic Studies:    XR Chest 1 View    Result Date: 1/17/2024  No active disease is seen.    This report was signed and finalized on 1/17/2024 10:22 AM by Dr. Doyle Dixon MD.            Treatments:   as above    Disposition:   home    Time spent on discharge including discussion with patient/family, SW, and coordination of care.     Follow up with Hank Rascon MD in 1 weeks.    Signed:  Hank Rascon MD   1/19/2024, 11:55 CST

## 2024-01-19 NOTE — OUTREACH NOTE
Prep Survey      Flowsheet Row Responses   [facility] facility patient discharged from? [facility]   Is LACE score < 7 ? No   Eligibility Readm Mgmt   Discharge diagnosis Chest pain   Does the patient have one of the following disease processes/diagnoses(primary or secondary)? Other   Does the patient have Home health ordered? No   Is there a DME ordered? No   Prep survey completed? Yes            PRESTON SEVILLA - Registered Nurse

## 2024-01-20 NOTE — THERAPY DISCHARGE NOTE
Acute Care - Physical Therapy Discharge Summary  [facility]       Patient Name: Nedra Tolliver  : 1935  MRN: [id number]    Today's Date: 2024                 Admit Date: 2024      PT Recommendation and Plan    Visit Dx:    ICD-10-CM ICD-9-CM   1. Chest pain, unspecified type  R07.9 786.50   2. Hypertension, unspecified type  I10 401.9   3. NSTEMI (non-ST elevation myocardial infarction)  I21.4 410.70   4. Impaired mobility [Z74.09]  Z74.09 799.89   5. Decreased activities of daily living (ADL)  Z78.9 V49.89        Outcome Measures       Row Name 24 1100             How much help from another is currently needed...    Putting on and taking off regular lower body clothing? 4  -LS      Bathing (including washing, rinsing, and drying) 4  -LS      Toileting (which includes using toilet bed pan or urinal) 4  -LS      Putting on and taking off regular upper body clothing 4  -LS      Taking care of personal grooming (such as brushing teeth) 4  -LS      Eating meals 4  -LS      AM-PAC 6 Clicks Score (OT) 24  -LS         Functional Assessment    Outcome Measure Options AM-PAC 6 Clicks Daily Activity (OT)  -LS                User Key  (r) = Recorded By, (t) = Taken By, (c) = Cosigned By      Initials Name Provider Type    LS Karolina Tobin COTA Occupational Therapist Assistant                         PT Rehab Goals       Row Name 24 1613             Transfer Goal 1 (PT)    Activity/Assistive Device (Transfer Goal 1, PT) sit-to-stand/stand-to-sit;bed-to-chair/chair-to-bed  -LY      [place] Level/Cues Needed (Transfer Goal 1, PT) supervision required  -LY      Time Frame (Transfer Goal 1, PT) long term goal (LTG);10 days  -LY      Progress/Outcome (Transfer Goal 1, PT) goal not met  -LY         Gait Training Goal 1 (PT)    Activity/Assistive Device (Gait Training Goal 1, PT) gait (walking locomotion);assistive device use;decrease fall risk;increase endurance/gait distance  -LY       [place] Level (Gait Training Goal 1, PT) supervision required  -LY      Distance (Gait Training Goal 1, PT) 150 ft  -LY      Time Frame (Gait Training Goal 1, PT) long term goal (LTG);10 days  -LY      Progress/Outcome (Gait Training Goal 1, PT) goal not met  -LY         Stairs Goal 1 (PT)    Activity/Assistive Device (Stairs Goal 1, PT) ascending stairs;descending stairs;step-to-step;decrease fall risk  -LY      [place] Level/Cues Needed (Stairs Goal 1, PT) standby assist  -LY      Number of Stairs (Stairs Goal 1, PT) 5  -LY      Time Frame (Stairs Goal 1, PT) long term goal (LTG);10 days  -LY      Progress/Outcome (Stairs Goal 1, PT) goal not met  -LY                User Key  (r) = Recorded By, (t) = Taken By, (c) = Cosigned By      Initials Name Provider Type Discipline    Dejah Forman PTA Physical Therapist Assistant PT                        PT Discharge Summary  Anticipated Discharge Disposition (PT): home, home with assist, home with home health  Reason for Discharge: Discharge from facility  Outcomes Achieved: Refer to plan of care for updates on goals achieved  Discharge Destination: Home      Dejah Amin PTA   1/20/2024

## 2024-01-22 ENCOUNTER — HOSPITAL ENCOUNTER (INPATIENT)
Facility: HOSPITAL | Age: 89
LOS: 1 days | Discharge: HOME | DRG: 309 | End: 2024-01-25
Attending: FAMILY MEDICINE | Admitting: FAMILY MEDICINE
Payer: MEDICARE

## 2024-01-22 DIAGNOSIS — Z74.09 IMPAIRED MOBILITY: Primary | ICD-10-CM

## 2024-01-22 DIAGNOSIS — R00.2 PALPITATIONS: ICD-10-CM

## 2024-01-22 PROBLEM — I48.91 ATRIAL FIBRILLATION, RAPID: Status: ACTIVE | Noted: 2024-01-22

## 2024-01-22 LAB
ALBUMIN SERPL-MCNC: 4.1 G/DL (ref 3.5–5.2)
ALBUMIN/GLOB SERPL: 1.6 G/DL
ALP SERPL-CCNC: 75 U/L (ref 39–117)
ALT SERPL W P-5'-P-CCNC: 12 U/L (ref 1–33)
ANION GAP SERPL CALCULATED.3IONS-SCNC: 13 MMOL/L (ref 5–15)
AST SERPL-CCNC: 16 U/L (ref 1–32)
BACTERIA UR QL AUTO: ABNORMAL /HPF
BASOPHILS # BLD AUTO: 0.04 10*3/MM3 (ref 0–0.2)
BASOPHILS # BLD AUTO: 0.05 10*3/MM3 (ref 0–0.2)
BASOPHILS NFR BLD AUTO: 0.6 % (ref 0–1.5)
BASOPHILS NFR BLD AUTO: 0.8 % (ref 0–1.5)
BILIRUB SERPL-MCNC: 0.3 MG/DL (ref 0–1.2)
BILIRUB UR QL STRIP: NEGATIVE
BUN SERPL-MCNC: 24 MG/DL (ref 8–23)
BUN/CREAT SERPL: 22 (ref 7–25)
CALCIUM SPEC-SCNC: 10 MG/DL (ref 8.6–10.5)
CHLORIDE SERPL-SCNC: 100 MMOL/L (ref 98–107)
CLARITY UR: CLEAR
CO2 SERPL-SCNC: 25 MMOL/L (ref 22–29)
COLOR UR: YELLOW
CREAT SERPL-MCNC: 1.09 MG/DL (ref 0.57–1)
D-LACTATE SERPL-SCNC: 1.4 MMOL/L (ref 0.5–2)
DEPRECATED RDW RBC AUTO: 41.5 FL (ref 37–54)
DEPRECATED RDW RBC AUTO: 41.5 FL (ref 37–54)
EGFRCR SERPLBLD CKD-EPI 2021: 49 ML/MIN/1.73
EOSINOPHIL # BLD AUTO: 0.28 10*3/MM3 (ref 0–0.4)
EOSINOPHIL # BLD AUTO: 0.29 10*3/MM3 (ref 0–0.4)
EOSINOPHIL NFR BLD AUTO: 4.2 % (ref 0.3–6.2)
EOSINOPHIL NFR BLD AUTO: 4.5 % (ref 0.3–6.2)
ERYTHROCYTE [DISTWIDTH] IN BLOOD BY AUTOMATED COUNT: 12.2 % (ref 12.3–15.4)
ERYTHROCYTE [DISTWIDTH] IN BLOOD BY AUTOMATED COUNT: 12.2 % (ref 12.3–15.4)
GEN 5 2HR TROPONIN T REFLEX: 36 NG/L
GLOBULIN UR ELPH-MCNC: 2.5 GM/DL
GLUCOSE SERPL-MCNC: 189 MG/DL (ref 65–99)
GLUCOSE UR STRIP-MCNC: NEGATIVE MG/DL
HCT VFR BLD AUTO: 37.3 % (ref 34–46.6)
HCT VFR BLD AUTO: 37.7 % (ref 34–46.6)
HGB BLD-MCNC: 12.9 G/DL (ref 12–15.9)
HGB BLD-MCNC: 12.9 G/DL (ref 12–15.9)
HGB UR QL STRIP.AUTO: NEGATIVE
HYALINE CASTS UR QL AUTO: ABNORMAL /LPF
IMM GRANULOCYTES # BLD AUTO: 0.01 10*3/MM3 (ref 0–0.05)
IMM GRANULOCYTES # BLD AUTO: 0.02 10*3/MM3 (ref 0–0.05)
IMM GRANULOCYTES NFR BLD AUTO: 0.2 % (ref 0–0.5)
IMM GRANULOCYTES NFR BLD AUTO: 0.3 % (ref 0–0.5)
KETONES UR QL STRIP: NEGATIVE
LEUKOCYTE ESTERASE UR QL STRIP.AUTO: ABNORMAL
LYMPHOCYTES # BLD AUTO: 2.18 10*3/MM3 (ref 0.7–3.1)
LYMPHOCYTES # BLD AUTO: 2.27 10*3/MM3 (ref 0.7–3.1)
LYMPHOCYTES NFR BLD AUTO: 34.1 % (ref 19.6–45.3)
LYMPHOCYTES NFR BLD AUTO: 34.4 % (ref 19.6–45.3)
MAGNESIUM SERPL-MCNC: 2.1 MG/DL (ref 1.6–2.4)
MCH RBC QN AUTO: 31.6 PG (ref 26.6–33)
MCH RBC QN AUTO: 31.9 PG (ref 26.6–33)
MCHC RBC AUTO-ENTMCNC: 34.2 G/DL (ref 31.5–35.7)
MCHC RBC AUTO-ENTMCNC: 34.6 G/DL (ref 31.5–35.7)
MCV RBC AUTO: 92.3 FL (ref 79–97)
MCV RBC AUTO: 92.4 FL (ref 79–97)
MONOCYTES # BLD AUTO: 0.69 10*3/MM3 (ref 0.1–0.9)
MONOCYTES # BLD AUTO: 0.72 10*3/MM3 (ref 0.1–0.9)
MONOCYTES NFR BLD AUTO: 10.8 % (ref 5–12)
MONOCYTES NFR BLD AUTO: 10.9 % (ref 5–12)
NEUTROPHILS NFR BLD AUTO: 3.18 10*3/MM3 (ref 1.7–7)
NEUTROPHILS NFR BLD AUTO: 3.26 10*3/MM3 (ref 1.7–7)
NEUTROPHILS NFR BLD AUTO: 49.4 % (ref 42.7–76)
NEUTROPHILS NFR BLD AUTO: 49.8 % (ref 42.7–76)
NITRITE UR QL STRIP: NEGATIVE
NRBC BLD AUTO-RTO: 0 /100 WBC (ref 0–0.2)
NRBC BLD AUTO-RTO: 0 /100 WBC (ref 0–0.2)
NT-PROBNP SERPL-MCNC: 2774 PG/ML (ref 0–1800)
PH UR STRIP.AUTO: 6.5 [PH] (ref 5–8)
PHOSPHATE SERPL-MCNC: 2.8 MG/DL (ref 2.5–4.5)
PLATELET # BLD AUTO: 232 10*3/MM3 (ref 140–450)
PLATELET # BLD AUTO: 253 10*3/MM3 (ref 140–450)
PMV BLD AUTO: 10.3 FL (ref 6–12)
PMV BLD AUTO: 9.3 FL (ref 6–12)
POTASSIUM SERPL-SCNC: 4 MMOL/L (ref 3.5–5.2)
PROCALCITONIN SERPL-MCNC: 0.06 NG/ML (ref 0–0.25)
PROT SERPL-MCNC: 6.6 G/DL (ref 6–8.5)
PROT UR QL STRIP: ABNORMAL
QT INTERVAL: 410 MS
QTC INTERVAL: 467 MS
RBC # BLD AUTO: 4.04 10*6/MM3 (ref 3.77–5.28)
RBC # BLD AUTO: 4.08 10*6/MM3 (ref 3.77–5.28)
RBC # UR STRIP: ABNORMAL /HPF
REF LAB TEST METHOD: ABNORMAL
SODIUM SERPL-SCNC: 138 MMOL/L (ref 136–145)
SP GR UR STRIP: 1.01 (ref 1–1.03)
SQUAMOUS #/AREA URNS HPF: ABNORMAL /HPF
T4 FREE SERPL-MCNC: 0.99 NG/DL (ref 0.93–1.7)
TROPONIN T DELTA: 0 NG/L
TROPONIN T SERPL HS-MCNC: 36 NG/L
TSH SERPL DL<=0.05 MIU/L-ACNC: 1.31 UIU/ML (ref 0.27–4.2)
UROBILINOGEN UR QL STRIP: ABNORMAL
WBC # UR STRIP: ABNORMAL /HPF
WBC NRBC COR # BLD AUTO: 6.39 10*3/MM3 (ref 3.4–10.8)
WBC NRBC COR # BLD AUTO: 6.6 10*3/MM3 (ref 3.4–10.8)

## 2024-01-22 PROCEDURE — 83880 ASSAY OF NATRIURETIC PEPTIDE: CPT | Performed by: FAMILY MEDICINE

## 2024-01-22 PROCEDURE — 87088 URINE BACTERIA CULTURE: CPT | Performed by: FAMILY MEDICINE

## 2024-01-22 PROCEDURE — 84145 PROCALCITONIN (PCT): CPT | Performed by: FAMILY MEDICINE

## 2024-01-22 PROCEDURE — 83735 ASSAY OF MAGNESIUM: CPT | Performed by: FAMILY MEDICINE

## 2024-01-22 PROCEDURE — 83605 ASSAY OF LACTIC ACID: CPT | Performed by: FAMILY MEDICINE

## 2024-01-22 PROCEDURE — 81001 URINALYSIS AUTO W/SCOPE: CPT | Performed by: FAMILY MEDICINE

## 2024-01-22 PROCEDURE — 80053 COMPREHEN METABOLIC PANEL: CPT | Performed by: FAMILY MEDICINE

## 2024-01-22 PROCEDURE — G0378 HOSPITAL OBSERVATION PER HR: HCPCS

## 2024-01-22 PROCEDURE — 25010000002 DIPHENHYDRAMINE PER 50 MG: Performed by: PHYSICIAN ASSISTANT

## 2024-01-22 PROCEDURE — 84443 ASSAY THYROID STIM HORMONE: CPT | Performed by: FAMILY MEDICINE

## 2024-01-22 PROCEDURE — 87186 SC STD MICRODIL/AGAR DIL: CPT | Performed by: FAMILY MEDICINE

## 2024-01-22 PROCEDURE — 85025 COMPLETE CBC W/AUTO DIFF WBC: CPT | Performed by: FAMILY MEDICINE

## 2024-01-22 PROCEDURE — 87086 URINE CULTURE/COLONY COUNT: CPT | Performed by: FAMILY MEDICINE

## 2024-01-22 PROCEDURE — 25010000002 LABETALOL 5 MG/ML SOLUTION: Performed by: PHYSICIAN ASSISTANT

## 2024-01-22 PROCEDURE — 84439 ASSAY OF FREE THYROXINE: CPT | Performed by: FAMILY MEDICINE

## 2024-01-22 PROCEDURE — 84484 ASSAY OF TROPONIN QUANT: CPT | Performed by: FAMILY MEDICINE

## 2024-01-22 PROCEDURE — 84100 ASSAY OF PHOSPHORUS: CPT | Performed by: FAMILY MEDICINE

## 2024-01-22 RX ORDER — BISACODYL 10 MG
10 SUPPOSITORY, RECTAL RECTAL DAILY PRN
Status: DISCONTINUED | OUTPATIENT
Start: 2024-01-22 | End: 2024-01-25 | Stop reason: HOSPADM

## 2024-01-22 RX ORDER — DIPHENHYDRAMINE HYDROCHLORIDE 50 MG/ML
25 INJECTION INTRAMUSCULAR; INTRAVENOUS EVERY 6 HOURS PRN
Status: DISCONTINUED | OUTPATIENT
Start: 2024-01-22 | End: 2024-01-25 | Stop reason: HOSPADM

## 2024-01-22 RX ORDER — NITROGLYCERIN 0.4 MG/1
0.4 TABLET SUBLINGUAL
Status: DISCONTINUED | OUTPATIENT
Start: 2024-01-22 | End: 2024-01-25 | Stop reason: HOSPADM

## 2024-01-22 RX ORDER — METOPROLOL SUCCINATE 50 MG/1
50 TABLET, EXTENDED RELEASE ORAL DAILY
COMMUNITY
End: 2024-01-25 | Stop reason: HOSPADM

## 2024-01-22 RX ORDER — BISACODYL 5 MG/1
5 TABLET, DELAYED RELEASE ORAL DAILY PRN
Status: DISCONTINUED | OUTPATIENT
Start: 2024-01-22 | End: 2024-01-25 | Stop reason: HOSPADM

## 2024-01-22 RX ORDER — POLYETHYLENE GLYCOL 3350 17 G/17G
17 POWDER, FOR SOLUTION ORAL DAILY PRN
Status: DISCONTINUED | OUTPATIENT
Start: 2024-01-22 | End: 2024-01-25 | Stop reason: HOSPADM

## 2024-01-22 RX ORDER — LABETALOL HYDROCHLORIDE 5 MG/ML
10 INJECTION, SOLUTION INTRAVENOUS EVERY 4 HOURS PRN
Status: DISCONTINUED | OUTPATIENT
Start: 2024-01-22 | End: 2024-01-25 | Stop reason: HOSPADM

## 2024-01-22 RX ORDER — LOSARTAN POTASSIUM 50 MG/1
100 TABLET ORAL
Status: DISCONTINUED | OUTPATIENT
Start: 2024-01-23 | End: 2024-01-24

## 2024-01-22 RX ORDER — SODIUM CHLORIDE 0.9 % (FLUSH) 0.9 %
10 SYRINGE (ML) INJECTION AS NEEDED
Status: DISCONTINUED | OUTPATIENT
Start: 2024-01-22 | End: 2024-01-25 | Stop reason: HOSPADM

## 2024-01-22 RX ORDER — LEVOTHYROXINE SODIUM 0.05 MG/1
50 TABLET ORAL
Status: DISCONTINUED | OUTPATIENT
Start: 2024-01-23 | End: 2024-01-25 | Stop reason: HOSPADM

## 2024-01-22 RX ORDER — GLIPIZIDE 5 MG/1
2.5 TABLET ORAL
Status: DISCONTINUED | OUTPATIENT
Start: 2024-01-23 | End: 2024-01-23

## 2024-01-22 RX ORDER — CLONIDINE HYDROCHLORIDE 0.1 MG/1
0.1 TABLET ORAL EVERY 6 HOURS PRN
Status: DISCONTINUED | OUTPATIENT
Start: 2024-01-22 | End: 2024-01-22

## 2024-01-22 RX ORDER — AMOXICILLIN 250 MG
2 CAPSULE ORAL 2 TIMES DAILY
Status: DISCONTINUED | OUTPATIENT
Start: 2024-01-22 | End: 2024-01-25 | Stop reason: HOSPADM

## 2024-01-22 RX ORDER — SODIUM CHLORIDE 0.9 % (FLUSH) 0.9 %
10 SYRINGE (ML) INJECTION EVERY 12 HOURS SCHEDULED
Status: DISCONTINUED | OUTPATIENT
Start: 2024-01-22 | End: 2024-01-25 | Stop reason: HOSPADM

## 2024-01-22 RX ORDER — VERAPAMIL HYDROCHLORIDE 100 MG/1
100 CAPSULE, EXTENDED RELEASE ORAL NIGHTLY
Status: DISCONTINUED | OUTPATIENT
Start: 2024-01-22 | End: 2024-01-25 | Stop reason: HOSPADM

## 2024-01-22 RX ORDER — SODIUM CHLORIDE 9 MG/ML
40 INJECTION, SOLUTION INTRAVENOUS AS NEEDED
Status: DISCONTINUED | OUTPATIENT
Start: 2024-01-22 | End: 2024-01-25 | Stop reason: HOSPADM

## 2024-01-22 RX ORDER — BUMETANIDE 1 MG/1
1 TABLET ORAL DAILY PRN
Status: ON HOLD | COMMUNITY
End: 2024-01-22

## 2024-01-22 RX ORDER — FLECAINIDE ACETATE 50 MG/1
25 TABLET ORAL 2 TIMES DAILY
Status: DISCONTINUED | OUTPATIENT
Start: 2024-01-22 | End: 2024-01-25 | Stop reason: HOSPADM

## 2024-01-22 RX ORDER — LABETALOL HYDROCHLORIDE 5 MG/ML
10 INJECTION, SOLUTION INTRAVENOUS ONCE
Status: COMPLETED | OUTPATIENT
Start: 2024-01-22 | End: 2024-01-22

## 2024-01-22 RX ADMIN — VERAPAMIL HYDROCHLORIDE 100 MG: 100 CAPSULE, EXTENDED RELEASE ORAL at 22:04

## 2024-01-22 RX ADMIN — FLECAINIDE ACETATE 25 MG: 50 TABLET ORAL at 22:04

## 2024-01-22 RX ADMIN — APIXABAN 5 MG: 5 TABLET, FILM COATED ORAL at 22:03

## 2024-01-22 RX ADMIN — DIPHENHYDRAMINE HYDROCHLORIDE 25 MG: 50 INJECTION, SOLUTION INTRAMUSCULAR; INTRAVENOUS at 20:57

## 2024-01-22 RX ADMIN — LABETALOL HYDROCHLORIDE 10 MG: 5 INJECTION, SOLUTION INTRAVENOUS at 18:52

## 2024-01-22 RX ADMIN — Medication 10 ML: at 22:06

## 2024-01-22 NOTE — PLAN OF CARE
Goal Outcome Evaluation:  Plan of Care Reviewed With: patient        Progress: no change  Outcome Evaluation: pt was direct admit from PCP office.  BP been elevated 181/89, patient denies pain.  Providing for pt comfort while awaiting orders.

## 2024-01-23 ENCOUNTER — READMISSION MANAGEMENT (OUTPATIENT)
Dept: CALL CENTER | Facility: HOSPITAL | Age: 89
End: 2024-01-23
Payer: MEDICARE

## 2024-01-23 LAB
ANION GAP SERPL CALCULATED.3IONS-SCNC: 11 MMOL/L (ref 5–15)
BASOPHILS # BLD AUTO: 0.04 10*3/MM3 (ref 0–0.2)
BASOPHILS NFR BLD AUTO: 0.7 % (ref 0–1.5)
BUN SERPL-MCNC: 29 MG/DL (ref 8–23)
BUN/CREAT SERPL: 25.7 (ref 7–25)
CALCIUM SPEC-SCNC: 9.4 MG/DL (ref 8.6–10.5)
CHLORIDE SERPL-SCNC: 102 MMOL/L (ref 98–107)
CO2 SERPL-SCNC: 24 MMOL/L (ref 22–29)
CREAT SERPL-MCNC: 1.13 MG/DL (ref 0.57–1)
DEPRECATED RDW RBC AUTO: 42.3 FL (ref 37–54)
EGFRCR SERPLBLD CKD-EPI 2021: 46.9 ML/MIN/1.73
EOSINOPHIL # BLD AUTO: 0.37 10*3/MM3 (ref 0–0.4)
EOSINOPHIL NFR BLD AUTO: 6.6 % (ref 0.3–6.2)
ERYTHROCYTE [DISTWIDTH] IN BLOOD BY AUTOMATED COUNT: 12.2 % (ref 12.3–15.4)
GLUCOSE BLDC GLUCOMTR-MCNC: 167 MG/DL (ref 70–130)
GLUCOSE SERPL-MCNC: 117 MG/DL (ref 65–99)
HCT VFR BLD AUTO: 38.2 % (ref 34–46.6)
HGB BLD-MCNC: 12.8 G/DL (ref 12–15.9)
IMM GRANULOCYTES # BLD AUTO: 0.01 10*3/MM3 (ref 0–0.05)
IMM GRANULOCYTES NFR BLD AUTO: 0.2 % (ref 0–0.5)
LYMPHOCYTES # BLD AUTO: 2.05 10*3/MM3 (ref 0.7–3.1)
LYMPHOCYTES NFR BLD AUTO: 36.6 % (ref 19.6–45.3)
MCH RBC QN AUTO: 31.1 PG (ref 26.6–33)
MCHC RBC AUTO-ENTMCNC: 33.5 G/DL (ref 31.5–35.7)
MCV RBC AUTO: 92.9 FL (ref 79–97)
MONOCYTES # BLD AUTO: 0.75 10*3/MM3 (ref 0.1–0.9)
MONOCYTES NFR BLD AUTO: 13.4 % (ref 5–12)
NEUTROPHILS NFR BLD AUTO: 2.38 10*3/MM3 (ref 1.7–7)
NEUTROPHILS NFR BLD AUTO: 42.5 % (ref 42.7–76)
NRBC BLD AUTO-RTO: 0 /100 WBC (ref 0–0.2)
PLATELET # BLD AUTO: 244 10*3/MM3 (ref 140–450)
PMV BLD AUTO: 10 FL (ref 6–12)
POTASSIUM SERPL-SCNC: 4 MMOL/L (ref 3.5–5.2)
RBC # BLD AUTO: 4.11 10*6/MM3 (ref 3.77–5.28)
SODIUM SERPL-SCNC: 137 MMOL/L (ref 136–145)
WBC NRBC COR # BLD AUTO: 5.6 10*3/MM3 (ref 3.4–10.8)

## 2024-01-23 PROCEDURE — 25010000002 CEFTRIAXONE PER 250 MG: Performed by: FAMILY MEDICINE

## 2024-01-23 PROCEDURE — 82948 REAGENT STRIP/BLOOD GLUCOSE: CPT

## 2024-01-23 PROCEDURE — 80048 BASIC METABOLIC PNL TOTAL CA: CPT | Performed by: FAMILY MEDICINE

## 2024-01-23 PROCEDURE — G0378 HOSPITAL OBSERVATION PER HR: HCPCS

## 2024-01-23 PROCEDURE — 25010000002 DIPHENHYDRAMINE PER 50 MG: Performed by: PHYSICIAN ASSISTANT

## 2024-01-23 PROCEDURE — 99222 1ST HOSP IP/OBS MODERATE 55: CPT | Performed by: NURSE PRACTITIONER

## 2024-01-23 PROCEDURE — 85025 COMPLETE CBC W/AUTO DIFF WBC: CPT | Performed by: FAMILY MEDICINE

## 2024-01-23 RX ORDER — GLIMEPIRIDE 4 MG/1
4 TABLET ORAL DAILY
Status: DISCONTINUED | OUTPATIENT
Start: 2024-01-23 | End: 2024-01-25 | Stop reason: HOSPADM

## 2024-01-23 RX ORDER — ACETAMINOPHEN 325 MG/1
650 TABLET ORAL EVERY 6 HOURS PRN
Status: DISCONTINUED | OUTPATIENT
Start: 2024-01-23 | End: 2024-01-25 | Stop reason: HOSPADM

## 2024-01-23 RX ADMIN — FLECAINIDE ACETATE 25 MG: 50 TABLET ORAL at 08:17

## 2024-01-23 RX ADMIN — FLECAINIDE ACETATE 25 MG: 50 TABLET ORAL at 20:35

## 2024-01-23 RX ADMIN — LEVOTHYROXINE SODIUM 50 MCG: 50 TABLET ORAL at 05:06

## 2024-01-23 RX ADMIN — APIXABAN 5 MG: 5 TABLET, FILM COATED ORAL at 20:35

## 2024-01-23 RX ADMIN — APIXABAN 5 MG: 5 TABLET, FILM COATED ORAL at 08:17

## 2024-01-23 RX ADMIN — GLIPIZIDE 2.5 MG: 5 TABLET ORAL at 08:17

## 2024-01-23 RX ADMIN — ACETAMINOPHEN 650 MG: 325 TABLET, FILM COATED ORAL at 20:35

## 2024-01-23 RX ADMIN — DOCUSATE SODIUM 50 MG AND SENNOSIDES 8.6 MG 2 TABLET: 8.6; 5 TABLET, FILM COATED ORAL at 20:35

## 2024-01-23 RX ADMIN — DIPHENHYDRAMINE HYDROCHLORIDE 25 MG: 50 INJECTION, SOLUTION INTRAMUSCULAR; INTRAVENOUS at 21:46

## 2024-01-23 RX ADMIN — SODIUM CHLORIDE 1000 MG: 900 INJECTION INTRAVENOUS at 08:12

## 2024-01-23 RX ADMIN — Medication 10 ML: at 20:34

## 2024-01-23 RX ADMIN — Medication 10 ML: at 08:20

## 2024-01-23 RX ADMIN — VERAPAMIL HYDROCHLORIDE 100 MG: 100 CAPSULE, EXTENDED RELEASE ORAL at 20:37

## 2024-01-23 NOTE — PLAN OF CARE
Problem: Adult Inpatient Plan of Care  Goal: Plan of Care Review  Outcome: Ongoing, Progressing  Flowsheets (Taken 1/23/2024 0421)  Progress: no change  Plan of Care Reviewed With: patient  Outcome Evaluation: Pt had elevated BP prior to shift and was given labetalol, pt during beginning of shift c/o blurred vision and started having muscle jerking mainly on the left side. Pt stated that this has happened before when given a beta-blocker and other antihypertensives (listed in allergies). Called MD, gave benadryl per order. Pt returned to normal, no similar reaction when verapamil was given. Pt's BP has been WNL during the rest of the shift. Pt up with assist x1 to the BR. A/O, no c/o pain. On 2L NC for comfort per pt request, does not wear O2 at home, pt states it helps calm her down. Pt appeared to rest well overnight. Safety maintained. NSR 61-79 with PVCs and BBB on tele.

## 2024-01-23 NOTE — H&P
"    History and Physical    Patient:  Nedra Tolliver  MRN: [id number]    CHIEF COMPLAINT: Atrial fibrillation with RVR    History Obtained From: the patient   PCP: Hank Rascon MD    HISTORY OF PRESENT ILLNESS:   The patient is a 88 y.o. female who presents with atrial fibrillation with rapid ventricular response and incredibly labile blood pressure.  Patient presented to the office 1/22/2024 for her TCM visit after recent hospital stay where she was admitted with vague complaints of malaise, dizziness and near syncopal. Responded to losartan while in house. Attempted cardizem cd for rate control but didn't tolerate due to \"weakness\".  In the office, worsening weakness and accelerated bp. EKG in office indicated Afib with RVR. Admitted for rate control and bp control .  Did not tolerate labetalol, has done well with verapamil.  Cardiology consulted.  She is on Eliquis, flecainide.  Difficult case due to long list of intolerances to medications.  On admission labs, noted to have evidence of urinary tract infection as well as elevated BNP.  Urine culture pending without any lower extremity edema.    REVIEW OF SYSTEMS:    Review of Systems   Constitutional:  Positive for activity change.   Respiratory:  Positive for cough and shortness of breath.    Cardiovascular:  Positive for palpitations. Negative for leg swelling.   Gastrointestinal:  Negative for abdominal pain, constipation, diarrhea and vomiting.   Genitourinary:  Positive for dysuria.          Past Medical History:  Past Medical History:   Diagnosis Date    Arthritis     Cancer     BCC @ nose & Left arm    Diabetes type 2, controlled     Diverticulitis     History of GI bleed     History of transfusion     Has had x 5 units.    Hypertension     STAGE 3     Hyponatremia     Required hospitalization    Hypothyroidism     Knee arthropathy 09/30/2020    LPRD (laryngopharyngeal reflux disease)     Pharyngeal dysphagia     Thyroid nodule        Past " Surgical History:  Past Surgical History:   Procedure Laterality Date    APPENDECTOMY      BASAL CELL CARCINOMA EXCISION      BELPHAROPTOSIS REPAIR      BREAST CYST EXCISION      CARDIAC CATHETERIZATION      CARDIAC CATHETERIZATION Right 6/24/2021    Procedure: Left Heart Cath R radial, US guided w poss intervention;  Surgeon: Mak Nickerson DO;  Location:  [facility] CATH [facility]ASIVE [facility];  Service: Cardiovascular;  Laterality: Right;    CARDIAC CATHETERIZATION      CATARACT EXTRACTION      CATARACT EXTRACTION WITH INTRAOCULAR LENS IMPLANT Bilateral     CHOLECYSTECTOMY      COLONOSCOPY Left 11/1/2016    Tubular adenoma cecum, Diverticulosis repeat prn    SUBTOTAL HYSTERECTOMY      TOTAL KNEE ARTHROPLASTY Right 9/30/2020    Procedure: TOTAL KNEE ARTHROPLASTY;  Surgeon: Mak Pickens MD;  Location:  [facility] OR;  Service: Orthopedics;  Laterality: Right;    TUMOR EXCISION      under armpits and left breast       Medications Prior to Admission:    Medications Prior to Admission   Medication Sig Dispense Refill Last Dose    apixaban (ELIQUIS) 5 MG tablet tablet Take 1 tablet by mouth 2 (Two) Times a Day.   1/22/2024    Cholecalciferol (Vitamin D3) 125 MCG (5000 UT) tablet dispersible Take 5,000 Units by mouth Daily.       Coenzyme Q10 (CO Q 10) 100 MG capsule Take 1 tablet by mouth Daily.   1/22/2024    Evolocumab (Repatha SureClick) solution auto-injector SureClick injection Inject 1 mL under the skin into the appropriate area as directed Every 14 (Fourteen) Days. 2 mL 11 Past Month    flecainide (TAMBOCOR) 50 MG tablet Take 0.5 tablets by mouth 2 (Two) Times a Day. RX states 1/2 tab BID   1/21/2024    glimepiride (AMARYL) 4 MG tablet Take 1 tablet by mouth Every Morning Before Breakfast. Takes an extra 1/2 tablet when blood sugar is high at night   Patient Taking Differently    levothyroxine (SYNTHROID, LEVOTHROID) 50 MCG tablet Take 1 tablet by mouth Daily.   1/22/2024    losartan (COZAAR) 100 MG tablet  "Take 1 tablet by mouth Daily for 30 days. 30 tablet 0 1/22/2024    Multiple Vitamins-Minerals (CENTRUM SILVER PO) Take 1 tablet by mouth Daily.   1/22/2024    metoprolol succinate XL (TOPROL-XL) 50 MG 24 hr tablet Take 1 tablet by mouth Daily.   Unknown       Allergies:  Codeine, Iodine, Levaquin [levofloxacin], Lortab [hydrocodone-acetaminophen], Quinolones, Shrimp (diagnostic), Bactroban [mupirocin calcium], Ace inhibitors, Beta adrenergic blockers, Hydralazine, Norvasc [amlodipine], Piperacillin sod-tazobactam so, Procardia xl [nifedipine er], Statins, Thiazide-type diuretics, and Mupirocin    Social History:   Social History     Socioeconomic History    Marital status:    Tobacco Use    Smoking status: Never    Smokeless tobacco: Never   Vaping Use    Vaping Use: Never used   Substance and Sexual Activity    Alcohol use: No    Drug use: Never    Sexual activity: Defer     Partners: Male       Family History:   Family History   Problem Relation Age of Onset    Diabetes Mother     Cancer Mother     Heart failure Mother     Diabetes Father     Colon cancer Neg Hx     Colon polyps Neg Hx            Physical Exam:    Vitals: /49 (BP Location: Left arm, Patient Position: Lying) Comment: RN notified  Pulse 66   Temp 98.5 °F (36.9 °C) (Oral)   Resp 16   Ht 162.6 cm (64\")   Wt 64.4 kg (142 lb)   LMP  (LMP Unknown)   SpO2 96%   BMI 24.37 kg/m²   Physical Exam  Vitals reviewed.   Constitutional:       Appearance: Normal appearance. She is not ill-appearing.   HENT:      Head: Normocephalic and atraumatic.   Eyes:      General:         Right eye: No discharge.         Left eye: No discharge.      Extraocular Movements: Extraocular movements intact.      Conjunctiva/sclera: Conjunctivae normal.   Cardiovascular:      Rate and Rhythm: Normal rate. Rhythm irregular.      Pulses: Normal pulses.      Heart sounds: No murmur heard.  Pulmonary:      Effort: Pulmonary effort is normal. No respiratory distress. "   Abdominal:      General: Abdomen is flat. Bowel sounds are normal. There is no distension.   Musculoskeletal:      Right lower leg: No edema.      Left lower leg: No edema.   Skin:     Capillary Refill: Capillary refill takes less than 2 seconds.   Neurological:      General: No focal deficit present.      Mental Status: She is alert.   Psychiatric:         Mood and Affect: Mood normal.         Behavior: Behavior normal.           Lab Results (last 24 hours)       Procedure Component Value Units Date/Time    Basic Metabolic Panel [[id number]]  (Abnormal) Collected: 01/23/24 0457    Specimen: Blood Updated: 01/23/24 0555     Glucose 117 mg/dL      BUN 29 mg/dL      Creatinine 1.13 mg/dL      Sodium 137 mmol/L      Potassium 4.0 mmol/L      Chloride 102 mmol/L      CO2 24.0 mmol/L      Calcium 9.4 mg/dL      BUN/Creatinine Ratio 25.7     Anion Gap 11.0 mmol/L      eGFR 46.9 mL/min/1.73     Narrative:      GFR Normal >60  Chronic Kidney Disease <60  Kidney Failure <15    The GFR formula is only valid for adults with stable renal function between ages 18 and 70.    CBC & Differential [[id number]]  (Abnormal) Collected: 01/23/24 0457    Specimen: Blood Updated: 01/23/24 0531    Narrative:      The following orders were created for panel order CBC & Differential.  Procedure                               Abnormality         Status                     ---------                               -----------         ------                     CBC Auto Differential[[id number]]        Abnormal            Final result                 Please view results for these tests on the individual orders.    CBC Auto Differential [[id number]]  (Abnormal) Collected: 01/23/24 0457    Specimen: Blood Updated: 01/23/24 0531     WBC 5.60 10*3/mm3      RBC 4.11 10*6/mm3      Hemoglobin 12.8 g/dL      Hematocrit 38.2 %      MCV 92.9 fL      MCH 31.1 pg      MCHC 33.5 g/dL      RDW 12.2 %      RDW-SD 42.3 fl      MPV 10.0 fL      Platelets 244 10*3/mm3       Neutrophil % 42.5 %      Lymphocyte % 36.6 %      Monocyte % 13.4 %      Eosinophil % 6.6 %      Basophil % 0.7 %      Immature Grans % 0.2 %      Neutrophils, Absolute 2.38 10*3/mm3      Lymphocytes, Absolute 2.05 10*3/mm3      Monocytes, Absolute 0.75 10*3/mm3      Eosinophils, Absolute 0.37 10*3/mm3      Basophils, Absolute 0.04 10*3/mm3      Immature Grans, Absolute 0.01 10*3/mm3      nRBC 0.0 /100 WBC     Urine Culture - Urine, Urine, Clean Catch [[id number]] Collected: 01/22/24 2106    Specimen: Urine, Clean Catch Updated: 01/23/24 0517    High Sensitivity Troponin T 2Hr [[id number]]  (Abnormal) Collected: 01/22/24 2120    Specimen: Blood Updated: 01/22/24 2153     HS Troponin T 36 ng/L      Troponin T Delta 0 ng/L     Narrative:      High Sensitive Troponin T Reference Range:  <14.0 ng/L- Negative Female for AMI  <22.0 ng/L- Negative Male for AMI  >=14 - Abnormal Female indicating possible myocardial injury.  >=22 - Abnormal Male indicating possible myocardial injury.   Clinicians would have to utilize clinical acumen, EKG, Troponin, and serial changes to determine if it is an Acute Myocardial Infarction or myocardial injury due to an underlying chronic condition.         Urinalysis With Microscopic If Indicated (No Culture) - Urine, Clean Catch [[id number]]  (Abnormal) Collected: 01/22/24 2106    Specimen: Urine, Clean Catch Updated: 01/22/24 2121     Color, UA Yellow     Appearance, UA Clear     pH, UA 6.5     Specific Gravity, UA 1.010     Glucose, UA Negative     Ketones, UA Negative     Bilirubin, UA Negative     Blood, UA Negative     Protein, UA 30 mg/dL (1+)     Leuk Esterase, UA Moderate (2+)     Nitrite, UA Negative     Urobilinogen, UA 0.2 E.U./dL    Urinalysis, Microscopic Only - Urine, Clean Catch [[id number]]  (Abnormal) Collected: 01/22/24 2106    Specimen: Urine, Clean Catch Updated: 01/22/24 2121     RBC, UA 0-2 /HPF      WBC, UA Too Numerous to Count /HPF      Bacteria, UA 3+ /HPF       Squamous Epithelial Cells, UA None Seen /HPF      Hyaline Casts, UA None Seen /LPF      Methodology Automated Microscopy    CBC & Differential [[id number]]  (Abnormal) Collected: 01/22/24 1921    Specimen: Blood Updated: 01/22/24 2000    Narrative:      The following orders were created for panel order CBC & Differential.  Procedure                               Abnormality         Status                     ---------                               -----------         ------                     CBC Auto Differential[[id number]]        Abnormal            Final result                 Please view results for these tests on the individual orders.    CBC Auto Differential [[id number]]  (Abnormal) Collected: 01/22/24 1921    Specimen: Blood Updated: 01/22/24 2000     WBC 6.39 10*3/mm3      RBC 4.08 10*6/mm3      Hemoglobin 12.9 g/dL      Hematocrit 37.7 %      MCV 92.4 fL      MCH 31.6 pg      MCHC 34.2 g/dL      RDW 12.2 %      RDW-SD 41.5 fl      MPV 10.3 fL      Platelets 253 10*3/mm3      Neutrophil % 49.8 %      Lymphocyte % 34.1 %      Monocyte % 10.8 %      Eosinophil % 4.5 %      Basophil % 0.6 %      Immature Grans % 0.2 %      Neutrophils, Absolute 3.18 10*3/mm3      Lymphocytes, Absolute 2.18 10*3/mm3      Monocytes, Absolute 0.69 10*3/mm3      Eosinophils, Absolute 0.29 10*3/mm3      Basophils, Absolute 0.04 10*3/mm3      Immature Grans, Absolute 0.01 10*3/mm3      nRBC 0.0 /100 WBC     TSH [[id number]]  (Normal) Collected: 01/22/24 1921    Specimen: Blood Updated: 01/22/24 1954     TSH 1.310 uIU/mL     Procalcitonin [[id number]]  (Normal) Collected: 01/22/24 1921    Specimen: Blood Updated: 01/22/24 1953     Procalcitonin 0.06 ng/mL     Narrative:      As a Marker for Sepsis (Non-Neonates):    1. <0.5 ng/mL represents a low risk of severe sepsis and/or septic shock.  2. >2 ng/mL represents a high risk of severe sepsis and/or septic shock.    As a Marker for Lower Respiratory Tract Infections that require  "antibiotic therapy:    PCT on Admission    Antibiotic Therapy       6-12 Hrs later    >0.5                Strongly Recommended  >0.25 - <0.5        Recommended   0.1 - 0.25          Discouraged              Remeasure/reassess PCT  <0.1                Strongly Discouraged     Remeasure/reassess PCT    As 28 day mortality risk marker: \"Change in Procalcitonin Result\" (>80% or <=80%) if Day 0 (or Day 1) and Day 4 values are available. Refer to http://www[email or web address] [email or web address]cal[place]    Change in PCT <=80%  A decrease of PCT levels below or equal to 80% defines a positive change in PCT test result representing a higher risk for 28-day all-cause mortality of patients diagnosed with severe sepsis for septic shock.    Change in PCT >80%  A decrease of PCT levels of more than 80% defines a negative change in PCT result representing a lower risk for 28-day all-cause mortality of patients diagnosed with severe sepsis or septic shock.       T4, Free [[id number]]  (Normal) Collected: 01/22/24 1921    Specimen: Blood Updated: 01/22/24 1952     Free T4 0.99 ng/dL     Narrative:      Results may be falsely increased if patient taking Biotin.      Comprehensive Metabolic Panel [[id number]]  (Abnormal) Collected: 01/22/24 1921    Specimen: Blood Updated: 01/22/24 1949     Glucose 189 mg/dL      BUN 24 mg/dL      Creatinine 1.09 mg/dL      Sodium 138 mmol/L      Potassium 4.0 mmol/L      Chloride 100 mmol/L      CO2 25.0 mmol/L      Calcium 10.0 mg/dL      Total Protein 6.6 g/dL      Albumin 4.1 g/dL      ALT (SGPT) 12 U/L      AST (SGOT) 16 U/L      Alkaline Phosphatase 75 U/L      Total Bilirubin 0.3 mg/dL      Globulin 2.5 gm/dL      A/G Ratio 1.6 g/dL      BUN/Creatinine Ratio 22.0     Anion Gap 13.0 mmol/L      eGFR 49.0 mL/min/1.73     Narrative:      GFR Normal >60  Chronic Kidney Disease <60  Kidney Failure <15    The GFR formula is only valid for adults with stable renal function between ages 18 and 70.    BNP [[id number]]  " (Abnormal) Collected: 01/22/24 1921    Specimen: Blood Updated: 01/22/24 1947     proBNP 2,774.0 pg/mL     Narrative:      This assay is used as an aid in the diagnosis of individuals suspected of having heart failure. It can be used as an aid in the diagnosis of acute decompensated heart failure (ADHF) in patients presenting with signs and symptoms of ADHF to the emergency department (ED). In addition, NT-proBNP of <300 pg/mL indicates ADHF is not likely.    Age Range Result Interpretation  NT-proBNP Concentration (pg/mL:      <50             Positive            >450                   Gray                 300-450                    Negative             <300    50-75           Positive            >900                  Gray                300-900                  Negative            <300      >75             Positive            >1800                  Gray                300-1800                  Negative            <300    Lactic Acid, Plasma [[id number]]  (Normal) Collected: 01/22/24 1921    Specimen: Blood Updated: 01/22/24 1947     Lactate 1.4 mmol/L     Magnesium [[id number]]  (Normal) Collected: 01/22/24 1921    Specimen: Blood Updated: 01/22/24 1947     Magnesium 2.1 mg/dL     Phosphorus [[id number]]  (Normal) Collected: 01/22/24 1921    Specimen: Blood Updated: 01/22/24 1946     Phosphorus 2.8 mg/dL     High Sensitivity Troponin T [[id number]]  (Abnormal) Collected: 01/22/24 1921    Specimen: Blood Updated: 01/22/24 1946     HS Troponin T 36 ng/L     Narrative:      High Sensitive Troponin T Reference Range:  <14.0 ng/L- Negative Female for AMI  <22.0 ng/L- Negative Male for AMI  >=14 - Abnormal Female indicating possible myocardial injury.  >=22 - Abnormal Male indicating possible myocardial injury.   Clinicians would have to utilize clinical acumen, EKG, Troponin, and serial changes to determine if it is an Acute Myocardial Infarction or myocardial injury due to an underlying chronic condition.         CBC Auto  Differential [[id number]]  (Abnormal) Collected: 01/22/24 1921    Specimen: Blood Updated: 01/22/24 1928     WBC 6.60 10*3/mm3      RBC 4.04 10*6/mm3      Hemoglobin 12.9 g/dL      Hematocrit 37.3 %      MCV 92.3 fL      MCH 31.9 pg      MCHC 34.6 g/dL      RDW 12.2 %      RDW-SD 41.5 fl      MPV 9.3 fL      Platelets 232 10*3/mm3      Neutrophil % 49.4 %      Lymphocyte % 34.4 %      Monocyte % 10.9 %      Eosinophil % 4.2 %      Basophil % 0.8 %      Immature Grans % 0.3 %      Neutrophils, Absolute 3.26 10*3/mm3      Lymphocytes, Absolute 2.27 10*3/mm3      Monocytes, Absolute 0.72 10*3/mm3      Eosinophils, Absolute 0.28 10*3/mm3      Basophils, Absolute 0.05 10*3/mm3      Immature Grans, Absolute 0.02 10*3/mm3      nRBC 0.0 /100 WBC              -----------------------------------------------------------------  EKG:   Radiology:     No results found.    Assessment and Plan     Primary Problem:  Atrial fibrillation, rapid    Hospital Problem list:    Atrial fibrillation, rapid      PMH:  Past Medical History:   Diagnosis Date    Arthritis     Cancer     BCC @ nose & Left arm    Diabetes type 2, controlled     Diverticulitis     History of GI bleed     History of transfusion     Has had x 5 units.    Hypertension     STAGE 3     Hyponatremia     Required hospitalization    Hypothyroidism     Knee arthropathy 09/30/2020    LPRD (laryngopharyngeal reflux disease)     Pharyngeal dysphagia     Thyroid nodule        Treatment Plan:  A-fib with RVR: Has reactions to beta-blockers, will try to avoid.  Has had some improvement with verapamil.  Very difficult case due to intolerance to medications.  Cardiology consulted for further recommendations and assistance.  Last echo 5/30/2023 with normal EF, LVH, grade 1 diastolic dysfunction.    Hypertension: Very difficult to control due to intolerance of medications.  On losartan 100 mg and verapamil 100 mg nightly.    UTI: Urinalysis on admission with WBCs, leukocyte Estrace,  3+ bacteria.  Urine culture pending.  Rocephin.    CODE STATUS: Full    DVT prophylaxis: Home Eliquis    Disposition: Inpatient    Edgar Tolbert MD 1/23/2024 06:43 CST

## 2024-01-23 NOTE — OUTREACH NOTE
Medical Week 1 Survey      Flowsheet Row Responses   [facility] patient discharged from? [facility]   Does the patient have one of the following disease processes/diagnoses(primary or secondary)? Other   Week 1 attempt successful? No   Unsuccessful attempts Attempt 1   Revoke Readmitted            Margarita H - Registered Nurse

## 2024-01-23 NOTE — CONSULTS
"[facility] HEART GROUP CONSULT NOTE    Referring Provider: Hank Rascon MD    Reason for Consultation: \"afib with RVR and uncontrolled HTN\"     Chief complaint: No chief complaint on file.      Subjective .     History of present illness:  Nedra Tolliver is a 88 y.o. female with PAF, PSVT, LBBB, and nonobstructive CAD who presented to the hospital after she was noted to be in afib with RVR with labile BP at her PCP office. She was admitted to the hospital last week not feeling well and hypertensive with an elevated, flat trending troponin. She has multiple allergies therefore regulation of HR and BP has been difficult. While at her PCP office, it is reported that her EKG revealed Afib with RVR. I do not have that EKG to confirm presence of arrythmia. Since arrival to the hospital and being placed on telemetry, she has been NSR 67-76 with LBBB and PACs and PVCs. Cardiology has been consulted for management of afib with RVR and uncontrolled HTN. She notes she has been feeling poorly for the last few days. She was started on Losartan during her last hospitalization which she reports she did not tolerate as it causes her to have generalized body tremors and uncontrollable muscle spasms which she reports occurs when she has an \"allergic reaction\" to a medication.     History  Past Medical History:   Diagnosis Date    Arthritis     Cancer     BCC @ nose & Left arm    Diabetes type 2, controlled     Diverticulitis     History of GI bleed     History of transfusion     Has had x 5 units.    Hypertension     STAGE 3     Hyponatremia     Required hospitalization    Hypothyroidism     Knee arthropathy 09/30/2020    LPRD (laryngopharyngeal reflux disease)     Pharyngeal dysphagia     Thyroid nodule    ,   Past Surgical History:   Procedure Laterality Date    APPENDECTOMY      BASAL CELL CARCINOMA EXCISION      BELPHAROPTOSIS REPAIR      BREAST CYST EXCISION      CARDIAC CATHETERIZATION      CARDIAC " CATHETERIZATION Right 6/24/2021    Procedure: Left Heart Cath R radial, US guided w poss intervention;  Surgeon: Mak Nickerson DO;  Location:  [facility] CATH INVASIVE LOCATION;  Service: Cardiovascular;  Laterality: Right;    CARDIAC CATHETERIZATION      CATARACT EXTRACTION      CATARACT EXTRACTION WITH INTRAOCULAR LENS IMPLANT Bilateral     CHOLECYSTECTOMY      COLONOSCOPY Left 11/1/2016    Tubular adenoma cecum, Diverticulosis repeat prn    SUBTOTAL HYSTERECTOMY      TOTAL KNEE ARTHROPLASTY Right 9/30/2020    Procedure: TOTAL KNEE ARTHROPLASTY;  Surgeon: Mak Pickens MD;  Location:  [facility] OR;  Service: Orthopedics;  Laterality: Right;    TUMOR EXCISION      under armpits and left breast   ,   Family History   Problem Relation Age of Onset    Diabetes Mother     Cancer Mother     Heart failure Mother     Diabetes Father     Colon cancer Neg Hx     Colon polyps Neg Hx    ,   Social History     Tobacco Use    Smoking status: Never    Smokeless tobacco: Never   Vaping Use    Vaping Use: Never used   Substance Use Topics    Alcohol use: No    Drug use: Never   ,     Medications    Prior to Admission medications    Medication Sig Start Date End Date Taking? Authorizing Provider   apixaban (ELIQUIS) 5 MG tablet tablet Take 1 tablet by mouth 2 (Two) Times a Day.   Yes Elma Yoo MD   Cholecalciferol (Vitamin D3) 125 MCG (5000 UT) tablet dispersible Take 5,000 Units by mouth Daily.   Yes Elma Yoo MD   Coenzyme Q10 (CO Q 10) 100 MG capsule Take 1 tablet by mouth Daily.   Yes Elma Yoo MD   Evolocumab (Repatha SureClick) solution auto-injector SureClick injection Inject 1 mL under the skin into the appropriate area as directed Every 14 (Fourteen) Days. 9/29/23  Yes Mak Nickerson DO   flecainide (TAMBOCOR) 50 MG tablet Take 0.5 tablets by mouth 2 (Two) Times a Day. RX states 1/2 tab BID   Yes Elma Yoo MD   glimepiride (AMARYL) 4 MG tablet Take 1  tablet by mouth Every Morning Before Breakfast. Takes an extra 1/2 tablet when blood sugar is high at night   Yes Elma Yoo MD   levothyroxine (SYNTHROID, LEVOTHROID) 50 MCG tablet Take 1 tablet by mouth Daily.   Yes Elma Yoo MD   losartan (COZAAR) 100 MG tablet Take 1 tablet by mouth Daily for 30 days. 1/20/24 2/19/24 Yes Hank Rascon MD   Multiple Vitamins-Minerals (CENTRUM SILVER PO) Take 1 tablet by mouth Daily.   Yes Elma Yoo MD   metoprolol succinate XL (TOPROL-XL) 50 MG 24 hr tablet Take 1 tablet by mouth Daily.    Elma Yoo MD       Current Facility-Administered Medications   Medication Dose Route Frequency Provider Last Rate Last Admin    apixaban (ELIQUIS) tablet 5 mg  5 mg Oral BID Hank Rascon MD   5 mg at 01/23/24 0817    sennosides-docusate (PERICOLACE) 8.6-50 MG per tablet 2 tablet  2 tablet Oral BID Hank Rascon MD        And    polyethylene glycol (MIRALAX) packet 17 g  17 g Oral Daily PRN Hank Rascon MD        And    bisacodyl (DULCOLAX) EC tablet 5 mg  5 mg Oral Daily PRN Hank Rascon MD        And    bisacodyl (DULCOLAX) suppository 10 mg  10 mg Rectal Daily PRN Hank Rascon MD        cefTRIAXone (ROCEPHIN) 1,000 mg in sodium chloride 0.9 % 100 mL IVPB  1,000 mg Intravenous Q24H Edgar Tolbert  mL/hr at 01/23/24 0812 1,000 mg at 01/23/24 0812    diphenhydrAMINE (BENADRYL) injection 25 mg  25 mg Intravenous Q6H PRN Kalyani Ferrer PA   25 mg at 01/22/24 2057    flecainide (TAMBOCOR) tablet 25 mg  25 mg Oral BID Hank Rascon MD   25 mg at 01/23/24 0817    glipizide (GLUCOTROL) tablet 2.5 mg  2.5 mg Oral QAM AC Hank Rascon MD   2.5 mg at 01/23/24 0817    labetalol (NORMODYNE,TRANDATE) injection 10 mg  10 mg Intravenous Q4H PRN Hank Rascon MD        levothyroxine (SYNTHROID, LEVOTHROID) tablet 50 mcg  50 mcg Oral Q AM Hank Rascon MD   50 mcg at 01/23/24 0838     losartan (COZAAR) tablet 100 mg  100 mg Oral Q24H Hank Rascon MD        nitroglycerin (NITROSTAT) SL tablet 0.4 mg  0.4 mg Sublingual Q5 Min PRN Hank Rascon MD        sodium chloride 0.9 % flush 10 mL  10 mL Intravenous Q12H Hank Rascon MD   10 mL at 01/23/24 0820    sodium chloride 0.9 % flush 10 mL  10 mL Intravenous PRN Hank Rascon MD        sodium chloride 0.9 % infusion 40 mL  40 mL Intravenous PRN Hank Rascon MD        verapamil (VERELAN) 24 hr capsule 100 mg  100 mg Oral Nightly Hank Rascon MD   100 mg at 01/22/24 2204       Allergies:  Codeine, Iodine, Levaquin [levofloxacin], Lortab [hydrocodone-acetaminophen], Quinolones, Shrimp (diagnostic), Bactroban [mupirocin calcium], Ace inhibitors, Beta adrenergic blockers, Hydralazine, Norvasc [amlodipine], Piperacillin sod-tazobactam so, Procardia xl [nifedipine er], Statins, Thiazide-type diuretics, and Mupirocin    Review of Systems  Review of Systems   Constitutional:  Negative for diaphoresis, fatigue and unexpected weight change.   Respiratory:  Negative for chest tightness, shortness of breath and wheezing.    Cardiovascular:  Negative for chest pain, palpitations and leg swelling.   Gastrointestinal:  Negative for diarrhea, nausea and vomiting.   Neurological:  Negative for dizziness, syncope and light-headedness.   All other systems reviewed and are negative.      Objective     Physical Exam:  Patient Vitals for the past 24 hrs:   BP Temp Temp src Pulse Resp SpO2 Height Weight   01/23/24 0759 148/47 98 °F (36.7 °C) Oral 66 18 96 % -- --   01/23/24 0419 -- -- -- -- -- -- -- 64.4 kg (142 lb)   01/23/24 0332 111/49 98.5 °F (36.9 °C) Oral 66 16 96 % -- --   01/22/24 2329 121/48 98.5 °F (36.9 °C) Oral 70 16 96 % -- --   01/22/24 2204 141/67 -- -- 66 -- -- -- --   01/22/24 2002 178/77 -- -- -- -- 99 % -- --   01/22/24 1944 141/50 98.5 °F (36.9 °C) Oral 64 16 95 % -- --   01/22/24 1609 (!) 181/87 97.5 °F (36.4 °C)  "Oral 84 18 98 % -- --   01/22/24 1300 151/98 97.4 °F (36.3 °C) Oral 92 18 98 % 162.6 cm (64\") 64.7 kg (142 lb 9.6 oz)     Vitals reviewed.   Constitutional:       General: Not in acute distress.     Appearance: Healthy appearance. Well-developed. Not diaphoretic.   Eyes:      General: No scleral icterus.     Conjunctiva/sclera: Conjunctivae normal.      Pupils: Pupils are equal, round, and reactive to light.   HENT:      Head: Normocephalic.    Mouth/Throat:      Pharynx: No oropharyngeal exudate.   Neck:      Vascular: No JVR.   Pulmonary:      Effort: Pulmonary effort is normal. No respiratory distress.      Breath sounds: Normal breath sounds. No wheezing. No rhonchi. No rales.   Chest:      Chest wall: Not tender to palpatation.   Cardiovascular:      Normal rate. Irregularly irregular rhythm.   Pulses:     Intact distal pulses.   Edema:     Peripheral edema absent.   Abdominal:      General: Bowel sounds are normal. There is no distension.      Palpations: Abdomen is soft.      Tenderness: There is no abdominal tenderness.   Musculoskeletal: Normal range of motion.      Cervical back: Normal range of motion and neck supple. Skin:     General: Skin is warm and dry.      Coloration: Skin is not pale.      Findings: No erythema or rash.   Neurological:      Mental Status: Alert, oriented to person, place, and time and oriented to person, place and time.      Deep Tendon Reflexes: Reflexes are normal and symmetric.   Psychiatric:         Behavior: Behavior normal.         Results Review:   I reviewed the patient's new clinical results.    Lab Results (last 72 hours)       Procedure Component Value Units Date/Time    Basic Metabolic Panel [[id number]]  (Abnormal) Collected: 01/23/24 0457    Specimen: Blood Updated: 01/23/24 0555     Glucose 117 mg/dL      BUN 29 mg/dL      Creatinine 1.13 mg/dL      Sodium 137 mmol/L      Potassium 4.0 mmol/L      Chloride 102 mmol/L      CO2 24.0 mmol/L      Calcium 9.4 mg/dL      " BUN/Creatinine Ratio 25.7     Anion Gap 11.0 mmol/L      eGFR 46.9 mL/min/1.73     Narrative:      GFR Normal >60  Chronic Kidney Disease <60  Kidney Failure <15    The GFR formula is only valid for adults with stable renal function between ages 18 and 70.    CBC & Differential [[id number]]  (Abnormal) Collected: 01/23/24 0457    Specimen: Blood Updated: 01/23/24 0531    Narrative:      The following orders were created for panel order CBC & Differential.  Procedure                               Abnormality         Status                     ---------                               -----------         ------                     CBC Auto Differential[[id number]]        Abnormal            Final result                 Please view results for these tests on the individual orders.    CBC Auto Differential [[id number]]  (Abnormal) Collected: 01/23/24 0457    Specimen: Blood Updated: 01/23/24 0531     WBC 5.60 10*3/mm3      RBC 4.11 10*6/mm3      Hemoglobin 12.8 g/dL      Hematocrit 38.2 %      MCV 92.9 fL      MCH 31.1 pg      MCHC 33.5 g/dL      RDW 12.2 %      RDW-SD 42.3 fl      MPV 10.0 fL      Platelets 244 10*3/mm3      Neutrophil % 42.5 %      Lymphocyte % 36.6 %      Monocyte % 13.4 %      Eosinophil % 6.6 %      Basophil % 0.7 %      Immature Grans % 0.2 %      Neutrophils, Absolute 2.38 10*3/mm3      Lymphocytes, Absolute 2.05 10*3/mm3      Monocytes, Absolute 0.75 10*3/mm3      Eosinophils, Absolute 0.37 10*3/mm3      Basophils, Absolute 0.04 10*3/mm3      Immature Grans, Absolute 0.01 10*3/mm3      nRBC 0.0 /100 WBC     Urine Culture - Urine, Urine, Clean Catch [[id number]] Collected: 01/22/24 2106    Specimen: Urine, Clean Catch Updated: 01/23/24 0517    High Sensitivity Troponin T 2Hr [[id number]]  (Abnormal) Collected: 01/22/24 2120    Specimen: Blood Updated: 01/22/24 2153     HS Troponin T 36 ng/L      Troponin T Delta 0 ng/L     Narrative:      High Sensitive Troponin T Reference Range:  <14.0 ng/L- Negative  Female for AMI  <22.0 ng/L- Negative Male for AMI  >=14 - Abnormal Female indicating possible myocardial injury.  >=22 - Abnormal Male indicating possible myocardial injury.   Clinicians would have to utilize clinical acumen, EKG, Troponin, and serial changes to determine if it is an Acute Myocardial Infarction or myocardial injury due to an underlying chronic condition.         Urinalysis With Microscopic If Indicated (No Culture) - Urine, Clean Catch [[id number]]  (Abnormal) Collected: 01/22/24 2106    Specimen: Urine, Clean Catch Updated: 01/22/24 2121     Color, UA Yellow     Appearance, UA Clear     pH, UA 6.5     Specific Gravity, UA 1.010     Glucose, UA Negative     Ketones, UA Negative     Bilirubin, UA Negative     Blood, UA Negative     Protein, UA 30 mg/dL (1+)     Leuk Esterase, UA Moderate (2+)     Nitrite, UA Negative     Urobilinogen, UA 0.2 E.U./dL    Urinalysis, Microscopic Only - Urine, Clean Catch [[id number]]  (Abnormal) Collected: 01/22/24 2106    Specimen: Urine, Clean Catch Updated: 01/22/24 2121     RBC, UA 0-2 /HPF      WBC, UA Too Numerous to Count /HPF      Bacteria, UA 3+ /HPF      Squamous Epithelial Cells, UA None Seen /HPF      Hyaline Casts, UA None Seen /LPF      Methodology Automated Microscopy    CBC & Differential [[id number]]  (Abnormal) Collected: 01/22/24 1921    Specimen: Blood Updated: 01/22/24 2000    Narrative:      The following orders were created for panel order CBC & Differential.  Procedure                               Abnormality         Status                     ---------                               -----------         ------                     CBC Auto Differential[[id number]]        Abnormal            Final result                 Please view results for these tests on the individual orders.    CBC Auto Differential [[id number]]  (Abnormal) Collected: 01/22/24 1921    Specimen: Blood Updated: 01/22/24 2000     WBC 6.39 10*3/mm3      RBC 4.08 10*6/mm3       "Hemoglobin 12.9 g/dL      Hematocrit 37.7 %      MCV 92.4 fL      MCH 31.6 pg      MCHC 34.2 g/dL      RDW 12.2 %      RDW-SD 41.5 fl      MPV 10.3 fL      Platelets 253 10*3/mm3      Neutrophil % 49.8 %      Lymphocyte % 34.1 %      Monocyte % 10.8 %      Eosinophil % 4.5 %      Basophil % 0.6 %      Immature Grans % 0.2 %      Neutrophils, Absolute 3.18 10*3/mm3      Lymphocytes, Absolute 2.18 10*3/mm3      Monocytes, Absolute 0.69 10*3/mm3      Eosinophils, Absolute 0.29 10*3/mm3      Basophils, Absolute 0.04 10*3/mm3      Immature Grans, Absolute 0.01 10*3/mm3      nRBC 0.0 /100 WBC     TSH [[id number]]  (Normal) Collected: 01/22/24 1921    Specimen: Blood Updated: 01/22/24 1954     TSH 1.310 uIU/mL     Procalcitonin [[id number]]  (Normal) Collected: 01/22/24 1921    Specimen: Blood Updated: 01/22/24 1953     Procalcitonin 0.06 ng/mL     Narrative:      As a Marker for Sepsis (Non-Neonates):    1. <0.5 ng/mL represents a low risk of severe sepsis and/or septic shock.  2. >2 ng/mL represents a high risk of severe sepsis and/or septic shock.    As a Marker for Lower Respiratory Tract Infections that require antibiotic therapy:    PCT on Admission    Antibiotic Therapy       6-12 Hrs later    >0.5                Strongly Recommended  >0.25 - <0.5        Recommended   0.1 - 0.25          Discouraged              Remeasure/reassess PCT  <0.1                Strongly Discouraged     Remeasure/reassess PCT    As 28 day mortality risk marker: \"Change in Procalcitonin Result\" (>80% or <=80%) if Day 0 (or Day 1) and Day 4 values are available. Refer to http://www.[facility]pct[facility]calculator.com    Change in PCT <=80%  A decrease of PCT levels below or equal to 80% defines a positive change in PCT test result representing a higher risk for 28-day all-cause mortality of patients diagnosed with severe sepsis for septic shock.    Change in PCT >80%  A decrease of PCT levels of more than 80% defines a negative change in PCT result " representing a lower risk for 28-day all-cause mortality of patients diagnosed with severe sepsis or septic shock.       T4, Free [[id number]]  (Normal) Collected: 01/22/24 1921    Specimen: Blood Updated: 01/22/24 1952     Free T4 0.99 ng/dL     Narrative:      Results may be falsely increased if patient taking Biotin.      Comprehensive Metabolic Panel [[id number]]  (Abnormal) Collected: 01/22/24 1921    Specimen: Blood Updated: 01/22/24 1949     Glucose 189 mg/dL      BUN 24 mg/dL      Creatinine 1.09 mg/dL      Sodium 138 mmol/L      Potassium 4.0 mmol/L      Chloride 100 mmol/L      CO2 25.0 mmol/L      Calcium 10.0 mg/dL      Total Protein 6.6 g/dL      Albumin 4.1 g/dL      ALT (SGPT) 12 U/L      AST (SGOT) 16 U/L      Alkaline Phosphatase 75 U/L      Total Bilirubin 0.3 mg/dL      Globulin 2.5 gm/dL      A/G Ratio 1.6 g/dL      BUN/Creatinine Ratio 22.0     Anion Gap 13.0 mmol/L      eGFR 49.0 mL/min/1.73     Narrative:      GFR Normal >60  Chronic Kidney Disease <60  Kidney Failure <15    The GFR formula is only valid for adults with stable renal function between ages 18 and 70.    BNP [[id number]]  (Abnormal) Collected: 01/22/24 1921    Specimen: Blood Updated: 01/22/24 1947     proBNP 2,774.0 pg/mL     Narrative:      This assay is used as an aid in the diagnosis of individuals suspected of having heart failure. It can be used as an aid in the diagnosis of acute decompensated heart failure (ADHF) in patients presenting with signs and symptoms of ADHF to the emergency department (ED). In addition, NT-proBNP of <300 pg/mL indicates ADHF is not likely.    Age Range Result Interpretation  NT-proBNP Concentration (pg/mL:      <50             Positive            >450                   Gray                 300-450                    Negative             <300    50-75           Positive            >900                  Gray                300-900                  Negative            <300      >75              "Positive            >1800                  Gray                300-1800                  Negative            <300    Lactic Acid, Plasma [[id number]]  (Normal) Collected: 01/22/24 1921    Specimen: Blood Updated: 01/22/24 1947     Lactate 1.4 mmol/L     Magnesium [[id number]]  (Normal) Collected: 01/22/24 1921    Specimen: Blood Updated: 01/22/24 1947     Magnesium 2.1 mg/dL     Phosphorus [[id number]]  (Normal) Collected: 01/22/24 1921    Specimen: Blood Updated: 01/22/24 1946     Phosphorus 2.8 mg/dL     High Sensitivity Troponin T [[id number]]  (Abnormal) Collected: 01/22/24 1921    Specimen: Blood Updated: 01/22/24 1946     HS Troponin T 36 ng/L     Narrative:      High Sensitive Troponin T Reference Range:  <14.0 ng/L- Negative Female for AMI  <22.0 ng/L- Negative Male for AMI  >=14 - Abnormal Female indicating possible myocardial injury.  >=22 - Abnormal Male indicating possible myocardial injury.   Clinicians would have to utilize clinical acumen, EKG, Troponin, and serial changes to determine if it is an Acute Myocardial Infarction or myocardial injury due to an underlying chronic condition.         CBC Auto Differential [[id number]]  (Abnormal) Collected: 01/22/24 1921    Specimen: Blood Updated: 01/22/24 1928     WBC 6.60 10*3/mm3      RBC 4.04 10*6/mm3      Hemoglobin 12.9 g/dL      Hematocrit 37.3 %      MCV 92.3 fL      MCH 31.9 pg      MCHC 34.6 g/dL      RDW 12.2 %      RDW-SD 41.5 fl      MPV 9.3 fL      Platelets 232 10*3/mm3      Neutrophil % 49.4 %      Lymphocyte % 34.4 %      Monocyte % 10.9 %      Eosinophil % 4.2 %      Basophil % 0.8 %      Immature Grans % 0.3 %      Neutrophils, Absolute 3.26 10*3/mm3      Lymphocytes, Absolute 2.27 10*3/mm3      Monocytes, Absolute 0.72 10*3/mm3      Eosinophils, Absolute 0.28 10*3/mm3      Basophils, Absolute 0.05 10*3/mm3      Immature Grans, Absolute 0.02 10*3/mm3      nRBC 0.0 /100 WBC             No results found for: \"ECHOEFEST\"    Imaging Results (Last " 72 Hours)       ** No results found for the last 72 hours. **                  Assessment & Plan       Atrial fibrillation, rapid    Benign essential HTN      Plan:   Afib: since arrival to [facility], she has been in NSR with PACs and PVCs. Will obtain a copy of EKG that was obtained at her PCP office to review. Continue Eliquis and flecainide.     HTN: BP was elevated on arrival but has improved with addition of Verapamil. Very difficult to control due to  numerous reported allergies however in my opinion her symptoms appear to be more anxiety related rather than a reaction to the medication. She reports a reaction to the losartan, therefore hold this am and give verapamil and will re-evaluate BP in 1 hr.     Further orders per Dr. Lobo     Thank you for asking us to follow this patient with you.     Electronically signed by QUETA Lyle, 01/23/24, 7:57 AM CST.    Please note this cardiology consultation note is the result of a face to face consultation with the patient, in addition to reviewing medical records at length by myself, QUETA Cotton      Time spent: 45 minutes    Addendum: I have reviewed the EKG that was completed yesterday at her PCP office. It appears to be NSR with PACs and PVCs, not afib with RVR.

## 2024-01-23 NOTE — PROGRESS NOTES
"Brief Office Note:   Patient presented to the office for her TCM visit after recent hospital stay where she was admitted with vague complaints of malaise, dizziness and near syncopal. Responded to losartan while in house. Attempted cardizem cd for rate control but didn't tolerate due to \"weakness\". Today, worsening weakness and accelerated bp. EKG in office indicated Afib with RVR. Admitted for rate control and bp control . Will attempt low dose verelan pm and hopefully she will tolerate. IV hydralazine PRN. Cardiology to eval for recommendations. Difficult case due to long list of intolerances to medications.   Hank Rascon MD    "

## 2024-01-23 NOTE — CASE MANAGEMENT/SOCIAL WORK
Discharge Planning Assessment  [facility]     Patient Name: Nedra Tolliver  MRN: [id number]  Today's Date: 1/23/2024    Admit Date: 1/22/2024        Discharge Needs Assessment       Row Name 01/23/24 1340       Living Environment    People in Home alone    Current Living Arrangements home    Primary Care Provided by self    Family Caregiver if Needed child(lashonda), adult    Able to Return to Prior Arrangements yes       Discharge Needs Assessment    Readmission Within the Last 30 Days previous discharge plan unsuccessful    Equipment Currently Used at Home rollator;cane, straight    Discharge Facility/Level of Care Needs home with home health    Current Discharge Risk lives alone    Discharge Coordination/Progress Spoke with patient at bedside for dc planning. Patient lives alone. Has a son and daughter but she states they have their own health issues and are not able to provide much assistance. Patient was just recently discharged on 1/19 and  has concerns for increased weakness. Possible need for home health at discharge.   Last admit PT recommended home with assist and home health. Will continue to follow for dc needs.                   Discharge Plan    No documentation.                 Continued Care and Services - Admitted Since 1/22/2024    Coordination has not been started for this encounter.       Expected Discharge Date and Time       Expected Discharge Date Expected Discharge Time    Jan 25, 2024            Demographic Summary    No documentation.                  Functional Status    No documentation.                  Psychosocial    No documentation.                  Abuse/Neglect    No documentation.                  Legal    No documentation.                  Substance Abuse    No documentation.                  Patient Forms    No documentation.                     Merlina A Fletcher, RN

## 2024-01-24 PROBLEM — I48.91 ATRIAL FIBRILLATION WITH RVR: Status: ACTIVE | Noted: 2024-01-24

## 2024-01-24 PROBLEM — F41.9 ANXIETY: Status: ACTIVE | Noted: 2024-01-24

## 2024-01-24 PROBLEM — R00.2 PALPITATIONS: Status: ACTIVE | Noted: 2024-01-22

## 2024-01-24 LAB
ANION GAP SERPL CALCULATED.3IONS-SCNC: 12 MMOL/L (ref 5–15)
BASOPHILS # BLD AUTO: 0.05 10*3/MM3 (ref 0–0.2)
BASOPHILS NFR BLD AUTO: 0.9 % (ref 0–1.5)
BUN SERPL-MCNC: 29 MG/DL (ref 8–23)
BUN/CREAT SERPL: 27.6 (ref 7–25)
CALCIUM SPEC-SCNC: 9.8 MG/DL (ref 8.6–10.5)
CHLORIDE SERPL-SCNC: 101 MMOL/L (ref 98–107)
CO2 SERPL-SCNC: 23 MMOL/L (ref 22–29)
CREAT SERPL-MCNC: 1.05 MG/DL (ref 0.57–1)
DEPRECATED RDW RBC AUTO: 42.2 FL (ref 37–54)
EGFRCR SERPLBLD CKD-EPI 2021: 51.2 ML/MIN/1.73
EOSINOPHIL # BLD AUTO: 0.43 10*3/MM3 (ref 0–0.4)
EOSINOPHIL NFR BLD AUTO: 7.7 % (ref 0.3–6.2)
ERYTHROCYTE [DISTWIDTH] IN BLOOD BY AUTOMATED COUNT: 12.1 % (ref 12.3–15.4)
GLUCOSE BLDC GLUCOMTR-MCNC: 173 MG/DL (ref 70–130)
GLUCOSE BLDC GLUCOMTR-MCNC: 201 MG/DL (ref 70–130)
GLUCOSE BLDC GLUCOMTR-MCNC: 230 MG/DL (ref 70–130)
GLUCOSE BLDC GLUCOMTR-MCNC: 94 MG/DL (ref 70–130)
GLUCOSE SERPL-MCNC: 152 MG/DL (ref 65–99)
HCT VFR BLD AUTO: 40.6 % (ref 34–46.6)
HGB BLD-MCNC: 13.2 G/DL (ref 12–15.9)
IMM GRANULOCYTES # BLD AUTO: 0.02 10*3/MM3 (ref 0–0.05)
IMM GRANULOCYTES NFR BLD AUTO: 0.4 % (ref 0–0.5)
LYMPHOCYTES # BLD AUTO: 2.2 10*3/MM3 (ref 0.7–3.1)
LYMPHOCYTES NFR BLD AUTO: 39.5 % (ref 19.6–45.3)
MCH RBC QN AUTO: 30.7 PG (ref 26.6–33)
MCHC RBC AUTO-ENTMCNC: 32.5 G/DL (ref 31.5–35.7)
MCV RBC AUTO: 94.4 FL (ref 79–97)
MONOCYTES # BLD AUTO: 0.67 10*3/MM3 (ref 0.1–0.9)
MONOCYTES NFR BLD AUTO: 12 % (ref 5–12)
NEUTROPHILS NFR BLD AUTO: 2.2 10*3/MM3 (ref 1.7–7)
NEUTROPHILS NFR BLD AUTO: 39.5 % (ref 42.7–76)
NRBC BLD AUTO-RTO: 0 /100 WBC (ref 0–0.2)
PLATELET # BLD AUTO: 234 10*3/MM3 (ref 140–450)
PMV BLD AUTO: 9.4 FL (ref 6–12)
POTASSIUM SERPL-SCNC: 4.3 MMOL/L (ref 3.5–5.2)
RBC # BLD AUTO: 4.3 10*6/MM3 (ref 3.77–5.28)
SODIUM SERPL-SCNC: 136 MMOL/L (ref 136–145)
WBC NRBC COR # BLD AUTO: 5.57 10*3/MM3 (ref 3.4–10.8)

## 2024-01-24 PROCEDURE — 25010000002 CEFTRIAXONE PER 250 MG: Performed by: FAMILY MEDICINE

## 2024-01-24 PROCEDURE — 85025 COMPLETE CBC W/AUTO DIFF WBC: CPT | Performed by: FAMILY MEDICINE

## 2024-01-24 PROCEDURE — 82948 REAGENT STRIP/BLOOD GLUCOSE: CPT

## 2024-01-24 PROCEDURE — 97161 PT EVAL LOW COMPLEX 20 MIN: CPT

## 2024-01-24 PROCEDURE — 99232 SBSQ HOSP IP/OBS MODERATE 35: CPT | Performed by: NURSE PRACTITIONER

## 2024-01-24 PROCEDURE — 80048 BASIC METABOLIC PNL TOTAL CA: CPT | Performed by: FAMILY MEDICINE

## 2024-01-24 RX ORDER — HYDROXYZINE HYDROCHLORIDE 25 MG/1
25 TABLET, FILM COATED ORAL 3 TIMES DAILY PRN
Status: DISCONTINUED | OUTPATIENT
Start: 2024-01-24 | End: 2024-01-25 | Stop reason: HOSPADM

## 2024-01-24 RX ADMIN — DOCUSATE SODIUM 50 MG AND SENNOSIDES 8.6 MG 2 TABLET: 8.6; 5 TABLET, FILM COATED ORAL at 08:33

## 2024-01-24 RX ADMIN — GLIMEPIRIDE 4 MG: 4 TABLET ORAL at 08:34

## 2024-01-24 RX ADMIN — LEVOTHYROXINE SODIUM 50 MCG: 50 TABLET ORAL at 05:52

## 2024-01-24 RX ADMIN — SODIUM CHLORIDE 1000 MG: 900 INJECTION INTRAVENOUS at 08:33

## 2024-01-24 RX ADMIN — Medication 10 ML: at 20:12

## 2024-01-24 RX ADMIN — VERAPAMIL HYDROCHLORIDE 100 MG: 100 CAPSULE, EXTENDED RELEASE ORAL at 20:11

## 2024-01-24 RX ADMIN — APIXABAN 5 MG: 5 TABLET, FILM COATED ORAL at 08:33

## 2024-01-24 RX ADMIN — APIXABAN 5 MG: 5 TABLET, FILM COATED ORAL at 20:11

## 2024-01-24 RX ADMIN — FLECAINIDE ACETATE 25 MG: 50 TABLET ORAL at 08:33

## 2024-01-24 RX ADMIN — Medication 10 ML: at 08:34

## 2024-01-24 RX ADMIN — FLECAINIDE ACETATE 25 MG: 50 TABLET ORAL at 20:11

## 2024-01-24 NOTE — THERAPY EVALUATION
Patient Name: Nedra Tolliver  : 1935    MRN: [id number]                              Today's Date: 2024       Admit Date: 2024    Visit Dx:     ICD-10-CM ICD-9-CM   1. Impaired mobility [Z74.09]  Z74.09 799.89     Patient Active Problem List   Diagnosis    Urinary tract infection without hematuria    Dysuria    Vaginal atrophy    Overactive bladder    Diverticulitis    Benign essential HTN    Thyroid nodule    Hypothyroidism    Hyponatremia    Syncope and collapse    Volume depletion    Type 2 diabetes mellitus, without long-term current use of insulin    Moderate left ankle sprain    Left hip pain    Acute pain of left knee    Pain of left thumb    Nondisplaced fracture of navicular bone of left foot with routine healing    Chest pain at rest    Acute cystitis without hematuria    H/O thyroid nodule    Hypoglycemia    IBS (irritable bowel syndrome)    Idiopathic scoliosis    Insomnia    Mild tricuspid insufficiency    Neuropathy due to type 2 diabetes mellitus    Onychomycosis    Osteopenia    Pericarditis secondary to uremia    Primary osteoarthritis of right knee    Proteinuria    Vitamin D deficiency    High cholesterol    Lumbar radiculopathy    Non-toxic multinodular goiter    Transient ischemic attack    Acute gastritis without hemorrhage    UTI symptoms    Absolute anemia    Essential hypertension    Mixed hyperlipidemia with pervious statin intolerance    Stage 3a chronic kidney disease (CKD)    Cervical radiculopathy    LBBB (left bundle branch block)    Persistent atrial fibrillation    Fatigue    Shortness of breath    Accelerated hypertension with diastolic congestive heart failure, NYHA class 3    Paroxysmal A-fib    Chest pain    Palpitations    Anxiety    Atrial fibrillation with RVR     Past Medical History:   Diagnosis Date    Arthritis     Cancer     BCC @ nose & Left arm    Diabetes type 2, controlled     Diverticulitis     History of GI bleed     History of transfusion      Has had x 5 units.    Hypertension     STAGE 3     Hyponatremia     Required hospitalization    Hypothyroidism     Knee arthropathy 09/30/2020    LPRD (laryngopharyngeal reflux disease)     Pharyngeal dysphagia     Thyroid nodule      Past Surgical History:   Procedure Laterality Date    APPENDECTOMY      BASAL CELL CARCINOMA EXCISION      BELPHAROPTOSIS REPAIR      BREAST CYST EXCISION      CARDIAC CATHETERIZATION      CARDIAC CATHETERIZATION Right 6/24/2021    Procedure: Left Heart Cath R radial, US guided w poss intervention;  Surgeon: Mak Nickerson DO;  Location:  [facility];  Service: Cardiovascular;  Laterality: Right;    CARDIAC CATHETERIZATION      CATARACT EXTRACTION      CATARACT EXTRACTION WITH INTRAOCULAR LENS IMPLANT Bilateral     CHOLECYSTECTOMY      COLONOSCOPY Left 11/1/2016    Tubular adenoma cecum, Diverticulosis repeat prn    SUBTOTAL HYSTERECTOMY      TOTAL KNEE ARTHROPLASTY Right 9/30/2020    Procedure: TOTAL KNEE ARTHROPLASTY;  Surgeon: Mak Pickens MD;  Location:  [facility] OR;  Service: Orthopedics;  Laterality: Right;    TUMOR EXCISION      under armpits and left breast      General Information       Row Name 01/24/24 1406          Physical Therapy Time and Intention    Document Type evaluation  -     Mode of Treatment physical therapy  -       Row Name 01/24/24 1406          General Information    Patient Profile Reviewed yes  -     Prior Level of Function independent:;community mobility;ADL's;home management;cooking;cleaning;driving;shopping;using stairs  rollator  -     Existing Precautions/Restrictions fall  -     Barriers to Rehab none identified  -       Row Name 01/24/24 1406          Living Environment    People in Home alone  -       Row Name 01/24/24 1406          Home Main Entrance    Number of Stairs, Main Entrance five  has post to hold onto  -       Row Name 01/24/24 1406          Cognition    Orientation Status (Cognition)  oriented x 4  -       Row Name 01/24/24 1406          Safety Issues, Functional Mobility    Safety Issues Affecting Function (Mobility) ability to follow commands  -     Impairments Affecting Function (Mobility) balance;endurance/activity tolerance;shortness of breath;strength  -               User Key  (r) = Recorded By, (t) = Taken By, (c) = Cosigned By      Initials Name Provider Type     Wicho Johnson, PT Physical Therapist                   Mobility       Row Name 01/24/24 1406          Bed Mobility    Bed Mobility sit-supine  -     Sit-Supine [place] (Bed Mobility) standby assist  -     Comment, (Bed Mobility) in BR upon entering room  -       Row Name 01/24/24 1406          Sit-Stand Transfer    Sit-Stand [place] (Transfers) standby assist  -[facility] Name 01/24/24 1406          Gait/Stairs (Locomotion)    [place] Level (Gait) contact guard  -     Distance in Feet (Gait) 100  -     Deviations/Abnormal Patterns (Gait) gait speed decreased  -               User Key  (r) = Recorded By, (t) = Taken By, (c) = Cosigned By      Initials Name Provider Type     Wicho Johnson, PT Physical Therapist                   Obj/Interventions       Row Name 01/24/24 1406          Range of Motion Comprehensive    General Range of Motion bilateral upper extremity ROM WFL;bilateral lower extremity ROM WFL  -[facility] Name 01/24/24 1406          Strength Comprehensive (MMT)    General Manual Muscle Testing (MMT) Assessment upper extremity strength deficits identified;lower extremity strength deficits identified  -               User Key  (r) = Recorded By, (t) = Taken By, (c) = Cosigned By      Initials Name Provider Type     Wicho Johnson, PT Physical Therapist                   Goals/Plan       Row Name 01/24/24 1406          Bed Mobility Goal 1 (PT)    Activity/Assistive Device (Bed Mobility Goal 1, PT) bed mobility activities, all  -     [place] Level/Cues Needed (Bed  Mobility Goal 1, PT) independent  -LH     Time Frame (Bed Mobility Goal 1, PT) 10 days  -LH     Progress/Outcomes (Bed Mobility Goal 1, PT) new goal  -       Row Name 01/24/24 1406          Transfer Goal 1 (PT)    Activity/Assistive Device (Transfer Goal 1, PT) sit-to-stand/stand-to-sit  -     [place] Level/Cues Needed (Transfer Goal 1, PT) independent  -LH     Time Frame (Transfer Goal 1, PT) 10 days  -LH     Progress/Outcome (Transfer Goal 1, PT) new goal  -       Row Name 01/24/24 1406          Gait Training Goal 1 (PT)    Activity/Assistive Device (Gait Training Goal 1, PT) gait (walking locomotion);improve balance and speed  -     [place] Level (Gait Training Goal 1, PT) independent  -     Distance (Gait Training Goal 1, PT) 250ft  -LH     Time Frame (Gait Training Goal 1, PT) 10 days  -     Progress/Outcome (Gait Training Goal 1, PT) new goal  -       Row Name 01/24/24 1406          Stairs Goal 1 (PT)    Activity/Assistive Device (Stairs Goal 1, PT) ascending stairs;descending stairs;using handrail, left;using handrail, right  -     [place] Level/Cues Needed (Stairs Goal 1, PT) standby assist  -     Number of Stairs (Stairs Goal 1, PT) 5  -LH     Time Frame (Stairs Goal 1, PT) 10 days  -     Progress/Outcome (Stairs Goal 1, PT) new goal  -       Row Name 01/24/24 1406          Therapy Assessment/Plan (PT)    Planned Therapy Interventions (PT) balance training;bed mobility training;gait training;home exercise program;stretching;strengthening;stair training;ROM (range of motion);patient/family education;transfer training  -               User Key  (r) = Recorded By, (t) = Taken By, (c) = Cosigned By      Initials Name Provider Type     Wicho Johnson, PT Physical Therapist                   Clinical Impression       Row Name 01/24/24 1406          Pain    Pretreatment Pain Rating 0/10 - no pain  -     Posttreatment Pain Rating 0/10 - no pain  -     Pain Intervention(s)  Medication (See MAR)  -       Row Name 01/24/24 1406          Plan of Care Review    Plan of Care Reviewed With patient  -     Outcome Evaluation PT IE complete.  A&Ox4.  No c/o pain.  Pt is independent PLOF.  Today she is SBA for bed mobility and sit<>stand.  Ambulated 100ft CGA x1.  HR 85bpm.  PT to see for progressive ambulation and strengthening.  Recommend home with HH at [place].  Thank you for referral.  -       Row Name 01/24/24 1406          Therapy Assessment/Plan (PT)    Patient/Family Therapy Goals Statement (PT) return home  -     Rehab Potential (PT) good, to achieve stated therapy goals  -     Criteria for Skilled Interventions Met (PT) yes;skilled treatment is necessary  -     Therapy Frequency (PT) 2 times/day  -     Predicted Duration of Therapy Intervention (PT) until dc  -       Row Name 01/24/24 1406          Positioning and Restraints    Pre-Treatment Position bathroom  -     Post Treatment Position bed  -     In Bed fowlers;call light within reach;encouraged to call for assist;side rails up x2  -               User Key  (r) = Recorded By, (t) = Taken By, (c) = Cosigned By      Initials Name Provider Type     Wicho Johnson, PT Physical Therapist                   Outcome Measures       Row Name 01/24/24 1406          How much help from another person do you currently need...    Turning from your back to your side while in flat bed without using bedrails? 4  -LH     Moving from lying on back to sitting on the side of a flat bed without bedrails? 4  -LH     Moving to and from a bed to a chair (including a wheelchair)? 3  -LH     Standing up from a chair using your arms (e.g., wheelchair, bedside chair)? 4  -LH     Climbing 3-5 steps with a railing? 3  -LH     To walk in hospital room? 3  -LH     AM-PAC 6 Clicks Score (PT) 21  -     Highest Level of Mobility Goal 6 --> Walk 10 steps or more  -       Row Name 01/24/24 1406          Functional Assessment    Outcome Measure  Options AM-PAC 6 Clicks Basic Mobility (PT)  -               User Key  (r) = Recorded By, (t) = Taken By, (c) = Cosigned By      Initials Name Provider Type     Wicho Johnson PT Physical Therapist                                 Physical Therapy Education       Title: PT OT SLP Therapies (Done)       Topic: Physical Therapy (Done)       Point: Mobility training (Done)       Learning Progress Summary             Patient Acceptance, E,D, DU,VU by  at 1/24/2024 1413    Comment: benefits of PT and POC, call for A OOB    Acceptance, E,TB, VU by CA at 1/22/2024 1517                         Point: Home exercise program (Done)       Learning Progress Summary             Patient Acceptance, E,TB, VU by CA at 1/22/2024 1517                         Point: Body mechanics (Done)       Learning Progress Summary             Patient Acceptance, E,TB, VU by CA at 1/22/2024 1517                         Point: Precautions (Done)       Learning Progress Summary             Patient Acceptance, E,D, DU,VU by  at 1/24/2024 1413    Comment: benefits of PT and POC, call for A OOB    Acceptance, E,TB, VU by CA at 1/22/2024 1517                                         User Key       Initials Effective Dates Name Provider Type Discipline     02/03/23 -  Wicho Johnson PT Physical Therapist PT    CA 01/15/24 -  Silverio Pham, RN Registered Nurse Nurse                  PT Recommendation and Plan  Planned Therapy Interventions (PT): balance training, bed mobility training, gait training, home exercise program, stretching, strengthening, stair training, ROM (range of motion), patient/family education, transfer training  Plan of Care Reviewed With: patient  Outcome Evaluation: PT IE complete.  A&Ox4.  No c/o pain.  Pt is independent PLOF.  Today she is SBA for bed mobility and sit<>stand.  Ambulated 100ft CGA x1.  HR 85bpm.  PT to see for progressive ambulation and strengthening.  Recommend home with HH at [place].  Thank you for  referral.     Time Calculation:         PT Charges       Row Name 01/24/24 1415             Time Calculation    Start Time 1338  -      Stop Time 1406  -      Time Calculation (min) 28 min  -      PT Received On 01/24/24  -      PT Goal Re-Cert Due Date 02/03/24  -         Untimed Charges    PT Eval/Re-eval Minutes 28  -LH         Total Minutes    Untimed Charges Total Minutes 28  -       Total Minutes 28  -LH                User Key  (r) = Recorded By, (t) = Taken By, (c) = Cosigned By      Initials Name Provider Type     Wicho Johnson, PT Physical Therapist                  Therapy Charges for Today       Code Description Service Date Service Provider Modifiers Qty    73206698603 HC PT EVAL LOW COMPLEXITY 2 1/24/2024 Wicho Johnson, PT GP 1            PT G-Codes  Outcome Measure Options: AM-PAC 6 Clicks Basic Mobility (PT)  AM-PAC 6 Clicks Score (PT): 21  PT Discharge Summary  Anticipated Discharge Disposition (PT): home with home health    Wicho Johnson PT  1/24/2024

## 2024-01-24 NOTE — PROGRESS NOTES
[facility] -  Progress Note     LOS: 0 days   Patient Care Team:  Hank Rascon MD as PCP - General (Family Medicine)  Manish Smith MD as Consulting Physician (Otolaryngology)  Chandrakant Amin MD (Inactive) as Consulting Physician (Urology)  Yordan Blanton MD as Consulting Physician (Gastroenterology)  Roberto Collins PA as Physician Assistant (Otolaryngology)  Gill Panda APRN as Nurse Practitioner (Nurse Practitioner)  Mak Nickerson DO as Consulting Physician (Cardiology)    Chief Complaint: Palpitations     Subjective     Interval History:   No afib per telemetry, remains in NSR with LBBB with PACs and PVCs. Continues to have shaking episodes even after taking medications that she has taken for years. She is receiving IV antibiotics at the time of my assessment and expresses that she does not feel well and asks that I listen to her heart to make sure it is beating normal.          Review of Systems:   Review of Systems   Constitutional:  Negative for diaphoresis, fatigue and unexpected weight change.   Respiratory:  Negative for chest tightness, shortness of breath and wheezing.    Cardiovascular:  Positive for palpitations. Negative for chest pain and leg swelling.   Gastrointestinal:  Negative for diarrhea, nausea and vomiting.   Neurological:  Negative for dizziness, syncope and light-headedness.   Psychiatric/Behavioral:  The patient is nervous/anxious.    All other systems reviewed and are negative.      Objective     Vital Sign Min/Max for last 24 hours  Temp  Min: 97.8 °F (36.6 °C)  Max: 98.4 °F (36.9 °C)   BP  Min: 160/82  Max: 174/63   Pulse  Min: 66  Max: 80   Resp  Min: 16  Max: 18   SpO2  Min: 95 %  Max: 100 %   No data recorded   Weight  Min: 65.1 kg (143 lb 9.6 oz)  Max: 65.1 kg (143 lb 9.6 oz)         01/24/24  0506   Weight: 65.1 kg (143 lb 9.6 oz)         Intake/Output Summary (Last 24 hours) at 1/24/2024 0958  Last data  filed at 1/24/2024 0555  Gross per 24 hour   Intake 600 ml   Output 1300 ml   Net -700 ml         Physical Exam:  Vitals reviewed.   Constitutional:       General: Not in acute distress.     Appearance: Healthy appearance. Well-developed. Not diaphoretic.   Eyes:      General: No scleral icterus.     Conjunctiva/sclera: Conjunctivae normal.      Pupils: Pupils are equal, round, and reactive to light.   HENT:      Head: Normocephalic.    Mouth/Throat:      Pharynx: No oropharyngeal exudate.   Neck:      Vascular: No JVR.   Pulmonary:      Effort: Pulmonary effort is normal. No respiratory distress.      Breath sounds: Normal breath sounds. No wheezing. No rhonchi. No rales.   Chest:      Chest wall: Not tender to palpatation.   Cardiovascular:      Normal rate. Occasional ectopic beats. Regular rhythm.   Pulses:     Intact distal pulses.   Edema:     Peripheral edema absent.   Abdominal:      General: Bowel sounds are normal. There is no distension.      Palpations: Abdomen is soft.      Tenderness: There is no abdominal tenderness.   Musculoskeletal: Normal range of motion.      Cervical back: Normal range of motion and neck supple. Skin:     General: Skin is warm and dry.      Coloration: Skin is not pale.      Findings: No erythema or rash.   Neurological:      Mental Status: Alert, oriented to person, place, and time and oriented to person, place and time.      Deep Tendon Reflexes: Reflexes are normal and symmetric.   Psychiatric:         Behavior: Behavior normal.          Results Review:   Lab Results (last 72 hours)       Procedure Component Value Units Date/Time    POC Glucose Once [[id number]]  (Abnormal) Collected: 01/24/24 0742    Specimen: Blood Updated: 01/24/24 0801     Glucose 173 mg/dL      Comment: : [id number] Elizabeth Castellanosjade ID: [id number]       CBC & Differential [[id number]]  (Abnormal) Collected: 01/24/24 0608    Specimen: Blood Updated: 01/24/24 0621    Narrative:      The following orders  were created for panel order CBC & Differential.  Procedure                               Abnormality         Status                     ---------                               -----------         ------                     CBC Auto Differential[[id number]]        Abnormal            Final result                 Please view results for these tests on the individual orders.    CBC Auto Differential [[id number]]  (Abnormal) Collected: 01/24/24 0608    Specimen: Blood Updated: 01/24/24 0621     WBC 5.57 10*3/mm3      RBC 4.30 10*6/mm3      Hemoglobin 13.2 g/dL      Hematocrit 40.6 %      MCV 94.4 fL      MCH 30.7 pg      MCHC 32.5 g/dL      RDW 12.1 %      RDW-SD 42.2 fl      MPV 9.4 fL      Platelets 234 10*3/mm3      Neutrophil % 39.5 %      Lymphocyte % 39.5 %      Monocyte % 12.0 %      Eosinophil % 7.7 %      Basophil % 0.9 %      Immature Grans % 0.4 %      Neutrophils, Absolute 2.20 10*3/mm3      Lymphocytes, Absolute 2.20 10*3/mm3      Monocytes, Absolute 0.67 10*3/mm3      Eosinophils, Absolute 0.43 10*3/mm3      Basophils, Absolute 0.05 10*3/mm3      Immature Grans, Absolute 0.02 10*3/mm3      nRBC 0.0 /100 WBC     Basic Metabolic Panel [[id number]]  (Abnormal) Collected: 01/24/24 0455    Specimen: Blood Updated: 01/24/24 0540     Glucose 152 mg/dL      BUN 29 mg/dL      Creatinine 1.05 mg/dL      Sodium 136 mmol/L      Potassium 4.3 mmol/L      Comment: Slight hemolysis detected by analyzer. Result may be falsely elevated.        Chloride 101 mmol/L      CO2 23.0 mmol/L      Calcium 9.8 mg/dL      BUN/Creatinine Ratio 27.6     Anion Gap 12.0 mmol/L      eGFR 51.2 mL/min/1.73     Narrative:      GFR Normal >60  Chronic Kidney Disease <60  Kidney Failure <15    The GFR formula is only valid for adults with stable renal function between ages 18 and 70.    POC Glucose Once [[id number]]  (Abnormal) Collected: 01/23/24 1951    Specimen: Blood Updated: 01/23/24 2002     Glucose 167 mg/dL      Comment: : [id number]  Aidan Ecsobedo ID: [id number]       Basic Metabolic Panel [[id number]]  (Abnormal) Collected: 01/23/24 0457    Specimen: Blood Updated: 01/23/24 0555     Glucose 117 mg/dL      BUN 29 mg/dL      Creatinine 1.13 mg/dL      Sodium 137 mmol/L      Potassium 4.0 mmol/L      Chloride 102 mmol/L      CO2 24.0 mmol/L      Calcium 9.4 mg/dL      BUN/Creatinine Ratio 25.7     Anion Gap 11.0 mmol/L      eGFR 46.9 mL/min/1.73     Narrative:      GFR Normal >60  Chronic Kidney Disease <60  Kidney Failure <15    The GFR formula is only valid for adults with stable renal function between ages 18 and 70.    CBC & Differential [[id number]]  (Abnormal) Collected: 01/23/24 0457    Specimen: Blood Updated: 01/23/24 0531    Narrative:      The following orders were created for panel order CBC & Differential.  Procedure                               Abnormality         Status                     ---------                               -----------         ------                     CBC Auto Differential[[id number]]        Abnormal            Final result                 Please view results for these tests on the individual orders.    CBC Auto Differential [[id number]]  (Abnormal) Collected: 01/23/24 0457    Specimen: Blood Updated: 01/23/24 0531     WBC 5.60 10*3/mm3      RBC 4.11 10*6/mm3      Hemoglobin 12.8 g/dL      Hematocrit 38.2 %      MCV 92.9 fL      MCH 31.1 pg      MCHC 33.5 g/dL      RDW 12.2 %      RDW-SD 42.3 fl      MPV 10.0 fL      Platelets 244 10*3/mm3      Neutrophil % 42.5 %      Lymphocyte % 36.6 %      Monocyte % 13.4 %      Eosinophil % 6.6 %      Basophil % 0.7 %      Immature Grans % 0.2 %      Neutrophils, Absolute 2.38 10*3/mm3      Lymphocytes, Absolute 2.05 10*3/mm3      Monocytes, Absolute 0.75 10*3/mm3      Eosinophils, Absolute 0.37 10*3/mm3      Basophils, Absolute 0.04 10*3/mm3      Immature Grans, Absolute 0.01 10*3/mm3      nRBC 0.0 /100 WBC     Urine Culture - Urine, Urine, Clean Catch  [[id number]] Collected: 01/22/24 2106    Specimen: Urine, Clean Catch Updated: 01/23/24 0517    High Sensitivity Troponin T 2Hr [[id number]]  (Abnormal) Collected: 01/22/24 2120    Specimen: Blood Updated: 01/22/24 2153     HS Troponin T 36 ng/L      Troponin T Delta 0 ng/L     Narrative:      High Sensitive Troponin T Reference Range:  <14.0 ng/L- Negative Female for AMI  <22.0 ng/L- Negative Male for AMI  >=14 - Abnormal Female indicating possible myocardial injury.  >=22 - Abnormal Male indicating possible myocardial injury.   Clinicians would have to utilize clinical acumen, EKG, Troponin, and serial changes to determine if it is an Acute Myocardial Infarction or myocardial injury due to an underlying chronic condition.         Urinalysis With Microscopic If Indicated (No Culture) - Urine, Clean Catch [[id number]]  (Abnormal) Collected: 01/22/24 2106    Specimen: Urine, Clean Catch Updated: 01/22/24 2121     Color, UA Yellow     Appearance, UA Clear     pH, UA 6.5     Specific Gravity, UA 1.010     Glucose, UA Negative     Ketones, UA Negative     Bilirubin, UA Negative     Blood, UA Negative     Protein, UA 30 mg/dL (1+)     Leuk Esterase, UA Moderate (2+)     Nitrite, UA Negative     Urobilinogen, UA 0.2 E.U./dL    Urinalysis, Microscopic Only - Urine, Clean Catch [[id number]]  (Abnormal) Collected: 01/22/24 2106    Specimen: Urine, Clean Catch Updated: 01/22/24 2121     RBC, UA 0-2 /HPF      WBC, UA Too Numerous to Count /HPF      Bacteria, UA 3+ /HPF      Squamous Epithelial Cells, UA None Seen /HPF      Hyaline Casts, UA None Seen /LPF      Methodology Automated Microscopy    CBC & Differential [[id number]]  (Abnormal) Collected: 01/22/24 1921    Specimen: Blood Updated: 01/22/24 2000    Narrative:      The following orders were created for panel order CBC & Differential.  Procedure                               Abnormality         Status                     ---------                               -----------     "     ------                     CBC Auto Differential[[id number]]        Abnormal            Final result                 Please view results for these tests on the individual orders.    CBC Auto Differential [[id number]]  (Abnormal) Collected: 01/22/24 1921    Specimen: Blood Updated: 01/22/24 2000     WBC 6.39 10*3/mm3      RBC 4.08 10*6/mm3      Hemoglobin 12.9 g/dL      Hematocrit 37.7 %      MCV 92.4 fL      MCH 31.6 pg      MCHC 34.2 g/dL      RDW 12.2 %      RDW-SD 41.5 fl      MPV 10.3 fL      Platelets 253 10*3/mm3      Neutrophil % 49.8 %      Lymphocyte % 34.1 %      Monocyte % 10.8 %      Eosinophil % 4.5 %      Basophil % 0.6 %      Immature Grans % 0.2 %      Neutrophils, Absolute 3.18 10*3/mm3      Lymphocytes, Absolute 2.18 10*3/mm3      Monocytes, Absolute 0.69 10*3/mm3      Eosinophils, Absolute 0.29 10*3/mm3      Basophils, Absolute 0.04 10*3/mm3      Immature Grans, Absolute 0.01 10*3/mm3      nRBC 0.0 /100 WBC     TSH [[id number]]  (Normal) Collected: 01/22/24 1921    Specimen: Blood Updated: 01/22/24 1954     TSH 1.310 uIU/mL     Procalcitonin [[id number]]  (Normal) Collected: 01/22/24 1921    Specimen: Blood Updated: 01/22/24 1953     Procalcitonin 0.06 ng/mL     Narrative:      As a Marker for Sepsis (Non-Neonates):    1. <0.5 ng/mL represents a low risk of severe sepsis and/or septic shock.  2. >2 ng/mL represents a high risk of severe sepsis and/or septic shock.    As a Marker for Lower Respiratory Tract Infections that require antibiotic therapy:    PCT on Admission    Antibiotic Therapy       6-12 Hrs later    >0.5                Strongly Recommended  >0.25 - <0.5        Recommended   0.1 - 0.25          Discouraged              Remeasure/reassess PCT  <0.1                Strongly Discouraged     Remeasure/reassess PCT    As 28 day mortality risk marker: \"Change in Procalcitonin Result\" (>80% or <=80%) if Day 0 (or Day 1) and Day 4 values are available. Refer to " http://[email or web address]-pct-calculator.com    Change in PCT <=80%  A decrease of PCT levels below or equal to 80% defines a positive change in PCT test result representing a higher risk for 28-day all-cause mortality of patients diagnosed with severe sepsis for septic shock.    Change in PCT >80%  A decrease of PCT levels of more than 80% defines a negative change in PCT result representing a lower risk for 28-day all-cause mortality of patients diagnosed with severe sepsis or septic shock.       T4, Free [[id number]]  (Normal) Collected: 01/22/24 1921    Specimen: Blood Updated: 01/22/24 1952     Free T4 0.99 ng/dL     Narrative:      Results may be falsely increased if patient taking Biotin.      Comprehensive Metabolic Panel [[id number]]  (Abnormal) Collected: 01/22/24 1921    Specimen: Blood Updated: 01/22/24 1949     Glucose 189 mg/dL      BUN 24 mg/dL      Creatinine 1.09 mg/dL      Sodium 138 mmol/L      Potassium 4.0 mmol/L      Chloride 100 mmol/L      CO2 25.0 mmol/L      Calcium 10.0 mg/dL      Total Protein 6.6 g/dL      Albumin 4.1 g/dL      ALT (SGPT) 12 U/L      AST (SGOT) 16 U/L      Alkaline Phosphatase 75 U/L      Total Bilirubin 0.3 mg/dL      Globulin 2.5 gm/dL      A/G Ratio 1.6 g/dL      BUN/Creatinine Ratio 22.0     Anion Gap 13.0 mmol/L      eGFR 49.0 mL/min/1.73     Narrative:      GFR Normal >60  Chronic Kidney Disease <60  Kidney Failure <15    The GFR formula is only valid for adults with stable renal function between ages 18 and 70.    BNP [[id number]]  (Abnormal) Collected: 01/22/24 1921    Specimen: Blood Updated: 01/22/24 1947     proBNP 2,774.0 pg/mL     Narrative:      This assay is used as an aid in the diagnosis of individuals suspected of having heart failure. It can be used as an aid in the diagnosis of acute decompensated heart failure (ADHF) in patients presenting with signs and symptoms of ADHF to the emergency department (ED). In addition, NT-proBNP of <300 pg/mL indicates ADHF is  not likely.    Age Range Result Interpretation  NT-proBNP Concentration (pg/mL:      <50             Positive            >450                   Gray                 300-450                    Negative             <300    50-75           Positive            >900                  Gray                300-900                  Negative            <300      >75             Positive            >1800                  Gray                300-1800                  Negative            <300    Lactic Acid, Plasma [[id number]]  (Normal) Collected: 01/22/24 1921    Specimen: Blood Updated: 01/22/24 1947     Lactate 1.4 mmol/L     Magnesium [[id number]]  (Normal) Collected: 01/22/24 1921    Specimen: Blood Updated: 01/22/24 1947     Magnesium 2.1 mg/dL     Phosphorus [[id number]]  (Normal) Collected: 01/22/24 1921    Specimen: Blood Updated: 01/22/24 1946     Phosphorus 2.8 mg/dL     High Sensitivity Troponin T [[id number]]  (Abnormal) Collected: 01/22/24 1921    Specimen: Blood Updated: 01/22/24 1946     HS Troponin T 36 ng/L     Narrative:      High Sensitive Troponin T Reference Range:  <14.0 ng/L- Negative Female for AMI  <22.0 ng/L- Negative Male for AMI  >=14 - Abnormal Female indicating possible myocardial injury.  >=22 - Abnormal Male indicating possible myocardial injury.   Clinicians would have to utilize clinical acumen, EKG, Troponin, and serial changes to determine if it is an Acute Myocardial Infarction or myocardial injury due to an underlying chronic condition.         CBC Auto Differential [[id number]]  (Abnormal) Collected: 01/22/24 1921    Specimen: Blood Updated: 01/22/24 1928     WBC 6.60 10*3/mm3      RBC 4.04 10*6/mm3      Hemoglobin 12.9 g/dL      Hematocrit 37.3 %      MCV 92.3 fL      MCH 31.9 pg      MCHC 34.6 g/dL      RDW 12.2 %      RDW-SD 41.5 fl      MPV 9.3 fL      Platelets 232 10*3/mm3      Neutrophil % 49.4 %      Lymphocyte % 34.4 %      Monocyte % 10.9 %      Eosinophil % 4.2 %      Basophil % 0.8  %      Immature Grans % 0.3 %      Neutrophils, Absolute 3.26 10*3/mm3      Lymphocytes, Absolute 2.27 10*3/mm3      Monocytes, Absolute 0.72 10*3/mm3      Eosinophils, Absolute 0.28 10*3/mm3      Basophils, Absolute 0.05 10*3/mm3      Immature Grans, Absolute 0.02 10*3/mm3      nRBC 0.0 /100 WBC              Results for orders placed during the hospital encounter of 05/30/23    Adult Transthoracic Echo Complete w/ Color, Spectral and Contrast if necessary per protocol    Interpretation Summary    Left ventricular systolic function is normal. Left ventricular ejection fraction appears to be 66 - 70%.    Left ventricular wall thickness is consistent with moderate septal asymmetric hypertrophy.    Left ventricular diastolic function is consistent with (grade Ia w/high LAP) impaired relaxation and age.    The left atrial cavity is moderately dilated.    Estimated right ventricular systolic pressure from tricuspid regurgitation is normal (<35 mmHg).    Normal size and function the right ventricle.    No significant (greater than mild) valvular pathology.    Compared to prior exam from 9/9/2022, no significant change though EF appears better on current exam.          Medication Review: yes  Current Facility-Administered Medications   Medication Dose Route Frequency Provider Last Rate Last Admin    acetaminophen (TYLENOL) tablet 650 mg  650 mg Oral Q6H PRN Hank Rascon MD   650 mg at 01/23/24 2035    apixaban (ELIQUIS) tablet 5 mg  5 mg Oral BID Hank Rascon MD   5 mg at 01/24/24 0833    sennosides-docusate (PERICOLACE) 8.6-50 MG per tablet 2 tablet  2 tablet Oral BID Hank Rascon MD   2 tablet at 01/24/24 0833    And    polyethylene glycol (MIRALAX) packet 17 g  17 g Oral Daily PRN Hank Rascon MD        And    bisacodyl (DULCOLAX) EC tablet 5 mg  5 mg Oral Daily PRN Hank Rascon MD        And    bisacodyl (DULCOLAX) suppository 10 mg  10 mg Rectal Daily PRN Hank Rascon MD      "   cefTRIAXone (ROCEPHIN) 1,000 mg in sodium chloride 0.9 % 100 mL IVPB  1,000 mg Intravenous Q24H Edgar Tolbert  mL/hr at 01/24/24 0833 1,000 mg at 01/24/24 0833    diphenhydrAMINE (BENADRYL) injection 25 mg  25 mg Intravenous Q6H PRN Kalyani Ferrer PA   25 mg at 01/23/24 2146    flecainide (TAMBOCOR) tablet 25 mg  25 mg Oral BID Hank Rascon MD   25 mg at 01/24/24 0833    glimepiride (AMARYL) tablet 4 mg  4 mg Oral Daily Hank Rascon MD   4 mg at 01/24/24 0834    hydrOXYzine (ATARAX) tablet 25 mg  25 mg Oral TID PRN Edgar Tolbert MD        labetalol (NORMODYNE,TRANDATE) injection 10 mg  10 mg Intravenous Q4H PRN Hank Rascon MD        levothyroxine (SYNTHROID, LEVOTHROID) tablet 50 mcg  50 mcg Oral Q AM Hank Rascon MD   50 mcg at 01/24/24 0552    nitroglycerin (NITROSTAT) SL tablet 0.4 mg  0.4 mg Sublingual Q5 Min PRN Hank Rascon MD        sodium chloride 0.9 % flush 10 mL  10 mL Intravenous Q12H Hank Rascon MD   10 mL at 01/24/24 0834    sodium chloride 0.9 % flush 10 mL  10 mL Intravenous PRN Hank Rascon MD        sodium chloride 0.9 % infusion 40 mL  40 mL Intravenous PRN Hank Rascon MD        verapamil (VERELAN) 24 hr capsule 100 mg  100 mg Oral Nightly Hank Rascon MD   100 mg at 01/23/24 2037         Assessment & Plan       Palpitations    Benign essential HTN    Anxiety    Plan:   Palpitations: no evidence of Afib. Has PACs and PVCs. Has had \"reactions\" to BB in the past. Is being treated with verapamil.     HTN: elevated today, may be anxiety induced. Defer HTN treatment to primary provider.     Anxiety: likely the main cause of a majority of her symptoms however she is in denial that her anxiety is bad enough to cause her symptoms. Defer treatment to primary provider.     Cardiology will sign off at this time. Please let us know if we can be of further assistance.       Further recommendations per  " [place]    Electronically signed by QUETA Lyle, 01/24/24, 9:29 AM CST.    Time spent: 30 minutes

## 2024-01-24 NOTE — PLAN OF CARE
Problem: Adult Inpatient Plan of Care  Goal: Plan of Care Review  Recent Flowsheet Documentation  Taken 1/24/2024 1406 by Wicho Johnson, PT  Plan of Care Reviewed With: patient  Outcome Evaluation: PT IE complete.  A&Ox4.  No c/o pain.  Pt is independent PLOF.  Today she is SBA for bed mobility and sit<>stand.  Ambulated 100ft CGA x1.  HR 85bpm.  PT to see for progressive ambulation and strengthening.  Recommend home with HH at [place].  Thank you for referral.   Goal Outcome Evaluation:  Plan of Care Reviewed With: patient           Outcome Evaluation: PT IE complete.  A&Ox4.  No c/o pain.  Pt is independent PLOF.  Today she is SBA for bed mobility and sit<>stand.  Ambulated 100ft CGA x1.  HR 85bpm.  PT to see for progressive ambulation and strengthening.  Recommend home with HH at [place].  Thank you for referral.      Anticipated Discharge Disposition (PT): home with home health

## 2024-01-24 NOTE — PLAN OF CARE
Goal Outcome Evaluation:  Plan of Care Reviewed With: patient        Progress: no change  Outcome Evaluation: VSS. Pt resting well this shift. At about 2145, pt called out stating she was having another spell of muscle jerking. Attempted to obtain vitals but had no luck, manual BP obtained see chart. Pt stated she had a similar episode last night, PRN benadryl given. Pt did not have much change until she asked to walk to the bathroom. Once up and walking patient returned to normal with no issues. 2L o@ placed for comfort per aptient.  No c/o pain. Safety maintained. Tele- SB/S/SA 59-84 with BBB/PAC.

## 2024-01-24 NOTE — PROGRESS NOTES
Daily Progress Note  Nedra Tolliver  MRN: [id number] LOS: 0    Admit Date: 1/22/2024 1/24/2024 07:54 CST    Subjective:      Chief Complaint:  No chief complaint on file.      Interval History:    Reviewed overnight events and nursing notes.   Reports another episode after taking nighttime dose of verapamil.  Per nursing staff she also has these shaking episodes following administration of medications that she is reportedly taken for years without any adverse reaction.  Will try low-dose hydroxyzine on a scheduled regimen as she does admit to increasing stressors with family members.    Review of Systems   Constitutional:  Positive for activity change.   Respiratory:  Positive for shortness of breath. Negative for cough.    Cardiovascular:  Positive for palpitations. Negative for leg swelling.   Gastrointestinal:  Negative for abdominal pain, constipation, diarrhea and vomiting.   Genitourinary:  Positive for dysuria.   Neurological:  Positive for weakness.       DIET:  Diet: Cardiac Diets; Healthy Heart (2-3 Na+); Texture: Regular Texture (IDDSI 7); Fluid Consistency: Thin (IDDSI 0)    Medications:      apixaban, 5 mg, Oral, BID  cefTRIAXone, 1,000 mg, Intravenous, Q24H  flecainide, 25 mg, Oral, BID  glimepiride, 4 mg, Oral, Daily  levothyroxine, 50 mcg, Oral, Q AM  senna-docusate sodium, 2 tablet, Oral, BID  sodium chloride, 10 mL, Intravenous, Q12H  verapamil, 100 mg, Oral, Nightly        Data:     Code Status:   Code Status and Medical Interventions:   Ordered at: 01/22/24 7153     Level Of Support Discussed With:    Patient     Code Status (Patient has no pulse and is not breathing):    CPR (Attempt to Resuscitate)     Medical Interventions (Patient has pulse or is breathing):    Full Support       Family History   Problem Relation Age of Onset    Diabetes Mother     Cancer Mother     Heart failure Mother     Diabetes Father     Colon cancer Neg Hx     Colon polyps Neg Hx      Social History      Socioeconomic History    Marital status:    Tobacco Use    Smoking status: Never    Smokeless tobacco: Never   Vaping Use    Vaping Use: Never used   Substance and Sexual Activity    Alcohol use: No    Drug use: Never    Sexual activity: Defer     Partners: Male       Labs:    Lab Results (last 72 hours)       Procedure Component Value Units Date/Time    CBC & Differential [[id number]]  (Abnormal) Collected: 01/24/24 0608    Specimen: Blood Updated: 01/24/24 0621    Narrative:      The following orders were created for panel order CBC & Differential.  Procedure                               Abnormality         Status                     ---------                               -----------         ------                     CBC Auto Differential[[id number]]        Abnormal            Final result                 Please view results for these tests on the individual orders.    CBC Auto Differential [[id number]]  (Abnormal) Collected: 01/24/24 0608    Specimen: Blood Updated: 01/24/24 0621     WBC 5.57 10*3/mm3      RBC 4.30 10*6/mm3      Hemoglobin 13.2 g/dL      Hematocrit 40.6 %      MCV 94.4 fL      MCH 30.7 pg      MCHC 32.5 g/dL      RDW 12.1 %      RDW-SD 42.2 fl      MPV 9.4 fL      Platelets 234 10*3/mm3      Neutrophil % 39.5 %      Lymphocyte % 39.5 %      Monocyte % 12.0 %      Eosinophil % 7.7 %      Basophil % 0.9 %      Immature Grans % 0.4 %      Neutrophils, Absolute 2.20 10*3/mm3      Lymphocytes, Absolute 2.20 10*3/mm3      Monocytes, Absolute 0.67 10*3/mm3      Eosinophils, Absolute 0.43 10*3/mm3      Basophils, Absolute 0.05 10*3/mm3      Immature Grans, Absolute 0.02 10*3/mm3      nRBC 0.0 /100 WBC     Basic Metabolic Panel [[id number]]  (Abnormal) Collected: 01/24/24 0455    Specimen: Blood Updated: 01/24/24 0540     Glucose 152 mg/dL      BUN 29 mg/dL      Creatinine 1.05 mg/dL      Sodium 136 mmol/L      Potassium 4.3 mmol/L      Comment: Slight hemolysis detected by analyzer. Result may  be falsely elevated.        Chloride 101 mmol/L      CO2 23.0 mmol/L      Calcium 9.8 mg/dL      BUN/Creatinine Ratio 27.6     Anion Gap 12.0 mmol/L      eGFR 51.2 mL/min/1.73     Narrative:      GFR Normal >60  Chronic Kidney Disease <60  Kidney Failure <15    The GFR formula is only valid for adults with stable renal function between ages 18 and 70.    POC Glucose Once [[id number]]  (Abnormal) Collected: 01/23/24 1951    Specimen: Blood Updated: 01/23/24 2002     Glucose 167 mg/dL      Comment: : [id number] Aidan Escobedo ID: [id number]       Basic Metabolic Panel [[id number]]  (Abnormal) Collected: 01/23/24 0457    Specimen: Blood Updated: 01/23/24 0555     Glucose 117 mg/dL      BUN 29 mg/dL      Creatinine 1.13 mg/dL      Sodium 137 mmol/L      Potassium 4.0 mmol/L      Chloride 102 mmol/L      CO2 24.0 mmol/L      Calcium 9.4 mg/dL      BUN/Creatinine Ratio 25.7     Anion Gap 11.0 mmol/L      eGFR 46.9 mL/min/1.73     Narrative:      GFR Normal >60  Chronic Kidney Disease <60  Kidney Failure <15    The GFR formula is only valid for adults with stable renal function between ages 18 and 70.    CBC & Differential [[id number]]  (Abnormal) Collected: 01/23/24 0457    Specimen: Blood Updated: 01/23/24 0531    Narrative:      The following orders were created for panel order CBC & Differential.  Procedure                               Abnormality         Status                     ---------                               -----------         ------                     CBC Auto Differential[[id number]]        Abnormal            Final result                 Please view results for these tests on the individual orders.    CBC Auto Differential [[id number]]  (Abnormal) Collected: 01/23/24 0457    Specimen: Blood Updated: 01/23/24 0531     WBC 5.60 10*3/mm3      RBC 4.11 10*6/mm3      Hemoglobin 12.8 g/dL      Hematocrit 38.2 %      MCV 92.9 fL      MCH 31.1 pg      MCHC 33.5 g/dL      RDW 12.2 %      RDW-SD 42.3  fl      MPV 10.0 fL      Platelets 244 10*3/mm3      Neutrophil % 42.5 %      Lymphocyte % 36.6 %      Monocyte % 13.4 %      Eosinophil % 6.6 %      Basophil % 0.7 %      Immature Grans % 0.2 %      Neutrophils, Absolute 2.38 10*3/mm3      Lymphocytes, Absolute 2.05 10*3/mm3      Monocytes, Absolute 0.75 10*3/mm3      Eosinophils, Absolute 0.37 10*3/mm3      Basophils, Absolute 0.04 10*3/mm3      Immature Grans, Absolute 0.01 10*3/mm3      nRBC 0.0 /100 WBC     Urine Culture - Urine, Urine, Clean Catch [[id number]] Collected: 01/22/24 2106    Specimen: Urine, Clean Catch Updated: 01/23/24 0517    High Sensitivity Troponin T 2Hr [[id number]]  (Abnormal) Collected: 01/22/24 2120    Specimen: Blood Updated: 01/22/24 2153     HS Troponin T 36 ng/L      Troponin T Delta 0 ng/L     Narrative:      High Sensitive Troponin T Reference Range:  <14.0 ng/L- Negative Female for AMI  <22.0 ng/L- Negative Male for AMI  >=14 - Abnormal Female indicating possible myocardial injury.  >=22 - Abnormal Male indicating possible myocardial injury.   Clinicians would have to utilize clinical acumen, EKG, Troponin, and serial changes to determine if it is an Acute Myocardial Infarction or myocardial injury due to an underlying chronic condition.         Urinalysis With Microscopic If Indicated (No Culture) - Urine, Clean Catch [[id number]]  (Abnormal) Collected: 01/22/24 2106    Specimen: Urine, Clean Catch Updated: 01/22/24 2121     Color, UA Yellow     Appearance, UA Clear     pH, UA 6.5     Specific Gravity, UA 1.010     Glucose, UA Negative     Ketones, UA Negative     Bilirubin, UA Negative     Blood, UA Negative     Protein, UA 30 mg/dL (1+)     Leuk Esterase, UA Moderate (2+)     Nitrite, UA Negative     Urobilinogen, UA 0.2 E.U./dL    Urinalysis, Microscopic Only - Urine, Clean Catch [[id number]]  (Abnormal) Collected: 01/22/24 2106    Specimen: Urine, Clean Catch Updated: 01/22/24 2121     RBC, UA 0-2 /HPF      WBC, UA Too Numerous  to Count /HPF      Bacteria, UA 3+ /HPF      Squamous Epithelial Cells, UA None Seen /HPF      Hyaline Casts, UA None Seen /LPF      Methodology Automated Microscopy    CBC & Differential [[id number]]  (Abnormal) Collected: 01/22/24 1921    Specimen: Blood Updated: 01/22/24 2000    Narrative:      The following orders were created for panel order CBC & Differential.  Procedure                               Abnormality         Status                     ---------                               -----------         ------                     CBC Auto Differential[[id number]]        Abnormal            Final result                 Please view results for these tests on the individual orders.    CBC Auto Differential [[id number]]  (Abnormal) Collected: 01/22/24 1921    Specimen: Blood Updated: 01/22/24 2000     WBC 6.39 10*3/mm3      RBC 4.08 10*6/mm3      Hemoglobin 12.9 g/dL      Hematocrit 37.7 %      MCV 92.4 fL      MCH 31.6 pg      MCHC 34.2 g/dL      RDW 12.2 %      RDW-SD 41.5 fl      MPV 10.3 fL      Platelets 253 10*3/mm3      Neutrophil % 49.8 %      Lymphocyte % 34.1 %      Monocyte % 10.8 %      Eosinophil % 4.5 %      Basophil % 0.6 %      Immature Grans % 0.2 %      Neutrophils, Absolute 3.18 10*3/mm3      Lymphocytes, Absolute 2.18 10*3/mm3      Monocytes, Absolute 0.69 10*3/mm3      Eosinophils, Absolute 0.29 10*3/mm3      Basophils, Absolute 0.04 10*3/mm3      Immature Grans, Absolute 0.01 10*3/mm3      nRBC 0.0 /100 WBC     TSH [[id number]]  (Normal) Collected: 01/22/24 1921    Specimen: Blood Updated: 01/22/24 1954     TSH 1.310 uIU/mL     Procalcitonin [[id number]]  (Normal) Collected: 01/22/24 1921    Specimen: Blood Updated: 01/22/24 1953     Procalcitonin 0.06 ng/mL     Narrative:      As a Marker for Sepsis (Non-Neonates):    1. <0.5 ng/mL represents a low risk of severe sepsis and/or septic shock.  2. >2 ng/mL represents a high risk of severe sepsis and/or septic shock.    As a Marker for Lower  "Respiratory Tract Infections that require antibiotic therapy:    PCT on Admission    Antibiotic Therapy       6-12 Hrs later    >0.5                Strongly Recommended  >0.25 - <0.5        Recommended   0.1 - 0.25          Discouraged              Remeasure/reassess PCT  <0.1                Strongly Discouraged     Remeasure/reassess PCT    As 28 day mortality risk marker: \"Change in Procalcitonin Result\" (>80% or <=80%) if Day 0 (or Day 1) and Day 4 values are available. Refer to http://www.[facility]-pct-calculator.com    Change in PCT <=80%  A decrease of PCT levels below or equal to 80% defines a positive change in PCT test result representing a higher risk for 28-day all-cause mortality of patients diagnosed with severe sepsis for septic shock.    Change in PCT >80%  A decrease of PCT levels of more than 80% defines a negative change in PCT result representing a lower risk for 28-day all-cause mortality of patients diagnosed with severe sepsis or septic shock.       T4, Free [[id number]]  (Normal) Collected: 01/22/24 1921    Specimen: Blood Updated: 01/22/24 1952     Free T4 0.99 ng/dL     Narrative:      Results may be falsely increased if patient taking Biotin.      Comprehensive Metabolic Panel [[id number]]  (Abnormal) Collected: 01/22/24 1921    Specimen: Blood Updated: 01/22/24 1949     Glucose 189 mg/dL      BUN 24 mg/dL      Creatinine 1.09 mg/dL      Sodium 138 mmol/L      Potassium 4.0 mmol/L      Chloride 100 mmol/L      CO2 25.0 mmol/L      Calcium 10.0 mg/dL      Total Protein 6.6 g/dL      Albumin 4.1 g/dL      ALT (SGPT) 12 U/L      AST (SGOT) 16 U/L      Alkaline Phosphatase 75 U/L      Total Bilirubin 0.3 mg/dL      Globulin 2.5 gm/dL      A/G Ratio 1.6 g/dL      BUN/Creatinine Ratio 22.0     Anion Gap 13.0 mmol/L      eGFR 49.0 mL/min/1.73     Narrative:      GFR Normal >60  Chronic Kidney Disease <60  Kidney Failure <15    The GFR formula is only valid for adults with stable renal function between " ages 18 and 70.    BNP [[id number]]  (Abnormal) Collected: 01/22/24 1921    Specimen: Blood Updated: 01/22/24 1947     proBNP 2,774.0 pg/mL     Narrative:      This assay is used as an aid in the diagnosis of individuals suspected of having heart failure. It can be used as an aid in the diagnosis of acute decompensated heart failure (ADHF) in patients presenting with signs and symptoms of ADHF to the emergency department (ED). In addition, NT-proBNP of <300 pg/mL indicates ADHF is not likely.    Age Range Result Interpretation  NT-proBNP Concentration (pg/mL:      <50             Positive            >450                   Gray                 300-450                    Negative             <300    50-75           Positive            >900                  Gray                300-900                  Negative            <300      >75             Positive            >1800                  Gray                300-1800                  Negative            <300    Lactic Acid, Plasma [[id number]]  (Normal) Collected: 01/22/24 1921    Specimen: Blood Updated: 01/22/24 1947     Lactate 1.4 mmol/L     Magnesium [[id number]]  (Normal) Collected: 01/22/24 1921    Specimen: Blood Updated: 01/22/24 1947     Magnesium 2.1 mg/dL     Phosphorus [[id number]]  (Normal) Collected: 01/22/24 1921    Specimen: Blood Updated: 01/22/24 1946     Phosphorus 2.8 mg/dL     High Sensitivity Troponin T [[id number]]  (Abnormal) Collected: 01/22/24 1921    Specimen: Blood Updated: 01/22/24 1946     HS Troponin T 36 ng/L     Narrative:      High Sensitive Troponin T Reference Range:  <14.0 ng/L- Negative Female for AMI  <22.0 ng/L- Negative Male for AMI  >=14 - Abnormal Female indicating possible myocardial injury.  >=22 - Abnormal Male indicating possible myocardial injury.   Clinicians would have to utilize clinical acumen, EKG, Troponin, and serial changes to determine if it is an Acute Myocardial Infarction or myocardial injury due to an underlying  "chronic condition.         CBC Auto Differential [[id number]]  (Abnormal) Collected: 24    Specimen: Blood Updated: 24     WBC 6.60 10*3/mm3      RBC 4.04 10*6/mm3      Hemoglobin 12.9 g/dL      Hematocrit 37.3 %      MCV 92.3 fL      MCH 31.9 pg      MCHC 34.6 g/dL      RDW 12.2 %      RDW-SD 41.5 fl      MPV 9.3 fL      Platelets 232 10*3/mm3      Neutrophil % 49.4 %      Lymphocyte % 34.4 %      Monocyte % 10.9 %      Eosinophil % 4.2 %      Basophil % 0.8 %      Immature Grans % 0.3 %      Neutrophils, Absolute 3.26 10*3/mm3      Lymphocytes, Absolute 2.27 10*3/mm3      Monocytes, Absolute 0.72 10*3/mm3      Eosinophils, Absolute 0.28 10*3/mm3      Basophils, Absolute 0.05 10*3/mm3      Immature Grans, Absolute 0.02 10*3/mm3      nRBC 0.0 /100 WBC               Objective:     Vitals: /63 (BP Location: Right arm, Patient Position: Lying)   Pulse 66   Temp 98.4 °F (36.9 °C) (Oral)   Resp 18   Ht 162.6 cm (64\")   Wt 65.1 kg (143 lb 9.6 oz)   LMP  (LMP Unknown)   SpO2 100%   BMI 24.65 kg/m²    Intake/Output Summary (Last 24 hours) at 2024 0754  Last data filed at 2024 0555  Gross per 24 hour   Intake 840 ml   Output 1500 ml   Net -660 ml    Temp (24hrs), Av.2 °F (36.8 °C), Min:97.8 °F (36.6 °C), Max:98.4 °F (36.9 °C)      Physical Exam  Vitals reviewed.   Constitutional:       Appearance: Normal appearance. She is not ill-appearing.   HENT:      Head: Normocephalic and atraumatic.   Eyes:      General:         Right eye: No discharge.         Left eye: No discharge.      Extraocular Movements: Extraocular movements intact.      Conjunctiva/sclera: Conjunctivae normal.   Cardiovascular:      Rate and Rhythm: Normal rate. Rhythm irregular.      Pulses: Normal pulses.      Heart sounds: No murmur heard.  Pulmonary:      Effort: Pulmonary effort is normal. No respiratory distress.   Abdominal:      General: Abdomen is flat. Bowel sounds are normal. There is no distension. " "  Musculoskeletal:      Right lower leg: No edema.      Left lower leg: No edema.   Skin:     Capillary Refill: Capillary refill takes less than 2 seconds.   Neurological:      General: No focal deficit present.      Mental Status: She is alert.   Psychiatric:         Mood and Affect: Mood normal.         Behavior: Behavior normal.             Assessment and Plan:     Primary Problem:  Atrial fibrillation, rapid    Hospital Problem list:    Atrial fibrillation, rapid    Benign essential HTN      PMH:  Past Medical History:   Diagnosis Date    Arthritis     Cancer     BCC @ nose & Left arm    Diabetes type 2, controlled     Diverticulitis     History of GI bleed     History of transfusion     Has had x 5 units.    Hypertension     STAGE 3     Hyponatremia     Required hospitalization    Hypothyroidism     Knee arthropathy 09/30/2020    LPRD (laryngopharyngeal reflux disease)     Pharyngeal dysphagia     Thyroid nodule        Treatment Plan:  Palpitations: EKG without evidence of atrial fibrillation, but noted PACs and PVCs.  Has reactions to beta-blockers, will try to avoid.  Has had some improvement with verapamil.  Very difficult case due to intolerance to medications.  Cardiology consulted for further recommendations and assistance.  Last echo 5/30/2023 with normal EF, LVH, grade 1 diastolic dysfunction.     Uncontrolled hypertension: Very difficult to control due to intolerance of medications.  Currently on verapamil 100 mg nightly.  Did not tolerate losartan.    Anxiety: Scheduled low-dose hydroxyzine to see if this will help with her \"episodes\" that have been attributed as medication reactions.     UTI: Urinalysis on admission with WBCs, leukocyte Estrace, 3+ bacteria.  Urine culture pending.  Rocephin.     CODE STATUS: Full     DVT prophylaxis: Home Eliquis     Disposition: Inpatient    Reviewed treatment plans with the patient and/or family.   30 minutes spent in face to face interaction and coordination of " care.     Electronically signed by Edgar Tolbert MD on 1/24/2024 at 07:54 CST

## 2024-01-24 NOTE — PLAN OF CARE
Problem: Adult Inpatient Plan of Care  Goal: Plan of Care Review  Outcome: Ongoing, Progressing  Goal: Patient-Specific Goal (Individualized)  Outcome: Ongoing, Progressing  Goal: Absence of Hospital-Acquired Illness or Injury  Outcome: Ongoing, Progressing  Intervention: Identify and Manage Fall Risk  Recent Flowsheet Documentation  Taken 1/24/2024 0743 by Nicole Amin RN  Safety Promotion/Fall Prevention: safety round/check completed  Intervention: Prevent Skin Injury  Recent Flowsheet Documentation  Taken 1/24/2024 0743 by Nicole Amin RN  Body Position: position changed independently  Intervention: Prevent and Manage VTE (Venous Thromboembolism) Risk  Recent Flowsheet Documentation  Taken 1/24/2024 0743 by Nicole Amin RN  Activity Management: activity encouraged  Intervention: Prevent Infection  Recent Flowsheet Documentation  Taken 1/24/2024 0743 by Nicole Amin RN  Infection Prevention: single patient room provided  Goal: Optimal Comfort and Wellbeing  Outcome: Ongoing, Progressing  Intervention: Provide Person-Centered Care  Recent Flowsheet Documentation  Taken 1/24/2024 0743 by Nicole Amin RN  Trust Relationship/Rapport: care explained  Goal: Readiness for Transition of Care  Outcome: Ongoing, Progressing     Problem: Dysrhythmia  Goal: Normalized Cardiac Rhythm  Outcome: Ongoing, Progressing     Problem: Fall Injury Risk  Goal: Absence of Fall and Fall-Related Injury  Outcome: Ongoing, Progressing  Intervention: Identify and Manage Contributors  Recent Flowsheet Documentation  Taken 1/24/2024 0743 by Nicole Amin RN  Medication Review/Management: medications reviewed  Intervention: Promote Injury-Free Environment  Recent Flowsheet Documentation  Taken 1/24/2024 0743 by Nicole Amin RN  Safety Promotion/Fall Prevention: safety round/check completed   Goal Outcome Evaluation:

## 2024-01-25 ENCOUNTER — READMISSION MANAGEMENT (OUTPATIENT)
Dept: CALL CENTER | Facility: HOSPITAL | Age: 89
End: 2024-01-25
Payer: MEDICARE

## 2024-01-25 VITALS
SYSTOLIC BLOOD PRESSURE: 150 MMHG | DIASTOLIC BLOOD PRESSURE: 67 MMHG | TEMPERATURE: 97.3 F | HEIGHT: 64 IN | RESPIRATION RATE: 16 BRPM | WEIGHT: 145 LBS | HEART RATE: 83 BPM | BODY MASS INDEX: 24.75 KG/M2 | OXYGEN SATURATION: 97 %

## 2024-01-25 LAB
BACTERIA SPEC AEROBE CULT: ABNORMAL
GLUCOSE BLDC GLUCOMTR-MCNC: 158 MG/DL (ref 70–130)

## 2024-01-25 PROCEDURE — 25010000002 CEFTRIAXONE PER 250 MG: Performed by: FAMILY MEDICINE

## 2024-01-25 PROCEDURE — 25810000003 SODIUM CHLORIDE 0.9 % SOLUTION: Performed by: FAMILY MEDICINE

## 2024-01-25 PROCEDURE — 97530 THERAPEUTIC ACTIVITIES: CPT

## 2024-01-25 PROCEDURE — 97116 GAIT TRAINING THERAPY: CPT

## 2024-01-25 PROCEDURE — 82948 REAGENT STRIP/BLOOD GLUCOSE: CPT

## 2024-01-25 RX ORDER — VERAPAMIL HYDROCHLORIDE 100 MG/1
100 CAPSULE, EXTENDED RELEASE ORAL NIGHTLY
Qty: 60 CAPSULE | Refills: 0 | Status: SHIPPED | OUTPATIENT
Start: 2024-01-25

## 2024-01-25 RX ORDER — HYDROXYZINE HYDROCHLORIDE 25 MG/1
25 TABLET, FILM COATED ORAL 3 TIMES DAILY PRN
Qty: 90 TABLET | Refills: 0 | Status: SHIPPED | OUTPATIENT
Start: 2024-01-25

## 2024-01-25 RX ADMIN — SODIUM CHLORIDE 1000 MG: 900 INJECTION INTRAVENOUS at 08:33

## 2024-01-25 RX ADMIN — GLIMEPIRIDE 4 MG: 4 TABLET ORAL at 08:33

## 2024-01-25 RX ADMIN — FLECAINIDE ACETATE 25 MG: 50 TABLET ORAL at 08:33

## 2024-01-25 RX ADMIN — APIXABAN 5 MG: 5 TABLET, FILM COATED ORAL at 08:33

## 2024-01-25 RX ADMIN — SODIUM CHLORIDE 250 ML: 9 INJECTION, SOLUTION INTRAVENOUS at 08:33

## 2024-01-25 RX ADMIN — Medication 10 ML: at 08:33

## 2024-01-25 RX ADMIN — LEVOTHYROXINE SODIUM 50 MCG: 50 TABLET ORAL at 05:18

## 2024-01-25 NOTE — DISCHARGE SUMMARY
"  Hospital Discharge Summary    Nedra Tolliver  :  1935  MRN:  [id number]    Admit date:  2024  Discharge date:  2024    Admitting Physician:    Hank Rascon MD    Discharge Diagnoses:      Palpitations    Benign essential HTN    Anxiety    Atrial fibrillation with RVR      Hospital Course:   The patient is a 88 y.o. female who presents with atrial fibrillation with rapid ventricular response and incredibly labile blood pressure.  Patient presented to the office 2024 for her TCM visit after recent hospital stay where she was admitted with vague complaints of malaise, dizziness and near syncopal. Responded to losartan while in house. Attempted cardizem cd for rate control but didn't tolerate due to \"weakness\".  In the office, worsening weakness and accelerated bp. EKG in office indicated Afib with RVR. Admitted for rate control and bp control .  Did not tolerate labetalol, has done well with verapamil.  Cardiology consulted.  She is on Eliquis, flecainide.  Difficult case due to long list of intolerances to medications.  On admission labs, noted to have evidence of urinary tract infection as well as elevated BNP.  Urine culture pending without any lower extremity edema. Repeat EKG without evidence of Afib. Responded well to verapamil. Symptoms are attributed mostly to anxiety, atarax added as well.    The patient was admitted for the above noted medical/surgical issues. Please see daily progress note for further details concerning their stay. The patient improved throughout their stay and reached maximum medical improvement on the day of discharge. The patient/family agree with the treatment plan as outlined above. All questions concerning their stay were answered prior to discharge. They understand the importance of follow up concerning any abnormal test results.     Physical Exam  Vitals reviewed.   Constitutional:       Appearance: Normal appearance. She is not ill-appearing. "   HENT:      Head: Normocephalic and atraumatic.   Eyes:      General:         Right eye: No discharge.         Left eye: No discharge.      Extraocular Movements: Extraocular movements intact.      Conjunctiva/sclera: Conjunctivae normal.   Cardiovascular:      Rate and Rhythm: Normal rate. Rhythm irregular.      Pulses: Normal pulses.      Heart sounds: No murmur heard.  Pulmonary:      Effort: Pulmonary effort is normal. No respiratory distress.   Abdominal:      General: Abdomen is flat. Bowel sounds are normal. There is no distension.   Musculoskeletal:      Right lower leg: No edema.      Left lower leg: No edema.   Skin:     Capillary Refill: Capillary refill takes less than 2 seconds.   Neurological:      General: No focal deficit present.      Mental Status: She is alert.   Psychiatric:         Mood and Affect: Mood normal.         Behavior: Behavior normal.           Discharge Medications:         Discharge Medications        New Medications        Instructions Start Date   hydrOXYzine 25 MG tablet  Commonly known as: ATARAX   25 mg, Oral, 3 Times Daily PRN      verapamil 100 MG 24 hr capsule  Commonly known as: VERELAN   100 mg, Oral, Nightly             Continue These Medications        Instructions Start Date   apixaban 5 MG tablet tablet  Commonly known as: ELIQUIS   5 mg, Oral, 2 Times Daily      Co Q 10 100 MG capsule   1 tablet, Oral, Daily      flecainide 50 MG tablet  Commonly known as: TAMBOCOR   25 mg, Oral, 2 Times Daily, RX states 1/2 tab BID       glimepiride 4 MG tablet  Commonly known as: AMARYL   4 mg, Oral, Every Morning Before Breakfast, Takes an extra 1/2 tablet when blood sugar is high at night      levothyroxine 50 MCG tablet  Commonly known as: SYNTHROID, LEVOTHROID   50 mcg, Oral, Daily      multivitamin with minerals tablet tablet   1 tablet, Oral, Daily      Repatha SureClick solution auto-injector SureClick injection  Generic drug: Evolocumab   140 mg, Subcutaneous, Every 14  Days      Vitamin D3 125 MCG (5000 UT) tablet dispersible   5,000 Units, Oral, Daily             Stop These Medications      losartan 100 MG tablet  Commonly known as: COZAAR     metoprolol succinate XL 50 MG 24 hr tablet  Commonly known as: TOPROL-XL              Activity: as tolerated    Diet: as prior    Consults: cardiology    Significant Diagnostic Studies:    XR Chest 1 View    Result Date: 1/17/2024  No active disease is seen.    This report was signed and finalized on 1/17/2024 10:22 AM by Dr. Doyle Dixon MD.            Treatments:   as above    Disposition:   discharge home    Time spent on discharge including discussion with patient/family, SW, and coordination of care.     Follow up with Edgar Tolbert MD in 1 weeks.    Signed:  Edgar Tolbert MD   1/25/2024, 08:53 CST

## 2024-01-25 NOTE — THERAPY TREATMENT NOTE
Acute Care - Physical Therapy Treatment Note  [facility]     Patient Name: Nedra Tolliver  : 1935  MRN: [id number]  Today's Date: 2024      Visit Dx:     ICD-10-CM ICD-9-CM   1. Impaired mobility [Z74.09]  Z74.09 799.89     Patient Active Problem List   Diagnosis    Urinary tract infection without hematuria    Dysuria    Vaginal atrophy    Overactive bladder    Diverticulitis    Benign essential HTN    Thyroid nodule    Hypothyroidism    Hyponatremia    Syncope and collapse    Volume depletion    Type 2 diabetes mellitus, without long-term current use of insulin    Moderate left ankle sprain    Left hip pain    Acute pain of left knee    Pain of left thumb    Nondisplaced fracture of navicular bone of left foot with routine healing    Chest pain at rest    Acute cystitis without hematuria    H/O thyroid nodule    Hypoglycemia    IBS (irritable bowel syndrome)    Idiopathic scoliosis    Insomnia    Mild tricuspid insufficiency    Neuropathy due to type 2 diabetes mellitus    Onychomycosis    Osteopenia    Pericarditis secondary to uremia    Primary osteoarthritis of right knee    Proteinuria    Vitamin D deficiency    High cholesterol    Lumbar radiculopathy    Non-toxic multinodular goiter    Transient ischemic attack    Acute gastritis without hemorrhage    UTI symptoms    Absolute anemia    Essential hypertension    Mixed hyperlipidemia with pervious statin intolerance    Stage 3a chronic kidney disease (CKD)    Cervical radiculopathy    LBBB (left bundle branch block)    Persistent atrial fibrillation    Fatigue    Shortness of breath    Accelerated hypertension with diastolic congestive heart failure, NYHA class 3    Paroxysmal A-fib    Chest pain    Palpitations    Anxiety    Atrial fibrillation with RVR     Past Medical History:   Diagnosis Date    Arthritis     Cancer     BCC @ nose & Left arm    Diabetes type 2, controlled     Diverticulitis     History of GI bleed     History of transfusion      Has had x 5 units.    Hypertension     STAGE 3     Hyponatremia     Required hospitalization    Hypothyroidism     Knee arthropathy 09/30/2020    LPRD (laryngopharyngeal reflux disease)     Pharyngeal dysphagia     Thyroid nodule      Past Surgical History:   Procedure Laterality Date    APPENDECTOMY      BASAL CELL CARCINOMA EXCISION      BELPHAROPTOSIS REPAIR      BREAST CYST EXCISION      CARDIAC CATHETERIZATION      CARDIAC CATHETERIZATION Right 6/24/2021    Procedure: Left Heart Cath R radial, US guided w poss intervention;  Surgeon: Mak Nickerson DO;  Location:  [facility]ATH [facility]ASIVE [facility];  Service: Cardiovascular;  Laterality: Right;    CARDIAC CATHETERIZATION      CATARACT EXTRACTION      CATARACT EXTRACTION WITH INTRAOCULAR LENS IMPLANT Bilateral     CHOLECYSTECTOMY      COLONOSCOPY Left 11/1/2016    Tubular adenoma cecum, Diverticulosis repeat prn    SUBTOTAL HYSTERECTOMY      TOTAL KNEE ARTHROPLASTY Right 9/30/2020    Procedure: TOTAL KNEE ARTHROPLASTY;  Surgeon: Mak Pickens MD;  Location:  [facility] OR;  Service: Orthopedics;  Laterality: Right;    TUMOR EXCISION      under armpits and left breast     PT Assessment (last 12 hours)       PT Evaluation and Treatment       Row Name 01/25/24 0908          Physical Therapy Time and Intention    Subjective Information complains of;fatigue  -TB     Document Type therapy note (daily note)  -TB     Mode of Treatment physical therapy  -TB       Row Name 01/25/24 0908          General Information    Existing Precautions/Restrictions fall  -TB       Row Name 01/25/24 0908          Bed Mobility    Bed Mobility scooting/bridging;supine-sit;sit-supine  -TB     Scooting/Bridging [place] (Bed Mobility) modified independence  -TB     Supine-Sit [place] (Bed Mobility) modified independence  -TB     Sit-Supine [place] (Bed Mobility) modified independence  -TB     Assistive Device (Bed Mobility) head of bed elevated  -TB       Row Name  01/25/24 0908          Transfers    Transfers sit-stand transfer;stand-sit transfer  -TB       Row Name 01/25/24 0908          Sit-Stand Transfer    Sit-Stand [place] (Transfers) standby assist  -TB       Row Name 01/25/24 0908          Stand-Sit Transfer    Stand-Sit [place] (Transfers) standby assist  -TB       Row Name 01/25/24 0908          Gait/Stairs (Locomotion)    [place] Level (Gait) contact guard;verbal cues  -TB     Assistive Device (Gait) other (see comments)  HHA  -TB     Distance in Feet (Gait) 100  x2  -TB     Deviations/Abnormal Patterns (Gait) gait speed decreased  -TB     Comment, (Gait/Stairs) Pt states she feels weaker today  -TB       Row Name 01/25/24 0908          Balance    Balance Assessment sitting static balance;sitting dynamic balance  -TB     Static Sitting Balance independent  -TB     Dynamic Sitting Balance independent  -TB     Position, Sitting Balance sitting edge of bed  -TB       Row Name 01/25/24 0908          Plan of Care Review    Plan of Care Reviewed With patient  -TB     Progress improving  -TB     Outcome Evaluation PT tx complete. Pt stated she feels weaker today. Bed mob Mod Ind to EOB w/ HOB elevated. Stands SBA. Amb 100' at a time w/ CGA/HHA. Pt doesn't feel as steady today. No LOB noted but very slow gait. Recommend home w/ HH to assist improving strength, balance, and mobility.  -TB       Row Name 01/25/24 0908          Positioning and Restraints    Pre-Treatment Position in bed  -TB     Post Treatment Position bed  -TB     In Bed fowlers;call light within reach;encouraged to call for assist;side rails up x2  -TB               User Key  (r) = Recorded By, (t) = Taken By, (c) = Cosigned By      Initials Name Provider Type    TB Sunil Miller, PTA Physical Therapist Assistant                    Physical Therapy Education       Title: PT OT SLP Therapies (Resolved)       Topic: Physical Therapy (Resolved)       Point: Mobility training (Resolved)        Learning Progress Summary             Patient Acceptance, E,D, DU,VU by  at 1/24/2024 1413    Comment: benefits of PT and POC, call for A OOB    Acceptance, E,TB, VU by CA at 1/22/2024 1517                         Point: Home exercise program (Resolved)       Learning Progress Summary             Patient Acceptance, E,TB, VU by CA at 1/22/2024 1517                         Point: Body mechanics (Resolved)       Learning Progress Summary             Patient Acceptance, E,TB, VU by CA at 1/22/2024 1517                         Point: Precautions (Resolved)       Learning Progress Summary             Patient Acceptance, E,D, DU,VU by  at 1/24/2024 1413    Comment: benefits of PT and POC, call for A OOB    Acceptance, E,TB, VU by CA at 1/22/2024 1517                                         User Key       Initials Effective Dates Name Provider Type Discipline     02/03/23 -  Wicho Johnson, PT Physical Therapist PT    CA 01/15/24 -  Silverio Pham, RN Registered Nurse Nurse                  PT Recommendation and Plan     Plan of Care Reviewed With: patient  Progress: improving  Outcome Evaluation: PT tx complete. Pt stated she feels weaker today. Bed mob Mod Ind to EOB w/ HOB elevated. Stands SBA. Amb 100' at a time w/ CGA/HHA. Pt doesn't feel as steady today. No LOB noted but very slow gait. Recommend home w/ HH to assist improving strength, balance, and mobility.   Outcome Measures       Row Name 01/25/24 0908             How much help from another person do you currently need...    Turning from your back to your side while in flat bed without using bedrails? 4  -TB      Moving from lying on back to sitting on the side of a flat bed without bedrails? 4  -TB      Moving to and from a bed to a chair (including a wheelchair)? 4  -TB      Standing up from a chair using your arms (e.g., wheelchair, bedside chair)? 4  -TB      Climbing 3-5 steps with a railing? 3  -TB      To walk in hospital room? 3  -TB      AM-PAC  6 Clicks Score (PT) 22  -TB      Highest Level of Mobility Goal 7 --> Walk 25 feet or more  -TB         Functional Assessment    Outcome Measure Options AM-PAC 6 Clicks Basic Mobility (PT)  -TB                User Key  (r) = Recorded By, (t) = Taken By, (c) = Cosigned By      Initials Name Provider Type    Sunil Johnston PTA Physical Therapist Assistant                     Time Calculation:    PT Charges       Row Name 01/25/24 0956             Time Calculation    Start Time 0908  -TB      Stop Time 0946  -TB      Time Calculation (min) 38 min  -TB      PT Received On 01/25/24  -TB         Time Calculation- PT    Total Timed Code Minutes- PT 38 minute(s)  -TB                User Key  (r) = Recorded By, (t) = Taken By, (c) = Cosigned By      Initials Name Provider Type    Sunil Johnston, BREE Physical Therapist Assistant                  Therapy Charges for Today       Code Description Service Date Service Provider Modifiers Qty    74624571240 HC GAIT TRAINING EA 15 MIN 1/25/2024 Sunil Miller, BREE GP 2    28363067306 HC PT THERAPEUTIC ACT EA 15 MIN 1/25/2024 Sunil Miller, BREE GP 1            PT G-Codes  Outcome Measure Options: AM-PAC 6 Clicks Basic Mobility (PT)  AM-PAC 6 Clicks Score (PT): 22    Sunil Miller PTA  1/25/2024

## 2024-01-25 NOTE — CASE MANAGEMENT/SOCIAL WORK
Continued Stay Note   [place]     Patient Name: Nedra Tolliver  MRN: [id number]  Today's Date: 1/25/2024    Admit Date: 1/22/2024    Plan: [facility]   Discharge Plan       Row Name 01/25/24 1025       Plan    Plan [facility]    Final Discharge Disposition Code 06 - home with home health care    Final Note Received orders for home health care. Only provider in [facility] is [facility]. MSW spoke to Martha Maciel RN with [facility] to notify of new referral and provided referral packet to Martha on site. No other OR planning needs identified.              Expected Discharge Date and Time       Expected Discharge Date Expected Discharge Time    Jan 25, 2024      KONG Sanchez

## 2024-01-25 NOTE — CASE MANAGEMENT/SOCIAL WORK
Continued Stay Note   [place]     Patient Name: Nedra Tolliver  MRN: [id number]  Today's Date: 1/25/2024    Admit Date: 1/22/2024    Plan: Home   Discharge Plan       Row Name 01/25/24 0908       Plan    Plan Home    Final Discharge Disposition Code 01 - home or self-care    Final Note Patient is discharged home today with no dc planning needs / orders.               Expected Discharge Date and Time       Expected Discharge Date Expected Discharge Time    Jan 25, 2024         KONG Sanchez

## 2024-01-25 NOTE — CASE MANAGEMENT/SOCIAL WORK
"Received consult that says \"home health\".  MSW cannot use this to arrange home health because skilled needs are not included. Will need order entered into \"Ambulatory Referral for Home Health\" section. MSW notified KHANG Salomon of need.   Dr. Tolbert will need to agree to follow home health orders as well. Will follow and will arrange home health if correct orders are entered.   "

## 2024-01-25 NOTE — PLAN OF CARE
Goal Outcome Evaluation:  Plan of Care Reviewed With: patient        Progress: improving  Outcome Evaluation: PT tx complete. Pt stated she feels weaker today. Bed mob Mod Ind to EOB w/ HOB elevated. Stands SBA. Amb 100' at a time w/ CGA/HHA. Pt doesn't feel as steady today. No LOB noted but very slow gait. Recommend home w/ HH to assist improving strength, balance, and mobility.

## 2024-01-25 NOTE — THERAPY DISCHARGE NOTE
Acute Care - Physical Therapy Discharge Summary  [facility]       Patient Name: Nedra Tolliver  : 1935  MRN: [id number]    Today's Date: 2024                 Admit Date: 2024      PT Recommendation and Plan    Visit Dx:    ICD-10-CM ICD-9-CM   1. Impaired mobility [Z74.09]  Z74.09 799.89   2. Palpitations  R00.2 785.1        Outcome Measures       Row Name 24 0908             How much help from another person do you currently need...    Turning from your back to your side while in flat bed without using bedrails? 4  -TB      Moving from lying on back to sitting on the side of a flat bed without bedrails? 4  -TB      Moving to and from a bed to a chair (including a wheelchair)? 4  -TB      Standing up from a chair using your arms (e.g., wheelchair, bedside chair)? 4  -TB      Climbing 3-5 steps with a railing? 3  -TB      To walk in hospital room? 3  -TB      AM-PAC 6 Clicks Score (PT) 22  -TB      Highest Level of Mobility Goal 7 --> Walk 25 feet or more  -TB         Functional Assessment    Outcome Measure Options AM-PAC 6 Clicks Basic Mobility (PT)  -TB                User Key  (r) = Recorded By, (t) = Taken By, (c) = Cosigned By      Initials Name Provider Type    Sunil Johnston PTA Physical Therapist Assistant                     PT Charges       Row Name 24 0956             Time Calculation    Start Time 0908  -TB      Stop Time 0946  -TB      Time Calculation (min) 38 min  -TB      PT Received On 24  -TB         Time Calculation- PT    Total Timed Code Minutes- PT 38 minute(s)  -TB                User Key  (r) = Recorded By, (t) = Taken By, (c) = Cosigned By      Initials Name Provider Type    Sunil Johnston PTA Physical Therapist Assistant                     PT Rehab Goals       Row Name 24 1100             Bed Mobility Goal 1 (PT)    Activity/Assistive Device (Bed Mobility Goal 1, PT) bed mobility activities, all  -AB      [place]  Level/Cues Needed (Bed Mobility Goal 1, PT) independent  -AB      Time Frame (Bed Mobility Goal 1, PT) 10 days  -AB      Progress/Outcomes (Bed Mobility Goal 1, PT) goal partially met  -AB         Transfer Goal 1 (PT)    Activity/Assistive Device (Transfer Goal 1, PT) sit-to-stand/stand-to-sit  -AB      [place] Level/Cues Needed (Transfer Goal 1, PT) independent  -AB      Time Frame (Transfer Goal 1, PT) 10 days  -AB      Progress/Outcome (Transfer Goal 1, PT) goal not met  -AB         Gait Training Goal 1 (PT)    Activity/Assistive Device (Gait Training Goal 1, PT) gait (walking locomotion);improve balance and speed  -AB      [place] Level (Gait Training Goal 1, PT) independent  -AB      Distance (Gait Training Goal 1, PT) 250ft  -AB      Time Frame (Gait Training Goal 1, PT) 10 days  -AB      Progress/Outcome (Gait Training Goal 1, PT) goal not met  -AB         Stairs Goal 1 (PT)    Activity/Assistive Device (Stairs Goal 1, PT) ascending stairs;descending stairs;using handrail, left;using handrail, right  -AB      [place] Level/Cues Needed (Stairs Goal 1, PT) standby assist  -AB      Number of Stairs (Stairs Goal 1, PT) 5  -AB      Time Frame (Stairs Goal 1, PT) 10 days  -AB      Progress/Outcome (Stairs Goal 1, PT) goal not met  -AB                User Key  (r) = Recorded By, (t) = Taken By, (c) = Cosigned By      Initials Name Provider Type Discipline    Soha Smith PTA Physical Therapist Assistant PT                        PT Discharge Summary  Anticipated Discharge Disposition (PT): home with home health  Reason for Discharge: Discharge from facility  Outcomes Achieved: Refer to plan of care for updates on goals achieved  Discharge Destination: Home with home health      Soha Bradley PTA   1/25/2024

## 2024-01-25 NOTE — PLAN OF CARE
Problem: Adult Inpatient Plan of Care  Goal: Plan of Care Review  Outcome: Ongoing, Progressing  Goal: Patient-Specific Goal (Individualized)  Outcome: Ongoing, Progressing  Goal: Absence of Hospital-Acquired Illness or Injury  Outcome: Ongoing, Progressing  Intervention: Identify and Manage Fall Risk  Recent Flowsheet Documentation  Taken 1/25/2024 0400 by Josi De Leon RN  Safety Promotion/Fall Prevention: safety round/check completed  Taken 1/25/2024 0200 by Josi De Leon RN  Safety Promotion/Fall Prevention: safety round/check completed  Taken 1/25/2024 0000 by Josi De Leon RN  Safety Promotion/Fall Prevention: safety round/check completed  Taken 1/24/2024 2200 by Josi De Leon RN  Safety Promotion/Fall Prevention: safety round/check completed  Taken 1/24/2024 2100 by Josi De Leon RN  Safety Promotion/Fall Prevention: safety round/check completed  Taken 1/24/2024 2000 by Josi De Leon RN  Safety Promotion/Fall Prevention: safety round/check completed  Taken 1/24/2024 1900 by Josi De Leon RN  Safety Promotion/Fall Prevention: safety round/check completed  Intervention: Prevent Skin Injury  Recent Flowsheet Documentation  Taken 1/24/2024 2000 by Josi De Leon RN  Body Position: position changed independently  Intervention: Prevent and Manage VTE (Venous Thromboembolism) Risk  Recent Flowsheet Documentation  Taken 1/24/2024 2000 by Josi De Leon RN  Activity Management: activity encouraged  Goal: Optimal Comfort and Wellbeing  Outcome: Ongoing, Progressing  Intervention: Provide Person-Centered Care  Recent Flowsheet Documentation  Taken 1/24/2024 2000 by Josi De Leon RN  Trust Relationship/Rapport:   choices provided   care explained  Goal: Readiness for Transition of Care  Outcome: Ongoing, Progressing     Problem: Dysrhythmia  Goal: Normalized Cardiac Rhythm  Outcome: Ongoing, Progressing     Problem: Fall Injury Risk  Goal: Absence of Fall and Fall-Related  Injury  Outcome: Ongoing, Progressing  Intervention: Promote Injury-Free Environment  Recent Flowsheet Documentation  Taken 1/25/2024 0400 by Josi De Leon RN  Safety Promotion/Fall Prevention: safety round/check completed  Taken 1/25/2024 0200 by Josi De Leon RN  Safety Promotion/Fall Prevention: safety round/check completed  Taken 1/25/2024 0000 by Josi De Leon RN  Safety Promotion/Fall Prevention: safety round/check completed  Taken 1/24/2024 2200 by Josi De Leon RN  Safety Promotion/Fall Prevention: safety round/check completed  Taken 1/24/2024 2100 by Josi De Leon RN  Safety Promotion/Fall Prevention: safety round/check completed  Taken 1/24/2024 2000 by Josi De Leon RN  Safety Promotion/Fall Prevention: safety round/check completed  Taken 1/24/2024 1900 by Josi De Leon RN  Safety Promotion/Fall Prevention: safety round/check completed   Goal Outcome Evaluation:

## 2024-01-25 NOTE — OUTREACH NOTE
Prep Survey      Flowsheet Row Responses   [facility] facility patient discharged from? [place]   Is LACE score < 7 ? No   Eligibility Readm Mgmt   Discharge diagnosis Palpitations   Does the patient have one of the following disease processes/diagnoses(primary or secondary)? Other   Does the patient have Home health ordered? Yes   What is the Home health agency?  [facility]   Is there a DME ordered? No   Medication alerts for this patient see AVS   Prep survey completed? Yes            Janee MATIAS - Registered Nurse

## 2024-01-30 ENCOUNTER — READMISSION MANAGEMENT (OUTPATIENT)
Dept: CALL CENTER | Facility: HOSPITAL | Age: 89
End: 2024-01-30
Payer: MEDICARE

## 2024-01-30 NOTE — OUTREACH NOTE
Medical Week 1 Survey      Flowsheet Row Responses   [facility] facility patient discharged from? [place]   Does the patient have one of the following disease processes/diagnoses(primary or secondary)? Other   Week 1 attempt successful? No   Unsuccessful attempts Attempt 2            Magali Beasley Registered Nurse

## 2024-02-01 LAB — GLUCOSE BLDC GLUCOMTR-MCNC: 113 MG/DL (ref 70–130)

## 2024-02-06 ENCOUNTER — READMISSION MANAGEMENT (OUTPATIENT)
Dept: CALL CENTER | Facility: HOSPITAL | Age: 89
End: 2024-02-06
Payer: MEDICARE

## 2024-02-06 NOTE — OUTREACH NOTE
Medical Week 2 Survey      Flowsheet Row Responses   [facility] patient discharged from? [place]   Does the patient have one of the following disease processes/diagnoses(primary or secondary)? Other   Week 2 attempt successful? Yes   Call start time 1332   Discharge diagnosis Palpitations   Call end time 1336   Meds reviewed with patient/caregiver? Yes   Is the patient having any side effects they believe may be caused by any medication additions or changes? No   Does the patient have all medications ordered at discharge? Yes   Is the patient taking all medications as directed (includes completed medication regime)? Yes   Does the patient have a primary care provider?  Yes   Does the patient have an appointment with their PCP within 7 days of discharge? Yes   Comments regarding PCP PCP follow up 2/7/24   Has the patient kept scheduled appointments due by today? N/A   What is the Home health agency?  [facility]   Has home health visited the patient within 72 hours of discharge? Yes   Psychosocial issues? No   Did the patient receive a copy of their discharge instructions? Yes   Nursing interventions Reviewed instructions with patient   What is the patient's perception of their health status since discharge? Improving   Is the patient/caregiver able to teach back signs and symptoms related to disease process for when to call PCP? Yes   Is the patient/caregiver able to teach back signs and symptoms related to disease process for when to call 911? Yes   Is the patient/caregiver able to teach back the hierarchy of who to call/visit for symptoms/problems? PCP, Specialist, Home health nurse, Urgent Care, ED, 911 Yes   Week 2 Call Completed? Yes   Is the patient interested in additional calls from an ambulatory ? No   Would this patient benefit from a Referral to Amb Social Work? No   Wrap up additional comments Patient reports BP has improved, she is keeping log to take to PCP follow up appt.    Call end time 1817            Waldo T - Registered Nurse

## 2024-02-07 NOTE — PROGRESS NOTES
"Enter Query Response Below      Query Response: Chronic diastolic CHF   Hank Rascon MD               If applicable, please update the problem list.     Patient: Nedra Tolliver        : 1935  Account: [id number]           Admit Date: 2024        How to Respond to this query:       a. Click New Note     b. Answer query within the yellow box.                c. Update the Problem List, if applicable.      If you have any questions about this query contact me at: [email or web address]     Dr. Rascon:    Patient admitted  with hypertensive urgency.  ED note includes past medical history documentation of CHF.  Historic echo of 23 noted EF \"66-70%.\"  Home medications included Avapro which was changed to Losartan.      Please clarify if patient treated/monitored for one or more of the following:     Chronic diastolic CHF  History of CHF only  Other- specify_____  Unable to determine       By submitting this query, we are merely seeking further clarification of documentation to accurately reflect all conditions that you are monitoring, evaluating, treating or that extend the hospitalization or utilize additional resources of care. Please utilize your independent clinical judgment when addressing the question(s) above.     This query and your response, once completed, will be entered into the legal medical record.    Sincerely,  Trinity Jorge RN, CCDS  Clinical Documentation Integrity Program     "

## 2024-02-14 ENCOUNTER — READMISSION MANAGEMENT (OUTPATIENT)
Dept: CALL CENTER | Facility: HOSPITAL | Age: 89
End: 2024-02-14
Payer: MEDICARE

## 2024-03-04 ENCOUNTER — OFFICE VISIT (OUTPATIENT)
Dept: CARDIOLOGY | Facility: CLINIC | Age: 89
End: 2024-03-04
Payer: MEDICARE

## 2024-03-04 VITALS
OXYGEN SATURATION: 97 % | DIASTOLIC BLOOD PRESSURE: 64 MMHG | HEART RATE: 73 BPM | BODY MASS INDEX: 25.61 KG/M2 | HEIGHT: 64 IN | SYSTOLIC BLOOD PRESSURE: 162 MMHG | WEIGHT: 150 LBS

## 2024-03-04 DIAGNOSIS — I48.91 ATRIAL FIBRILLATION WITH RVR: ICD-10-CM

## 2024-03-04 DIAGNOSIS — I50.30 ACCELERATED HYPERTENSION WITH DIASTOLIC CONGESTIVE HEART FAILURE, NYHA CLASS 3: Primary | ICD-10-CM

## 2024-03-04 DIAGNOSIS — I11.0 ACCELERATED HYPERTENSION WITH DIASTOLIC CONGESTIVE HEART FAILURE, NYHA CLASS 3: Primary | ICD-10-CM

## 2024-03-04 DIAGNOSIS — I44.7 LBBB (LEFT BUNDLE BRANCH BLOCK): ICD-10-CM

## 2024-03-04 DIAGNOSIS — N18.31 STAGE 3A CHRONIC KIDNEY DISEASE (CKD): ICD-10-CM

## 2024-03-04 DIAGNOSIS — E78.00 HIGH CHOLESTEROL: ICD-10-CM

## 2024-03-04 DIAGNOSIS — I07.1 MILD TRICUSPID INSUFFICIENCY: ICD-10-CM

## 2024-03-04 DIAGNOSIS — R00.2 PALPITATIONS: ICD-10-CM

## 2024-03-04 DIAGNOSIS — E11.21 TYPE 2 DIABETES MELLITUS WITH DIABETIC NEPHROPATHY, WITHOUT LONG-TERM CURRENT USE OF INSULIN: Chronic | ICD-10-CM

## 2024-03-04 DIAGNOSIS — E78.2 MIXED HYPERLIPIDEMIA: ICD-10-CM

## 2024-03-04 PROCEDURE — 99213 OFFICE O/P EST LOW 20 MIN: CPT | Performed by: EMERGENCY MEDICINE

## 2024-03-04 NOTE — PROGRESS NOTES
Subjective:     Encounter Date:03/04/2024      Patient ID: Nedra Tolliver is a 88 y.o. female.    Chief Complaint:  History of Present Illness  The patient presents today for follow-up.    She is not feeling well today. She is experiencing shortness of breath with ambulation. When checking her blood pressure at home, her blood pressure varies with elevated systolic readings of 190, 180, 170, and 160 mmHg. Her blood pressure on 02/19/2024 was 132/74 mmHg. She is on verapamil 180 mg. She no longer has an endocrinologist and inquires if hormones attribute to blood pressure levels. She has occasional chest pain. She denies palpitations or heart skipping or fluttering. She did have tightness in her chest after discontinuing other medications. She denies chest tightness today.     She has stage 3 kidney disease, atrial fibrillation, and SVT. She takes Repatha injections, Eliquis, and flecainide to control the atrial fibrillation and SVT.    She was unable to tolerate BETA BLOCKERS, STATINS, PROCARDIA, AMLODIPINE, HYDRALAZINE, THIAZIDE or CLONIDINE. She is allergic to ACE INHIBITORS. Hospitalization due clonidine.       Review of Systems   Constitutional: Positive for malaise/fatigue. Negative for diaphoresis and fever.   HENT:  Negative for congestion.    Eyes:  Negative for vision loss in left eye and vision loss in right eye.   Cardiovascular:  Negative for chest pain, claudication, dyspnea on exertion, irregular heartbeat, leg swelling, orthopnea, palpitations and syncope.   Respiratory:  Negative for cough, shortness of breath and wheezing.    Hematologic/Lymphatic: Negative for adenopathy.   Skin:  Negative for rash.   Musculoskeletal:  Negative for joint pain and joint swelling.   Gastrointestinal:  Negative for abdominal pain, diarrhea, nausea and vomiting.   Neurological:  Negative for excessive daytime sleepiness, dizziness, focal weakness, light-headedness, numbness and weakness.   Psychiatric/Behavioral:   Negative for depression. The patient does not have insomnia.            Current Outpatient Medications:     apixaban (ELIQUIS) 5 MG tablet tablet, Take 1 tablet by mouth 2 (Two) Times a Day., Disp: , Rfl:     Cholecalciferol (Vitamin D3) 125 MCG (5000 UT) tablet dispersible, Take 5,000 Units by mouth Daily., Disp: , Rfl:     Coenzyme Q10 (CO Q 10) 100 MG capsule, Take 1 tablet by mouth Daily., Disp: , Rfl:     Evolocumab (Repatha SureClick) solution auto-injector SureClick injection, Inject 1 mL under the skin into the appropriate area as directed Every 14 (Fourteen) Days., Disp: 2 mL, Rfl: 11    flecainide (TAMBOCOR) 50 MG tablet, Take 0.5 tablets by mouth 2 (Two) Times a Day. RX states 1/2 tab BID, Disp: , Rfl:     glimepiride (AMARYL) 4 MG tablet, Take 1 tablet by mouth Every Morning Before Breakfast. Takes an extra 1/2 tablet when blood sugar is high at night, Disp: , Rfl:     hydrOXYzine (ATARAX) 25 MG tablet, Take 1 tablet by mouth 3 (Three) Times a Day As Needed for Itching or Anxiety., Disp: 90 tablet, Rfl: 0    levothyroxine (SYNTHROID, LEVOTHROID) 50 MCG tablet, Take 1 tablet by mouth Daily., Disp: , Rfl:     Multiple Vitamins-Minerals (CENTRUM SILVER PO), Take 1 tablet by mouth Daily., Disp: , Rfl:     verapamil (VERELAN) 100 MG 24 hr capsule, Take 1 capsule by mouth Every Night., Disp: 60 capsule, Rfl: 0       Objective:      Vitals:    03/04/24 1059   BP: 162/64   Pulse: 73   SpO2: 97%     Vitals and nursing note reviewed.   Constitutional:       Appearance: Normal and healthy appearance. Well-developed and not in distress.   Eyes:      Extraocular Movements: Extraocular movements intact.      Pupils: Pupils are equal, round, and reactive to light.   HENT:      Head: Normocephalic and atraumatic.    Mouth/Throat:      Pharynx: Oropharynx is clear.   Neck:      Vascular: JVD normal.      Trachea: Trachea normal.   Pulmonary:      Effort: Pulmonary effort is normal.      Breath sounds: Normal breath  sounds. No wheezing. No rhonchi. No rales.   Cardiovascular:      PMI at left midclavicular line. Normal rate. Regular rhythm. Normal S1. Normal S2.       Murmurs: There is a grade 2/6 systolic murmur.      No gallop.  No click. No rub.   Pulses:     Dorsalis pedis: 2+ bilaterally.     Posterior tibial: 2+ bilaterally.  Abdominal:      General: Bowel sounds are normal.      Palpations: Abdomen is soft.      Tenderness: There is no abdominal tenderness.   Musculoskeletal: Normal range of motion.      Cervical back: Normal range of motion and neck supple. Skin:     General: Skin is warm and dry.      Capillary Refill: Capillary refill takes less than 2 seconds.   Feet:      Right foot:      Skin integrity: Skin integrity normal.      Left foot:      Skin integrity: Skin integrity normal.   Neurological:      Mental Status: Alert and oriented to person, place and time.      Sensory: Sensation is intact.      Motor: Motor function is intact.      Coordination: Coordination is intact.   Psychiatric:         Speech: Speech normal.         Behavior: Behavior is cooperative.         Lab Review:         ECG 12 Lead    Date/Time: 3/5/2024 1:04 PM  Performed by: Mak Nickerson DO    Authorized by: Mak Nickerson DO  Comparison: compared with previous ECG   Similar to previous ECG  Rhythm: sinus rhythm  Ectopy: unifocal PVCs  Rate: normal  Conduction: left bundle branch block and 1st degree AV block  QRS axis: normal    Clinical impression: abnormal EKG          Laboratory Studies  Labs were reviewed with the patient.   A1c: 7.3 percent  HDL: 59 mg/dL  hemoglobin: 11 g/dL  hematocrit: 33  platelets: within normal limits  triglycerides: 185 mg/dL      Assessment/Plan:     Problem List Items Addressed This Visit       Type 2 diabetes mellitus, without long-term current use of insulin (Chronic)    Mild tricuspid insufficiency    High cholesterol    Mixed hyperlipidemia with pervious statin intolerance     Stage 3a chronic kidney disease (CKD)    LBBB (left bundle branch block)    Overview     Added automatically from request for surgery [id number]         Accelerated hypertension with diastolic congestive heart failure, NYHA class 3 - Primary    Palpitations    Atrial fibrillation with RVR     Hypertension  Her blood pressure is still elevated. We will continue verapamil 180 mg daily. Refill will be sent.    Follow-up  The patient will follow up in 2 to 3 months.    Recommendations/plans:    Transcribed from ambient dictation for Mak Nickerson DO by Valarie Pierre.  03/04/24   14:46 CST    Patient or patient representative verbalized consent to the visit recording.  I have personally performed the services described in this document as transcribed by the above individual, and it is both accurate and complete.  Mak Nickerson DO  3/5/2024  13:04 CST   Miriam Pederson

## 2024-03-05 PROCEDURE — 93000 ELECTROCARDIOGRAM COMPLETE: CPT | Performed by: EMERGENCY MEDICINE

## 2024-05-16 ENCOUNTER — OFFICE VISIT (OUTPATIENT)
Dept: GASTROENTEROLOGY | Facility: CLINIC | Age: 89
End: 2024-05-16
Payer: MEDICARE

## 2024-05-16 VITALS
BODY MASS INDEX: 26.46 KG/M2 | DIASTOLIC BLOOD PRESSURE: 80 MMHG | HEART RATE: 64 BPM | WEIGHT: 155 LBS | SYSTOLIC BLOOD PRESSURE: 140 MMHG | OXYGEN SATURATION: 99 % | HEIGHT: 64 IN | TEMPERATURE: 95.3 F

## 2024-05-16 DIAGNOSIS — I10 HTN (HYPERTENSION), BENIGN: ICD-10-CM

## 2024-05-16 DIAGNOSIS — Z79.01 ANTICOAGULATED: ICD-10-CM

## 2024-05-16 DIAGNOSIS — R13.19 ESOPHAGEAL DYSPHAGIA: Primary | ICD-10-CM

## 2024-05-16 DIAGNOSIS — Z78.9 NONSMOKER: ICD-10-CM

## 2024-05-16 NOTE — PROGRESS NOTES
Nedra Tolliver  1935 5/16/2024  Chief Complaint   Patient presents with    Difficulty Swallowing     Subjective   HPI  Nedra Tolliver is a 88 y.o. female who presents with a complaint of difficulty swallowing large pills vitamins, and breads upper esophageal region. Progressive ongoing for months. Moderate for her. Liquids do help and time. It has been years but she has had dilatation in the past. This helped her. No other associated symptoms. Colonoscopy evaluations are PRN     Past Medical History:   Diagnosis Date    Arthritis     Cancer     BCC @ nose & Left arm    Diabetes type 2, controlled     Diverticulitis     History of GI bleed     History of transfusion     Has had x 5 units.    Hypertension     STAGE 3     Hyponatremia     Required hospitalization    Hypothyroidism     Knee arthropathy 09/30/2020    LPRD (laryngopharyngeal reflux disease)     Pharyngeal dysphagia     Thyroid nodule      Past Surgical History:   Procedure Laterality Date    APPENDECTOMY      BASAL CELL CARCINOMA EXCISION      BELPHAROPTOSIS REPAIR      BREAST CYST EXCISION      CARDIAC CATHETERIZATION      CARDIAC CATHETERIZATION Right 6/24/2021    Procedure: Left Heart Cath R radial, US guided w poss intervention;  Surgeon: Mak Nickerson DO;  Location:  [facility];  Service: Cardiovascular;  Laterality: Right;    CARDIAC CATHETERIZATION      CATARACT EXTRACTION      CATARACT EXTRACTION WITH INTRAOCULAR LENS IMPLANT Bilateral     CHOLECYSTECTOMY      COLONOSCOPY Left 11/1/2016    Tubular adenoma cecum, Diverticulosis repeat prn    SUBTOTAL HYSTERECTOMY      TOTAL KNEE ARTHROPLASTY Right 9/30/2020    Procedure: TOTAL KNEE ARTHROPLASTY;  Surgeon: Mak Pickens MD;  Location: [facility] OR;  Service: Orthopedics;  Laterality: Right;    TUMOR EXCISION      under armpits and left breast       Outpatient Medications Marked as Taking for the 5/16/24 encounter (Office Visit) with Arianne Charles  "QUETA Landeros   Medication Sig Dispense Refill    apixaban (ELIQUIS) 5 MG tablet tablet Take 1 tablet by mouth 2 (Two) Times a Day.      Cholecalciferol (Vitamin D3) 125 MCG (5000 UT) tablet dispersible Take 5,000 Units by mouth Daily.      Coenzyme Q10 (CO Q 10) 100 MG capsule Take 1 tablet by mouth Daily.      Evolocumab (Repatha SureClick) solution auto-injector SureClick injection Inject 1 mL under the skin into the appropriate area as directed Every 14 (Fourteen) Days. 2 mL 11    flecainide (TAMBOCOR) 50 MG tablet Take 0.5 tablets by mouth 2 (Two) Times a Day. RX states 1/2 tab BID      glimepiride (AMARYL) 4 MG tablet Take 1 tablet by mouth Every Morning Before Breakfast. Takes an extra 1/2 tablet when blood sugar is high at night      levothyroxine (SYNTHROID, LEVOTHROID) 50 MCG tablet Take 1 tablet by mouth Daily.      Multiple Vitamins-Minerals (CENTRUM SILVER PO) Take 1 tablet by mouth Daily.      verapamil (VERELAN) 100 MG 24 hr capsule Take 1 capsule by mouth Every Night. 60 capsule 0     Allergies   Allergen Reactions    Codeine Hallucinations    Iodine Anaphylaxis     Xray dye    Levaquin [Levofloxacin] Anaphylaxis    Lortab [Hydrocodone-Acetaminophen] Mental Status Change and Confusion    Quinolones Anaphylaxis     - CIPRO -     Shrimp (Diagnostic) Anaphylaxis    Bactroban [Mupirocin Calcium] Swelling    Ace Inhibitors Cough    Beta Adrenergic Blockers Other (See Comments)     Pt can't remember reaction type    Hydralazine Other (See Comments)     \"Makes her feel bad\" \"head full and weakness\"    Norvasc [Amlodipine] Swelling and Headache    Piperacillin Sod-Tazobactam So Unknown - High Severity    Procardia Xl [Nifedipine Er] Arrhythmia    Statins Myalgia    Thiazide-Type Diuretics Unknown - High Severity    Mupirocin Rash     Social History     Socioeconomic History    Marital status:    Tobacco Use    Smoking status: Never    Smokeless tobacco: Never   Vaping Use    Vaping status: Never Used "   Substance and Sexual Activity    Alcohol use: No    Drug use: Never    Sexual activity: Defer     Partners: Male     Family History   Problem Relation Age of Onset    Diabetes Mother     Cancer Mother     Heart failure Mother     Diabetes Father     Colon cancer Neg Hx     Colon polyps Neg Hx      Health Maintenance   Topic Date Due    RSV Vaccine - Adults (1 - 1-dose 60+ series) Never done    ZOSTER VACCINE (2 of 2) 07/28/2020    URINE MICROALBUMIN  09/09/2022    BMI FOLLOWUP  09/09/2022    DIABETIC EYE EXAM  09/23/2022    COVID-19 Vaccine (1 - 2023-24 season) Never done    DXA SCAN  10/15/2023    HEMOGLOBIN A1C  05/14/2024    INFLUENZA VACCINE  08/01/2024    LIPID PANEL  11/14/2024    ANNUAL WELLNESS VISIT  11/15/2024    TDAP/TD VACCINES (3 - Td or Tdap) 06/19/2027    Pneumococcal Vaccine 65+  Completed     Review of Systems   Constitutional:  Negative for activity change, appetite change, chills, diaphoresis, fatigue, fever and unexpected weight change.   HENT:  Positive for trouble swallowing. Negative for ear pain, hearing loss, mouth sores, sore throat and voice change.    Eyes: Negative.    Respiratory:  Negative for cough, choking, shortness of breath and wheezing.    Cardiovascular:  Negative for chest pain and palpitations.   Gastrointestinal:  Negative for abdominal pain, blood in stool, constipation, diarrhea, nausea and vomiting.   Endocrine: Negative for cold intolerance and heat intolerance.   Genitourinary:  Negative for decreased urine volume, dysuria, frequency, hematuria and urgency.   Musculoskeletal:  Negative for back pain, gait problem and myalgias.   Skin:  Negative for color change, pallor and rash.   Allergic/Immunologic: Negative for food allergies and immunocompromised state.   Neurological:  Negative for dizziness, tremors, seizures, syncope, weakness, light-headedness, numbness and headaches.   Hematological:  Negative for adenopathy. Does not bruise/bleed easily.  "  Psychiatric/Behavioral:  Negative for agitation and confusion. The patient is not nervous/anxious.    All other systems reviewed and are negative.    Objective   Vitals:    05/16/24 1008   BP: 140/80   BP Location: Left arm   Pulse: 64   Temp: 95.3 °F (35.2 °C)   TempSrc: Temporal   SpO2: 99%   Weight: 70.3 kg (155 lb)   Height: 162.6 cm (64.02\")     Body mass index is 26.59 kg/m².  Physical Exam  Constitutional:       Appearance: She is well-developed.   HENT:      Head: Normocephalic and atraumatic.   Eyes:      Pupils: Pupils are equal, round, and reactive to light.   Neck:      Trachea: No tracheal deviation.   Cardiovascular:      Rate and Rhythm: Normal rate and regular rhythm.      Heart sounds: Normal heart sounds. No murmur heard.     No friction rub. No gallop.   Pulmonary:      Effort: Pulmonary effort is normal. No respiratory distress.      Breath sounds: Normal breath sounds. No wheezing or rales.   Chest:      Chest wall: No tenderness.   Abdominal:      General: Bowel sounds are normal. There is no distension.      Palpations: Abdomen is soft. Abdomen is not rigid.      Tenderness: There is no abdominal tenderness. There is no guarding or rebound.   Musculoskeletal:         General: No tenderness or deformity. Normal range of motion.      Cervical back: Normal range of motion and neck supple.   Skin:     General: Skin is warm and dry.      Coloration: Skin is not pale.      Findings: No rash.   Neurological:      Mental Status: She is alert and oriented to person, place, and time.      Deep Tendon Reflexes: Reflexes are normal and symmetric.   Psychiatric:         Behavior: Behavior normal.         Thought Content: Thought content normal.         Judgment: Judgment normal.       Assessment & Plan   Diagnoses and all orders for this visit:    1. Esophageal dysphagia (Primary)  -     Case Request; Standing  -     Case Request    2. Anticoagulated  Comments:  Eliquis to hold per Krishan she takes due " to Afib    3. HTN (hypertension), benign    4. Nonsmoker    Other orders  -     Implement Anesthesia Orders Day of Procedure; Standing  -     Follow Anesthesia Guidelines / Protocol; Future  -     Obtain Informed Consent; Future  -     Obtain Informed Consent; Standing      ESOPHAGOGASTRODUODENOSCOPY WITH ANESTHESIA (N/A)  Part of this note may be an electronic transcription/translation of spoken language to printed text using the Dragon Dictation System.  Body mass index is 26.59 kg/m².  No follow-ups on file.    BMI is >= 25 and <30. (Overweight) The following options were offered after discussion;: weight loss educational material (shared in after visit summary)      All risks, benefits, alternatives, and indications of colonoscopy and/or Endoscopy procedure have been discussed with the patient. Risks to include perforation of the colon requiring possible surgery or colostomy, risk of bleeding from biopsies or removal of colon tissue, possibility of missing a colon polyp or cancer, or adverse drug reaction.  Benefits to include the diagnosis and management of disease of the colon and rectum. Alternatives to include barium enema, radiographic evaluation, lab testing or no intervention. Pt verbalizes understanding and agrees.     Arianne Charles, APRN  5/16/2024  10:44 CDT          If you smoke or use tobacco, 4 minutes reading provided  Steps to Quit Smoking  Smoking tobacco can be harmful to your health and can affect almost every organ in your body. Smoking puts you, and those around you, at risk for developing many serious chronic diseases. Quitting smoking is difficult, but it is one of the best things that you can do for your health. It is never too late to quit.  What are the benefits of quitting smoking?  When you quit smoking, you lower your risk of developing serious diseases and conditions, such as:  Lung cancer or lung disease, such as COPD.  Heart disease.  Stroke.  Heart  attack.  Infertility.  Osteoporosis and bone fractures.  Additionally, symptoms such as coughing, wheezing, and shortness of breath may get better when you quit. You may also find that you get sick less often because your body is stronger at fighting off colds and infections. If you are pregnant, quitting smoking can help to reduce your chances of having a baby of low birth weight.  How do I get ready to quit?  When you decide to quit smoking, create a plan to make sure that you are successful. Before you quit:  Pick a date to quit. Set a date within the next two weeks to give you time to prepare.  Write down the reasons why you are quitting. Keep this list in places where you will see it often, such as on your bathroom mirror or in your car or wallet.  Identify the people, places, things, and activities that make you want to smoke (triggers) and avoid them. Make sure to take these actions:  Throw away all cigarettes at home, at work, and in your car.  Throw away smoking accessories, such as ashtrays and lighters.  Clean your car and make sure to empty the ashtray.  Clean your home, including curtains and carpets.  Tell your family, friends, and coworkers that you are quitting. Support from your loved ones can make quitting easier.  Talk with your health care provider about your options for quitting smoking.  Find out what treatment options are covered by your health insurance.  What strategies can I use to quit smoking?  Talk with your healthcare provider about different strategies to quit smoking. Some strategies include:  Quitting smoking altogether instead of gradually lessening how much you smoke over a period of time. Research shows that quitting “cold turkey” is more successful than gradually quitting.  Attending in-person counseling to help you build problem-solving skills. You are more likely to have success in quitting if you attend several counseling sessions. Even short sessions of 10 minutes can be  effective.  Finding resources and support systems that can help you to quit smoking and remain smoke-free after you quit. These resources are most helpful when you use them often. They can include:  Online chats with a counselor.  Telephone quitlines.  Printed self-help materials.  Support groups or group counseling.  Text messaging programs.  Mobile phone applications.  Taking medicines to help you quit smoking. (If you are pregnant or breastfeeding, talk with your health care provider first.) Some medicines contain nicotine and some do not. Both types of medicines help with cravings, but the medicines that include nicotine help to relieve withdrawal symptoms. Your health care provider may recommend:  Nicotine patches, gum, or lozenges.  Nicotine inhalers or sprays.  Non-nicotine medicine that is taken by mouth.  Talk with your health care provider about combining strategies, such as taking medicines while you are also receiving in-person counseling. Using these two strategies together makes you more likely to succeed in quitting than if you used either strategy on its own.  If you are pregnant or breastfeeding, talk with your health care provider about finding counseling or other support strategies to quit smoking. Do not take medicine to help you quit smoking unless told to do so by your health care provider.  What things can I do to make it easier to quit?  Quitting smoking might feel overwhelming at first, but there is a lot that you can do to make it easier. Take these important actions:  Reach out to your family and friends and ask that they support and encourage you during this time. Call telephone quitlines, reach out to support groups, or work with a counselor for support.  Ask people who smoke to avoid smoking around you.  Avoid places that trigger you to smoke, such as bars, parties, or smoke-break areas at work.  Spend time around people who do not smoke.  Lessen stress in your life, because stress can  be a smoking trigger for some people. To lessen stress, try:  Exercising regularly.  Deep-breathing exercises.  Yoga.  Meditating.  Performing a body scan. This involves closing your eyes, scanning your body from head to toe, and noticing which parts of your body are particularly tense. Purposefully relax the muscles in those areas.  Download or purchase mobile phone or tablet apps (applications) that can help you stick to your quit plan by providing reminders, tips, and encouragement. There are many free apps, such as QuitGuide from the CDC (Centers for Disease Control and Prevention). You can find other support for quitting smoking (smoking cessation) through smokefree.gov and other websites.  How will I feel when I quit smoking?  Within the first 24 hours of quitting smoking, you may start to feel some withdrawal symptoms. These symptoms are usually most noticeable 2-3 days after quitting, but they usually do not last beyond 2-3 weeks. Changes or symptoms that you might experience include:  Mood swings.  Restlessness, anxiety, or irritation.  Difficulty concentrating.  Dizziness.  Strong cravings for sugary foods in addition to nicotine.  Mild weight gain.  Constipation.  Nausea.  Coughing or a sore throat.  Changes in how your medicines work in your body.  A depressed mood.  Difficulty sleeping (insomnia).  After the first 2-3 weeks of quitting, you may start to notice more positive results, such as:  Improved sense of smell and taste.  Decreased coughing and sore throat.  Slower heart rate.  Lower blood pressure.  Clearer skin.  The ability to breathe more easily.  Fewer sick days.  Quitting smoking is very challenging for most people. Do not get discouraged if you are not successful the first time. Some people need to make many attempts to quit before they achieve long-term success. Do your best to stick to your quit plan, and talk with your health care provider if you have any questions or concerns.  This  information is not intended to replace advice given to you by your health care provider. Make sure you discuss any questions you have with your health care provider.  Document Released: 12/12/2002 Document Revised: 08/15/2017 Document Reviewed: 05/03/2016  [employer] Interactive Patient Education © 2017 [employer]

## 2024-05-17 ENCOUNTER — TELEPHONE (OUTPATIENT)
Dept: CARDIOLOGY | Facility: CLINIC | Age: 89
End: 2024-05-17
Payer: MEDICARE

## 2024-05-17 NOTE — TELEPHONE ENCOUNTER
DR. QUEZADA IS REQUESTING CLEARANCE FOR  EGD         WITH DR QUEZADA/ FAX# [phone]         THIS IS SCHEDULED FOR  05/28/24    ?     PT HAS  AFIB    ?     PT IS ON  ELIQUIS NEEDS TO STOP FOR 2 DAYS           LOV WITH YOU WAS  03/04/24    ?     PLEASE ADVISE

## 2024-05-21 ENCOUNTER — TELEPHONE (OUTPATIENT)
Dept: GASTROENTEROLOGY | Facility: CLINIC | Age: 89
End: 2024-05-21
Payer: MEDICARE

## 2024-05-21 NOTE — TELEPHONE ENCOUNTER
Patient called in with complaints of a sharp ear pain. She is scheduled for EGD next week. I advised her to go to PCP for evaluation of ear pain but no need to cancel EGD at this time. She decided to cancel EGD anyway because she didn't want to wait and see how she was feeling closer to time and isn't sure when she will go to the doctor over the ear pain. I cancelled procedure and told her to call me when she wanted to reschedule.

## 2024-08-06 RX ORDER — EVOLOCUMAB 140 MG/ML
140 INJECTION, SOLUTION SUBCUTANEOUS
Qty: 2 ML | Refills: 11 | Status: SHIPPED | OUTPATIENT
Start: 2024-08-06

## 2024-09-05 ENCOUNTER — OFFICE VISIT (OUTPATIENT)
Dept: CARDIOLOGY | Facility: CLINIC | Age: 89
End: 2024-09-05
Payer: MEDICARE

## 2024-09-05 VITALS
SYSTOLIC BLOOD PRESSURE: 130 MMHG | HEIGHT: 64 IN | OXYGEN SATURATION: 98 % | DIASTOLIC BLOOD PRESSURE: 62 MMHG | BODY MASS INDEX: 25.44 KG/M2 | WEIGHT: 149 LBS | HEART RATE: 78 BPM

## 2024-09-05 DIAGNOSIS — I10 BENIGN ESSENTIAL HTN: ICD-10-CM

## 2024-09-05 DIAGNOSIS — I10 ESSENTIAL HYPERTENSION: ICD-10-CM

## 2024-09-05 DIAGNOSIS — E78.2 MIXED HYPERLIPIDEMIA: ICD-10-CM

## 2024-09-05 DIAGNOSIS — N18.31 STAGE 3A CHRONIC KIDNEY DISEASE (CKD): ICD-10-CM

## 2024-09-05 DIAGNOSIS — I20.0 UNSTABLE ANGINA PECTORIS: ICD-10-CM

## 2024-09-05 DIAGNOSIS — I48.91 ATRIAL FIBRILLATION WITH RVR: Primary | ICD-10-CM

## 2024-09-05 DIAGNOSIS — I25.110 CORONARY ARTERY DISEASE INVOLVING NATIVE CORONARY ARTERY OF NATIVE HEART WITH UNSTABLE ANGINA PECTORIS: ICD-10-CM

## 2024-09-05 DIAGNOSIS — I50.30 ACCELERATED HYPERTENSION WITH DIASTOLIC CONGESTIVE HEART FAILURE, NYHA CLASS 3: ICD-10-CM

## 2024-09-05 DIAGNOSIS — I11.0 ACCELERATED HYPERTENSION WITH DIASTOLIC CONGESTIVE HEART FAILURE, NYHA CLASS 3: ICD-10-CM

## 2024-09-05 DIAGNOSIS — I48.0 PAROXYSMAL A-FIB: ICD-10-CM

## 2024-09-05 DIAGNOSIS — I44.7 LBBB (LEFT BUNDLE BRANCH BLOCK): ICD-10-CM

## 2024-09-05 PROBLEM — I25.700 CORONARY ARTERY DISEASE INVOLVING CORONARY BYPASS GRAFT OF NATIVE HEART WITH UNSTABLE ANGINA PECTORIS: Status: ACTIVE | Noted: 2024-09-05

## 2024-09-05 RX ORDER — PREDNISONE 50 MG/1
50 TABLET ORAL TAKE AS DIRECTED
Qty: 3 TABLET | Refills: 0 | Status: SHIPPED | OUTPATIENT
Start: 2024-09-05

## 2024-09-05 RX ORDER — DIPHENHYDRAMINE HYDROCHLORIDE 50 MG/ML
50 INJECTION INTRAMUSCULAR; INTRAVENOUS
OUTPATIENT
Start: 2024-09-05 | End: 2024-09-05

## 2024-09-05 RX ORDER — DIPHENHYDRAMINE HCL 25 MG
50 TABLET ORAL
OUTPATIENT
Start: 2024-09-05 | End: 2024-09-05

## 2024-09-05 NOTE — PROGRESS NOTES
Subjective:     Encounter Date:09/05/2024      Patient ID: Nedra Tolliver is a 88 y.o. female.    Chief Complaint:  History of Present Illness  History of Present Illness  The patient is an 88-year-old female who presents for evaluation of chest pain.    She describes her chest pain as sharp, originating from the breastbone and extending under her breast, into her neck, and down her shoulder. She also mentions a crackling sensation in her neck bones and occasional coughing up of mucus.    She reports feeling unwell, with her condition fluctuating. She was prescribed Jardiance and Kinedak due to deteriorating kidney function. After starting Kinedak, she experienced blurry vision and dizziness, followed by chest pain radiating behind her breast. This led to the discontinuation of Kinedak due to a suspected allergy. Blood tests revealed elevated heart enzymes, which normalized after stopping Kinedak. She also experienced weakness while on Jardiance, but this was not as severe as with Kinedak.    She has a history of kidney infection and urinary tract infection (UTI). Additionally, she has been diagnosed with an aortic aneurysm near her stomach.    ALLERGIES  She is allergic to KINEDAK.        Review of Systems   Constitutional: Negative for diaphoresis, fever and malaise/fatigue.   HENT:  Positive for congestion.    Eyes:  Positive for blurred vision. Negative for vision loss in left eye and vision loss in right eye.   Cardiovascular:  Positive for chest pain. Negative for claudication, dyspnea on exertion, irregular heartbeat, leg swelling, orthopnea, palpitations and syncope.   Respiratory:  Positive for shortness of breath. Negative for cough and wheezing.    Hematologic/Lymphatic: Negative for adenopathy.   Skin:  Negative for rash.   Musculoskeletal:  Negative for joint pain and joint swelling.   Gastrointestinal:  Negative for abdominal pain, diarrhea, nausea and vomiting.   Neurological:  Positive for  dizziness. Negative for excessive daytime sleepiness, focal weakness, light-headedness, numbness and weakness.   Psychiatric/Behavioral:  Negative for depression. The patient does not have insomnia.            Current Outpatient Medications:     apixaban (ELIQUIS) 5 MG tablet tablet, Take 1 tablet by mouth 2 (Two) Times a Day., Disp: , Rfl:     Cholecalciferol (Vitamin D3) 125 MCG (5000 UT) tablet dispersible, Take 5,000 Units by mouth Daily., Disp: , Rfl:     Coenzyme Q10 (CO Q 10) 100 MG capsule, Take 1 tablet by mouth Daily., Disp: , Rfl:     flecainide (TAMBOCOR) 50 MG tablet, Take 0.5 tablets by mouth 2 (Two) Times a Day. RX states 1/2 tab BID, Disp: , Rfl:     glimepiride (AMARYL) 4 MG tablet, Take 1 tablet by mouth Every Morning Before Breakfast. Takes an extra 1/2 tablet when blood sugar is high at night, Disp: , Rfl:     levothyroxine (SYNTHROID, LEVOTHROID) 50 MCG tablet, Take 1 tablet by mouth Daily., Disp: , Rfl:     Multiple Vitamins-Minerals (CENTRUM SILVER PO), Take 1 tablet by mouth Daily., Disp: , Rfl:     Repatha SureClick solution auto-injector SureClick injection, INJECT 1 ML UNDER THE SKIN INTO THE APPROPRIATE AREA AS DIRECTED EVERY 14 (FOURTEEN) DAYS., Disp: 2 mL, Rfl: 11    verapamil (VERELAN) 100 MG 24 hr capsule, Take 1 capsule by mouth Every Night. (Patient taking differently: Take 2 capsules by mouth Every Night.), Disp: 60 capsule, Rfl: 0    predniSONE (DELTASONE) 50 MG tablet, Take 1 tablet by mouth Take As Directed for 3 doses. Take 1 tablet (50mg) 13 Hours 7 Hours & 1 Hour Prior to Exam, Disp: 3 tablet, Rfl: 0       Objective:      Vitals:    09/05/24 1413   BP: 130/62   Pulse: 78   SpO2: 98%     Vitals and nursing note reviewed.   Constitutional:       Appearance: Normal and healthy appearance. Well-developed and not in distress.   Eyes:      Extraocular Movements: Extraocular movements intact.      Pupils: Pupils are equal, round, and reactive to light.   HENT:      Head:  Normocephalic and atraumatic.    Mouth/Throat:      Pharynx: Oropharynx is clear.   Neck:      Vascular: JVD normal.      Trachea: Trachea normal.   Pulmonary:      Effort: Pulmonary effort is normal.      Breath sounds: Normal breath sounds. No wheezing. No rhonchi. No rales.   Cardiovascular:      PMI at left midclavicular line. Normal rate. Regular rhythm. Normal S1. Normal S2.       Murmurs: There is a grade 2/6 systolic murmur.      No gallop.  No click. No rub.   Pulses:     Dorsalis pedis: 2+ bilaterally.     Posterior tibial: 2+ bilaterally.  Abdominal:      General: Bowel sounds are normal.      Palpations: Abdomen is soft.      Tenderness: There is no abdominal tenderness.   Musculoskeletal: Normal range of motion.      Cervical back: Normal range of motion and neck supple. Skin:     General: Skin is warm and dry.      Capillary Refill: Capillary refill takes less than 2 seconds.   Feet:      Right foot:      Skin integrity: Skin integrity normal.      Left foot:      Skin integrity: Skin integrity normal.   Neurological:      Mental Status: Alert and oriented to person, place and time.      Sensory: Sensation is intact.      Motor: Motor function is intact.      Coordination: Coordination is intact.   Psychiatric:         Speech: Speech normal.         Behavior: Behavior is cooperative.       Physical Exam      Lab Review:         ECG 12 Lead    Date/Time: 9/5/2024 4:16 PM  Performed by: Mak Nickerson DO    Authorized by: Mak Nickerson DO  Comparison: compared with previous ECG   Similar to previous ECG  Rhythm: sinus rhythm  Rate: normal  Conduction: left bundle branch block and 1st degree AV block  QRS axis: normal    Clinical impression: abnormal EKG        Results  Laboratory Studies  Troponin level was 21.    Assessment/Plan:     Problem List Items Addressed This Visit       Benign essential HTN    Overview     cont BP medication the day of procedure         Essential  hypertension    Mixed hyperlipidemia with pervious statin intolerance    Stage 3a chronic kidney disease (CKD)    LBBB (left bundle branch block)    Overview     Added automatically from request for surgery [id number]         Accelerated hypertension with diastolic congestive heart failure, NYHA class 3    Paroxysmal A-fib    Chest pain    Relevant Orders    Case Request Cath Lab: Left Heart Cath (Completed)    Atrial fibrillation with RVR - Primary     Other Visit Diagnoses       Coronary artery disease involving native coronary artery of native heart with unstable angina pectoris              Assessment & Plan  1. Chest Pain.  The patient's symptoms of chest pain radiating to the neck and shoulder are concerning for potential cardiac issues. Her EKG today showed a left bundle-branch block, consistent with previous findings. Given her history of coronary artery disease and the presence of heart enzymes in her blood, a heart catheterization has been scheduled for 09/20/2024 to further evaluate her cardiac status. She reports no current chest pain after discontinuing the medication suspected to cause adverse effects.    2. Coronary Artery Disease.  She has a history of coronary artery disease with borderline findings from a heart catheterization three years ago. The current symptoms and elevated troponin levels suggest a possible worsening of her condition. The heart catheterization on 09/20/2024 will help determine if there are new blockages or if existing ones have worsened. Potential interventions, such as stent placement, will be considered based on the findings.    3. Aortic Aneurysm.  She has a known aortic aneurysm. Although not the primary focus of today's visit, it remains a significant concern. Continued monitoring and management will be necessary, especially in the context of her overall cardiovascular health.    4. Medication Management.  She was previously on Obom and transOMIC for kidney function. She  experienced adverse effects, including dizziness, blurry vision, and chest pain, after starting Boom. The medication was discontinued, and she reports improvement in symptoms. Jardiance will be reconsidered after the heart catheterization results.           Recommendations/plans:    Transcribed from ambient dictation for Mak Nickerson DO by Mak Nickerson DO.  09/05/24   16:15 CDT    Patient or patient representative verbalized consent for the use of Ambient Listening during the visit with  Mak Nickerson DO for chart documentation. 9/5/2024  16:19 CDT

## 2024-09-20 ENCOUNTER — HOSPITAL ENCOUNTER (OUTPATIENT)
Facility: HOSPITAL | Age: 89
Setting detail: HOSPITAL OUTPATIENT SURGERY
Discharge: HOME | End: 2024-09-20
Attending: EMERGENCY MEDICINE | Admitting: EMERGENCY MEDICINE
Payer: MEDICARE

## 2024-09-20 VITALS
WEIGHT: 151.8 LBS | DIASTOLIC BLOOD PRESSURE: 62 MMHG | TEMPERATURE: 97.8 F | RESPIRATION RATE: 13 BRPM | HEIGHT: 65 IN | OXYGEN SATURATION: 99 % | BODY MASS INDEX: 25.29 KG/M2 | SYSTOLIC BLOOD PRESSURE: 162 MMHG | HEART RATE: 63 BPM

## 2024-09-20 DIAGNOSIS — I20.0 UNSTABLE ANGINA PECTORIS: ICD-10-CM

## 2024-09-20 DIAGNOSIS — I48.91 ATRIAL FIBRILLATION WITH RVR: ICD-10-CM

## 2024-09-20 DIAGNOSIS — I25.700 CORONARY ARTERY DISEASE INVOLVING CORONARY BYPASS GRAFT OF NATIVE HEART WITH UNSTABLE ANGINA PECTORIS: ICD-10-CM

## 2024-09-20 DIAGNOSIS — I20.89 STABLE ANGINA PECTORIS: Primary | ICD-10-CM

## 2024-09-20 LAB
ANION GAP SERPL CALCULATED.3IONS-SCNC: 13 MMOL/L (ref 5–15)
BUN SERPL-MCNC: 31 MG/DL (ref 8–23)
BUN/CREAT SERPL: 27 (ref 7–25)
CALCIUM SPEC-SCNC: 10.1 MG/DL (ref 8.6–10.5)
CHLORIDE SERPL-SCNC: 98 MMOL/L (ref 98–107)
CO2 SERPL-SCNC: 22 MMOL/L (ref 22–29)
CREAT SERPL-MCNC: 1.15 MG/DL (ref 0.57–1)
DEPRECATED RDW RBC AUTO: 42.8 FL (ref 37–54)
EGFRCR SERPLBLD CKD-EPI 2021: 45.9 ML/MIN/1.73
ERYTHROCYTE [DISTWIDTH] IN BLOOD BY AUTOMATED COUNT: 12.5 % (ref 12.3–15.4)
GLUCOSE SERPL-MCNC: 257 MG/DL (ref 65–99)
HCT VFR BLD AUTO: 39.3 % (ref 34–46.6)
HGB BLD-MCNC: 13.3 G/DL (ref 12–15.9)
INR PPP: 0.96 (ref 0.91–1.09)
MCH RBC QN AUTO: 31.4 PG (ref 26.6–33)
MCHC RBC AUTO-ENTMCNC: 33.8 G/DL (ref 31.5–35.7)
MCV RBC AUTO: 92.9 FL (ref 79–97)
PLATELET # BLD AUTO: 232 10*3/MM3 (ref 140–450)
PMV BLD AUTO: 9.5 FL (ref 6–12)
POTASSIUM SERPL-SCNC: 4.6 MMOL/L (ref 3.5–5.2)
PROTHROMBIN TIME: 13.2 SECONDS (ref 11.8–14.8)
RBC # BLD AUTO: 4.23 10*6/MM3 (ref 3.77–5.28)
SODIUM SERPL-SCNC: 133 MMOL/L (ref 136–145)
WBC NRBC COR # BLD AUTO: 6.93 10*3/MM3 (ref 3.4–10.8)

## 2024-09-20 PROCEDURE — 99152 MOD SED SAME PHYS/QHP 5/>YRS: CPT | Performed by: EMERGENCY MEDICINE

## 2024-09-20 PROCEDURE — 85610 PROTHROMBIN TIME: CPT | Performed by: EMERGENCY MEDICINE

## 2024-09-20 PROCEDURE — 25010000002 MIDAZOLAM HCL (PF) 5 MG/5ML SOLUTION: Performed by: EMERGENCY MEDICINE

## 2024-09-20 PROCEDURE — 85027 COMPLETE CBC AUTOMATED: CPT | Performed by: EMERGENCY MEDICINE

## 2024-09-20 PROCEDURE — 25510000001 IOPAMIDOL 61 % SOLUTION: Performed by: EMERGENCY MEDICINE

## 2024-09-20 PROCEDURE — 25010000002 DIPHENHYDRAMINE PER 50 MG: Performed by: EMERGENCY MEDICINE

## 2024-09-20 PROCEDURE — S0260 H&P FOR SURGERY: HCPCS | Performed by: EMERGENCY MEDICINE

## 2024-09-20 PROCEDURE — C1894 INTRO/SHEATH, NON-LASER: HCPCS | Performed by: EMERGENCY MEDICINE

## 2024-09-20 PROCEDURE — 25010000002 FENTANYL CITRATE (PF) 50 MCG/ML SOLUTION: Performed by: EMERGENCY MEDICINE

## 2024-09-20 PROCEDURE — C1769 GUIDE WIRE: HCPCS | Performed by: EMERGENCY MEDICINE

## 2024-09-20 PROCEDURE — 25010000002 HEPARIN (PORCINE) 1000-0.9 UT/500ML-% SOLUTION: Performed by: EMERGENCY MEDICINE

## 2024-09-20 PROCEDURE — 93458 L HRT ARTERY/VENTRICLE ANGIO: CPT | Performed by: EMERGENCY MEDICINE

## 2024-09-20 PROCEDURE — 25010000002 HEPARIN (PORCINE) 2000-0.9 UNIT/L-% SOLUTION: Performed by: EMERGENCY MEDICINE

## 2024-09-20 PROCEDURE — 80048 BASIC METABOLIC PNL TOTAL CA: CPT | Performed by: EMERGENCY MEDICINE

## 2024-09-20 RX ORDER — MIDAZOLAM HYDROCHLORIDE 5 MG/5ML
INJECTION, SOLUTION INTRAMUSCULAR; INTRAVENOUS
Status: DISCONTINUED | OUTPATIENT
Start: 2024-09-20 | End: 2024-09-20 | Stop reason: HOSPADM

## 2024-09-20 RX ORDER — BUMETANIDE 1 MG/1
1 TABLET ORAL DAILY PRN
Status: ON HOLD | COMMUNITY

## 2024-09-20 RX ORDER — ACETAMINOPHEN 325 MG/1
650 TABLET ORAL EVERY 4 HOURS PRN
Status: DISCONTINUED | OUTPATIENT
Start: 2024-09-20 | End: 2024-09-20 | Stop reason: HOSPADM

## 2024-09-20 RX ORDER — DIPHENHYDRAMINE HYDROCHLORIDE 50 MG/ML
50 INJECTION INTRAMUSCULAR; INTRAVENOUS
Status: DISCONTINUED | OUTPATIENT
Start: 2024-09-20 | End: 2024-09-20 | Stop reason: HOSPADM

## 2024-09-20 RX ORDER — SODIUM CHLORIDE 0.9 % (FLUSH) 0.9 %
10 SYRINGE (ML) INJECTION AS NEEDED
Status: DISCONTINUED | OUTPATIENT
Start: 2024-09-20 | End: 2024-09-20 | Stop reason: HOSPADM

## 2024-09-20 RX ORDER — OMEPRAZOLE 40 MG/1
40 CAPSULE, DELAYED RELEASE ORAL DAILY
Status: ON HOLD | COMMUNITY

## 2024-09-20 RX ORDER — FENTANYL CITRATE 50 UG/ML
INJECTION, SOLUTION INTRAMUSCULAR; INTRAVENOUS
Status: DISCONTINUED | OUTPATIENT
Start: 2024-09-20 | End: 2024-09-20 | Stop reason: HOSPADM

## 2024-09-20 RX ORDER — LIDOCAINE HYDROCHLORIDE 20 MG/ML
INJECTION, SOLUTION INFILTRATION; PERINEURAL
Status: DISCONTINUED | OUTPATIENT
Start: 2024-09-20 | End: 2024-09-20 | Stop reason: HOSPADM

## 2024-09-20 RX ORDER — IOPAMIDOL 612 MG/ML
INJECTION, SOLUTION INTRAVASCULAR
Status: DISCONTINUED | OUTPATIENT
Start: 2024-09-20 | End: 2024-09-20 | Stop reason: HOSPADM

## 2024-09-20 RX ORDER — DIPHENHYDRAMINE HCL 50 MG
50 CAPSULE ORAL
Status: DISCONTINUED | OUTPATIENT
Start: 2024-09-20 | End: 2024-09-20 | Stop reason: HOSPADM

## 2024-09-20 RX ORDER — HEPARIN SODIUM 200 [USP'U]/100ML
INJECTION, SOLUTION INTRAVENOUS
Status: DISCONTINUED | OUTPATIENT
Start: 2024-09-20 | End: 2024-09-20 | Stop reason: HOSPADM

## 2024-09-20 RX ORDER — SODIUM CHLORIDE 0.9 % (FLUSH) 0.9 %
10 SYRINGE (ML) INJECTION EVERY 12 HOURS SCHEDULED
Status: DISCONTINUED | OUTPATIENT
Start: 2024-09-20 | End: 2024-09-20 | Stop reason: HOSPADM

## 2024-09-20 RX ORDER — ACETAMINOPHEN 500 MG
500 TABLET ORAL EVERY 6 HOURS PRN
Status: ON HOLD | COMMUNITY

## 2024-09-20 RX ORDER — HYDROXYZINE HYDROCHLORIDE 25 MG/1
25 TABLET, FILM COATED ORAL EVERY 8 HOURS PRN
Status: ON HOLD | COMMUNITY

## 2024-09-20 RX ORDER — DIPHENHYDRAMINE HYDROCHLORIDE 50 MG/ML
INJECTION INTRAMUSCULAR; INTRAVENOUS
Status: DISCONTINUED | OUTPATIENT
Start: 2024-09-20 | End: 2024-09-20 | Stop reason: HOSPADM

## 2024-09-20 RX ORDER — SODIUM CHLORIDE 9 MG/ML
100 INJECTION, SOLUTION INTRAVENOUS CONTINUOUS
Status: DISCONTINUED | OUTPATIENT
Start: 2024-09-20 | End: 2024-09-20 | Stop reason: HOSPADM

## 2024-09-20 RX ORDER — NITROGLYCERIN 0.4 MG/1
0.4 TABLET SUBLINGUAL
Status: DISCONTINUED | OUTPATIENT
Start: 2024-09-20 | End: 2024-09-20 | Stop reason: HOSPADM

## 2024-09-24 ENCOUNTER — TELEPHONE (OUTPATIENT)
Dept: CARDIOLOGY | Facility: CLINIC | Age: 89
End: 2024-09-24
Payer: MEDICARE

## 2024-09-26 ENCOUNTER — APPOINTMENT (OUTPATIENT)
Dept: CT IMAGING | Facility: HOSPITAL | Age: 89
DRG: 641 | End: 2024-09-26
Payer: MEDICARE

## 2024-09-26 ENCOUNTER — HOSPITAL ENCOUNTER (INPATIENT)
Facility: HOSPITAL | Age: 89
LOS: 6 days | Discharge: HOME | DRG: 641 | End: 2024-10-04
Attending: FAMILY MEDICINE | Admitting: FAMILY MEDICINE
Payer: MEDICARE

## 2024-09-26 ENCOUNTER — APPOINTMENT (OUTPATIENT)
Dept: GENERAL RADIOLOGY | Facility: HOSPITAL | Age: 89
DRG: 641 | End: 2024-09-26
Payer: MEDICARE

## 2024-09-26 DIAGNOSIS — R53.1 LEFT-SIDED WEAKNESS: ICD-10-CM

## 2024-09-26 DIAGNOSIS — R53.1 GENERALIZED WEAKNESS: Primary | ICD-10-CM

## 2024-09-26 DIAGNOSIS — Z74.09 IMPAIRED MOBILITY: ICD-10-CM

## 2024-09-26 LAB
ALBUMIN SERPL-MCNC: 4 G/DL (ref 3.5–5.2)
ALBUMIN/GLOB SERPL: 1.5 G/DL
ALP SERPL-CCNC: 73 U/L (ref 39–117)
ALT SERPL W P-5'-P-CCNC: 15 U/L (ref 1–33)
ANION GAP SERPL CALCULATED.3IONS-SCNC: 12 MMOL/L (ref 5–15)
AST SERPL-CCNC: 18 U/L (ref 1–32)
B PARAPERT DNA SPEC QL NAA+PROBE: NOT DETECTED
B PERT DNA SPEC QL NAA+PROBE: NOT DETECTED
BACTERIA UR QL AUTO: ABNORMAL /HPF
BASOPHILS # BLD AUTO: 0.03 10*3/MM3 (ref 0–0.2)
BASOPHILS NFR BLD AUTO: 0.4 % (ref 0–1.5)
BILIRUB SERPL-MCNC: 0.3 MG/DL (ref 0–1.2)
BILIRUB UR QL STRIP: NEGATIVE
BUN SERPL-MCNC: 19 MG/DL (ref 8–23)
BUN/CREAT SERPL: 19.6 (ref 7–25)
C PNEUM DNA NPH QL NAA+NON-PROBE: NOT DETECTED
CALCIUM SPEC-SCNC: 9.9 MG/DL (ref 8.6–10.5)
CHLORIDE SERPL-SCNC: 97 MMOL/L (ref 98–107)
CK SERPL-CCNC: 36 U/L (ref 20–180)
CLARITY UR: CLEAR
CO2 SERPL-SCNC: 26 MMOL/L (ref 22–29)
COLOR UR: YELLOW
CREAT SERPL-MCNC: 0.97 MG/DL (ref 0.57–1)
DEPRECATED RDW RBC AUTO: 42.1 FL (ref 37–54)
EGFRCR SERPLBLD CKD-EPI 2021: 56.3 ML/MIN/1.73
EOSINOPHIL # BLD AUTO: 0.2 10*3/MM3 (ref 0–0.4)
EOSINOPHIL NFR BLD AUTO: 3 % (ref 0.3–6.2)
ERYTHROCYTE [DISTWIDTH] IN BLOOD BY AUTOMATED COUNT: 12.3 % (ref 12.3–15.4)
FLUAV SUBTYP SPEC NAA+PROBE: NOT DETECTED
FLUBV RNA ISLT QL NAA+PROBE: NOT DETECTED
GLOBULIN UR ELPH-MCNC: 2.7 GM/DL
GLUCOSE SERPL-MCNC: 261 MG/DL (ref 65–99)
GLUCOSE UR STRIP-MCNC: ABNORMAL MG/DL
HADV DNA SPEC NAA+PROBE: NOT DETECTED
HCOV 229E RNA SPEC QL NAA+PROBE: NOT DETECTED
HCOV HKU1 RNA SPEC QL NAA+PROBE: NOT DETECTED
HCOV NL63 RNA SPEC QL NAA+PROBE: NOT DETECTED
HCOV OC43 RNA SPEC QL NAA+PROBE: NOT DETECTED
HCT VFR BLD AUTO: 39.1 % (ref 34–46.6)
HGB BLD-MCNC: 13.4 G/DL (ref 12–15.9)
HGB UR QL STRIP.AUTO: NEGATIVE
HMPV RNA NPH QL NAA+NON-PROBE: NOT DETECTED
HPIV1 RNA ISLT QL NAA+PROBE: NOT DETECTED
HPIV2 RNA SPEC QL NAA+PROBE: NOT DETECTED
HPIV3 RNA NPH QL NAA+PROBE: NOT DETECTED
HPIV4 P GENE NPH QL NAA+PROBE: NOT DETECTED
HYALINE CASTS UR QL AUTO: ABNORMAL /LPF
IMM GRANULOCYTES # BLD AUTO: 0.01 10*3/MM3 (ref 0–0.05)
IMM GRANULOCYTES NFR BLD AUTO: 0.1 % (ref 0–0.5)
KETONES UR QL STRIP: NEGATIVE
LEUKOCYTE ESTERASE UR QL STRIP.AUTO: ABNORMAL
LYMPHOCYTES # BLD AUTO: 1.7 10*3/MM3 (ref 0.7–3.1)
LYMPHOCYTES NFR BLD AUTO: 25.4 % (ref 19.6–45.3)
M PNEUMO IGG SER IA-ACNC: NOT DETECTED
MAGNESIUM SERPL-MCNC: 2.2 MG/DL (ref 1.6–2.4)
MCH RBC QN AUTO: 32 PG (ref 26.6–33)
MCHC RBC AUTO-ENTMCNC: 34.3 G/DL (ref 31.5–35.7)
MCV RBC AUTO: 93.3 FL (ref 79–97)
MONOCYTES # BLD AUTO: 0.63 10*3/MM3 (ref 0.1–0.9)
MONOCYTES NFR BLD AUTO: 9.4 % (ref 5–12)
NEUTROPHILS NFR BLD AUTO: 4.12 10*3/MM3 (ref 1.7–7)
NEUTROPHILS NFR BLD AUTO: 61.7 % (ref 42.7–76)
NITRITE UR QL STRIP: NEGATIVE
NRBC BLD AUTO-RTO: 0 /100 WBC (ref 0–0.2)
NT-PROBNP SERPL-MCNC: 2005 PG/ML (ref 0–1800)
PH UR STRIP.AUTO: 7 [PH] (ref 5–8)
PLATELET # BLD AUTO: 237 10*3/MM3 (ref 140–450)
PMV BLD AUTO: 9.4 FL (ref 6–12)
POTASSIUM SERPL-SCNC: 4.1 MMOL/L (ref 3.5–5.2)
PROT SERPL-MCNC: 6.7 G/DL (ref 6–8.5)
PROT UR QL STRIP: ABNORMAL
RBC # BLD AUTO: 4.19 10*6/MM3 (ref 3.77–5.28)
RBC # UR STRIP: ABNORMAL /HPF
REF LAB TEST METHOD: ABNORMAL
RHINOVIRUS RNA SPEC NAA+PROBE: NOT DETECTED
RSV RNA NPH QL NAA+NON-PROBE: NOT DETECTED
SARS-COV-2 RNA NPH QL NAA+NON-PROBE: NOT DETECTED
SODIUM SERPL-SCNC: 135 MMOL/L (ref 136–145)
SP GR UR STRIP: 1.01 (ref 1–1.03)
SQUAMOUS #/AREA URNS HPF: ABNORMAL /HPF
TSH SERPL DL<=0.05 MIU/L-ACNC: 0.46 UIU/ML (ref 0.27–4.2)
UROBILINOGEN UR QL STRIP: ABNORMAL
WBC # UR STRIP: ABNORMAL /HPF
WBC CLUMPS # UR AUTO: ABNORMAL /HPF
WBC NRBC COR # BLD AUTO: 6.69 10*3/MM3 (ref 3.4–10.8)

## 2024-09-26 PROCEDURE — 99285 EMERGENCY DEPT VISIT HI MDM: CPT

## 2024-09-26 PROCEDURE — 82550 ASSAY OF CK (CPK): CPT

## 2024-09-26 PROCEDURE — 70450 CT HEAD/BRAIN W/O DYE: CPT

## 2024-09-26 PROCEDURE — 84443 ASSAY THYROID STIM HORMONE: CPT

## 2024-09-26 PROCEDURE — 83735 ASSAY OF MAGNESIUM: CPT

## 2024-09-26 PROCEDURE — 93010 ELECTROCARDIOGRAM REPORT: CPT | Performed by: INTERNAL MEDICINE

## 2024-09-26 PROCEDURE — 85025 COMPLETE CBC W/AUTO DIFF WBC: CPT

## 2024-09-26 PROCEDURE — 80053 COMPREHEN METABOLIC PANEL: CPT

## 2024-09-26 PROCEDURE — 93005 ELECTROCARDIOGRAM TRACING: CPT

## 2024-09-26 PROCEDURE — G0378 HOSPITAL OBSERVATION PER HR: HCPCS

## 2024-09-26 PROCEDURE — 81001 URINALYSIS AUTO W/SCOPE: CPT

## 2024-09-26 PROCEDURE — 87086 URINE CULTURE/COLONY COUNT: CPT

## 2024-09-26 PROCEDURE — 0202U NFCT DS 22 TRGT SARS-COV-2: CPT

## 2024-09-26 PROCEDURE — 71045 X-RAY EXAM CHEST 1 VIEW: CPT

## 2024-09-26 PROCEDURE — 83880 ASSAY OF NATRIURETIC PEPTIDE: CPT

## 2024-09-26 PROCEDURE — 72125 CT NECK SPINE W/O DYE: CPT

## 2024-09-26 RX ORDER — SODIUM CHLORIDE 9 MG/ML
40 INJECTION, SOLUTION INTRAVENOUS AS NEEDED
Status: DISCONTINUED | OUTPATIENT
Start: 2024-09-26 | End: 2024-10-04 | Stop reason: HOSPADM

## 2024-09-26 RX ORDER — SODIUM CHLORIDE 0.9 % (FLUSH) 0.9 %
10 SYRINGE (ML) INJECTION AS NEEDED
Status: DISCONTINUED | OUTPATIENT
Start: 2024-09-26 | End: 2024-10-04 | Stop reason: HOSPADM

## 2024-09-26 RX ORDER — SODIUM CHLORIDE 0.9 % (FLUSH) 0.9 %
10 SYRINGE (ML) INJECTION EVERY 12 HOURS SCHEDULED
Status: DISCONTINUED | OUTPATIENT
Start: 2024-09-26 | End: 2024-10-04 | Stop reason: HOSPADM

## 2024-09-26 RX ADMIN — Medication 10 ML: at 20:40

## 2024-09-26 NOTE — ED NOTES
Pt taken to bathroom by WC and returned to bed. Pt had no difficulty lifting left leg to get into the bed but states she does feel weaker on the left leg

## 2024-09-26 NOTE — ED PROVIDER NOTES
Subjective   History of Present Illness  Patient is an 88-year-old female who presents emergency department complaints of generalized weakness.  Patient reports that she recently had a cardiac catheterization this past Friday, September 20.  Patient has history of chronic kidney disease.  Her PCP thinks that she may likely still have contrast in her system.  Patient reports that she was going to be direct admitted, but there were no beds available at the time.  She reported to the ER for further evaluation.  Patient also states that she has been having some difficulty breathing.  She denies chest pain.  She is anticoagulated with Eliquis for history of atrial fibrillation.  Patient reports that overall she just feels like she has had generalized weakness for the past few days.  Reports that it started this past weekend, but has worsened over the past 2 days. She states that for the past 2 days she feels like she has had some numbness to her right forearm that radiates down into her hand.  However, on exam she feels like her sensation to her left arm is weaker than her right arm.  She is also having weakness to her left leg and is having some difficulty raising her leg all the way.  Patient denies any urinary symptoms.  No fevers.  She was also having a headache all over her head, but currently not having a headache.  She is complaining of pain to her left neck that radiates down to her left shoulder.        Review of Systems   Constitutional:  Positive for fatigue.   Respiratory:  Positive for shortness of breath.    Musculoskeletal:  Positive for neck pain.   Neurological:  Positive for weakness, numbness and headaches.   All other systems reviewed and are negative.      Past Medical History:   Diagnosis Date    Arthritis     Cancer     BCC @ nose & Left arm    Diabetes type 2, controlled     Diverticulitis     History of GI bleed     History of transfusion     Has had x 5 units.    Hypertension     STAGE 3      "Hyponatremia     Required hospitalization    Hypothyroidism     Knee arthropathy 09/30/2020    LPRD (laryngopharyngeal reflux disease)     Pharyngeal dysphagia     Thyroid nodule        Allergies   Allergen Reactions    Codeine Hallucinations    Iodine Anaphylaxis     Xray dye    Levaquin [Levofloxacin] Anaphylaxis    Lortab [Hydrocodone-Acetaminophen] Mental Status Change and Confusion    Quinolones Anaphylaxis     - CIPRO -     Shrimp (Diagnostic) Anaphylaxis    Bactroban [Mupirocin Calcium] Swelling    Ace Inhibitors Cough    Beta Adrenergic Blockers Other (See Comments)     Pt can't remember reaction type    Hydralazine Other (See Comments)     \"Makes her feel bad\" \"head full and weakness\"    Kerendia [Finerenone] Unknown - High Severity     CHEST PAIN    Norvasc [Amlodipine] Swelling and Headache    Piperacillin Sod-Tazobactam So Unknown - High Severity    Procardia Xl [Nifedipine Er] Arrhythmia    Statins Myalgia    Thiazide-Type Diuretics Unknown - High Severity    Mupirocin Rash       Past Surgical History:   Procedure Laterality Date    APPENDECTOMY      BASAL CELL CARCINOMA EXCISION      BELPHAROPTOSIS REPAIR      BREAST CYST EXCISION      CARDIAC CATHETERIZATION      CARDIAC CATHETERIZATION Right 6/24/2021    Procedure: Left Heart Cath R radial, US guided w poss intervention;  Surgeon: Mak Nickerson DO;  Location:  [facility] CATH IN[facility]ASIVE [facility];  Service: Cardiovascular;  Laterality: Right;    CARDIAC CATHETERIZATION      CARDIAC CATHETERIZATION N/A 9/20/2024    Procedure: Left Heart Cath;  Surgeon: Mak Nickerson DO;  Location:  [facility]ATH [facility]VAS[facility];  Service: Cardiovascular;  Laterality: N/A;    CATARACT EXTRACTION      CATARACT EXTRACTION WITH INTRAOCULAR LENS IMPLANT Bilateral     CHOLECYSTECTOMY      COLONOSCOPY Left 11/1/2016    Tubular adenoma cecum, Diverticulosis repeat prn    SUBTOTAL HYSTERECTOMY      TOTAL KNEE ARTHROPLASTY Right 9/30/2020    Procedure: TOTAL " KNEE ARTHROPLASTY;  Surgeon: Mak Pickens MD;  Location: [facility] OR;  Service: Orthopedics;  Laterality: Right;    TUMOR EXCISION      under armpits and left breast       Family History   Problem Relation Age of Onset    Diabetes Mother     Cancer Mother     Heart failure Mother     Diabetes Father     Colon cancer Neg Hx     Colon polyps Neg Hx        Social History     Socioeconomic History    Marital status:    Tobacco Use    Smoking status: Never    Smokeless tobacco: Never   Vaping Use    Vaping status: Never Used   Substance and Sexual Activity    Alcohol use: No    Drug use: Never    Sexual activity: Defer     Partners: Male           Objective   Physical Exam  Vitals and nursing note reviewed.   Constitutional:       General: She is not in acute distress.     Appearance: Normal appearance. She is normal weight. She is not ill-appearing or toxic-appearing.   HENT:      Head: Normocephalic.   Cardiovascular:      Rate and Rhythm: Normal rate and regular rhythm.      Pulses: Normal pulses.      Heart sounds: Normal heart sounds.   Pulmonary:      Effort: Pulmonary effort is normal.      Breath sounds: Normal breath sounds.   Abdominal:      General: Abdomen is flat. Bowel sounds are normal. There is no distension.      Palpations: Abdomen is soft.      Tenderness: There is no abdominal tenderness.   Musculoskeletal:         General: Normal range of motion.      Cervical back: Normal range of motion and neck supple.   Skin:     General: Skin is warm and dry.   Neurological:      General: No focal deficit present.      Mental Status: She is alert and oriented to person, place, and time. Mental status is at baseline.      GCS: GCS eye subscore is 4. GCS verbal subscore is 5. GCS motor subscore is 6.      Cranial Nerves: Cranial nerves 2-12 are intact. No facial asymmetry.      Motor: Weakness present.      Coordination: Coordination is intact.      Comments: Patient having difficulty lifting left leg  on exam. States her LUE and LLE are weaker on exam. Reports sensation is equal bilaterally.   Psychiatric:         Mood and Affect: Mood normal.         Behavior: Behavior normal.         Thought Content: Thought content normal.         Judgment: Judgment normal.         Procedures       Labs Reviewed   COMPREHENSIVE METABOLIC PANEL - Abnormal; Notable for the following components:       Result Value    Glucose 261 (*)     Sodium 135 (*)     Chloride 97 (*)     eGFR 56.3 (*)     All other components within normal limits    Narrative:     GFR Normal >60  Chronic Kidney Disease <60  Kidney Failure <15    The GFR formula is only valid for adults with stable renal function between ages 18 and 70.   URINALYSIS W/ CULTURE IF INDICATED - Abnormal; Notable for the following components:    Glucose,  mg/dL (1+) (*)     Protein, UA Trace (*)     Leuk Esterase, UA Small (1+) (*)     All other components within normal limits    Narrative:     In absence of clinical symptoms, the presence of pyuria, bacteria, and/or nitrites on the urinalysis result does not correlate with infection.   BNP (IN-HOUSE) - Abnormal; Notable for the following components:    proBNP 2,005.0 (*)     All other components within normal limits    Narrative:     This assay is used as an aid in the diagnosis of individuals suspected of having heart failure. It can be used as an aid in the diagnosis of acute decompensated heart failure (ADHF) in patients presenting with signs and symptoms of ADHF to the emergency department (ED). In addition, NT-proBNP of <300 pg/mL indicates ADHF is not likely.    Age Range Result Interpretation  NT-proBNP Concentration (pg/mL:      <50             Positive            >450                   Gray                 300-450                    Negative             <300    50-75           Positive            >900                  Gray                300-900                  Negative            <300      >75              Positive            >1800                  Gray                300-1800                  Negative            <300   URINALYSIS, MICROSCOPIC ONLY - Abnormal; Notable for the following components:    WBC, UA 11-20 (*)     All other components within normal limits   RESPIRATORY PANEL PCR W/ COVID-19 (SARS-COV-2), NP SWAB IN UTM/VTP, 2 HR TAT - Normal    Narrative:     In the setting of a positive respiratory panel with a viral infection PLUS a negative procalcitonin without other underlying concern for bacterial infection, consider observing off antibiotics or discontinuation of antibiotics and continue supportive care. If the respiratory panel is positive for atypical bacterial infection (Bordetella pertussis, Chlamydophila pneumoniae, or Mycoplasma pneumoniae), consider antibiotic de-escalation to target atypical bacterial infection.   MAGNESIUM - Normal   TSH - Normal   CBC WITH AUTO DIFFERENTIAL - Normal   CK - Normal   URINE CULTURE   CBC AND DIFFERENTIAL    Narrative:     The following orders were created for panel order CBC & Differential.  Procedure                               Abnormality         Status                     ---------                               -----------         ------                     CBC Auto Differential[[id number]]        Normal              Final result                 Please view results for these tests on the individual orders.     CT Head Without Contrast   Final Result       1. Moderate cerebral and cerebellar atrophy with chronic microvascular   disease but no evidence of acute intracranial process.               This report was signed and finalized on 9/26/2024 5:54 PM by Dr. Brayan Resendiz MD.          CT Cervical Spine Without Contrast   Final Result   1. No evidence of acute osseous injury or malalignment in the cervical   spine.               This report was signed and finalized on 9/26/2024 5:56 PM by Dr. Brayan Resendiz MD.          XR Chest 1 View   Final Result        No acute cardiopulmonary abnormality.               This report was signed and finalized on 9/26/2024 4:59 PM by Donavan Main.          MRI Brain Without Contrast    (Results Pending)           ED Course  ED Course as of 09/26/24 1856   Thu Sep 26, 2024   1848 Case has been discussed with Dr. Tolbert.  He was trying to direct admit her earlier today for possible SNF placement due to her living alone and having difficulty taking care of herself.  We will plan to admit to his service.  Will consult PT, OT, case management.  Patient with left sided weakness on exam today, reports has been on going for the past two days. Discussed with Dr. Tolbert. Will order MRI brain for AM. [KR]      ED Course User Index  [KR] Deepthi Gregory APRN                          Total (NIH Stroke Scale): 2                  Medical Decision Making  Problems Addressed:  Generalized weakness: complicated acute illness or injury  Left-sided weakness: complicated acute illness or injury    Amount and/or Complexity of Data Reviewed  Labs: ordered.  Radiology: ordered.  ECG/medicine tests: ordered.    Risk  Prescription drug management.  Decision regarding hospitalization.        Final diagnoses:   Generalized weakness   Left-sided weakness       ED Disposition  ED Disposition       ED Disposition   Decision to Admit    Condition   --    Comment   Level of Care: Med/Surg [1]   Diagnosis: Generalized weakness [961899]   Admitting Physician: RAD TOLBERT [[id number]]                 No follow-up provider specified.       Medication List      No changes were made to your prescriptions during this visit.            Deepthi Gregory APRN  09/26/24 [id number]

## 2024-09-27 ENCOUNTER — APPOINTMENT (OUTPATIENT)
Dept: MRI IMAGING | Facility: HOSPITAL | Age: 89
DRG: 641 | End: 2024-09-27
Payer: MEDICARE

## 2024-09-27 LAB
ALBUMIN SERPL-MCNC: 3.9 G/DL (ref 3.5–5.2)
ALBUMIN/GLOB SERPL: 1.6 G/DL
ALP SERPL-CCNC: 65 U/L (ref 39–117)
ALT SERPL W P-5'-P-CCNC: 13 U/L (ref 1–33)
ANION GAP SERPL CALCULATED.3IONS-SCNC: 10 MMOL/L (ref 5–15)
AST SERPL-CCNC: 15 U/L (ref 1–32)
BASOPHILS # BLD AUTO: 0.03 10*3/MM3 (ref 0–0.2)
BASOPHILS NFR BLD AUTO: 0.6 % (ref 0–1.5)
BILIRUB SERPL-MCNC: 0.6 MG/DL (ref 0–1.2)
BUN SERPL-MCNC: 20 MG/DL (ref 8–23)
BUN/CREAT SERPL: 18.9 (ref 7–25)
CALCIUM SPEC-SCNC: 9.7 MG/DL (ref 8.6–10.5)
CHLORIDE SERPL-SCNC: 99 MMOL/L (ref 98–107)
CO2 SERPL-SCNC: 27 MMOL/L (ref 22–29)
CREAT SERPL-MCNC: 1.06 MG/DL (ref 0.57–1)
DEPRECATED RDW RBC AUTO: 43.4 FL (ref 37–54)
EGFRCR SERPLBLD CKD-EPI 2021: 50.6 ML/MIN/1.73
EOSINOPHIL # BLD AUTO: 0.25 10*3/MM3 (ref 0–0.4)
EOSINOPHIL NFR BLD AUTO: 4.7 % (ref 0.3–6.2)
ERYTHROCYTE [DISTWIDTH] IN BLOOD BY AUTOMATED COUNT: 12.4 % (ref 12.3–15.4)
GLOBULIN UR ELPH-MCNC: 2.4 GM/DL
GLUCOSE SERPL-MCNC: 123 MG/DL (ref 65–99)
HCT VFR BLD AUTO: 39.8 % (ref 34–46.6)
HGB BLD-MCNC: 13.1 G/DL (ref 12–15.9)
IMM GRANULOCYTES # BLD AUTO: 0.01 10*3/MM3 (ref 0–0.05)
IMM GRANULOCYTES NFR BLD AUTO: 0.2 % (ref 0–0.5)
LYMPHOCYTES # BLD AUTO: 1.85 10*3/MM3 (ref 0.7–3.1)
LYMPHOCYTES NFR BLD AUTO: 34.5 % (ref 19.6–45.3)
MCH RBC QN AUTO: 31.3 PG (ref 26.6–33)
MCHC RBC AUTO-ENTMCNC: 32.9 G/DL (ref 31.5–35.7)
MCV RBC AUTO: 95.2 FL (ref 79–97)
MONOCYTES # BLD AUTO: 0.66 10*3/MM3 (ref 0.1–0.9)
MONOCYTES NFR BLD AUTO: 12.3 % (ref 5–12)
NEUTROPHILS NFR BLD AUTO: 2.56 10*3/MM3 (ref 1.7–7)
NEUTROPHILS NFR BLD AUTO: 47.7 % (ref 42.7–76)
NRBC BLD AUTO-RTO: 0 /100 WBC (ref 0–0.2)
PLATELET # BLD AUTO: 221 10*3/MM3 (ref 140–450)
PMV BLD AUTO: 9.3 FL (ref 6–12)
POTASSIUM SERPL-SCNC: 4.2 MMOL/L (ref 3.5–5.2)
PROT SERPL-MCNC: 6.3 G/DL (ref 6–8.5)
RBC # BLD AUTO: 4.18 10*6/MM3 (ref 3.77–5.28)
SODIUM SERPL-SCNC: 136 MMOL/L (ref 136–145)
WBC NRBC COR # BLD AUTO: 5.36 10*3/MM3 (ref 3.4–10.8)

## 2024-09-27 PROCEDURE — 80053 COMPREHEN METABOLIC PANEL: CPT | Performed by: FAMILY MEDICINE

## 2024-09-27 PROCEDURE — 97161 PT EVAL LOW COMPLEX 20 MIN: CPT

## 2024-09-27 PROCEDURE — G0378 HOSPITAL OBSERVATION PER HR: HCPCS

## 2024-09-27 PROCEDURE — 85025 COMPLETE CBC W/AUTO DIFF WBC: CPT | Performed by: FAMILY MEDICINE

## 2024-09-27 PROCEDURE — 97165 OT EVAL LOW COMPLEX 30 MIN: CPT

## 2024-09-27 PROCEDURE — 70551 MRI BRAIN STEM W/O DYE: CPT

## 2024-09-27 RX ORDER — FLECAINIDE ACETATE 50 MG/1
25 TABLET ORAL 2 TIMES DAILY
Status: DISCONTINUED | OUTPATIENT
Start: 2024-09-27 | End: 2024-10-04 | Stop reason: HOSPADM

## 2024-09-27 RX ORDER — PANTOPRAZOLE SODIUM 40 MG/1
40 TABLET, DELAYED RELEASE ORAL
Status: DISCONTINUED | OUTPATIENT
Start: 2024-09-27 | End: 2024-10-04 | Stop reason: HOSPADM

## 2024-09-27 RX ORDER — POLYETHYLENE GLYCOL 3350 17 G/17G
17 POWDER, FOR SOLUTION ORAL DAILY
Status: DISCONTINUED | OUTPATIENT
Start: 2024-09-27 | End: 2024-10-04 | Stop reason: HOSPADM

## 2024-09-27 RX ORDER — LEVOTHYROXINE SODIUM 100 UG/1
50 TABLET ORAL DAILY
Status: DISCONTINUED | OUTPATIENT
Start: 2024-09-27 | End: 2024-10-04 | Stop reason: HOSPADM

## 2024-09-27 RX ORDER — ACETAMINOPHEN 325 MG/1
650 TABLET ORAL EVERY 6 HOURS PRN
Status: DISCONTINUED | OUTPATIENT
Start: 2024-09-27 | End: 2024-10-04 | Stop reason: HOSPADM

## 2024-09-27 RX ORDER — EVOLOCUMAB 140 MG/ML
140 INJECTION, SOLUTION SUBCUTANEOUS
COMMUNITY

## 2024-09-27 RX ORDER — BUMETANIDE 1 MG/1
1 TABLET ORAL DAILY PRN
Status: DISCONTINUED | OUTPATIENT
Start: 2024-09-27 | End: 2024-10-04 | Stop reason: HOSPADM

## 2024-09-27 RX ORDER — VERAPAMIL HYDROCHLORIDE 200 MG/1
200 CAPSULE, EXTENDED RELEASE ORAL NIGHTLY
COMMUNITY
End: 2024-10-04 | Stop reason: HOSPADM

## 2024-09-27 RX ADMIN — FLECAINIDE ACETATE 25 MG: 50 TABLET ORAL at 21:28

## 2024-09-27 RX ADMIN — Medication 10 ML: at 21:00

## 2024-09-27 RX ADMIN — POLYETHYLENE GLYCOL 3350 17 G: 17 POWDER, FOR SOLUTION ORAL at 21:27

## 2024-09-27 RX ADMIN — APIXABAN 5 MG: 5 TABLET, FILM COATED ORAL at 08:46

## 2024-09-27 RX ADMIN — PANTOPRAZOLE SODIUM 40 MG: 40 TABLET, DELAYED RELEASE ORAL at 21:28

## 2024-09-27 RX ADMIN — Medication 10 ML: at 08:47

## 2024-09-27 RX ADMIN — FLECAINIDE ACETATE 25 MG: 50 TABLET ORAL at 08:46

## 2024-09-27 RX ADMIN — LEVOTHYROXINE SODIUM 50 MCG: 100 TABLET ORAL at 08:46

## 2024-09-27 RX ADMIN — ACETAMINOPHEN 650 MG: 325 TABLET ORAL at 21:32

## 2024-09-27 RX ADMIN — APIXABAN 5 MG: 5 TABLET, FILM COATED ORAL at 21:27

## 2024-09-27 NOTE — PROGRESS NOTES
Malnutrition Severity Assessment    Patient Name:  Nedra Tolliver  YOB: 1935  MRN: [id number]  Admit Date:  9/26/2024    Patient meets criteria for : Moderate (non-severe) Malnutrition (Secondary signs: generalized weakness)    Malnutrition Severity Assessment  Malnutrition Type: Chronic Disease - Related Malnutrition  Malnutrition Type (Last 8 Hours)       Malnutrition Severity Assessment       Row Name 09/27/24 1612       Malnutrition Severity Assessment    Malnutrition Type Chronic Disease - Related Malnutrition      Row Name 09/27/24 1612       Insufficient Energy Intake     Insufficient Energy Intake Findings Moderate    Insufficient Energy Intake  <75% of est. energy requirement for > or equal to 1 month      Row Name 09/27/24 1612       Unintentional Weight Loss     Unintentional Weight Loss Findings Moderate    Unintentional Weight Loss  Weight loss greater than 5% in one month  wt loss of 10 lbs (6%) over the past three months      Row Name 09/27/24 1612       Muscle Loss    Loss of Muscle Mass Findings Moderate    Randle Region Moderate - slight depression    Clavicle Bone Region Moderate - some protrusion in females, visible in males    Dorsal Hand Region Moderate - slight depression    Patellar Region Moderate - patella more prominent, less muscle definition around patella    Anterior Thigh Region Moderate - mild depression on inner thigh    Posterior Calf Region Moderate - some roundness, slight firmness      Row Name 09/27/24 1612       Fat Loss    Subcutaneous Fat Loss Findings Moderate    Orbital Region  Moderate -  somewhat hollowness, slightly dark circles    Upper Arm Region Moderate - some fat tissue, not ample      Row Name 09/27/24 1612       Criteria Met (Must meet criteria for severity in at least 2 of these categories: M Wasting, Fat Loss, Fluid, Secondary Signs, Wt. Status, Intake)    Patient meets criteria for  Moderate (non-severe) Malnutrition  Secondary signs:  generalized weakness                    Electronically signed by:  Mya Danielson RDN, JAJA  09/27/24 16:18 CDT

## 2024-09-27 NOTE — THERAPY EVALUATION
Patient Name: Nedra Tolliver  : 1935    MRN: [id number]                              Today's Date: 2024       Admit Date: 2024    Visit Dx:     ICD-10-CM ICD-9-CM   1. Generalized weakness  R53.1 780.79   2. Left-sided weakness  R53.1 728.87   3. Impaired mobility [Z74.09]  Z74.09 799.89     Patient Active Problem List   Diagnosis    Urinary tract infection without hematuria    Dysuria    Vaginal atrophy    Overactive bladder    Diverticulitis    Benign essential HTN    Thyroid nodule    Hypothyroidism    Hyponatremia    Syncope and collapse    Volume depletion    Type 2 diabetes mellitus, without long-term current use of insulin    Moderate left ankle sprain    Left hip pain    Acute pain of left knee    Pain of left thumb    Nondisplaced fracture of navicular bone of left foot with routine healing    Chest pain at rest    Acute cystitis without hematuria    H/O thyroid nodule    Hypoglycemia    IBS (irritable bowel syndrome)    Idiopathic scoliosis    Insomnia    Mild tricuspid insufficiency    Neuropathy due to type 2 diabetes mellitus    Onychomycosis    Osteopenia    Pericarditis secondary to uremia    Primary osteoarthritis of right knee    Proteinuria    Vitamin D deficiency    High cholesterol    Lumbar radiculopathy    Non-toxic multinodular goiter    Transient ischemic attack    Acute gastritis without hemorrhage    UTI symptoms    Absolute anemia    Essential hypertension    Mixed hyperlipidemia with pervious statin intolerance    Stage 3a chronic kidney disease (CKD)    Cervical radiculopathy    LBBB (left bundle branch block)    Persistent atrial fibrillation    Fatigue    Shortness of breath    Accelerated hypertension with diastolic congestive heart failure, NYHA class 3    Paroxysmal A-fib    Chest pain    Palpitations    Anxiety    Atrial fibrillation with RVR    Coronary artery disease involving coronary bypass graft of native heart with unstable angina pectoris    Generalized  weakness     Past Medical History:   Diagnosis Date    Arthritis     Cancer     BCC @ nose & Left arm    Diabetes type 2, controlled     Diverticulitis     History of GI bleed     History of transfusion     Has had x 5 units.    Hypertension     STAGE 3     Hyponatremia     Required hospitalization    Hypothyroidism     Knee arthropathy 09/30/2020    LPRD (laryngopharyngeal reflux disease)     Pharyngeal dysphagia     Thyroid nodule      Past Surgical History:   Procedure Laterality Date    APPENDECTOMY      BASAL CELL CARCINOMA EXCISION      BELPHAROPTOSIS REPAIR      BREAST CYST EXCISION      CARDIAC CATHETERIZATION      CARDIAC CATHETERIZATION Right 6/24/2021    Procedure: Left Heart Cath R radial, US guided w poss intervention;  Surgeon: Mak Nickerson DO;  Location:  [facility];  Service: Cardiovascular;  Laterality: Right;    CARDIAC CATHETERIZATION      CARDIAC CATHETERIZATION N/A 9/20/2024    Procedure: Left Heart Cath;  Surgeon: Mak Nickerson DO;  Location:  [facility];  Service: Cardiovascular;  Laterality: N/A;    CATARACT EXTRACTION      CATARACT EXTRACTION WITH INTRAOCULAR LENS IMPLANT Bilateral     CHOLECYSTECTOMY      COLONOSCOPY Left 11/1/2016    Tubular adenoma cecum, Diverticulosis repeat prn    SUBTOTAL HYSTERECTOMY      TOTAL KNEE ARTHROPLASTY Right 9/30/2020    Procedure: TOTAL KNEE ARTHROPLASTY;  Surgeon: Mak Pickens MD;  Location: [facility] OR;  Service: Orthopedics;  Laterality: Right;    TUMOR EXCISION      under armpits and left breast      General Information       Row Name 09/27/24 0818          Physical Therapy Time and Intention    Document Type evaluation  presents with difficulty caring for self Dx; generalized weakness. H/o L heart cath 9/20  -AJ     Mode of Treatment physical therapy  -AJ       Row Name 09/27/24 0818          General Information    Patient Profile Reviewed yes  -AJ     Prior Level of Function  independent:;all household mobility;community mobility;home management;gait;ADL's  -     Existing Precautions/Restrictions fall  -     Barriers to Rehab previous functional deficit  -       Row Name 09/27/24 0818          Living Environment    People in Home alone  -       Row Name 09/27/24 0818          Home Main Entrance    Number of Stairs, Main Entrance two  -     Stair Railings, Main Entrance none  -       Row Name 09/27/24 0818          Stairs Within Home, Primary    Number of Stairs, Within Home, Primary none  -       Row Name 09/27/24 0818          Cognition    Orientation Status (Cognition) oriented x 4  -       Row Name 09/27/24 0818          Safety Issues, Functional Mobility    Impairments Affecting Function (Mobility) balance;pain;range of motion (ROM);strength;endurance/activity tolerance  -               User Key  (r) = Recorded By, (t) = Taken By, (c) = Cosigned By      Initials Name Provider Type    Donavan Bautista, PT DPT Physical Therapist                   Mobility       Row Name 09/27/24 0818          Bed Mobility    Bed Mobility supine-sit  -     Supine-Sit [place] (Bed Mobility) standby assist  -     Assistive Device (Bed Mobility) bed rails;head of bed elevated  -       Row Name 09/27/24 0818          Bed-Chair Transfer    Bed-Chair [place] (Transfers) contact guard;1 person assist  -     Assistive Device (Bed-Chair Transfers) walker, front-wheeled  -AJ     Comment, (Bed-Chair Transfer) bed to w/c in blanc for testing  -       Row Name 09/27/24 0818          Sit-Stand Transfer    Sit-Stand [place] (Transfers) contact guard  -     Assistive Device (Sit-Stand Transfers) walker, front-connor MILLER     Comment, (Sit-Stand Transfer) required3 attempts with leaning to reach stand  -       Row Name 09/27/24 0818          Gait/Stairs (Locomotion)    [place] Level (Gait) contact guard  -KARAN     Assistive Device (Gait) walker, front-connor MILLER      Patient was able to Ambulate yes  -AJ     Distance in Feet (Gait) 15  -AJ     Deviations/Abnormal Patterns (Gait) bilateral deviations;base of support, narrow;stride length decreased;gait speed decreased  -AJ     Bilateral Gait Deviations forward flexed posture  -               User Key  (r) = Recorded By, (t) = Taken By, (c) = Cosigned By      Initials Name Provider Type    Donavan Bautitsa PT DPT Physical Therapist                   Obj/Interventions       Row Name 09/27/24 0818          Range of Motion Comprehensive    General Range of Motion bilateral lower extremity ROM WFL  -       Row Name 09/27/24 0818          Strength Comprehensive (MMT)    General Manual Muscle Testing (MMT) Assessment lower extremity strength deficits identified  -     Comment, General Manual Muscle Testing (MMT) Assessment L hip flexion 3/5, R hip flexion 3+/5, B knee ext 4-/5, B knee flexion 3+/5, Ankle DF/PF WFL and 4+/5  -       Row Name 09/27/24 0818          Balance    Balance Assessment sitting static balance;sitting dynamic balance;standing static balance;standing dynamic balance  -     Static Sitting Balance independent  -     Dynamic Sitting Balance independent  -AJ     Position, Sitting Balance unsupported;sitting edge of bed  -     Static Standing Balance contact guard  -     Dynamic Standing Balance contact guard;minimal assist;1-person assist  -     Position/Device Used, Standing Balance supported;walker, front-wheeled  -     Balance Interventions sitting;standing;sit to stand;supported;static;dynamic  -       Row Name 09/27/24 0818          Sensory Assessment (Somatosensory)    Sensory Assessment (Somatosensory) right LE  -AJ     Right LE Sensory Assessment general sensation;impaired;other (see comments)  along L5-s1 dermatome pt reports tingling  -               User Key  (r) = Recorded By, (t) = Taken By, (c) = Cosigned By      Initials Name Provider Type    Donavan Bautista PT DPT Physical  Therapist                   Goals/Plan       Row Name 09/27/24 0818          Bed Mobility Goal 1 (PT)    Activity/Assistive Device (Bed Mobility Goal 1, PT) rolling to left;rolling to right;sit to supine;supine to sit  -AJ     [place] Level/Cues Needed (Bed Mobility Goal 1, PT) independent  -AJ     Time Frame (Bed Mobility Goal 1, PT) long term goal (LTG);10 days  -AJ     Progress/Outcomes (Bed Mobility Goal 1, PT) new goal  -AJ       Row Name 09/27/24 0818          Transfer Goal 1 (PT)    Activity/Assistive Device (Transfer Goal 1, PT) sit-to-stand/stand-to-sit;bed-to-chair/chair-to-bed;toilet;walk-in shower;wheelchair transfer;car transfer  -AJ     [place] Level/Cues Needed (Transfer Goal 1, PT) standby assist  -AJ     Time Frame (Transfer Goal 1, PT) long term goal (LTG);10 days  -AJ     Progress/Outcome (Transfer Goal 1, PT) new goal  -AJ       Row Name 09/27/24 0818          Gait Training Goal 1 (PT)    Activity/Assistive Device (Gait Training Goal 1, PT) gait (walking locomotion);decrease asymmetrical patterns;decrease fall risk;diminish gait deviation;forward stepping;improve balance and speed;increase endurance/gait distance;increase energy conservation;assistive device use;walker, rolling  -AJ     [place] Level (Gait Training Goal 1, PT) standby assist  -AJ     Distance (Gait Training Goal 1, PT) 100' with no pain  -AJ     Time Frame (Gait Training Goal 1, PT) long term goal (LTG);10 days  -AJ     Progress/Outcome (Gait Training Goal 1, PT) new goal  -AJ       Row Name 09/27/24 0818          Stairs Goal 1 (PT)    Activity/Assistive Device (Stairs Goal 1, PT) stairs, all skills;ascending stairs;descending stairs;decrease fall risk;improve balance and speed  -AJ     [place] Level/Cues Needed (Stairs Goal 1, PT) contact guard required  -AJ     Number of Stairs (Stairs Goal 1, PT) 2 as needed for home safety if pt plans to d/c home  -AJ     Time Frame (Stairs Goal 1, PT) long term goal  (LTG);10 days  -     Progress/Outcome (Stairs Goal 1, PT) new goal  -       Row Name 09/27/24 0818          Problem Specific Goal 1 (PT)    Problem Specific Goal 1 (PT) Pt will demo independence with HEP as needed to improve LE strength and decrease fall risk to return to PLOF  -     Time Frame (Problem Specific Goal 1, PT) long-term goal (LTG);by discharge  -     Progress/Outcome (Problem Specific Goal 1, PT) new goal  -       Row Name 09/27/24 0818          Therapy Assessment/Plan (PT)    Planned Therapy Interventions (PT) balance training;bed mobility training;gait training;home exercise program;patient/family education;transfer training;strengthening;ROM (range of motion);stair training  -               User Key  (r) = Recorded By, (t) = Taken By, (c) = Cosigned By      Initials Name Provider Type    Donavan Bautista, PT DPT Physical Therapist                   Clinical Impression       Row Name 09/27/24 0818          Pain    Pretreatment Pain Rating 0/10 - no pain  -     Posttreatment Pain Rating 0/10 - no pain  -     Additional Documentation Pain Scale: Numbers Pre/Post-Treatment (Group)  -       Row Name 09/27/24 0818          Plan of Care Review    Plan of Care Reviewed With patient  -     Outcome Evaluation PT eval completed. pt in fowlers position, AOx4 with no complaints of pain currently. pt reports independence with household mobility with use of rollator and hopes to return to independence and return home. Today pt brought self to EOB with SBA. In sitting pt demo functional AROM in B LE, but demo LE strength deficits;L hip flexion 3/5, R hip flexion 3+/5, B knee ext 4-/5, B knee flexion 3+/5, Ankle DF/PF WFL and 4+/5. pt required 3 attemptes at sit>stand and required cues for leaning to gain momentum and ambulated to w/c for testing with CGA and FWW. pt will benefit from skilled PT services to decrease fall risk, regain activity tolerance, improve LE strength and return to PLOF.  Recommend home with assist and HH when medically stable.  -       Row Name 09/27/24 0818          Therapy Assessment/Plan (PT)    Patient/Family Therapy Goals Statement (PT) go home and get better  -     Rehab Potential (PT) good, to achieve stated therapy goals  -     Criteria for Skilled Interventions Met (PT) yes;meets criteria;skilled treatment is necessary  -     Therapy Frequency (PT) 2 times/day  -AJ     Predicted Duration of Therapy Intervention (PT) until d/c  -       Row Name 09/27/24 0818          Vital Signs    Pre Patient Position Supine  -AJ     Intra Patient Position Standing  -AJ     Post Patient Position Sitting  -AJ       Row Name 09/27/24 0818          Positioning and Restraints    Pre-Treatment Position in bed  -AJ     Post Treatment Position wheelchair  -AJ     In Wheelchair notified nsg;sitting;with other staff  -               User Key  (r) = Recorded By, (t) = Taken By, (c) = Cosigned By      Initials Name Provider Type    Donavan Bautista PT DPT Physical Therapist                   Outcome Measures       Row Name 09/27/24 0818          How much help from another person do you currently need...    Turning from your back to your side while in flat bed without using bedrails? 4  -AJ     Moving from lying on back to sitting on the side of a flat bed without bedrails? 4  -AJ     Moving to and from a bed to a chair (including a wheelchair)? 3  -AJ     Standing up from a chair using your arms (e.g., wheelchair, bedside chair)? 3  -AJ     Climbing 3-5 steps with a railing? 2  -AJ     To walk in hospital room? 3  -AJ     AM-PAC 6 Clicks Score (PT) 19  -AJ     Highest Level of Mobility Goal 6 --> Walk 10 steps or more  -       Row Name 09/27/24 0818          Functional Assessment    Outcome Measure Options AM-PAC 6 Clicks Basic Mobility (PT)  -               User Key  (r) = Recorded By, (t) = Taken By, (c) = Cosigned By      Initials Name Provider Type    Donavan Bautista PT DPT  Physical Therapist                                 Physical Therapy Education       Title: PT OT SLP Therapies (Done)       Topic: Physical Therapy (Done)       Point: Mobility training (Done)       Learning Progress Summary             Patient Acceptance, E, VU by KARAN at 9/27/2024 1002    Comment: role of PT, fallrisk, benefits of OOB activity, beginning HEP                         Point: Home exercise program (Done)       Learning Progress Summary             Patient Acceptance, E, VU by KARAN at 9/27/2024 1002    Comment: role of PT, fallrisk, benefits of OOB activity, beginning HEP                         Point: Body mechanics (Done)       Learning Progress Summary             Patient Acceptance, E, VU by KARAN at 9/27/2024 1002    Comment: role of PT, fallrisk, benefits of OOB activity, beginning HEP                         Point: Precautions (Done)       Learning Progress Summary             Patient Acceptance, E, VU by KARAN at 9/27/2024 1002    Comment: role of PT, fallrisk, benefits of OOB activity, beginning HEP                                         User Key       Initials Effective Dates Name Provider Type Discipline    KARAN 08/15/24 -  Donavan Smith, IRINA DPT Physical Therapist PT                  PT Recommendation and Plan  Planned Therapy Interventions (PT): balance training, bed mobility training, gait training, home exercise program, patient/family education, transfer training, strengthening, ROM (range of motion), stair training  Plan of Care Reviewed With: patient  Outcome Evaluation: PT eval completed. pt in fowlers position, AOx4 with no complaints of pain currently. pt reports independence with household mobility with use of rollator and hopes to return to independence and return home. Today pt brought self to EOB with SBA. In sitting pt demo functional AROM in B LE, but demo LE strength deficits;L hip flexion 3/5, R hip flexion 3+/5, B knee ext 4-/5, B knee flexion 3+/5, Ankle DF/PF WFL and 4+/5. pt  required 3 attemptes at sit>stand and required cues for leaning to gain momentum and ambulated to w/c for testing with CGA and FWW. pt will benefit from skilled PT services to decrease fall risk, regain activity tolerance, improve LE strength and return to PLOF. Recommend home with assist and HH when medically stable.     Time Calculation:         PT Charges       Row Name 09/27/24 0818             Time Calculation    Start Time 0930  -AJ      Stop Time 0951  +10 for chart review  -      Time Calculation (min) 21 min  -AJ      PT Received On 09/27/24  -AJ      PT Goal Re-Cert Due Date 10/07/24  -AJ         Untimed Charges    PT Eval/Re-eval Minutes 31  -AJ         Total Minutes    Untimed Charges Total Minutes 31  -AJ       Total Minutes 31  -AJ                User Key  (r) = Recorded By, (t) = Taken By, (c) = Cosigned By      Initials Name Provider Type    Donavan Bautista, IRINA DPT Physical Therapist                  Therapy Charges for Today       Code Description Service Date Service Provider Modifiers Qty    [id number] HC PT EVAL LOW COMPLEXITY 2 9/27/2024 Donavan Smith PT DPT GP 1            PT G-Codes  Outcome Measure Options: AM-PAC 6 Clicks Basic Mobility (PT)  AM-PAC 6 Clicks Score (PT): 19  PT Discharge Summary  Anticipated Discharge Disposition (PT): home with assist, home with home health    Donavan Smith PT DPROEL  9/27/2024

## 2024-09-27 NOTE — THERAPY EVALUATION
Acute Care - Occupational Therapy Initial Evaluation  [facility]     Patient Name: Nedra Tolliver  : 1935  MRN: [id number]  Today's Date: 2024  Onset of Illness/Injury or Date of Surgery: 24  Date of Referral to OT: 24  Referring Physician: Dr. Tolbert    Admit Date: 2024       ICD-10-CM ICD-9-CM   1. Generalized weakness  R53.1 780.79   2. Left-sided weakness  R53.1 728.87   3. Impaired mobility [Z74.09]  Z74.09 799.89     Patient Active Problem List   Diagnosis    Urinary tract infection without hematuria    Dysuria    Vaginal atrophy    Overactive bladder    Diverticulitis    Benign essential HTN    Thyroid nodule    Hypothyroidism    Hyponatremia    Syncope and collapse    Volume depletion    Type 2 diabetes mellitus, without long-term current use of insulin    Moderate left ankle sprain    Left hip pain    Acute pain of left knee    Pain of left thumb    Nondisplaced fracture of navicular bone of left foot with routine healing    Chest pain at rest    Acute cystitis without hematuria    H/O thyroid nodule    Hypoglycemia    IBS (irritable bowel syndrome)    Idiopathic scoliosis    Insomnia    Mild tricuspid insufficiency    Neuropathy due to type 2 diabetes mellitus    Onychomycosis    Osteopenia    Pericarditis secondary to uremia    Primary osteoarthritis of right knee    Proteinuria    Vitamin D deficiency    High cholesterol    Lumbar radiculopathy    Non-toxic multinodular goiter    Transient ischemic attack    Acute gastritis without hemorrhage    UTI symptoms    Absolute anemia    Essential hypertension    Mixed hyperlipidemia with pervious statin intolerance    Stage 3a chronic kidney disease (CKD)    Cervical radiculopathy    LBBB (left bundle branch block)    Persistent atrial fibrillation    Fatigue    Shortness of breath    Accelerated hypertension with diastolic congestive heart failure, NYHA class 3    Paroxysmal A-fib    Chest pain    Palpitations    Anxiety     Atrial fibrillation with RVR    Coronary artery disease involving coronary bypass graft of native heart with unstable angina pectoris    Generalized weakness     Past Medical History:   Diagnosis Date    Arthritis     Cancer     BCC @ nose & Left arm    Diabetes type 2, controlled     Diverticulitis     History of GI bleed     History of transfusion     Has had x 5 units.    Hypertension     STAGE 3     Hyponatremia     Required hospitalization    Hypothyroidism     Knee arthropathy 09/30/2020    LPRD (laryngopharyngeal reflux disease)     Pharyngeal dysphagia     Thyroid nodule      Past Surgical History:   Procedure Laterality Date    APPENDECTOMY      BASAL CELL CARCINOMA EXCISION      BELPHAROPTOSIS REPAIR      BREAST CYST EXCISION      CARDIAC CATHETERIZATION      CARDIAC CATHETERIZATION Right 6/24/2021    Procedure: Left Heart Cath R radial, US guided w poss intervention;  Surgeon: Mak Nickerson DO;  Location:  [facility];  Service: Cardiovascular;  Laterality: Right;    CARDIAC CATHETERIZATION      CARDIAC CATHETERIZATION N/A 9/20/2024    Procedure: Left Heart Cath;  Surgeon: Mak Nickerson DO;  Location:  [facility]ATH [facility];  Service: Cardiovascular;  Laterality: N/A;    CATARACT EXTRACTION      CATARACT EXTRACTION WITH INTRAOCULAR LENS IMPLANT Bilateral     CHOLECYSTECTOMY      COLONOSCOPY Left 11/1/2016    Tubular adenoma cecum, Diverticulosis repeat prn    SUBTOTAL HYSTERECTOMY      TOTAL KNEE ARTHROPLASTY Right 9/30/2020    Procedure: TOTAL KNEE ARTHROPLASTY;  Surgeon: Mak Pickens MD;  Location:  [facility] OR;  Service: Orthopedics;  Laterality: Right;    TUMOR EXCISION      under armpits and left breast         OT ASSESSMENT FLOWSHEET (Last 12 Hours)       OT Evaluation and Treatment       Row Name 09/27/24 0911                   OT Time and Intention    Subjective Information complains of;weakness;fatigue  -AC        Document Type evaluation  -AC         Mode of Treatment occupational therapy  -AC           General Information    Patient Profile Reviewed yes  -AC        Onset of Illness/Injury or Date of Surgery 09/26/24  -        Referring Physician Dr. Tolbert  -        Prior Level of Function independent:;all household mobility;gait;transfer;bed mobility;ADL's;home management;cooking;cleaning;dependent:;driving  -AC        Equipment Currently Used at Home rollator;bath bench;grab bar  -AC        Pertinent History of Current Functional Problem generalized weakness, unable to care for self at home  -AC        Existing Precautions/Restrictions fall  -AC        Risks Reviewed LOB;increased discomfort;change in vital signs;lines disloged;patient:  -AC        Benefits Reviewed improve function;increase independence;increase strength;increase balance;decrease pain;decrease risk of DVT;improve skin integrity;increase knowledge;patient:  -AC        Barriers to Rehab previous functional deficit  -AC           Living Environment    Current Living Arrangements home  tub shower  -AC        Home Accessibility stairs to enter home  -AC        People in Home alone  son checks on pt almost daily  -AC           Home Main Entrance    Number of Stairs, Main Entrance two  -AC        Stair Railings, Main Entrance none  -AC           Pain Assessment    Pretreatment Pain Rating 0/10 - no pain  -AC        Posttreatment Pain Rating 0/10 - no pain  -AC        Pain Intervention(s) --  -AC           Cognition    Orientation Status (Cognition) oriented x 4  -AC        Follows Commands (Cognition) WFL  -AC        Personal Safety Interventions fall prevention program maintained;gait belt;nonskid shoes/slippers when out of bed;supervised activity;toileting scheduled;elopement precautions initiated  -AC           Range of Motion Comprehensive    General Range of Motion bilateral upper extremity ROM WFL  -AC        Comment, General Range of Motion L shoulder chronic injury/pain and  difficulty with flexion, ext/int rotation  -           Strength Comprehensive (MMT)    Comment, General Manual Muscle Testing (MMT) Assessment 4-/5 RUE, 3+/5 LUE  -           Activities of Daily Living    BADL Assessment/Intervention lower body dressing  -           Lower Body Dressing Assessment/Training    [place] Level (Lower Body Dressing) don;doff;socks;standby assist  -        Position (Lower Body Dressing) edge of bed sitting  -           BADL Safety/Performance    Impairments, BADL Safety/Performance balance;range of motion;endurance/activity tolerance;strength;shortness of breath  -           Bed Mobility    Bed Mobility supine-sit  -        Supine-Sit [place] (Bed Mobility) standby assist  -        Assistive Device (Bed Mobility) bed rails;head of bed elevated  -           Functional Mobility    Functional Mobility- Ind. Level contact guard assist  -        Functional Mobility- Device walker, front-wheeled  -        Functional Mobility- Comment to hallway transport chair  -           Transfer Assessment/Treatment    Transfers sit-stand transfer;stand-sit transfer  -           Sit-Stand Transfer    Sit-Stand [place] (Transfers) contact guard  -        Assistive Device (Sit-Stand Transfers) walker, front-wheeled  -        Comment, (Sit-Stand Transfer) 3 attempts before able to transfer  -           Stand-Sit Transfer    Stand-Sit [place] (Transfers) contact guard  -        Assistive Device (Stand-Sit Transfers) walker, front-wheeled  -           Safety Issues, Functional Mobility    Impairments Affecting Function (Mobility) balance;pain;range of motion (ROM);strength;endurance/activity tolerance;shortness of breath  -           Balance    Balance Assessment sitting static balance;sitting dynamic balance;standing static balance;standing dynamic balance  -        Static Sitting Balance independent  -        Dynamic Sitting Balance independent  -         Position, Sitting Balance sitting edge of bed;unsupported  -AC        Static Standing Balance contact guard  -AC        Dynamic Standing Balance contact guard  -AC        Position/Device Used, Standing Balance walker, front-wheeled;supported  -AC           Plan of Care Review    Plan of Care Reviewed With patient  -AC        Progress no change  -AC        Outcome Evaluation OT eval completed.  Pt alert and oriented x4.  No c/o pain.  Pt lives alone and is typically independent but has had a steady decline in function recently and reports increased difficulty caring for self.  She was Tiera to come to EOB with HOB elevated.  Strength is significantly decreased in BUE at 4-/5.  LUE is slightly weaker than R likely due to chronic L shoulder pain per pt.  Pt donned and doffed socks with SBA at EOB.  She transferred with CGA but required 3 attempts at standing before she was able to get up.  She walked to hallway with CGA and rw but was SOA once seated in transport chair for test.  OT is indicated to address pt's significant weakness, decreased endurance and decreased safety with ADL.  Recommend SNF at discharge for rehab prior to returning home.  -AC           Positioning and Restraints    Pre-Treatment Position in bed  -AC        Post Treatment Position other  -AC        Other Position with other staff  -AC           Therapy Assessment/Plan (OT)    Date of Referral to OT 09/26/24  -AC        OT Diagnosis decreased adl  -AC        Rehab Potential (OT) good, to achieve stated therapy goals  -        Criteria for Skilled Therapeutic Interventions Met (OT) yes;meets criteria;skilled treatment is necessary  -AC        Therapy Frequency (OT) 5 times/wk  -AC        Predicted Duration of Therapy Intervention (OT) 10 days  -AC        Activity Limitations Related to Problem List (OT) BADLs not performed adequately or safely  -AC        Planned Therapy Interventions (OT) activity tolerance training;BADL  retraining;functional balance retraining;occupation/activity based interventions;patient/caregiver education/training;strengthening exercise;transfer/mobility retraining  -AC           OT Goals    Transfer Goal Selection (OT) transfer, OT goal 1  -AC        Bathing Goal Selection (OT) bathing, OT goal 1  -AC        Dressing Goal Selection (OT) dressing, OT goal 1  -AC        Strength Goal Selection (OT) strength, OT goal 1  -AC        Problem Specific Goal Selection (OT) --  -AC           Transfer Goal 1 (OT)    Activity/Assistive Device (Transfer Goal 1, OT) toilet;tub;tub bench  -AC        [place] Level/Cues Needed (Transfer Goal 1, OT) standby assist  -AC        Time Frame (Transfer Goal 1, OT) long term goal (LTG);10 days  -AC        Progress/Outcome (Transfer Goal 1, OT) new goal  -AC           Bathing Goal 1 (OT)    Activity/Device (Bathing Goal 1, OT) bathing skills, all;tub bench  -AC        [place] Level/Cues Needed (Bathing Goal 1, OT) standby assist  -AC        Time Frame (Bathing Goal 1, OT) long term goal (LTG);10 days  -AC        Progress/Outcomes (Bathing Goal 1, OT) new goal  -AC           Dressing Goal 1 (OT)    Activity/Device (Dressing Goal 1, OT) --  -AC        Time Frame (Dressing Goal 1, OT) --  -AC        Progress/Outcome (Dressing Goal 1, OT) --  -AC           Strength Goal 1 (OT)    Strength Goal 1 (OT) Pt will increase BUE strength to 4/5 to increase independence with ADL  -AC        Time Frame (Strength Goal 1, OT) long term goal (LTG);10 days  -AC        Progress/Outcome (Strength Goal 1, OT) new goal  -AC           Problem Specific Goal 1 (OT)    Problem Specific Goal 1 (OT) --  -AC        Time Frame (Problem Specific Goal 1, OT) --  -AC        Progress/Outcome (Problem Specific Goal 1, OT) --  -AC                  User Key  (r) = Recorded By, (t) = Taken By, (c) = Cosigned By      Initials Name Effective Dates    Jose David Magaña OTFLORENCIO/L, CNT 02/03/23 -                       Occupational Therapy Education       Title: PT OT SLP Therapies (In Progress)       Topic: Occupational Therapy (In Progress)       Point: ADL training (Done)       Description:   Instruct learner(s) on proper safety adaptation and remediation techniques during self care or transfers.   Instruct in proper use of assistive devices.                  Learning Progress Summary             Patient Acceptance, HERBER, VU by  at 9/27/2024 1004                         Point: Home exercise program (Not Started)       Description:   Instruct learner(s) on appropriate technique for monitoring, assisting and/or progressing therapeutic exercises/activities.                  Learner Progress:  Not documented in this visit.              Point: Body mechanics (Not Started)       Description:   Instruct learner(s) on proper positioning and spine alignment during self-care, functional mobility activities and/or exercises.                  Learner Progress:  Not documented in this visit.                              User Key       Initials Effective Dates Name Provider Type Discipline     02/03/23 -  Jose David Carrizales, OTR/L, CNT Occupational Therapist OT                      OT Recommendation and Plan  Planned Therapy Interventions (OT): activity tolerance training, BADL retraining, functional balance retraining, occupation/activity based interventions, patient/caregiver education/training, strengthening exercise, transfer/mobility retraining  Therapy Frequency (OT): 5 times/wk  Plan of Care Review  Plan of Care Reviewed With: patient  Progress: no change  Outcome Evaluation: OT eval completed.  Pt alert and oriented x4.  No c/o pain.  Pt lives alone and is typically independent but has had a steady decline in function recently and reports increased difficulty caring for self.  She was Tiera to come to EOB with HOB elevated.  Strength is significantly decreased in BUE at 4-/5.  LUE is slightly weaker than R likely due to chronic L  shoulder pain per pt.  Pt donned and doffed socks with SBA at EOB.  She transferred with CGA but required 3 attempts at standing before she was able to get up.  She walked to hallway with CGA and rw but was SOA once seated in transport chair for test.  OT is indicated to address pt's significant weakness, decreased endurance and decreased safety with ADL.  Recommend SNF at discharge for rehab prior to returning home.  Plan of Care Reviewed With: patient  Outcome Evaluation: OT sabino completed.  Pt alert and oriented x4.  No c/o pain.  Pt lives alone and is typically independent but has had a steady decline in function recently and reports increased difficulty caring for self.  She was Tiera to come to EOB with HOB elevated.  Strength is significantly decreased in BUE at 4-/5.  LUE is slightly weaker than R likely due to chronic L shoulder pain per pt.  Pt donned and doffed socks with SBA at EOB.  She transferred with CGA but required 3 attempts at standing before she was able to get up.  She walked to hallway with CGA and rw but was SOA once seated in transport chair for test.  OT is indicated to address pt's significant weakness, decreased endurance and decreased safety with ADL.  Recommend SNF at discharge for rehab prior to returning home.     Outcome Measures       Row Name 09/27/24 0931             How much help from another is currently needed...    Putting on and taking off regular lower body clothing? 3  -AC      Bathing (including washing, rinsing, and drying) 3  -AC      Toileting (which includes using toilet bed pan or urinal) 3  -AC      Putting on and taking off regular upper body clothing 4  -AC      Taking care of personal grooming (such as brushing teeth) 4  -AC      Eating meals 4  -AC      AM-PAC 6 Clicks Score (OT) 21  -AC         Functional Assessment    Outcome Measure Options AM-PAC 6 Clicks Daily Activity (OT)  -AC                User Key  (r) = Recorded By, (t) = Taken By, (c) = Cosigned By       Initials Name Provider Type    Jose aDvid Magaña OTR/L, LATRELL Occupational Therapist                    Time Calculation:    Time Calculation- OT       Row Name 09/27/24 1005             Time Calculation- OT    OT Start Time 0931  -AC      OT Stop Time 1000  -AC      OT Time Calculation (min) 29 min  -AC      OT Received On 09/27/24  -      OT Goal Re-Cert Due Date 10/07/24  -                User Key  (r) = Recorded By, (t) = Taken By, (c) = Cosigned By      Initials Name Provider Type    Jose David Magaña OTR/L, LATRELL Occupational Therapist                  Therapy Charges for Today       Code Description Service Date Service Provider Modifiers Qty    73738014506 HC OT EVAL LOW COMPLEXITY 2 9/27/2024 Jose David Carrizales OTR/L, CNT GO 1                 THEODORE Evans/L, CNT  9/27/2024

## 2024-09-27 NOTE — NURSING NOTE
[facility]  INPATIENT WOUND & OSTOMY CARE    Today's Date: 09/27/24    Patient Name: Nedra Tolliver  MRN: [id number]  CSN: [id number]  PCP: Edgar Tolbert MD  Attending Provider: Edgar Tolbert MD  Length of Stay: 0    Pressure injury prevention measures ordered per protocol due to patient being at risk for skin breakdown.    Apply silicone foam border dressing per protocol to sacral spine/bilateral heels for protection.  Nursing to change dressing every 3 days and PRN if soiled. Nursing is to peel back dressing with every assessment to assess skin underneath dressing. No barrier cream under dressing.     Please reach out to wound care nurse if any skin issues arise.     This document has been electronically signed by Olive Banda RN on 9/27/2024 08:33 CDT

## 2024-09-27 NOTE — PLAN OF CARE
Goal Outcome Evaluation:  Plan of Care Reviewed With: patient      Pt is a&ox4. No new neuro changes noted. MRI complete this shift. Up x1 ambulating to bathroom. Voiding. Sat up in chair.

## 2024-09-27 NOTE — PLAN OF CARE
Goal Outcome Evaluation:  Plan of Care Reviewed With: patient           Outcome Evaluation: Pt received from ER. A&Ox4, NIH 2-3. Up to toilet x1 voiding. On RA, Tele. SCDs for VTE. IV to RAC IID. Safety maintained, call light within reach.

## 2024-09-27 NOTE — CASE MANAGEMENT/SOCIAL WORK
Discharge Planning Assessment  [facility]     Patient Name: Nedra Tolliver  MRN: [id number]  Today's Date: 9/27/2024    Admit Date: 9/26/2024        Discharge Needs Assessment       Row Name 09/27/24 1558       Living Environment    People in Home alone    Current Living Arrangements home    Potentially Unsafe Housing Conditions none    In the past 12 months has the electric, gas, oil, or water company threatened to shut off services in your home? No    Primary Care Provided by self    Provides Primary Care For no one    Family Caregiver if Needed child(lashonda), adult    Family Caregiver Names Son    Quality of Family Relationships helpful;involved    Able to Return to Prior Arrangements yes       Resource/Environmental Concerns    Resource/Environmental Concerns none    Transportation Concerns none       Transportation Needs    In the past 12 months, has lack of transportation kept you from medical appointments or from getting medications? no    In the past 12 months, has lack of transportation kept you from meetings, work, or from getting things needed for daily living? No       Food Insecurity    Within the past 12 months, you worried that your food would run out before you got the money to buy more. Never true    Within the past 12 months, the food you bought just didn't last and you didn't have money to get more. Never true       Transition Planning    Patient/Family Anticipates Transition to home with help/services;inpatient rehabilitation facility    Patient/Family Anticipated Services at Transition rehabilitation services;skilled nursing;home health care    Transportation Anticipated family or friend will provide       Discharge Needs Assessment    Readmission Within the Last 30 Days no previous admission in last 30 days    Equipment Currently Used at Home rollator    Concerns to be Addressed adjustment to diagnosis/illness;discharge planning    Anticipated Changes Related to Illness none    Equipment Needed  After Discharge other (see comments)    Outpatient/Agency/Support Group Needs homecare agency;skilled nursing facility    Discharge Facility/Level of Care Needs home with home health;nursing facility, skilled    Current Discharge Risk lives alone    Discharge Coordination/Progress SW received consult for possible placement. Pt is currently in obs status and medicare will not cover Snf unless pt has a 3 night inpt stay. Noted PT is recommending home with HH but OT has recommended placement. Will follow to see if pt meets inpt status.                   Discharge Plan    No documentation.                 Continued Care and Services - Admitted Since 9/26/2024    No active coordination exists for this encounter.          Demographic Summary    No documentation.                  Functional Status    No documentation.                  Psychosocial    No documentation.                  Abuse/Neglect    No documentation.                  Legal    No documentation.                  Substance Abuse    No documentation.                  Patient Forms    No documentation.                     KONG Hsu

## 2024-09-27 NOTE — PLAN OF CARE
Goal Outcome Evaluation:  Plan of Care Reviewed With: patient        Progress: no change  Outcome Evaluation: OT eval completed.  Pt alert and oriented x4.  No c/o pain.  Pt lives alone and is typically independent but has had a steady decline in function recently and reports increased difficulty caring for self.  She was Tiera to come to EOB with HOB elevated.  Strength is significantly decreased in BUE at 4-/5.  LUE is slightly weaker than R likely due to chronic L shoulder pain per pt.  Pt donned and doffed socks with SBA at EOB.  She transferred with CGA but required 3 attempts at standing before she was able to get up.  She walked to hallway with CGA and rw but was SOA once seated in transport chair for test.  OT is indicated to address pt's significant weakness, decreased endurance and decreased safety with ADL.  Recommend SNF at discharge for rehab prior to returning home.

## 2024-09-27 NOTE — PLAN OF CARE
Goal Outcome Evaluation:  Plan of Care Reviewed With: patient           Outcome Evaluation: PT eval completed. pt in fowlers position, AOx4 with no complaints of pain currently. pt reports independence with household mobility with use of rollator and hopes to return to independence and return home. Today pt brought self to EOB with SBA. In sitting pt demo functional AROM in B LE, but demo LE strength deficits;L hip flexion 3/5, R hip flexion 3+/5, B knee ext 4-/5, B knee flexion 3+/5, Ankle DF/PF WFL and 4+/5. pt required 3 attemptes at sit>stand and required cues for leaning to gain momentum and ambulated to w/c for testing with CGA and FWW. pt will benefit from skilled PT services to decrease fall risk, regain activity tolerance, improve LE strength and return to PLOF. Recommend home with assist and HH when medically stable.      Anticipated Discharge Disposition (PT): home with assist, home with home health

## 2024-09-27 NOTE — H&P
History and Physical    Patient:  Nedra Tolliver  MRN: [id number]    CHIEF COMPLAINT:  weakness    History Obtained From: the patient   PCP: Edgar Tolbert MD    HISTORY OF PRESENT ILLNESS:   The patient is a 88 y.o. female who presents with persistent, worsening generalized weakness and inability to care for self at home. She currently lives alone and has difficulty even preparing meals for herself.  She has not fallen, but feels very weak.  She has also reported some chest pain, not currently active.  Recent heart cath on 9/20/2024 with findings that were grossly unchanged from 3 years ago.  She was seen in the office on 9/26/2024 an attempt was made for direct admission, but no beds available.  She was directed to the emergency department from where she was subsequently admitted.    REVIEW OF SYSTEMS:    Review of Systems   Constitutional:  Positive for activity change, appetite change and fatigue. Negative for chills and fever.   Respiratory:  Negative for cough and shortness of breath.    Gastrointestinal:  Negative for abdominal pain, nausea and vomiting.   Genitourinary:  Negative for difficulty urinating.   Neurological:  Positive for dizziness and weakness.          Past Medical History:  Past Medical History:   Diagnosis Date    Arthritis     Cancer     BCC @ nose & Left arm    Diabetes type 2, controlled     Diverticulitis     History of GI bleed     History of transfusion     Has had x 5 units.    Hypertension     STAGE 3     Hyponatremia     Required hospitalization    Hypothyroidism     Knee arthropathy 09/30/2020    LPRD (laryngopharyngeal reflux disease)     Pharyngeal dysphagia     Thyroid nodule        Past Surgical History:  Past Surgical History:   Procedure Laterality Date    APPENDECTOMY      BASAL CELL CARCINOMA EXCISION      BELPHAROPTOSIS REPAIR      BREAST CYST EXCISION      CARDIAC CATHETERIZATION      CARDIAC CATHETERIZATION Right 6/24/2021    Procedure: Left Heart Cath R  radial, US guided w poss intervention;  Surgeon: Mak Nickerson DO;  Location:  [facility];  Service: Cardiovascular;  Laterality: Right;    CARDIAC CATHETERIZATION      CARDIAC CATHETERIZATION N/A 9/20/2024    Procedure: Left Heart Cath;  Surgeon: Mak Nickerson DO;  Location:  [facility];  Service: Cardiovascular;  Laterality: N/A;    CATARACT EXTRACTION      CATARACT EXTRACTION WITH INTRAOCULAR LENS IMPLANT Bilateral     CHOLECYSTECTOMY      COLONOSCOPY Left 11/1/2016    Tubular adenoma cecum, Diverticulosis repeat prn    SUBTOTAL HYSTERECTOMY      TOTAL KNEE ARTHROPLASTY Right 9/30/2020    Procedure: TOTAL KNEE ARTHROPLASTY;  Surgeon: Mak Pickens MD;  Location:  [facility] OR;  Service: Orthopedics;  Laterality: Right;    TUMOR EXCISION      under armpits and left breast       Medications Prior to Admission:    Medications Prior to Admission   Medication Sig Dispense Refill Last Dose    acetaminophen (TYLENOL) 500 MG tablet Take 1 tablet by mouth Every 6 (Six) Hours As Needed for Mild Pain.       apixaban (ELIQUIS) 5 MG tablet tablet Take 1 tablet by mouth 2 (Two) Times a Day.       bumetanide (BUMEX) 1 MG tablet Take 1 tablet by mouth Daily As Needed (daily as needed for leg swelling).       Cholecalciferol (Vitamin D3) 125 MCG (5000 UT) tablet dispersible Take 5,000 Units by mouth Daily.       Coenzyme Q10 (CO Q 10) 100 MG capsule Take 1 tablet by mouth Daily.       flecainide (TAMBOCOR) 50 MG tablet Take 0.5 tablets by mouth 2 (Two) Times a Day. RX states 1/2 tab BID       glimepiride (AMARYL) 4 MG tablet Take 1 tablet by mouth Every Morning Before Breakfast. Takes an extra 1/2 tablet when blood sugar is high at night       hydrOXYzine (ATARAX) 25 MG tablet Take 1 tablet by mouth Every 8 (Eight) Hours As Needed for Itching.       levothyroxine (SYNTHROID, LEVOTHROID) 50 MCG tablet Take 1 tablet by mouth Daily.       Multiple Vitamins-Minerals  "(CENTRUM SILVER PO) Take 1 tablet by mouth Daily.       omeprazole (priLOSEC) 40 MG capsule Take 1 capsule by mouth Daily.       predniSONE (DELTASONE) 50 MG tablet Take 1 tablet by mouth Take As Directed for 3 doses. Take 1 tablet (50mg) 13 Hours 7 Hours & 1 Hour Prior to Exam 3 tablet 0     Repatha SureClick solution auto-injector SureClick injection INJECT 1 ML UNDER THE SKIN INTO THE APPROPRIATE AREA AS DIRECTED EVERY 14 (FOURTEEN) DAYS. 2 mL 11     verapamil (VERELAN) 100 MG 24 hr capsule Take 1 capsule by mouth Every Night. (Patient taking differently: Take 2 capsules by mouth Every Night.) 60 capsule 0        Allergies:  Codeine, Iodine, Levaquin [levofloxacin], Lortab [hydrocodone-acetaminophen], Quinolones, Shrimp (diagnostic), Bactroban [mupirocin calcium], Ace inhibitors, Beta adrenergic blockers, Hydralazine, Kerendia [finerenone], Norvasc [amlodipine], Piperacillin sod-tazobactam so, Procardia xl [nifedipine er], Statins, Thiazide-type diuretics, and Mupirocin    Social History:   Social History     Socioeconomic History    Marital status:    Tobacco Use    Smoking status: Never    Smokeless tobacco: Never   Vaping Use    Vaping status: Never Used   Substance and Sexual Activity    Alcohol use: No    Drug use: Never    Sexual activity: Defer     Partners: Male       Family History:   Family History   Problem Relation Age of Onset    Diabetes Mother     Cancer Mother     Heart failure Mother     Diabetes Father     Colon cancer Neg Hx     Colon polyps Neg Hx            Physical Exam:    Vitals: /51 (BP Location: Right arm, Patient Position: Lying)   Pulse 67   Temp 98.1 °F (36.7 °C) (Oral)   Resp 18   Ht 160 cm (63\")   Wt 65.8 kg (145 lb)   LMP  (LMP Unknown)   SpO2 96%   BMI 25.69 kg/m²   Physical Exam  Vitals reviewed.   Constitutional:       Appearance: Normal appearance. She is not ill-appearing.   HENT:      Head: Normocephalic and atraumatic.   Eyes:      General:         Right " eye: No discharge.         Left eye: No discharge.      Extraocular Movements: Extraocular movements intact.      Conjunctiva/sclera: Conjunctivae normal.   Cardiovascular:      Rate and Rhythm: Normal rate and regular rhythm.      Pulses: Normal pulses.      Heart sounds: No murmur heard.  Pulmonary:      Effort: Pulmonary effort is normal. No respiratory distress.   Abdominal:      General: Abdomen is flat. Bowel sounds are normal. There is no distension.   Musculoskeletal:      Right lower leg: No edema.      Left lower leg: No edema.   Skin:     Capillary Refill: Capillary refill takes less than 2 seconds.   Neurological:      General: No focal deficit present.      Mental Status: She is alert.   Psychiatric:         Mood and Affect: Mood normal.         Behavior: Behavior normal.           Lab Results (last 24 hours)       Procedure Component Value Units Date/Time    CK [[id number]]  (Normal) Collected: 09/26/24 1712    Specimen: Blood Updated: 09/26/24 1837     Creatine Kinase 36 U/L     Urinalysis With Culture If Indicated - Urine, Clean Catch [[id number]]  (Abnormal) Collected: 09/26/24 1730    Specimen: Urine, Clean Catch Updated: 09/26/24 1833     Color, UA Yellow     Appearance, UA Clear     pH, UA 7.0     Specific Gravity, UA 1.009     Glucose,  mg/dL (1+)     Ketones, UA Negative     Bilirubin, UA Negative     Blood, UA Negative     Protein, UA Trace     Leuk Esterase, UA Small (1+)     Nitrite, UA Negative     Urobilinogen, UA 0.2 E.U./dL    Narrative:      In absence of clinical symptoms, the presence of pyuria, bacteria, and/or nitrites on the urinalysis result does not correlate with infection.    Urinalysis, Microscopic Only - Urine, Clean Catch [[id number]]  (Abnormal) Collected: 09/26/24 1730    Specimen: Urine, Clean Catch Updated: 09/26/24 1833     RBC, UA None Seen /HPF      WBC, UA 11-20 /HPF      Bacteria, UA None Seen /HPF      Squamous Epithelial Cells, UA 0-2 /HPF      Hyaline Casts,  UA None Seen /LPF      WBC Clumps, UA Moderate/2+ /HPF      Methodology Manual Light Microscopy    Urine Culture - Urine, Urine, Clean Catch [[id number]] Collected: 09/26/24 1730    Specimen: Urine, Clean Catch Updated: 09/26/24 1833    Respiratory Panel PCR w/COVID-19(SARS-CoV-2) [facility], NP Swab in UTM/VTM, 2 HR TAT - Swab, Nasopharynx [[id number]]  (Normal) Collected: 09/26/24 1711    Specimen: Swab from Nasopharynx Updated: 09/26/24 1809     ADENOVIRUS, PCR Not Detected     Coronavirus 229E Not Detected     Coronavirus HKU1 Not Detected     Coronavirus NL63 Not Detected     Coronavirus OC43 Not Detected     COVID19 Not Detected     Human Metapneumovirus Not Detected     Human Rhinovirus/Enterovirus Not Detected     Influenza A PCR Not Detected     Influenza B PCR Not Detected     Parainfluenza Virus 1 Not Detected     Parainfluenza Virus 2 Not Detected     Parainfluenza Virus 3 Not Detected     Parainfluenza Virus 4 Not Detected     RSV, PCR Not Detected     Bordetella pertussis pcr Not Detected     Bordetella parapertussis PCR Not Detected     Chlamydophila pneumoniae PCR Not Detected     Mycoplasma pneumo by PCR Not Detected    Narrative:      In the setting of a positive respiratory panel with a viral infection PLUS a negative procalcitonin without other underlying concern for bacterial infection, consider observing off antibiotics or discontinuation of antibiotics and continue supportive care. If the respiratory panel is positive for atypical bacterial infection (Bordetella pertussis, Chlamydophila pneumoniae, or Mycoplasma pneumoniae), consider antibiotic de-escalation to target atypical bacterial infection.    TSH [[id number]]  (Normal) Collected: 09/26/24 1712    Specimen: Blood Updated: 09/26/24 1745     TSH 0.462 uIU/mL     Comprehensive Metabolic Panel [[id number]]  (Abnormal) Collected: 09/26/24 1712    Specimen: Blood Updated: 09/26/24 1739     Glucose 261 mg/dL      BUN 19 mg/dL       Creatinine 0.97 mg/dL      Sodium 135 mmol/L      Potassium 4.1 mmol/L      Comment: Slight hemolysis detected by analyzer. Result may be falsely elevated.        Chloride 97 mmol/L      CO2 26.0 mmol/L      Calcium 9.9 mg/dL      Total Protein 6.7 g/dL      Albumin 4.0 g/dL      ALT (SGPT) 15 U/L      AST (SGOT) 18 U/L      Alkaline Phosphatase 73 U/L      Total Bilirubin 0.3 mg/dL      Globulin 2.7 gm/dL      A/G Ratio 1.5 g/dL      BUN/Creatinine Ratio 19.6     Anion Gap 12.0 mmol/L      eGFR 56.3 mL/min/1.73     Narrative:      GFR Normal >60  Chronic Kidney Disease <60  Kidney Failure <15    The GFR formula is only valid for adults with stable renal function between ages 18 and 70.    BNP [[id number]]  (Abnormal) Collected: 09/26/24 1712    Specimen: Blood Updated: 09/26/24 1737     proBNP 2,005.0 pg/mL     Narrative:      This assay is used as an aid in the diagnosis of individuals suspected of having heart failure. It can be used as an aid in the diagnosis of acute decompensated heart failure (ADHF) in patients presenting with signs and symptoms of ADHF to the emergency department (ED). In addition, NT-proBNP of <300 pg/mL indicates ADHF is not likely.    Age Range Result Interpretation  NT-proBNP Concentration (pg/mL:      <50             Positive            >450                   Gray                 300-450                    Negative             <300    50-75           Positive            >900                  Gray                300-900                  Negative            <300      >75             Positive            >1800                  Gray                300-1800                  Negative            <300    Magnesium [[id number]]  (Normal) Collected: 09/26/24 1712    Specimen: Blood Updated: 09/26/24 1735     Magnesium 2.2 mg/dL     CBC & Differential [[id number]]  (Normal) Collected: 09/26/24 1712    Specimen: Blood Updated: 09/26/24 1720    Narrative:      The following orders were created for  panel order CBC & Differential.  Procedure                               Abnormality         Status                     ---------                               -----------         ------                     CBC Auto Differential[[id number]]        Normal              Final result                 Please view results for these tests on the individual orders.    CBC Auto Differential [[id number]]  (Normal) Collected: 09/26/24 1712    Specimen: Blood Updated: 09/26/24 1720     WBC 6.69 10*3/mm3      RBC 4.19 10*6/mm3      Hemoglobin 13.4 g/dL      Hematocrit 39.1 %      MCV 93.3 fL      MCH 32.0 pg      MCHC 34.3 g/dL      RDW 12.3 %      RDW-SD 42.1 fl      MPV 9.4 fL      Platelets 237 10*3/mm3      Neutrophil % 61.7 %      Lymphocyte % 25.4 %      Monocyte % 9.4 %      Eosinophil % 3.0 %      Basophil % 0.4 %      Immature Grans % 0.1 %      Neutrophils, Absolute 4.12 10*3/mm3      Lymphocytes, Absolute 1.70 10*3/mm3      Monocytes, Absolute 0.63 10*3/mm3      Eosinophils, Absolute 0.20 10*3/mm3      Basophils, Absolute 0.03 10*3/mm3      Immature Grans, Absolute 0.01 10*3/mm3      nRBC 0.0 /100 WBC              -----------------------------------------------------------------  EKG:   Radiology:     CT Cervical Spine Without Contrast    Result Date: 9/26/2024  CT CERVICAL SPINE WO CONTRAST- 9/26/2024 4:40 PM  HISTORY: neck pain  COMPARISON: None  DOSE LENGTH PRODUCT:  961.79 mGy.cm. All CT scans are performed using dose optimization techniques as appropriate to the performed exam and including at least one of the following: Automated exposure control, adjustment of the mA and/or kV according to size, and the use of the iterative reconstruction technique.  TECHNIQUE: Serial helical tomographic images of the cervical spine were obtained without the use of intravenous contrast. Additionally, sagittal and coronal reformatted images were also provided for review.  FINDINGS: Alignment: Normal.  Bones: There is no evidence of  fracture. Vertebral body heights are maintained.  Disc spaces: Maintained.  Canal and neuroforamina: Patent.  Soft tissues: Unremarkable.  Lung apices: Clear. No pneumothorax.      1. No evidence of acute osseous injury or malalignment in the cervical spine.    This report was signed and finalized on 9/26/2024 5:56 PM by Dr. Brayan Resendiz MD.      CT Head Without Contrast    Result Date: 9/26/2024  CT HEAD WO CONTRAST- 9/26/2024 4:40 PM  HISTORY: headache, numbness to right arm, left sided weakness, weakness all over  COMPARISON: 5/8/2021  DLP: 617.0 mGycm. All CT scans are performed using dose optimization techniques as appropriate to the performed exam and including at least one of the following: Automated exposure control, adjustment of the mA and/or kV according to size, and the use of the iterative reconstruction technique.  TECHNIQUE: Serial axial tomographic images of the brain were obtained without the use of intravenous contrast.  FINDINGS: The midline structures are nondisplaced. There is moderate cerebral and cerebellar atrophy, with an associated increase in the prominence of the ventricles and sulci. The basilar cisterns are normal in size and configuration. There is no evidence of intracranial hemorrhage or mass-effect. There is low attenuation in the periventricular white matter, consistent with chronic ischemic change. There are no abnormal extra-axial fluid collections. There is no evidence of tonsillar herniation.  The included orbits and their contents are unremarkable. The visualized paranasal sinuses, mastoid air cells and middle ear cavities are clear. The visualized osseous structures and overlying soft tissues of the skull and face are intact.       1. Moderate cerebral and cerebellar atrophy with chronic microvascular disease but no evidence of acute intracranial process.    This report was signed and finalized on 9/26/2024 5:54 PM by Dr. Brayan Resendiz MD.      XR Chest 1  View    Result Date: 9/26/2024  EXAM: XR CHEST 1 VW- 9/26/2024 3:56 PM  HISTORY: weakness   COMPARISON: 1/17/2024.  TECHNIQUE: Single frontal radiograph of the chest was obtained.  FINDINGS:  Support Devices: None.  Cardiac and Mediastinal Silhouettes: Normal.  Lungs/Pleura: No focal consolidation. No sizable pleural effusion. No visible pneumothorax.  Osseous structures: No acute osseous finding.  Other: None.       No acute cardiopulmonary abnormality.    This report was signed and finalized on 9/26/2024 4:59 PM by Donavan Main.        Assessment and Plan     Primary Problem:  Generalized weakness    Hospital Problem list:    Generalized weakness      PMH:  Past Medical History:   Diagnosis Date    Arthritis     Cancer     BCC @ nose & Left arm    Diabetes type 2, controlled     Diverticulitis     History of GI bleed     History of transfusion     Has had x 5 units.    Hypertension     STAGE 3     Hyponatremia     Required hospitalization    Hypothyroidism     Knee arthropathy 09/30/2020    LPRD (laryngopharyngeal reflux disease)     Pharyngeal dysphagia     Thyroid nodule        Treatment Plan:  Generalized weakness: PT/OT/case management consultations.  Possible SNF as she lives alone and currently unable to care for self.    Hypertension: She is very tenuous with her blood pressure control and very sensitive to medications.  She has not been given any of her home blood pressure medications yet and blood pressure remained stable.  Will continue to hold for now.    CODE STATUS: Full    DVT prophylaxis: SCDs    Disposition: Observation    Edgar Tolbert MD 9/27/2024 07:31 CDT

## 2024-09-27 NOTE — PLAN OF CARE
Goal Outcome Evaluation:  Plan of Care Reviewed With: patient        Progress: improving  Outcome Evaluation: Pt identified per nursing nutrition screen with reduced oral intake,weight loss and decreased appetite. Pt reports poor appetite for several months. Pt does report she eats three meals per day. Diet changed to CCHO. Boost Glucose Control BID. Con to follow for plan of care.

## 2024-09-28 PROBLEM — E44.0 MODERATE MALNUTRITION: Status: ACTIVE | Noted: 2024-09-28

## 2024-09-28 PROBLEM — R62.7 FAILURE TO THRIVE IN ADULT: Status: ACTIVE | Noted: 2024-09-28

## 2024-09-28 LAB
ALBUMIN SERPL-MCNC: 3.6 G/DL (ref 3.5–5.2)
ALBUMIN/GLOB SERPL: 1.6 G/DL
ALP SERPL-CCNC: 61 U/L (ref 39–117)
ALT SERPL W P-5'-P-CCNC: 12 U/L (ref 1–33)
ANION GAP SERPL CALCULATED.3IONS-SCNC: 11 MMOL/L (ref 5–15)
AST SERPL-CCNC: 15 U/L (ref 1–32)
BACTERIA SPEC AEROBE CULT: NORMAL
BASOPHILS # BLD AUTO: 0.05 10*3/MM3 (ref 0–0.2)
BASOPHILS NFR BLD AUTO: 0.9 % (ref 0–1.5)
BILIRUB SERPL-MCNC: 0.6 MG/DL (ref 0–1.2)
BUN SERPL-MCNC: 25 MG/DL (ref 8–23)
BUN/CREAT SERPL: 23.1 (ref 7–25)
CALCIUM SPEC-SCNC: 9 MG/DL (ref 8.6–10.5)
CHLORIDE SERPL-SCNC: 98 MMOL/L (ref 98–107)
CO2 SERPL-SCNC: 24 MMOL/L (ref 22–29)
CREAT SERPL-MCNC: 1.08 MG/DL (ref 0.57–1)
DEPRECATED RDW RBC AUTO: 42.6 FL (ref 37–54)
EGFRCR SERPLBLD CKD-EPI 2021: 49.5 ML/MIN/1.73
EOSINOPHIL # BLD AUTO: 0.32 10*3/MM3 (ref 0–0.4)
EOSINOPHIL NFR BLD AUTO: 5.8 % (ref 0.3–6.2)
ERYTHROCYTE [DISTWIDTH] IN BLOOD BY AUTOMATED COUNT: 12.2 % (ref 12.3–15.4)
GLOBULIN UR ELPH-MCNC: 2.2 GM/DL
GLUCOSE SERPL-MCNC: 121 MG/DL (ref 65–99)
HCT VFR BLD AUTO: 37.8 % (ref 34–46.6)
HGB BLD-MCNC: 12.6 G/DL (ref 12–15.9)
IMM GRANULOCYTES # BLD AUTO: 0.02 10*3/MM3 (ref 0–0.05)
IMM GRANULOCYTES NFR BLD AUTO: 0.4 % (ref 0–0.5)
LYMPHOCYTES # BLD AUTO: 1.91 10*3/MM3 (ref 0.7–3.1)
LYMPHOCYTES NFR BLD AUTO: 34.8 % (ref 19.6–45.3)
MCH RBC QN AUTO: 31.3 PG (ref 26.6–33)
MCHC RBC AUTO-ENTMCNC: 33.3 G/DL (ref 31.5–35.7)
MCV RBC AUTO: 94 FL (ref 79–97)
MONOCYTES # BLD AUTO: 0.76 10*3/MM3 (ref 0.1–0.9)
MONOCYTES NFR BLD AUTO: 13.8 % (ref 5–12)
NEUTROPHILS NFR BLD AUTO: 2.43 10*3/MM3 (ref 1.7–7)
NEUTROPHILS NFR BLD AUTO: 44.3 % (ref 42.7–76)
NRBC BLD AUTO-RTO: 0 /100 WBC (ref 0–0.2)
PLATELET # BLD AUTO: 212 10*3/MM3 (ref 140–450)
PMV BLD AUTO: 9.9 FL (ref 6–12)
POTASSIUM SERPL-SCNC: 4 MMOL/L (ref 3.5–5.2)
PROT SERPL-MCNC: 5.8 G/DL (ref 6–8.5)
QT INTERVAL: 442 MS
QTC INTERVAL: 477 MS
RBC # BLD AUTO: 4.02 10*6/MM3 (ref 3.77–5.28)
SODIUM SERPL-SCNC: 133 MMOL/L (ref 136–145)
WBC NRBC COR # BLD AUTO: 5.49 10*3/MM3 (ref 3.4–10.8)

## 2024-09-28 PROCEDURE — 80053 COMPREHEN METABOLIC PANEL: CPT | Performed by: FAMILY MEDICINE

## 2024-09-28 PROCEDURE — 85025 COMPLETE CBC W/AUTO DIFF WBC: CPT | Performed by: FAMILY MEDICINE

## 2024-09-28 PROCEDURE — 25810000003 SODIUM CHLORIDE 0.9 % SOLUTION: Performed by: FAMILY MEDICINE

## 2024-09-28 PROCEDURE — 97116 GAIT TRAINING THERAPY: CPT

## 2024-09-28 RX ORDER — SODIUM CHLORIDE 9 MG/ML
75 INJECTION, SOLUTION INTRAVENOUS CONTINUOUS
Status: DISCONTINUED | OUTPATIENT
Start: 2024-09-28 | End: 2024-10-02

## 2024-09-28 RX ADMIN — FLECAINIDE ACETATE 25 MG: 50 TABLET ORAL at 21:14

## 2024-09-28 RX ADMIN — PANTOPRAZOLE SODIUM 40 MG: 40 TABLET, DELAYED RELEASE ORAL at 05:05

## 2024-09-28 RX ADMIN — SODIUM CHLORIDE 75 ML/HR: 9 INJECTION, SOLUTION INTRAVENOUS at 21:16

## 2024-09-28 RX ADMIN — APIXABAN 5 MG: 5 TABLET, FILM COATED ORAL at 08:50

## 2024-09-28 RX ADMIN — LEVOTHYROXINE SODIUM 50 MCG: 100 TABLET ORAL at 08:54

## 2024-09-28 RX ADMIN — SODIUM CHLORIDE 75 ML/HR: 9 INJECTION, SOLUTION INTRAVENOUS at 09:01

## 2024-09-28 RX ADMIN — APIXABAN 5 MG: 5 TABLET, FILM COATED ORAL at 21:14

## 2024-09-28 RX ADMIN — Medication 10 ML: at 21:15

## 2024-09-28 RX ADMIN — ACETAMINOPHEN 650 MG: 325 TABLET ORAL at 21:14

## 2024-09-28 RX ADMIN — Medication 10 ML: at 08:55

## 2024-09-28 RX ADMIN — FLECAINIDE ACETATE 25 MG: 50 TABLET ORAL at 08:50

## 2024-09-28 NOTE — CASE MANAGEMENT/SOCIAL WORK
Continued Stay Note  JARRETT Thomas     Patient Name: Nedra Tolliver  MRN: [id number]  Today's Date: 9/28/2024    Admit Date: 9/26/2024    Plan: Home Health vs SNF   Discharge Plan       Row Name 09/28/24 1545       Plan    Plan Home Health vs SNF    Patient/Family in Agreement with Plan yes    Plan Comments Spoke with pt's daughter, Jeannie [phone]. Pt is inpt now. She is not sure if pt will need rehab or home health at d/c. She is wanting to talk with her brother to discuss. Will follow.                   Discharge Codes    No documentation.                       DACIA Bowser

## 2024-09-28 NOTE — PLAN OF CARE
Goal Outcome Evaluation:  Plan of Care Reviewed With: patient           Outcome Evaluation: Pt A&Ox4, able to make needs known. Up to toilet x1 with walker and gait belt, voiding. On RA, tele SB. SCDs for VTE. IV to RAC IID. Medications given per orders, PRN Tylenol given with positive effect. Safety maintained, call light within reach.

## 2024-09-28 NOTE — PLAN OF CARE
Goal Outcome Evaluation:  Plan of Care Reviewed With: patient        Progress: no change  Outcome Evaluation: bed moblity supervision. sit-stand cga. pt able to amb short distances before needing to stand and rest due to SOA and weakness. when amb back to room pt LLE begin to buckle min/mod recovery and required mult standing rest in order to amb safely back to room. pt reports LLE has been doing that lately but not this bad. Pt will need cont PT upon d/c to cont working on strength, mobility and safety

## 2024-09-28 NOTE — PROGRESS NOTES
Daily Progress Note  Nedra Tolliver  MRN: [id number] LOS: 0    Admit Date: 9/26/2024 9/28/2024 09:47 CDT    Subjective:      Chief Complaint:  Chief Complaint   Patient presents with    Weakness - Generalized       Interval History:    Reviewed overnight events and nursing notes.   No acute events overnight.  Continues to feel weak and dizzy.  Encouraged her to work well with PT/OT.    Review of Systems   Constitutional:  Positive for activity change, appetite change and fatigue.   Respiratory:  Negative for cough and shortness of breath.    Gastrointestinal:  Negative for nausea and vomiting.   Genitourinary:  Negative for dysuria.   Musculoskeletal:  Positive for gait problem.   Skin:  Negative for color change and pallor.   Neurological:  Positive for dizziness and weakness.       DIET:  Diet: Regular/House, Diabetic; Consistent Carbohydrate; Fluid Consistency: Thin (IDDSI 0)    Medications:   sodium chloride, 75 mL/hr, Last Rate: 75 mL/hr (09/28/24 0901)      apixaban, 5 mg, Oral, BID  flecainide, 25 mg, Oral, BID  levothyroxine, 50 mcg, Oral, Daily  pantoprazole, 40 mg, Oral, Q AM  polyethylene glycol, 17 g, Oral, Daily  sodium chloride, 10 mL, Intravenous, Q12H        Data:     Code Status:   Code Status and Medical Interventions: CPR (Attempt to Resuscitate); Full Support   Ordered at: 09/27/24 0734     Code Status (Patient has no pulse and is not breathing):    CPR (Attempt to Resuscitate)     Medical Interventions (Patient has pulse or is breathing):    Full Support       Family History   Problem Relation Age of Onset    Diabetes Mother     Cancer Mother     Heart failure Mother     Diabetes Father     Colon cancer Neg Hx     Colon polyps Neg Hx      Social History     Socioeconomic History    Marital status:    Tobacco Use    Smoking status: Never    Smokeless tobacco: Never   Vaping Use    Vaping status: Never Used   Substance and Sexual Activity    Alcohol use: No    Drug use: Never     Sexual activity: Defer     Partners: Male       Labs:    Lab Results (last 72 hours)       Procedure Component Value Units Date/Time    Comprehensive Metabolic Panel [[id number]]  (Abnormal) Collected: 09/28/24 0431    Specimen: Blood Updated: 09/28/24 0521     Glucose 121 mg/dL      BUN 25 mg/dL      Creatinine 1.08 mg/dL      Sodium 133 mmol/L      Potassium 4.0 mmol/L      Chloride 98 mmol/L      CO2 24.0 mmol/L      Calcium 9.0 mg/dL      Total Protein 5.8 g/dL      Albumin 3.6 g/dL      ALT (SGPT) 12 U/L      AST (SGOT) 15 U/L      Alkaline Phosphatase 61 U/L      Total Bilirubin 0.6 mg/dL      Globulin 2.2 gm/dL      A/G Ratio 1.6 g/dL      BUN/Creatinine Ratio 23.1     Anion Gap 11.0 mmol/L      eGFR 49.5 mL/min/1.73     Narrative:      GFR Normal >60  Chronic Kidney Disease <60  Kidney Failure <15    The GFR formula is only valid for adults with stable renal function between ages 18 and 70.    CBC & Differential [[id number]]  (Abnormal) Collected: 09/28/24 0431    Specimen: Blood Updated: 09/28/24 0501    Narrative:      The following orders were created for panel order CBC & Differential.  Procedure                               Abnormality         Status                     ---------                               -----------         ------                     CBC Auto Differential[[id number]]        Abnormal            Final result                 Please view results for these tests on the individual orders.    CBC Auto Differential [[id number]]  (Abnormal) Collected: 09/28/24 0431    Specimen: Blood Updated: 09/28/24 0501     WBC 5.49 10*3/mm3      RBC 4.02 10*6/mm3      Hemoglobin 12.6 g/dL      Hematocrit 37.8 %      MCV 94.0 fL      MCH 31.3 pg      MCHC 33.3 g/dL      RDW 12.2 %      RDW-SD 42.6 fl      MPV 9.9 fL      Platelets 212 10*3/mm3      Neutrophil % 44.3 %      Lymphocyte % 34.8 %      Monocyte % 13.8 %      Eosinophil % 5.8 %      Basophil % 0.9 %      Immature Grans % 0.4 %      Neutrophils,  Absolute 2.43 10*3/mm3      Lymphocytes, Absolute 1.91 10*3/mm3      Monocytes, Absolute 0.76 10*3/mm3      Eosinophils, Absolute 0.32 10*3/mm3      Basophils, Absolute 0.05 10*3/mm3      Immature Grans, Absolute 0.02 10*3/mm3      nRBC 0.0 /100 WBC     Urine Culture - Urine, Urine, Clean Catch [[id number]]  (Normal) Collected: 09/26/24 1730    Specimen: Urine, Clean Catch Updated: 09/27/24 1143     Urine Culture Growth present, too young to evaluate    Comprehensive Metabolic Panel [[id number]]  (Abnormal) Collected: 09/27/24 0751    Specimen: Blood Updated: 09/27/24 0828     Glucose 123 mg/dL      BUN 20 mg/dL      Creatinine 1.06 mg/dL      Sodium 136 mmol/L      Potassium 4.2 mmol/L      Chloride 99 mmol/L      CO2 27.0 mmol/L      Calcium 9.7 mg/dL      Total Protein 6.3 g/dL      Albumin 3.9 g/dL      ALT (SGPT) 13 U/L      AST (SGOT) 15 U/L      Alkaline Phosphatase 65 U/L      Total Bilirubin 0.6 mg/dL      Globulin 2.4 gm/dL      A/G Ratio 1.6 g/dL      BUN/Creatinine Ratio 18.9     Anion Gap 10.0 mmol/L      eGFR 50.6 mL/min/1.73     Narrative:      GFR Normal >60  Chronic Kidney Disease <60  Kidney Failure <15    The GFR formula is only valid for adults with stable renal function between ages 18 and 70.    CBC & Differential [[id number]]  (Abnormal) Collected: 09/27/24 0751    Specimen: Blood Updated: 09/27/24 0807    Narrative:      The following orders were created for panel order CBC & Differential.  Procedure                               Abnormality         Status                     ---------                               -----------         ------                     CBC Auto Differential[[id number]]        Abnormal            Final result                 Please view results for these tests on the individual orders.    CBC Auto Differential [[id number]]  (Abnormal) Collected: 09/27/24 0751    Specimen: Blood Updated: 09/27/24 0807     WBC 5.36 10*3/mm3      RBC 4.18 10*6/mm3      Hemoglobin 13.1 g/dL       Hematocrit 39.8 %      MCV 95.2 fL      MCH 31.3 pg      MCHC 32.9 g/dL      RDW 12.4 %      RDW-SD 43.4 fl      MPV 9.3 fL      Platelets 221 10*3/mm3      Neutrophil % 47.7 %      Lymphocyte % 34.5 %      Monocyte % 12.3 %      Eosinophil % 4.7 %      Basophil % 0.6 %      Immature Grans % 0.2 %      Neutrophils, Absolute 2.56 10*3/mm3      Lymphocytes, Absolute 1.85 10*3/mm3      Monocytes, Absolute 0.66 10*3/mm3      Eosinophils, Absolute 0.25 10*3/mm3      Basophils, Absolute 0.03 10*3/mm3      Immature Grans, Absolute 0.01 10*3/mm3      nRBC 0.0 /100 WBC     CK [[id number]]  (Normal) Collected: 09/26/24 1712    Specimen: Blood Updated: 09/26/24 1837     Creatine Kinase 36 U/L     Urinalysis With Culture If Indicated - Urine, Clean Catch [[id number]]  (Abnormal) Collected: 09/26/24 1730    Specimen: Urine, Clean Catch Updated: 09/26/24 1833     Color, UA Yellow     Appearance, UA Clear     pH, UA 7.0     Specific Gravity, UA 1.009     Glucose,  mg/dL (1+)     Ketones, UA Negative     Bilirubin, UA Negative     Blood, UA Negative     Protein, UA Trace     Leuk Esterase, UA Small (1+)     Nitrite, UA Negative     Urobilinogen, UA 0.2 E.U./dL    Narrative:      In absence of clinical symptoms, the presence of pyuria, bacteria, and/or nitrites on the urinalysis result does not correlate with infection.    Urinalysis, Microscopic Only - Urine, Clean Catch [[id number]]  (Abnormal) Collected: 09/26/24 1730    Specimen: Urine, Clean Catch Updated: 09/26/24 1833     RBC, UA None Seen /HPF      WBC, UA 11-20 /HPF      Bacteria, UA None Seen /HPF      Squamous Epithelial Cells, UA 0-2 /HPF      Hyaline Casts, UA None Seen /LPF      WBC Clumps, UA Moderate/2+ /HPF      Methodology Manual Light Microscopy    Respiratory Panel PCR w/COVID-19(SARS-CoV-2) [facility], NP Swab in UTM/VTM, 2 HR TAT - Swab, Nasopharynx [[id number]]  (Normal) Collected: 09/26/24 7701    Specimen: Swab from Nasopharynx  Updated: 09/26/24 1809     ADENOVIRUS, PCR Not Detected     Coronavirus 229E Not Detected     Coronavirus HKU1 Not Detected     Coronavirus NL63 Not Detected     Coronavirus OC43 Not Detected     COVID19 Not Detected     Human Metapneumovirus Not Detected     Human Rhinovirus/Enterovirus Not Detected     Influenza A PCR Not Detected     Influenza B PCR Not Detected     Parainfluenza Virus 1 Not Detected     Parainfluenza Virus 2 Not Detected     Parainfluenza Virus 3 Not Detected     Parainfluenza Virus 4 Not Detected     RSV, PCR Not Detected     Bordetella pertussis pcr Not Detected     Bordetella parapertussis PCR Not Detected     Chlamydophila pneumoniae PCR Not Detected     Mycoplasma pneumo by PCR Not Detected    Narrative:      In the setting of a positive respiratory panel with a viral infection PLUS a negative procalcitonin without other underlying concern for bacterial infection, consider observing off antibiotics or discontinuation of antibiotics and continue supportive care. If the respiratory panel is positive for atypical bacterial infection (Bordetella pertussis, Chlamydophila pneumoniae, or Mycoplasma pneumoniae), consider antibiotic de-escalation to target atypical bacterial infection.    TSH [[id number]]  (Normal) Collected: 09/26/24 1712    Specimen: Blood Updated: 09/26/24 1745     TSH 0.462 uIU/mL     Comprehensive Metabolic Panel [[id number]]  (Abnormal) Collected: 09/26/24 1712    Specimen: Blood Updated: 09/26/24 1739     Glucose 261 mg/dL      BUN 19 mg/dL      Creatinine 0.97 mg/dL      Sodium 135 mmol/L      Potassium 4.1 mmol/L      Comment: Slight hemolysis detected by analyzer. Result may be falsely elevated.        Chloride 97 mmol/L      CO2 26.0 mmol/L      Calcium 9.9 mg/dL      Total Protein 6.7 g/dL      Albumin 4.0 g/dL      ALT (SGPT) 15 U/L      AST (SGOT) 18 U/L      Alkaline Phosphatase 73 U/L      Total Bilirubin 0.3 mg/dL      Globulin 2.7 gm/dL      A/G Ratio 1.5 g/dL       BUN/Creatinine Ratio 19.6     Anion Gap 12.0 mmol/L      eGFR 56.3 mL/min/1.73     Narrative:      GFR Normal >60  Chronic Kidney Disease <60  Kidney Failure <15    The GFR formula is only valid for adults with stable renal function between ages 18 and 70.    BNP [[id number]]  (Abnormal) Collected: 09/26/24 1712    Specimen: Blood Updated: 09/26/24 1737     proBNP 2,005.0 pg/mL     Narrative:      This assay is used as an aid in the diagnosis of individuals suspected of having heart failure. It can be used as an aid in the diagnosis of acute decompensated heart failure (ADHF) in patients presenting with signs and symptoms of ADHF to the emergency department (ED). In addition, NT-proBNP of <300 pg/mL indicates ADHF is not likely.    Age Range Result Interpretation  NT-proBNP Concentration (pg/mL:      <50             Positive            >450                   Gray                 300-450                    Negative             <300    50-75           Positive            >900                  Gray                300-900                  Negative            <300      >75             Positive            >1800                  Gray                300-1800                  Negative            <300    Magnesium [[id number]]  (Normal) Collected: 09/26/24 1712    Specimen: Blood Updated: 09/26/24 1735     Magnesium 2.2 mg/dL     CBC & Differential [[id number]]  (Normal) Collected: 09/26/24 1712    Specimen: Blood Updated: 09/26/24 1720    Narrative:      The following orders were created for panel order CBC & Differential.  Procedure                               Abnormality         Status                     ---------                               -----------         ------                     CBC Auto Differential[[id number]]        Normal              Final result                 Please view results for these tests on the individual orders.    CBC Auto Differential [[id number]]  (Normal) Collected: 09/26/24 1712    Specimen:  "Blood Updated: 24 1720     WBC 6.69 10*3/mm3      RBC 4.19 10*6/mm3      Hemoglobin 13.4 g/dL      Hematocrit 39.1 %      MCV 93.3 fL      MCH 32.0 pg      MCHC 34.3 g/dL      RDW 12.3 %      RDW-SD 42.1 fl      MPV 9.4 fL      Platelets 237 10*3/mm3      Neutrophil % 61.7 %      Lymphocyte % 25.4 %      Monocyte % 9.4 %      Eosinophil % 3.0 %      Basophil % 0.4 %      Immature Grans % 0.1 %      Neutrophils, Absolute 4.12 10*3/mm3      Lymphocytes, Absolute 1.70 10*3/mm3      Monocytes, Absolute 0.63 10*3/mm3      Eosinophils, Absolute 0.20 10*3/mm3      Basophils, Absolute 0.03 10*3/mm3      Immature Grans, Absolute 0.01 10*3/mm3      nRBC 0.0 /100 WBC               Objective:     Vitals: /70 (BP Location: Right arm, Patient Position: Lying)   Pulse 80   Temp 97.5 °F (36.4 °C) (Oral)   Resp 18   Ht 160 cm (63\")   Wt 65.8 kg (145 lb)   LMP  (LMP Unknown)   SpO2 93%   BMI 25.69 kg/m²    Intake/Output Summary (Last 24 hours) at 2024 0947  Last data filed at 2024 0500  Gross per 24 hour   Intake 360 ml   Output --   Net 360 ml    Temp (24hrs), Av.9 °F (36.6 °C), Min:97.5 °F (36.4 °C), Max:98.3 °F (36.8 °C)      Physical Exam  Vitals reviewed.   Constitutional:       Appearance: Normal appearance.      Comments: Frail   HENT:      Head: Normocephalic and atraumatic.   Eyes:      General:         Right eye: No discharge.         Left eye: No discharge.      Conjunctiva/sclera: Conjunctivae normal.   Cardiovascular:      Rate and Rhythm: Normal rate and regular rhythm.      Pulses: Normal pulses.      Heart sounds: No murmur heard.  Pulmonary:      Effort: Pulmonary effort is normal. No respiratory distress.   Abdominal:      General: Abdomen is flat. Bowel sounds are normal. There is no distension.   Musculoskeletal:      Right lower leg: No edema.      Left lower leg: No edema.   Skin:     Capillary Refill: Capillary refill takes less than 2 seconds.   Neurological:      General: No " focal deficit present.      Mental Status: She is alert and oriented to person, place, and time.   Psychiatric:         Mood and Affect: Mood normal.         Behavior: Behavior normal.             Assessment and Plan:     Primary Problem:  Generalized weakness    Hospital Problem list:    Generalized weakness    Failure to thrive in adult    Moderate malnutrition      PMH:  Past Medical History:   Diagnosis Date    Arthritis     Cancer     BCC @ nose & Left arm    Diabetes type 2, controlled     Diverticulitis     History of GI bleed     History of transfusion     Has had x 5 units.    Hypertension     STAGE 3     Hyponatremia     Required hospitalization    Hypothyroidism     Knee arthropathy 09/30/2020    LPRD (laryngopharyngeal reflux disease)     Pharyngeal dysphagia     Thyroid nodule        Treatment Plan:  Generalized weakness: PT/OT/case management consultations.  Possible SNF as she lives alone and currently unable to care for self.    JOSE ALEJANDRO: GFR down to 49.5, previously normal.  IV NS, gentle.  Monitor blood pressure closely.     Hypertension: She is very tenuous with her blood pressure control and very sensitive to medications.  She has not been given any of her home blood pressure medications yet and blood pressure remained stable.  Will continue to hold for now.     CODE STATUS: Full     DVT prophylaxis: SCDs     Disposition: Inpatient, likely SNF.    Reviewed treatment plans with the patient and/or family.   30 minutes spent in face to face interaction and coordination of care.     Electronically signed by Edgar Tolbert MD on 9/28/2024 at 09:47 CDT

## 2024-09-28 NOTE — THERAPY TREATMENT NOTE
Acute Care - Physical Therapy Treatment Note  [facility]     Patient Name: Nedra Tolliver  : 1935  MRN: [id number]  Today's Date: 2024   Onset of Illness/Injury or Date of Surgery: 24  Visit Dx:     ICD-10-CM ICD-9-CM   1. Generalized weakness  R53.1 780.79   2. Left-sided weakness  R53.1 728.87   3. Impaired mobility [Z74.09]  Z74.09 799.89     Patient Active Problem List   Diagnosis    Urinary tract infection without hematuria    Dysuria    Vaginal atrophy    Overactive bladder    Diverticulitis    Benign essential HTN    Thyroid nodule    Hypothyroidism    Hyponatremia    Syncope and collapse    Volume depletion    Type 2 diabetes mellitus, without long-term current use of insulin    Moderate left ankle sprain    Left hip pain    Acute pain of left knee    Pain of left thumb    Nondisplaced fracture of navicular bone of left foot with routine healing    Chest pain at rest    Acute cystitis without hematuria    H/O thyroid nodule    Hypoglycemia    IBS (irritable bowel syndrome)    Idiopathic scoliosis    Insomnia    Mild tricuspid insufficiency    Neuropathy due to type 2 diabetes mellitus    Onychomycosis    Osteopenia    Pericarditis secondary to uremia    Primary osteoarthritis of right knee    Proteinuria    Vitamin D deficiency    High cholesterol    Lumbar radiculopathy    Non-toxic multinodular goiter    Transient ischemic attack    Acute gastritis without hemorrhage    UTI symptoms    Absolute anemia    Essential hypertension    Mixed hyperlipidemia with pervious statin intolerance    Stage 3a chronic kidney disease (CKD)    Cervical radiculopathy    LBBB (left bundle branch block)    Persistent atrial fibrillation    Fatigue    Shortness of breath    Accelerated hypertension with diastolic congestive heart failure, NYHA class 3    Paroxysmal A-fib    Chest pain    Palpitations    Anxiety    Atrial fibrillation with RVR    Coronary artery disease involving coronary bypass graft of  native heart with unstable angina pectoris    Generalized weakness    Failure to thrive in adult    Moderate malnutrition     Past Medical History:   Diagnosis Date    Arthritis     Cancer     BCC @ nose & Left arm    Diabetes type 2, controlled     Diverticulitis     History of GI bleed     History of transfusion     Has had x 5 units.    Hypertension     STAGE 3     Hyponatremia     Required hospitalization    Hypothyroidism     Knee arthropathy 09/30/2020    LPRD (laryngopharyngeal reflux disease)     Pharyngeal dysphagia     Thyroid nodule      Past Surgical History:   Procedure Laterality Date    APPENDECTOMY      BASAL CELL CARCINOMA EXCISION      BELPHAROPTOSIS REPAIR      BREAST CYST EXCISION      CARDIAC CATHETERIZATION      CARDIAC CATHETERIZATION Right 6/24/2021    Procedure: Left Heart Cath R radial, US guided w poss intervention;  Surgeon: Mak Nickerson DO;  Location:  [facility];  Service: Cardiovascular;  Laterality: Right;    CARDIAC CATHETERIZATION      CARDIAC CATHETERIZATION N/A 9/20/2024    Procedure: Left Heart Cath;  Surgeon: Mak Nickerson DO;  Location:  [facility];  Service: Cardiovascular;  Laterality: N/A;    CATARACT EXTRACTION      CATARACT EXTRACTION WITH INTRAOCULAR LENS IMPLANT Bilateral     CHOLECYSTECTOMY      COLONOSCOPY Left 11/1/2016    Tubular adenoma cecum, Diverticulosis repeat prn    SUBTOTAL HYSTERECTOMY      TOTAL KNEE ARTHROPLASTY Right 9/30/2020    Procedure: TOTAL KNEE ARTHROPLASTY;  Surgeon: Mak Pickens MD;  Location:  [facility] OR;  Service: Orthopedics;  Laterality: Right;    TUMOR EXCISION      under armpits and left breast     PT Assessment (Last 12 Hours)       PT Evaluation and Treatment       Row Name 09/28/24 1006          Physical Therapy Time and Intention    Subjective Information complains of;weakness  -NW     Document Type therapy note (daily note)  -NW     Mode of Treatment physical therapy   -NW     Comment reports not feeling as well as she did yesterday  -NW       Row Name 09/28/24 1006          General Information    Existing Precautions/Restrictions fall  -NW       Row Name 09/28/24 1006          Pain    Pretreatment Pain Rating 2/10  -NW     Posttreatment Pain Rating 3/10  -NW     Pain Location - Side/Orientation Left  -NW     Pain Location - flank  -NW       Row Name 09/28/24 1006          Bed Mobility    Supine-Sit Placer (Bed Mobility) supervision  -NW       Row Name 09/28/24 1006          Sit-Stand Transfer    Sit-Stand Placer (Transfers) verbal cues;contact guard  -NW       Row Name 09/28/24 1006          Stand-Sit Transfer    Stand-Sit Placer (Transfers) verbal cues;contact guard  -NW       Row Name 09/28/24 1006          Gait/Stairs (Locomotion)    Placer Level (Gait) verbal cues;minimum assist (75% patient effort);moderate assist (50% patient effort)  -NW     Assistive Device (Gait) walker, front-wheeled  -NW     Distance in Feet (Gait) 25  -NW     Comment, (Gait/Stairs) pt able to amb short distances before needing to stand and rest due to SOA and weakness. when amb back to room pt LLE begin to buckle and required mult standing rest in order to amb safely back to room.  -NW       Row Name 09/28/24 1006          Safety Issues, Functional Mobility    Impairments Affecting Function (Mobility) balance;pain;strength  -NW       Row Name 09/28/24 1006          Vital Signs    Pre SpO2 (%) 99  -NW     O2 Delivery Pre Treatment room air  -NW     Post SpO2 (%) 98  -NW     O2 Delivery Post Treatment room air  -NW       Row Name 09/28/24 1006          Positioning and Restraints    Pre-Treatment Position in bed  -NW     Post Treatment Position chair  -NW     In Chair reclined;call light within reach;encouraged to call for assist  -NW               User Key  (r) = Recorded By, (t) = Taken By, (c) = Cosigned By      Initials Name Provider Type    Lindy Robbins, PTA Physical  Therapist Assistant                    Physical Therapy Education       Title: PT OT SLP Therapies (In Progress)       Topic: Physical Therapy (Done)       Point: Mobility training (Done)       Learning Progress Summary             Patient Acceptance, E, VU by AR at 9/28/2024 0238    Acceptance, E, VU by KARAN at 9/27/2024 1002    Comment: role of PT, fallrisk, benefits of OOB activity, beginning HEP                         Point: Home exercise program (Done)       Learning Progress Summary             Patient Acceptance, E, VU by KARAN at 9/27/2024 1002    Comment: role of PT, fallrisk, benefits of OOB activity, beginning HEP                         Point: Body mechanics (Done)       Learning Progress Summary             Patient Acceptance, E, VU by KARAN at 9/27/2024 1002    Comment: role of PT, fallrisk, benefits of OOB activity, beginning HEP                         Point: Precautions (Done)       Learning Progress Summary             Patient Acceptance, E, VU by KARAN at 9/27/2024 1002    Comment: role of PT, fallrisk, benefits of OOB activity, beginning HEP                                         User Key       Initials Effective Dates Name Provider Type Discipline    AR 05/24/22 -  Soha Gottlieb, KHANG Registered Nurse Nurse    KARAN 08/15/24 -  Donavan Smith, PT DPT Physical Therapist PT                  PT Recommendation and Plan         Outcome Measures       Row Name 09/27/24 0931             How much help from another is currently needed...    Putting on and taking off regular lower body clothing? 3  -AC      Bathing (including washing, rinsing, and drying) 3  -AC      Toileting (which includes using toilet bed pan or urinal) 3  -AC      Putting on and taking off regular upper body clothing 4  -AC      Taking care of personal grooming (such as brushing teeth) 4  -AC      Eating meals 4  -AC      AM-PAC 6 Clicks Score (OT) 21  -AC         Functional Assessment    Outcome Measure Options AM-PAC 6 Clicks Daily Activity (OT)   -AC                User Key  (r) = Recorded By, (t) = Taken By, (c) = Cosigned By      Initials Name Provider Type    AC Jose David Carrizales, OTR/L, CNT Occupational Therapist                     Time Calculation:    PT Charges       Row Name 09/28/24 1029             Time Calculation    Start Time 1006  -NW      Stop Time 1029  -NW      Time Calculation (min) 23 min  -NW      PT Received On 09/28/24  -NW      PT Goal Re-Cert Due Date 10/07/24  -NW         Time Calculation- PT    Total Timed Code Minutes- PT 23 minute(s)  -NW         Timed Charges    74923 - Gait Training Minutes  23  -NW         Total Minutes    Timed Charges Total Minutes 23  -NW       Total Minutes 23  -NW                User Key  (r) = Recorded By, (t) = Taken By, (c) = Cosigned By      Initials Name Provider Type    NW Lindy Perez PTA Physical Therapist Assistant                  Therapy Charges for Today       Code Description Service Date Service Provider Modifiers Qty    [id number] HC GAIT TRAINING EA 15 MIN 9/28/2024 Lindy Perez PTA GP 2            PT G-Codes  Outcome Measure Options: AM-PAC 6 Clicks Daily Activity (OT)  AM-PAC 6 Clicks Score (PT): 19  AM-PAC 6 Clicks Score (OT): 21    Lindy Perez PTA  9/28/2024

## 2024-09-29 LAB
ALBUMIN SERPL-MCNC: 3.4 G/DL (ref 3.5–5.2)
ALBUMIN/GLOB SERPL: 1.4 G/DL
ALP SERPL-CCNC: 58 U/L (ref 39–117)
ALT SERPL W P-5'-P-CCNC: 11 U/L (ref 1–33)
ANION GAP SERPL CALCULATED.3IONS-SCNC: 11 MMOL/L (ref 5–15)
AST SERPL-CCNC: 15 U/L (ref 1–32)
BASOPHILS # BLD AUTO: 0.03 10*3/MM3 (ref 0–0.2)
BASOPHILS NFR BLD AUTO: 0.5 % (ref 0–1.5)
BILIRUB SERPL-MCNC: 0.3 MG/DL (ref 0–1.2)
BUN SERPL-MCNC: 24 MG/DL (ref 8–23)
BUN/CREAT SERPL: 24.5 (ref 7–25)
CALCIUM SPEC-SCNC: 8.9 MG/DL (ref 8.6–10.5)
CHLORIDE SERPL-SCNC: 102 MMOL/L (ref 98–107)
CO2 SERPL-SCNC: 23 MMOL/L (ref 22–29)
CREAT SERPL-MCNC: 0.98 MG/DL (ref 0.57–1)
DEPRECATED RDW RBC AUTO: 42.2 FL (ref 37–54)
EGFRCR SERPLBLD CKD-EPI 2021: 55.6 ML/MIN/1.73
EOSINOPHIL # BLD AUTO: 0.26 10*3/MM3 (ref 0–0.4)
EOSINOPHIL NFR BLD AUTO: 4.1 % (ref 0.3–6.2)
ERYTHROCYTE [DISTWIDTH] IN BLOOD BY AUTOMATED COUNT: 12.2 % (ref 12.3–15.4)
GLOBULIN UR ELPH-MCNC: 2.4 GM/DL
GLUCOSE SERPL-MCNC: 143 MG/DL (ref 65–99)
HCT VFR BLD AUTO: 35.4 % (ref 34–46.6)
HGB BLD-MCNC: 11.9 G/DL (ref 12–15.9)
IMM GRANULOCYTES # BLD AUTO: 0.02 10*3/MM3 (ref 0–0.05)
IMM GRANULOCYTES NFR BLD AUTO: 0.3 % (ref 0–0.5)
LYMPHOCYTES # BLD AUTO: 1.77 10*3/MM3 (ref 0.7–3.1)
LYMPHOCYTES NFR BLD AUTO: 27.7 % (ref 19.6–45.3)
MCH RBC QN AUTO: 31.4 PG (ref 26.6–33)
MCHC RBC AUTO-ENTMCNC: 33.6 G/DL (ref 31.5–35.7)
MCV RBC AUTO: 93.4 FL (ref 79–97)
MONOCYTES # BLD AUTO: 0.76 10*3/MM3 (ref 0.1–0.9)
MONOCYTES NFR BLD AUTO: 11.9 % (ref 5–12)
NEUTROPHILS NFR BLD AUTO: 3.56 10*3/MM3 (ref 1.7–7)
NEUTROPHILS NFR BLD AUTO: 55.5 % (ref 42.7–76)
NRBC BLD AUTO-RTO: 0 /100 WBC (ref 0–0.2)
PLATELET # BLD AUTO: 209 10*3/MM3 (ref 140–450)
PMV BLD AUTO: 10.2 FL (ref 6–12)
POTASSIUM SERPL-SCNC: 4 MMOL/L (ref 3.5–5.2)
PROT SERPL-MCNC: 5.8 G/DL (ref 6–8.5)
RBC # BLD AUTO: 3.79 10*6/MM3 (ref 3.77–5.28)
SODIUM SERPL-SCNC: 136 MMOL/L (ref 136–145)
WBC NRBC COR # BLD AUTO: 6.4 10*3/MM3 (ref 3.4–10.8)

## 2024-09-29 PROCEDURE — 85025 COMPLETE CBC W/AUTO DIFF WBC: CPT | Performed by: FAMILY MEDICINE

## 2024-09-29 PROCEDURE — 25810000003 SODIUM CHLORIDE 0.9 % SOLUTION: Performed by: FAMILY MEDICINE

## 2024-09-29 PROCEDURE — 80053 COMPREHEN METABOLIC PANEL: CPT | Performed by: FAMILY MEDICINE

## 2024-09-29 RX ADMIN — SODIUM CHLORIDE 75 ML/HR: 9 INJECTION, SOLUTION INTRAVENOUS at 17:23

## 2024-09-29 RX ADMIN — FLECAINIDE ACETATE 25 MG: 50 TABLET ORAL at 10:04

## 2024-09-29 RX ADMIN — ACETAMINOPHEN 650 MG: 325 TABLET ORAL at 22:04

## 2024-09-29 RX ADMIN — POLYETHYLENE GLYCOL 3350 17 G: 17 POWDER, FOR SOLUTION ORAL at 10:06

## 2024-09-29 RX ADMIN — PANTOPRAZOLE SODIUM 40 MG: 40 TABLET, DELAYED RELEASE ORAL at 05:30

## 2024-09-29 RX ADMIN — FLECAINIDE ACETATE 25 MG: 50 TABLET ORAL at 22:00

## 2024-09-29 RX ADMIN — APIXABAN 5 MG: 5 TABLET, FILM COATED ORAL at 10:04

## 2024-09-29 RX ADMIN — APIXABAN 5 MG: 5 TABLET, FILM COATED ORAL at 22:00

## 2024-09-29 RX ADMIN — LEVOTHYROXINE SODIUM 50 MCG: 100 TABLET ORAL at 10:04

## 2024-09-29 RX ADMIN — Medication 10 ML: at 22:00

## 2024-09-29 NOTE — PROGRESS NOTES
Daily Progress Note  Nedra Tolliver  MRN: [id number] LOS: 1    Admit Date: 9/26/2024 9/29/2024 06:06 CDT    Subjective:      Chief Complaint:  Chief Complaint   Patient presents with    Weakness - Generalized       Interval History:    Reviewed overnight events and nursing notes.   No acute events overnight.  Slow, steady improvement.  Encouraged her to work well with PT/OT.    Review of Systems   Constitutional:  Positive for activity change, appetite change and fatigue.   Respiratory:  Negative for cough and shortness of breath.    Gastrointestinal:  Negative for nausea and vomiting.   Genitourinary:  Negative for dysuria.   Musculoskeletal:  Positive for gait problem.   Skin:  Negative for color change and pallor.   Neurological:  Positive for dizziness and weakness.       DIET:  Diet: Regular/House, Diabetic; Consistent Carbohydrate; Fluid Consistency: Thin (IDDSI 0)    Medications:   sodium chloride, 75 mL/hr, Last Rate: 75 mL/hr (09/28/24 8936)      apixaban, 5 mg, Oral, BID  flecainide, 25 mg, Oral, BID  levothyroxine, 50 mcg, Oral, Daily  pantoprazole, 40 mg, Oral, Q AM  polyethylene glycol, 17 g, Oral, Daily  sodium chloride, 10 mL, Intravenous, Q12H        Data:     Code Status:   Code Status and Medical Interventions: CPR (Attempt to Resuscitate); Full Support   Ordered at: 09/27/24 0734     Code Status (Patient has no pulse and is not breathing):    CPR (Attempt to Resuscitate)     Medical Interventions (Patient has pulse or is breathing):    Full Support       Family History   Problem Relation Age of Onset    Diabetes Mother     Cancer Mother     Heart failure Mother     Diabetes Father     Colon cancer Neg Hx     Colon polyps Neg Hx      Social History     Socioeconomic History    Marital status:    Tobacco Use    Smoking status: Never    Smokeless tobacco: Never   Vaping Use    Vaping status: Never Used   Substance and Sexual Activity    Alcohol use: No    Drug use: Never    Sexual  activity: Defer     Partners: Male       Labs:    Lab Results (last 72 hours)       Procedure Component Value Units Date/Time    Comprehensive Metabolic Panel [[id number]]  (Abnormal) Collected: 09/29/24 0422    Specimen: Blood Updated: 09/29/24 0541     Glucose 143 mg/dL      BUN 24 mg/dL      Creatinine 0.98 mg/dL      Sodium 136 mmol/L      Potassium 4.0 mmol/L      Chloride 102 mmol/L      CO2 23.0 mmol/L      Calcium 8.9 mg/dL      Total Protein 5.8 g/dL      Albumin 3.4 g/dL      ALT (SGPT) 11 U/L      AST (SGOT) 15 U/L      Alkaline Phosphatase 58 U/L      Total Bilirubin 0.3 mg/dL      Globulin 2.4 gm/dL      A/G Ratio 1.4 g/dL      BUN/Creatinine Ratio 24.5     Anion Gap 11.0 mmol/L      eGFR 55.6 mL/min/1.73     Narrative:      GFR Normal >60  Chronic Kidney Disease <60  Kidney Failure <15    The GFR formula is only valid for adults with stable renal function between ages 18 and 70.    CBC & Differential [[id number]]  (Abnormal) Collected: 09/29/24 0422    Specimen: Blood Updated: 09/29/24 0516    Narrative:      The following orders were created for panel order CBC & Differential.  Procedure                               Abnormality         Status                     ---------                               -----------         ------                     CBC Auto Differential[[id number]]        Abnormal            Final result                 Please view results for these tests on the individual orders.    CBC Auto Differential [[id number]]  (Abnormal) Collected: 09/29/24 0422    Specimen: Blood Updated: 09/29/24 0516     WBC 6.40 10*3/mm3      RBC 3.79 10*6/mm3      Hemoglobin 11.9 g/dL      Hematocrit 35.4 %      MCV 93.4 fL      MCH 31.4 pg      MCHC 33.6 g/dL      RDW 12.2 %      RDW-SD 42.2 fl      MPV 10.2 fL      Platelets 209 10*3/mm3      Neutrophil % 55.5 %      Lymphocyte % 27.7 %      Monocyte % 11.9 %      Eosinophil % 4.1 %      Basophil % 0.5 %      Immature Grans % 0.3 %      Neutrophils,  Absolute 3.56 10*3/mm3      Lymphocytes, Absolute 1.77 10*3/mm3      Monocytes, Absolute 0.76 10*3/mm3      Eosinophils, Absolute 0.26 10*3/mm3      Basophils, Absolute 0.03 10*3/mm3      Immature Grans, Absolute 0.02 10*3/mm3      nRBC 0.0 /100 WBC     Urine Culture - Urine, Urine, Clean Catch [[id number]] Collected: 09/26/24 1730    Specimen: Urine, Clean Catch Updated: 09/28/24 1308     Urine Culture >100,000 CFU/mL Mixed Peggy Isolated    Narrative:      Specimen contains mixed organisms of questionable pathogenicity suggestive of contamination. If symptoms persist, suggest recollection.  Colonization of the urinary tract without infection is common. Treatment is discouraged unless the patient is symptomatic, pregnant, or undergoing an invasive urologic procedure.    Comprehensive Metabolic Panel [[id number]]  (Abnormal) Collected: 09/28/24 0431    Specimen: Blood Updated: 09/28/24 0521     Glucose 121 mg/dL      BUN 25 mg/dL      Creatinine 1.08 mg/dL      Sodium 133 mmol/L      Potassium 4.0 mmol/L      Chloride 98 mmol/L      CO2 24.0 mmol/L      Calcium 9.0 mg/dL      Total Protein 5.8 g/dL      Albumin 3.6 g/dL      ALT (SGPT) 12 U/L      AST (SGOT) 15 U/L      Alkaline Phosphatase 61 U/L      Total Bilirubin 0.6 mg/dL      Globulin 2.2 gm/dL      A/G Ratio 1.6 g/dL      BUN/Creatinine Ratio 23.1     Anion Gap 11.0 mmol/L      eGFR 49.5 mL/min/1.73     Narrative:      GFR Normal >60  Chronic Kidney Disease <60  Kidney Failure <15    The GFR formula is only valid for adults with stable renal function between ages 18 and 70.    CBC & Differential [[id number]]  (Abnormal) Collected: 09/28/24 0431    Specimen: Blood Updated: 09/28/24 0501    Narrative:      The following orders were created for panel order CBC & Differential.  Procedure                               Abnormality         Status                     ---------                               -----------         ------                     CBC Auto  Differential[[id number]]        Abnormal            Final result                 Please view results for these tests on the individual orders.    CBC Auto Differential [[id number]]  (Abnormal) Collected: 09/28/24 0431    Specimen: Blood Updated: 09/28/24 0501     WBC 5.49 10*3/mm3      RBC 4.02 10*6/mm3      Hemoglobin 12.6 g/dL      Hematocrit 37.8 %      MCV 94.0 fL      MCH 31.3 pg      MCHC 33.3 g/dL      RDW 12.2 %      RDW-SD 42.6 fl      MPV 9.9 fL      Platelets 212 10*3/mm3      Neutrophil % 44.3 %      Lymphocyte % 34.8 %      Monocyte % 13.8 %      Eosinophil % 5.8 %      Basophil % 0.9 %      Immature Grans % 0.4 %      Neutrophils, Absolute 2.43 10*3/mm3      Lymphocytes, Absolute 1.91 10*3/mm3      Monocytes, Absolute 0.76 10*3/mm3      Eosinophils, Absolute 0.32 10*3/mm3      Basophils, Absolute 0.05 10*3/mm3      Immature Grans, Absolute 0.02 10*3/mm3      nRBC 0.0 /100 WBC     Comprehensive Metabolic Panel [[id number]]  (Abnormal) Collected: 09/27/24 0751    Specimen: Blood Updated: 09/27/24 0828     Glucose 123 mg/dL      BUN 20 mg/dL      Creatinine 1.06 mg/dL      Sodium 136 mmol/L      Potassium 4.2 mmol/L      Chloride 99 mmol/L      CO2 27.0 mmol/L      Calcium 9.7 mg/dL      Total Protein 6.3 g/dL      Albumin 3.9 g/dL      ALT (SGPT) 13 U/L      AST (SGOT) 15 U/L      Alkaline Phosphatase 65 U/L      Total Bilirubin 0.6 mg/dL      Globulin 2.4 gm/dL      A/G Ratio 1.6 g/dL      BUN/Creatinine Ratio 18.9     Anion Gap 10.0 mmol/L      eGFR 50.6 mL/min/1.73     Narrative:      GFR Normal >60  Chronic Kidney Disease <60  Kidney Failure <15    The GFR formula is only valid for adults with stable renal function between ages 18 and 70.    CBC & Differential [[id number]]  (Abnormal) Collected: 09/27/24 0751    Specimen: Blood Updated: 09/27/24 0807    Narrative:      The following orders were created for panel order CBC & Differential.  Procedure                               Abnormality         Status                      ---------                               -----------         ------                     CBC Auto Differential[[id number]]        Abnormal            Final result                 Please view results for these tests on the individual orders.    CBC Auto Differential [[id number]]  (Abnormal) Collected: 09/27/24 0751    Specimen: Blood Updated: 09/27/24 0807     WBC 5.36 10*3/mm3      RBC 4.18 10*6/mm3      Hemoglobin 13.1 g/dL      Hematocrit 39.8 %      MCV 95.2 fL      MCH 31.3 pg      MCHC 32.9 g/dL      RDW 12.4 %      RDW-SD 43.4 fl      MPV 9.3 fL      Platelets 221 10*3/mm3      Neutrophil % 47.7 %      Lymphocyte % 34.5 %      Monocyte % 12.3 %      Eosinophil % 4.7 %      Basophil % 0.6 %      Immature Grans % 0.2 %      Neutrophils, Absolute 2.56 10*3/mm3      Lymphocytes, Absolute 1.85 10*3/mm3      Monocytes, Absolute 0.66 10*3/mm3      Eosinophils, Absolute 0.25 10*3/mm3      Basophils, Absolute 0.03 10*3/mm3      Immature Grans, Absolute 0.01 10*3/mm3      nRBC 0.0 /100 WBC     CK [[id number]]  (Normal) Collected: 09/26/24 1712    Specimen: Blood Updated: 09/26/24 1837     Creatine Kinase 36 U/L     Urinalysis With Culture If Indicated - Urine, Clean Catch [[id number]]  (Abnormal) Collected: 09/26/24 1730    Specimen: Urine, Clean Catch Updated: 09/26/24 1833     Color, UA Yellow     Appearance, UA Clear     pH, UA 7.0     Specific Gravity, UA 1.009     Glucose,  mg/dL (1+)     Ketones, UA Negative     Bilirubin, UA Negative     Blood, UA Negative     Protein, UA Trace     Leuk Esterase, UA Small (1+)     Nitrite, UA Negative     Urobilinogen, UA 0.2 E.U./dL    Narrative:      In absence of clinical symptoms, the presence of pyuria, bacteria, and/or nitrites on the urinalysis result does not correlate with infection.    Urinalysis, Microscopic Only - Urine, Clean Catch [[id number]]  (Abnormal) Collected: 09/26/24 1730    Specimen: Urine, Clean Catch Updated: 09/26/24 1833     RBC, UA  None Seen /HPF      WBC, UA 11-20 /HPF      Bacteria, UA None Seen /HPF      Squamous Epithelial Cells, UA 0-2 /HPF      Hyaline Casts, UA None Seen /LPF      WBC Clumps, UA Moderate/2+ /HPF      Methodology Manual Light Microscopy    Respiratory Panel PCR w/COVID-19(SARS-CoV-2) [facility]-House, NP Swab in UTM/VTM, 2 HR TAT - Swab, Nasopharynx [[id number]]  (Normal) Collected: 09/26/24 1711    Specimen: Swab from Nasopharynx Updated: 09/26/24 1809     ADENOVIRUS, PCR Not Detected     Coronavirus 229E Not Detected     Coronavirus HKU1 Not Detected     Coronavirus NL63 Not Detected     Coronavirus OC43 Not Detected     COVID19 Not Detected     Human Metapneumovirus Not Detected     Human Rhinovirus/Enterovirus Not Detected     Influenza A PCR Not Detected     Influenza B PCR Not Detected     Parainfluenza Virus 1 Not Detected     Parainfluenza Virus 2 Not Detected     Parainfluenza Virus 3 Not Detected     Parainfluenza Virus 4 Not Detected     RSV, PCR Not Detected     Bordetella pertussis pcr Not Detected     Bordetella parapertussis PCR Not Detected     Chlamydophila pneumoniae PCR Not Detected     Mycoplasma pneumo by PCR Not Detected    Narrative:      In the setting of a positive respiratory panel with a viral infection PLUS a negative procalcitonin without other underlying concern for bacterial infection, consider observing off antibiotics or discontinuation of antibiotics and continue supportive care. If the respiratory panel is positive for atypical bacterial infection (Bordetella pertussis, Chlamydophila pneumoniae, or Mycoplasma pneumoniae), consider antibiotic de-escalation to target atypical bacterial infection.    TSH [[id number]]  (Normal) Collected: 09/26/24 1712    Specimen: Blood Updated: 09/26/24 1745     TSH 0.462 uIU/mL     Comprehensive Metabolic Panel [[id number]]  (Abnormal) Collected: 09/26/24 1712    Specimen: Blood Updated: 09/26/24 1739     Glucose 261 mg/dL      BUN 19 mg/dL       Creatinine 0.97 mg/dL      Sodium 135 mmol/L      Potassium 4.1 mmol/L      Comment: Slight hemolysis detected by analyzer. Result may be falsely elevated.        Chloride 97 mmol/L      CO2 26.0 mmol/L      Calcium 9.9 mg/dL      Total Protein 6.7 g/dL      Albumin 4.0 g/dL      ALT (SGPT) 15 U/L      AST (SGOT) 18 U/L      Alkaline Phosphatase 73 U/L      Total Bilirubin 0.3 mg/dL      Globulin 2.7 gm/dL      A/G Ratio 1.5 g/dL      BUN/Creatinine Ratio 19.6     Anion Gap 12.0 mmol/L      eGFR 56.3 mL/min/1.73     Narrative:      GFR Normal >60  Chronic Kidney Disease <60  Kidney Failure <15    The GFR formula is only valid for adults with stable renal function between ages 18 and 70.    BNP [[id number]]  (Abnormal) Collected: 09/26/24 1712    Specimen: Blood Updated: 09/26/24 1737     proBNP 2,005.0 pg/mL     Narrative:      This assay is used as an aid in the diagnosis of individuals suspected of having heart failure. It can be used as an aid in the diagnosis of acute decompensated heart failure (ADHF) in patients presenting with signs and symptoms of ADHF to the emergency department (ED). In addition, NT-proBNP of <300 pg/mL indicates ADHF is not likely.    Age Range Result Interpretation  NT-proBNP Concentration (pg/mL:      <50             Positive            >450                   Gray                 300-450                    Negative             <300    50-75           Positive            >900                  Gray                300-900                  Negative            <300      >75             Positive            >1800                  Gray                300-1800                  Negative            <300    Magnesium [[id number]]  (Normal) Collected: 09/26/24 1712    Specimen: Blood Updated: 09/26/24 1735     Magnesium 2.2 mg/dL     CBC & Differential [[id number]]  (Normal) Collected: 09/26/24 1712    Specimen: Blood Updated: 09/26/24 1720    Narrative:      The following orders were created for  "panel order CBC & Differential.  Procedure                               Abnormality         Status                     ---------                               -----------         ------                     CBC Auto Differential[[id number]]        Normal              Final result                 Please view results for these tests on the individual orders.    CBC Auto Differential [[id number]]  (Normal) Collected: 24    Specimen: Blood Updated: 24     WBC 6.69 10*3/mm3      RBC 4.19 10*6/mm3      Hemoglobin 13.4 g/dL      Hematocrit 39.1 %      MCV 93.3 fL      MCH 32.0 pg      MCHC 34.3 g/dL      RDW 12.3 %      RDW-SD 42.1 fl      MPV 9.4 fL      Platelets 237 10*3/mm3      Neutrophil % 61.7 %      Lymphocyte % 25.4 %      Monocyte % 9.4 %      Eosinophil % 3.0 %      Basophil % 0.4 %      Immature Grans % 0.1 %      Neutrophils, Absolute 4.12 10*3/mm3      Lymphocytes, Absolute 1.70 10*3/mm3      Monocytes, Absolute 0.63 10*3/mm3      Eosinophils, Absolute 0.20 10*3/mm3      Basophils, Absolute 0.03 10*3/mm3      Immature Grans, Absolute 0.01 10*3/mm3      nRBC 0.0 /100 WBC               Objective:     Vitals: /48 (BP Location: Left arm, Patient Position: Lying)   Pulse 65   Temp 97.7 °F (36.5 °C) (Oral)   Resp 18   Ht 160 cm (63\")   Wt 65.8 kg (145 lb)   LMP  (LMP Unknown)   SpO2 97%   BMI 25.69 kg/m²    Intake/Output Summary (Last 24 hours) at 2024 0606  Last data filed at 2024 1553  Gross per 24 hour   Intake 200 ml   Output --   Net 200 ml    Temp (24hrs), Av.7 °F (36.5 °C), Min:97.5 °F (36.4 °C), Max:98.1 °F (36.7 °C)      Physical Exam  Vitals reviewed.   Constitutional:       Appearance: Normal appearance.      Comments: Frail   HENT:      Head: Normocephalic and atraumatic.   Eyes:      General:         Right eye: No discharge.         Left eye: No discharge.      Conjunctiva/sclera: Conjunctivae normal.   Cardiovascular:      Rate and Rhythm: Normal rate " and regular rhythm.      Pulses: Normal pulses.      Heart sounds: No murmur heard.  Pulmonary:      Effort: Pulmonary effort is normal. No respiratory distress.   Abdominal:      General: Abdomen is flat. Bowel sounds are normal. There is no distension.   Musculoskeletal:      Right lower leg: No edema.      Left lower leg: No edema.   Skin:     Capillary Refill: Capillary refill takes less than 2 seconds.   Neurological:      General: No focal deficit present.      Mental Status: She is alert and oriented to person, place, and time.   Psychiatric:         Mood and Affect: Mood normal.         Behavior: Behavior normal.             Assessment and Plan:     Primary Problem:  Generalized weakness    Hospital Problem list:    Generalized weakness    Failure to thrive in adult    Moderate malnutrition      PMH:  Past Medical History:   Diagnosis Date    Arthritis     Cancer     BCC @ nose & Left arm    Diabetes type 2, controlled     Diverticulitis     History of GI bleed     History of transfusion     Has had x 5 units.    Hypertension     STAGE 3     Hyponatremia     Required hospitalization    Hypothyroidism     Knee arthropathy 09/30/2020    LPRD (laryngopharyngeal reflux disease)     Pharyngeal dysphagia     Thyroid nodule        Treatment Plan:  Generalized weakness: PT/OT/case management consultations.  Possible SNF as she lives alone and currently unable to care for self.  - Doing well with PT, continue and may be able to avoid SNF.    JOSE ALEJANDRO: GFR Improving with IV NS.     Hypertension: She is very tenuous with her blood pressure control and very sensitive to medications.  She has still not been given any of her home blood pressure medications and blood pressure has remained mostly stable, a couple of values high.  Will continue to hold for now.     CODE STATUS: Full     DVT prophylaxis: SCDs     Disposition: Inpatient, likely SNF.    Reviewed treatment plans with the patient and/or family.   30 minutes spent in  face to face interaction and coordination of care.     Electronically signed by Edgar Tolbert MD on 9/29/2024 at 06:06 CDT

## 2024-09-29 NOTE — PLAN OF CARE
Goal Outcome Evaluation:  Plan of Care Reviewed With: patient        Progress: improving  Outcome Evaluation: Patient A/O x 4. VSS. On room air. Tolerating IV fluids. Tylenol given once last night with good result. Up x 1 with walker to bathroom. Safety maintained.

## 2024-09-30 LAB
ALBUMIN SERPL-MCNC: 3.6 G/DL (ref 3.5–5.2)
ALBUMIN/GLOB SERPL: 1.7 G/DL
ALP SERPL-CCNC: 58 U/L (ref 39–117)
ALT SERPL W P-5'-P-CCNC: 10 U/L (ref 1–33)
ANION GAP SERPL CALCULATED.3IONS-SCNC: 8 MMOL/L (ref 5–15)
AST SERPL-CCNC: 12 U/L (ref 1–32)
BASOPHILS # BLD AUTO: 0.03 10*3/MM3 (ref 0–0.2)
BASOPHILS NFR BLD AUTO: 0.6 % (ref 0–1.5)
BILIRUB SERPL-MCNC: 0.3 MG/DL (ref 0–1.2)
BUN SERPL-MCNC: 23 MG/DL (ref 8–23)
BUN/CREAT SERPL: 23.7 (ref 7–25)
CALCIUM SPEC-SCNC: 8.9 MG/DL (ref 8.6–10.5)
CHLORIDE SERPL-SCNC: 103 MMOL/L (ref 98–107)
CO2 SERPL-SCNC: 24 MMOL/L (ref 22–29)
CREAT SERPL-MCNC: 0.97 MG/DL (ref 0.57–1)
DEPRECATED RDW RBC AUTO: 42.9 FL (ref 37–54)
EGFRCR SERPLBLD CKD-EPI 2021: 56.3 ML/MIN/1.73
EOSINOPHIL # BLD AUTO: 0.3 10*3/MM3 (ref 0–0.4)
EOSINOPHIL NFR BLD AUTO: 6.2 % (ref 0.3–6.2)
ERYTHROCYTE [DISTWIDTH] IN BLOOD BY AUTOMATED COUNT: 12.4 % (ref 12.3–15.4)
GLOBULIN UR ELPH-MCNC: 2.1 GM/DL
GLUCOSE SERPL-MCNC: 141 MG/DL (ref 65–99)
HCT VFR BLD AUTO: 35.2 % (ref 34–46.6)
HGB BLD-MCNC: 11.9 G/DL (ref 12–15.9)
IMM GRANULOCYTES # BLD AUTO: 0.02 10*3/MM3 (ref 0–0.05)
IMM GRANULOCYTES NFR BLD AUTO: 0.4 % (ref 0–0.5)
LYMPHOCYTES # BLD AUTO: 1.58 10*3/MM3 (ref 0.7–3.1)
LYMPHOCYTES NFR BLD AUTO: 32.6 % (ref 19.6–45.3)
MCH RBC QN AUTO: 31.9 PG (ref 26.6–33)
MCHC RBC AUTO-ENTMCNC: 33.8 G/DL (ref 31.5–35.7)
MCV RBC AUTO: 94.4 FL (ref 79–97)
MONOCYTES # BLD AUTO: 0.68 10*3/MM3 (ref 0.1–0.9)
MONOCYTES NFR BLD AUTO: 14 % (ref 5–12)
NEUTROPHILS NFR BLD AUTO: 2.23 10*3/MM3 (ref 1.7–7)
NEUTROPHILS NFR BLD AUTO: 46.2 % (ref 42.7–76)
NRBC BLD AUTO-RTO: 0 /100 WBC (ref 0–0.2)
PLATELET # BLD AUTO: 204 10*3/MM3 (ref 140–450)
PMV BLD AUTO: 9.8 FL (ref 6–12)
POTASSIUM SERPL-SCNC: 4.1 MMOL/L (ref 3.5–5.2)
PROT SERPL-MCNC: 5.7 G/DL (ref 6–8.5)
RBC # BLD AUTO: 3.73 10*6/MM3 (ref 3.77–5.28)
SODIUM SERPL-SCNC: 135 MMOL/L (ref 136–145)
WBC NRBC COR # BLD AUTO: 4.84 10*3/MM3 (ref 3.4–10.8)

## 2024-09-30 PROCEDURE — 80053 COMPREHEN METABOLIC PANEL: CPT | Performed by: FAMILY MEDICINE

## 2024-09-30 PROCEDURE — 97116 GAIT TRAINING THERAPY: CPT

## 2024-09-30 PROCEDURE — 25810000003 SODIUM CHLORIDE 0.9 % SOLUTION: Performed by: FAMILY MEDICINE

## 2024-09-30 PROCEDURE — 85025 COMPLETE CBC W/AUTO DIFF WBC: CPT | Performed by: FAMILY MEDICINE

## 2024-09-30 RX ADMIN — APIXABAN 5 MG: 5 TABLET, FILM COATED ORAL at 20:01

## 2024-09-30 RX ADMIN — Medication 10 ML: at 20:01

## 2024-09-30 RX ADMIN — FLECAINIDE ACETATE 25 MG: 50 TABLET ORAL at 09:13

## 2024-09-30 RX ADMIN — SODIUM CHLORIDE 75 ML/HR: 9 INJECTION, SOLUTION INTRAVENOUS at 20:00

## 2024-09-30 RX ADMIN — ACETAMINOPHEN 650 MG: 325 TABLET ORAL at 20:04

## 2024-09-30 RX ADMIN — FLECAINIDE ACETATE 25 MG: 50 TABLET ORAL at 20:01

## 2024-09-30 RX ADMIN — Medication 10 ML: at 09:14

## 2024-09-30 RX ADMIN — LEVOTHYROXINE SODIUM 50 MCG: 100 TABLET ORAL at 09:13

## 2024-09-30 RX ADMIN — APIXABAN 5 MG: 5 TABLET, FILM COATED ORAL at 09:13

## 2024-09-30 RX ADMIN — PANTOPRAZOLE SODIUM 40 MG: 40 TABLET, DELAYED RELEASE ORAL at 05:08

## 2024-09-30 NOTE — THERAPY TREATMENT NOTE
Acute Care - Physical Therapy Treatment Note  [facility]     Patient Name: Nedra Tolliver  : 1935  MRN: [id number]  Today's Date: 2024   Onset of Illness/Injury or Date of Surgery: 24  Visit Dx:     ICD-10-CM ICD-9-CM   1. Generalized weakness  R53.1 780.79   2. Left-sided weakness  R53.1 728.87   3. Impaired mobility [Z74.09]  Z74.09 799.89     Patient Active Problem List   Diagnosis    Urinary tract infection without hematuria    Dysuria    Vaginal atrophy    Overactive bladder    Diverticulitis    Benign essential HTN    Thyroid nodule    Hypothyroidism    Hyponatremia    Syncope and collapse    Volume depletion    Type 2 diabetes mellitus, without long-term current use of insulin    Moderate left ankle sprain    Left hip pain    Acute pain of left knee    Pain of left thumb    Nondisplaced fracture of navicular bone of left foot with routine healing    Chest pain at rest    Acute cystitis without hematuria    H/O thyroid nodule    Hypoglycemia    IBS (irritable bowel syndrome)    Idiopathic scoliosis    Insomnia    Mild tricuspid insufficiency    Neuropathy due to type 2 diabetes mellitus    Onychomycosis    Osteopenia    Pericarditis secondary to uremia    Primary osteoarthritis of right knee    Proteinuria    Vitamin D deficiency    High cholesterol    Lumbar radiculopathy    Non-toxic multinodular goiter    Transient ischemic attack    Acute gastritis without hemorrhage    UTI symptoms    Absolute anemia    Essential hypertension    Mixed hyperlipidemia with pervious statin intolerance    Stage 3a chronic kidney disease (CKD)    Cervical radiculopathy    LBBB (left bundle branch block)    Persistent atrial fibrillation    Fatigue    Shortness of breath    Accelerated hypertension with diastolic congestive heart failure, NYHA class 3    Paroxysmal A-fib    Chest pain    Palpitations    Anxiety    Atrial fibrillation with RVR    Coronary artery disease involving coronary bypass graft of  native heart with unstable angina pectoris    Generalized weakness    Failure to thrive in adult    Moderate malnutrition     Past Medical History:   Diagnosis Date    Arthritis     Cancer     BCC @ nose & Left arm    Diabetes type 2, controlled     Diverticulitis     History of GI bleed     History of transfusion     Has had x 5 units.    Hypertension     STAGE 3     Hyponatremia     Required hospitalization    Hypothyroidism     Knee arthropathy 09/30/2020    LPRD (laryngopharyngeal reflux disease)     Pharyngeal dysphagia     Thyroid nodule      Past Surgical History:   Procedure Laterality Date    APPENDECTOMY      BASAL CELL CARCINOMA EXCISION      BELPHAROPTOSIS REPAIR      BREAST CYST EXCISION      CARDIAC CATHETERIZATION      CARDIAC CATHETERIZATION Right 6/24/2021    Procedure: Left Heart Cath R radial, US guided w poss intervention;  Surgeon: Mak Nickerson DO;  Location:  [facility];  Service: Cardiovascular;  Laterality: Right;    CARDIAC CATHETERIZATION      CARDIAC CATHETERIZATION N/A 9/20/2024    Procedure: Left Heart Cath;  Surgeon: Mak Nickerson DO;  Location:  [facility];  Service: Cardiovascular;  Laterality: N/A;    CATARACT EXTRACTION      CATARACT EXTRACTION WITH INTRAOCULAR LENS IMPLANT Bilateral     CHOLECYSTECTOMY      COLONOSCOPY Left 11/1/2016    Tubular adenoma cecum, Diverticulosis repeat prn    SUBTOTAL HYSTERECTOMY      TOTAL KNEE ARTHROPLASTY Right 9/30/2020    Procedure: TOTAL KNEE ARTHROPLASTY;  Surgeon: Mak Pickens MD;  Location:  [facility] OR;  Service: Orthopedics;  Laterality: Right;    TUMOR EXCISION      under armpits and left breast     PT Assessment (Last 12 Hours)       PT Evaluation and Treatment       Row Name 09/30/24 1318          Physical Therapy Time and Intention    Subjective Information complains of;weakness;pain  LLE weakness and pain  -NW     Document Type therapy note (daily note)  -NW     Mode of  Treatment physical therapy  -NW       Row Name 09/30/24 1318          General Information    Existing Precautions/Restrictions fall  -NW       Row Name 09/30/24 1318          Pain    Posttreatment Pain Rating 4/10  -NW     Pain Location - Side/Orientation Left  -NW     Pain Location - hip  -NW       Row Name 09/30/24 1318          Bed Mobility    Comment, (Bed Mobility) up in BR w/ nsg  -NW       Row Name 09/30/24 1318          Stand-Sit Transfer    Stand-Sit [place] (Transfers) verbal cues;contact guard  -NW       Row Name 09/30/24 1318          Gait/Stairs (Locomotion)    [place] Level (Gait) verbal cues;contact guard  -NW     Assistive Device (Gait) walker, front-wheeled  -NW     Distance in Feet (Gait) 25  25x3  -NW     Deviations/Abnormal Patterns (Gait) trang decreased  -NW     Comment, (Gait/Stairs) pt w/ gaurded gait due to LLE weakness, LLE tends to buckle w/  her at times  -NW       Row Name 09/30/24 1318          Safety Issues, Functional Mobility    Impairments Affecting Function (Mobility) balance;pain;strength  -NW       Row Name 09/30/24 1318          Positioning and Restraints    Pre-Treatment Position bathroom  -NW     Post Treatment Position chair  -NW     In Chair reclined;call light within reach;encouraged to call for assist;notified nsg  -NW               User Key  (r) = Recorded By, (t) = Taken By, (c) = Cosigned By      Initials Name Provider Type    NW Lindy Perez, PTA Physical Therapist Assistant                    Physical Therapy Education       Title: PT OT SLP Therapies (In Progress)       Topic: Physical Therapy (Done)       Point: Mobility training (Done)       Learning Progress Summary             Patient Acceptance, E, VU by AR at 9/28/2024 0238    Acceptance, E, VU by KARAN at 9/27/2024 1002    Comment: role of PT, fallrisk, benefits of OOB activity, beginning HEP                         Point: Home exercise program (Done)       Learning Progress Summary              Patient Acceptance, E, VU by KARAN at 9/27/2024 1002    Comment: role of PT, fallrisk, benefits of OOB activity, beginning HEP                         Point: Body mechanics (Done)       Learning Progress Summary             Patient Acceptance, E, VU by KARAN at 9/27/2024 1002    Comment: role of PT, fallrisk, benefits of OOB activity, beginning HEP                         Point: Precautions (Done)       Learning Progress Summary             Patient Acceptance, E, VU by KARNA at 9/27/2024 1002    Comment: role of PT, fallrisk, benefits of OOB activity, beginning HEP                                         User Key       Initials Effective Dates Name Provider Type Discipline    AR 05/24/22 -  Soha Gottlieb, RN Registered Nurse Nurse     08/15/24 -  Donavan Smith, PT DPT Physical Therapist PT                  PT Recommendation and Plan     Plan of Care Reviewed With: patient  Progress: no change  Outcome Evaluation: bed moblity supervision. sit-stand cga. pt able to amb short distances before needing to stand and rest due to SOA and weakness. when amb back to room pt LLE begin to buckle min/mod recovery and required mult standing rest in order to amb safely back to room. pt reports LLE has been doing that lately but not this bad. Pt will need cont PT upon d/c to cont working on strength, mobility and safety       Time Calculation:         PT G-Codes  Outcome Measure Options: AM-PAC 6 Clicks Daily Activity (OT)  AM-PAC 6 Clicks Score (PT): 20  AM-PAC 6 Clicks Score (OT): 21    Lindy Perez, PTA  9/30/2024

## 2024-09-30 NOTE — PLAN OF CARE
Goal Outcome Evaluation:      Pt. A&Ox4, VSS, general weakness, standby assist to bathroom and chair, worked with therapy, SCD's , RA, plan of care ongoing.

## 2024-09-30 NOTE — PROGRESS NOTES
Daily Progress Note  Nedra Tolliver  MRN: [id number] LOS: 2    Admit Date: 9/26/2024 9/30/2024 13:22 CDT    Subjective:      Chief Complaint:  Chief Complaint   Patient presents with    Weakness - Generalized       Interval History:    Reviewed overnight events and nursing notes.   No acute events overnight.  Slow, steady improvement.  Encouraged her to work well with PT/OT.    Review of Systems   Constitutional:  Positive for activity change, appetite change and fatigue.   Respiratory:  Negative for cough and shortness of breath.    Gastrointestinal:  Negative for nausea and vomiting.   Genitourinary:  Negative for dysuria.   Musculoskeletal:  Positive for gait problem.   Skin:  Negative for color change and pallor.   Neurological:  Positive for dizziness and weakness.       DIET:  Diet: Regular/House, Diabetic; Consistent Carbohydrate; Fluid Consistency: Thin (IDDSI 0)    Medications:   sodium chloride, 75 mL/hr, Last Rate: 75 mL/hr (09/29/24 5113)      apixaban, 5 mg, Oral, BID  flecainide, 25 mg, Oral, BID  levothyroxine, 50 mcg, Oral, Daily  pantoprazole, 40 mg, Oral, Q AM  polyethylene glycol, 17 g, Oral, Daily  sodium chloride, 10 mL, Intravenous, Q12H        Data:     Code Status:   Code Status and Medical Interventions: CPR (Attempt to Resuscitate); Full Support   Ordered at: 09/27/24 0734     Code Status (Patient has no pulse and is not breathing):    CPR (Attempt to Resuscitate)     Medical Interventions (Patient has pulse or is breathing):    Full Support       Family History   Problem Relation Age of Onset    Diabetes Mother     Cancer Mother     Heart failure Mother     Diabetes Father     Colon cancer Neg Hx     Colon polyps Neg Hx      Social History     Socioeconomic History    Marital status:    Tobacco Use    Smoking status: Never    Smokeless tobacco: Never   Vaping Use    Vaping status: Never Used   Substance and Sexual Activity    Alcohol use: No    Drug use: Never    Sexual  activity: Defer     Partners: Male       Labs:    Lab Results (last 72 hours)       Procedure Component Value Units Date/Time    Comprehensive Metabolic Panel [[id number]]  (Abnormal) Collected: 09/29/24 0422    Specimen: Blood Updated: 09/29/24 0541     Glucose 143 mg/dL      BUN 24 mg/dL      Creatinine 0.98 mg/dL      Sodium 136 mmol/L      Potassium 4.0 mmol/L      Chloride 102 mmol/L      CO2 23.0 mmol/L      Calcium 8.9 mg/dL      Total Protein 5.8 g/dL      Albumin 3.4 g/dL      ALT (SGPT) 11 U/L      AST (SGOT) 15 U/L      Alkaline Phosphatase 58 U/L      Total Bilirubin 0.3 mg/dL      Globulin 2.4 gm/dL      A/G Ratio 1.4 g/dL      BUN/Creatinine Ratio 24.5     Anion Gap 11.0 mmol/L      eGFR 55.6 mL/min/1.73     Narrative:      GFR Normal >60  Chronic Kidney Disease <60  Kidney Failure <15    The GFR formula is only valid for adults with stable renal function between ages 18 and 70.    CBC & Differential [[id number]]  (Abnormal) Collected: 09/29/24 0422    Specimen: Blood Updated: 09/29/24 0516    Narrative:      The following orders were created for panel order CBC & Differential.  Procedure                               Abnormality         Status                     ---------                               -----------         ------                     CBC Auto Differential[[id number]]        Abnormal            Final result                 Please view results for these tests on the individual orders.    CBC Auto Differential [[id number]]  (Abnormal) Collected: 09/29/24 0422    Specimen: Blood Updated: 09/29/24 0516     WBC 6.40 10*3/mm3      RBC 3.79 10*6/mm3      Hemoglobin 11.9 g/dL      Hematocrit 35.4 %      MCV 93.4 fL      MCH 31.4 pg      MCHC 33.6 g/dL      RDW 12.2 %      RDW-SD 42.2 fl      MPV 10.2 fL      Platelets 209 10*3/mm3      Neutrophil % 55.5 %      Lymphocyte % 27.7 %      Monocyte % 11.9 %      Eosinophil % 4.1 %      Basophil % 0.5 %      Immature Grans % 0.3 %      Neutrophils,  Absolute 3.56 10*3/mm3      Lymphocytes, Absolute 1.77 10*3/mm3      Monocytes, Absolute 0.76 10*3/mm3      Eosinophils, Absolute 0.26 10*3/mm3      Basophils, Absolute 0.03 10*3/mm3      Immature Grans, Absolute 0.02 10*3/mm3      nRBC 0.0 /100 WBC     Urine Culture - Urine, Urine, Clean Catch [[id number]] Collected: 09/26/24 1730    Specimen: Urine, Clean Catch Updated: 09/28/24 1308     Urine Culture >100,000 CFU/mL Mixed Peggy Isolated    Narrative:      Specimen contains mixed organisms of questionable pathogenicity suggestive of contamination. If symptoms persist, suggest recollection.  Colonization of the urinary tract without infection is common. Treatment is discouraged unless the patient is symptomatic, pregnant, or undergoing an invasive urologic procedure.    Comprehensive Metabolic Panel [[id number]]  (Abnormal) Collected: 09/28/24 0431    Specimen: Blood Updated: 09/28/24 0521     Glucose 121 mg/dL      BUN 25 mg/dL      Creatinine 1.08 mg/dL      Sodium 133 mmol/L      Potassium 4.0 mmol/L      Chloride 98 mmol/L      CO2 24.0 mmol/L      Calcium 9.0 mg/dL      Total Protein 5.8 g/dL      Albumin 3.6 g/dL      ALT (SGPT) 12 U/L      AST (SGOT) 15 U/L      Alkaline Phosphatase 61 U/L      Total Bilirubin 0.6 mg/dL      Globulin 2.2 gm/dL      A/G Ratio 1.6 g/dL      BUN/Creatinine Ratio 23.1     Anion Gap 11.0 mmol/L      eGFR 49.5 mL/min/1.73     Narrative:      GFR Normal >60  Chronic Kidney Disease <60  Kidney Failure <15    The GFR formula is only valid for adults with stable renal function between ages 18 and 70.    CBC & Differential [[id number]]  (Abnormal) Collected: 09/28/24 0431    Specimen: Blood Updated: 09/28/24 0501    Narrative:      The following orders were created for panel order CBC & Differential.  Procedure                               Abnormality         Status                     ---------                               -----------         ------                     CBC Auto  Differential[[id number]]        Abnormal            Final result                 Please view results for these tests on the individual orders.    CBC Auto Differential [[id number]]  (Abnormal) Collected: 09/28/24 0431    Specimen: Blood Updated: 09/28/24 0501     WBC 5.49 10*3/mm3      RBC 4.02 10*6/mm3      Hemoglobin 12.6 g/dL      Hematocrit 37.8 %      MCV 94.0 fL      MCH 31.3 pg      MCHC 33.3 g/dL      RDW 12.2 %      RDW-SD 42.6 fl      MPV 9.9 fL      Platelets 212 10*3/mm3      Neutrophil % 44.3 %      Lymphocyte % 34.8 %      Monocyte % 13.8 %      Eosinophil % 5.8 %      Basophil % 0.9 %      Immature Grans % 0.4 %      Neutrophils, Absolute 2.43 10*3/mm3      Lymphocytes, Absolute 1.91 10*3/mm3      Monocytes, Absolute 0.76 10*3/mm3      Eosinophils, Absolute 0.32 10*3/mm3      Basophils, Absolute 0.05 10*3/mm3      Immature Grans, Absolute 0.02 10*3/mm3      nRBC 0.0 /100 WBC     Comprehensive Metabolic Panel [[id number]]  (Abnormal) Collected: 09/27/24 0751    Specimen: Blood Updated: 09/27/24 0828     Glucose 123 mg/dL      BUN 20 mg/dL      Creatinine 1.06 mg/dL      Sodium 136 mmol/L      Potassium 4.2 mmol/L      Chloride 99 mmol/L      CO2 27.0 mmol/L      Calcium 9.7 mg/dL      Total Protein 6.3 g/dL      Albumin 3.9 g/dL      ALT (SGPT) 13 U/L      AST (SGOT) 15 U/L      Alkaline Phosphatase 65 U/L      Total Bilirubin 0.6 mg/dL      Globulin 2.4 gm/dL      A/G Ratio 1.6 g/dL      BUN/Creatinine Ratio 18.9     Anion Gap 10.0 mmol/L      eGFR 50.6 mL/min/1.73     Narrative:      GFR Normal >60  Chronic Kidney Disease <60  Kidney Failure <15    The GFR formula is only valid for adults with stable renal function between ages 18 and 70.    CBC & Differential [[id number]]  (Abnormal) Collected: 09/27/24 0751    Specimen: Blood Updated: 09/27/24 0807    Narrative:      The following orders were created for panel order CBC & Differential.  Procedure                               Abnormality         Status                      ---------                               -----------         ------                     CBC Auto Differential[[id number]]        Abnormal            Final result                 Please view results for these tests on the individual orders.    CBC Auto Differential [[id number]]  (Abnormal) Collected: 09/27/24 0751    Specimen: Blood Updated: 09/27/24 0807     WBC 5.36 10*3/mm3      RBC 4.18 10*6/mm3      Hemoglobin 13.1 g/dL      Hematocrit 39.8 %      MCV 95.2 fL      MCH 31.3 pg      MCHC 32.9 g/dL      RDW 12.4 %      RDW-SD 43.4 fl      MPV 9.3 fL      Platelets 221 10*3/mm3      Neutrophil % 47.7 %      Lymphocyte % 34.5 %      Monocyte % 12.3 %      Eosinophil % 4.7 %      Basophil % 0.6 %      Immature Grans % 0.2 %      Neutrophils, Absolute 2.56 10*3/mm3      Lymphocytes, Absolute 1.85 10*3/mm3      Monocytes, Absolute 0.66 10*3/mm3      Eosinophils, Absolute 0.25 10*3/mm3      Basophils, Absolute 0.03 10*3/mm3      Immature Grans, Absolute 0.01 10*3/mm3      nRBC 0.0 /100 WBC     CK [[id number]]  (Normal) Collected: 09/26/24 1712    Specimen: Blood Updated: 09/26/24 1837     Creatine Kinase 36 U/L     Urinalysis With Culture If Indicated - Urine, Clean Catch [[id number]]  (Abnormal) Collected: 09/26/24 1730    Specimen: Urine, Clean Catch Updated: 09/26/24 1833     Color, UA Yellow     Appearance, UA Clear     pH, UA 7.0     Specific Gravity, UA 1.009     Glucose,  mg/dL (1+)     Ketones, UA Negative     Bilirubin, UA Negative     Blood, UA Negative     Protein, UA Trace     Leuk Esterase, UA Small (1+)     Nitrite, UA Negative     Urobilinogen, UA 0.2 E.U./dL    Narrative:      In absence of clinical symptoms, the presence of pyuria, bacteria, and/or nitrites on the urinalysis result does not correlate with infection.    Urinalysis, Microscopic Only - Urine, Clean Catch [[id number]]  (Abnormal) Collected: 09/26/24 1730    Specimen: Urine, Clean Catch Updated: 09/26/24 1833     RBC, UA  None Seen /HPF      WBC, UA 11-20 /HPF      Bacteria, UA None Seen /HPF      Squamous Epithelial Cells, UA 0-2 /HPF      Hyaline Casts, UA None Seen /LPF      WBC Clumps, UA Moderate/2+ /HPF      Methodology Manual Light Microscopy    Respiratory Panel PCR w/COVID-19(SARS-CoV-2) [facility], NP Swab in UTM/VTM, 2 HR TAT - Swab, Nasopharynx [[id number]]  (Normal) Collected: 09/26/24 1711    Specimen: Swab from Nasopharynx Updated: 09/26/24 1809     ADENOVIRUS, PCR Not Detected     Coronavirus 229E Not Detected     Coronavirus HKU1 Not Detected     Coronavirus NL63 Not Detected     Coronavirus OC43 Not Detected     COVID19 Not Detected     Human Metapneumovirus Not Detected     Human Rhinovirus/Enterovirus Not Detected     Influenza A PCR Not Detected     Influenza B PCR Not Detected     Parainfluenza Virus 1 Not Detected     Parainfluenza Virus 2 Not Detected     Parainfluenza Virus 3 Not Detected     Parainfluenza Virus 4 Not Detected     RSV, PCR Not Detected     Bordetella pertussis pcr Not Detected     Bordetella parapertussis PCR Not Detected     Chlamydophila pneumoniae PCR Not Detected     Mycoplasma pneumo by PCR Not Detected    Narrative:      In the setting of a positive respiratory panel with a viral infection PLUS a negative procalcitonin without other underlying concern for bacterial infection, consider observing off antibiotics or discontinuation of antibiotics and continue supportive care. If the respiratory panel is positive for atypical bacterial infection (Bordetella pertussis, Chlamydophila pneumoniae, or Mycoplasma pneumoniae), consider antibiotic de-escalation to target atypical bacterial infection.    TSH [[id number]]  (Normal) Collected: 09/26/24 1712    Specimen: Blood Updated: 09/26/24 1745     TSH 0.462 uIU/mL     Comprehensive Metabolic Panel [[id number]]  (Abnormal) Collected: 09/26/24 1712    Specimen: Blood Updated: 09/26/24 1739     Glucose 261 mg/dL      BUN 19 mg/dL       Creatinine 0.97 mg/dL      Sodium 135 mmol/L      Potassium 4.1 mmol/L      Comment: Slight hemolysis detected by analyzer. Result may be falsely elevated.        Chloride 97 mmol/L      CO2 26.0 mmol/L      Calcium 9.9 mg/dL      Total Protein 6.7 g/dL      Albumin 4.0 g/dL      ALT (SGPT) 15 U/L      AST (SGOT) 18 U/L      Alkaline Phosphatase 73 U/L      Total Bilirubin 0.3 mg/dL      Globulin 2.7 gm/dL      A/G Ratio 1.5 g/dL      BUN/Creatinine Ratio 19.6     Anion Gap 12.0 mmol/L      eGFR 56.3 mL/min/1.73     Narrative:      GFR Normal >60  Chronic Kidney Disease <60  Kidney Failure <15    The GFR formula is only valid for adults with stable renal function between ages 18 and 70.    BNP [[id number]]  (Abnormal) Collected: 09/26/24 1712    Specimen: Blood Updated: 09/26/24 1737     proBNP 2,005.0 pg/mL     Narrative:      This assay is used as an aid in the diagnosis of individuals suspected of having heart failure. It can be used as an aid in the diagnosis of acute decompensated heart failure (ADHF) in patients presenting with signs and symptoms of ADHF to the emergency department (ED). In addition, NT-proBNP of <300 pg/mL indicates ADHF is not likely.    Age Range Result Interpretation  NT-proBNP Concentration (pg/mL:      <50             Positive            >450                   Gray                 300-450                    Negative             <300    50-75           Positive            >900                  Gray                300-900                  Negative            <300      >75             Positive            >1800                  Gray                300-1800                  Negative            <300    Magnesium [[id number]]  (Normal) Collected: 09/26/24 1712    Specimen: Blood Updated: 09/26/24 1735     Magnesium 2.2 mg/dL     CBC & Differential [[id number]]  (Normal) Collected: 09/26/24 1712    Specimen: Blood Updated: 09/26/24 1720    Narrative:      The following orders were created for  "panel order CBC & Differential.  Procedure                               Abnormality         Status                     ---------                               -----------         ------                     CBC Auto Differential[[id number]]        Normal              Final result                 Please view results for these tests on the individual orders.    CBC Auto Differential [[id number]]  (Normal) Collected: 24    Specimen: Blood Updated: 24     WBC 6.69 10*3/mm3      RBC 4.19 10*6/mm3      Hemoglobin 13.4 g/dL      Hematocrit 39.1 %      MCV 93.3 fL      MCH 32.0 pg      MCHC 34.3 g/dL      RDW 12.3 %      RDW-SD 42.1 fl      MPV 9.4 fL      Platelets 237 10*3/mm3      Neutrophil % 61.7 %      Lymphocyte % 25.4 %      Monocyte % 9.4 %      Eosinophil % 3.0 %      Basophil % 0.4 %      Immature Grans % 0.1 %      Neutrophils, Absolute 4.12 10*3/mm3      Lymphocytes, Absolute 1.70 10*3/mm3      Monocytes, Absolute 0.63 10*3/mm3      Eosinophils, Absolute 0.20 10*3/mm3      Basophils, Absolute 0.03 10*3/mm3      Immature Grans, Absolute 0.01 10*3/mm3      nRBC 0.0 /100 WBC               Objective:     Vitals: /60 (BP Location: Right arm, Patient Position: Lying)   Pulse 63   Temp 97.8 °F (36.6 °C) (Oral)   Resp 18   Ht 160 cm (63\")   Wt 65.8 kg (145 lb)   LMP  (LMP Unknown)   SpO2 97%   BMI 25.69 kg/m²  No intake or output data in the 24 hours ending 24 1322   Temp (24hrs), Av.8 °F (36.6 °C), Min:97.6 °F (36.4 °C), Max:97.9 °F (36.6 °C)      Physical Exam  Vitals reviewed.   Constitutional:       Appearance: Normal appearance.      Comments: Frail   HENT:      Head: Normocephalic and atraumatic.   Eyes:      General:         Right eye: No discharge.         Left eye: No discharge.      Conjunctiva/sclera: Conjunctivae normal.   Cardiovascular:      Rate and Rhythm: Normal rate and regular rhythm.      Pulses: Normal pulses.      Heart sounds: No murmur " heard.  Pulmonary:      Effort: Pulmonary effort is normal. No respiratory distress.   Abdominal:      General: Abdomen is flat. Bowel sounds are normal. There is no distension.   Musculoskeletal:      Right lower leg: No edema.      Left lower leg: No edema.   Skin:     Capillary Refill: Capillary refill takes less than 2 seconds.   Neurological:      General: No focal deficit present.      Mental Status: She is alert and oriented to person, place, and time.   Psychiatric:         Mood and Affect: Mood normal.         Behavior: Behavior normal.             Assessment and Plan:     Primary Problem:  Generalized weakness    Hospital Problem list:    Generalized weakness    Failure to thrive in adult    Moderate malnutrition      PMH:  Past Medical History:   Diagnosis Date    Arthritis     Cancer     BCC @ nose & Left arm    Diabetes type 2, controlled     Diverticulitis     History of GI bleed     History of transfusion     Has had x 5 units.    Hypertension     STAGE 3     Hyponatremia     Required hospitalization    Hypothyroidism     Knee arthropathy 09/30/2020    LPRD (laryngopharyngeal reflux disease)     Pharyngeal dysphagia     Thyroid nodule        Treatment Plan:  Generalized weakness: PT/OT/case management consultations.  Possible SNF as she lives alone and currently unable to care for self.  - Doing well with PT, continue and may be able to avoid SNF.    JOSE ALEJANDRO: GFR Improving with IV NS.     Hypertension: She is very tenuous with her blood pressure control and very sensitive to medications.  She has still not been given any of her home blood pressure medications and blood pressure has remained mostly stable, a couple of values high.  Will continue to hold for now.     CODE STATUS: Full     DVT prophylaxis: SCDs     Disposition: Inpatient, likely SNF.    Reviewed treatment plans with the patient and/or family.   30 minutes spent in face to face interaction and coordination of care.     Electronically signed  by Hank Rascon MD on 9/30/2024 at 13:22 CDT

## 2024-09-30 NOTE — PLAN OF CARE
Goal Outcome Evaluation:  Plan of Care Reviewed With: patient        Progress: no change  Outcome Evaluation: Pt up in BR w/ nsg. Pt able to amb short distances at a time cga. pt w/ gaurded gait due to the feeling of LLE weakness and recently LLE has been buckling w/ her. pt LLE didnt' buckle during this rx but had during last rx, making pt very fearful and concerned. stated this was all new. pt may benefit from cont rehab due to safety concerns.

## 2024-09-30 NOTE — PLAN OF CARE
Goal Outcome Evaluation:  Plan of Care Reviewed With: patient        Progress: improving  Outcome Evaluation: Patient A/O x 4. VSS. On room air. IV fluids infusing. Tylenol given once last night with good result. Up x 1 with walker to bathroom. Safety maintained.

## 2024-09-30 NOTE — CASE MANAGEMENT/SOCIAL WORK
Continued Stay Note  JARRETT Thomas     Patient Name: Nedra Tolliver  MRN: [id number]  Today's Date: 9/30/2024    Admit Date: 9/26/2024    Plan: Home with HH vs SNF   Discharge Plan       Row Name 09/30/24 0946       Plan    Plan Home with HH vs SNF    Plan Comments Called and spoke with Jeannie Fernandez Daughter   [phone] to see if she has discussed d/c plan with her brother, she is saying they still have not decided on plan.  She may need much encouragement via MD before deciding, she was informed MD mentioned SNF need in notes. Will follow.                   Discharge Codes    No documentation.                       ELIZABETH LuciaW

## 2024-10-01 LAB
ALBUMIN SERPL-MCNC: 3.8 G/DL (ref 3.5–5.2)
ALBUMIN/GLOB SERPL: 1.4 G/DL
ALP SERPL-CCNC: 63 U/L (ref 39–117)
ALT SERPL W P-5'-P-CCNC: 12 U/L (ref 1–33)
ANION GAP SERPL CALCULATED.3IONS-SCNC: 11 MMOL/L (ref 5–15)
AST SERPL-CCNC: 16 U/L (ref 1–32)
BASOPHILS # BLD AUTO: 0.05 10*3/MM3 (ref 0–0.2)
BASOPHILS NFR BLD AUTO: 0.9 % (ref 0–1.5)
BILIRUB SERPL-MCNC: 0.3 MG/DL (ref 0–1.2)
BUN SERPL-MCNC: 21 MG/DL (ref 8–23)
BUN/CREAT SERPL: 23.9 (ref 7–25)
CALCIUM SPEC-SCNC: 9.4 MG/DL (ref 8.6–10.5)
CHLORIDE SERPL-SCNC: 103 MMOL/L (ref 98–107)
CO2 SERPL-SCNC: 25 MMOL/L (ref 22–29)
CREAT SERPL-MCNC: 0.88 MG/DL (ref 0.57–1)
DEPRECATED RDW RBC AUTO: 42.4 FL (ref 37–54)
EGFRCR SERPLBLD CKD-EPI 2021: 63.3 ML/MIN/1.73
EOSINOPHIL # BLD AUTO: 0.29 10*3/MM3 (ref 0–0.4)
EOSINOPHIL NFR BLD AUTO: 5.1 % (ref 0.3–6.2)
ERYTHROCYTE [DISTWIDTH] IN BLOOD BY AUTOMATED COUNT: 12.2 % (ref 12.3–15.4)
GLOBULIN UR ELPH-MCNC: 2.7 GM/DL
GLUCOSE SERPL-MCNC: 154 MG/DL (ref 65–99)
HCT VFR BLD AUTO: 38.5 % (ref 34–46.6)
HGB BLD-MCNC: 13 G/DL (ref 12–15.9)
IMM GRANULOCYTES # BLD AUTO: 0.01 10*3/MM3 (ref 0–0.05)
IMM GRANULOCYTES NFR BLD AUTO: 0.2 % (ref 0–0.5)
LYMPHOCYTES # BLD AUTO: 2.09 10*3/MM3 (ref 0.7–3.1)
LYMPHOCYTES NFR BLD AUTO: 36.8 % (ref 19.6–45.3)
MCH RBC QN AUTO: 31.5 PG (ref 26.6–33)
MCHC RBC AUTO-ENTMCNC: 33.8 G/DL (ref 31.5–35.7)
MCV RBC AUTO: 93.2 FL (ref 79–97)
MONOCYTES # BLD AUTO: 0.72 10*3/MM3 (ref 0.1–0.9)
MONOCYTES NFR BLD AUTO: 12.7 % (ref 5–12)
NEUTROPHILS NFR BLD AUTO: 2.52 10*3/MM3 (ref 1.7–7)
NEUTROPHILS NFR BLD AUTO: 44.3 % (ref 42.7–76)
NRBC BLD AUTO-RTO: 0 /100 WBC (ref 0–0.2)
PLATELET # BLD AUTO: 219 10*3/MM3 (ref 140–450)
PMV BLD AUTO: 9.4 FL (ref 6–12)
POTASSIUM SERPL-SCNC: 4.1 MMOL/L (ref 3.5–5.2)
PROT SERPL-MCNC: 6.5 G/DL (ref 6–8.5)
RBC # BLD AUTO: 4.13 10*6/MM3 (ref 3.77–5.28)
SODIUM SERPL-SCNC: 139 MMOL/L (ref 136–145)
WBC NRBC COR # BLD AUTO: 5.68 10*3/MM3 (ref 3.4–10.8)

## 2024-10-01 PROCEDURE — 80053 COMPREHEN METABOLIC PANEL: CPT | Performed by: FAMILY MEDICINE

## 2024-10-01 PROCEDURE — 97535 SELF CARE MNGMENT TRAINING: CPT

## 2024-10-01 PROCEDURE — 97116 GAIT TRAINING THERAPY: CPT

## 2024-10-01 PROCEDURE — 85025 COMPLETE CBC W/AUTO DIFF WBC: CPT | Performed by: FAMILY MEDICINE

## 2024-10-01 RX ORDER — HYDROXYZINE HYDROCHLORIDE 25 MG/1
25 TABLET, FILM COATED ORAL 3 TIMES DAILY PRN
Status: DISCONTINUED | OUTPATIENT
Start: 2024-10-01 | End: 2024-10-04 | Stop reason: HOSPADM

## 2024-10-01 RX ADMIN — APIXABAN 5 MG: 5 TABLET, FILM COATED ORAL at 20:43

## 2024-10-01 RX ADMIN — ACETAMINOPHEN 650 MG: 325 TABLET ORAL at 20:43

## 2024-10-01 RX ADMIN — Medication 10 ML: at 08:33

## 2024-10-01 RX ADMIN — FLECAINIDE ACETATE 25 MG: 50 TABLET ORAL at 20:43

## 2024-10-01 RX ADMIN — HYDROXYZINE HYDROCHLORIDE 25 MG: 25 TABLET ORAL at 07:03

## 2024-10-01 RX ADMIN — Medication 10 ML: at 20:43

## 2024-10-01 RX ADMIN — HYDROXYZINE HYDROCHLORIDE 25 MG: 25 TABLET ORAL at 20:43

## 2024-10-01 RX ADMIN — APIXABAN 5 MG: 5 TABLET, FILM COATED ORAL at 08:32

## 2024-10-01 RX ADMIN — PANTOPRAZOLE SODIUM 40 MG: 40 TABLET, DELAYED RELEASE ORAL at 05:41

## 2024-10-01 RX ADMIN — FLECAINIDE ACETATE 25 MG: 50 TABLET ORAL at 08:32

## 2024-10-01 RX ADMIN — LEVOTHYROXINE SODIUM 50 MCG: 100 TABLET ORAL at 08:32

## 2024-10-01 NOTE — PROGRESS NOTES
Daily Progress Note  Nedra Tolliver  MRN: [id number] LOS: 3    Admit Date: 9/26/2024   10/1/2024 06:57 CDT    Subjective:      Chief Complaint:  Chief Complaint   Patient presents with    Weakness - Generalized       Interval History:    Reviewed overnight events and nursing notes.   No acute events overnight.  Continues to have slow, steady improvement.  Encouraged her to work well with PT/OT.  Complains of some shortness of breath this morning.  No coughing.    Review of Systems   Constitutional:  Positive for activity change, appetite change and fatigue.   Respiratory:  Positive for shortness of breath. Negative for cough.    Gastrointestinal:  Negative for nausea and vomiting.   Genitourinary:  Negative for dysuria.   Musculoskeletal:  Positive for gait problem.   Skin:  Negative for color change and pallor.   Neurological:  Positive for dizziness and weakness.       DIET:  Diet: Regular/House, Diabetic; Consistent Carbohydrate; Fluid Consistency: Thin (IDDSI 0)    Medications:   sodium chloride, 75 mL/hr, Last Rate: 75 mL/hr (09/30/24 2000)      apixaban, 5 mg, Oral, BID  flecainide, 25 mg, Oral, BID  levothyroxine, 50 mcg, Oral, Daily  pantoprazole, 40 mg, Oral, Q AM  polyethylene glycol, 17 g, Oral, Daily  sodium chloride, 10 mL, Intravenous, Q12H        Data:     Code Status:   Code Status and Medical Interventions: CPR (Attempt to Resuscitate); Full Support   Ordered at: 09/27/24 0734     Code Status (Patient has no pulse and is not breathing):    CPR (Attempt to Resuscitate)     Medical Interventions (Patient has pulse or is breathing):    Full Support       Family History   Problem Relation Age of Onset    Diabetes Mother     Cancer Mother     Heart failure Mother     Diabetes Father     Colon cancer Neg Hx     Colon polyps Neg Hx      Social History     Socioeconomic History    Marital status:    Tobacco Use    Smoking status: Never    Smokeless tobacco: Never   Vaping Use    Vaping status:  Never Used   Substance and Sexual Activity    Alcohol use: No    Drug use: Never    Sexual activity: Defer     Partners: Male       Labs:    Lab Results (last 72 hours)       Procedure Component Value Units Date/Time    Comprehensive Metabolic Panel [[id number]]  (Abnormal) Collected: 10/01/24 0518    Specimen: Blood Updated: 10/01/24 0554     Glucose 154 mg/dL      BUN 21 mg/dL      Creatinine 0.88 mg/dL      Sodium 139 mmol/L      Potassium 4.1 mmol/L      Chloride 103 mmol/L      CO2 25.0 mmol/L      Calcium 9.4 mg/dL      Total Protein 6.5 g/dL      Albumin 3.8 g/dL      ALT (SGPT) 12 U/L      AST (SGOT) 16 U/L      Alkaline Phosphatase 63 U/L      Total Bilirubin 0.3 mg/dL      Globulin 2.7 gm/dL      A/G Ratio 1.4 g/dL      BUN/Creatinine Ratio 23.9     Anion Gap 11.0 mmol/L      eGFR 63.3 mL/min/1.73     Narrative:      GFR Normal >60  Chronic Kidney Disease <60  Kidney Failure <15    The GFR formula is only valid for adults with stable renal function between ages 18 and 70.    CBC & Differential [[id number]]  (Abnormal) Collected: 10/01/24 0518    Specimen: Blood Updated: 10/01/24 0530    Narrative:      The following orders were created for panel order CBC & Differential.  Procedure                               Abnormality         Status                     ---------                               -----------         ------                     CBC Auto Differential[[id number]]        Abnormal            Final result                 Please view results for these tests on the individual orders.    CBC Auto Differential [[id number]]  (Abnormal) Collected: 10/01/24 0518    Specimen: Blood Updated: 10/01/24 0530     WBC 5.68 10*3/mm3      RBC 4.13 10*6/mm3      Hemoglobin 13.0 g/dL      Hematocrit 38.5 %      MCV 93.2 fL      MCH 31.5 pg      MCHC 33.8 g/dL      RDW 12.2 %      RDW-SD 42.4 fl      MPV 9.4 fL      Platelets 219 10*3/mm3      Neutrophil % 44.3 %      Lymphocyte % 36.8 %      Monocyte % 12.7 %       Eosinophil % 5.1 %      Basophil % 0.9 %      Immature Grans % 0.2 %      Neutrophils, Absolute 2.52 10*3/mm3      Lymphocytes, Absolute 2.09 10*3/mm3      Monocytes, Absolute 0.72 10*3/mm3      Eosinophils, Absolute 0.29 10*3/mm3      Basophils, Absolute 0.05 10*3/mm3      Immature Grans, Absolute 0.01 10*3/mm3      nRBC 0.0 /100 WBC     Comprehensive Metabolic Panel [[id number]]  (Abnormal) Collected: 09/30/24 0538    Specimen: Blood Updated: 09/30/24 0613     Glucose 141 mg/dL      BUN 23 mg/dL      Creatinine 0.97 mg/dL      Sodium 135 mmol/L      Potassium 4.1 mmol/L      Chloride 103 mmol/L      CO2 24.0 mmol/L      Calcium 8.9 mg/dL      Total Protein 5.7 g/dL      Albumin 3.6 g/dL      ALT (SGPT) 10 U/L      AST (SGOT) 12 U/L      Alkaline Phosphatase 58 U/L      Total Bilirubin 0.3 mg/dL      Globulin 2.1 gm/dL      A/G Ratio 1.7 g/dL      BUN/Creatinine Ratio 23.7     Anion Gap 8.0 mmol/L      eGFR 56.3 mL/min/1.73     Narrative:      GFR Normal >60  Chronic Kidney Disease <60  Kidney Failure <15    The GFR formula is only valid for adults with stable renal function between ages 18 and 70.    CBC & Differential [[id number]]  (Abnormal) Collected: 09/30/24 0539    Specimen: Blood Updated: 09/30/24 0555    Narrative:      The following orders were created for panel order CBC & Differential.  Procedure                               Abnormality         Status                     ---------                               -----------         ------                     CBC Auto Differential[[id number]]        Abnormal            Final result                 Please view results for these tests on the individual orders.    CBC Auto Differential [[id number]]  (Abnormal) Collected: 09/30/24 0539    Specimen: Blood Updated: 09/30/24 0555     WBC 4.84 10*3/mm3      RBC 3.73 10*6/mm3      Hemoglobin 11.9 g/dL      Hematocrit 35.2 %      MCV 94.4 fL      MCH 31.9 pg      MCHC 33.8 g/dL      RDW 12.4 %      RDW-SD 42.9 fl       MPV 9.8 fL      Platelets 204 10*3/mm3      Neutrophil % 46.2 %      Lymphocyte % 32.6 %      Monocyte % 14.0 %      Eosinophil % 6.2 %      Basophil % 0.6 %      Immature Grans % 0.4 %      Neutrophils, Absolute 2.23 10*3/mm3      Lymphocytes, Absolute 1.58 10*3/mm3      Monocytes, Absolute 0.68 10*3/mm3      Eosinophils, Absolute 0.30 10*3/mm3      Basophils, Absolute 0.03 10*3/mm3      Immature Grans, Absolute 0.02 10*3/mm3      nRBC 0.0 /100 WBC     Comprehensive Metabolic Panel [[id number]]  (Abnormal) Collected: 09/29/24 0422    Specimen: Blood Updated: 09/29/24 0541     Glucose 143 mg/dL      BUN 24 mg/dL      Creatinine 0.98 mg/dL      Sodium 136 mmol/L      Potassium 4.0 mmol/L      Chloride 102 mmol/L      CO2 23.0 mmol/L      Calcium 8.9 mg/dL      Total Protein 5.8 g/dL      Albumin 3.4 g/dL      ALT (SGPT) 11 U/L      AST (SGOT) 15 U/L      Alkaline Phosphatase 58 U/L      Total Bilirubin 0.3 mg/dL      Globulin 2.4 gm/dL      A/G Ratio 1.4 g/dL      BUN/Creatinine Ratio 24.5     Anion Gap 11.0 mmol/L      eGFR 55.6 mL/min/1.73     Narrative:      GFR Normal >60  Chronic Kidney Disease <60  Kidney Failure <15    The GFR formula is only valid for adults with stable renal function between ages 18 and 70.    CBC & Differential [[id number]]  (Abnormal) Collected: 09/29/24 0422    Specimen: Blood Updated: 09/29/24 0516    Narrative:      The following orders were created for panel order CBC & Differential.  Procedure                               Abnormality         Status                     ---------                               -----------         ------                     CBC Auto Differential[[id number]]        Abnormal            Final result                 Please view results for these tests on the individual orders.    CBC Auto Differential [[id number]]  (Abnormal) Collected: 09/29/24 0422    Specimen: Blood Updated: 09/29/24 0516     WBC 6.40 10*3/mm3      RBC 3.79 10*6/mm3      Hemoglobin 11.9 g/dL   "    Hematocrit 35.4 %      MCV 93.4 fL      MCH 31.4 pg      MCHC 33.6 g/dL      RDW 12.2 %      RDW-SD 42.2 fl      MPV 10.2 fL      Platelets 209 10*3/mm3      Neutrophil % 55.5 %      Lymphocyte % 27.7 %      Monocyte % 11.9 %      Eosinophil % 4.1 %      Basophil % 0.5 %      Immature Grans % 0.3 %      Neutrophils, Absolute 3.56 10*3/mm3      Lymphocytes, Absolute 1.77 10*3/mm3      Monocytes, Absolute 0.76 10*3/mm3      Eosinophils, Absolute 0.26 10*3/mm3      Basophils, Absolute 0.03 10*3/mm3      Immature Grans, Absolute 0.02 10*3/mm3      nRBC 0.0 /100 WBC     Urine Culture - Urine, Urine, Clean Catch [[id number]] Collected: 24 1730    Specimen: Urine, Clean Catch Updated: 24 1308     Urine Culture >100,000 CFU/mL Mixed Peggy Isolated    Narrative:      Specimen contains mixed organisms of questionable pathogenicity suggestive of contamination. If symptoms persist, suggest recollection.  Colonization of the urinary tract without infection is common. Treatment is discouraged unless the patient is symptomatic, pregnant, or undergoing an invasive urologic procedure.              Objective:     Vitals: /53 (BP Location: Right arm, Patient Position: Lying)   Pulse 56   Temp 97.8 °F (36.6 °C) (Oral)   Resp 18   Ht 160 cm (63\")   Wt 65.8 kg (145 lb)   LMP  (LMP Unknown)   SpO2 96%   BMI 25.69 kg/m²  No intake or output data in the 24 hours ending 10/01/24 0657   Temp (24hrs), Av.8 °F (36.6 °C), Min:97.7 °F (36.5 °C), Max:97.9 °F (36.6 °C)      Physical Exam  Vitals reviewed.   Constitutional:       Appearance: Normal appearance.      Comments: Frail   HENT:      Head: Normocephalic and atraumatic.   Eyes:      General:         Right eye: No discharge.         Left eye: No discharge.      Conjunctiva/sclera: Conjunctivae normal.   Cardiovascular:      Rate and Rhythm: Normal rate and regular rhythm.      Pulses: Normal pulses.      Heart sounds: No murmur heard.  Pulmonary:      Effort: " Pulmonary effort is normal. No respiratory distress.   Abdominal:      General: Abdomen is flat. Bowel sounds are normal. There is no distension.   Musculoskeletal:      Right lower leg: No edema.      Left lower leg: No edema.   Skin:     Capillary Refill: Capillary refill takes less than 2 seconds.   Neurological:      General: No focal deficit present.      Mental Status: She is alert and oriented to person, place, and time.   Psychiatric:         Mood and Affect: Mood normal.         Behavior: Behavior normal.             Assessment and Plan:     Primary Problem:  Generalized weakness    Hospital Problem list:    Generalized weakness    Failure to thrive in adult    Moderate malnutrition      PMH:  Past Medical History:   Diagnosis Date    Arthritis     Cancer     BCC @ nose & Left arm    Diabetes type 2, controlled     Diverticulitis     History of GI bleed     History of transfusion     Has had x 5 units.    Hypertension     STAGE 3     Hyponatremia     Required hospitalization    Hypothyroidism     Knee arthropathy 09/30/2020    LPRD (laryngopharyngeal reflux disease)     Pharyngeal dysphagia     Thyroid nodule        Treatment Plan:  Generalized weakness: PT/OT/case management consultations.  Possible SNF as she lives alone and currently unable to care for self.  - Doing well with PT, continue and may be able to avoid SNF.    JOSE ALEJANDRO: GFR Improving with IV NS.    Anxiety: Added home Atarax back on.     Hypertension: She is very tenuous with her blood pressure control and very sensitive to medications.  She has still not been given any of her home blood pressure medications and blood pressure has remained mostly stable, a couple of values high.  Will continue to hold for now.     CODE STATUS: Full     DVT prophylaxis: SCDs     Disposition: Inpatient, SNF versus home with home health.    Reviewed treatment plans with the patient and/or family.   30 minutes spent in face to face interaction and coordination of  care.     Electronically signed by Edgar Tolbert MD on 10/1/2024 at 06:57 CDT

## 2024-10-01 NOTE — PLAN OF CARE
Goal Outcome Evaluation:  Plan of Care Reviewed With: patient        Progress: improving  Outcome Evaluation: PT tx completed. Pt up in chair and requesting to go back to bed. Pt agreed to work with therapy prior to going to bed. CGA for sit to stands with cues for hand placement. Able to amb 30' at a time with RWX and CGA. Pt with slow and guarded gait d/t c/o LLE weakness. No signs of knee buckling this date. Pt returned to bed with S. Will cont to follow.

## 2024-10-01 NOTE — THERAPY TREATMENT NOTE
Acute Care - Occupational Therapy Treatment Note  [facility]     Patient Name: Nedra Tolliver  : 1935  MRN: [id number]  Today's Date: 10/1/2024  Onset of Illness/Injury or Date of Surgery: 24  Date of Referral to OT: 24  Referring Physician: Dr. Tolbert    Admit Date: 2024       ICD-10-CM ICD-9-CM   1. Generalized weakness  R53.1 780.79   2. Left-sided weakness  R53.1 728.87   3. Impaired mobility [Z74.09]  Z74.09 799.89     Patient Active Problem List   Diagnosis    Urinary tract infection without hematuria    Dysuria    Vaginal atrophy    Overactive bladder    Diverticulitis    Benign essential HTN    Thyroid nodule    Hypothyroidism    Hyponatremia    Syncope and collapse    Volume depletion    Type 2 diabetes mellitus, without long-term current use of insulin    Moderate left ankle sprain    Left hip pain    Acute pain of left knee    Pain of left thumb    Nondisplaced fracture of navicular bone of left foot with routine healing    Chest pain at rest    Acute cystitis without hematuria    H/O thyroid nodule    Hypoglycemia    IBS (irritable bowel syndrome)    Idiopathic scoliosis    Insomnia    Mild tricuspid insufficiency    Neuropathy due to type 2 diabetes mellitus    Onychomycosis    Osteopenia    Pericarditis secondary to uremia    Primary osteoarthritis of right knee    Proteinuria    Vitamin D deficiency    High cholesterol    Lumbar radiculopathy    Non-toxic multinodular goiter    Transient ischemic attack    Acute gastritis without hemorrhage    UTI symptoms    Absolute anemia    Essential hypertension    Mixed hyperlipidemia with pervious statin intolerance    Stage 3a chronic kidney disease (CKD)    Cervical radiculopathy    LBBB (left bundle branch block)    Persistent atrial fibrillation    Fatigue    Shortness of breath    Accelerated hypertension with diastolic congestive heart failure, NYHA class 3    Paroxysmal A-fib    Chest pain    Palpitations    Anxiety    Atrial  fibrillation with RVR    Coronary artery disease involving coronary bypass graft of native heart with unstable angina pectoris    Generalized weakness    Failure to thrive in adult    Moderate malnutrition     Past Medical History:   Diagnosis Date    Arthritis     Cancer     BCC @ nose & Left arm    Diabetes type 2, controlled     Diverticulitis     History of GI bleed     History of transfusion     Has had x 5 units.    Hypertension     STAGE 3     Hyponatremia     Required hospitalization    Hypothyroidism     Knee arthropathy 09/30/2020    LPRD (laryngopharyngeal reflux disease)     Pharyngeal dysphagia     Thyroid nodule      Past Surgical History:   Procedure Laterality Date    APPENDECTOMY      BASAL CELL CARCINOMA EXCISION      BELPHAROPTOSIS REPAIR      BREAST CYST EXCISION      CARDIAC CATHETERIZATION      CARDIAC CATHETERIZATION Right 6/24/2021    Procedure: Left Heart Cath R radial, US guided w poss intervention;  Surgeon: Mak Nickerson DO;  Location:  [facility] CATH INVASIVE [facility];  Service: Cardiovascular;  Laterality: Right;    CARDIAC CATHETERIZATION      CARDIAC CATHETERIZATION N/A 9/20/2024    Procedure: Left Heart Cath;  Surgeon: Mak Nickerson DO;  Location:  [facility] CATH INVASIVE [facility];  Service: Cardiovascular;  Laterality: N/A;    CATARACT EXTRACTION      CATARACT EXTRACTION WITH INTRAOCULAR LENS IMPLANT Bilateral     CHOLECYSTECTOMY      COLONOSCOPY Left 11/1/2016    Tubular adenoma cecum, Diverticulosis repeat prn    SUBTOTAL HYSTERECTOMY      TOTAL KNEE ARTHROPLASTY Right 9/30/2020    Procedure: TOTAL KNEE ARTHROPLASTY;  Surgeon: Mak Pickens MD;  Location:  [facility] OR;  Service: Orthopedics;  Laterality: Right;    TUMOR EXCISION      under armpits and left breast         OT ASSESSMENT FLOWSHEET (Last 12 Hours)       OT Evaluation and Treatment       Row Name 10/01/24 0746                   OT Time and Intention    Subjective Information complains  of;weakness;fatigue  -TS        Document Type therapy note (daily note)  -TS        Mode of Treatment occupational therapy  -TS        Patient Effort adequate  -TS           General Information    Existing Precautions/Restrictions fall  -TS           Pain Assessment    Pretreatment Pain Rating 0/10 - no pain  -TS        Posttreatment Pain Rating 0/10 - no pain  -TS           Cognition    Orientation Status (Cognition) oriented x 4  -TS        Personal Safety Interventions fall prevention program maintained;gait belt;nonskid shoes/slippers when out of bed  -TS           Activities of Daily Living    BADL Assessment/Intervention bathing;upper body dressing;lower body dressing;grooming  -TS           Bathing Assessment/Intervention    Arthur Level (Bathing) upper body;lower body;set up;supervision  -TS        Position (Bathing) sink side;unsupported sitting  -TS           Upper Body Dressing Assessment/Training    Arthur Level (Upper Body Dressing) don;doff;pull-over garment;set up  -TS        Position (Upper Body Dressing) unsupported sitting  -TS           Lower Body Dressing Assessment/Training    Arthur Level (Lower Body Dressing) don;doff;pants/bottoms;socks;set up;supervision  -TS        Position (Lower Body Dressing) unsupported sitting;unsupported standing  -TS           Grooming Assessment/Training    Arthur Level (Grooming) grooming skills;set up  -TS        Position (Grooming) sink side;unsupported sitting  -TS           Bed Mobility    Bed Mobility supine-sit  -TS        Supine-Sit Arthur (Bed Mobility) modified independence  -TS        Assistive Device (Bed Mobility) bed rails;head of bed elevated  -TS           Functional Mobility    Functional Mobility- Ind. Level standby assist  -TS        Functional Mobility- Device walker, front-wheeled  -TS        Functional Mobility- Comment in room  -TS           Transfer Assessment/Treatment    Transfers sit-stand transfer;stand-sit  transfer  -TS           Sit-Stand Transfer    Sit-Stand Baldwin (Transfers) standby assist  -TS        Assistive Device (Sit-Stand Transfers) walker, front-wheeled  -TS           Stand-Sit Transfer    Stand-Sit Baldwin (Transfers) standby assist  -TS        Assistive Device (Stand-Sit Transfers) walker, front-wheeled  -TS           Plan of Care Review    Plan of Care Reviewed With patient  -TS        Progress improving  -TS        Outcome Evaluation Pt agreeable to OOB activity. Pt did initially require some encouragement. Pt SBA for transfers and ambulation with RW. Pt set up and S for TB sponge bath while seated at side sink. Pt S for LB dressing. Pt plans to return home at discharge, would benefit from OP PT at discharge. TIDWELL and pt did discuss options for increased socialization for pt. Pt also reports interest in having some meals delivered to her. Continue OT POC  -TS           Positioning and Restraints    Pre-Treatment Position in bed  -TS        Post Treatment Position chair  -TS        In Chair reclined;call light within reach;encouraged to call for assist  -TS                  User Key  (r) = Recorded By, (t) = Taken By, (c) = Cosigned By      Initials Name Effective Dates    TS Sayra Yang, TIDWELL 02/03/23 -                      Occupational Therapy Education       Title: PT OT SLP Therapies (Done)       Topic: Occupational Therapy (Done)       Point: ADL training (Done)       Description:   Instruct learner(s) on proper safety adaptation and remediation techniques during self care or transfers.   Instruct in proper use of assistive devices.                  Learning Progress Summary             Patient Acceptance, E, VU by TS at 10/1/2024 1334    Acceptance, E,TB, VU,DU by KS at 9/30/2024 1610    Acceptance, E, VU by AR at 9/28/2024 0238    Acceptance, E, VU by AC at 9/27/2024 1004                         Point: Home exercise program (Done)       Description:   Instruct learner(s) on  appropriate technique for monitoring, assisting and/or progressing therapeutic exercises/activities.                  Learning Progress Summary             Patient Acceptance, E,TB, VU,DU by KS at 9/30/2024 1610                         Point: Body mechanics (Done)       Description:   Instruct learner(s) on proper positioning and spine alignment during self-care, functional mobility activities and/or exercises.                  Learning Progress Summary             Patient Acceptance, E,TB, VU,DU by KS at 9/30/2024 1610    Acceptance, E, VU by AR at 9/28/2024 0238                                         User Key       Initials Effective Dates Name Provider Type Discipline    AC 02/03/23 -  Jose David Carrizales, OTR/L, CNT Occupational Therapist OT    TS 02/03/23 -  Sayra Yang COTA Occupational Therapist Assistant OT    KS 02/27/23 -  Carmel King RN Registered Nurse Nurse    AR 05/24/22 -  Soha Gottlieb RN Registered Nurse Nurse                      OT Recommendation and Plan     Plan of Care Review  Plan of Care Reviewed With: patient  Progress: improving  Outcome Evaluation: Pt agreeable to OOB activity. Pt did initially require some encouragement. Pt SBA for transfers and ambulation with RW. Pt set up and S for TB sponge bath while seated at side sink. Pt S for LB dressing. Pt plans to return home at discharge, would benefit from OP PT at discharge. TIDWELL and pt did discuss options for increased socialization for pt. Pt also reports interest in having some meals delivered to her. Continue OT POC  Plan of Care Reviewed With: patient  Outcome Evaluation: Pt agreeable to OOB activity. Pt did initially require some encouragement. Pt SBA for transfers and ambulation with RW. Pt set up and S for TB sponge bath while seated at side sink. Pt S for LB dressing. Pt plans to return home at discharge, would benefit from OP PT at discharge. TIDWELL and pt did discuss options for increased socialization for pt. Pt  also reports interest in having some meals delivered to her. Continue OT POC     Outcome Measures       Row Name 10/01/24 1300             How much help from another is currently needed...    Putting on and taking off regular lower body clothing? 3  -TS      Bathing (including washing, rinsing, and drying) 3  -TS      Toileting (which includes using toilet bed pan or urinal) 4  -TS      Putting on and taking off regular upper body clothing 4  -TS      Taking care of personal grooming (such as brushing teeth) 4  -TS      Eating meals 4  -TS      AM-PAC 6 Clicks Score (OT) 22  -TS                User Key  (r) = Recorded By, (t) = Taken By, (c) = Cosigned By      Initials Name Provider Type    Sayra Haywood COTA Occupational Therapist Assistant                    Time Calculation:    Time Calculation- OT       Row Name 10/01/24 1334 10/01/24 1123          Time Calculation- OT    OT Start Time 0746  -TS --     OT Stop Time 0840  -TS --     OT Time Calculation (min) 54 min  -TS --     Total Timed Code Minutes- OT 54 minute(s)  -TS --     OT Received On 10/01/24  -TS --        Timed Charges    85143 - Gait Training Minutes  -- 23  -LY     80406 - OT Self Care/Mgmt Minutes 54  -TS --        Total Minutes    Timed Charges Total Minutes 54  -TS 23  -LY      Total Minutes 54  -TS 23  -LY               User Key  (r) = Recorded By, (t) = Taken By, (c) = Cosigned By      Initials Name Provider Type    Sayra Haywood COTA Occupational Therapist Assistant    Dejah Forman, PTA Physical Therapist Assistant                  Therapy Charges for Today       Code Description Service Date Service Provider Modifiers Qty    [id number]  OT SELF CARE/MGMT/TRAIN EA 15 MIN 10/1/2024 Sayra Yang COTA GO 4                 Sayra HEWITT. YAW Yang  10/1/2024

## 2024-10-01 NOTE — PLAN OF CARE
Goal Outcome Evaluation:  Plan of Care Reviewed With: patient        Progress: improving  Outcome Evaluation: A/Ox4. No neuro changes noted. Upx1 with walker to BR. LLE weakness noted. IVF infusing. PRN Tylenol given for neck pain. VSS. Safety maintained.

## 2024-10-01 NOTE — PLAN OF CARE
Goal Outcome Evaluation:  Plan of Care Reviewed With: patient    Pt. A&Ox4, VSS, improved strength, standby assist to bathroom and chair, worked with therapy, ambulated in blanc, SCD's , RA, plan of care ongoing.      Progress: no change

## 2024-10-01 NOTE — THERAPY TREATMENT NOTE
Acute Care - Physical Therapy Treatment Note  [facility]     Patient Name: Nedra Tolliver  : 1935  MRN: [id number]  Today's Date: 10/1/2024   Onset of Illness/Injury or Date of Surgery: 24  Visit Dx:     ICD-10-CM ICD-9-CM   1. Generalized weakness  R53.1 780.79   2. Left-sided weakness  R53.1 728.87   3. Impaired mobility [Z74.09]  Z74.09 799.89     Patient Active Problem List   Diagnosis    Urinary tract infection without hematuria    Dysuria    Vaginal atrophy    Overactive bladder    Diverticulitis    Benign essential HTN    Thyroid nodule    Hypothyroidism    Hyponatremia    Syncope and collapse    Volume depletion    Type 2 diabetes mellitus, without long-term current use of insulin    Moderate left ankle sprain    Left hip pain    Acute pain of left knee    Pain of left thumb    Nondisplaced fracture of navicular bone of left foot with routine healing    Chest pain at rest    Acute cystitis without hematuria    H/O thyroid nodule    Hypoglycemia    IBS (irritable bowel syndrome)    Idiopathic scoliosis    Insomnia    Mild tricuspid insufficiency    Neuropathy due to type 2 diabetes mellitus    Onychomycosis    Osteopenia    Pericarditis secondary to uremia    Primary osteoarthritis of right knee    Proteinuria    Vitamin D deficiency    High cholesterol    Lumbar radiculopathy    Non-toxic multinodular goiter    Transient ischemic attack    Acute gastritis without hemorrhage    UTI symptoms    Absolute anemia    Essential hypertension    Mixed hyperlipidemia with pervious statin intolerance    Stage 3a chronic kidney disease (CKD)    Cervical radiculopathy    LBBB (left bundle branch block)    Persistent atrial fibrillation    Fatigue    Shortness of breath    Accelerated hypertension with diastolic congestive heart failure, NYHA class 3    Paroxysmal A-fib    Chest pain    Palpitations    Anxiety    Atrial fibrillation with RVR    Coronary artery disease involving coronary bypass graft of  native heart with unstable angina pectoris    Generalized weakness    Failure to thrive in adult    Moderate malnutrition     Past Medical History:   Diagnosis Date    Arthritis     Cancer     BCC @ nose & Left arm    Diabetes type 2, controlled     Diverticulitis     History of GI bleed     History of transfusion     Has had x 5 units.    Hypertension     STAGE 3     Hyponatremia     Required hospitalization    Hypothyroidism     Knee arthropathy 09/30/2020    LPRD (laryngopharyngeal reflux disease)     Pharyngeal dysphagia     Thyroid nodule      Past Surgical History:   Procedure Laterality Date    APPENDECTOMY      BASAL CELL CARCINOMA EXCISION      BELPHAROPTOSIS REPAIR      BREAST CYST EXCISION      CARDIAC CATHETERIZATION      CARDIAC CATHETERIZATION Right 6/24/2021    Procedure: Left Heart Cath R radial, US guided w poss intervention;  Surgeon: Mak Nickerson DO;  Location:  [facility] CATH [facility]ASIVE LOC[facility];  Service: Cardiovascular;  Laterality: Right;    CARDIAC CATHETERIZATION      CARDIAC CATHETERIZATION N/A 9/20/2024    Procedure: Left Heart Cath;  Surgeon: Mak Nickerson DO;  Location:  [facility]ATH [facility];  Service: Cardiovascular;  Laterality: N/A;    CATARACT EXTRACTION      CATARACT EXTRACTION WITH INTRAOCULAR LENS IMPLANT Bilateral     CHOLECYSTECTOMY      COLONOSCOPY Left 11/1/2016    Tubular adenoma cecum, Diverticulosis repeat prn    SUBTOTAL HYSTERECTOMY      TOTAL KNEE ARTHROPLASTY Right 9/30/2020    Procedure: TOTAL KNEE ARTHROPLASTY;  Surgeon: Mak Pickens MD;  Location:  [facility] OR;  Service: Orthopedics;  Laterality: Right;    TUMOR EXCISION      under armpits and left breast     PT Assessment (Last 12 Hours)       PT Evaluation and Treatment       Row Name 10/01/24 0907          Physical Therapy Time and Intention    Subjective Information complains of;fatigue;weakness;pain  -LY     Document Type therapy note (daily note)  -LY     Mode of Treatment  physical therapy  -LY       Row Name 10/01/24 0940          General Information    Existing Precautions/Restrictions fall  -LY       Row Name 10/01/24 0940          Pain    Pretreatment Pain Rating 2/10  -LY     Posttreatment Pain Rating 4/10  -LY     Pain Location - Side/Orientation Left  -LY     Pain Location - hip  -LY     Pain Intervention(s) Medication (See MAR);Ambulation/increased activity;Repositioned  -LY       Row Name 10/01/24 0940          Bed Mobility    Bed Mobility sit-supine  -LY     Sit-Supine [place] (Bed Mobility) supervision  -LY     Assistive Device (Bed Mobility) bed rails;head of bed elevated  -LY       Row Name 10/01/24 0940          Sit-Stand Transfer    Sit-Stand [place] (Transfers) verbal cues;contact guard  -LY     Assistive Device (Sit-Stand Transfers) walker, front-wheeled  -LY       Row Name 10/01/24 0940          Stand-Sit Transfer    Stand-Sit [place] (Transfers) verbal cues;contact guard  -LY     Assistive Device (Stand-Sit Transfers) walker, front-wheeled  -LY       Row Name 10/01/24 0940          Gait/Stairs (Locomotion)    [place] Level (Gait) verbal cues;contact guard  -LY     Assistive Device (Gait) walker, front-wheeled  -LY     Distance in Feet (Gait) 30  x3  -LY     Deviations/Abnormal Patterns (Gait) gait speed decreased;stride length decreased  -LY     Bilateral Gait Deviations forward flexed posture  -LY     Comment, (Gait/Stairs) slow and guarded gait d/t LLE weakness, no buckling noted this date  -LY       Row Name 10/01/24 0940          Plan of Care Review    Plan of Care Reviewed With patient  -LY     Progress improving  -LY     Outcome Evaluation PT tx completed. Pt up in chair and requesting to go back to bed. Pt agreed to work with therapy prior to going to bed. CGA for sit to stands with cues for hand placement. Able to amb 30' at a time with RWX and CGA. Pt with slow and guarded gait d/t c/o LLE weakness. No signs of knee buckling this date.  Pt returned to bed with S. Will cont to follow.  -LY       Row Name 10/01/24 0940          Positioning and Restraints    Pre-Treatment Position sitting in chair/recliner  -LY     Post Treatment Position bed  -LY     In Bed fowlers;call light within reach;encouraged to call for assist  -LY               User Key  (r) = Recorded By, (t) = Taken By, (c) = Cosigned By      Initials Name Provider Type    LY Dejah Amin, PTA Physical Therapist Assistant                    Physical Therapy Education       Title: PT OT SLP Therapies (Done)       Topic: Physical Therapy (Done)       Point: Mobility training (Done)       Learning Progress Summary             Patient Acceptance, E,TB, VU,DU by KS at 9/30/2024 1610    Acceptance, E, VU by AR at 9/28/2024 0238    Acceptance, E, VU by KARAN at 9/27/2024 1002    Comment: role of PT, fallrisk, benefits of OOB activity, beginning HEP                         Point: Home exercise program (Done)       Learning Progress Summary             Patient Acceptance, E,TB, VU,DU by KS at 9/30/2024 1610    Acceptance, E, VU by KARAN at 9/27/2024 1002    Comment: role of PT, fallrisk, benefits of OOB activity, beginning HEP                         Point: Body mechanics (Done)       Learning Progress Summary             Patient Acceptance, E,TB, VU,DU by KS at 9/30/2024 1610    Acceptance, E, VU by KARAN at 9/27/2024 1002    Comment: role of PT, fallrisk, benefits of OOB activity, beginning HEP                         Point: Precautions (Done)       Learning Progress Summary             Patient Acceptance, E,TB, VU,DU by KS at 9/30/2024 1610    Acceptance, E, VU by KARAN at 9/27/2024 1002    Comment: role of PT, fallrisk, benefits of OOB activity, beginning HEP                                         User Key       Initials Effective Dates Name Provider Type Discipline    KS 02/27/23 -  Carmel King, RN Registered Nurse Nurse    AR 05/24/22 -  Soha Gottlieb, KHANG Registered Nurse Nurse    KARAN 08/15/24 -   Donavan Smith, PT DPT Physical Therapist PT                  PT Recommendation and Plan     Plan of Care Reviewed With: patient  Progress: improving  Outcome Evaluation: PT tx completed. Pt up in chair and requesting to go back to bed. Pt agreed to work with therapy prior to going to bed. CGA for sit to stands with cues for hand placement. Able to amb 30' at a time with RWX and CGA. Pt with slow and guarded gait d/t c/o LLE weakness. No signs of knee buckling this date. Pt returned to bed with S. Will cont to follow.       Time Calculation:    PT Charges       Row Name 10/01/24 1123             Time Calculation    Start Time 0940  -LY      Stop Time 1003  -LY      Time Calculation (min) 23 min  -LY      PT Received On 10/01/24  -LY         Time Calculation- PT    Total Timed Code Minutes- PT 23 minute(s)  -LY         Timed Charges    38059 - Gait Training Minutes  23  -LY         Total Minutes    Timed Charges Total Minutes 23  -LY       Total Minutes 23  -LY                User Key  (r) = Recorded By, (t) = Taken By, (c) = Cosigned By      Initials Name Provider Type    LY Dejah Amin PTA Physical Therapist Assistant                  Therapy Charges for Today       Code Description Service Date Service Provider Modifiers Qty    [id number] HC GAIT TRAINING EA 15 MIN 10/1/2024 Dejah Amin PTA GP 2            PT G-Codes  Outcome Measure Options: AM-PAC 6 Clicks Daily Activity (OT)  AM-PAC 6 Clicks Score (PT): 20  AM-PAC 6 Clicks Score (OT): 21    Dejah Amin PTA  10/1/2024

## 2024-10-02 LAB
ALBUMIN SERPL-MCNC: 3.6 G/DL (ref 3.5–5.2)
ALBUMIN/GLOB SERPL: 1.5 G/DL
ALP SERPL-CCNC: 60 U/L (ref 39–117)
ALT SERPL W P-5'-P-CCNC: 11 U/L (ref 1–33)
ANION GAP SERPL CALCULATED.3IONS-SCNC: 11 MMOL/L (ref 5–15)
AST SERPL-CCNC: 14 U/L (ref 1–32)
BASOPHILS # BLD AUTO: 0.03 10*3/MM3 (ref 0–0.2)
BASOPHILS NFR BLD AUTO: 0.6 % (ref 0–1.5)
BILIRUB SERPL-MCNC: 0.2 MG/DL (ref 0–1.2)
BUN SERPL-MCNC: 26 MG/DL (ref 8–23)
BUN/CREAT SERPL: 24.5 (ref 7–25)
CALCIUM SPEC-SCNC: 9.2 MG/DL (ref 8.6–10.5)
CHLORIDE SERPL-SCNC: 100 MMOL/L (ref 98–107)
CO2 SERPL-SCNC: 26 MMOL/L (ref 22–29)
CREAT SERPL-MCNC: 1.06 MG/DL (ref 0.57–1)
DEPRECATED RDW RBC AUTO: 42.6 FL (ref 37–54)
EGFRCR SERPLBLD CKD-EPI 2021: 50.6 ML/MIN/1.73
EOSINOPHIL # BLD AUTO: 0.27 10*3/MM3 (ref 0–0.4)
EOSINOPHIL NFR BLD AUTO: 5.2 % (ref 0.3–6.2)
ERYTHROCYTE [DISTWIDTH] IN BLOOD BY AUTOMATED COUNT: 12.3 % (ref 12.3–15.4)
GLOBULIN UR ELPH-MCNC: 2.4 GM/DL
GLUCOSE SERPL-MCNC: 166 MG/DL (ref 65–99)
HCT VFR BLD AUTO: 36.8 % (ref 34–46.6)
HGB BLD-MCNC: 12.4 G/DL (ref 12–15.9)
IMM GRANULOCYTES # BLD AUTO: 0.03 10*3/MM3 (ref 0–0.05)
IMM GRANULOCYTES NFR BLD AUTO: 0.6 % (ref 0–0.5)
LYMPHOCYTES # BLD AUTO: 1.92 10*3/MM3 (ref 0.7–3.1)
LYMPHOCYTES NFR BLD AUTO: 36.7 % (ref 19.6–45.3)
MCH RBC QN AUTO: 31.5 PG (ref 26.6–33)
MCHC RBC AUTO-ENTMCNC: 33.7 G/DL (ref 31.5–35.7)
MCV RBC AUTO: 93.4 FL (ref 79–97)
MONOCYTES # BLD AUTO: 0.63 10*3/MM3 (ref 0.1–0.9)
MONOCYTES NFR BLD AUTO: 12 % (ref 5–12)
NEUTROPHILS NFR BLD AUTO: 2.35 10*3/MM3 (ref 1.7–7)
NEUTROPHILS NFR BLD AUTO: 44.9 % (ref 42.7–76)
NRBC BLD AUTO-RTO: 0 /100 WBC (ref 0–0.2)
PLATELET # BLD AUTO: 215 10*3/MM3 (ref 140–450)
PMV BLD AUTO: 9.7 FL (ref 6–12)
POTASSIUM SERPL-SCNC: 4.1 MMOL/L (ref 3.5–5.2)
PROT SERPL-MCNC: 6 G/DL (ref 6–8.5)
RBC # BLD AUTO: 3.94 10*6/MM3 (ref 3.77–5.28)
SODIUM SERPL-SCNC: 137 MMOL/L (ref 136–145)
WBC NRBC COR # BLD AUTO: 5.23 10*3/MM3 (ref 3.4–10.8)

## 2024-10-02 PROCEDURE — 97116 GAIT TRAINING THERAPY: CPT

## 2024-10-02 PROCEDURE — 97535 SELF CARE MNGMENT TRAINING: CPT

## 2024-10-02 PROCEDURE — 80053 COMPREHEN METABOLIC PANEL: CPT | Performed by: FAMILY MEDICINE

## 2024-10-02 PROCEDURE — 85025 COMPLETE CBC W/AUTO DIFF WBC: CPT | Performed by: FAMILY MEDICINE

## 2024-10-02 PROCEDURE — 97110 THERAPEUTIC EXERCISES: CPT

## 2024-10-02 RX ADMIN — FLECAINIDE ACETATE 25 MG: 50 TABLET ORAL at 20:05

## 2024-10-02 RX ADMIN — FLECAINIDE ACETATE 25 MG: 50 TABLET ORAL at 08:58

## 2024-10-02 RX ADMIN — POLYETHYLENE GLYCOL 3350 17 G: 17 POWDER, FOR SOLUTION ORAL at 09:00

## 2024-10-02 RX ADMIN — LEVOTHYROXINE SODIUM 50 MCG: 100 TABLET ORAL at 08:59

## 2024-10-02 RX ADMIN — ACETAMINOPHEN 650 MG: 325 TABLET ORAL at 18:21

## 2024-10-02 RX ADMIN — APIXABAN 5 MG: 5 TABLET, FILM COATED ORAL at 20:05

## 2024-10-02 RX ADMIN — Medication 10 ML: at 09:00

## 2024-10-02 RX ADMIN — APIXABAN 5 MG: 5 TABLET, FILM COATED ORAL at 08:58

## 2024-10-02 RX ADMIN — Medication 10 ML: at 20:05

## 2024-10-02 NOTE — PLAN OF CARE
Goal Outcome Evaluation: Pt is alert and oriented, c/o mild pain, vitals are stable, no s/s of distress, ambulates with one assist and a walker, room air, IV is clean dry intact

## 2024-10-02 NOTE — PLAN OF CARE
Problem: Adult Inpatient Plan of Care  Goal: Plan of Care Review  Outcome: Progressing  Flowsheets (Taken 10/2/2024 2196)  Progress: no change  Plan of Care Reviewed With: patient  Outcome Evaluation: Pt A/Ox4 but can be forgetful. Anxious. PRN Tylenol given for pain. Pt appears to be resting welll in-between care. Upon waking up this morning to go to the BR, pt stated that she was stiff and felt really weak. X2 assisted pt oob, after pt was up and had the walker she was more mobile and only required x1 assistance with ambulating. VSS. Safety maintained.

## 2024-10-02 NOTE — PROGRESS NOTES
Daily Progress Note  Nedra Tolliver  MRN: [id number] LOS: 4    Admit Date: 9/26/2024   10/2/2024 07:57 CDT    Subjective:      Chief Complaint:  Chief Complaint   Patient presents with    Weakness - Generalized       Interval History:    Reviewed overnight events and nursing notes.   No acute events overnight.  Continues to have slow, steady improvement.  Encouraged her to work well with PT/OT.  Very drowsy from ativan yesterday.    Review of Systems   Constitutional:  Positive for activity change, appetite change and fatigue.   Respiratory:  Positive for shortness of breath. Negative for cough.    Gastrointestinal:  Negative for nausea and vomiting.   Genitourinary:  Negative for dysuria.   Musculoskeletal:  Positive for gait problem.   Skin:  Negative for color change and pallor.   Neurological:  Positive for dizziness and weakness.       DIET:  Diet: Regular/House, Diabetic; Consistent Carbohydrate; Fluid Consistency: Thin (IDDSI 0)    Medications:   sodium chloride, 75 mL/hr, Last Rate: 75 mL/hr (09/30/24 2000)      apixaban, 5 mg, Oral, BID  flecainide, 25 mg, Oral, BID  levothyroxine, 50 mcg, Oral, Daily  pantoprazole, 40 mg, Oral, Q AM  polyethylene glycol, 17 g, Oral, Daily  sodium chloride, 10 mL, Intravenous, Q12H        Data:     Code Status:   Code Status and Medical Interventions: CPR (Attempt to Resuscitate); Full Support   Ordered at: 09/27/24 0734     Code Status (Patient has no pulse and is not breathing):    CPR (Attempt to Resuscitate)     Medical Interventions (Patient has pulse or is breathing):    Full Support       Family History   Problem Relation Age of Onset    Diabetes Mother     Cancer Mother     Heart failure Mother     Diabetes Father     Colon cancer Neg Hx     Colon polyps Neg Hx      Social History     Socioeconomic History    Marital status:    Tobacco Use    Smoking status: Never    Smokeless tobacco: Never   Vaping Use    Vaping status: Never Used   Substance and Sexual  Activity    Alcohol use: No    Drug use: Never    Sexual activity: Defer     Partners: Male       Labs:    Lab Results (last 72 hours)       Procedure Component Value Units Date/Time    Comprehensive Metabolic Panel [[id number]]  (Abnormal) Collected: 10/02/24 0520    Specimen: Blood Updated: 10/02/24 0555     Glucose 166 mg/dL      BUN 26 mg/dL      Creatinine 1.06 mg/dL      Sodium 137 mmol/L      Potassium 4.1 mmol/L      Chloride 100 mmol/L      CO2 26.0 mmol/L      Calcium 9.2 mg/dL      Total Protein 6.0 g/dL      Albumin 3.6 g/dL      ALT (SGPT) 11 U/L      AST (SGOT) 14 U/L      Alkaline Phosphatase 60 U/L      Total Bilirubin 0.2 mg/dL      Globulin 2.4 gm/dL      A/G Ratio 1.5 g/dL      BUN/Creatinine Ratio 24.5     Anion Gap 11.0 mmol/L      eGFR 50.6 mL/min/1.73     Narrative:      GFR Normal >60  Chronic Kidney Disease <60  Kidney Failure <15    The GFR formula is only valid for adults with stable renal function between ages 18 and 70.    CBC & Differential [[id number]]  (Abnormal) Collected: 10/02/24 0520    Specimen: Blood Updated: 10/02/24 0530    Narrative:      The following orders were created for panel order CBC & Differential.  Procedure                               Abnormality         Status                     ---------                               -----------         ------                     CBC Auto Differential[[id number]]        Abnormal            Final result                 Please view results for these tests on the individual orders.    CBC Auto Differential [[id number]]  (Abnormal) Collected: 10/02/24 0520    Specimen: Blood Updated: 10/02/24 0530     WBC 5.23 10*3/mm3      RBC 3.94 10*6/mm3      Hemoglobin 12.4 g/dL      Hematocrit 36.8 %      MCV 93.4 fL      MCH 31.5 pg      MCHC 33.7 g/dL      RDW 12.3 %      RDW-SD 42.6 fl      MPV 9.7 fL      Platelets 215 10*3/mm3      Neutrophil % 44.9 %      Lymphocyte % 36.7 %      Monocyte % 12.0 %      Eosinophil % 5.2 %      Basophil %  0.6 %      Immature Grans % 0.6 %      Neutrophils, Absolute 2.35 10*3/mm3      Lymphocytes, Absolute 1.92 10*3/mm3      Monocytes, Absolute 0.63 10*3/mm3      Eosinophils, Absolute 0.27 10*3/mm3      Basophils, Absolute 0.03 10*3/mm3      Immature Grans, Absolute 0.03 10*3/mm3      nRBC 0.0 /100 WBC     Comprehensive Metabolic Panel [[id number]]  (Abnormal) Collected: 10/01/24 0518    Specimen: Blood Updated: 10/01/24 0554     Glucose 154 mg/dL      BUN 21 mg/dL      Creatinine 0.88 mg/dL      Sodium 139 mmol/L      Potassium 4.1 mmol/L      Chloride 103 mmol/L      CO2 25.0 mmol/L      Calcium 9.4 mg/dL      Total Protein 6.5 g/dL      Albumin 3.8 g/dL      ALT (SGPT) 12 U/L      AST (SGOT) 16 U/L      Alkaline Phosphatase 63 U/L      Total Bilirubin 0.3 mg/dL      Globulin 2.7 gm/dL      A/G Ratio 1.4 g/dL      BUN/Creatinine Ratio 23.9     Anion Gap 11.0 mmol/L      eGFR 63.3 mL/min/1.73     Narrative:      GFR Normal >60  Chronic Kidney Disease <60  Kidney Failure <15    The GFR formula is only valid for adults with stable renal function between ages 18 and 70.    CBC & Differential [[id number]]  (Abnormal) Collected: 10/01/24 0518    Specimen: Blood Updated: 10/01/24 0530    Narrative:      The following orders were created for panel order CBC & Differential.  Procedure                               Abnormality         Status                     ---------                               -----------         ------                     CBC Auto Differential[[id number]]        Abnormal            Final result                 Please view results for these tests on the individual orders.    CBC Auto Differential [[id number]]  (Abnormal) Collected: 10/01/24 0518    Specimen: Blood Updated: 10/01/24 0530     WBC 5.68 10*3/mm3      RBC 4.13 10*6/mm3      Hemoglobin 13.0 g/dL      Hematocrit 38.5 %      MCV 93.2 fL      MCH 31.5 pg      MCHC 33.8 g/dL      RDW 12.2 %      RDW-SD 42.4 fl      MPV 9.4 fL      Platelets 219  10*3/mm3      Neutrophil % 44.3 %      Lymphocyte % 36.8 %      Monocyte % 12.7 %      Eosinophil % 5.1 %      Basophil % 0.9 %      Immature Grans % 0.2 %      Neutrophils, Absolute 2.52 10*3/mm3      Lymphocytes, Absolute 2.09 10*3/mm3      Monocytes, Absolute 0.72 10*3/mm3      Eosinophils, Absolute 0.29 10*3/mm3      Basophils, Absolute 0.05 10*3/mm3      Immature Grans, Absolute 0.01 10*3/mm3      nRBC 0.0 /100 WBC     Comprehensive Metabolic Panel [[id number]]  (Abnormal) Collected: 09/30/24 0538    Specimen: Blood Updated: 09/30/24 0613     Glucose 141 mg/dL      BUN 23 mg/dL      Creatinine 0.97 mg/dL      Sodium 135 mmol/L      Potassium 4.1 mmol/L      Chloride 103 mmol/L      CO2 24.0 mmol/L      Calcium 8.9 mg/dL      Total Protein 5.7 g/dL      Albumin 3.6 g/dL      ALT (SGPT) 10 U/L      AST (SGOT) 12 U/L      Alkaline Phosphatase 58 U/L      Total Bilirubin 0.3 mg/dL      Globulin 2.1 gm/dL      A/G Ratio 1.7 g/dL      BUN/Creatinine Ratio 23.7     Anion Gap 8.0 mmol/L      eGFR 56.3 mL/min/1.73     Narrative:      GFR Normal >60  Chronic Kidney Disease <60  Kidney Failure <15    The GFR formula is only valid for adults with stable renal function between ages 18 and 70.    CBC & Differential [[id number]]  (Abnormal) Collected: 09/30/24 0539    Specimen: Blood Updated: 09/30/24 0555    Narrative:      The following orders were created for panel order CBC & Differential.  Procedure                               Abnormality         Status                     ---------                               -----------         ------                     CBC Auto Differential[[id number]]        Abnormal            Final result                 Please view results for these tests on the individual orders.    CBC Auto Differential [[id number]]  (Abnormal) Collected: 09/30/24 0539    Specimen: Blood Updated: 09/30/24 0555     WBC 4.84 10*3/mm3      RBC 3.73 10*6/mm3      Hemoglobin 11.9 g/dL      Hematocrit 35.2 %      MCV  "94.4 fL      MCH 31.9 pg      MCHC 33.8 g/dL      RDW 12.4 %      RDW-SD 42.9 fl      MPV 9.8 fL      Platelets 204 10*3/mm3      Neutrophil % 46.2 %      Lymphocyte % 32.6 %      Monocyte % 14.0 %      Eosinophil % 6.2 %      Basophil % 0.6 %      Immature Grans % 0.4 %      Neutrophils, Absolute 2.23 10*3/mm3      Lymphocytes, Absolute 1.58 10*3/mm3      Monocytes, Absolute 0.68 10*3/mm3      Eosinophils, Absolute 0.30 10*3/mm3      Basophils, Absolute 0.03 10*3/mm3      Immature Grans, Absolute 0.02 10*3/mm3      nRBC 0.0 /100 WBC             Objective:     Vitals: /67 (BP Location: Left arm, Patient Position: Sitting)   Pulse 65   Temp 98.5 °F (36.9 °C) (Oral)   Resp 18   Ht 160 cm (63\")   Wt 65.8 kg (145 lb)   LMP  (LMP Unknown)   SpO2 99%   BMI 25.69 kg/m²  No intake or output data in the 24 hours ending 10/02/24 0757   Temp (24hrs), Av.2 °F (36.8 °C), Min:97.5 °F (36.4 °C), Max:98.7 °F (37.1 °C)      Physical Exam  Vitals reviewed.   Constitutional:       Appearance: Normal appearance.      Comments: Frail   HENT:      Head: Normocephalic and atraumatic.   Eyes:      General:         Right eye: No discharge.         Left eye: No discharge.      Conjunctiva/sclera: Conjunctivae normal.   Cardiovascular:      Rate and Rhythm: Normal rate and regular rhythm.      Pulses: Normal pulses.      Heart sounds: No murmur heard.  Pulmonary:      Effort: Pulmonary effort is normal. No respiratory distress.   Abdominal:      General: Abdomen is flat. Bowel sounds are normal. There is no distension.   Musculoskeletal:      Right lower leg: No edema.      Left lower leg: No edema.   Skin:     Capillary Refill: Capillary refill takes less than 2 seconds.   Neurological:      General: No focal deficit present.      Mental Status: She is alert and oriented to person, place, and time.   Psychiatric:         Mood and Affect: Mood normal.         Behavior: Behavior normal.             Assessment and Plan: "     Primary Problem:  Generalized weakness    Hospital Problem list:    Generalized weakness    Failure to thrive in adult    Moderate malnutrition      PMH:  Past Medical History:   Diagnosis Date    Arthritis     Cancer     BCC @ nose & Left arm    Diabetes type 2, controlled     Diverticulitis     History of GI bleed     History of transfusion     Has had x 5 units.    Hypertension     STAGE 3     Hyponatremia     Required hospitalization    Hypothyroidism     Knee arthropathy 09/30/2020    LPRD (laryngopharyngeal reflux disease)     Pharyngeal dysphagia     Thyroid nodule        Treatment Plan:  Generalized weakness: PT/OT/case management consultations.  Possible SNF as she lives alone and currently unable to care for self.  - Doing well with PT, continue and may be able to avoid SNF.    JOSE ALEJANDRO: GFR Improving with IV NS.    Anxiety: Added home Atarax back on.     Hypertension: She is very tenuous with her blood pressure control and very sensitive to medications.  She has still not been given any of her home blood pressure medications and blood pressure has remained mostly stable, a couple of values high.  Will continue to hold for now.     CODE STATUS: Full     DVT prophylaxis: SCDs     Disposition: Inpatient, SNF versus home with home health.    Reviewed treatment plans with the patient and/or family.   30 minutes spent in face to face interaction and coordination of care.     Electronically signed by Edgar Tolbert MD on 10/2/2024 at 07:57 CDT

## 2024-10-02 NOTE — THERAPY TREATMENT NOTE
Acute Care - Occupational Therapy Treatment Note  [facility]     Patient Name: Nedra Tolliver  : 1935  MRN: [id number]  Today's Date: 10/2/2024  Onset of Illness/Injury or Date of Surgery: 24  Date of Referral to OT: 24  Referring Physician: Dr. Tolbert    Admit Date: 2024       ICD-10-CM ICD-9-CM   1. Generalized weakness  R53.1 780.79   2. Left-sided weakness  R53.1 728.87   3. Impaired mobility [Z74.09]  Z74.09 799.89     Patient Active Problem List   Diagnosis    Urinary tract infection without hematuria    Dysuria    Vaginal atrophy    Overactive bladder    Diverticulitis    Benign essential HTN    Thyroid nodule    Hypothyroidism    Hyponatremia    Syncope and collapse    Volume depletion    Type 2 diabetes mellitus, without long-term current use of insulin    Moderate left ankle sprain    Left hip pain    Acute pain of left knee    Pain of left thumb    Nondisplaced fracture of navicular bone of left foot with routine healing    Chest pain at rest    Acute cystitis without hematuria    H/O thyroid nodule    Hypoglycemia    IBS (irritable bowel syndrome)    Idiopathic scoliosis    Insomnia    Mild tricuspid insufficiency    Neuropathy due to type 2 diabetes mellitus    Onychomycosis    Osteopenia    Pericarditis secondary to uremia    Primary osteoarthritis of right knee    Proteinuria    Vitamin D deficiency    High cholesterol    Lumbar radiculopathy    Non-toxic multinodular goiter    Transient ischemic attack    Acute gastritis without hemorrhage    UTI symptoms    Absolute anemia    Essential hypertension    Mixed hyperlipidemia with pervious statin intolerance    Stage 3a chronic kidney disease (CKD)    Cervical radiculopathy    LBBB (left bundle branch block)    Persistent atrial fibrillation    Fatigue    Shortness of breath    Accelerated hypertension with diastolic congestive heart failure, NYHA class 3    Paroxysmal A-fib    Chest pain    Palpitations    Anxiety    Atrial  fibrillation with RVR    Coronary artery disease involving coronary bypass graft of native heart with unstable angina pectoris    Generalized weakness    Failure to thrive in adult    Moderate malnutrition     Past Medical History:   Diagnosis Date    Arthritis     Cancer     BCC @ nose & Left arm    Diabetes type 2, controlled     Diverticulitis     History of GI bleed     History of transfusion     Has had x 5 units.    Hypertension     STAGE 3     Hyponatremia     Required hospitalization    Hypothyroidism     Knee arthropathy 09/30/2020    LPRD (laryngopharyngeal reflux disease)     Pharyngeal dysphagia     Thyroid nodule      Past Surgical History:   Procedure Laterality Date    APPENDECTOMY      BASAL CELL CARCINOMA EXCISION      BELPHAROPTOSIS REPAIR      BREAST CYST EXCISION      CARDIAC CATHETERIZATION      CARDIAC CATHETERIZATION Right 6/24/2021    Procedure: Left Heart Cath R radial, US guided w poss intervention;  Surgeon: Mak Nickerson DO;  Location:  [facility];  Service: Cardiovascular;  Laterality: Right;    CARDIAC CATHETERIZATION      CARDIAC CATHETERIZATION N/A 9/20/2024    Procedure: Left Heart Cath;  Surgeon: Mak Nickerson DO;  Location:  [facility];  Service: Cardiovascular;  Laterality: N/A;    CATARACT EXTRACTION      CATARACT EXTRACTION WITH INTRAOCULAR LENS IMPLANT Bilateral     CHOLECYSTECTOMY      COLONOSCOPY Left 11/1/2016    Tubular adenoma cecum, Diverticulosis repeat prn    SUBTOTAL HYSTERECTOMY      TOTAL KNEE ARTHROPLASTY Right 9/30/2020    Procedure: TOTAL KNEE ARTHROPLASTY;  Surgeon: Mak Pickens MD;  Location:  [facility] OR;  Service: Orthopedics;  Laterality: Right;    TUMOR EXCISION      under armpits and left breast         OT ASSESSMENT FLOWSHEET (Last 12 Hours)       OT Evaluation and Treatment       Row Name 10/02/24 0915                   OT Time and Intention    Subjective Information complains of;pain  -TS         Document Type therapy note (daily note)  -TS        Mode of Treatment occupational therapy  -TS        Patient Effort excellent  -TS           General Information    Existing Precautions/Restrictions fall  -TS           Pain Assessment    Pretreatment Pain Rating 3/10  -TS        Posttreatment Pain Rating 0/10 - no pain  -TS        Pain Location - Side/Orientation Left  -TS        Pain Location generalized  -TS        Pain Location - neck  -TS        Pain Intervention(s) Shower  -TS           Cognition    Orientation Status (Cognition) oriented x 4  -TS        Personal Safety Interventions fall prevention program maintained;gait belt;nonskid shoes/slippers when out of bed  -TS           Activities of Daily Living    BADL Assessment/Intervention bathing;upper body dressing;lower body dressing;grooming  -TS           Bathing Assessment/Intervention    [place] Level (Bathing) supervision  -TS        Assistive Devices (Bathing) grab bar, tub/shower;hand-held shower spray hose;tub bench  -TS        Position (Bathing) unsupported sitting  -TS           Upper Body Dressing Assessment/Training    [place] Level (Upper Body Dressing) doff;don;pull-over garment;supervision  -TS        Position (Upper Body Dressing) unsupported standing  -TS           Lower Body Dressing Assessment/Training    [place] Level (Lower Body Dressing) doff;don;pants/bottoms;supervision;shoes/slippers;socks  -TS        Position (Lower Body Dressing) unsupported sitting;supported standing  -TS        Comment, (Lower Body Dressing) verbal cues provided to sit when threading legs into pants for increased safety at home at discharge  -TS           Grooming Assessment/Training    [place] Level (Grooming) hair care, combing/brushing;oral care regimen;wash face, hands;set up  -TS        Oral Care teeth brushed - regular toothbrush;tongue brushed  -TS        Position (Grooming) unsupported standing  -TS           Functional Mobility     Functional Mobility- Ind. Level supervision required  -TS        Functional Mobility- Device walker, front-wheeled  -TS           Transfer Assessment/Treatment    Transfers shower transfer  -TS           Sit-Stand Transfer    Sit-Stand [place] (Transfers) modified independence  -TS        Assistive Device (Sit-Stand Transfers) walker, front-wheeled  -TS           Stand-Sit Transfer    Stand-Sit [place] (Transfers) modified independence  -TS        Assistive Device (Stand-Sit Transfers) walker, front-wheeled  -TS           Shower Transfer    Type (Shower Transfer) sit-stand;stand-sit  -TS        [place] Level (Shower Transfer) modified independence  -TS        Assistive Device (Shower Transfer) grab bar, tub/shower;tub bench  -TS           Safety Issues, Functional Mobility    Safety Issues Affecting Function (Mobility) safety precaution awareness  -TS           Plan of Care Review    Plan of Care Reviewed With patient  -TS        Progress improving  -TS        Outcome Evaluation Pt seated in recliner at start of session c/o of pain in L side of neck when she looks to the right but stated a shower might help the pain. engaged in OT session focused on grooming tasks and showering. Pt performed all transfers in tx mod I with RW/grab bar. Pt ambulated in room/BR with S and RW, verbal cues provided for ADL modification when completed LB dressing. TIDWELL encouraged pt to sit for tasks for decreased fall risk. Pt performed shower with supervision. Pt performed UB and LB dressing with supervision after shower. Pt performed oral care, combed hair, and applied deoderant with set up A. Pt required Mod A to don socks/shoes in standing, prior to task she was asked to don while seated and declined. Pt would benefit from continued skilled OT services for strengthening as well as education on safety precautions and techniques to modify her daily activities to promote safe engagment. Continue OT POC. Pt would benefit  from OP PT for BRAULIO saldana.  -TS           Positioning and Restraints    Pre-Treatment Position sitting in chair/recliner  -TS        Post Treatment Position chair  -TS        In Chair sitting;call light within reach;encouraged to call for assist  -TS           Bathing Goal 1 (OT)    Activity/Device (Bathing Goal 1, OT) bathing skills, all;tub bench  -TS        [place] Level/Cues Needed (Bathing Goal 1, OT) standby assist  -TS        Time Frame (Bathing Goal 1, OT) long term goal (LTG);10 days  -TS        Progress/Outcomes (Bathing Goal 1, OT) goal met  -TS                  User Key  (r) = Recorded By, (t) = Taken By, (c) = Cosigned By      Initials Name Effective Dates    TS Sayra Yang COTA 02/03/23 -                      Occupational Therapy Education       Title: PT OT SLP Therapies (Done)       Topic: Occupational Therapy (Done)       Point: ADL training (Done)       Description:   Instruct learner(s) on proper safety adaptation and remediation techniques during self care or transfers.   Instruct in proper use of assistive devices.                  Learning Progress Summary             Patient Acceptance, E, VU by TS at 10/2/2024 1329    Acceptance, TB,E, VU,DU by KS at 10/1/2024 1404    Acceptance, E, VU by TS at 10/1/2024 1334    Acceptance, E,TB, VU,DU by KS at 9/30/2024 1610    Acceptance, E, VU by AR at 9/28/2024 0238    Acceptance, E, VU by AC at 9/27/2024 1004                         Point: Home exercise program (Done)       Description:   Instruct learner(s) on appropriate technique for monitoring, assisting and/or progressing therapeutic exercises/activities.                  Learning Progress Summary             Patient Acceptance, TB,E, VU,DU by KS at 10/1/2024 1404    Acceptance, E,TB, VU,DU by KS at 9/30/2024 1610                         Point: Body mechanics (Done)       Description:   Instruct learner(s) on proper positioning and spine alignment during self-care, functional  mobility activities and/or exercises.                  Learning Progress Summary             Patient Acceptance, E, VU by TS at 10/2/2024 1329    Acceptance, TB,E, VU,DU by KS at 10/1/2024 1404    Acceptance, E,TB, VU,DU by KS at 9/30/2024 1610    Acceptance, E, VU by AR at 9/28/2024 0238                                         User Key       Initials Effective Dates Name Provider Type Discipline    AC 02/03/23 -  Jose David Carrizales, OTR/L, CNT Occupational Therapist OT    TS 02/03/23 -  Sayra Yang COTA Occupational Therapist Assistant OT    KS 02/27/23 -  Carmel King RN Registered Nurse Nurse    AR 05/24/22 -  Soha Gottlieb RN Registered Nurse Nurse                      OT Recommendation and Plan     Plan of Care Review  Plan of Care Reviewed With: patient  Progress: improving  Outcome Evaluation: Pt seated in recliner at start of session c/o of pain in L side of neck when she looks to the right but stated a shower might help the pain. engaged in OT session focused on grooming tasks and showering. Pt performed all transfers in tx mod I with RW/grab bar. Pt ambulated in room/BR with S and RW, verbal cues provided for ADL modification when completed LB dressing. TIDWELL encouraged pt to sit for tasks for decreased fall risk. Pt performed shower with supervision. Pt performed UB and LB dressing with supervision after shower. Pt performed oral care, combed hair, and applied deoderant with set up A. Pt required Mod A to don socks/shoes in standing, prior to task she was asked to don while seated and declined. Pt would benefit from continued skilled OT services for strengthening as well as education on safety precautions and techniques to modify her daily activities to promote safe engagment. Continue OT POC. Pt would benefit from OP PT for LE stengthening.  Plan of Care Reviewed With: patient  Outcome Evaluation: Pt seated in recliner at start of session c/o of pain in L side of neck when she looks to the  right but stated a shower might help the pain. engaged in OT session focused on grooming tasks and showering. Pt performed all transfers in tx mod I with RW/grab bar. Pt ambulated in room/BR with S and RW, verbal cues provided for ADL modification when completed LB dressing. TIDWELL encouraged pt to sit for tasks for decreased fall risk. Pt performed shower with supervision. Pt performed UB and LB dressing with supervision after shower. Pt performed oral care, combed hair, and applied deoderant with set up A. Pt required Mod A to don socks/shoes in standing, prior to task she was asked to don while seated and declined. Pt would benefit from continued skilled OT services for strengthening as well as education on safety precautions and techniques to modify her daily activities to promote safe engagment. Continue OT POC. Pt would benefit from OP PT for LE stengthening.     Outcome Measures       Row Name 10/01/24 1300             How much help from another is currently needed...    Putting on and taking off regular lower body clothing? 3  -TS      Bathing (including washing, rinsing, and drying) 3  -TS      Toileting (which includes using toilet bed pan or urinal) 4  -TS      Putting on and taking off regular upper body clothing 4  -TS      Taking care of personal grooming (such as brushing teeth) 4  -TS      Eating meals 4  -TS      AM-PAC 6 Clicks Score (OT) 22  -TS                User Key  (r) = Recorded By, (t) = Taken By, (c) = Cosigned By      Initials Name Provider Type    TS Sayra Yang COTA Occupational Therapist Assistant                    Time Calculation:    Time Calculation- OT       Row Name 10/02/24 1330             Time Calculation- OT    OT Start Time 0915  -TS      OT Stop Time 1012  -TS      OT Time Calculation (min) 57 min  -TS      Total Timed Code Minutes- OT 57 minute(s)  -TS      OT Received On 10/02/24  -TS         Timed Charges    40654 - OT Self Care/Mgmt Minutes 57  -TS         Total  Minutes    Timed Charges Total Minutes 57  -TS       Total Minutes 57  -TS                User Key  (r) = Recorded By, (t) = Taken By, (c) = Cosigned By      Initials Name Provider Type     Sayra Yang COTA Occupational Therapist Assistant                  Therapy Charges for Today       Code Description Service Date Service Provider Modifiers Qty    58616562373 HC OT SELF CARE/MGMT/TRAIN EA 15 MIN 10/1/2024 Sayra Yang COTA GO 4    06308467054 HC OT SELF CARE/MGMT/TRAIN EA 15 MIN 10/2/2024 Sayra Yang COTA GO 4                 Sayra HEWITT. YAW Yang  10/2/2024

## 2024-10-02 NOTE — PLAN OF CARE
Goal Outcome Evaluation:  Plan of Care Reviewed With: patient        Progress: improving  Outcome Evaluation: Pt seated in recliner at start of session c/o of pain in L side of neck when she looks to the right but stated a shower might help the pain. engaged in OT session focused on grooming tasks and showering. Pt performed all transfers in tx mod I with RW/grab bar. Pt ambulated in room/BR with S and RW, verbal cues provided for ADL modification when completed LB dressing. TIDWELL encouraged pt to sit for tasks for decreased fall risk. Pt performed shower with supervision. Pt performed UB and LB dressing with supervision after shower. Pt performed oral care, combed hair, and applied deoderant with set up A. Pt required Mod A to don socks/shoes in standing, prior to task she was asked to don while seated and declined. Pt would benefit from continued skilled OT services for strengthening as well as education on safety precautions and techniques to modify her daily activities to promote safe engagment. Continue OT POC. Pt would benefit from OP PT for BRAULIO saldana.

## 2024-10-02 NOTE — THERAPY TREATMENT NOTE
Acute Care - Physical Therapy Treatment Note  [facility]     Patient Name: Nedra Tolliver  : 1935  MRN: [id number]  Today's Date: 10/2/2024   Onset of Illness/Injury or Date of Surgery: 24  Visit Dx:     ICD-10-CM ICD-9-CM   1. Generalized weakness  R53.1 780.79   2. Left-sided weakness  R53.1 728.87   3. Impaired mobility [Z74.09]  Z74.09 799.89     Patient Active Problem List   Diagnosis    Urinary tract infection without hematuria    Dysuria    Vaginal atrophy    Overactive bladder    Diverticulitis    Benign essential HTN    Thyroid nodule    Hypothyroidism    Hyponatremia    Syncope and collapse    Volume depletion    Type 2 diabetes mellitus, without long-term current use of insulin    Moderate left ankle sprain    Left hip pain    Acute pain of left knee    Pain of left thumb    Nondisplaced fracture of navicular bone of left foot with routine healing    Chest pain at rest    Acute cystitis without hematuria    H/O thyroid nodule    Hypoglycemia    IBS (irritable bowel syndrome)    Idiopathic scoliosis    Insomnia    Mild tricuspid insufficiency    Neuropathy due to type 2 diabetes mellitus    Onychomycosis    Osteopenia    Pericarditis secondary to uremia    Primary osteoarthritis of right knee    Proteinuria    Vitamin D deficiency    High cholesterol    Lumbar radiculopathy    Non-toxic multinodular goiter    Transient ischemic attack    Acute gastritis without hemorrhage    UTI symptoms    Absolute anemia    Essential hypertension    Mixed hyperlipidemia with pervious statin intolerance    Stage 3a chronic kidney disease (CKD)    Cervical radiculopathy    LBBB (left bundle branch block)    Persistent atrial fibrillation    Fatigue    Shortness of breath    Accelerated hypertension with diastolic congestive heart failure, NYHA class 3    Paroxysmal A-fib    Chest pain    Palpitations    Anxiety    Atrial fibrillation with RVR    Coronary artery disease involving coronary bypass graft of  native heart with unstable angina pectoris    Generalized weakness    Failure to thrive in adult    Moderate malnutrition     Past Medical History:   Diagnosis Date    Arthritis     Cancer     BCC @ nose & Left arm    Diabetes type 2, controlled     Diverticulitis     History of GI bleed     History of transfusion     Has had x 5 units.    Hypertension     STAGE 3     Hyponatremia     Required hospitalization    Hypothyroidism     Knee arthropathy 09/30/2020    LPRD (laryngopharyngeal reflux disease)     Pharyngeal dysphagia     Thyroid nodule      Past Surgical History:   Procedure Laterality Date    APPENDECTOMY      BASAL CELL CARCINOMA EXCISION      BELPHAROPTOSIS REPAIR      BREAST CYST EXCISION      CARDIAC CATHETERIZATION      CARDIAC CATHETERIZATION Right 6/24/2021    Procedure: Left Heart Cath R radial, US guided w poss intervention;  Surgeon: Mak Nickerson DO;  Location:  [facility] CATH [facility]ASIVE [facility];  Service: Cardiovascular;  Laterality: Right;    CARDIAC CATHETERIZATION      CARDIAC CATHETERIZATION N/A 9/20/2024    Procedure: Left Heart Cath;  Surgeon: Mak Nickerson DO;  Location:  [facility] CATH [facility]VAS[facility];  Service: Cardiovascular;  Laterality: N/A;    CATARACT EXTRACTION      CATARACT EXTRACTION WITH INTRAOCULAR LENS IMPLANT Bilateral     CHOLECYSTECTOMY      COLONOSCOPY Left 11/1/2016    Tubular adenoma cecum, Diverticulosis repeat prn    SUBTOTAL HYSTERECTOMY      TOTAL KNEE ARTHROPLASTY Right 9/30/2020    Procedure: TOTAL KNEE ARTHROPLASTY;  Surgeon: Mak Pickens MD;  Location:  [facility] OR;  Service: Orthopedics;  Laterality: Right;    TUMOR EXCISION      under armpits and left breast     PT Assessment (Last 12 Hours)       PT Evaluation and Treatment       Row Name 10/02/24 1106          Physical Therapy Time and Intention    Subjective Information complains of;pain;fatigue  -LY     Document Type therapy note (daily note)  -LY     Mode of Treatment physical  therapy  -LY       Row Name 10/02/24 1106          General Information    Existing Precautions/Restrictions fall  -LY       Row Name 10/02/24 1106          Pain    Pretreatment Pain Rating 1/10  -LY     Posttreatment Pain Rating 4/10  -LY     Pain Location - Side/Orientation Right  -LY     Pain Location upper  -LY     Pain Location - extremity  -LY     Pain Intervention(s) Medication (See MAR);Ambulation/increased activity  -LY       Row Name 10/02/24 1106          Bed Mobility    Comment, (Bed Mobility) up in chair  -LY       Row Name 10/02/24 1106          Transfers    Transfers toilet transfer  -LY       Row Name 10/02/24 1106          Sit-Stand Transfer    Sit-Stand [place] (Transfers) modified independence  -LY     Assistive Device (Sit-Stand Transfers) walker, front-wheeled  -LY       Row Name 10/02/24 1106          Stand-Sit Transfer    Stand-Sit [place] (Transfers) modified independence  -LY     Assistive Device (Stand-Sit Transfers) walker, front-wheeled  -LY       Row Name 10/02/24 1106          Toilet Transfer    Type (Toilet Transfer) sit-stand;stand-sit  -LY     [place] Level (Toilet Transfer) supervision  -LY     Assistive Device (Toilet Transfer) commode  -LY       Row Name 10/02/24 1106          Gait/Stairs (Locomotion)    [place] Level (Gait) verbal cues;contact guard  -LY     Assistive Device (Gait) walker, front-wheeled  -LY     Distance in Feet (Gait) 55  x3  -LY     Pattern (Gait) step-through  -LY     Deviations/Abnormal Patterns (Gait) gait speed decreased;stride length decreased  -LY     Bilateral Gait Deviations forward flexed posture  -LY     Comment, (Gait/Stairs) slow and guarded gait, pt required rest breaks  -LY       Row Name 10/02/24 1106          Plan of Care Review    Plan of Care Reviewed With patient  -LY     Progress improving  -LY     Outcome Evaluation PT tx completed. Pt up in chair and agreeable to therapy. Able to stand with use of RWX for support once in  standing. Pt amb 55' with RWX and CGA. Pt c/o pain in RUE and required rest breaks to recover. S for toileting. Able to work through BLE AROM seated. Will cont to follow.  -LY       Row Name 10/02/24 1106          Positioning and Restraints    Pre-Treatment Position sitting in chair/recliner  -LY     Post Treatment Position chair  -LY     In Chair reclined;call light within reach;encouraged to call for assist  -LY               User Key  (r) = Recorded By, (t) = Taken By, (c) = Cosigned By      Initials Name Provider Type    LY Dejah Amin, PTA Physical Therapist Assistant                    Physical Therapy Education       Title: PT OT SLP Therapies (Done)       Topic: Physical Therapy (Done)       Point: Mobility training (Done)       Learning Progress Summary             Patient Acceptance, TB,E, VU,DU by KS at 10/1/2024 1404    Acceptance, E,TB, VU,DU by KS at 9/30/2024 1610    Acceptance, E, VU by AR at 9/28/2024 0238    Acceptance, E, VU by KARAN at 9/27/2024 1002    Comment: role of PT, fallrisk, benefits of OOB activity, beginning HEP                         Point: Home exercise program (Done)       Learning Progress Summary             Patient Acceptance, TB,E, VU,DU by KS at 10/1/2024 1404    Acceptance, E,TB, VU,DU by KS at 9/30/2024 1610    Acceptance, E, VU by KARAN at 9/27/2024 1002    Comment: role of PT, fallrisk, benefits of OOB activity, beginning HEP                         Point: Body mechanics (Done)       Learning Progress Summary             Patient Acceptance, TB,E, VU,DU by KS at 10/1/2024 1404    Acceptance, E,TB, VU,DU by KS at 9/30/2024 1610    Acceptance, E, VU by KARAN at 9/27/2024 1002    Comment: role of PT, fallrisk, benefits of OOB activity, beginning HEP                         Point: Precautions (Done)       Learning Progress Summary             Patient Acceptance, TB,E, VU,DU by KS at 10/1/2024 1404    Acceptance, E,TB, VU,DU by KS at 9/30/2024 1610    Acceptance, E, VU by KARAN at  9/27/2024 1002    Comment: role of PT, fallrisk, benefits of OOB activity, beginning HEP                                         User Key       Initials Effective Dates Name Provider Type Discipline    KS 02/27/23 -  Carmel King, RN Registered Nurse Nurse    AR 05/24/22 -  Soha Gottlieb, RN Registered Nurse Nurse    KARAN 08/15/24 -  Donavan Smith, PT DPT Physical Therapist PT                  PT Recommendation and Plan     Plan of Care Reviewed With: patient  Progress: improving  Outcome Evaluation: PT tx completed. Pt up in chair and agreeable to therapy. Able to stand with use of RWX for support once in standing. Pt amb 55' with RWX and CGA. Pt c/o pain in RUE and required rest breaks to recover. S for toileting. Able to work through BLE AROM seated. Will cont to follow.   Outcome Measures       Row Name 10/01/24 1300             How much help from another is currently needed...    Putting on and taking off regular lower body clothing? 3  -TS      Bathing (including washing, rinsing, and drying) 3  -TS      Toileting (which includes using toilet bed pan or urinal) 4  -TS      Putting on and taking off regular upper body clothing 4  -TS      Taking care of personal grooming (such as brushing teeth) 4  -TS      Eating meals 4  -TS      AM-PAC 6 Clicks Score (OT) 22  -TS                User Key  (r) = Recorded By, (t) = Taken By, (c) = Cosigned By      Initials Name Provider Type    TS Sayra Yang COTA Occupational Therapist Assistant                     Time Calculation:    PT Charges       Row Name 10/02/24 1409             Time Calculation    Start Time 1106  -LY      Stop Time 1130  -LY      Time Calculation (min) 24 min  -LY      PT Received On 10/02/24  -LY         Time Calculation- PT    Total Timed Code Minutes- PT 24 minute(s)  -LY         Timed Charges    73086 - PT Therapeutic Exercise Minutes 10  -LY      64648 - Gait Training Minutes  14  -LY         Total Minutes    Timed Charges Total  Minutes 24  -LY       Total Minutes 24  -LY                User Key  (r) = Recorded By, (t) = Taken By, (c) = Cosigned By      Initials Name Provider Type    Dejah Forman PTA Physical Therapist Assistant                  Therapy Charges for Today       Code Description Service Date Service Provider Modifiers Qty    02650274175 HC GAIT TRAINING EA 15 MIN 10/1/2024 Dejah Amin, PTA GP 2    55248748428 HC GAIT TRAINING EA 15 MIN 10/1/2024 Dejah Amin, PTA GP 2    19895356829 HC PT THER PROC EA 15 MIN 10/2/2024 Dejah Amin, PTA GP 1    31325706716 HC GAIT TRAINING EA 15 MIN 10/2/2024 Dejah Amin, PTA GP 1            PT G-Codes  Outcome Measure Options: AM-PAC 6 Clicks Daily Activity (OT)  AM-PAC 6 Clicks Score (PT): 19  AM-PAC 6 Clicks Score (OT): 22    Dejah Amin PTA  10/2/2024

## 2024-10-02 NOTE — PLAN OF CARE
Goal Outcome Evaluation:  Plan of Care Reviewed With: patient        Progress: improving  Outcome Evaluation: PT tx completed. Pt up in chair and agreeable to therapy. Able to stand with use of RWX for support once in standing. Pt amb 55' with RWX and CGA. Pt c/o pain in RUE and required rest breaks to recover. S for toileting. Able to work through BLE AROM seated. Will cont to follow.

## 2024-10-03 LAB
ALBUMIN SERPL-MCNC: 3.8 G/DL (ref 3.5–5.2)
ALBUMIN/GLOB SERPL: 1.5 G/DL
ALP SERPL-CCNC: 64 U/L (ref 39–117)
ALT SERPL W P-5'-P-CCNC: 12 U/L (ref 1–33)
ANION GAP SERPL CALCULATED.3IONS-SCNC: 10 MMOL/L (ref 5–15)
AST SERPL-CCNC: 14 U/L (ref 1–32)
BASOPHILS # BLD AUTO: 0.06 10*3/MM3 (ref 0–0.2)
BASOPHILS NFR BLD AUTO: 1 % (ref 0–1.5)
BILIRUB SERPL-MCNC: 0.3 MG/DL (ref 0–1.2)
BUN SERPL-MCNC: 25 MG/DL (ref 8–23)
BUN/CREAT SERPL: 24 (ref 7–25)
CALCIUM SPEC-SCNC: 9.6 MG/DL (ref 8.6–10.5)
CHLORIDE SERPL-SCNC: 99 MMOL/L (ref 98–107)
CO2 SERPL-SCNC: 27 MMOL/L (ref 22–29)
CREAT SERPL-MCNC: 1.04 MG/DL (ref 0.57–1)
DEPRECATED RDW RBC AUTO: 42.5 FL (ref 37–54)
EGFRCR SERPLBLD CKD-EPI 2021: 51.8 ML/MIN/1.73
EOSINOPHIL # BLD AUTO: 0.3 10*3/MM3 (ref 0–0.4)
EOSINOPHIL NFR BLD AUTO: 5.2 % (ref 0.3–6.2)
ERYTHROCYTE [DISTWIDTH] IN BLOOD BY AUTOMATED COUNT: 12.2 % (ref 12.3–15.4)
GLOBULIN UR ELPH-MCNC: 2.5 GM/DL
GLUCOSE SERPL-MCNC: 175 MG/DL (ref 65–99)
HCT VFR BLD AUTO: 38.6 % (ref 34–46.6)
HGB BLD-MCNC: 12.9 G/DL (ref 12–15.9)
IMM GRANULOCYTES # BLD AUTO: 0.01 10*3/MM3 (ref 0–0.05)
IMM GRANULOCYTES NFR BLD AUTO: 0.2 % (ref 0–0.5)
LYMPHOCYTES # BLD AUTO: 2.01 10*3/MM3 (ref 0.7–3.1)
LYMPHOCYTES NFR BLD AUTO: 34.8 % (ref 19.6–45.3)
MCH RBC QN AUTO: 31.2 PG (ref 26.6–33)
MCHC RBC AUTO-ENTMCNC: 33.4 G/DL (ref 31.5–35.7)
MCV RBC AUTO: 93.5 FL (ref 79–97)
MONOCYTES # BLD AUTO: 0.68 10*3/MM3 (ref 0.1–0.9)
MONOCYTES NFR BLD AUTO: 11.8 % (ref 5–12)
NEUTROPHILS NFR BLD AUTO: 2.72 10*3/MM3 (ref 1.7–7)
NEUTROPHILS NFR BLD AUTO: 47 % (ref 42.7–76)
NRBC BLD AUTO-RTO: 0 /100 WBC (ref 0–0.2)
PLATELET # BLD AUTO: 213 10*3/MM3 (ref 140–450)
PMV BLD AUTO: 9.9 FL (ref 6–12)
POTASSIUM SERPL-SCNC: 4.1 MMOL/L (ref 3.5–5.2)
PROT SERPL-MCNC: 6.3 G/DL (ref 6–8.5)
RBC # BLD AUTO: 4.13 10*6/MM3 (ref 3.77–5.28)
SODIUM SERPL-SCNC: 136 MMOL/L (ref 136–145)
WBC NRBC COR # BLD AUTO: 5.78 10*3/MM3 (ref 3.4–10.8)

## 2024-10-03 PROCEDURE — 97535 SELF CARE MNGMENT TRAINING: CPT

## 2024-10-03 PROCEDURE — 80053 COMPREHEN METABOLIC PANEL: CPT | Performed by: FAMILY MEDICINE

## 2024-10-03 PROCEDURE — 85025 COMPLETE CBC W/AUTO DIFF WBC: CPT | Performed by: FAMILY MEDICINE

## 2024-10-03 PROCEDURE — 97116 GAIT TRAINING THERAPY: CPT

## 2024-10-03 RX ADMIN — FLECAINIDE ACETATE 25 MG: 50 TABLET ORAL at 21:43

## 2024-10-03 RX ADMIN — LEVOTHYROXINE SODIUM 50 MCG: 100 TABLET ORAL at 09:25

## 2024-10-03 RX ADMIN — Medication 10 ML: at 09:27

## 2024-10-03 RX ADMIN — APIXABAN 5 MG: 5 TABLET, FILM COATED ORAL at 21:43

## 2024-10-03 RX ADMIN — APIXABAN 5 MG: 5 TABLET, FILM COATED ORAL at 09:25

## 2024-10-03 RX ADMIN — FLECAINIDE ACETATE 25 MG: 50 TABLET ORAL at 09:26

## 2024-10-03 RX ADMIN — Medication 10 ML: at 21:47

## 2024-10-03 RX ADMIN — ACETAMINOPHEN 650 MG: 325 TABLET ORAL at 21:46

## 2024-10-03 RX ADMIN — POLYETHYLENE GLYCOL 3350 17 G: 17 POWDER, FOR SOLUTION ORAL at 09:27

## 2024-10-03 NOTE — PLAN OF CARE
Goal Outcome Evaluation:  Plan of Care Reviewed With: patient        Progress: improving  Outcome Evaluation: A/Ox4. Up with standby assisst to the BR with walker. Pt states that she is starting to have a burning sensation with urination. Encouraging pt to clean perineum well each BR trip. VSS. Safety maintained.

## 2024-10-03 NOTE — PLAN OF CARE
Goal Outcome Evaluation:  Plan of Care Reviewed With: patient        Progress: improving  Outcome Evaluation: Pt reports LLE buckling this am with PT ambulation in hallway. Pt performed CGA via RW fxl mobility in room and t/fs <> various surfaces for increased fxl I and strength. pt with no knee buckling during activity including reaching outside [place]ACHELLE for ADL items and dynamic standing tasks at RW. Pt reports this is happening with long distances. Reports typically completes IADLs (grocery shopping). Discussed options to decrease fall risk, increase strength with mobility and increase safety. Pt concerned with home/HH vs SNF d/c. Reports fearful of falling. Problem solved with pt if d/c home available assistance and home modifications PRN. Pt would benefit from continued strength and endurance training. Educated to discuss with social work potential d/c options regarding SNF/sub acute care.

## 2024-10-03 NOTE — PLAN OF CARE
Goal Outcome Evaluation:   Patient resting in bed, VSS, A&Ox4, no c/o pain, up x1 asssit with walker, Patient up in chair at bedside today and tolerated well. All safety maintained and Call light in reach.

## 2024-10-03 NOTE — CASE MANAGEMENT/SOCIAL WORK
Continued Stay Note  JARRETT Thomas     Patient Name: Nedra Tolliver  MRN: [id number]  Today's Date: 10/3/2024    Admit Date: 9/26/2024    Plan: Home with HH vs SNF   Discharge Plan       Row Name 10/03/24 0933       Plan    Plan Comments Pt continues to work with therapy and is doing better. Pt prefers to return home with HH at [facility]. Will follow.                   Discharge Codes    No documentation.                 Expected Discharge Date and Time       Expected Discharge Date Expected Discharge Time    Oct 3, 2024               KONG Hsu

## 2024-10-03 NOTE — PROGRESS NOTES
Daily Progress Note  Nedra Tolliver  MRN: [id number] LOS: 5    Admit Date: 9/26/2024   10/3/2024 07:26 CDT    Subjective:      Chief Complaint:  Chief Complaint   Patient presents with    Weakness - Generalized       Interval History:    Reviewed overnight events and nursing notes.   No acute events overnight.  Continues to have slow, steady improvement.  Encouraged her to work well with PT/OT.  More  normal mentation this morning.    Review of Systems   Constitutional:  Positive for activity change, appetite change and fatigue.   Respiratory:  Positive for shortness of breath. Negative for cough.    Gastrointestinal:  Negative for nausea and vomiting.   Genitourinary:  Negative for dysuria.   Musculoskeletal:  Positive for gait problem.   Skin:  Negative for color change and pallor.   Neurological:  Positive for dizziness and weakness.       DIET:  Diet: Regular/House, Diabetic; Consistent Carbohydrate; Fluid Consistency: Thin (IDDSI 0)    Medications:        apixaban, 5 mg, Oral, BID  flecainide, 25 mg, Oral, BID  levothyroxine, 50 mcg, Oral, Daily  pantoprazole, 40 mg, Oral, Q AM  polyethylene glycol, 17 g, Oral, Daily  sodium chloride, 10 mL, Intravenous, Q12H        Data:     Code Status:   Code Status and Medical Interventions: CPR (Attempt to Resuscitate); Full Support   Ordered at: 09/27/24 0734     Code Status (Patient has no pulse and is not breathing):    CPR (Attempt to Resuscitate)     Medical Interventions (Patient has pulse or is breathing):    Full Support       Family History   Problem Relation Age of Onset    Diabetes Mother     Cancer Mother     Heart failure Mother     Diabetes Father     Colon cancer Neg Hx     Colon polyps Neg Hx      Social History     Socioeconomic History    Marital status:    Tobacco Use    Smoking status: Never    Smokeless tobacco: Never   Vaping Use    Vaping status: Never Used   Substance and Sexual Activity    Alcohol use: No    Drug use: Never    Sexual  activity: Defer     Partners: Male       Labs:    Lab Results (last 72 hours)       Procedure Component Value Units Date/Time    Comprehensive Metabolic Panel [[id number]]  (Abnormal) Collected: 10/03/24 0430    Specimen: Blood Updated: 10/03/24 0505     Glucose 175 mg/dL      BUN 25 mg/dL      Creatinine 1.04 mg/dL      Sodium 136 mmol/L      Potassium 4.1 mmol/L      Chloride 99 mmol/L      CO2 27.0 mmol/L      Calcium 9.6 mg/dL      Total Protein 6.3 g/dL      Albumin 3.8 g/dL      ALT (SGPT) 12 U/L      AST (SGOT) 14 U/L      Alkaline Phosphatase 64 U/L      Total Bilirubin 0.3 mg/dL      Globulin 2.5 gm/dL      A/G Ratio 1.5 g/dL      BUN/Creatinine Ratio 24.0     Anion Gap 10.0 mmol/L      eGFR 51.8 mL/min/1.73     Narrative:      GFR Normal >60  Chronic Kidney Disease <60  Kidney Failure <15    The GFR formula is only valid for adults with stable renal function between ages 18 and 70.    CBC & Differential [[id number]]  (Abnormal) Collected: 10/03/24 0430    Specimen: Blood Updated: 10/03/24 0447    Narrative:      The following orders were created for panel order CBC & Differential.  Procedure                               Abnormality         Status                     ---------                               -----------         ------                     CBC Auto Differential[[id number]]        Abnormal            Final result                 Please view results for these tests on the individual orders.    CBC Auto Differential [[id number]]  (Abnormal) Collected: 10/03/24 0430    Specimen: Blood Updated: 10/03/24 0447     WBC 5.78 10*3/mm3      RBC 4.13 10*6/mm3      Hemoglobin 12.9 g/dL      Hematocrit 38.6 %      MCV 93.5 fL      MCH 31.2 pg      MCHC 33.4 g/dL      RDW 12.2 %      RDW-SD 42.5 fl      MPV 9.9 fL      Platelets 213 10*3/mm3      Neutrophil % 47.0 %      Lymphocyte % 34.8 %      Monocyte % 11.8 %      Eosinophil % 5.2 %      Basophil % 1.0 %      Immature Grans % 0.2 %      Neutrophils, Absolute  2.72 10*3/mm3      Lymphocytes, Absolute 2.01 10*3/mm3      Monocytes, Absolute 0.68 10*3/mm3      Eosinophils, Absolute 0.30 10*3/mm3      Basophils, Absolute 0.06 10*3/mm3      Immature Grans, Absolute 0.01 10*3/mm3      nRBC 0.0 /100 WBC     Comprehensive Metabolic Panel [[id number]]  (Abnormal) Collected: 10/02/24 0520    Specimen: Blood Updated: 10/02/24 0555     Glucose 166 mg/dL      BUN 26 mg/dL      Creatinine 1.06 mg/dL      Sodium 137 mmol/L      Potassium 4.1 mmol/L      Chloride 100 mmol/L      CO2 26.0 mmol/L      Calcium 9.2 mg/dL      Total Protein 6.0 g/dL      Albumin 3.6 g/dL      ALT (SGPT) 11 U/L      AST (SGOT) 14 U/L      Alkaline Phosphatase 60 U/L      Total Bilirubin 0.2 mg/dL      Globulin 2.4 gm/dL      A/G Ratio 1.5 g/dL      BUN/Creatinine Ratio 24.5     Anion Gap 11.0 mmol/L      eGFR 50.6 mL/min/1.73     Narrative:      GFR Normal >60  Chronic Kidney Disease <60  Kidney Failure <15    The GFR formula is only valid for adults with stable renal function between ages 18 and 70.    CBC & Differential [[id number]]  (Abnormal) Collected: 10/02/24 0520    Specimen: Blood Updated: 10/02/24 0530    Narrative:      The following orders were created for panel order CBC & Differential.  Procedure                               Abnormality         Status                     ---------                               -----------         ------                     CBC Auto Differential[[id number]]        Abnormal            Final result                 Please view results for these tests on the individual orders.    CBC Auto Differential [[id number]]  (Abnormal) Collected: 10/02/24 0520    Specimen: Blood Updated: 10/02/24 0530     WBC 5.23 10*3/mm3      RBC 3.94 10*6/mm3      Hemoglobin 12.4 g/dL      Hematocrit 36.8 %      MCV 93.4 fL      MCH 31.5 pg      MCHC 33.7 g/dL      RDW 12.3 %      RDW-SD 42.6 fl      MPV 9.7 fL      Platelets 215 10*3/mm3      Neutrophil % 44.9 %      Lymphocyte % 36.7 %       Monocyte % 12.0 %      Eosinophil % 5.2 %      Basophil % 0.6 %      Immature Grans % 0.6 %      Neutrophils, Absolute 2.35 10*3/mm3      Lymphocytes, Absolute 1.92 10*3/mm3      Monocytes, Absolute 0.63 10*3/mm3      Eosinophils, Absolute 0.27 10*3/mm3      Basophils, Absolute 0.03 10*3/mm3      Immature Grans, Absolute 0.03 10*3/mm3      nRBC 0.0 /100 WBC     Comprehensive Metabolic Panel [[id number]]  (Abnormal) Collected: 10/01/24 0518    Specimen: Blood Updated: 10/01/24 0554     Glucose 154 mg/dL      BUN 21 mg/dL      Creatinine 0.88 mg/dL      Sodium 139 mmol/L      Potassium 4.1 mmol/L      Chloride 103 mmol/L      CO2 25.0 mmol/L      Calcium 9.4 mg/dL      Total Protein 6.5 g/dL      Albumin 3.8 g/dL      ALT (SGPT) 12 U/L      AST (SGOT) 16 U/L      Alkaline Phosphatase 63 U/L      Total Bilirubin 0.3 mg/dL      Globulin 2.7 gm/dL      A/G Ratio 1.4 g/dL      BUN/Creatinine Ratio 23.9     Anion Gap 11.0 mmol/L      eGFR 63.3 mL/min/1.73     Narrative:      GFR Normal >60  Chronic Kidney Disease <60  Kidney Failure <15    The GFR formula is only valid for adults with stable renal function between ages 18 and 70.    CBC & Differential [[id number]]  (Abnormal) Collected: 10/01/24 0518    Specimen: Blood Updated: 10/01/24 0530    Narrative:      The following orders were created for panel order CBC & Differential.  Procedure                               Abnormality         Status                     ---------                               -----------         ------                     CBC Auto Differential[[id number]]        Abnormal            Final result                 Please view results for these tests on the individual orders.    CBC Auto Differential [[id number]]  (Abnormal) Collected: 10/01/24 0518    Specimen: Blood Updated: 10/01/24 0530     WBC 5.68 10*3/mm3      RBC 4.13 10*6/mm3      Hemoglobin 13.0 g/dL      Hematocrit 38.5 %      MCV 93.2 fL      MCH 31.5 pg      MCHC 33.8 g/dL      RDW 12.2 %   "    RDW-SD 42.4 fl      MPV 9.4 fL      Platelets 219 10*3/mm3      Neutrophil % 44.3 %      Lymphocyte % 36.8 %      Monocyte % 12.7 %      Eosinophil % 5.1 %      Basophil % 0.9 %      Immature Grans % 0.2 %      Neutrophils, Absolute 2.52 10*3/mm3      Lymphocytes, Absolute 2.09 10*3/mm3      Monocytes, Absolute 0.72 10*3/mm3      Eosinophils, Absolute 0.29 10*3/mm3      Basophils, Absolute 0.05 10*3/mm3      Immature Grans, Absolute 0.01 10*3/mm3      nRBC 0.0 /100 WBC                 Objective:     Vitals: /58 (BP Location: Right arm, Patient Position: Lying)   Pulse 57   Temp 98 °F (36.7 °C) (Oral)   Resp 16   Ht 160 cm (63\")   Wt 65.8 kg (145 lb)   LMP  (LMP Unknown)   SpO2 96%   BMI 25.69 kg/m²  No intake or output data in the 24 hours ending 10/03/24 0726   Temp (24hrs), Av.1 °F (36.7 °C), Min:97.9 °F (36.6 °C), Max:98.5 °F (36.9 °C)      Physical Exam  Vitals reviewed.   Constitutional:       Appearance: Normal appearance.      Comments: Frail   HENT:      Head: Normocephalic and atraumatic.   Eyes:      General:         Right eye: No discharge.         Left eye: No discharge.      Conjunctiva/sclera: Conjunctivae normal.   Cardiovascular:      Rate and Rhythm: Normal rate and regular rhythm.      Pulses: Normal pulses.      Heart sounds: No murmur heard.  Pulmonary:      Effort: Pulmonary effort is normal. No respiratory distress.   Abdominal:      General: Abdomen is flat. Bowel sounds are normal. There is no distension.   Musculoskeletal:      Right lower leg: No edema.      Left lower leg: No edema.   Skin:     Capillary Refill: Capillary refill takes less than 2 seconds.   Neurological:      General: No focal deficit present.      Mental Status: She is alert and oriented to person, place, and time.   Psychiatric:         Mood and Affect: Mood normal.         Behavior: Behavior normal.             Assessment and Plan:     Primary Problem:  Generalized weakness    Hospital Problem " list:    Generalized weakness    Failure to thrive in adult    Moderate malnutrition      PMH:  Past Medical History:   Diagnosis Date    Arthritis     Cancer     BCC @ nose & Left arm    Diabetes type 2, controlled     Diverticulitis     History of GI bleed     History of transfusion     Has had x 5 units.    Hypertension     STAGE 3     Hyponatremia     Required hospitalization    Hypothyroidism     Knee arthropathy 09/30/2020    LPRD (laryngopharyngeal reflux disease)     Pharyngeal dysphagia     Thyroid nodule        Treatment Plan:  Generalized weakness: PT/OT/case management consultations.  Possible SNF as she lives alone and currently unable to care for self.  - Doing well with PT, continue and may be able to avoid SNF.  - Appreciate case management assistance.    JOSE ALEJANDRO: GFR Improving with IV NS.    Anxiety: Added home Atarax back on.     Hypertension: She is very tenuous with her blood pressure control and very sensitive to medications.  She has still not been given any of her home blood pressure medications and blood pressure has remained mostly stable, a couple of values high.  Will continue to hold for now.     CODE STATUS: Full     DVT prophylaxis: SCDs     Disposition: Inpatient, SNF versus home with home health.    Reviewed treatment plans with the patient and/or family.   30 minutes spent in face to face interaction and coordination of care.     Electronically signed by Edgar Tolbert MD on 10/3/2024 at 07:26 CDT     Detail Level: Simple Additional Notes: DDX\\n-ACD; most likely pt has improved with no fragrance. \\n-Medication; less likely due to improvement of no fragrance

## 2024-10-03 NOTE — PLAN OF CARE
Goal Outcome Evaluation:  Plan of Care Reviewed With: patient        Progress: improving  Outcome Evaluation: Pt cont to c/o concern about her LLE only able to amb short distances before it cont to give away on her. pt states has no problem walking short distances in room and to bathroom, but when attempting to amb any functional distance pt begins to have weakness in LLE and it will buckle on her. requiring guarded gait and mult standing rests.

## 2024-10-03 NOTE — THERAPY TREATMENT NOTE
Patient Name: Nedra Tolliver  : 1935    MRN: [id number]                              Today's Date: 10/3/2024       Admit Date: 2024    Visit Dx: Therapist utilized gait belt, applied non-slipped socks, provided fall risk education/prevention, & facilitated muscle strengthening PRN to reduce patient falls risk during this session.      ICD-10-CM ICD-9-CM   1. Generalized weakness  R53.1 780.79   2. Left-sided weakness  R53.1 728.87   3. Impaired mobility [Z74.09]  Z74.09 799.89     Patient Active Problem List   Diagnosis    Urinary tract infection without hematuria    Dysuria    Vaginal atrophy    Overactive bladder    Diverticulitis    Benign essential HTN    Thyroid nodule    Hypothyroidism    Hyponatremia    Syncope and collapse    Volume depletion    Type 2 diabetes mellitus, without long-term current use of insulin    Moderate left ankle sprain    Left hip pain    Acute pain of left knee    Pain of left thumb    Nondisplaced fracture of navicular bone of left foot with routine healing    Chest pain at rest    Acute cystitis without hematuria    H/O thyroid nodule    Hypoglycemia    IBS (irritable bowel syndrome)    Idiopathic scoliosis    Insomnia    Mild tricuspid insufficiency    Neuropathy due to type 2 diabetes mellitus    Onychomycosis    Osteopenia    Pericarditis secondary to uremia    Primary osteoarthritis of right knee    Proteinuria    Vitamin D deficiency    High cholesterol    Lumbar radiculopathy    Non-toxic multinodular goiter    Transient ischemic attack    Acute gastritis without hemorrhage    UTI symptoms    Absolute anemia    Essential hypertension    Mixed hyperlipidemia with pervious statin intolerance    Stage 3a chronic kidney disease (CKD)    Cervical radiculopathy    LBBB (left bundle branch block)    Persistent atrial fibrillation    Fatigue    Shortness of breath    Accelerated hypertension with diastolic congestive heart failure, NYHA class 3    Paroxysmal A-fib     Chest pain    Palpitations    Anxiety    Atrial fibrillation with RVR    Coronary artery disease involving coronary bypass graft of native heart with unstable angina pectoris    Generalized weakness    Failure to thrive in adult    Moderate malnutrition     Past Medical History:   Diagnosis Date    Arthritis     Cancer     BCC @ nose & Left arm    Diabetes type 2, controlled     Diverticulitis     History of GI bleed     History of transfusion     Has had x 5 units.    Hypertension     STAGE 3     Hyponatremia     Required hospitalization    Hypothyroidism     Knee arthropathy 09/30/2020    LPRD (laryngopharyngeal reflux disease)     Pharyngeal dysphagia     Thyroid nodule      Past Surgical History:   Procedure Laterality Date    APPENDECTOMY      BASAL CELL CARCINOMA EXCISION      BELPHAROPTOSIS REPAIR      BREAST CYST EXCISION      CARDIAC CATHETERIZATION      CARDIAC CATHETERIZATION Right 6/24/2021    Procedure: Left Heart Cath R radial, US guided w poss intervention;  Surgeon: Mak Nickerson DO;  Location:  [facility];  Service: Cardiovascular;  Laterality: Right;    CARDIAC CATHETERIZATION      CARDIAC CATHETERIZATION N/A 9/20/2024    Procedure: Left Heart Cath;  Surgeon: Mak Nickerson DO;  Location:  [facility];  Service: Cardiovascular;  Laterality: N/A;    CATARACT EXTRACTION      CATARACT EXTRACTION WITH INTRAOCULAR LENS IMPLANT Bilateral     CHOLECYSTECTOMY      COLONOSCOPY Left 11/1/2016    Tubular adenoma cecum, Diverticulosis repeat prn    SUBTOTAL HYSTERECTOMY      TOTAL KNEE ARTHROPLASTY Right 9/30/2020    Procedure: TOTAL KNEE ARTHROPLASTY;  Surgeon: Mak Pickens MD;  Location:  [facility] OR;  Service: Orthopedics;  Laterality: Right;    TUMOR EXCISION      under armpits and left breast      General Information       Row Name 10/03/24 1513          OT Time and Intention    Document Type therapy note (daily note)  -MT     Mode of  Treatment occupational therapy  -MT       Row Name 10/03/24 1513          General Information    Patient Profile Reviewed yes  -MT     Existing Precautions/Restrictions fall  -MT       Row Name 10/03/24 1513          Cognition    Orientation Status (Cognition) oriented x 4  -MT       Row Name 10/03/24 1513          Safety Issues, Functional Mobility    Impairments Affecting Function (Mobility) balance;pain;strength  -MT               User Key  (r) = Recorded By, (t) = Taken By, (c) = Cosigned By      Initials Name Provider Type    MT Sarika Kahn COTA Occupational Therapist Assistant                     Mobility/ADL's       Row Name 10/03/24 1513          Bed Mobility    Comment, (Bed Mobility) in chair  -MT       Row Name 10/03/24 1513          Transfers    Transfers sit-stand transfer;stand-sit transfer;toilet transfer  -MT       Row Name 10/03/24 1513          Sit-Stand Transfer    Sit-Stand Benoit (Transfers) standby assist  -MT       Row Name 10/03/24 1513          Stand-Sit Transfer    Stand-Sit Benoit (Transfers) standby assist  -MT       Row Name 10/03/24 1513          Toilet Transfer    Type (Toilet Transfer) sit-stand;stand-sit  -MT     Benoit Level (Toilet Transfer) standby assist  -MT     Assistive Device (Toilet Transfer) commode;walker, front-wheeled  -MT       Row Name 10/03/24 1513          Functional Mobility    Functional Mobility- Ind. Level standby assist  -MT     Functional Mobility- Device walker, front-wheeled  -MT     Functional Mobility- Comment Pt reports LLE buckling this am with PT ambulation in hallway. Pt performed CGA via RW fxl mobility in room and t/fs <> various surfaces for increased fxl I and strength. pt with no knee buckling during activity including reaching outside LACHELLE for ADL items and dynamic standing tasks at RW. Pt reports this is happening with long distances. Reports typically completes IADLs (grocery shopping). Discussed options to decrease fall  risk, increase strength with mobility and increase safety. Pt concerned with home vs SNF d/c. Reports fearful of falling  -MT               User Key  (r) = Recorded By, (t) = Taken By, (c) = Cosigned By      Initials Name Provider Type    Sarika John COTA Occupational Therapist Assistant                   Obj/Interventions    No documentation.                  Goals/Plan    No documentation.                  Clinical Impression       Row Name 10/03/24 1512          Pain Assessment    Pretreatment Pain Rating 3/10  -MT     Posttreatment Pain Rating 6/10  post mobility  -MT     Pre/Posttreatment Pain Comment L hip/lower back area. Reports this may be why LLE is weak. Reports this is new since hospital admit  -MT     Pain Intervention(s) Repositioned  -MT       Row Name 10/03/24 1512          Therapy Plan Review/Discharge Plan (OT)    Anticipated Discharge Disposition (OT) sub acute care setting  -MT       Row Name 10/03/24 1512          Positioning and Restraints    Pre-Treatment Position sitting in chair/recliner  -MT     Post Treatment Position chair  -MT     In Chair call light within reach;encouraged to call for assist;notified nsg;sitting;legs elevated  -MT               User Key  (r) = Recorded By, (t) = Taken By, (c) = Cosigned By      Initials Name Provider Type    Sarika John COTA Occupational Therapist Assistant                   Outcome Measures       Row Name 10/03/24 1512          How much help from another is currently needed...    Putting on and taking off regular lower body clothing? 3  -MT     Bathing (including washing, rinsing, and drying) 3  -MT     Toileting (which includes using toilet bed pan or urinal) 3  -MT     Putting on and taking off regular upper body clothing 4  -MT     Taking care of personal grooming (such as brushing teeth) 4  -MT     Eating meals 4  -MT     AM-PAC 6 Clicks Score (OT) 21  -MT       Row Name 10/03/24 0800          How much help from another person do  you currently need...    Turning from your back to your side while in flat bed without using bedrails? 4  -JM     Moving from lying on back to sitting on the side of a flat bed without bedrails? 3  -JM     Moving to and from a bed to a chair (including a wheelchair)? 3  -JM     Standing up from a chair using your arms (e.g., wheelchair, bedside chair)? 3  -JM     Climbing 3-5 steps with a railing? 3  -JM     To walk in hospital room? 3  -JM     AM-PAC 6 Clicks Score (PT) 19  -JM     Highest Level of Mobility Goal 6 --> Walk 10 steps or more  -               User Key  (r) = Recorded By, (t) = Taken By, (c) = Cosigned By      Initials Name Provider Type    Sarika John COTA Occupational Therapist Assistant    Jennifer Merlos, RN Registered Nurse                    Occupational Therapy Education       Title: PT OT SLP Therapies (Done)       Topic: Occupational Therapy (Done)       Point: ADL training (Done)       Description:   Instruct learner(s) on proper safety adaptation and remediation techniques during self care or transfers.   Instruct in proper use of assistive devices.                  Learning Progress Summary             Patient Acceptance, E, VU by MT at 10/3/2024 1619    Comment: safety and fall risk    Acceptance, E, VU by TS at 10/2/2024 1329    Acceptance, TB,E, VU,DU by KS at 10/1/2024 1404    Acceptance, E, VU by TS at 10/1/2024 1334    Acceptance, E,TB, VU,DU by KS at 9/30/2024 1610    Acceptance, E, VU by AR at 9/28/2024 0238    Acceptance, E, VU by AC at 9/27/2024 1004                         Point: Home exercise program (Done)       Description:   Instruct learner(s) on appropriate technique for monitoring, assisting and/or progressing therapeutic exercises/activities.                  Learning Progress Summary             Patient Acceptance, TB,E, VU,DU by KS at 10/1/2024 1404    Acceptance, E,TB, VU,DU by KS at 9/30/2024 1610                         Point: Body mechanics (Done)        Description:   Instruct learner(s) on proper positioning and spine alignment during self-care, functional mobility activities and/or exercises.                  Learning Progress Summary             Patient Acceptance, E, VU by TS at 10/2/2024 1329    Acceptance, TB,E, VU,DU by KS at 10/1/2024 1404    Acceptance, E,TB, VU,DU by KS at 9/30/2024 1610    Acceptance, E, VU by AR at 9/28/2024 0238                                         User Key       Initials Effective Dates Name Provider Type Discipline    AC 02/03/23 -  Jose David Carrizales, OTR/L, CNT Occupational Therapist OT    TS 02/03/23 -  Sayra Yang COTA Occupational Therapist Assistant OT    MT 06/16/21 -  Sarika Kahn COTA Occupational Therapist Assistant OT    KS 02/27/23 -  Carmel King RN Registered Nurse Nurse    AR 05/24/22 -  Soha Gottlieb RN Registered Nurse Nurse                  OT Recommendation and Plan     Plan of Care Review  Plan of Care Reviewed With: patient  Progress: improving  Outcome Evaluation: Pt reports LLE buckling this am with PT ambulation in hallway. Pt performed CGA via RW fxl mobility in room and t/fs <> various surfaces for increased fxl I and strength. pt with no knee buckling during activity including reaching outside [facility] for ADL items and dynamic standing tasks at RW. Pt reports this is happening with long distances. Reports typically completes IADLs (grocery shopping). Discussed options to decrease fall risk, increase strength with mobility and increase safety. Pt concerned with home/HH vs SNF d/c. Reports fearful of falling. Problem solved with pt if d/c home available assistance and home modifications PRN. Educated to discuss with social work potential d/c options regarding SNF/sub acute care.     Time Calculation:         Time Calculation- OT       Row Name 10/03/24 1512 10/03/24 0912          Time Calculation- OT    OT Start Time 1512  -MT --     OT Stop Time 1605  -MT --     OT Time Calculation (min)  53 min  -MT --     Total Timed Code Minutes- OT 53 minute(s)  -MT --     OT Received On 10/03/24  -MT --        Timed Charges    61050 - Gait Training Minutes  -- 23  -NW     69356 - OT Self Care/Mgmt Minutes 53  -MT --        Total Minutes    Timed Charges Total Minutes 53  -MT 23  -NW      Total Minutes 53  -MT 23  -NW               User Key  (r) = Recorded By, (t) = Taken By, (c) = Cosigned By      Initials Name Provider Type    NW Lindy Perez, PTA Physical Therapist Assistant    Sarika John COTA Occupational Therapist Assistant                  Therapy Charges for Today       Code Description Service Date Service Provider Modifiers Qty    [id number] HC OT SELF CARE/MGMT/TRAIN EA 15 MIN 10/3/2024 Sarika Kahn COTA GO 4                 YAW Zarate  10/3/2024

## 2024-10-04 ENCOUNTER — READMISSION MANAGEMENT (OUTPATIENT)
Dept: CALL CENTER | Facility: HOSPITAL | Age: 89
End: 2024-10-04
Payer: MEDICARE

## 2024-10-04 VITALS
HEIGHT: 63 IN | TEMPERATURE: 98.1 F | RESPIRATION RATE: 16 BRPM | SYSTOLIC BLOOD PRESSURE: 152 MMHG | WEIGHT: 145 LBS | HEART RATE: 77 BPM | DIASTOLIC BLOOD PRESSURE: 69 MMHG | OXYGEN SATURATION: 98 % | BODY MASS INDEX: 25.69 KG/M2

## 2024-10-04 LAB
ALBUMIN SERPL-MCNC: 3.7 G/DL (ref 3.5–5.2)
ALBUMIN/GLOB SERPL: 1.6 G/DL
ALP SERPL-CCNC: 67 U/L (ref 39–117)
ALT SERPL W P-5'-P-CCNC: 10 U/L (ref 1–33)
ANION GAP SERPL CALCULATED.3IONS-SCNC: 10 MMOL/L (ref 5–15)
AST SERPL-CCNC: 15 U/L (ref 1–32)
BASOPHILS # BLD AUTO: 0.03 10*3/MM3 (ref 0–0.2)
BASOPHILS NFR BLD AUTO: 0.6 % (ref 0–1.5)
BILIRUB SERPL-MCNC: 0.5 MG/DL (ref 0–1.2)
BUN SERPL-MCNC: 25 MG/DL (ref 8–23)
BUN/CREAT SERPL: 25.5 (ref 7–25)
CALCIUM SPEC-SCNC: 9.8 MG/DL (ref 8.6–10.5)
CHLORIDE SERPL-SCNC: 97 MMOL/L (ref 98–107)
CO2 SERPL-SCNC: 26 MMOL/L (ref 22–29)
CREAT SERPL-MCNC: 0.98 MG/DL (ref 0.57–1)
DEPRECATED RDW RBC AUTO: 43.2 FL (ref 37–54)
EGFRCR SERPLBLD CKD-EPI 2021: 55.6 ML/MIN/1.73
EOSINOPHIL # BLD AUTO: 0.17 10*3/MM3 (ref 0–0.4)
EOSINOPHIL NFR BLD AUTO: 3.2 % (ref 0.3–6.2)
ERYTHROCYTE [DISTWIDTH] IN BLOOD BY AUTOMATED COUNT: 12.4 % (ref 12.3–15.4)
GLOBULIN UR ELPH-MCNC: 2.3 GM/DL
GLUCOSE SERPL-MCNC: 194 MG/DL (ref 65–99)
HCT VFR BLD AUTO: 37.7 % (ref 34–46.6)
HGB BLD-MCNC: 12.7 G/DL (ref 12–15.9)
IMM GRANULOCYTES # BLD AUTO: 0.02 10*3/MM3 (ref 0–0.05)
IMM GRANULOCYTES NFR BLD AUTO: 0.4 % (ref 0–0.5)
LYMPHOCYTES # BLD AUTO: 0.61 10*3/MM3 (ref 0.7–3.1)
LYMPHOCYTES NFR BLD AUTO: 11.5 % (ref 19.6–45.3)
MCH RBC QN AUTO: 31.8 PG (ref 26.6–33)
MCHC RBC AUTO-ENTMCNC: 33.7 G/DL (ref 31.5–35.7)
MCV RBC AUTO: 94.5 FL (ref 79–97)
MONOCYTES # BLD AUTO: 0.48 10*3/MM3 (ref 0.1–0.9)
MONOCYTES NFR BLD AUTO: 9 % (ref 5–12)
NEUTROPHILS NFR BLD AUTO: 4 10*3/MM3 (ref 1.7–7)
NEUTROPHILS NFR BLD AUTO: 75.3 % (ref 42.7–76)
NRBC BLD AUTO-RTO: 0 /100 WBC (ref 0–0.2)
PLATELET # BLD AUTO: 196 10*3/MM3 (ref 140–450)
PMV BLD AUTO: 10.3 FL (ref 6–12)
POTASSIUM SERPL-SCNC: 4.2 MMOL/L (ref 3.5–5.2)
PROT SERPL-MCNC: 6 G/DL (ref 6–8.5)
RBC # BLD AUTO: 3.99 10*6/MM3 (ref 3.77–5.28)
SODIUM SERPL-SCNC: 133 MMOL/L (ref 136–145)
WBC NRBC COR # BLD AUTO: 5.31 10*3/MM3 (ref 3.4–10.8)

## 2024-10-04 PROCEDURE — 85025 COMPLETE CBC W/AUTO DIFF WBC: CPT | Performed by: FAMILY MEDICINE

## 2024-10-04 PROCEDURE — 80053 COMPREHEN METABOLIC PANEL: CPT | Performed by: FAMILY MEDICINE

## 2024-10-04 PROCEDURE — 97116 GAIT TRAINING THERAPY: CPT

## 2024-10-04 PROCEDURE — 97535 SELF CARE MNGMENT TRAINING: CPT

## 2024-10-04 RX ADMIN — PANTOPRAZOLE SODIUM 40 MG: 40 TABLET, DELAYED RELEASE ORAL at 05:58

## 2024-10-04 RX ADMIN — FLECAINIDE ACETATE 25 MG: 50 TABLET ORAL at 08:42

## 2024-10-04 RX ADMIN — APIXABAN 5 MG: 5 TABLET, FILM COATED ORAL at 08:42

## 2024-10-04 RX ADMIN — LEVOTHYROXINE SODIUM 50 MCG: 100 TABLET ORAL at 08:42

## 2024-10-04 NOTE — THERAPY TREATMENT NOTE
Patient Name: Nedra Tolliver  : 1935    MRN: [id number]                              Today's Date: 10/4/2024       Admit Date: 2024    Visit Dx: Therapist utilized gait belt, applied non-slipped socks, provided fall risk education/prevention, & facilitated muscle strengthening PRN to reduce patient falls risk during this session.      ICD-10-CM ICD-9-CM   1. Generalized weakness  R53.1 780.79   2. Left-sided weakness  R53.1 728.87   3. Impaired mobility [Z74.09]  Z74.09 799.89     Patient Active Problem List   Diagnosis    Urinary tract infection without hematuria    Dysuria    Vaginal atrophy    Overactive bladder    Diverticulitis    Benign essential HTN    Thyroid nodule    Hypothyroidism    Hyponatremia    Syncope and collapse    Volume depletion    Type 2 diabetes mellitus, without long-term current use of insulin    Moderate left ankle sprain    Left hip pain    Acute pain of left knee    Pain of left thumb    Nondisplaced fracture of navicular bone of left foot with routine healing    Chest pain at rest    Acute cystitis without hematuria    H/O thyroid nodule    Hypoglycemia    IBS (irritable bowel syndrome)    Idiopathic scoliosis    Insomnia    Mild tricuspid insufficiency    Neuropathy due to type 2 diabetes mellitus    Onychomycosis    Osteopenia    Pericarditis secondary to uremia    Primary osteoarthritis of right knee    Proteinuria    Vitamin D deficiency    High cholesterol    Lumbar radiculopathy    Non-toxic multinodular goiter    Transient ischemic attack    Acute gastritis without hemorrhage    UTI symptoms    Absolute anemia    Essential hypertension    Mixed hyperlipidemia with pervious statin intolerance    Stage 3a chronic kidney disease (CKD)    Cervical radiculopathy    LBBB (left bundle branch block)    Persistent atrial fibrillation    Fatigue    Shortness of breath    Accelerated hypertension with diastolic congestive heart failure, NYHA class 3    Paroxysmal A-fib     Chest pain    Palpitations    Anxiety    Atrial fibrillation with RVR    Coronary artery disease involving coronary bypass graft of native heart with unstable angina pectoris    Generalized weakness    Failure to thrive in adult    Moderate malnutrition     Past Medical History:   Diagnosis Date    Arthritis     Cancer     BCC @ nose & Left arm    Diabetes type 2, controlled     Diverticulitis     History of GI bleed     History of transfusion     Has had x 5 units.    Hypertension     STAGE 3     Hyponatremia     Required hospitalization    Hypothyroidism     Knee arthropathy 09/30/2020    LPRD (laryngopharyngeal reflux disease)     Pharyngeal dysphagia     Thyroid nodule      Past Surgical History:   Procedure Laterality Date    APPENDECTOMY      BASAL CELL CARCINOMA EXCISION      BELPHAROPTOSIS REPAIR      BREAST CYST EXCISION      CARDIAC CATHETERIZATION      CARDIAC CATHETERIZATION Right 6/24/2021    Procedure: Left Heart Cath R radial, US guided w poss intervention;  Surgeon: Mak Nickerson DO;  Location:  [facility];  Service: Cardiovascular;  Laterality: Right;    CARDIAC CATHETERIZATION      CARDIAC CATHETERIZATION N/A 9/20/2024    Procedure: Left Heart Cath;  Surgeon: Mak Nickerson DO;  Location:  [facility];  Service: Cardiovascular;  Laterality: N/A;    CATARACT EXTRACTION      CATARACT EXTRACTION WITH INTRAOCULAR LENS IMPLANT Bilateral     CHOLECYSTECTOMY      COLONOSCOPY Left 11/1/2016    Tubular adenoma cecum, Diverticulosis repeat prn    SUBTOTAL HYSTERECTOMY      TOTAL KNEE ARTHROPLASTY Right 9/30/2020    Procedure: TOTAL KNEE ARTHROPLASTY;  Surgeon: Mak Pickens MD;  Location:  [facility] OR;  Service: Orthopedics;  Laterality: Right;    TUMOR EXCISION      under armpits and left breast      General Information       Row Name 10/04/24 0923          OT Time and Intention    Document Type therapy note (daily note)  -MT     Mode of  Treatment occupational therapy  -MT       Row Name 10/04/24 0918          General Information    Patient Profile Reviewed yes  -MT     Existing Precautions/Restrictions fall  -MT       Row Name 10/04/24 0918          Cognition    Orientation Status (Cognition) oriented x 4  -MT       Row Name 10/04/24 0918          Safety Issues, Functional Mobility    Impairments Affecting Function (Mobility) balance;pain;strength  reports burning with urination. Notified NSG  -MT               User Key  (r) = Recorded By, (t) = Taken By, (c) = Cosigned By      Initials Name Provider Type    MT Sarika Kahn COTA Occupational Therapist Assistant                     Mobility/ADL's       Row Name 10/04/24 0918          Bed Mobility    Comment, (Bed Mobility) chair  -MT       Row Name 10/04/24 0918          Transfers    Transfers sit-stand transfer;stand-sit transfer;toilet transfer  -MT       Row Name 10/04/24 0918          Sit-Stand Transfer    Sit-Stand [place] (Transfers) supervision  -MT       Row Name 10/04/24 0918          Stand-Sit Transfer    Stand-Sit [place] (Transfers) supervision  -MT       Row Name 10/04/24 0918          Toilet Transfer    Type (Toilet Transfer) sit-stand;stand-sit  -MT     [place] Level (Toilet Transfer) supervision  -MT     Assistive Device (Toilet Transfer) commode;walker, front-wheeled  -MT       Row Name 10/04/24 0918          Functional Mobility    Functional Mobility- Ind. Level supervision required  -MT     Functional Mobility- Device walker, front-wheeled  -MT     Functional Mobility- Comment Fxl mobility in room <> BR via RW with S. Pt t/fs<>surfaces S with good safety awareness.  -MT       Row Name 10/04/24 0918          Activities of Daily Living    BADL Assessment/Intervention toileting;grooming  -MT       Row Name 10/04/24 0918          Toileting Assessment/Training    [place] Level (Toileting) adjust/manage clothing;toileting skills;supervision  -MT     Assistive  Devices (Toileting) commode  -MT     Position (Toileting) unsupported sitting;supported standing  -MT       Row Name 10/04/24 0918          Grooming Assessment/Training    [place] Level (Grooming) wash face, hands  -MT     Position (Grooming) other (see comments)  reports completed this am  -MT               User Key  (r) = Recorded By, (t) = Taken By, (c) = Cosigned By      Initials Name Provider Type    Sarika John COTA Occupational Therapist Assistant                   Obj/Interventions    No documentation.                  Goals/Plan    No documentation.                  Clinical Impression       Row Name 10/04/24 0918          Pain Assessment    Pretreatment Pain Rating 3/10  burning with urination. Notified NSG  -MT     Posttreatment Pain Rating 3/10  -MT       Row Name 10/04/24 0918          Therapy Plan Review/Discharge Plan (OT)    Anticipated Discharge Disposition (OT) home with home health;home with outpatient therapy services;home with assist  -MT       Row Name 10/04/24 0918          Positioning and Restraints    Pre-Treatment Position sitting in chair/recliner  -MT     Post Treatment Position chair  -MT     In Chair sitting;call light within reach;encouraged to call for assist;notified nsg  -MT               User Key  (r) = Recorded By, (t) = Taken By, (c) = Cosigned By      Initials Name Provider Type    Sarika John COTA Occupational Therapist Assistant                   Outcome Measures       Row Name 10/04/24 0918          How much help from another is currently needed...    Putting on and taking off regular lower body clothing? 3  -MT     Bathing (including washing, rinsing, and drying) 3  -MT     Toileting (which includes using toilet bed pan or urinal) 3  -MT     Putting on and taking off regular upper body clothing 4  -MT     Taking care of personal grooming (such as brushing teeth) 4  -MT     Eating meals 4  -MT     AM-PAC 6 Clicks Score (OT) 21  -MT       Row Name 10/04/24  0830          How much help from another person do you currently need...    Turning from your back to your side while in flat bed without using bedrails? 4  -KS     Moving from lying on back to sitting on the side of a flat bed without bedrails? 4  -KS     Moving to and from a bed to a chair (including a wheelchair)? 3  -KS     Standing up from a chair using your arms (e.g., wheelchair, bedside chair)? 3  -KS     Climbing 3-5 steps with a railing? 3  -KS     To walk in hospital room? 3  -KS     AM-PAC 6 Clicks Score (PT) 20  -KS     Highest Level of Mobility Goal 6 --> Walk 10 steps or more  -KS               User Key  (r) = Recorded By, (t) = Taken By, (c) = Cosigned By      Initials Name Provider Type    Sarika John COTA Occupational Therapist Assistant    Carmel Martins RN Registered Nurse                    Occupational Therapy Education       Title: PT OT SLP Therapies (Done)       Topic: Occupational Therapy (Done)       Point: ADL training (Done)       Description:   Instruct learner(s) on proper safety adaptation and remediation techniques during self care or transfers.   Instruct in proper use of assistive devices.                  Learning Progress Summary             Patient Acceptance, E,TB, VU by MT at 10/4/2024 0949    Comment: home safety    Acceptance, E, VU by MT at 10/3/2024 1619    Comment: safety and fall risk    Acceptance, E, VU by TS at 10/2/2024 1329    Acceptance, TB,E, VU,DU by KS at 10/1/2024 1404    Acceptance, E, VU by TS at 10/1/2024 1334    Acceptance, E,TB, VU,DU by KS at 9/30/2024 1610    Acceptance, E, VU by AR at 9/28/2024 0238    Acceptance, E, VU by AC at 9/27/2024 1004                         Point: Home exercise program (Done)       Description:   Instruct learner(s) on appropriate technique for monitoring, assisting and/or progressing therapeutic exercises/activities.                  Learning Progress Summary             Patient Acceptance, TB,E, VU,DU by KS  at 10/1/2024 1404    Acceptance, E,TB, VU,DU by KS at 9/30/2024 1610                         Point: Body mechanics (Done)       Description:   Instruct learner(s) on proper positioning and spine alignment during self-care, functional mobility activities and/or exercises.                  Learning Progress Summary             Patient Acceptance, E, VU by TS at 10/2/2024 1329    Acceptance, TB,E, VU,DU by KS at 10/1/2024 1404    Acceptance, E,TB, VU,DU by KS at 9/30/2024 1610    Acceptance, E, VU by AR at 9/28/2024 0238                                         User Key       Initials Effective Dates Name Provider Type Discipline    AC 02/03/23 -  Jose David Carrizales, OTR/L, CNT Occupational Therapist OT    TS 02/03/23 -  Sayra Yang COTA Occupational Therapist Assistant OT    MT 06/16/21 -  Sarika Kahn COTA Occupational Therapist Assistant OT    KS 02/27/23 -  Carmel King RN Registered Nurse Nurse    AR 05/24/22 -  Soha Gottileb RN Registered Nurse Nurse                  OT Recommendation and Plan     Plan of Care Review  Plan of Care Reviewed With: patient  Progress: improving  Outcome Evaluation: Fxl mobility in room <> BR via RW with S. Pt t/fs<>surfaces S with good safety awareness. Pt performed toileting tasks S and grooming standing S with hand hygiene. Pt c/o burning with urination, notified [facility] staff. Discussed with pt home modifications and plans d/t pt deciding home with assist and OP therapy.     Time Calculation:         Time Calculation- OT       Row Name 10/04/24 0918             Time Calculation- OT    OT Start Time 0918  -MT      OT Stop Time 0943  -MT      OT Time Calculation (min) 25 min  -MT      Total Timed Code Minutes- OT 25 minute(s)  -MT      OT Received On 10/04/24  -MT         Timed Charges    41611 - OT Self Care/Mgmt Minutes 25  -MT         Total Minutes    Timed Charges Total Minutes 25  -MT       Total Minutes 25  -MT                User Key  (r) = Recorded By, (t) =  Taken By, (c) = Cosigned By      Initials Name Provider Type    MT Sarika Kahn COTA Occupational Therapist Assistant                  Therapy Charges for Today       Code Description Service Date Service Provider Modifiers Qty    66598046883 HC OT SELF CARE/MGMT/TRAIN EA 15 MIN 10/3/2024 Sarika Kahn COTA GO 4    37294297154 HC OT SELF CARE/MGMT/TRAIN EA 15 MIN 10/4/2024 Sarika Kahn COTA GO 2                 YAW Zarate  10/4/2024

## 2024-10-04 NOTE — PLAN OF CARE
Goal Outcome Evaluation:  Plan of Care Reviewed With: patient        Progress: improving  Outcome Evaluation: Fxl mobility in room <> BR via RW with S. Pt t/fs<>surfaces S with good safety awareness. Pt performed toileting tasks S and grooming standing S with hand hygiene. Pt c/o burning with urination, notified [facility] staff. Discussed with pt home modifications and plans d/t pt deciding home with assist and OP therapy.

## 2024-10-04 NOTE — DISCHARGE SUMMARY
Hospital Discharge Summary    Nedra Tolliver  :  1935  MRN:  [id number]    Admit date:  2024  Discharge date:  10/4/2024    Admitting Physician:    Edgar Tolbert MD    Discharge Diagnoses:      Generalized weakness    Failure to thrive in adult    Moderate malnutrition      Hospital Course:   The patient is a 88 y.o. female who presents with persistent, worsening generalized weakness and inability to care for self at home. She currently lives alone and has difficulty even preparing meals for herself.  She has not fallen, but feels very weak.  She has also reported some chest pain, not currently active.  Recent heart cath on 2024 with findings that were grossly unchanged from 3 years ago.  She was seen in the office on 2024 for the above an attempt was made for direct admission, but no beds available.  She was directed to the emergency department from where she was subsequently admitted.     She performed well with inpatient physical therapy and had no acute findings on lab work.  We discussed options including SNF, home health, outpatient physical therapy.  With how well she did inpatient, we ultimately decided on outpatient physical therapy would give her the best treatment.  Discharged in stable condition.    The patient was admitted for the above noted medical/surgical issues. Please see daily progress note for further details concerning their stay. The patient improved throughout their stay and reached maximum medical improvement on the day of discharge. The patient/family agree with the treatment plan as outlined above. All questions concerning their stay were answered prior to discharge. They understand the importance of follow up concerning any abnormal test results.     Physical Exam      Discharge Medications:         Discharge Medications        Continue These Medications        Instructions Start Date   acetaminophen 500 MG tablet  Commonly known as: TYLENOL   500 mg, Oral,  Every 6 Hours PRN      apixaban 5 MG tablet tablet  Commonly known as: ELIQUIS   5 mg, Oral, 2 Times Daily      bumetanide 1 MG tablet  Commonly known as: BUMEX   1 mg, Oral, Daily PRN      Co Q 10 100 MG capsule   1 tablet, Oral, Daily      flecainide 50 MG tablet  Commonly known as: TAMBOCOR   25 mg, Oral, 2 Times Daily, RX states 1/2 tab BID       glimepiride 4 MG tablet  Commonly known as: AMARYL   4 mg, Oral, Every Morning Before Breakfast, Takes an extra 1/2 tablet when blood sugar is high at night      hydrOXYzine 25 MG tablet  Commonly known as: ATARAX   25 mg, Oral, Every 8 Hours PRN      levothyroxine 50 MCG tablet  Commonly known as: SYNTHROID, LEVOTHROID   50 mcg, Oral, Daily      multivitamin with minerals tablet tablet   1 tablet, Oral, Daily      omeprazole 40 MG capsule  Commonly known as: priLOSEC   40 mg, Oral, Daily      Repatha solution prefilled syringe injection  Generic drug: Evolocumab   140 mg, Subcutaneous, Every 14 Days      Vitamin D3 125 MCG (5000 UT) tablet dispersible   5,000 Units, Oral, Daily             Stop These Medications      verapamil 200 MG capsule sustained-release 24 hr capsule  Commonly known as: VERELAN PM              Activity: As tolerated    Diet: Regular    Consults: PT/OT    Significant Diagnostic Studies:    MRI Brain Without Contrast    Result Date: 9/27/2024  1. No acute intracranial abnormality, particularly, no finding to suggest a mass or acute infarction. 2. Chronic ischemic and atrophic changes.  This report was signed and finalized on 9/27/2024 10:38 AM by Dr. Clyde Stuart MD.      CT Cervical Spine Without Contrast    Result Date: 9/26/2024  1. No evidence of acute osseous injury or malalignment in the cervical spine.    This report was signed and finalized on 9/26/2024 5:56 PM by Dr. Brayan Resendiz MD.      CT Head Without Contrast    Result Date: 9/26/2024   1. Moderate cerebral and cerebellar atrophy with chronic microvascular disease but no  evidence of acute intracranial process.    This report was signed and finalized on 9/26/2024 5:54 PM by Dr. Brayan Resendiz MD.      XR Chest 1 View    Result Date: 9/26/2024   No acute cardiopulmonary abnormality.    This report was signed and finalized on 9/26/2024 4:59 PM by Donavan Main.            Treatments:   As above    Disposition:   Discharge home    Time spent on discharge including discussion with patient/family, SW, and coordination of care.     Follow up with Edgar Tolbert MD in 1-2 weeks.    Signed:  Edgar Tolbert MD   10/4/2024, 07:31 CDT

## 2024-10-04 NOTE — THERAPY TREATMENT NOTE
Acute Care - Physical Therapy Treatment Note  [facility]     Patient Name: Nedra Tolliver  : 1935  MRN: [id number]  Today's Date: 10/4/2024   Onset of Illness/Injury or Date of Surgery: 24  Visit Dx:     ICD-10-CM ICD-9-CM   1. Generalized weakness  R53.1 780.79   2. Left-sided weakness  R53.1 728.87   3. Impaired mobility [Z74.09]  Z74.09 799.89     Patient Active Problem List   Diagnosis    Urinary tract infection without hematuria    Dysuria    Vaginal atrophy    Overactive bladder    Diverticulitis    Benign essential HTN    Thyroid nodule    Hypothyroidism    Hyponatremia    Syncope and collapse    Volume depletion    Type 2 diabetes mellitus, without long-term current use of insulin    Moderate left ankle sprain    Left hip pain    Acute pain of left knee    Pain of left thumb    Nondisplaced fracture of navicular bone of left foot with routine healing    Chest pain at rest    Acute cystitis without hematuria    H/O thyroid nodule    Hypoglycemia    IBS (irritable bowel syndrome)    Idiopathic scoliosis    Insomnia    Mild tricuspid insufficiency    Neuropathy due to type 2 diabetes mellitus    Onychomycosis    Osteopenia    Pericarditis secondary to uremia    Primary osteoarthritis of right knee    Proteinuria    Vitamin D deficiency    High cholesterol    Lumbar radiculopathy    Non-toxic multinodular goiter    Transient ischemic attack    Acute gastritis without hemorrhage    UTI symptoms    Absolute anemia    Essential hypertension    Mixed hyperlipidemia with pervious statin intolerance    Stage 3a chronic kidney disease (CKD)    Cervical radiculopathy    LBBB (left bundle branch block)    Persistent atrial fibrillation    Fatigue    Shortness of breath    Accelerated hypertension with diastolic congestive heart failure, NYHA class 3    Paroxysmal A-fib    Chest pain    Palpitations    Anxiety    Atrial fibrillation with RVR    Coronary artery disease involving coronary bypass graft of  native heart with unstable angina pectoris    Generalized weakness    Failure to thrive in adult    Moderate malnutrition     Past Medical History:   Diagnosis Date    Arthritis     Cancer     BCC @ nose & Left arm    Diabetes type 2, controlled     Diverticulitis     History of GI bleed     History of transfusion     Has had x 5 units.    Hypertension     STAGE 3     Hyponatremia     Required hospitalization    Hypothyroidism     Knee arthropathy 09/30/2020    LPRD (laryngopharyngeal reflux disease)     Pharyngeal dysphagia     Thyroid nodule      Past Surgical History:   Procedure Laterality Date    APPENDECTOMY      BASAL CELL CARCINOMA EXCISION      BELPHAROPTOSIS REPAIR      BREAST CYST EXCISION      CARDIAC CATHETERIZATION      CARDIAC CATHETERIZATION Right 6/24/2021    Procedure: Left Heart Cath R radial, US guided w poss intervention;  Surgeon: Mak Nickerson DO;  Location:  [facility] C[facility];  Service: Cardiovascular;  Laterality: Right;    CARDIAC CATHETERIZATION      CARDIAC CATHETERIZATION N/A 9/20/2024    Procedure: Left Heart Cath;  Surgeon: Mak Nickerson DO;  Location:  [facility] CATH [facility];  Service: Cardiovascular;  Laterality: N/A;    CATARACT EXTRACTION      CATARACT EXTRACTION WITH INTRAOCULAR LENS IMPLANT Bilateral     CHOLECYSTECTOMY      COLONOSCOPY Left 11/1/2016    Tubular adenoma cecum, Diverticulosis repeat prn    SUBTOTAL HYSTERECTOMY      TOTAL KNEE ARTHROPLASTY Right 9/30/2020    Procedure: TOTAL KNEE ARTHROPLASTY;  Surgeon: Mak Pickens MD;  Location:  [facility] OR;  Service: Orthopedics;  Laterality: Right;    TUMOR EXCISION      under armpits and left breast     PT Assessment (Last 12 Hours)       PT Evaluation and Treatment       Row Name 10/04/24 4473          Physical Therapy Time and Intention    Subjective Information complains of;weakness  -AH     Document Type therapy note (daily note)  -     Mode of Treatment physical therapy   -[facility] Name 10/04/24 1327          General Information    Existing Precautions/Restrictions fall  -[facility] Name 10/04/24 1327          Pain    Pretreatment Pain Rating 0/10 - no pain  -     Posttreatment Pain Rating 0/10 - no pain  -[facility] Name 10/04/24 1327          Bed Mobility    Comment, (Bed Mobility) CHAIR  -[facility] Name 10/04/24 1327          Sit-Stand Transfer    Sit-Stand [place] (Transfers) verbal cues;standby assist  -[facility] Name 10/04/24 1327          Stand-Sit Transfer    Stand-Sit [place] (Transfers) supervision  -[facility] Name 10/04/24 1327          Gait/Stairs (Locomotion)    [place] Level (Gait) contact guard;standby assist  -     Assistive Device (Gait) walker, front-wheeled  -     Distance in Feet (Gait) 100  100 X 2  -     Pattern (Gait) step-through  -     Comment, (Gait/Stairs) LLE did not buckle today  -[facility] Name 10/04/24 1327          Safety Issues, Functional Mobility    Impairments Affecting Function (Mobility) balance;pain;strength  -[facility] Name 10/04/24 1327          Positioning and Restraints    Pre-Treatment Position sitting in chair/recliner  -     Post Treatment Position chair  -     In Chair reclined;call light within reach;encouraged to call for assist  -               User Key  (r) = Recorded By, (t) = Taken By, (c) = Cosigned By      Initials Name Provider Type     Ashlee Lyles PTA Physical Therapist Assistant                    Physical Therapy Education       Title: PT OT SLP Therapies (Done)       Topic: Physical Therapy (Done)       Point: Mobility training (Done)       Learning Progress Summary             Patient Acceptance, TB,E, VU,DU by KS at 10/1/2024 1404    Acceptance, E,TB, VU,DU by KS at 9/30/2024 1610    Acceptance, E, VU by AR at 9/28/2024 0238    Acceptance, E, VU by KARAN at 9/27/2024 1002    Comment: role of PT, fallrisk, benefits of OOB activity, beginning HEP                          Point: Home exercise program (Done)       Learning Progress Summary             Patient Acceptance, TB,E, VU,DU by KS at 10/1/2024 1404    Acceptance, E,TB, VU,DU by KS at 9/30/2024 1610    Acceptance, E, VU by AJ at 9/27/2024 1002    Comment: role of PT, fallrisk, benefits of OOB activity, beginning HEP                         Point: Body mechanics (Done)       Learning Progress Summary             Patient Acceptance, TB,E, VU,DU by KS at 10/1/2024 1404    Acceptance, E,TB, VU,DU by KS at 9/30/2024 1610    Acceptance, E, VU by AJ at 9/27/2024 1002    Comment: role of PT, fallrisk, benefits of OOB activity, beginning HEP                         Point: Precautions (Done)       Learning Progress Summary             Patient Acceptance, TB,E, VU,DU by KS at 10/1/2024 1404    Acceptance, E,TB, VU,DU by KS at 9/30/2024 1610    Acceptance, E, VU by AJ at 9/27/2024 1002    Comment: role of PT, fallrisk, benefits of OOB activity, beginning HEP                                         User Key       Initials Effective Dates Name Provider Type Discipline    KS 02/27/23 -  Carmel King, RN Registered Nurse Nurse    AR 05/24/22 -  Soha Gottlieb, KHANG Registered Nurse Nurse     08/15/24 -  Donavan Smith, PT DPT Physical Therapist PT                  PT Recommendation and Plan         Outcome Measures       Row Name 10/04/24 1300             How much help from another person do you currently need...    Turning from your back to your side while in flat bed without using bedrails? 4  -AH      Moving from lying on back to sitting on the side of a flat bed without bedrails? 4  -AH      Moving to and from a bed to a chair (including a wheelchair)? 3  -AH      Standing up from a chair using your arms (e.g., wheelchair, bedside chair)? 3  -AH      Climbing 3-5 steps with a railing? 3  -AH      To walk in hospital room? 3  -AH      AM-PAC 6 Clicks Score (PT) 20  -AH      Highest Level of Mobility Goal 6 --> Walk 10 steps or more   -         Functional Assessment    Outcome Measure Options AM-PAC 6 Clicks Basic Mobility (PT)  -                User Key  (r) = Recorded By, (t) = Taken By, (c) = Cosigned By      Initials Name Provider Type    Ashlee Macias PTA Physical Therapist Assistant                     Time Calculation:    PT Charges       Row Name 10/04/24 1339             Time Calculation    Start Time 1327  -      Stop Time 1340  -      Time Calculation (min) 13 min  -      PT Received On 10/04/24  -         Time Calculation- PT    Total Timed Code Minutes- PT 13 minute(s)  -         Timed Charges    95541 - Gait Training Minutes  13  -AH         Total Minutes    Timed Charges Total Minutes 13  -       Total Minutes 13  -                User Key  (r) = Recorded By, (t) = Taken By, (c) = Cosigned By      Initials Name Provider Type    Ashlee Macias PTA Physical Therapist Assistant                  Therapy Charges for Today       Code Description Service Date Service Provider Modifiers Qty    [id number] HC GAIT TRAINING EA 15 MIN 10/4/2024 Ashlee Lyles PTA GP 1            PT G-Codes  Outcome Measure Options: AM-PAC 6 Clicks Basic Mobility (PT)  AM-PAC 6 Clicks Score (PT): 20  AM-PAC 6 Clicks Score (OT): 21    Ashlee Lyles PTA  10/4/2024

## 2024-10-04 NOTE — PLAN OF CARE
Goal Outcome Evaluation:      Alert and oriented. Gave tylenol at bedtime for c/o neck pain, see MAR for details. Ambulated to bathroom with walker and standby assist. VSS.

## 2024-10-04 NOTE — CASE MANAGEMENT/SOCIAL WORK
Continued Stay Note   [facility]     Patient Name: Nedra Tolliver  MRN: [id number]  Today's Date: 10/4/2024    Admit Date: 9/26/2024    Plan: Home with HH vs SNF   Discharge Plan       Row Name 10/04/24 1219       Plan    Final Discharge Disposition Code 01 - home or self-care    Final Note Pt is being dcd home today with no dc needs. Pt plans to do outpt therapy.                   Discharge Codes    No documentation.                 Expected Discharge Date and Time       Expected Discharge Date Expected Discharge Time    Oct 4, 2024               KONG Hsu

## 2024-10-04 NOTE — PLAN OF CARE
Goal Outcome Evaluation:  Plan of Care Reviewed With: patient      Pt. A&Ox4, VSS, improved strength, standby assist to bathroom and chair, worked with therapy, ambulated in blanc, SCD's , RA, D/C order to home with outpatient PT.  1539- Pt taken out via wheelchair with belongings to family.  Progress: no change

## 2024-10-05 NOTE — THERAPY DISCHARGE NOTE
Acute Care - Occupational Therapy Discharge Summary  [facility]     Patient Name: Nedra Tolliver  : 1935  MRN: [id number]    Today's Date: 10/5/2024  Onset of Illness/Injury or Date of Surgery: 24    Date of Referral to OT: 24  Referring Physician: Dr. Tolbert      Admit Date: 2024        OT Recommendation and Plan    Visit Dx:    ICD-10-CM ICD-9-CM   1. Generalized weakness  R53.1 780.79   2. Left-sided weakness  R53.1 728.87   3. Impaired mobility [Z74.09]  Z74.09 799.89                OT Rehab Goals       Row Name 10/05/24 1500             Transfer Goal 1 (OT)    Activity/Assistive Device (Transfer Goal 1, OT) toilet;tub;tub bench  -TS      [place] Level/Cues Needed (Transfer Goal 1, OT) standby assist  -TS      Time Frame (Transfer Goal 1, OT) long term goal (LTG);10 days  -TS      Progress/Outcome (Transfer Goal 1, OT) goal not met  -TS         Bathing Goal 1 (OT)    Activity/Device (Bathing Goal 1, OT) bathing skills, all;tub bench  -TS      [place] Level/Cues Needed (Bathing Goal 1, OT) standby assist  -TS      Time Frame (Bathing Goal 1, OT) long term goal (LTG);10 days  -TS      Progress/Outcomes (Bathing Goal 1, OT) goal met  -TS         Strength Goal 1 (OT)    Strength Goal 1 (OT) Pt will increase BUE strength to 4/5 to increase independence with ADL  -TS      Time Frame (Strength Goal 1, OT) long term goal (LTG);10 days  -TS      Progress/Outcome (Strength Goal 1, OT) goal met  -TS                User Key  (r) = Recorded By, (t) = Taken By, (c) = Cosigned By      Initials Name Provider Type Discipline    Sayra Haywood COTA Occupational Therapist Assistant OT                     Outcome Measures       Row Name 10/04/24 1300             How much help from another person do you currently need...    Turning from your back to your side while in flat bed without using bedrails? 4  -AH      Moving from lying on back to sitting on the side of a flat bed without  bedrails? 4  -AH      Moving to and from a bed to a chair (including a wheelchair)? 3  -AH      Standing up from a chair using your arms (e.g., wheelchair, bedside chair)? 3  -AH      Climbing 3-5 steps with a railing? 3  -AH      To walk in hospital room? 3  -AH      AM-PAC 6 Clicks Score (PT) 20  -      Highest Level of Mobility Goal 6 --> Walk 10 steps or more  -         Functional Assessment    Outcome Measure Options AM-PAC 6 Clicks Basic Mobility (PT)  -                User Key  (r) = Recorded By, (t) = Taken By, (c) = Cosigned By      Initials Name Provider Type     Ashlee Lyles, PTA Physical Therapist Assistant                    Timed Therapy Charges  Total Units: 2      Suggested Charges  Total Units: 2      Procedure Name Documented Minutes Units Code    HC OT SELF CARE/MGMT/TRAIN EA 15 MIN 25 2   98847 (CPT®)                 Documented Minutes  Total Minutes: 25      Therapy Provided Minutes    78085 - OT Self Care/Mgmt Minutes 25                        OT Discharge Summary  Anticipated Discharge Disposition (OT): home with assist  Reason for Discharge: Discharge from facility  Outcomes Achieved: Refer to plan of care for updates on goals achieved  Discharge Destination: Home with assist      YAW Pierre  10/5/2024

## 2024-10-05 NOTE — THERAPY DISCHARGE NOTE
Acute Care - Physical Therapy Discharge Summary  [facility]       Patient Name: Nedra Tolliver  : 1935  MRN: [id number]    Today's Date: 10/5/2024  Onset of Illness/Injury or Date of Surgery: 24       Referring Physician: Dr. Tolbert      Admit Date: 2024      PT Recommendation and Plan    Visit Dx:    ICD-10-CM ICD-9-CM   1. Generalized weakness  R53.1 780.79   2. Left-sided weakness  R53.1 728.87   3. Impaired mobility [Z74.09]  Z74.09 799.89        Outcome Measures       Row Name 10/04/24 1300             How much help from another person do you currently need...    Turning from your back to your side while in flat bed without using bedrails? 4  -AH      Moving from lying on back to sitting on the side of a flat bed without bedrails? 4  -AH      Moving to and from a bed to a chair (including a wheelchair)? 3  -AH      Standing up from a chair using your arms (e.g., wheelchair, bedside chair)? 3  -AH      Climbing 3-5 steps with a railing? 3  -AH      To walk in hospital room? 3  -AH      AM-PAC 6 Clicks Score (PT) 20  -AH      Highest Level of Mobility Goal 6 --> Walk 10 steps or more  -         Functional Assessment    Outcome Measure Options AM-PAC 6 Clicks Basic Mobility (PT)  -                User Key  (r) = Recorded By, (t) = Taken By, (c) = Cosigned By      Initials Name Provider Type     Ashlee Lyles, PTA Physical Therapist Assistant                         PT Rehab Goals       Row Name 10/05/24 1621             Bed Mobility Goal 1 (PT)    Activity/Assistive Device (Bed Mobility Goal 1, PT) rolling to left;rolling to right;sit to supine;supine to sit  -KJ      [place] Level/Cues Needed (Bed Mobility Goal 1, PT) independent  -KJ      Time Frame (Bed Mobility Goal 1, PT) long term goal (LTG);10 days  -KJ      Progress/Outcomes (Bed Mobility Goal 1, PT) goal not met  -KJ         Transfer Goal 1 (PT)    Activity/Assistive Device (Transfer Goal 1, PT)  sit-to-stand/stand-to-sit;bed-to-chair/chair-to-bed;toilet;walk-in shower;wheelchair transfer;car transfer  -KJ      [place] Level/Cues Needed (Transfer Goal 1, PT) standby assist  -KJ      Time Frame (Transfer Goal 1, PT) long term goal (LTG);10 days  -KJ      Progress/Outcome (Transfer Goal 1, PT) goal not met  -KJ         Gait Training Goal 1 (PT)    Activity/Assistive Device (Gait Training Goal 1, PT) gait (walking locomotion);decrease asymmetrical patterns;decrease fall risk;diminish gait deviation;forward stepping;improve balance and speed;increase endurance/gait distance;increase energy conservation;assistive device use;walker, rolling  -KJ      [place] Level (Gait Training Goal 1, PT) standby assist  -KJ      Distance (Gait Training Goal 1, PT) 100' with no pain  -KJ      Time Frame (Gait Training Goal 1, PT) long term goal (LTG);10 days  -KJ      Progress/Outcome (Gait Training Goal 1, PT) goal not met  -KJ         Stairs Goal 1 (PT)    Activity/Assistive Device (Stairs Goal 1, PT) stairs, all skills;ascending stairs;descending stairs;decrease fall risk;improve balance and speed  -KJ      [place] Level/Cues Needed (Stairs Goal 1, PT) contact guard required  -KJ      Number of Stairs (Stairs Goal 1, PT) 2 as needed for home safety if pt plans to d/c home  -KJ      Time Frame (Stairs Goal 1, PT) long term goal (LTG);10 days  -KJ      Progress/Outcome (Stairs Goal 1, PT) goal not met  -KJ         Problem Specific Goal 1 (PT)    Problem Specific Goal 1 (PT) Pt will demo independence with HEP as needed to improve LE strength and decrease fall risk to return to PLOF  -KJ      Time Frame (Problem Specific Goal 1, PT) long-term goal (LTG);by discharge  -KJ      Progress/Outcome (Problem Specific Goal 1, PT) goal not met  -KJ                User Key  (r) = Recorded By, (t) = Taken By, (c) = Cosigned By      Initials Name Provider Type Discipline    Zuleyma Jenkins PTA Physical Therapist Assistant  PT                        PT Discharge Summary  Anticipated Discharge Disposition (PT): home  Reason for Discharge: Discharge from facility  Outcomes Achieved: Refer to plan of care for updates on goals achieved  Discharge Destination: Home      Zuleyma Weston, PTA   10/5/2024

## 2024-10-08 ENCOUNTER — READMISSION MANAGEMENT (OUTPATIENT)
Dept: CALL CENTER | Facility: HOSPITAL | Age: 89
End: 2024-10-08
Payer: MEDICARE

## 2024-10-08 NOTE — OUTREACH NOTE
Medical Week 1 Survey      Flowsheet Row Responses   [facility] patient discharged from? [place]   Does the patient have one of the following disease processes/diagnoses(primary or secondary)? Other   Week 1 attempt successful? No   Unsuccessful attempts Attempt 1            Eri Beasley Registered Nurse

## 2024-10-15 ENCOUNTER — READMISSION MANAGEMENT (OUTPATIENT)
Dept: CALL CENTER | Facility: HOSPITAL | Age: 89
End: 2024-10-15
Payer: MEDICARE

## 2024-10-15 NOTE — OUTREACH NOTE
Medical Week 2 Survey      Flowsheet Row Responses   [facility] facility patient discharged from? [place]   Does the patient have one of the following disease processes/diagnoses(primary or secondary)? Other   Week 2 attempt successful? No   Unsuccessful attempts Attempt 1  [All numbers listed were attempted-no answer]            Zenia H - Registered Nurse

## 2024-10-24 ENCOUNTER — READMISSION MANAGEMENT (OUTPATIENT)
Dept: CALL CENTER | Facility: HOSPITAL | Age: 89
End: 2024-10-24
Payer: MEDICARE

## 2024-10-24 NOTE — OUTREACH NOTE
Medical Week 3 Survey      Flowsheet Row Responses   [facility] patient discharged from? [place]   Does the patient have one of the following disease processes/diagnoses(primary or secondary)? Other   Week 3 attempt successful? Yes   Call start time 1504   Call end time 1514   Discharge diagnosis Generalized weakness   Meds reviewed with patient/caregiver? Yes   Does the patient have all medications ordered at discharge? N/A   Is the patient taking all medications as directed (includes completed medication regime)? Yes   Medication comments noted verapamil discontinued at discharge but pt reports she has taken a few doses since discharge on a prn basis but to htn,  encouraged to discuss med changes and BP issues with MD, encouraged to notify MD of concerns   Does the patient have a primary care provider?  Yes   Does the patient have an appointment with their PCP within 7 days of discharge? Greater than 7 days   Nursing Interventions Verified appointment date/time/provider   Has the patient kept scheduled appointments due by today? Yes   Comments Pt has seen PCP once since discharge and has another appt scheduled on 11/5/24   Has home health visited the patient within 72 hours of discharge? N/A   Home health comments Pt has been attending OP P.T. twice daily   Psychosocial issues? No   Did the patient receive a copy of their discharge instructions? Yes   Nursing interventions Reviewed instructions with patient   What is the patient's perception of their health status since discharge? Same  [Reports she has has some HTN last few days with readings as high as 216/98, took verapamil as noted.  Provided latest reading of 144/63 with pulse of 69. Reports also edema, took prn bumex.  Encoruaged to notify MD with issues.]   Is the patient/caregiver able to teach back signs and symptoms related to disease process for when to call PCP? Yes   Additional teach back comments REviewed stroke type s/s with pt and need to  seek care if present,  v/u.   Week 3 Call Completed? Yes   Graduated Yes   Wrap up additional comments Pt will bring BP log to f/u appt   Call end time 9550            RAMESH PACHECO - Registered Nurse

## 2024-10-30 RX ORDER — APIXABAN 5 MG/1
5 TABLET, FILM COATED ORAL 2 TIMES DAILY
Qty: 180 TABLET | Refills: 3 | Status: SHIPPED | OUTPATIENT
Start: 2024-10-30

## 2024-11-26 RX ORDER — FLECAINIDE ACETATE 50 MG/1
25 TABLET ORAL 2 TIMES DAILY
Qty: 180 TABLET | Refills: 3 | Status: SHIPPED | OUTPATIENT
Start: 2024-11-26

## 2024-11-26 RX ORDER — EVOLOCUMAB 140 MG/ML
140 INJECTION, SOLUTION SUBCUTANEOUS
Qty: 1 ML | Refills: 11 | Status: SHIPPED | OUTPATIENT
Start: 2024-11-26

## 2024-11-26 RX ORDER — BUMETANIDE 1 MG/1
1 TABLET ORAL DAILY PRN
Qty: 90 TABLET | Refills: 3 | Status: SHIPPED | OUTPATIENT
Start: 2024-11-26

## 2025-01-12 ENCOUNTER — APPOINTMENT (OUTPATIENT)
Dept: GENERAL RADIOLOGY | Facility: HOSPITAL | Age: OVER 89
End: 2025-01-12
Payer: MEDICARE

## 2025-01-12 ENCOUNTER — HOSPITAL ENCOUNTER (INPATIENT)
Facility: HOSPITAL | Age: OVER 89
LOS: 3 days | Discharge: HOME | End: 2025-01-15
Attending: STUDENT IN AN ORGANIZED HEALTH CARE EDUCATION/TRAINING PROGRAM
Payer: MEDICARE

## 2025-01-12 ENCOUNTER — APPOINTMENT (OUTPATIENT)
Dept: CARDIOLOGY | Facility: HOSPITAL | Age: OVER 89
End: 2025-01-12
Payer: MEDICARE

## 2025-01-12 DIAGNOSIS — R79.89 ELEVATED TROPONIN: Primary | ICD-10-CM

## 2025-01-12 DIAGNOSIS — R42 DIZZINESS: ICD-10-CM

## 2025-01-12 DIAGNOSIS — N30.90 CYSTITIS: ICD-10-CM

## 2025-01-12 DIAGNOSIS — Z74.09 IMPAIRED MOBILITY: ICD-10-CM

## 2025-01-12 LAB
ALBUMIN SERPL-MCNC: 4 G/DL (ref 3.5–5.2)
ALBUMIN SERPL-MCNC: 4.1 G/DL (ref 3.5–5.2)
ALBUMIN/GLOB SERPL: 1.4 G/DL
ALBUMIN/GLOB SERPL: 1.5 G/DL
ALP SERPL-CCNC: 71 U/L (ref 39–117)
ALP SERPL-CCNC: 72 U/L (ref 39–117)
ALT SERPL W P-5'-P-CCNC: 12 U/L (ref 1–33)
ALT SERPL W P-5'-P-CCNC: 13 U/L (ref 1–33)
ANION GAP SERPL CALCULATED.3IONS-SCNC: 11 MMOL/L (ref 5–15)
ANION GAP SERPL CALCULATED.3IONS-SCNC: 14 MMOL/L (ref 5–15)
APTT PPP: 28.7 SECONDS (ref 24.5–36)
APTT PPP: 28.8 SECONDS (ref 24.5–36)
AST SERPL-CCNC: 18 U/L (ref 1–32)
AST SERPL-CCNC: 20 U/L (ref 1–32)
AV MEAN PRESS GRAD SYS DOP V1V2: 5 MMHG
AV VMAX SYS DOP: 148 CM/SEC
B PARAPERT DNA SPEC QL NAA+PROBE: NOT DETECTED
B PERT DNA SPEC QL NAA+PROBE: NOT DETECTED
BACTERIA UR QL AUTO: ABNORMAL /HPF
BASOPHILS # BLD AUTO: 0.05 10*3/MM3 (ref 0–0.2)
BASOPHILS NFR BLD AUTO: 0.8 % (ref 0–1.5)
BH CV ECHO MEAS - AO MAX PG: 8.8 MMHG
BH CV ECHO MEAS - AO ROOT DIAM: 3.3 CM
BH CV ECHO MEAS - AO V2 VTI: 32.9 CM
BH CV ECHO MEAS - EDV(CUBED): 158.3 ML
BH CV ECHO MEAS - EDV(MOD-SP4): 148 ML
BH CV ECHO MEAS - EF(MOD-SP4): 36 %
BH CV ECHO MEAS - ESV(CUBED): 56.2 ML
BH CV ECHO MEAS - ESV(MOD-SP4): 94.7 ML
BH CV ECHO MEAS - FS: 29.2 %
BH CV ECHO MEAS - IVS/LVPW: 1.51 CM
BH CV ECHO MEAS - IVSD: 1.28 CM
BH CV ECHO MEAS - LA DIMENSION: 3.3 CM
BH CV ECHO MEAS - LAT PEAK E' VEL: 3.7 CM/SEC
BH CV ECHO MEAS - LV DIASTOLIC VOL/BSA (35-75): 88.3 CM2
BH CV ECHO MEAS - LV MASS(C)D: 225.1 GRAMS
BH CV ECHO MEAS - LV SYSTOLIC VOL/BSA (12-30): 56.5 CM2
BH CV ECHO MEAS - LVIDD: 5.4 CM
BH CV ECHO MEAS - LVIDS: 3.8 CM
BH CV ECHO MEAS - LVOT AREA: 3.1 CM2
BH CV ECHO MEAS - LVOT DIAM: 2 CM
BH CV ECHO MEAS - LVPWD: 0.85 CM
BH CV ECHO MEAS - MED PEAK E' VEL: 5.1 CM/SEC
BH CV ECHO MEAS - MV A MAX VEL: 107 CM/SEC
BH CV ECHO MEAS - MV DEC TIME: 0.15 SEC
BH CV ECHO MEAS - MV E MAX VEL: 58.7 CM/SEC
BH CV ECHO MEAS - MV E/A: 0.55
BH CV ECHO MEAS - RAP SYSTOLE: 3 MMHG
BH CV ECHO MEAS - RVSP: 31.1 MMHG
BH CV ECHO MEAS - SV(MOD-SP4): 53.3 ML
BH CV ECHO MEAS - SVI(MOD-SP4): 31.8 ML/M2
BH CV ECHO MEAS - TR MAX PG: 28.1 MMHG
BH CV ECHO MEAS - TR MAX VEL: 230 CM/SEC
BH CV ECHO MEASUREMENTS AVERAGE E/E' RATIO: 13.34
BILIRUB SERPL-MCNC: 0.2 MG/DL (ref 0–1.2)
BILIRUB SERPL-MCNC: 0.3 MG/DL (ref 0–1.2)
BILIRUB UR QL STRIP: NEGATIVE
BUN SERPL-MCNC: 21 MG/DL (ref 8–23)
BUN SERPL-MCNC: 23 MG/DL (ref 8–23)
BUN/CREAT SERPL: 25 (ref 7–25)
BUN/CREAT SERPL: 25.3 (ref 7–25)
C PNEUM DNA NPH QL NAA+NON-PROBE: NOT DETECTED
CALCIUM SPEC-SCNC: 9 MG/DL (ref 8.6–10.5)
CALCIUM SPEC-SCNC: 9.4 MG/DL (ref 8.6–10.5)
CHLORIDE SERPL-SCNC: 100 MMOL/L (ref 98–107)
CHLORIDE SERPL-SCNC: 95 MMOL/L (ref 98–107)
CHOLEST SERPL-MCNC: 202 MG/DL (ref 0–200)
CLARITY UR: CLEAR
CO2 SERPL-SCNC: 22 MMOL/L (ref 22–29)
CO2 SERPL-SCNC: 23 MMOL/L (ref 22–29)
COLOR UR: YELLOW
CREAT SERPL-MCNC: 0.84 MG/DL (ref 0.57–1)
CREAT SERPL-MCNC: 0.91 MG/DL (ref 0.57–1)
DEPRECATED RDW RBC AUTO: 41.1 FL (ref 37–54)
DEPRECATED RDW RBC AUTO: 42.1 FL (ref 37–54)
EGFRCR SERPLBLD CKD-EPI 2021: 60.4 ML/MIN/1.73
EGFRCR SERPLBLD CKD-EPI 2021: 66.5 ML/MIN/1.73
EOSINOPHIL # BLD AUTO: 0.35 10*3/MM3 (ref 0–0.4)
EOSINOPHIL NFR BLD AUTO: 5.5 % (ref 0.3–6.2)
ERYTHROCYTE [DISTWIDTH] IN BLOOD BY AUTOMATED COUNT: 12.2 % (ref 12.3–15.4)
ERYTHROCYTE [DISTWIDTH] IN BLOOD BY AUTOMATED COUNT: 12.3 % (ref 12.3–15.4)
FLUAV SUBTYP SPEC NAA+PROBE: NOT DETECTED
FLUBV RNA ISLT QL NAA+PROBE: NOT DETECTED
GEN 5 1HR TROPONIN T REFLEX: 27 NG/L
GLOBULIN UR ELPH-MCNC: 2.8 GM/DL
GLOBULIN UR ELPH-MCNC: 2.8 GM/DL
GLUCOSE BLDC GLUCOMTR-MCNC: 101 MG/DL (ref 70–130)
GLUCOSE BLDC GLUCOMTR-MCNC: 144 MG/DL (ref 70–130)
GLUCOSE BLDC GLUCOMTR-MCNC: 180 MG/DL (ref 70–130)
GLUCOSE BLDC GLUCOMTR-MCNC: 95 MG/DL (ref 70–130)
GLUCOSE SERPL-MCNC: 119 MG/DL (ref 65–99)
GLUCOSE SERPL-MCNC: 143 MG/DL (ref 65–99)
GLUCOSE UR STRIP-MCNC: NEGATIVE MG/DL
HADV DNA SPEC NAA+PROBE: NOT DETECTED
HCOV 229E RNA SPEC QL NAA+PROBE: NOT DETECTED
HCOV HKU1 RNA SPEC QL NAA+PROBE: NOT DETECTED
HCOV NL63 RNA SPEC QL NAA+PROBE: NOT DETECTED
HCOV OC43 RNA SPEC QL NAA+PROBE: NOT DETECTED
HCT VFR BLD AUTO: 36.5 % (ref 34–46.6)
HCT VFR BLD AUTO: 37.7 % (ref 34–46.6)
HDLC SERPL-MCNC: 75 MG/DL (ref 40–60)
HGB BLD-MCNC: 12.1 G/DL (ref 12–15.9)
HGB BLD-MCNC: 12.8 G/DL (ref 12–15.9)
HGB UR QL STRIP.AUTO: NEGATIVE
HMPV RNA NPH QL NAA+NON-PROBE: NOT DETECTED
HOLD SPECIMEN: NORMAL
HOLD SPECIMEN: NORMAL
HPIV1 RNA ISLT QL NAA+PROBE: NOT DETECTED
HPIV2 RNA SPEC QL NAA+PROBE: NOT DETECTED
HPIV3 RNA NPH QL NAA+PROBE: NOT DETECTED
HPIV4 P GENE NPH QL NAA+PROBE: NOT DETECTED
HYALINE CASTS UR QL AUTO: ABNORMAL /LPF
IMM GRANULOCYTES # BLD AUTO: 0.01 10*3/MM3 (ref 0–0.05)
IMM GRANULOCYTES NFR BLD AUTO: 0.2 % (ref 0–0.5)
INR PPP: 1.29 (ref 0.91–1.09)
INR PPP: 1.44 (ref 0.91–1.09)
KETONES UR QL STRIP: NEGATIVE
LDLC SERPL CALC-MCNC: 104 MG/DL (ref 0–100)
LDLC/HDLC SERPL: 1.34 {RATIO}
LEFT ATRIUM VOLUME INDEX: 28.5 ML/M2
LEFT ATRIUM VOLUME: 47.9 ML
LEUKOCYTE ESTERASE UR QL STRIP.AUTO: ABNORMAL
LYMPHOCYTES # BLD AUTO: 1.87 10*3/MM3 (ref 0.7–3.1)
LYMPHOCYTES NFR BLD AUTO: 29.1 % (ref 19.6–45.3)
M PNEUMO IGG SER IA-ACNC: NOT DETECTED
MCH RBC QN AUTO: 30.8 PG (ref 26.6–33)
MCH RBC QN AUTO: 31.2 PG (ref 26.6–33)
MCHC RBC AUTO-ENTMCNC: 33.2 G/DL (ref 31.5–35.7)
MCHC RBC AUTO-ENTMCNC: 34 G/DL (ref 31.5–35.7)
MCV RBC AUTO: 92 FL (ref 79–97)
MCV RBC AUTO: 92.9 FL (ref 79–97)
MONOCYTES # BLD AUTO: 0.82 10*3/MM3 (ref 0.1–0.9)
MONOCYTES NFR BLD AUTO: 12.8 % (ref 5–12)
NEUTROPHILS NFR BLD AUTO: 3.32 10*3/MM3 (ref 1.7–7)
NEUTROPHILS NFR BLD AUTO: 51.6 % (ref 42.7–76)
NITRITE UR QL STRIP: NEGATIVE
NRBC BLD AUTO-RTO: 0 /100 WBC (ref 0–0.2)
NT-PROBNP SERPL-MCNC: 1192 PG/ML (ref 0–1800)
PH UR STRIP.AUTO: 8 [PH] (ref 5–8)
PLATELET # BLD AUTO: 256 10*3/MM3 (ref 140–450)
PLATELET # BLD AUTO: 256 10*3/MM3 (ref 140–450)
PMV BLD AUTO: 9.4 FL (ref 6–12)
PMV BLD AUTO: 9.4 FL (ref 6–12)
POTASSIUM SERPL-SCNC: 3.6 MMOL/L (ref 3.5–5.2)
POTASSIUM SERPL-SCNC: 4 MMOL/L (ref 3.5–5.2)
PROT SERPL-MCNC: 6.8 G/DL (ref 6–8.5)
PROT SERPL-MCNC: 6.9 G/DL (ref 6–8.5)
PROT UR QL STRIP: NEGATIVE
PROTHROMBIN TIME: 16.8 SECONDS (ref 11.8–14.8)
PROTHROMBIN TIME: 18.4 SECONDS (ref 11.8–14.8)
RBC # BLD AUTO: 3.93 10*6/MM3 (ref 3.77–5.28)
RBC # BLD AUTO: 4.1 10*6/MM3 (ref 3.77–5.28)
RBC # UR STRIP: ABNORMAL /HPF
REF LAB TEST METHOD: ABNORMAL
RHINOVIRUS RNA SPEC NAA+PROBE: NOT DETECTED
RSV RNA NPH QL NAA+NON-PROBE: NOT DETECTED
SARS-COV-2 RNA RESP QL NAA+PROBE: NOT DETECTED
SODIUM SERPL-SCNC: 132 MMOL/L (ref 136–145)
SODIUM SERPL-SCNC: 133 MMOL/L (ref 136–145)
SP GR UR STRIP: 1.01 (ref 1–1.03)
SQUAMOUS #/AREA URNS HPF: ABNORMAL /HPF
TRIGL SERPL-MCNC: 133 MG/DL (ref 0–150)
TROPONIN T % DELTA: 50 %
TROPONIN T NUMERIC DELTA: 9 NG/L
TROPONIN T SERPL HS-MCNC: 18 NG/L
UROBILINOGEN UR QL STRIP: ABNORMAL
VLDLC SERPL-MCNC: 23 MG/DL (ref 5–40)
WBC # UR STRIP: ABNORMAL /HPF
WBC NRBC COR # BLD AUTO: 5.89 10*3/MM3 (ref 3.4–10.8)
WBC NRBC COR # BLD AUTO: 6.42 10*3/MM3 (ref 3.4–10.8)
WHOLE BLOOD HOLD COAG: NORMAL
WHOLE BLOOD HOLD SPECIMEN: NORMAL

## 2025-01-12 PROCEDURE — 82948 REAGENT STRIP/BLOOD GLUCOSE: CPT

## 2025-01-12 PROCEDURE — 97165 OT EVAL LOW COMPLEX 30 MIN: CPT | Performed by: OCCUPATIONAL THERAPIST

## 2025-01-12 PROCEDURE — 93306 TTE W/DOPPLER COMPLETE: CPT | Performed by: INTERNAL MEDICINE

## 2025-01-12 PROCEDURE — 80061 LIPID PANEL: CPT | Performed by: INTERNAL MEDICINE

## 2025-01-12 PROCEDURE — 83880 ASSAY OF NATRIURETIC PEPTIDE: CPT | Performed by: STUDENT IN AN ORGANIZED HEALTH CARE EDUCATION/TRAINING PROGRAM

## 2025-01-12 PROCEDURE — 93005 ELECTROCARDIOGRAM TRACING: CPT | Performed by: STUDENT IN AN ORGANIZED HEALTH CARE EDUCATION/TRAINING PROGRAM

## 2025-01-12 PROCEDURE — 71045 X-RAY EXAM CHEST 1 VIEW: CPT

## 2025-01-12 PROCEDURE — 80053 COMPREHEN METABOLIC PANEL: CPT | Performed by: STUDENT IN AN ORGANIZED HEALTH CARE EDUCATION/TRAINING PROGRAM

## 2025-01-12 PROCEDURE — 93010 ELECTROCARDIOGRAM REPORT: CPT | Performed by: INTERNAL MEDICINE

## 2025-01-12 PROCEDURE — 85730 THROMBOPLASTIN TIME PARTIAL: CPT

## 2025-01-12 PROCEDURE — 85730 THROMBOPLASTIN TIME PARTIAL: CPT | Performed by: STUDENT IN AN ORGANIZED HEALTH CARE EDUCATION/TRAINING PROGRAM

## 2025-01-12 PROCEDURE — 84484 ASSAY OF TROPONIN QUANT: CPT | Performed by: STUDENT IN AN ORGANIZED HEALTH CARE EDUCATION/TRAINING PROGRAM

## 2025-01-12 PROCEDURE — 80053 COMPREHEN METABOLIC PANEL: CPT

## 2025-01-12 PROCEDURE — 87086 URINE CULTURE/COLONY COUNT: CPT | Performed by: STUDENT IN AN ORGANIZED HEALTH CARE EDUCATION/TRAINING PROGRAM

## 2025-01-12 PROCEDURE — 0202U NFCT DS 22 TRGT SARS-COV-2: CPT | Performed by: STUDENT IN AN ORGANIZED HEALTH CARE EDUCATION/TRAINING PROGRAM

## 2025-01-12 PROCEDURE — 85610 PROTHROMBIN TIME: CPT

## 2025-01-12 PROCEDURE — 25510000001 PERFLUTREN 6.52 MG/ML SUSPENSION 2 ML VIAL: Performed by: STUDENT IN AN ORGANIZED HEALTH CARE EDUCATION/TRAINING PROGRAM

## 2025-01-12 PROCEDURE — 85027 COMPLETE CBC AUTOMATED: CPT

## 2025-01-12 PROCEDURE — 99285 EMERGENCY DEPT VISIT HI MDM: CPT

## 2025-01-12 PROCEDURE — 85610 PROTHROMBIN TIME: CPT | Performed by: STUDENT IN AN ORGANIZED HEALTH CARE EDUCATION/TRAINING PROGRAM

## 2025-01-12 PROCEDURE — 93306 TTE W/DOPPLER COMPLETE: CPT

## 2025-01-12 PROCEDURE — 36415 COLL VENOUS BLD VENIPUNCTURE: CPT

## 2025-01-12 PROCEDURE — 81001 URINALYSIS AUTO W/SCOPE: CPT | Performed by: STUDENT IN AN ORGANIZED HEALTH CARE EDUCATION/TRAINING PROGRAM

## 2025-01-12 PROCEDURE — 99222 1ST HOSP IP/OBS MODERATE 55: CPT | Performed by: INTERNAL MEDICINE

## 2025-01-12 PROCEDURE — 85025 COMPLETE CBC W/AUTO DIFF WBC: CPT | Performed by: STUDENT IN AN ORGANIZED HEALTH CARE EDUCATION/TRAINING PROGRAM

## 2025-01-12 RX ORDER — IBUPROFEN 600 MG/1
1 TABLET ORAL
Status: DISCONTINUED | OUTPATIENT
Start: 2025-01-12 | End: 2025-01-15 | Stop reason: HOSPADM

## 2025-01-12 RX ORDER — SODIUM CHLORIDE 0.9 % (FLUSH) 0.9 %
10 SYRINGE (ML) INJECTION EVERY 12 HOURS SCHEDULED
Status: DISCONTINUED | OUTPATIENT
Start: 2025-01-12 | End: 2025-01-15 | Stop reason: HOSPADM

## 2025-01-12 RX ORDER — SULFAMETHOXAZOLE AND TRIMETHOPRIM 800; 160 MG/1; MG/1
1 TABLET ORAL ONCE
Status: COMPLETED | OUTPATIENT
Start: 2025-01-12 | End: 2025-01-12

## 2025-01-12 RX ORDER — PANTOPRAZOLE SODIUM 40 MG/1
40 TABLET, DELAYED RELEASE ORAL
Status: DISCONTINUED | OUTPATIENT
Start: 2025-01-12 | End: 2025-01-15 | Stop reason: HOSPADM

## 2025-01-12 RX ORDER — ACETAMINOPHEN 650 MG/1
650 SUPPOSITORY RECTAL EVERY 4 HOURS PRN
Status: DISCONTINUED | OUTPATIENT
Start: 2025-01-12 | End: 2025-01-15 | Stop reason: HOSPADM

## 2025-01-12 RX ORDER — DEXTROSE MONOHYDRATE 25 G/50ML
25 INJECTION, SOLUTION INTRAVENOUS
Status: DISCONTINUED | OUTPATIENT
Start: 2025-01-12 | End: 2025-01-15 | Stop reason: HOSPADM

## 2025-01-12 RX ORDER — SODIUM CHLORIDE 0.9 % (FLUSH) 0.9 %
10 SYRINGE (ML) INJECTION AS NEEDED
Status: DISCONTINUED | OUTPATIENT
Start: 2025-01-12 | End: 2025-01-15 | Stop reason: HOSPADM

## 2025-01-12 RX ORDER — ACETAMINOPHEN 160 MG/5ML
650 SOLUTION ORAL EVERY 4 HOURS PRN
Status: DISCONTINUED | OUTPATIENT
Start: 2025-01-12 | End: 2025-01-15 | Stop reason: HOSPADM

## 2025-01-12 RX ORDER — LEVOTHYROXINE SODIUM 50 UG/1
50 TABLET ORAL
Status: DISCONTINUED | OUTPATIENT
Start: 2025-01-12 | End: 2025-01-15 | Stop reason: HOSPADM

## 2025-01-12 RX ORDER — SODIUM CHLORIDE 9 MG/ML
40 INJECTION, SOLUTION INTRAVENOUS AS NEEDED
Status: DISCONTINUED | OUTPATIENT
Start: 2025-01-12 | End: 2025-01-15 | Stop reason: HOSPADM

## 2025-01-12 RX ORDER — NICOTINE POLACRILEX 4 MG
15 LOZENGE BUCCAL
Status: DISCONTINUED | OUTPATIENT
Start: 2025-01-12 | End: 2025-01-15 | Stop reason: HOSPADM

## 2025-01-12 RX ORDER — FLECAINIDE ACETATE 50 MG/1
25 TABLET ORAL 2 TIMES DAILY
Status: DISCONTINUED | OUTPATIENT
Start: 2025-01-12 | End: 2025-01-15 | Stop reason: HOSPADM

## 2025-01-12 RX ORDER — SULFAMETHOXAZOLE AND TRIMETHOPRIM 800; 160 MG/1; MG/1
1 TABLET ORAL EVERY 12 HOURS SCHEDULED
Status: DISCONTINUED | OUTPATIENT
Start: 2025-01-12 | End: 2025-01-13 | Stop reason: ALTCHOICE

## 2025-01-12 RX ORDER — NITROGLYCERIN 0.4 MG/1
0.4 TABLET SUBLINGUAL
Status: DISCONTINUED | OUTPATIENT
Start: 2025-01-12 | End: 2025-01-15 | Stop reason: HOSPADM

## 2025-01-12 RX ORDER — INSULIN LISPRO 100 [IU]/ML
2-7 INJECTION, SOLUTION INTRAVENOUS; SUBCUTANEOUS
Status: DISCONTINUED | OUTPATIENT
Start: 2025-01-12 | End: 2025-01-15 | Stop reason: HOSPADM

## 2025-01-12 RX ORDER — ACETAMINOPHEN 325 MG/1
650 TABLET ORAL EVERY 4 HOURS PRN
Status: DISCONTINUED | OUTPATIENT
Start: 2025-01-12 | End: 2025-01-15 | Stop reason: HOSPADM

## 2025-01-12 RX ADMIN — Medication 10 ML: at 08:12

## 2025-01-12 RX ADMIN — FLECAINIDE ACETATE 25 MG: 50 TABLET ORAL at 08:11

## 2025-01-12 RX ADMIN — LEVOTHYROXINE SODIUM 50 MCG: 0.05 TABLET ORAL at 06:04

## 2025-01-12 RX ADMIN — PANTOPRAZOLE SODIUM 40 MG: 40 TABLET, DELAYED RELEASE ORAL at 06:04

## 2025-01-12 RX ADMIN — SULFAMETHOXAZOLE AND TRIMETHOPRIM 1 TABLET: 800; 160 TABLET ORAL at 08:11

## 2025-01-12 RX ADMIN — FLECAINIDE ACETATE 25 MG: 50 TABLET ORAL at 21:41

## 2025-01-12 RX ADMIN — PERFLUTREN 10 ML: 6.52 INJECTION, SUSPENSION INTRAVENOUS at 16:11

## 2025-01-12 RX ADMIN — SULFAMETHOXAZOLE AND TRIMETHOPRIM 1 TABLET: 800; 160 TABLET ORAL at 21:41

## 2025-01-12 RX ADMIN — SULFAMETHOXAZOLE AND TRIMETHOPRIM 1 TABLET: 800; 160 TABLET ORAL at 02:32

## 2025-01-12 RX ADMIN — Medication 10 ML: at 21:41

## 2025-01-12 NOTE — PLAN OF CARE
Goal Outcome Evaluation:  Plan of Care Reviewed With: patient        Progress: no change  Outcome Evaluation: OT eval complete.  Pt. is AxO x 4.  She reports feelings of dizziness, weakness, fatigue, & minor back pain. Ms. Tolliver makes several self limiting statements and benefits from encouragement for fxl eval.  She was able to report her PLOF and home environment set up. OTR provided rwx as pt typically uses a rollator.  Pt. SBA-CGA for all fxl mob & toileting.  Anticipate min assist for high level ADLs like LBD & TBB. Pt. reports dizziness with standing, imbalance, decreased act sergei, & self limiting behaviors.  She had 1 LOB but was able to correct.  OTR encouraged use of BSC or commode with staff to increase activity level.  Pt. requesting pure wick 2' fatigue.  Cont OT tx to increase her act sergei, reduce her risk of falls, improve her balance, provide edu to improve her safety, & increase her overall fxn/fxl capacity.  Anticiapte home with assist vs. subacute rehab.    Anticipated Discharge Disposition (OT): sub acute care setting

## 2025-01-12 NOTE — PROGRESS NOTES
Brief Progress Note:    Nedra Tolliver is a 89 y.o. female with pmh of A-fib on Eliquis, T2DM, hypertension, hypothyroidism, GIB who presented with plaint of lightheadedness.  She reportedly had an episode of epistaxis at the nursing home.  She states she feels better today.  But complains of shortness of breath that she has been having for 1.5 years.    # UTI- Bactrim, follow urine cultures  # Epistaxis - currently no bleeding.  Consider resuming Eliquis tomorrow.  She states she feels as if she has something stuck in her left nostril for several months, may benefit from ENT referral.  # Atrial fibrillation - rate controlled.  Continue flecainide.  Consider resuming Eliquis tomorrow.  # Elevated troponin - evaluated by cardiology.  Recent cath December 2024 with mild nonobstructive CAD.  Follow-up on repeat echo.    Jeffrey Braxton MD   [facility]   01/12/25 14:08 CST

## 2025-01-12 NOTE — CONSULTS
Subjective   Nedra Tolliver is a 89 y.o. female is being seen for consultation today at the request of Dr Park.    Chief Complaint: Epistaxis    HPI: Ms. Tolliver is an 89-year-old female with significant past medical history of atrial fibrillation, chronic anticoagulation, hypertension, hypothyroid, diabetes mellitus type 2, diverticulitis, GI bleed, and arthritis.  She presented to the emergency room for evaluation of epistaxis and dizziness.  She also reports intermittent shortness of breath, chest discomfort,lower abdominal pain, and urinary frequency.  She denies any dizziness, palpitations, syncope, fever, or chills.  She feels like she is a little short of breath right now.  She follows in cardiology clinic with Dr. Nickerson and recently underwent coronary angiography on 9/20/2024 demonstrating mild nonobstructive disease only up to 50%.  Her initial high-sensitivity troponin was elevated at 18 and repeat set to 27.  proBNP is within normal limits for her age.  EKG shows sinus rhythm with her known left bundle branch block.      Patient Active Problem List   Diagnosis    Urinary tract infection without hematuria    Dysuria    Vaginal atrophy    Overactive bladder    Diverticulitis    Benign essential HTN    Thyroid nodule    Hypothyroidism    Hyponatremia    Syncope and collapse    Volume depletion    Type 2 diabetes mellitus, without long-term current use of insulin    Moderate left ankle sprain    Left hip pain    Acute pain of left knee    Pain of left thumb    Nondisplaced fracture of navicular bone of left foot with routine healing    Chest pain at rest    Acute cystitis without hematuria    H/O thyroid nodule    Hypoglycemia    IBS (irritable bowel syndrome)    Idiopathic scoliosis    Insomnia    Mild tricuspid insufficiency    Neuropathy due to type 2 diabetes mellitus    Onychomycosis    Osteopenia    Pericarditis secondary to uremia    Primary osteoarthritis of right knee    Proteinuria    Vitamin  D deficiency    High cholesterol    Lumbar radiculopathy    Non-toxic multinodular goiter    Transient ischemic attack    Acute gastritis without hemorrhage    UTI symptoms    Absolute anemia    Essential hypertension    Mixed hyperlipidemia with pervious statin intolerance    Stage 3a chronic kidney disease (CKD)    Cervical radiculopathy    LBBB (left bundle branch block)    Persistent atrial fibrillation    Fatigue    Shortness of breath    Accelerated hypertension with diastolic congestive heart failure, NYHA class 3    Paroxysmal A-fib    Chest pain    Palpitations    Anxiety    Atrial fibrillation with RVR    Coronary artery disease involving coronary bypass graft of native heart with unstable angina pectoris    Generalized weakness    Failure to thrive in adult    Moderate malnutrition    Dizziness       Past Medical History:   Diagnosis Date    Arthritis     Cancer     BCC @ nose & Left arm    Diabetes type 2, controlled     Diverticulitis     History of GI bleed     History of transfusion     Has had x 5 units.    Hypertension     STAGE 3     Hyponatremia     Required hospitalization    Hypothyroidism     Knee arthropathy 09/30/2020    LPRD (laryngopharyngeal reflux disease)     Pharyngeal dysphagia     Thyroid nodule      Past Surgical History:   Procedure Laterality Date    APPENDECTOMY      BASAL CELL CARCINOMA EXCISION      BELPHAROPTOSIS REPAIR      BREAST CYST EXCISION      CARDIAC CATHETERIZATION      CARDIAC CATHETERIZATION Right 6/24/2021    Procedure: Left Heart Cath R radial, US guided w poss intervention;  Surgeon: Mak Ncikerson DO;  Location:  [facility];  Service: Cardiovascular;  Laterality: Right;    CARDIAC CATHETERIZATION      CARDIAC CATHETERIZATION N/A 9/20/2024    Procedure: Left Heart Cath;  Surgeon: Mak Nickerson DO;  Location:  [facility];  Service: Cardiovascular;  Laterality: N/A;    CATARACT EXTRACTION      CATARACT  "EXTRACTION WITH INTRAOCULAR LENS IMPLANT Bilateral     CHOLECYSTECTOMY      COLONOSCOPY Left 11/1/2016    Tubular adenoma cecum, Diverticulosis repeat prn    SUBTOTAL HYSTERECTOMY      TOTAL KNEE ARTHROPLASTY Right 9/30/2020    Procedure: TOTAL KNEE ARTHROPLASTY;  Surgeon: Mak Pickens MD;  Location: [facility];  Service: Orthopedics;  Laterality: Right;    TUMOR EXCISION      under armpits and left breast       The following portions of the patient's history were reviewed and updated as appropriate: allergies, current medications, past family history, past medical history, past social history, past surgical history, and problem list.    Social History     Socioeconomic History    Marital status:    Tobacco Use    Smoking status: Never    Smokeless tobacco: Never   Vaping Use    Vaping status: Never Used   Substance and Sexual Activity    Alcohol use: No    Drug use: Never    Sexual activity: Defer     Partners: Male       Family History   Problem Relation Age of Onset    Diabetes Mother     Cancer Mother     Heart failure Mother     Diabetes Father     Colon cancer Neg Hx     Colon polyps Neg Hx        Review of Systems: A 12 system review of systems was completed and is negative except stated in HPI.     Objective   /48 (BP Location: Left arm, Patient Position: Lying)   Pulse 62   Temp 97.9 °F (36.6 °C) (Oral)   Resp 16   Ht 160 cm (63\")   Wt 65 kg (143 lb 6.4 oz)   LMP  (LMP Unknown)   SpO2 96%   BMI 25.40 kg/m²     Physical Exam:  Constitutional:       Appearance: Well-developed. Frail.   HENT:      Head: Normocephalic and atraumatic.   Pulmonary:      Effort: Pulmonary effort is normal.      Breath sounds: Normal breath sounds.   Cardiovascular:      Normal rate. Regular rhythm.   Edema:     Peripheral edema absent.   Skin:     General: Skin is warm and dry.   Neurological:      Mental Status: Alert and oriented to person, place, and time.         Lab Results   Component Value Date    " "CKTOTAL 36 09/26/2024    TROPONINT 27 (H) 01/12/2025     Lab Results   Component Value Date    GLUCOSE 143 (H) 01/12/2025    CALCIUM 9.4 01/12/2025     (L) 01/12/2025    K 3.6 01/12/2025    CO2 23.0 01/12/2025    CL 95 (L) 01/12/2025    BUN 21 01/12/2025    CREATININE 0.84 01/12/2025    EGFRIFAFRI 59 (L) 01/17/2022    EGFRIFNONA 49 (L) 01/17/2022    BCR 25.0 01/12/2025    ANIONGAP 14.0 01/12/2025     Lab Results   Component Value Date    WBC 5.89 01/12/2025    HGB 12.8 01/12/2025    HCT 37.7 01/12/2025    MCV 92.0 01/12/2025     01/12/2025     Lab Results   Component Value Date    CHOL 302 (H) 05/09/2021    CHOL 266 (H) 12/30/2019     Lab Results   Component Value Date    TRIG 281 (H) 09/09/2021    TRIG 187 (H) 05/09/2021    TRIG 251 (H) 06/26/2020     Lab Results   Component Value Date    HDL 58 09/09/2021    HDL 56 05/09/2021    HDL 58 06/26/2020     No components found for: \"LDLCALC\"  Lab Results   Component Value Date     (H) 09/09/2021     (H) 05/09/2021     (H) 06/26/2020     -Echo (5/31/2023) EF 66-70%, moderate asymmetric septal hypertrophy, grade 1 diastolic dysfunction, moderate LA enlargement, normal RV size and function, normal RVSP, no significant valve dysfunction  -LHC (9/20/2024) mild disease up to 50% in proximal to mid LAD diffuse proximal LCx stenosis up to 50%, 20 to 30% proximal to mid RCA stenosis, no change compared to angiography 3 years ago  -Chest x-ray (1/12/2025) hypoventilation with vascular crowding, patchy infiltrates in both lung bases, likely atelectasis, stable bronchial wall thickening      Assessment:  1.  Epistaxis  2.  Paroxysmal atrial fibrillation  3.  Elevated troponin elevated high-sensitivity troponin at 18 with repeat at 27.  EKG shows sinus rhythm with her known chronic left bundle branch block.  Recently underwent cardiac catheterization on 9/20/2024 showing mild nonobstructive disease up to 40% with no change compared to prior heart " cath 3 years ago.  4.  Chronic left bundle branch block  5.  Chronic anticoagulation   6.  Urinary tract infection  7.  Dyspnea: Unclear etiology.  Unremarkable chest x-ray and proBNP is within normal limits.    Plan:  -Eliquis on hold.  Resume when felt to be safe from a bleeding standpoint.  -Patient is a candidate for left atrial appendage occlusion with the Watchman device.  This could be considered further on an outpatient basis.  -Order repeat echo  -Continue flecainide  -Her regular cardiologist, Dr. Nickerson, will assume care tomorrow.

## 2025-01-12 NOTE — H&P
[facility] Medicine Services  HISTORY AND PHYSICAL    Date of Admission: 1/12/2025  Primary Care Physician: Edgar Tolbert MD    Subjective   Primary Historian: Patient    Chief Complaint: Nosebleed    History of Present Illness  This is an 89-year-old female patient with a medical history of A-fib on Eliquis, presenting to the ED after she had an episode of epistaxis, and she was feeling lightheaded.  As reported, she was cleaning her nose when she started having bleeding.  She was also having some runny nose.  Upon arrival of the EMS her epistaxis was resolved.  She was also complaining of lower abdominal pain along with urinary frequency.  She denies any fever, chills, chest pain, nausea, vomiting or shortness of breath.        Review of Systems   Otherwise complete ROS reviewed and negative except as mentioned in the HPI.    Past Medical History:   Past Medical History:   Diagnosis Date    Arthritis     Cancer     BCC @ nose & Left arm    Diabetes type 2, controlled     Diverticulitis     History of GI bleed     History of transfusion     Has had x 5 units.    Hypertension     STAGE 3     Hyponatremia     Required hospitalization    Hypothyroidism     Knee arthropathy 09/30/2020    LPRD (laryngopharyngeal reflux disease)     Pharyngeal dysphagia     Thyroid nodule      Past Surgical History:  Past Surgical History:   Procedure Laterality Date    APPENDECTOMY      BASAL CELL CARCINOMA EXCISION      BELPHAROPTOSIS REPAIR      BREAST CYST EXCISION      CARDIAC CATHETERIZATION      CARDIAC CATHETERIZATION Right 6/24/2021    Procedure: Left Heart Cath R radial, US guided w poss intervention;  Surgeon: Mak Nickerson DO;  Location: [facility] CATH INVASIVE LOCATION;  Service: Cardiovascular;  Laterality: Right;    CARDIAC CATHETERIZATION      CARDIAC CATHETERIZATION N/A 9/20/2024    Procedure: Left Heart Cath;  Surgeon: Mak Nickerson DO;  Location: [facility]  "CATH INVASIVE LOCATION;  Service: Cardiovascular;  Laterality: N/A;    CATARACT EXTRACTION      CATARACT EXTRACTION WITH INTRAOCULAR LENS IMPLANT Bilateral     CHOLECYSTECTOMY      COLONOSCOPY Left 11/1/2016    Tubular adenoma cecum, Diverticulosis repeat prn    SUBTOTAL HYSTERECTOMY      TOTAL KNEE ARTHROPLASTY Right 9/30/2020    Procedure: TOTAL KNEE ARTHROPLASTY;  Surgeon: Mak Pickens MD;  Location: [facility] OR;  Service: Orthopedics;  Laterality: Right;    TUMOR EXCISION      under armpits and left breast     Social History:  reports that she has never smoked. She has never used smokeless tobacco. She reports that she does not drink alcohol and does not use drugs.    Family History: family history includes Cancer in her mother; Diabetes in her father and mother; Heart failure in her mother.       Allergies:  Allergies   Allergen Reactions    Codeine Hallucinations    Iodine Anaphylaxis     Xray dye    Levaquin [Levofloxacin] Anaphylaxis    Lortab [Hydrocodone-Acetaminophen] Mental Status Change and Confusion    Quinolones Anaphylaxis     - CIPRO -     Shrimp (Diagnostic) Anaphylaxis    Bactroban [Mupirocin Calcium] Swelling    Ace Inhibitors Cough    Beta Adrenergic Blockers Other (See Comments)     Pt can't remember reaction type    Hydralazine Other (See Comments)     \"Makes her feel bad\" \"head full and weakness\"    Kerendia [Finerenone] Unknown - High Severity     CHEST PAIN    Norvasc [Amlodipine] Swelling and Headache    Piperacillin Sod-Tazobactam So Unknown - High Severity    Procardia Xl [Nifedipine Er] Arrhythmia    Statins Myalgia    Thiazide-Type Diuretics Unknown - High Severity    Mupirocin Rash       Medications:  Prior to Admission medications    Medication Sig Start Date End Date Taking? Authorizing Provider   acetaminophen (TYLENOL) 500 MG tablet Take 1 tablet by mouth Every 6 (Six) Hours As Needed for Mild Pain.    Provider, MD Elma   apixaban (Eliquis) 5 MG tablet tablet Take 1 " "tablet by mouth 2 (Two) Times a Day. 11/26/24   Mak Nickerson DO   bumetanide (BUMEX) 1 MG tablet Take 1 tablet by mouth Daily As Needed (daily as needed for leg swelling). 11/26/24   Mak Nickerson DO   Cholecalciferol (Vitamin D3) 125 MCG (5000 UT) tablet dispersible Take 5,000 Units by mouth Daily.    Elma Yoo MD   Coenzyme Q10 (CO Q 10) 100 MG capsule Take 1 tablet by mouth Daily.    Elma Yoo MD   Evolocumab (Repatha) solution prefilled syringe injection Inject 1 mL under the skin into the appropriate area as directed Every 14 (Fourteen) Days. 11/26/24   Mak Nickerson DO   flecainide (TAMBOCOR) 50 MG tablet Take 0.5 tablets by mouth 2 (Two) Times a Day. RX states 1/2 tab BID 11/26/24   Mak Nickerson DO   glimepiride (AMARYL) 4 MG tablet Take 1 tablet by mouth Every Morning Before Breakfast. Takes an extra 1/2 tablet when blood sugar is high at night    Elma Yoo MD   hydrOXYzine (ATARAX) 25 MG tablet Take 1 tablet by mouth Every 8 (Eight) Hours As Needed for Itching.    Elma Yoo MD   levothyroxine (SYNTHROID, LEVOTHROID) 50 MCG tablet Take 1 tablet by mouth Daily.    Elma Yoo MD   Multiple Vitamins-Minerals (CENTRUM SILVER PO) Take 1 tablet by mouth Daily.    Elma Yoo MD   omeprazole (priLOSEC) 40 MG capsule Take 1 capsule by mouth Daily.    Elma Yoo MD     I have utilized all available immediate resources to obtain, update, or review the patient's current medications (including all prescriptions, over-the-counter products, herbals, cannabis/cannabidiol products, and vitamin/mineral/dietary (nutritional) supplements).    Objective     Vital Signs: /67 (BP Location: Right arm, Patient Position: Sitting)   Pulse 68   Temp 98 °F (36.7 °C) (Oral)   Resp 18   Ht 160 cm (63\")   Wt 71.5 kg (157 lb 11.2 oz)   LMP  (LMP Unknown)   SpO2 93%   BMI 27.94 kg/m²   Physical " Exam  Constitutional:       Appearance: Normal appearance.   Cardiovascular:      Rate and Rhythm: Normal rate and regular rhythm.      Pulses: Normal pulses.      Heart sounds: Normal heart sounds. No murmur heard.  Pulmonary:      Effort: Pulmonary effort is normal. No respiratory distress.      Breath sounds: Normal breath sounds. No wheezing or rales.   Abdominal:      General: Bowel sounds are normal. There is no distension.      Palpations: Abdomen is soft.      Tenderness: There is no abdominal tenderness. There is no guarding.   Musculoskeletal:      Right lower leg: No edema.      Left lower leg: No edema.   Skin:     General: Skin is warm.   Neurological:      General: No focal deficit present.      Mental Status: She is alert and oriented to person, place, and time.              Results Reviewed:  Lab Results (last 24 hours)       Procedure Component Value Units Date/Time    Respiratory Panel PCR w/COVID-19(SARS-CoV-2) [facility] In-House, NP Swab in UTM/VTM, 2 HR TAT - Swab, Nasopharynx [[id number]]  (Normal) Collected: 01/12/25 0045    Specimen: Swab from Nasopharynx Updated: 01/12/25 0147     ADENOVIRUS, PCR Not Detected     Coronavirus 229E Not Detected     Coronavirus HKU1 Not Detected     Coronavirus NL63 Not Detected     Coronavirus OC43 Not Detected     COVID19 Not Detected     Human Metapneumovirus Not Detected     Human Rhinovirus/Enterovirus Not Detected     Influenza A PCR Not Detected     Influenza B PCR Not Detected     Parainfluenza Virus 1 Not Detected     Parainfluenza Virus 2 Not Detected     Parainfluenza Virus 3 Not Detected     Parainfluenza Virus 4 Not Detected     RSV, PCR Not Detected     Bordetella pertussis pcr Not Detected     Bordetella parapertussis PCR Not Detected     Chlamydophila pneumoniae PCR Not Detected     Mycoplasma pneumo by PCR Not Detected    Narrative:      In the setting of a positive respiratory panel with a viral infection PLUS a negative  procalcitonin without other underlying concern for bacterial infection, consider observing off antibiotics or discontinuation of antibiotics and continue supportive care. If the respiratory panel is positive for atypical bacterial infection (Bordetella pertussis, Chlamydophila pneumoniae, or Mycoplasma pneumoniae), consider antibiotic de-escalation to target atypical bacterial infection.    High Sensitivity Troponin T 1Hr [[id number]]  (Abnormal) Collected: 01/12/25 0059    Specimen: Blood Updated: 01/12/25 0129     HS Troponin T 27 ng/L      Troponin T Numeric Delta 9 ng/L      Troponin T % Delta 50 %     Narrative:      High Sensitive Troponin T Reference Range:  <14.0 ng/L- Negative Female for AMI  <22.0 ng/L- Negative Male for AMI  >=14 - Abnormal Female indicating possible myocardial injury.  >=22 - Abnormal Male indicating possible myocardial injury.   Clinicians would have to utilize clinical acumen, EKG, Troponin, and serial changes to determine if it is an Acute Myocardial Infarction or myocardial injury due to an underlying chronic condition.         Urinalysis With Culture If Indicated - Urine, Clean Catch [[id number]]  (Abnormal) Collected: 01/12/25 0042    Specimen: Urine, Clean Catch Updated: 01/12/25 0104     Color, UA Yellow     Appearance, UA Clear     pH, UA 8.0     Specific Gravity, UA 1.006     Glucose, UA Negative     Ketones, UA Negative     Bilirubin, UA Negative     Blood, UA Negative     Protein, UA Negative     Leuk Esterase, UA Large (3+)     Nitrite, UA Negative     Urobilinogen, UA 0.2 E.U./dL    Narrative:      In absence of clinical symptoms, the presence of pyuria, bacteria, and/or nitrites on the urinalysis result does not correlate with infection.    Urinalysis, Microscopic Only - Urine, Clean Catch [[id number]]  (Abnormal) Collected: 01/12/25 0042    Specimen: Urine, Clean Catch Updated: 01/12/25 0104     RBC, UA 3-5 /HPF      WBC, UA 11-20 /HPF      Bacteria, UA 1+ /HPF      Squamous  Epithelial Cells, UA 3-6 /HPF      Hyaline Casts, UA None Seen /LPF      Methodology Manual Light Microscopy    Urine Culture - Urine, Urine, Clean Catch [[id number]] Collected: 01/12/25 0042    Specimen: Urine, Clean Catch Updated: 01/12/25 0104    Comprehensive Metabolic Panel [[id number]]  (Abnormal) Collected: 01/12/25 0009    Specimen: Blood Updated: 01/12/25 0055     Glucose 119 mg/dL      BUN 23 mg/dL      Creatinine 0.91 mg/dL      Sodium 133 mmol/L      Potassium 4.0 mmol/L      Comment: Slight hemolysis detected by analyzer. Result may be falsely elevated.        Chloride 100 mmol/L      CO2 22.0 mmol/L      Calcium 9.0 mg/dL      Total Protein 6.8 g/dL      Albumin 4.0 g/dL      ALT (SGPT) 13 U/L      AST (SGOT) 20 U/L      Comment: Slight hemolysis detected by analyzer. Result may be falsely elevated.        Alkaline Phosphatase 72 U/L      Total Bilirubin 0.2 mg/dL      Globulin 2.8 gm/dL      A/G Ratio 1.4 g/dL      BUN/Creatinine Ratio 25.3     Anion Gap 11.0 mmol/L      eGFR 60.4 mL/min/1.73     Narrative:      GFR Categories in Chronic Kidney Disease (CKD)      GFR Category          GFR (mL/min/1.73)    Interpretation  G1                     90 or greater         Normal or high (1)  G2                      60-89                Mild decrease (1)  G3a                   45-59                Mild to moderate decrease  G3b                   30-44                Moderate to severe decrease  G4                    15-29                Severe decrease  G5                    14 or less           Kidney failure          (1)In the absence of evidence of kidney disease, neither GFR category G1 or G2 fulfill the criteria for CKD.    eGFR calculation 2021 CKD-EPI creatinine equation, which does not include race as a factor    High Sensitivity Troponin T [[id number]]  (Abnormal) Collected: 01/12/25 0009    Specimen: Blood Updated: 01/12/25 0052     HS Troponin T 18 ng/L     Narrative:      High Sensitive Troponin T  Reference Range:  <14.0 ng/L- Negative Female for AMI  <22.0 ng/L- Negative Male for AMI  >=14 - Abnormal Female indicating possible myocardial injury.  >=22 - Abnormal Male indicating possible myocardial injury.   Clinicians would have to utilize clinical acumen, EKG, Troponin, and serial changes to determine if it is an Acute Myocardial Infarction or myocardial injury due to an underlying chronic condition.         BNP [[id number]]  (Normal) Collected: 01/12/25 0009    Specimen: Blood Updated: 01/12/25 0052     proBNP 1,192.0 pg/mL     Narrative:      This assay is used as an aid in the diagnosis of individuals suspected of having heart failure. It can be used as an aid in the diagnosis of acute decompensated heart failure (ADHF) in patients presenting with signs and symptoms of ADHF to the emergency department (ED). In addition, NT-proBNP of <300 pg/mL indicates ADHF is not likely.    Age Range Result Interpretation  NT-proBNP Concentration (pg/mL:      <50             Positive            >450                   Gray                 300-450                    Negative             <300    50-75           Positive            >900                  Gray                300-900                  Negative            <300      >75             Positive            >1800                  Gray                300-1800                  Negative            <300    aPTT [[id number]]  (Normal) Collected: 01/12/25 0009    Specimen: Blood Updated: 01/12/25 0046     PTT 28.8 seconds     Protime-INR [[id number]]  (Abnormal) Collected: 01/12/25 0009    Specimen: Blood Updated: 01/12/25 0046     Protime 18.4 Seconds      INR 1.44    CBC & Differential [[id number]]  (Abnormal) Collected: 01/12/25 0009    Specimen: Blood Updated: 01/12/25 0036    Narrative:      The following orders were created for panel order CBC & Differential.  Procedure                               Abnormality         Status                     ---------                                -----------         ------                     CBC Auto Differential[[id number]]        Abnormal            Final result                 Please view results for these tests on the individual orders.    CBC Auto Differential [[id number]]  (Abnormal) Collected: 01/12/25 0009    Specimen: Blood Updated: 01/12/25 0036     WBC 6.42 10*3/mm3      RBC 3.93 10*6/mm3      Hemoglobin 12.1 g/dL      Hematocrit 36.5 %      MCV 92.9 fL      MCH 30.8 pg      MCHC 33.2 g/dL      RDW 12.2 %      RDW-SD 42.1 fl      MPV 9.4 fL      Platelets 256 10*3/mm3      Neutrophil % 51.6 %      Lymphocyte % 29.1 %      Monocyte % 12.8 %      Eosinophil % 5.5 %      Basophil % 0.8 %      Immature Grans % 0.2 %      Neutrophils, Absolute 3.32 10*3/mm3      Lymphocytes, Absolute 1.87 10*3/mm3      Monocytes, Absolute 0.82 10*3/mm3      Eosinophils, Absolute 0.35 10*3/mm3      Basophils, Absolute 0.05 10*3/mm3      Immature Grans, Absolute 0.01 10*3/mm3      nRBC 0.0 /100 WBC     [place] Draw [[id number]] Collected: 01/12/25 0009    Specimen: Blood Updated: 01/12/25 0015    Narrative:      The following orders were created for panel order [place] Draw.  Procedure                               Abnormality         Status                     ---------                               -----------         ------                     Green Top (Gel)[[id number]]                                  Final result               Lavender Top[[id number]]                                     Final result               Red Top[[id number]]                                          Final result               Light Blue Top[[id number]]                                   Final result                 Please view results for these tests on the individual orders.    Green Top (Gel) [[id number]] Collected: 01/12/25 0009    Specimen: Blood Updated: 01/12/25 0015     Extra Tube Hold for add-ons.     Comment: Auto resulted.       Lavender Top [[id number]] Collected: 01/12/25  0009    Specimen: Blood Updated: 01/12/25 0015     Extra Tube hold for add-on     Comment: Auto resulted       Red Top [[id number]] Collected: 01/12/25 0009    Specimen: Blood Updated: 01/12/25 0015     Extra Tube Hold for add-ons.     Comment: Auto resulted.       Light Blue Top [[id number]] Collected: 01/12/25 0009    Specimen: Blood Updated: 01/12/25 0015     Extra Tube Hold for add-ons.     Comment: Auto resulted             Imaging Results (Last 24 Hours)       Procedure Component Value Units Date/Time    XR Chest 1 View [[id number]] Resulted: 01/12/25 0034     Updated: 01/12/25 0038          I have personally reviewed and interpreted the radiology studies and ECG obtained at time of admission.     Assessment / Plan   Assessment:   There are no active hospital problems to display for this patient.      Treatment Plan  The patient will be admitted to my service here at [facility].     Assessment and plan:  #UTI, cystitis.  #Dizziness.  #Epistaxis.  #Elevated troponin trending up.  #History of LBBB    -Admitting to telemetry.  -ED started her on Bactrim for cystitis, will continue the same pending cultures.  -Placing an order for PT and OT in the morning.  -Given the episode of epistaxis, holding the Eliquis for now.  Day team may choose to resume when they find appropriate.  -Regarding the troponin which is trending up, I placed a consult in a.m. for cardiology.  At the moment, patient is not having chest pain.  Also placed Nitrostat as needed.  -DVT prophylaxis with SCDs for now.      Medical Decision Making  Number and Complexity of problems: 3 and moderate  Differential Diagnosis: As above    Conditions and Status        Condition is worsening.     [facility] Data  External documents reviewed: Yes  Cardiac tracing (EKG, telemetry) interpretation: Yes  Radiology interpretation: Yes  Labs reviewed: yes  Any tests that were considered but not ordered: None     Decision rules/scores evaluated (example  [id number], Wells, etc): Patient already on Eliquis for A-fib.     Discussed with: Patient and the ED provider.     Care Planning  Shared decision making: Yes  Code status and discussions: I discussed CODE STATUS with the patient and she wants to be full code.    Disposition  Social Determinants of Health that impact treatment or disposition: None  Estimated length of stay is 1 to 2 days.    I confirmed that the patient's advanced care plan is present, code status is documented, and a surrogate decision maker is listed in the patient's medical record.       The patient was seen and examined by me on 1/12 at 2:10 AM.    Electronically signed by Blake Park MD, 01/12/25, 02:06 CST.

## 2025-01-12 NOTE — ED PROVIDER NOTES
"Subjective   History of Present Illness  This is an 89-year-old female with history of heart disease, on Eliquis.  Patient called EMS for nosebleeds after nose picking.  Upon EMS arrival no longer having epistaxis, however patient complaining of multiple symptoms including dizziness described as lightheaded, as well as cough, runny nose, as well as increased urinary frequency.  No fever, abdominal pain, nausea, vomiting, chest pain, flank pain.  Patient does report resolved shortness of breath.        Review of Systems   Genitourinary:  Positive for frequency.   Neurological:  Positive for dizziness.   All other systems reviewed and are negative.      Past Medical History:   Diagnosis Date    Arthritis     Cancer     BCC @ nose & Left arm    Diabetes type 2, controlled     Diverticulitis     History of GI bleed     History of transfusion     Has had x 5 units.    Hypertension     STAGE 3     Hyponatremia     Required hospitalization    Hypothyroidism     Knee arthropathy 09/30/2020    LPRD (laryngopharyngeal reflux disease)     Pharyngeal dysphagia     Thyroid nodule        Allergies   Allergen Reactions    Codeine Hallucinations    Iodine Anaphylaxis     Xray dye    Levaquin [Levofloxacin] Anaphylaxis    Lortab [Hydrocodone-Acetaminophen] Mental Status Change and Confusion    Quinolones Anaphylaxis     - CIPRO -     Shrimp (Diagnostic) Anaphylaxis    Bactroban [Mupirocin Calcium] Swelling    Ace Inhibitors Cough    Beta Adrenergic Blockers Other (See Comments)     Pt can't remember reaction type    Hydralazine Other (See Comments)     \"Makes her feel bad\" \"head full and weakness\"    Kerendia [Finerenone] Unknown - High Severity     CHEST PAIN    Norvasc [Amlodipine] Swelling and Headache    Piperacillin Sod-Tazobactam So Unknown - High Severity    Procardia Xl [Nifedipine Er] Arrhythmia    Statins Myalgia    Thiazide-Type Diuretics Unknown - High Severity    Mupirocin Rash       Past Surgical History:   Procedure " Laterality Date    APPENDECTOMY      BASAL CELL CARCINOMA EXCISION      BELPHAROPTOSIS REPAIR      BREAST CYST EXCISION      CARDIAC CATHETERIZATION      CARDIAC CATHETERIZATION Right 6/24/2021    Procedure: Left Heart Cath R radial, US guided w poss intervention;  Surgeon: Mak Nickerson DO;  Location:  [facility]ATH [facility]ASIVE [facility];  Service: Cardiovascular;  Laterality: Right;    CARDIAC CATHETERIZATION      CARDIAC CATHETERIZATION N/A 9/20/2024    Procedure: Left Heart Cath;  Surgeon: Mak Nickerson DO;  Location:  [facility]ATH [facility];  Service: Cardiovascular;  Laterality: N/A;    CATARACT EXTRACTION      CATARACT EXTRACTION WITH INTRAOCULAR LENS IMPLANT Bilateral     CHOLECYSTECTOMY      COLONOSCOPY Left 11/1/2016    Tubular adenoma cecum, Diverticulosis repeat prn    SUBTOTAL HYSTERECTOMY      TOTAL KNEE ARTHROPLASTY Right 9/30/2020    Procedure: TOTAL KNEE ARTHROPLASTY;  Surgeon: Mak Pickens MD;  Location:  [facility] OR;  Service: Orthopedics;  Laterality: Right;    TUMOR EXCISION      under armpits and left breast       Family History   Problem Relation Age of Onset    Diabetes Mother     Cancer Mother     Heart failure Mother     Diabetes Father     Colon cancer Neg Hx     Colon polyps Neg Hx        Social History     Socioeconomic History    Marital status:    Tobacco Use    Smoking status: Never    Smokeless tobacco: Never   Vaping Use    Vaping status: Never Used   Substance and Sexual Activity    Alcohol use: No    Drug use: Never    Sexual activity: Defer     Partners: Male           Objective   Physical Exam  Constitutional:       General: She is not in acute distress.     Appearance: Normal appearance. She is not toxic-appearing.   Eyes:      Extraocular Movements: Extraocular movements intact.      Pupils: Pupils are equal, round, and reactive to light.   Cardiovascular:      Rate and Rhythm: Normal rate and regular rhythm.      Pulses: Normal pulses.    Pulmonary:      Effort: Pulmonary effort is normal. No respiratory distress.      Breath sounds: Normal breath sounds. No wheezing, rhonchi or rales.   Abdominal:      General: Abdomen is flat. Bowel sounds are normal. There is no distension.      Palpations: Abdomen is soft.      Tenderness: There is no abdominal tenderness. There is no right CVA tenderness, left CVA tenderness, guarding or rebound.   Musculoskeletal:         General: Normal range of motion.      Cervical back: Normal range of motion. No rigidity.   Lymphadenopathy:      Cervical: No cervical adenopathy.   Skin:     General: Skin is warm.   Neurological:      General: No focal deficit present.      Mental Status: She is alert and oriented to person, place, and time. Mental status is at baseline.      Cranial Nerves: No cranial nerve deficit.      Sensory: No sensory deficit.      Motor: No weakness.      Gait: Gait normal.      Comments: No focal findings   Psychiatric:         Mood and Affect: Mood normal.         Procedures           ED Course                                                       Medical Decision Making  This is an 89-year-old female is very soft, positive for cardiac history on Eliquis.  Patient was called by EMS for concerns of epistaxis, after picking nose.  On ED arrival negative for concerns of active nose bleeding.  No intervention was done by EMS.  Positive for concerns of mild dizziness described as lightheaded, no focality.  Patient also reports having URI symptoms.  As well as worsening urinary frequency.  No nausea, vomiting, chest pain, flank pain.  She does report mild shortness of breath.  EKG is similar for left bundle branch block pattern, no acute findings.  Pending evaluation with brain natruretic peptide, troponin, CBC, chemistry, respiratory panel.  X-ray chest rule out pneumonia.  UA rule out UTI.    Patient is positive for delta troponin.  No active chest pain, as such would not start heparin.  EKG is  unchanged.  UA concerning for UTI.  Will admit patient for concerns of dizziness, shortness of breath in the setting of positive troponin.  Patient is stable for admission, will call Dr. Xavier Park accepted admit     Problems Addressed:  Cystitis: complicated acute illness or injury  Dizziness: complicated acute illness or injury  Elevated troponin: complicated acute illness or injury    Amount and/or Complexity of Data Reviewed  Labs: ordered.  Radiology: ordered.  ECG/medicine tests: ordered.    Risk  Prescription drug management.  Decision regarding hospitalization.        Final diagnoses:   Elevated troponin   Dizziness   Cystitis       ED Disposition  ED Disposition       ED Disposition   Decision to Admit    Condition   --    Comment   Level of Care: Telemetry [5]   Diagnosis: Dizziness [200430]   Admitting Physician: MINNIE PARK [[id number]]   Attending Physician: MINNIE PARK [[id number]]   Certification: I Certify That Inpatient Hospital Services Are Medically Necessary For Greater Than 2 Midnights                 No follow-up provider specified.       Medication List      No changes were made to your prescriptions during this visit.            Juanpablo Georges MD  01/12/25 0598

## 2025-01-12 NOTE — ED NOTES
"Nursing report ED to floor  Nedra Tolliver  89 y.o.  female    HPI:   Chief Complaint   Patient presents with    Nose Bleed    Dizziness       Admitting doctor:   Blake Park MD    Consulting provider(s):  Consults       No orders found from 12/14/2024 to 1/13/2025.             Admitting diagnosis:   The primary encounter diagnosis was Elevated troponin. Diagnoses of Dizziness and Cystitis were also pertinent to this visit.    Code status:   Current Code Status       Date Active Code Status Order ID Comments User Context       Prior            Allergies:   Codeine, Iodine, Levaquin [levofloxacin], Lortab [hydrocodone-acetaminophen], Quinolones, Shrimp (diagnostic), Bactroban [mupirocin calcium], Ace inhibitors, Beta adrenergic blockers, Hydralazine, Kerendia [finerenone], Norvasc [amlodipine], Piperacillin sod-tazobactam so, Procardia xl [nifedipine er], Statins, Thiazide-type diuretics, and Mupirocin    Intake and Output    Intake/Output Summary (Last 24 hours) at 1/12/2025 0228  Last data filed at 1/12/2025 0057  Gross per 24 hour   Intake --   Output 250 ml   Net -250 ml       Weight:       01/12/25  0046   Weight: 71.5 kg (157 lb 11.2 oz)       Most recent vitals:   Vitals:    01/12/25 0046 01/12/25 0131   BP: 178/80 158/67   BP Location: Right arm Right arm   Patient Position: Sitting Sitting   Pulse: 75 68   Resp: 24 18   Temp: 98 °F (36.7 °C)    TempSrc: Oral    SpO2: 95% 93%   Weight: 71.5 kg (157 lb 11.2 oz)    Height: 160 cm (63\")      Oxygen Therapy: .    Active LDAs/IV Access:   Lines, Drains & Airways       Active LDAs       Name Placement date Placement time Site Days    Peripheral IV 01/12/25 0009 Left;Posterior Hand 01/12/25  0009  Hand  less than 1    External Urinary Catheter 01/12/25 0056  --  less than 1                    Labs (abnormal labs have a star):   Labs Reviewed   COMPREHENSIVE METABOLIC PANEL - Abnormal; Notable for the following components:       Result Value    Glucose 119 (*)  "    Sodium 133 (*)     BUN/Creatinine Ratio 25.3 (*)     All other components within normal limits    Narrative:     GFR Categories in Chronic Kidney Disease (CKD)      GFR Category          GFR (mL/min/1.73)    Interpretation  G1                     90 or greater         Normal or high (1)  G2                      60-89                Mild decrease (1)  G3a                   45-59                Mild to moderate decrease  G3b                   30-44                Moderate to severe decrease  G4                    15-29                Severe decrease  G5                    14 or less           Kidney failure          (1)In the absence of evidence of kidney disease, neither GFR category G1 or G2 fulfill the criteria for CKD.    eGFR calculation 2021 CKD-EPI creatinine equation, which does not include race as a factor   TROPONIN - Abnormal; Notable for the following components:    HS Troponin T 18 (*)     All other components within normal limits    Narrative:     High Sensitive Troponin T Reference Range:  <14.0 ng/L- Negative Female for AMI  <22.0 ng/L- Negative Male for AMI  >=14 - Abnormal Female indicating possible myocardial injury.  >=22 - Abnormal Male indicating possible myocardial injury.   Clinicians would have to utilize clinical acumen, EKG, Troponin, and serial changes to determine if it is an Acute Myocardial Infarction or myocardial injury due to an underlying chronic condition.        PROTIME-INR - Abnormal; Notable for the following components:    Protime 18.4 (*)     INR 1.44 (*)     All other components within normal limits   URINALYSIS W/ CULTURE IF INDICATED - Abnormal; Notable for the following components:    Leuk Esterase, UA Large (3+) (*)     All other components within normal limits    Narrative:     In absence of clinical symptoms, the presence of pyuria, bacteria, and/or nitrites on the urinalysis result does not correlate with infection.   CBC WITH AUTO DIFFERENTIAL - Abnormal; Notable  for the following components:    RDW 12.2 (*)     Monocyte % 12.8 (*)     All other components within normal limits   URINALYSIS, MICROSCOPIC ONLY - Abnormal; Notable for the following components:    RBC, UA 3-5 (*)     WBC, UA 11-20 (*)     Bacteria, UA 1+ (*)     Squamous Epithelial Cells, UA 3-6 (*)     All other components within normal limits   HIGH SENSITIVITIY TROPONIN T 1HR - Abnormal; Notable for the following components:    HS Troponin T 27 (*)     Troponin T Numeric Delta 9 (*)     Troponin T % Delta 50 (*)     All other components within normal limits    Narrative:     High Sensitive Troponin T Reference Range:  <14.0 ng/L- Negative Female for AMI  <22.0 ng/L- Negative Male for AMI  >=14 - Abnormal Female indicating possible myocardial injury.  >=22 - Abnormal Male indicating possible myocardial injury.   Clinicians would have to utilize clinical acumen, EKG, Troponin, and serial changes to determine if it is an Acute Myocardial Infarction or myocardial injury due to an underlying chronic condition.        RESPIRATORY PANEL PCR W/ COVID-19 (SARS-COV-2), NP SWAB IN UTM/VTP, 2 HR TAT - Normal    Narrative:     In the setting of a positive respiratory panel with a viral infection PLUS a negative procalcitonin without other underlying concern for bacterial infection, consider observing off antibiotics or discontinuation of antibiotics and continue supportive care. If the respiratory panel is positive for atypical bacterial infection (Bordetella pertussis, Chlamydophila pneumoniae, or Mycoplasma pneumoniae), consider antibiotic de-escalation to target atypical bacterial infection.   BNP (IN-HOUSE) - Normal    Narrative:     This assay is used as an aid in the diagnosis of individuals suspected of having heart failure. It can be used as an aid in the diagnosis of acute decompensated heart failure (ADHF) in patients presenting with signs and symptoms of ADHF to the emergency department (ED). In addition,  NT-proBNP of <300 pg/mL indicates ADHF is not likely.    Age Range Result Interpretation  NT-proBNP Concentration (pg/mL:      <50             Positive            >450                   Gray                 300-450                    Negative             <300    50-75           Positive            >900                  Gray                300-900                  Negative            <300      >75             Positive            >1800                  Gray                300-1800                  Negative            <300   APTT - Normal   URINE CULTURE   RAINBOW DRAW    Narrative:     The following orders were created for panel order [place] Draw.  Procedure                               Abnormality         Status                     ---------                               -----------         ------                     Green Top (Gel)[[id number]]                                  Final result               Lavender Top[[id number]]                                     Final result               Red Top[[id number]]                                          Final result               Light Blue Top[[id number]]                                   Final result                 Please view results for these tests on the individual orders.   GREEN TOP   LAVENDER TOP   RED TOP   LIGHT BLUE TOP   CBC AND DIFFERENTIAL    Narrative:     The following orders were created for panel order CBC & Differential.  Procedure                               Abnormality         Status                     ---------                               -----------         ------                     CBC Auto Differential[[id number]]        Abnormal            Final result                 Please view results for these tests on the individual orders.       Meds given in ED:   Medications   sodium chloride 0.9 % flush 10 mL (has no administration in time range)   sulfamethoxazole-trimethoprim (BACTRIM DS,SEPTRA DS) 800-160 MG per tablet 1 tablet (has no  administration in time range)           NIH Stroke Scale:       Isolation/Infection(s):  No active isolations   No active infections     COVID Testing  Collected .  Resulted .    Nursing report ED to floor:  Mental status: .  Ambulatory status: .  Precautions: .    ED nurse phone extentsion- ..

## 2025-01-12 NOTE — THERAPY EVALUATION
Patient Name: Nedra Tolliver  : 1935    MRN: [id number]                              Today's Date: 2025       Admit Date: 2025    Visit Dx:     ICD-10-CM ICD-9-CM   1. Elevated troponin  R79.89 790.6   2. Dizziness  R42 780.4   3. Cystitis  N30.90 595.9     Patient Active Problem List   Diagnosis    Urinary tract infection without hematuria    Dysuria    Vaginal atrophy    Overactive bladder    Diverticulitis    Benign essential HTN    Thyroid nodule    Hypothyroidism    Hyponatremia    Syncope and collapse    Volume depletion    Type 2 diabetes mellitus, without long-term current use of insulin    Moderate left ankle sprain    Left hip pain    Acute pain of left knee    Pain of left thumb    Nondisplaced fracture of navicular bone of left foot with routine healing    Chest pain at rest    Acute cystitis without hematuria    H/O thyroid nodule    Hypoglycemia    IBS (irritable bowel syndrome)    Idiopathic scoliosis    Insomnia    Mild tricuspid insufficiency    Neuropathy due to type 2 diabetes mellitus    Onychomycosis    Osteopenia    Pericarditis secondary to uremia    Primary osteoarthritis of right knee    Proteinuria    Vitamin D deficiency    High cholesterol    Lumbar radiculopathy    Non-toxic multinodular goiter    Transient ischemic attack    Acute gastritis without hemorrhage    UTI symptoms    Absolute anemia    Essential hypertension    Mixed hyperlipidemia with pervious statin intolerance    Stage 3a chronic kidney disease (CKD)    Cervical radiculopathy    LBBB (left bundle branch block)    Persistent atrial fibrillation    Fatigue    Shortness of breath    Accelerated hypertension with diastolic congestive heart failure, NYHA class 3    Paroxysmal A-fib    Chest pain    Palpitations    Anxiety    Atrial fibrillation with RVR    Coronary artery disease involving coronary bypass graft of native heart with unstable angina pectoris    Generalized weakness    Failure to thrive in  adult    Moderate malnutrition    Dizziness     Past Medical History:   Diagnosis Date    Arthritis     Cancer     BCC @ nose & Left arm    Diabetes type 2, controlled     Diverticulitis     History of GI bleed     History of transfusion     Has had x 5 units.    Hypertension     STAGE 3     Hyponatremia     Required hospitalization    Hypothyroidism     Knee arthropathy 09/30/2020    LPRD (laryngopharyngeal reflux disease)     Pharyngeal dysphagia     Thyroid nodule      Past Surgical History:   Procedure Laterality Date    APPENDECTOMY      BASAL CELL CARCINOMA EXCISION      BELPHAROPTOSIS REPAIR      BREAST CYST EXCISION      CARDIAC CATHETERIZATION      CARDIAC CATHETERIZATION Right 6/24/2021    Procedure: Left Heart Cath R radial, US guided w poss intervention;  Surgeon: Mak Nickerson DO;  Location:  [facility];  Service: Cardiovascular;  Laterality: Right;    CARDIAC CATHETERIZATION      CARDIAC CATHETERIZATION N/A 9/20/2024    Procedure: Left Heart Cath;  Surgeon: Mak Nickerson DO;  Location:  [facility];  Service: Cardiovascular;  Laterality: N/A;    CATARACT EXTRACTION      CATARACT EXTRACTION WITH INTRAOCULAR LENS IMPLANT Bilateral     CHOLECYSTECTOMY      COLONOSCOPY Left 11/1/2016    Tubular adenoma cecum, Diverticulosis repeat prn    SUBTOTAL HYSTERECTOMY      TOTAL KNEE ARTHROPLASTY Right 9/30/2020    Procedure: TOTAL KNEE ARTHROPLASTY;  Surgeon: Mak Pickens MD;  Location: [facility] OR;  Service: Orthopedics;  Laterality: Right;    TUMOR EXCISION      under armpits and left breast      General Information       Row Name 01/12/25 0842          OT Time and Intention    Subjective Information complains of;weakness;fatigue;dizziness  -CH     Document Type evaluation  -CH     Mode of Treatment occupational therapy  -CH     Patient Effort fair  -CH     Symptoms Noted During/After Treatment fatigue;dizziness  -CH       Row Name 01/12/25 0842           General Information    Patient Profile Reviewed yes  -     Prior Level of Function independent:;all household mobility;ADL's;home management;cooking;cleaning;dependent:;driving  -     Existing Precautions/Restrictions fall  -       Row Name 01/12/25 0842          Occupational Profile    Reason for Services/Referral (Occupational Profile) UTI, cystitis, dizziness, Epistaxis, elevated troponin  -       Row Name 01/12/25 0842          Living Environment    People in Home alone  -       Row Name 01/12/25 0842          Home Main Entrance    Number of Stairs, Main Entrance five  -       Row Name 01/12/25 0842          Stairs Within Home, Primary    Number of Stairs, Within Home, Primary none  -     Stair Railings, Within Home, Primary none  -       Row Name 01/12/25 0842          Cognition    Orientation Status (Cognition) oriented x 4  -       Row Name 01/12/25 0842          Safety Issues/Impairments Affecting Functional Mobility    Safety Issues Affecting Function (Mobility) friction/shear risk  -     Impairments Affecting Function (Mobility) balance;endurance/activity tolerance  -               User Key  (r) = Recorded By, (t) = Taken By, (c) = Cosigned By      Initials Name Provider Type     Clarissa Almazan, OTR/L Occupational Therapist                     Mobility/ADL's       Row Name 01/12/25 0926          Bed Mobility    Bed Mobility supine-sit;sit-supine  -     Supine-Sit [place] (Bed Mobility) supervision  -     Sit-Supine [place] (Bed Mobility) supervision  -     Assistive Device (Bed Mobility) head of bed elevated;bed rails  -       Row Name 01/12/25 0926          Transfers    Transfers sit-stand transfer;stand-sit transfer  -       Row Name 01/12/25 0926          Sit-Stand Transfer    Sit-Stand [place] (Transfers) standby assist  -     Assistive Device (Sit-Stand Transfers) walker, front-wheeled  -       Row Name 01/12/25 0926          Stand-Sit  Transfer    Stand-Sit [place] (Transfers) standby assist  -     Assistive Device (Stand-Sit Transfers) walker, front-wheeled  -       Row Name 01/12/25 0926          Functional Mobility    Functional Mobility- Ind. Level contact guard assist  -     Functional Mobility- Device walker, front-wheeled  -CH     Functional Mobility- Comment Bed <> BR  -       Row Name 01/12/25 0926          Activities of Daily Living    BADL Assessment/Intervention toileting;grooming  -[facility] Name 01/12/25 0926          Toileting Assessment/Training    [place] Level (Toileting) supervision;adjust/manage clothing;perform perineal hygiene;change pad/brief  -     Assistive Devices (Toileting) commode  -     Position (Toileting) unsupported sitting;supported standing  -[facility] Name 01/12/25 0926          Grooming Assessment/Training    [place] Level (Grooming) standby assist;wash face, hands  -     Position (Grooming) sink side;supported standing  -               User Key  (r) = Recorded By, (t) = Taken By, (c) = Cosigned By      Initials Name Provider Type     Clarissa Almazan, OTR/L Occupational Therapist                   Obj/Interventions       [facility] Name 01/12/25 0842          Vision Assessment/Intervention    Visual Impairment/Limitations corrective lenses full-time  -[facility] Name 01/12/25 0842          Range of Motion Comprehensive    General Range of Motion upper extremity range of motion deficits identified  -[facility] Name 01/12/25 0842          Strength Comprehensive (MMT)    General Manual Muscle Testing (MMT) Assessment no strength deficits identified  -[facility] Name 01/12/25 0842          Balance    Balance Assessment sitting static balance;sitting dynamic balance;sit to stand dynamic balance;standing static balance;standing dynamic balance  -     Static Sitting Balance supervision  -     Dynamic Sitting Balance contact guard  -     Position, Sitting Balance sitting edge  of bed  -     Sit to Stand Dynamic Balance standby assist;contact guard  -     Static Standing Balance contact guard;standby assist  -     Dynamic Standing Balance standby assist;contact guard  -CH     Position/Device Used, Standing Balance walker, front-wheeled  -               User Key  (r) = Recorded By, (t) = Taken By, (c) = Cosigned By      Initials Name Provider Type     Clarissa Almazan, OTR/L Occupational Therapist                   Goals/Plan       Row Name 01/12/25 0842          Transfer Goal 1 (OT)    Activity/Assistive Device (Transfer Goal 1, OT) transfers, all  -CH     [place] Level/Cues Needed (Transfer Goal 1, OT) modified independence  -CH     Time Frame (Transfer Goal 1, OT) long term goal (LTG);by discharge  -CH     Progress/Outcome (Transfer Goal 1, OT) new goal  -       Row Name 01/12/25 0842          Bathing Goal 1 (OT)    Activity/Device (Bathing Goal 1, OT) bathing skills, all;grab bar, tub/shower;hand-held shower spray hose;tub bench  -CH     [place] Level/Cues Needed (Bathing Goal 1, OT) supervision required  -CH     Time Frame (Bathing Goal 1, OT) long term goal (LTG);by discharge  -CH     Progress/Outcomes (Bathing Goal 1, OT) new goal  -       Row Name 01/12/25 0842          Dressing Goal 1 (OT)    Activity/Device (Dressing Goal 1, OT) dressing skills, all  -CH     [place]/Cues Needed (Dressing Goal 1, OT) set-up required  -CH     Time Frame (Dressing Goal 1, OT) long term goal (LTG);by discharge  -CH     Progress/Outcome (Dressing Goal 1, OT) new goal  -       Row Name 01/12/25 0842          Toileting Goal 1 (OT)    Activity/Device (Toileting Goal 1, OT) toileting skills, all  -CH     [place] Level/Cues Needed (Toileting Goal 1, OT) modified independence  -CH     Time Frame (Toileting Goal 1, OT) long term goal (LTG);by discharge  -     Progress/Outcome (Toileting Goal 1, OT) new [facility]  -       Row Name 01/12/25 0842          Therapy Assessment/Plan  (OT)    Planned Therapy Interventions (OT) activity tolerance training;adaptive equipment training;BADL retraining;edema control/reduction;functional balance retraining;occupation/activity based interventions;patient/caregiver education/training;ROM/therapeutic exercise;strengthening exercise;transfer/mobility retraining  -               User Key  (r) = Recorded By, (t) = Taken By, (c) = Cosigned By      Initials Name Provider Type     Clarissa Almazan, OTR/L Occupational Therapist                   Clinical Impression       Row Name 01/12/25 0842          Pain Assessment    Pretreatment Pain Rating 3/10  -     Posttreatment Pain Rating 3/10  -     Pain Location back  -       Row Name 01/12/25 0842          Plan of Care Review    Plan of Care Reviewed With patient  -     Progress no change  -     Outcome Evaluation OT eval complete.  Pt. is AxO x 4.  She reports feelings of dizziness, weakness, fatigue, & minor back pain. Ms. Tolliver makes several self limiting statements and benefits from encouragement for fxl eval.  She was able to report her PLOF and home environment set up. OTR provided rwx as pt typically uses a rollator.  Pt. SBA-CGA for all fxl mob & toileting.  Anticipate min assist for high level ADLs like LBD & TBB. Pt. reports dizziness with standing, imbalance, decreased act sergei, & self limiting behaviors.  She had 1 LOB but was able to correct.  OTR encouraged use of BSC or commode with staff to increase activity level.  Pt. requesting pure wick 2' fatigue.  Cont OT tx to increase her act sergei, reduce her risk of falls, improve her balance, provide edu to improve her safety, & increase her overall fxn/fxl capacity.  Anticiapte home with assist vs. subacute rehab.  -       Row Name 01/12/25 0842          Therapy Assessment/Plan (OT)    Patient/Family Therapy Goal Statement (OT) improve medically  -     Criteria for Skilled Therapeutic Interventions Met (OT) yes;skilled treatment is  necessary  -CH     Therapy Frequency (OT) 3 times/wk  -       Row Name 01/12/25 0842          Therapy Plan Review/Discharge Plan (OT)    Anticipated Discharge Disposition (OT) sub acute care setting  -       Row Name 01/12/25 0842          Positioning and Restraints    Pre-Treatment Position in bed  -CH     Post Treatment Position bed  -     In Bed fowlers;call light within reach;encouraged to call for assist;side rails up x2;notified nsg  -               User Key  (r) = Recorded By, (t) = Taken By, (c) = Cosigned By      Initials Name Provider Type     Clarissa Almazan, OTR/L Occupational Therapist                   Outcome Measures       Row Name 01/12/25 0842          How much help from another is currently needed...    Putting on and taking off regular lower body clothing? 3  -CH     Bathing (including washing, rinsing, and drying) 3  -CH     Toileting (which includes using toilet bed pan or urinal) 3  -CH     Putting on and taking off regular upper body clothing 4  -CH     Taking care of personal grooming (such as brushing teeth) 4  -CH     Eating meals 4  -CH     AM-PAC 6 Clicks Score (OT) 21  -CH       Row Name 01/12/25 0301 01/12/25 0255       How much help from another person do you currently need...    Turning from your back to your side while in flat bed without using bedrails? 4  -KV 4  -KV    Moving from lying on back to sitting on the side of a flat bed without bedrails? 4  -KV 4  -KV    Moving to and from a bed to a chair (including a wheelchair)? 4  -KV 4  -KV    Standing up from a chair using your arms (e.g., wheelchair, bedside chair)? 4  -KV 4  -KV    Climbing 3-5 steps with a railing? 2  -KV 2  -KV    To walk in hospital room? 3  -KV 3  -KV    AM-PAC 6 Clicks Score (PT) 21  -KV 21  -KV      Row Name 01/12/25 0842          Functional Assessment    Outcome Measure Options AM-PAC 6 Clicks Daily Activity (OT)  -               User Key  (r) = Recorded By, (t) = Taken By, (c) = Cosigned By       Initials Name Provider Type     Clarissa Almazan, OTR/L Occupational Therapist    Kaylin Cardona RN Registered Nurse                    Occupational Therapy Education       Title: PT OT SLP Therapies (In Progress)       Topic: Occupational Therapy (In Progress)       Point: ADL training (Done)       Description:   Instruct learner(s) on proper safety adaptation and remediation techniques during self care or transfers.   Instruct in proper use of assistive devices.                  Learning Progress Summary            Patient Acceptance, E, VU by  at 1/12/2025 1006                      Point: Home exercise program (Not Started)       Description:   Instruct learner(s) on appropriate technique for monitoring, assisting and/or progressing therapeutic exercises/activities.                  Learner Progress:  Not documented in this visit.              Point: Precautions (Done)       Description:   Instruct learner(s) on prescribed precautions during self-care and functional transfers.                  Learning Progress Summary            Patient Acceptance, E, VU by  at 1/12/2025 1006                      Point: Body mechanics (Done)       Description:   Instruct learner(s) on proper positioning and spine alignment during self-care, functional mobility activities and/or exercises.                  Learning Progress Summary            Patient Acceptance, E, VU by  at 1/12/2025 1006                                      User Key       Initials Effective Dates Name Provider Type Discipline     07/11/23 -  Clarissa Almazan, OTR/L Occupational Therapist OT                  OT Recommendation and Plan  Planned Therapy Interventions (OT): activity tolerance training, adaptive equipment training, BADL retraining, edema control/reduction, functional balance retraining, occupation/activity based interventions, patient/caregiver education/training, ROM/therapeutic exercise, strengthening exercise, transfer/mobility  retraining  Therapy Frequency (OT): 3 times/wk  Plan of Care Review  Plan of Care Reviewed With: patient  Progress: no change  Outcome Evaluation: OT eval complete.  Pt. is AxO x 4.  She reports feelings of dizziness, weakness, fatigue, & minor back pain. Ms. Tolliver makes several self limiting statements and benefits from encouragement for fxl eval.  She was able to report her PLOF and home environment set up. OTR provided rwx as pt typically uses a rollator.  Pt. SBA-CGA for all fxl mob & toileting.  Anticipate min assist for high level ADLs like LBD & TBB. Pt. reports dizziness with standing, imbalance, decreased act sergei, & self limiting behaviors.  She had 1 LOB but was able to correct.  OTR encouraged use of BSC or commode with staff to increase activity level.  Pt. requesting pure wick 2' fatigue.  Cont OT tx to increase her act sergei, reduce her risk of falls, improve her balance, provide edu to improve her safety, & increase her overall fxn/fxl capacity.  Anticiapte home with assist vs. subacute rehab.     Time Calculation:         Time Calculation- OT       Row Name 01/12/25 0842             Time Calculation- OT    OT Start Time 0842  -CH      OT Stop Time 0941  -CH      OT Time Calculation (min) 59 min  -CH      OT Received On 01/12/25  -      OT Goal Re-Cert Due Date 01/22/25  -         Untimed Charges    OT Eval/Re-eval Minutes 59  -CH         Total Minutes    Untimed Charges Total Minutes 59  -CH       Total Minutes 59  -CH                User Key  (r) = Recorded By, (t) = Taken By, (c) = Cosigned By      Initials Name Provider Type     Clarissa Almazan OTR/L Occupational Therapist                  Therapy Charges for Today       Code Description Service Date Service Provider Modifiers Qty    [id number] HC OT EVAL LOW COMPLEXITY 4 1/12/2025 Clarissa Almazan OTR/MARTHA GO 1                 THEODORE Fierro/MARTHA  1/12/2025

## 2025-01-13 PROBLEM — R04.0 EPISTAXIS: Status: ACTIVE | Noted: 2025-01-13

## 2025-01-13 LAB
ALBUMIN SERPL-MCNC: 3.7 G/DL (ref 3.5–5.2)
ALBUMIN/GLOB SERPL: 1.5 G/DL
ALP SERPL-CCNC: 65 U/L (ref 39–117)
ALT SERPL W P-5'-P-CCNC: 12 U/L (ref 1–33)
ANION GAP SERPL CALCULATED.3IONS-SCNC: 11 MMOL/L (ref 5–15)
AST SERPL-CCNC: 18 U/L (ref 1–32)
BACTERIA SPEC AEROBE CULT: NORMAL
BASOPHILS # BLD AUTO: 0.05 10*3/MM3 (ref 0–0.2)
BASOPHILS NFR BLD AUTO: 1.1 % (ref 0–1.5)
BILIRUB SERPL-MCNC: 0.3 MG/DL (ref 0–1.2)
BUN SERPL-MCNC: 21 MG/DL (ref 8–23)
BUN/CREAT SERPL: 16.9 (ref 7–25)
CALCIUM SPEC-SCNC: 9.1 MG/DL (ref 8.6–10.5)
CHLORIDE SERPL-SCNC: 96 MMOL/L (ref 98–107)
CO2 SERPL-SCNC: 23 MMOL/L (ref 22–29)
CREAT SERPL-MCNC: 1.24 MG/DL (ref 0.57–1)
DEPRECATED RDW RBC AUTO: 43 FL (ref 37–54)
EGFRCR SERPLBLD CKD-EPI 2021: 41.7 ML/MIN/1.73
EOSINOPHIL # BLD AUTO: 0.31 10*3/MM3 (ref 0–0.4)
EOSINOPHIL NFR BLD AUTO: 6.6 % (ref 0.3–6.2)
ERYTHROCYTE [DISTWIDTH] IN BLOOD BY AUTOMATED COUNT: 12.5 % (ref 12.3–15.4)
GLOBULIN UR ELPH-MCNC: 2.5 GM/DL
GLUCOSE BLDC GLUCOMTR-MCNC: 126 MG/DL (ref 70–130)
GLUCOSE BLDC GLUCOMTR-MCNC: 141 MG/DL (ref 70–130)
GLUCOSE BLDC GLUCOMTR-MCNC: 151 MG/DL (ref 70–130)
GLUCOSE BLDC GLUCOMTR-MCNC: 95 MG/DL (ref 70–130)
GLUCOSE BLDC GLUCOMTR-MCNC: 98 MG/DL (ref 70–130)
GLUCOSE SERPL-MCNC: 198 MG/DL (ref 65–99)
HCT VFR BLD AUTO: 37.1 % (ref 34–46.6)
HGB BLD-MCNC: 12.4 G/DL (ref 12–15.9)
IMM GRANULOCYTES # BLD AUTO: 0.01 10*3/MM3 (ref 0–0.05)
IMM GRANULOCYTES NFR BLD AUTO: 0.2 % (ref 0–0.5)
LYMPHOCYTES # BLD AUTO: 1.39 10*3/MM3 (ref 0.7–3.1)
LYMPHOCYTES NFR BLD AUTO: 29.6 % (ref 19.6–45.3)
MCH RBC QN AUTO: 31.2 PG (ref 26.6–33)
MCHC RBC AUTO-ENTMCNC: 33.4 G/DL (ref 31.5–35.7)
MCV RBC AUTO: 93.2 FL (ref 79–97)
MONOCYTES # BLD AUTO: 0.58 10*3/MM3 (ref 0.1–0.9)
MONOCYTES NFR BLD AUTO: 12.3 % (ref 5–12)
NEUTROPHILS NFR BLD AUTO: 2.36 10*3/MM3 (ref 1.7–7)
NEUTROPHILS NFR BLD AUTO: 50.2 % (ref 42.7–76)
NRBC BLD AUTO-RTO: 0 /100 WBC (ref 0–0.2)
PLATELET # BLD AUTO: 247 10*3/MM3 (ref 140–450)
PMV BLD AUTO: 9.4 FL (ref 6–12)
POTASSIUM SERPL-SCNC: 4.4 MMOL/L (ref 3.5–5.2)
PROT SERPL-MCNC: 6.2 G/DL (ref 6–8.5)
RBC # BLD AUTO: 3.98 10*6/MM3 (ref 3.77–5.28)
SODIUM SERPL-SCNC: 130 MMOL/L (ref 136–145)
WBC NRBC COR # BLD AUTO: 4.7 10*3/MM3 (ref 3.4–10.8)

## 2025-01-13 PROCEDURE — 80053 COMPREHEN METABOLIC PANEL: CPT | Performed by: FAMILY MEDICINE

## 2025-01-13 PROCEDURE — 82948 REAGENT STRIP/BLOOD GLUCOSE: CPT

## 2025-01-13 PROCEDURE — 99232 SBSQ HOSP IP/OBS MODERATE 35: CPT | Performed by: EMERGENCY MEDICINE

## 2025-01-13 PROCEDURE — 85025 COMPLETE CBC W/AUTO DIFF WBC: CPT | Performed by: FAMILY MEDICINE

## 2025-01-13 RX ORDER — SIMETHICONE 80 MG
80 TABLET,CHEWABLE ORAL EVERY 6 HOURS PRN
COMMUNITY

## 2025-01-13 RX ORDER — SODIUM CHLORIDE/ALOE VERA
1 GEL (GRAM) NASAL EVERY 6 HOURS
Status: DISCONTINUED | OUTPATIENT
Start: 2025-01-13 | End: 2025-01-15 | Stop reason: HOSPADM

## 2025-01-13 RX ORDER — LOSARTAN POTASSIUM 25 MG/1
25 TABLET ORAL
Status: DISCONTINUED | OUTPATIENT
Start: 2025-01-13 | End: 2025-01-15 | Stop reason: HOSPADM

## 2025-01-13 RX ORDER — VERAPAMIL HYDROCHLORIDE 200 MG/1
200 CAPSULE, EXTENDED RELEASE ORAL NIGHTLY
COMMUNITY

## 2025-01-13 RX ORDER — FLUTICASONE PROPIONATE 0.05 %
1 CREAM (GRAM) TOPICAL NIGHTLY PRN
COMMUNITY

## 2025-01-13 RX ORDER — SULFAMETHOXAZOLE AND TRIMETHOPRIM 400; 80 MG/1; MG/1
1 TABLET ORAL EVERY 12 HOURS SCHEDULED
Status: DISCONTINUED | OUTPATIENT
Start: 2025-01-13 | End: 2025-01-15 | Stop reason: HOSPADM

## 2025-01-13 RX ORDER — VERAPAMIL HYDROCHLORIDE 180 MG/1
90 TABLET, EXTENDED RELEASE ORAL
Status: DISCONTINUED | OUTPATIENT
Start: 2025-01-13 | End: 2025-01-15 | Stop reason: HOSPADM

## 2025-01-13 RX ADMIN — VERAPAMIL HYDROCHLORIDE 90 MG: 180 TABLET, FILM COATED, EXTENDED RELEASE ORAL at 12:03

## 2025-01-13 RX ADMIN — FLECAINIDE ACETATE 25 MG: 50 TABLET ORAL at 08:32

## 2025-01-13 RX ADMIN — APIXABAN 2.5 MG: 2.5 TABLET, FILM COATED ORAL at 21:14

## 2025-01-13 RX ADMIN — LEVOTHYROXINE SODIUM 50 MCG: 0.05 TABLET ORAL at 06:40

## 2025-01-13 RX ADMIN — APIXABAN 2.5 MG: 2.5 TABLET, FILM COATED ORAL at 12:03

## 2025-01-13 RX ADMIN — LOSARTAN POTASSIUM 25 MG: 25 TABLET, FILM COATED ORAL at 12:03

## 2025-01-13 RX ADMIN — FLECAINIDE ACETATE 25 MG: 50 TABLET ORAL at 21:14

## 2025-01-13 RX ADMIN — SULFAMETHOXAZOLE AND TRIMETHOPRIM 1 TABLET: 400; 80 TABLET ORAL at 21:14

## 2025-01-13 RX ADMIN — SULFAMETHOXAZOLE AND TRIMETHOPRIM 1 TABLET: 800; 160 TABLET ORAL at 08:32

## 2025-01-13 RX ADMIN — Medication 1 APPLICATION: at 17:22

## 2025-01-13 RX ADMIN — PANTOPRAZOLE SODIUM 40 MG: 40 TABLET, DELAYED RELEASE ORAL at 06:39

## 2025-01-13 NOTE — PROGRESS NOTES
[facility] Medicine Services  INPATIENT PROGRESS NOTE    Patient Name: Nedra Tolliver  Date of Admission: 1/12/2025  Today's Date: 01/13/25  Length of Stay: 1  Primary Care Physician: Edgar Tolbert MD    Subjective   Chief Complaint: Epistasis/hypertension/UTI/atrial fibs  HPI   Patient is currently in sinus rhythm, blood pressure is high, add Cozaar.  Chronic renal failure stage IIIa noted.  UTI, decrease Bactrim to single strength due to renal failure.  Epistasis resolved.    Review of Systems   Constitutional:  Positive for activity change, appetite change and fatigue. Negative for chills and fever.   HENT:  Negative for hearing loss, nosebleeds, tinnitus and trouble swallowing.    Eyes:  Negative for visual disturbance.   Respiratory:  Negative for cough, chest tightness, shortness of breath and wheezing.    Cardiovascular:  Negative for chest pain, palpitations and leg swelling.   Gastrointestinal:  Negative for abdominal distention, abdominal pain, blood in stool, constipation, diarrhea, nausea and vomiting.   Endocrine: Negative for cold intolerance, heat intolerance, polydipsia, polyphagia and polyuria.   Genitourinary:  Negative for decreased urine volume, difficulty urinating, dysuria, flank pain, frequency and hematuria.   Musculoskeletal:  Positive for arthralgias, gait problem and myalgias. Negative for joint swelling.   Skin:  Negative for rash.   Allergic/Immunologic: Negative for immunocompromised state.   Neurological:  Positive for weakness. Negative for dizziness, syncope, light-headedness and headaches.   Hematological:  Negative for adenopathy. Does not bruise/bleed easily.   Psychiatric/Behavioral:  Negative for confusion and sleep disturbance. The patient is not nervous/anxious.         All pertinent negatives and positives are as above. All other systems have been reviewed and are negative unless otherwise stated.     Objective    Temp:  [97.7 °F  (36.5 °C)-98 °F (36.7 °C)] 97.8 °F (36.6 °C)  Heart Rate:  [64-72] 70  Resp:  [16-18] 18  BP: (123-163)/(48-75) 163/65  Physical Exam  Vitals and nursing note reviewed.   Constitutional:       Comments: Advanced age.  Chronically ill.   HENT:      Head: Normocephalic.   Eyes:      Conjunctiva/sclera: Conjunctivae normal.      Pupils: Pupils are equal, round, and reactive to light.   Cardiovascular:      Rate and Rhythm: Normal rate and regular rhythm.      Heart sounds: Normal heart sounds.   Pulmonary:      Effort: Pulmonary effort is normal. No respiratory distress.      Breath sounds: Normal breath sounds.   Abdominal:      General: Bowel sounds are normal. There is no distension.      Palpations: Abdomen is soft.      Tenderness: There is no abdominal tenderness.   Musculoskeletal:         General: No swelling.      Cervical back: Neck supple.   Skin:     General: Skin is warm and dry.      Capillary Refill: Capillary refill takes 2 to 3 seconds.      Findings: No rash.   Neurological:      Mental Status: She is alert and oriented to person, place, and time.      Motor: Weakness present.      Coordination: Coordination abnormal.      Gait: Gait abnormal.   Psychiatric:         Mood and Affect: Mood normal.         Behavior: Behavior normal.         Thought Content: Thought content normal.             Results Review:  I have reviewed the labs, radiology results, and diagnostic studies.    Laboratory Data:   Results from last 7 days   Lab Units 01/13/25  0853 01/12/25  0329 01/12/25  0009   WBC 10*3/mm3 4.70 5.89 6.42   HEMOGLOBIN g/dL 12.4 12.8 12.1   HEMATOCRIT % 37.1 37.7 36.5   PLATELETS 10*3/mm3 247 256 256        Results from last 7 days   Lab Units 01/13/25  0853 01/12/25  0329 01/12/25  0009   SODIUM mmol/L 130* 132* 133*   POTASSIUM mmol/L 4.4 3.6 4.0   CHLORIDE mmol/L 96* 95* 100   CO2 mmol/L 23.0 23.0 22.0   BUN mg/dL 21 21 23   CREATININE mg/dL 1.24* 0.84 0.91   CALCIUM mg/dL 9.1 9.4 9.0   BILIRUBIN  "mg/dL 0.3 0.3 0.2   ALK PHOS U/L 65 71 72   ALT (SGPT) U/L 12 12 13   AST (SGOT) U/L 18 18 20   GLUCOSE mg/dL 198* 143* 119*       Culture Data:   No results found for: \"BLOODCX\", \"URINECX\", \"WOUNDCX\", \"MRSACX\", \"RESPCX\", \"STOOLCX\"    Radiology Data:   Imaging Results (Last 24 Hours)       ** No results found for the last 24 hours. **            I have reviewed the patient's current medications.     Assessment/Plan   Assessment  Active Hospital Problems    Diagnosis     **Dizziness     Epistaxis     Coronary artery disease involving coronary bypass graft of native heart with unstable angina pectoris     Accelerated hypertension with diastolic congestive heart failure, NYHA class 3     Persistent atrial fibrillation     LBBB (left bundle branch block)     Mixed hyperlipidemia with pervious statin intolerance     Stage 3a chronic kidney disease (CKD)     UTI symptoms     H/O thyroid nodule     Mild tricuspid insufficiency     Neuropathy due to type 2 diabetes mellitus     Lumbar radiculopathy     Non-toxic multinodular goiter     Type 2 diabetes mellitus, without long-term current use of insulin     Hypothyroidism     Benign essential HTN      HPI Heidi Tolliver is a 89 y.o. female with pmh of A-fib on Eliquis, T2DM, hypertension, hypothyroidism, GIB who presented with plaint of lightheadedness.  She reportedly had an episode of epistaxis at the nursing home.  She states she feels better today.  But complains of shortness of breath that she has been having for 1.5 years.       Treatment Plan  UTI- Bactrim changed Bactrim to single strength due to renal failure.    Epistaxis - currently no bleeding.  Consider resuming Eliquis tomorrow, discussed with cardiology start patient back on Eliquis 2.5 twice daily.  She states she feels as if she has something stuck in her left nostril for several months.  Saline nasal gel.    Atria fibrillation - rate controlled, patient is currently in sinus rhythm.  Continue flecainide.  " Restart low-dose Eliquis by cardiology due to advanced age and fall risk. Elevated troponin - evaluated by cardiology.  Recent cath December 2024 with mild nonobstructive CAD.  Eliquis . Flecainide.  Verapamil.  Nitro as needed.  Add Cozaar.  Echocardiogram-41 - 45%, left ventricular cavity is mildly dilated, Left ventricular wall thickness is consistent with mild septal asymmetric hypertrophy, Left ventricular diastolic function is consistent with (grade I) impaired relaxation, no significant (greater than mild) valvular dysfunction, Normal right ventricular cavity size and systolic function,  tricuspid regurgitation is normal (<35 mmHg).     Chronic stage IIIa renal failure.  Previous creatinine 1/23/2024 1.13.    Diabetes.  Low-dose sliding scale.    Hypothyroidism . Synthroid.    Reflux . Protonix.    Nutrition . Cardiac/diabetic diet.    Deconditioning.  PT and OT consult. Patient gets around with a rollator at baseline    Urine cultures pending.  Respiratory panel-negative.    Consult  for rehab placement     Medical Decision Making  Number and Complexity of problems: UTI/epistasis/atrial fibs/chronic stage IIIa renal failure  Differential Diagnosis: None    Conditions and Status        Condition is unchanged.     [facility] Data  External documents reviewed: Previous note  Cardiac tracing (EKG, telemetry) interpretation: Sinus  Radiology interpretation: Echocardiogram/chest x-ray  Labs reviewed: Laboratory  Any tests that were considered but not ordered: Laboratory in AM.     Decision rules/scores evaluated (example [id number], Wells, etc): None     Discussed with: Patient     Care Planning  Shared decision making: Patient  Code status and discussions: Full code    Disposition  Social Determinants of Health that impact treatment or disposition: From home  Patient probably need rehab placement      Electronically signed by Louie Khoury MD, 01/13/25, 11:23 CST.

## 2025-01-13 NOTE — CASE MANAGEMENT/SOCIAL WORK
Discharge Planning Assessment  [facility]     Patient Name: Nedra Tolliver  MRN: [id number]  Today's Date: 1/13/2025    Admit Date: 1/12/2025        Discharge Needs Assessment       Row Name 01/13/25 1012       Living Environment    People in Home alone    Current Living Arrangements home    Potentially Unsafe Housing Conditions none    In the past 12 months has the electric, gas, oil, or water company threatened to shut off services in your home? No    Primary Care Provided by self    Provides Primary Care For no one    Family Caregiver if Needed other relative(s);other (see comments);child(lashonda), adult    Family Caregiver Names Son. Neomari and cousin.    Quality of Family Relationships helpful;involved;supportive    Able to Return to Prior Arrangements yes       Resource/Environmental Concerns    Resource/Environmental Concerns none    Transportation Concerns none       Transportation Needs    In the past 12 months, has lack of transportation kept you from medical appointments or from getting medications? no    In the past 12 months, has lack of transportation kept you from meetings, work, or from getting things needed for daily living? No       Food Insecurity    Within the past 12 months, you worried that your food would run out before you got the money to buy more. Never true    Within the past 12 months, the food you bought just didn't last and you didn't have money to get more. Never true       Transition Planning    Patient/Family Anticipates Transition to home    Patient/Family Anticipated Services at Transition none    Transportation Anticipated family or friend will provide       Discharge Needs Assessment    Readmission Within the Last 30 Days no previous admission in last 30 days    Equipment Currently Used at Home rollator;shower chair;commode    Concerns to be Addressed no discharge needs identified    Do you want help finding or keeping work or a job? I do not need or want help    Do you want help  with school or training? For example, starting or completing job training or getting a high school diploma, GED or equivalent No    Anticipated Changes Related to Illness none    Equipment Needed After Discharge none    Provided Post Acute Provider List? N/A    Provided Post Acute Provider Quality & Resource List? N/A    Discharge Coordination/Progress Lives at home alone. Son, Jes and cousin live nearby and assist with transportation and needs. n Has PCP and Rx coverage. No D/C needs identified at this time.                   Discharge Plan    No documentation.                 Continued Care and Services - Admitted Since 1/12/2025    No active coordination exists for this encounter.          Demographic Summary    No documentation.                  Functional Status    No documentation.                  Psychosocial    No documentation.                  Abuse/Neglect    No documentation.                  Legal    No documentation.                  Substance Abuse    No documentation.                  Patient Forms    No documentation.                     Norm Shaw RN

## 2025-01-13 NOTE — PLAN OF CARE
Goal Outcome Evaluation:  Plan of Care Reviewed With: patient        Progress: no change  Outcome Evaluation: VSS. S/SB 58-69 on tele. Pt rested well overnight. No complaints. Call light within reach. Safety maintained.

## 2025-01-13 NOTE — PROGRESS NOTES
[facility] -  Progress Note     LOS: 1 day   Patient Care Team:  Edgar Tolbert MD as PCP - General (Family Medicine)  Manish Smith MD as Consulting Physician (Otolaryngology)  Yordan Blanton MD as Consulting Physician (Gastroenterology)  Roberto Collins PA as Physician Assistant (Otolaryngology)  Mak Nickerson DO as Consulting Physician (Cardiology)  Gill Panda APRN as Nurse Practitioner (Nurse Practitioner)    Chief Complaint: Nosebleed    Subjective     Interval History:     Patient seen and examined at bedside.  She says she is feeling better this morning.  She had an episode of epistaxis over the weekend secondary to dry nostrils and picking at a scab.  She has not had any further nosebleeds since admission.  Her shortness of breath is at its baseline.    Review of Systems:  Review of Systems   Constitutional: Positive for malaise/fatigue. Negative for diaphoresis and fever.   HENT:  Positive for congestion and nosebleeds.    Eyes:  Negative for vision loss in left eye and vision loss in right eye.   Cardiovascular:  Negative for chest pain, claudication, dyspnea on exertion, irregular heartbeat, leg swelling, orthopnea, palpitations and syncope.   Respiratory:  Positive for cough and shortness of breath. Negative for wheezing.    Hematologic/Lymphatic: Negative for adenopathy.   Skin:  Negative for rash.   Musculoskeletal:  Negative for joint pain and joint swelling.   Gastrointestinal:  Negative for abdominal pain, diarrhea, nausea and vomiting.   Neurological:  Negative for excessive daytime sleepiness, dizziness, focal weakness, light-headedness, numbness and weakness.   Psychiatric/Behavioral:  Negative for depression. The patient does not have insomnia.        Vital Sign Min/Max for last 24 hours  Temp  Min: 97.7 °F (36.5 °C)  Max: 98 °F (36.7 °C)   BP  Min: 123/74  Max: 163/65   Pulse  Min: 62  Max: 72   Resp  Min: 16  Max: 18   SpO2   Min: 93 %  Max: 99 %   No data recorded   Weight  Min: 64.9 kg (143 lb)  Max: 64.9 kg (143 lb)         01/12/25  1542   Weight: 64.9 kg (143 lb)       Physical Exam:   Vitals and nursing note reviewed.   Constitutional:       Appearance: Normal and healthy appearance. Well-developed and not in distress.   Eyes:      Extraocular Movements: Extraocular movements intact.      Pupils: Pupils are equal, round, and reactive to light.   HENT:      Head: Normocephalic and atraumatic.    Mouth/Throat:      Pharynx: Oropharynx is clear.   Neck:      Vascular: JVD normal.      Trachea: Trachea normal.   Pulmonary:      Effort: Pulmonary effort is normal.      Breath sounds: Normal breath sounds. No wheezing. No rhonchi. No rales.   Cardiovascular:      PMI at left midclavicular line. Normal rate. Regular rhythm. Normal S1. Normal S2.       Murmurs: There is a grade 2/6 systolic murmur.      No gallop.  No click. No rub.   Pulses:     Dorsalis pedis: 2+ bilaterally.     Posterior tibial: 2+ bilaterally.  Abdominal:      General: Bowel sounds are normal.      Palpations: Abdomen is soft.      Tenderness: There is no abdominal tenderness.   Musculoskeletal: Normal range of motion.      Cervical back: Normal range of motion and neck supple. Skin:     General: Skin is warm and dry.      Capillary Refill: Capillary refill takes less than 2 seconds.   Feet:      Right foot:      Skin integrity: Skin integrity normal.      Left foot:      Skin integrity: Skin integrity normal.   Neurological:      Mental Status: Alert and oriented to person, place and time.      Sensory: Sensation is intact.      Motor: Motor function is intact.      Coordination: Coordination is intact.   Psychiatric:         Speech: Speech normal.         Behavior: Behavior is cooperative.         Medication Review: yes  Current Facility-Administered Medications   Medication Dose Route Frequency Provider Last Rate Last Admin    acetaminophen (TYLENOL) tablet 650 mg   650 mg Oral Q4H PRN Blake Park MD        Or    acetaminophen (TYLENOL) 160 MG/5ML oral solution 650 mg  650 mg Oral Q4H PRN Blake Park MD        Or    acetaminophen (TYLENOL) suppository 650 mg  650 mg Rectal Q4H PRN Blake Park MD        apixaban (ELIQUIS) tablet 2.5 mg  2.5 mg Oral Q12H Mak Nickerson DO        dextrose (D50W) (25 g/50 mL) IV injection 25 g  25 g Intravenous Q15 Min PRN Blake Park MD        dextrose (GLUTOSE) oral gel 15 g  15 g Oral Q15 Min PRN Blake Park MD        flecainide (TAMBOCOR) tablet 25 mg  25 mg Oral BID Blake Park MD   25 mg at 01/13/25 0832    glucagon (GLUCAGEN) injection 1 mg  1 mg Intramuscular Q15 Min PRN Blake Park MD        Insulin Lispro (humaLOG) injection 2-7 Units  2-7 Units Subcutaneous 4x Daily AC & at Bedtime Blake Park MD        levothyroxine (SYNTHROID, LEVOTHROID) tablet 50 mcg  50 mcg Oral Q AM Blake Park MD   50 mcg at 01/13/25 0640    nitroglycerin (NITROSTAT) SL tablet 0.4 mg  0.4 mg Sublingual Q5 Min PRN Blake aPrk MD        pantoprazole (PROTONIX) EC tablet 40 mg  40 mg Oral Q AM Blake Park MD   40 mg at 01/13/25 0639    sodium chloride 0.9 % flush 10 mL  10 mL Intravenous PRN Juanpablo Georges MD        sodium chloride 0.9 % flush 10 mL  10 mL Intravenous Q12H Blake Park MD   10 mL at 01/12/25 2141    sodium chloride 0.9 % flush 10 mL  10 mL Intravenous PRN Blake Park MD        sodium chloride 0.9 % infusion 40 mL  40 mL Intravenous PRN Blake Park MD        sulfamethoxazole-trimethoprim (BACTRIM DS,SEPTRA DS) 800-160 MG per tablet 1 tablet  1 tablet Oral Q12H Blake Park MD   1 tablet at 01/13/25 0832    verapamil SR (CALAN-SR) CR tablet 90 mg  90 mg Oral Q24H Mak Nickerson, DO                 Assessment & Plan       Dizziness    Benign essential HTN    Type 2 diabetes mellitus, without long-term current use of insulin    Mixed hyperlipidemia with pervious statin  intolerance    LBBB (left bundle branch block)    Persistent atrial fibrillation    Epistaxis      Plan:    Epistaxis has resolved with holding Eliquis.  We will restart Eliquis at 2.5 mg twice daily for patient's atrial fibrillation.  We will also restart her home dose verapamil to get her blood pressures back under control.    No concern for acute coronary syndrome at this time.  No ischemic workup necessary.    Echocardiogram obtained shows slightly reduced ejection fraction compared to previously.  Angiography performed 4 months ago showed stable coronary artery disease unchanged from angiography 3 years prior.    Cardiology recommendations:  1.  Restart Eliquis 2.5 twice daily (patient is 89 years old and weighs 64 kg) to hopefully prevent any recurrent epistaxis  2.  Continue current dose of flecainide  3.  Resume home dose verapamil  4.  Recommend monitoring overnight for 1 more night to make sure she has no recurrent nosebleed.  Plans for discharge home in the morning.  5.  Start nasal gel to prevent dry membranes which likely led to the nosebleed  6.  If she has recurrent nosebleeds, we can set her up with structural on follow-up to discuss watchman placement.    Continue to follow along.  Please call with any questions.    Mak Nickerson, DO  Interventional cardiology  [facility]  01/13/25  10:44 CST

## 2025-01-14 LAB
ALBUMIN SERPL-MCNC: 3.7 G/DL (ref 3.5–5.2)
ALBUMIN/GLOB SERPL: 1.5 G/DL
ALP SERPL-CCNC: 63 U/L (ref 39–117)
ALT SERPL W P-5'-P-CCNC: 13 U/L (ref 1–33)
ANION GAP SERPL CALCULATED.3IONS-SCNC: 10 MMOL/L (ref 5–15)
AST SERPL-CCNC: 16 U/L (ref 1–32)
BASOPHILS # BLD AUTO: 0.06 10*3/MM3 (ref 0–0.2)
BASOPHILS NFR BLD AUTO: 1 % (ref 0–1.5)
BILIRUB SERPL-MCNC: 0.3 MG/DL (ref 0–1.2)
BUN SERPL-MCNC: 24 MG/DL (ref 8–23)
BUN/CREAT SERPL: 17.9 (ref 7–25)
CALCIUM SPEC-SCNC: 9.3 MG/DL (ref 8.6–10.5)
CHLORIDE SERPL-SCNC: 96 MMOL/L (ref 98–107)
CHOLEST SERPL-MCNC: 184 MG/DL (ref 0–200)
CO2 SERPL-SCNC: 23 MMOL/L (ref 22–29)
CREAT SERPL-MCNC: 1.34 MG/DL (ref 0.57–1)
DEPRECATED RDW RBC AUTO: 43.7 FL (ref 37–54)
EGFRCR SERPLBLD CKD-EPI 2021: 38 ML/MIN/1.73
EOSINOPHIL # BLD AUTO: 0.38 10*3/MM3 (ref 0–0.4)
EOSINOPHIL NFR BLD AUTO: 6.2 % (ref 0.3–6.2)
ERYTHROCYTE [DISTWIDTH] IN BLOOD BY AUTOMATED COUNT: 12.5 % (ref 12.3–15.4)
GLOBULIN UR ELPH-MCNC: 2.5 GM/DL
GLUCOSE BLDC GLUCOMTR-MCNC: 117 MG/DL (ref 70–130)
GLUCOSE BLDC GLUCOMTR-MCNC: 137 MG/DL (ref 70–130)
GLUCOSE BLDC GLUCOMTR-MCNC: 150 MG/DL (ref 70–130)
GLUCOSE BLDC GLUCOMTR-MCNC: 174 MG/DL (ref 70–130)
GLUCOSE SERPL-MCNC: 132 MG/DL (ref 65–99)
HBA1C MFR BLD: 7.3 % (ref 4.8–5.6)
HCT VFR BLD AUTO: 35.8 % (ref 34–46.6)
HDLC SERPL-MCNC: 66 MG/DL (ref 40–60)
HGB BLD-MCNC: 11.9 G/DL (ref 12–15.9)
IMM GRANULOCYTES # BLD AUTO: 0.01 10*3/MM3 (ref 0–0.05)
IMM GRANULOCYTES NFR BLD AUTO: 0.2 % (ref 0–0.5)
LDLC SERPL CALC-MCNC: 95 MG/DL (ref 0–100)
LDLC/HDLC SERPL: 1.38 {RATIO}
LYMPHOCYTES # BLD AUTO: 2.23 10*3/MM3 (ref 0.7–3.1)
LYMPHOCYTES NFR BLD AUTO: 36.2 % (ref 19.6–45.3)
MCH RBC QN AUTO: 31.4 PG (ref 26.6–33)
MCHC RBC AUTO-ENTMCNC: 33.2 G/DL (ref 31.5–35.7)
MCV RBC AUTO: 94.5 FL (ref 79–97)
MONOCYTES # BLD AUTO: 0.77 10*3/MM3 (ref 0.1–0.9)
MONOCYTES NFR BLD AUTO: 12.5 % (ref 5–12)
NEUTROPHILS NFR BLD AUTO: 2.71 10*3/MM3 (ref 1.7–7)
NEUTROPHILS NFR BLD AUTO: 43.9 % (ref 42.7–76)
NRBC BLD AUTO-RTO: 0 /100 WBC (ref 0–0.2)
PLATELET # BLD AUTO: 256 10*3/MM3 (ref 140–450)
PMV BLD AUTO: 10.2 FL (ref 6–12)
POTASSIUM SERPL-SCNC: 4.5 MMOL/L (ref 3.5–5.2)
PROT SERPL-MCNC: 6.2 G/DL (ref 6–8.5)
RBC # BLD AUTO: 3.79 10*6/MM3 (ref 3.77–5.28)
SODIUM SERPL-SCNC: 129 MMOL/L (ref 136–145)
TRIGL SERPL-MCNC: 135 MG/DL (ref 0–150)
TSH SERPL DL<=0.05 MIU/L-ACNC: 2.34 UIU/ML (ref 0.27–4.2)
VLDLC SERPL-MCNC: 23 MG/DL (ref 5–40)
WBC NRBC COR # BLD AUTO: 6.16 10*3/MM3 (ref 3.4–10.8)

## 2025-01-14 PROCEDURE — 97161 PT EVAL LOW COMPLEX 20 MIN: CPT

## 2025-01-14 PROCEDURE — 84443 ASSAY THYROID STIM HORMONE: CPT | Performed by: FAMILY MEDICINE

## 2025-01-14 PROCEDURE — 85025 COMPLETE CBC W/AUTO DIFF WBC: CPT | Performed by: FAMILY MEDICINE

## 2025-01-14 PROCEDURE — 82948 REAGENT STRIP/BLOOD GLUCOSE: CPT

## 2025-01-14 PROCEDURE — 80053 COMPREHEN METABOLIC PANEL: CPT | Performed by: FAMILY MEDICINE

## 2025-01-14 PROCEDURE — 80061 LIPID PANEL: CPT | Performed by: FAMILY MEDICINE

## 2025-01-14 PROCEDURE — 99232 SBSQ HOSP IP/OBS MODERATE 35: CPT | Performed by: NURSE PRACTITIONER

## 2025-01-14 PROCEDURE — 83036 HEMOGLOBIN GLYCOSYLATED A1C: CPT | Performed by: FAMILY MEDICINE

## 2025-01-14 RX ORDER — ONDANSETRON 4 MG/1
4 TABLET, ORALLY DISINTEGRATING ORAL EVERY 6 HOURS PRN
Status: DISCONTINUED | OUTPATIENT
Start: 2025-01-14 | End: 2025-01-15 | Stop reason: HOSPADM

## 2025-01-14 RX ADMIN — Medication 1 APPLICATION: at 11:29

## 2025-01-14 RX ADMIN — VERAPAMIL HYDROCHLORIDE 90 MG: 180 TABLET, FILM COATED, EXTENDED RELEASE ORAL at 08:43

## 2025-01-14 RX ADMIN — APIXABAN 2.5 MG: 2.5 TABLET, FILM COATED ORAL at 08:44

## 2025-01-14 RX ADMIN — Medication 1 APPLICATION: at 05:42

## 2025-01-14 RX ADMIN — PANTOPRAZOLE SODIUM 40 MG: 40 TABLET, DELAYED RELEASE ORAL at 05:41

## 2025-01-14 RX ADMIN — SULFAMETHOXAZOLE AND TRIMETHOPRIM 1 TABLET: 400; 80 TABLET ORAL at 08:43

## 2025-01-14 RX ADMIN — LEVOTHYROXINE SODIUM 50 MCG: 0.05 TABLET ORAL at 05:41

## 2025-01-14 RX ADMIN — SULFAMETHOXAZOLE AND TRIMETHOPRIM 1 TABLET: 400; 80 TABLET ORAL at 20:12

## 2025-01-14 RX ADMIN — FLECAINIDE ACETATE 25 MG: 50 TABLET ORAL at 20:12

## 2025-01-14 RX ADMIN — FLECAINIDE ACETATE 25 MG: 50 TABLET ORAL at 08:43

## 2025-01-14 RX ADMIN — Medication 1 APPLICATION: at 17:20

## 2025-01-14 RX ADMIN — APIXABAN 2.5 MG: 2.5 TABLET, FILM COATED ORAL at 20:12

## 2025-01-14 RX ADMIN — LOSARTAN POTASSIUM 25 MG: 25 TABLET, FILM COATED ORAL at 08:43

## 2025-01-14 RX ADMIN — Medication 1 APPLICATION: at 23:15

## 2025-01-14 NOTE — PLAN OF CARE
Problem: Adult Inpatient Plan of Care  Goal: Plan of Care Review  Outcome: Progressing     Problem: Adult Inpatient Plan of Care  Goal: Patient-Specific Goal (Individualized)  Outcome: Progressing     Problem: Adult Inpatient Plan of Care  Goal: Absence of Hospital-Acquired Illness or Injury  Outcome: Progressing  Intervention: Prevent Skin Injury  Description: Perform a screening for skin injury risk, such as pressure or moisture-associated skin damage on admission and at regular intervals throughout hospital stay.  Keep all areas of skin (especially folds) clean and dry.  Maintain adequate skin hydration.  Relieve and redistribute pressure and protect bony prominences and skin at risk for injury; implement measures based on patient-specific risk factors.  Match turning and repositioning schedule to clinical condition.  Encourage weight shift frequently; assist with reposition if unable to complete independently.  Float heels off bed; avoid pressure on the Achilles tendon.  Keep skin free from extended contact with medical devices.  Optimize nutrition and hydration.  Encourage functional activity and mobility, as early as tolerated.  Use aids (e.g., slide boards, mechanical lift) during transfer.  Intervention: Prevent and Manage VTE (Venous Thromboembolism) Risk  Description: Assess for VTE (venous thromboembolism) risk.  Promote early mobilization; encourage both active and passive leg exercises, if unable to ambulate.  Initiate and maintain compression or other therapy, as indicated, based on identified risk in accordance with organizational protocol and provider order.  Recognize the patient's individual risk for bleeding before initiating pharmacologic thromboprophylaxis.     Problem: Adult Inpatient Plan of Care  Goal: Absence of Hospital-Acquired Illness or Injury  Intervention: Prevent Skin Injury  Description: Perform a screening for skin injury risk, such as pressure or moisture-associated skin damage  on admission and at regular intervals throughout hospital stay.  Keep all areas of skin (especially folds) clean and dry.  Maintain adequate skin hydration.  Relieve and redistribute pressure and protect bony prominences and skin at risk for injury; implement measures based on patient-specific risk factors.  Match turning and repositioning schedule to clinical condition.  Encourage weight shift frequently; assist with reposition if unable to complete independently.  Float heels off bed; avoid pressure on the Achilles tendon.  Keep skin free from extended contact with medical devices.  Optimize nutrition and hydration.  Encourage functional activity and mobility, as early as tolerated.  Use aids (e.g., slide boards, mechanical lift) during transfer.   Goal Outcome Evaluation:           Progress: no change

## 2025-01-14 NOTE — THERAPY DISCHARGE NOTE
Patient Name: Nedra Tolliver  : 1935    MRN: [id number]                              Today's Date: 2025       Admit Date: 2025    Visit Dx:     ICD-10-CM ICD-9-CM   1. Elevated troponin  R79.89 790.6   2. Dizziness  R42 780.4   3. Cystitis  N30.90 595.9     Patient Active Problem List   Diagnosis    Urinary tract infection without hematuria    Dysuria    Vaginal atrophy    Overactive bladder    Diverticulitis    Benign essential HTN    Thyroid nodule    Hypothyroidism    Hyponatremia    Syncope and collapse    Volume depletion    Type 2 diabetes mellitus, without long-term current use of insulin    Moderate left ankle sprain    Left hip pain    Acute pain of left knee    Pain of left thumb    Nondisplaced fracture of navicular bone of left foot with routine healing    Chest pain at rest    Acute cystitis without hematuria    H/O thyroid nodule    Hypoglycemia    IBS (irritable bowel syndrome)    Idiopathic scoliosis    Insomnia    Mild tricuspid insufficiency    Neuropathy due to type 2 diabetes mellitus    Onychomycosis    Osteopenia    Pericarditis secondary to uremia    Primary osteoarthritis of right knee    Proteinuria    Vitamin D deficiency    High cholesterol    Lumbar radiculopathy    Non-toxic multinodular goiter    Transient ischemic attack    Acute gastritis without hemorrhage    UTI symptoms    Absolute anemia    Essential hypertension    Mixed hyperlipidemia with pervious statin intolerance    Stage 3a chronic kidney disease (CKD)    Cervical radiculopathy    LBBB (left bundle branch block)    Persistent atrial fibrillation    Fatigue    Shortness of breath    Accelerated hypertension with diastolic congestive heart failure, NYHA class 3    Paroxysmal A-fib    Chest pain    Palpitations    Anxiety    Atrial fibrillation with RVR    Coronary artery disease involving coronary bypass graft of native heart with unstable angina pectoris    Generalized weakness    Failure to thrive in  adult    Moderate malnutrition    Dizziness    Epistaxis     Past Medical History:   Diagnosis Date    Arthritis     Cancer     BCC @ nose & Left arm    Diabetes type 2, controlled     Diverticulitis     History of GI bleed     History of transfusion     Has had x 5 units.    Hypertension     STAGE 3     Hyponatremia     Required hospitalization    Hypothyroidism     Knee arthropathy 09/30/2020    LPRD (laryngopharyngeal reflux disease)     Pharyngeal dysphagia     Thyroid nodule      Past Surgical History:   Procedure Laterality Date    APPENDECTOMY      BASAL CELL CARCINOMA EXCISION      BELPHAROPTOSIS REPAIR      BREAST CYST EXCISION      CARDIAC CATHETERIZATION      CARDIAC CATHETERIZATION Right 6/24/2021    Procedure: Left Heart Cath R radial, US guided w poss intervention;  Surgeon: Mak Nickerson DO;  Location:  [facility];  Service: Cardiovascular;  Laterality: Right;    CARDIAC CATHETERIZATION      CARDIAC CATHETERIZATION N/A 9/20/2024    Procedure: Left Heart Cath;  Surgeon: Mak Nickerson DO;  Location:  [facility];  Service: Cardiovascular;  Laterality: N/A;    CATARACT EXTRACTION      CATARACT EXTRACTION WITH INTRAOCULAR LENS IMPLANT Bilateral     CHOLECYSTECTOMY      COLONOSCOPY Left 11/1/2016    Tubular adenoma cecum, Diverticulosis repeat prn    SUBTOTAL HYSTERECTOMY      TOTAL KNEE ARTHROPLASTY Right 9/30/2020    Procedure: TOTAL KNEE ARTHROPLASTY;  Surgeon: Mak Pickens MD;  Location:  [facility] OR;  Service: Orthopedics;  Laterality: Right;    TUMOR EXCISION      under armpits and left breast      General Information       Row Name 01/14/25 1413          Physical Therapy Time and Intention    Document Type evaluation  CC: light-headed, epistaxis, abdominal pain, urinary frequency. Dx: UTI, cystitis, dizziness. Hx: AFib, type II DM, HTN.  -CHARLES (r) LASHAWN (t) CHARLES (c)     Mode of Treatment physical therapy  -CHARLES (r) LASHAWN (t) CHARLES (c)       Row Name  01/14/25 1416          General Information    Patient Profile Reviewed yes  -CHARLES (r) EW (t) CHARLES (c)     Prior Level of Function independent:;all household mobility;gait;ADL's;grooming;dressing;bathing;dependent:;driving  -CHARLES (r) EW (t) CHARLES (c)     Existing Precautions/Restrictions fall  -CHARLES (r) EW (t) CHARLES (c)     Barriers to Rehab previous functional deficit  -CHARLES (r) EW (t) CHARLES (c)       Row Name 01/14/25 1416          Living Environment    People in Home alone  -CHARLES (r) EW (t) CHARLES (c)       Row Name 01/14/25 1416          Home Main Entrance    Number of Stairs, Main Entrance five  -CHARLES (r) EW (t) CHARLES (c)     Stair Railings, Main Entrance none  -CHARLES (r) EW (t) CHARLES (c)       Row Name 01/14/25 1416          Stairs Within Home, Primary    Number of Stairs, Within Home, Primary none  -CHARLES (r) EW (t) CHARLES (c)       Row Name 01/14/25 1416          Cognition    Orientation Status (Cognition) oriented x 4  -CHARLES (r) EW (t) CHARLES (c)       Row Name 01/14/25 1416          Safety Issues/Impairments Affecting Functional Mobility    Impairments Affecting Function (Mobility) balance;endurance/activity tolerance;shortness of breath;strength  -CHARLES (r) EW (t) CHARLES (c)               User Key  (r) = Recorded By, (t) = Taken By, (c) = Cosigned By      Initials Name Provider Type    Dioni Glass, PT DPT Physical Therapist    Sivan Madrigal, PT Student PT Student                   Mobility       Row Name 01/14/25 1416          Sit-Stand Transfer    Sit-Stand [place] (Transfers) independent  -CHARLES (r) EW (t) CHARLES (c)     Comment, (Sit-Stand Transfer) therapist SBA for safety, pt denied offer to use FW walker during transfer and ambulation assessment  -CHARLES (r) EW (t) CHARLES (c)       Row Name 01/14/25 1416          Gait/Stairs (Locomotion)    [place] Level (Gait) standby assist  therapist donned gait belt and provided SBA for patient safety  -CHARLES (r) EW (t) CHARLES (c)     Distance in Feet (Gait) 100  -CHARLES (r) EW (t) CHARLES (c)     Deviations/Abnormal Patterns  (Gait) gait speed decreased;trang decreased;stride length decreased  -CHARLES (r) EW (t) CHARLES (c)     Comment, (Gait/Stairs) pt noted mild SOB following ambulation but symptoms subsided w/ 1-2 min seated rest  -CHARLES (r) EW (t) CHARLES (c)               User Key  (r) = Recorded By, (t) = Taken By, (c) = Cosigned By      Initials Name Provider Type    Dioni Glass, PT DPT Physical Therapist    [facility], [facility] PT Student                   Obj/Interventions       Row Name 01/14/25 1416          Range of Motion Comprehensive    General Range of Motion bilateral lower extremity ROM WNL  -CHARLES (r) EW (t) CHARLES (c)       Row Name 01/14/25 1416          Strength Comprehensive (MMT)    Comment, General Manual Muscle Testing (MMT) Assessment BLE strength grossly 4+/5  -CHARLES (r) EW (t) CHARLES (c)       Row Name 01/14/25 1416          Balance    Balance Assessment sitting static balance;sitting dynamic balance  -CHARLES (r) EW (t) CHARLES (c)     Static Sitting Balance independent  -CHARLES (r) EW (t) CHARLES (c)     Dynamic Sitting Balance independent  -CHARLES (r) EW (t) CHARLES (c)     Position, Sitting Balance unsupported;sitting in chair  -CHARLES (r) EW (t) CHARLES (c)     Static Standing Balance independent  -CHARLES (r) EW (t) CHARLES (c)     Dynamic Standing Balance standby assist  -CHARLES (r) EW (t) CHARLES (c)     Position/Device Used, Standing Balance unsupported  -CHARLES (r) EW (t) CHARLES (c)       Row Name 01/14/25 1416          Sensory Assessment (Somatosensory)    Sensory Assessment (Somatosensory) LE sensation intact  pt reports prior R sided stroke with mild L side sensory deficits. neuropathy in bilateral feet cause occasional discomfort  -CHARLES (r) EW (t) CHARLES (c)               User Key  (r) = Recorded By, (t) = Taken By, (c) = Cosigned By      Initials Name Provider Type    Dioni Glass, PT DPT Physical Therapist     Sivan Tafoya, PT Student PT Student                   Goals/Plan    No documentation.                  Clinical Impression       Row Name 01/14/25 1416           Pain    Pretreatment Pain Rating 0/10 - no pain  -CHARLES (r) EW (t) CHARLES (c)       Row Name 01/14/25 1416          Plan of Care Review    Plan of Care Reviewed With patient  -CHARLES (r) EW (t) CHARLES (c)     Outcome Evaluation PT evaluation complete. Pt found sitting upright in chair with legs elevated. A&O x4 and agreeable to therapy. She denies any current symptoms of dizziness or pain. Pt reports independence at home with all ADLs but reports she no longer drives herself. She says she has a rollator which she uses when walking around the house, and other bathroom DME she uses when bathing. Pt notes that she has been attending outpatient physical therapy in [place] a couple times weekly for strength and balance training, but has briefly paused treatment sessions due to weather. Pt demonstrates BLE AROM WNL and strength grossly 4+/5. Patient able to stand from chair independently and stated that she did not want to use the walker to ambulate. Therapist donned gait belt and provided SBA for patient safety. Pt able to ambulate 100ft before reporting mild SOB and fatigue. She requested to return to the recliner and was left with legs elevated, call light in reach, and encouraged to call the nurse for any assistance. PT not needed at this time due to patient independence and level of function. Recommend d/c home with assistance as needed.  -CHARLES (r) EW (t) CHARLES (c)       Row Name 01/14/25 1416          Therapy Assessment/Plan (PT)    Patient/Family Therapy Goals Statement (PT) return home  -CHARLES (r) EW (t) CHARLES (c)     Criteria for Skilled Interventions Met (PT) no;no problems identified which require skilled intervention  -CHARLES (r) EW (t) CHARLES (c)     Therapy Frequency (PT) evaluation only  -CHARLES (r) EW (t) CHARLES (c)       Row Name 01/14/25 1416          Positioning and Restraints    Pre-Treatment Position sitting in chair/recliner  -CHARLES (r) EW (t) CHARLES (c)     Post Treatment Position chair  -CHARLES (r) EW (t) CHARLES (c)     In Chair reclined;call light  within reach;encouraged to call for assist;notified nsg  -CHARLES (r) EW (t) CHARLES (c)               User Key  (r) = Recorded By, (t) = Taken By, (c) = Cosigned By      Initials Name Provider Type    Dioni Glass, PT DPT Physical Therapist    [facility], Sivan, PT Student PT Student                   Outcome Measures       Row Name 01/14/25 1416 01/14/25 0844       How much help from another person do you currently need...    Turning from your back to your side while in flat bed without using bedrails? 4  -CHARLES (r) EW (t) CHARLES (c) 4  -AC    Moving from lying on back to sitting on the side of a flat bed without bedrails? 4  -CHARLES (r) EW (t) CHARLES (c) 4  -AC    Moving to and from a bed to a chair (including a wheelchair)? 4  -CHARLES (r) EW (t) CHARLES (c) 4  -AC    Standing up from a chair using your arms (e.g., wheelchair, bedside chair)? 4  -CHARLES (r) EW (t) CHARLES (c) 4  -AC    Climbing 3-5 steps with a railing? 3  -CHARLES (r) EW (t) CHARLES (c) 2  -AC    To walk in hospital room? 4  -CHARLES (r) EW (t) CHARLES (c) 3  -AC    AM-PAC 6 Clicks Score (PT) 23  -CHARLES (r) EW (t) 21  -AC    Highest Level of Mobility Goal 7 --> Walk 25 feet or more  -CHARLES (r) EW (t) 6 --> Walk 10 steps or more  -AC      Row Name 01/14/25 1416          Functional Assessment    Outcome Measure Options AM-PAC 6 Clicks Basic Mobility (PT)  -CHARLES (r) EW (t) CHARLES (c)               User Key  (r) = Recorded By, (t) = Taken By, (c) = Cosigned By      Initials Name Provider Type    Dioni Glass, PT DPT Physical Therapist    Arias Rodrigez, RN Registered Nurse    EW Tafoya, Sivan, PT Student PT Student                  Physical Therapy Education       Title: PT OT SLP Therapies (In Progress)       Topic: Physical Therapy (Resolved)       Point: Mobility training (Resolved)       Learning Progress Summary            Patient Acceptance, E, VU by LASHAWN at 1/14/2025 1416    Comment: safety during ambulation at home, benefits of activity                      Point: Home exercise program (Resolved)        Learner Progress:  Not documented in this visit.              Point: Body mechanics (Resolved)       Learner Progress:  Not documented in this visit.              Point: Precautions (Resolved)       Learner Progress:  Not documented in this visit.                              User Key       Initials Effective Dates Name Provider Type Discipline    EW 12/16/24 -  Tafoya, Sivan, PT Student PT Student PT                  PT Recommendation and Plan     Outcome Evaluation: PT evaluation complete. Pt found sitting upright in chair with legs elevated. A&O x4 and agreeable to therapy. She denies any current symptoms of dizziness or pain. Pt reports independence at home with all ADLs but reports she no longer drives herself. She says she has a rollator which she uses when walking around the house, and other bathroom DME she uses when bathing. Pt notes that she has been attending outpatient physical therapy in [place] a couple times weekly for strength and balance training, but has briefly paused treatment sessions due to weather. Pt demonstrates BLE AROM WNL and strength grossly 4+/5. Patient able to stand from chair independently and stated that she did not want to use the walker to ambulate. Therapist donned gait belt and provided SBA for patient safety. Pt able to ambulate 100ft before reporting mild SOB and fatigue. She requested to return to the recliner and was left with legs elevated, call light in reach, and encouraged to call the nurse for any assistance. PT not needed at this time due to patient independence and level of function. Recommend d/c home with assistance as needed.     Time Calculation:         PT Charges       Row Name 01/14/25 1416             Time Calculation    Start Time 1416  -CHARLES (r) EW (t) CHARLES (c)      Stop Time 1455  -CHARLES (r) EW (t) CHARLES (c)      Time Calculation (min) 39 min  -CHARLES (r) EW (t)      PT Received On 01/14/25  -CHARLES (r) EW (t) CHARLES (c)         Untimed Charges    PT Eval/Re-eval Minutes 49   chart review [id number]. PT eval [id number]. 49min total  -CHARLES (r) EW (t) CHARLES (c)         Total Minutes    Untimed Charges Total Minutes 49  -CHARLES (r) EW (t)       Total Minutes 49  -CHARLES (r) EW (t)                User Key  (r) = Recorded By, (t) = Taken By, (c) = Cosigned By      Initials Name Provider Type    Dioni Glass, PT DPT Physical Therapist    Sivan Madrigal PT Student PT Student                      PT G-Codes  Outcome Measure Options: AM-PAC 6 Clicks Basic Mobility (PT)  AM-PAC 6 Clicks Score (PT): 23  AM-PAC 6 Clicks Score (OT): 21    PT Discharge Summary  Anticipated Discharge Disposition (PT): home, home with assist    Sivan Tafoya, PT Student  1/14/2025

## 2025-01-14 NOTE — PROGRESS NOTES
"[facility] -  Progress Note     LOS: 2 days   Patient Care Team:  Edgar Tolbert MD as PCP - General (Family Medicine)  Manish Smith MD as Consulting Physician (Otolaryngology)  Yordan Blanton MD as Consulting Physician (Gastroenterology)  Roberto Collins PA as Physician Assistant (Otolaryngology)  Mak Nickerson DO as Consulting Physician (Cardiology)  Gill Panda APRN as Nurse Practitioner (Nurse Practitioner)    Chief Complaint: Nosebleed     Subjective     Interval History:   No further nose bleeds. She notes she \"feels dry\". Nose and throat. Her care was switched to her PCP and he has ordered gentle IV fluids and encouraged oral intake.         Review of Systems:     Review of Systems   HENT:          Dry mouth and dry throat     Objective     Vital Sign Min/Max for last 24 hours  Temp  Min: 97.6 °F (36.4 °C)  Max: 97.8 °F (36.6 °C)   BP  Min: 121/51  Max: 163/54   Pulse  Min: 61  Max: 65   Resp  Min: 16  Max: 16   SpO2  Min: 91 %  Max: 96 %   No data recorded   No data recorded         01/12/25  1542   Weight: 64.9 kg (143 lb)       Telemetry: NSR 58-81      Physical Exam:    Constitutional:       Appearance: Healthy appearance. Not in distress.   Eyes:      Pupils: Pupils are equal, round, and reactive to light.   HENT:      Head: Normocephalic and atraumatic.      Nose: Nose normal.    Mouth/Throat:      Dentition: Normal.   Pulmonary:      Effort: Pulmonary effort is normal.      Breath sounds: Normal breath sounds.   Chest:      Chest wall: Not tender to palpatation.   Cardiovascular:      PMI at left midclavicular line. Normal rate. Regular rhythm.      Murmurs: There is no murmur.   Pulses:     Intact distal pulses.   Edema:     Peripheral edema absent.   Abdominal:      General: Bowel sounds are normal.      Palpations: Abdomen is soft.   Musculoskeletal: Normal range of motion.      Cervical back: Normal range of motion. Skin:     " General: Skin is warm and dry.   Neurological:      Mental Status: Alert, oriented to person, place, and time and oriented to person, place and time.   Psychiatric:         Attention and Perception: Attention normal.         Mood and Affect: Mood normal.       Results Review:   Lab Results (last 72 hours)       Procedure Component Value Units Date/Time    POC Glucose Once [[id number]]  (Abnormal) Collected: 01/14/25 1102    Specimen: Blood Updated: 01/14/25 1114     Glucose 174 mg/dL      Comment: : [id number] [employer] ID: [id number]       POC Glucose Once [[id number]]  (Abnormal) Collected: 01/14/25 0731    Specimen: Blood Updated: 01/14/25 0742     Glucose 150 mg/dL      Comment: : [id number] [employer] ID: [phone]       Comprehensive Metabolic Panel [[id number]]  (Abnormal) Collected: 01/14/25 0328    Specimen: Blood Updated: 01/14/25 0443     Glucose 132 mg/dL      BUN 24 mg/dL      Creatinine 1.34 mg/dL      Sodium 129 mmol/L      Potassium 4.5 mmol/L      Chloride 96 mmol/L      CO2 23.0 mmol/L      Calcium 9.3 mg/dL      Total Protein 6.2 g/dL      Albumin 3.7 g/dL      ALT (SGPT) 13 U/L      AST (SGOT) 16 U/L      Alkaline Phosphatase 63 U/L      Total Bilirubin 0.3 mg/dL      Globulin 2.5 gm/dL      A/G Ratio 1.5 g/dL      BUN/Creatinine Ratio 17.9     Anion Gap 10.0 mmol/L      eGFR 38.0 mL/min/1.73     Narrative:      GFR Categories in Chronic Kidney Disease (CKD)      GFR Category          GFR (mL/min/1.73)    Interpretation  G1                     90 or greater         Normal or high (1)  G2                      60-89                Mild decrease (1)  G3a                   45-59                Mild to moderate decrease  G3b                   30-44                Moderate to severe decrease  G4                    15-29                Severe decrease  G5                    14 or less           Kidney failure          (1)In the absence of evidence of kidney disease, neither  GFR category G1 or G2 fulfill the criteria for CKD.    eGFR calculation 2021 CKD-EPI creatinine equation, which does not include race as a factor    TSH [[id number]]  (Normal) Collected: 01/14/25 0328    Specimen: Blood Updated: 01/14/25 0443     TSH 2.340 uIU/mL     Lipid Panel [[id number]]  (Abnormal) Collected: 01/14/25 0328    Specimen: Blood Updated: 01/14/25 0443     Total Cholesterol 184 mg/dL      Triglycerides 135 mg/dL      HDL Cholesterol 66 mg/dL      LDL Cholesterol  95 mg/dL      VLDL Cholesterol 23 mg/dL      LDL/HDL Ratio 1.38    Narrative:      Cholesterol Reference Ranges  (U.S. Department of Health and Human Services ATP III Classifications)    Desirable          <200 mg/dL  Borderline High    200-239 mg/dL  High Risk          >240 mg/dL      Triglyceride Reference Ranges  (U.S. Department of Health and Human Services ATP III Classifications)    Normal           <150 mg/dL  Borderline High  150-199 mg/dL  High             200-499 mg/dL  Very High        >500 mg/dL    HDL Reference Ranges  (U.S. Department of Health and Human Services ATP III Classifications)    Low     <40 mg/dl (major risk factor for CHD)  High    >60 mg/dl ('negative' risk factor for CHD)        LDL Reference Ranges  (U.S. Department of Health and Human Services ATP III Classifications)    Optimal          <100 mg/dL  Near Optimal     100-129 mg/dL  Borderline High  130-159 mg/dL  High             160-189 mg/dL  Very High        >189 mg/dL    CBC & Differential [[id number]]  (Abnormal) Collected: 01/14/25 0328    Specimen: Blood Updated: 01/14/25 0429    Narrative:      The following orders were created for panel order CBC & Differential.  Procedure                               Abnormality         Status                     ---------                               -----------         ------                     CBC Auto Differential[[id number]]        Abnormal            Final result                 Please view results for these  tests on the individual orders.    CBC Auto Differential [[id number]]  (Abnormal) Collected: 01/14/25 0328    Specimen: Blood Updated: 01/14/25 0429     WBC 6.16 10*3/mm3      RBC 3.79 10*6/mm3      Hemoglobin 11.9 g/dL      Hematocrit 35.8 %      MCV 94.5 fL      MCH 31.4 pg      MCHC 33.2 g/dL      RDW 12.5 %      RDW-SD 43.7 fl      MPV 10.2 fL      Platelets 256 10*3/mm3      Neutrophil % 43.9 %      Lymphocyte % 36.2 %      Monocyte % 12.5 %      Eosinophil % 6.2 %      Basophil % 1.0 %      Immature Grans % 0.2 %      Neutrophils, Absolute 2.71 10*3/mm3      Lymphocytes, Absolute 2.23 10*3/mm3      Monocytes, Absolute 0.77 10*3/mm3      Eosinophils, Absolute 0.38 10*3/mm3      Basophils, Absolute 0.06 10*3/mm3      Immature Grans, Absolute 0.01 10*3/mm3      nRBC 0.0 /100 WBC     Hemoglobin A1c [[id number]]  (Abnormal) Collected: 01/14/25 0328    Specimen: Blood Updated: 01/14/25 0428     Hemoglobin A1C 7.30 %     Narrative:      Hemoglobin A1C Ranges:    Increased Risk for Diabetes  5.7% to 6.4%  Diabetes                     >= 6.5%  Diabetic Goal                < 7.0%    POC Glucose Once [[id number]]  (Normal) Collected: 01/13/25 2023    Specimen: Blood Updated: 01/13/25 2034     Glucose 126 mg/dL      Comment: : [id number] Giles RebeccaMeter ID: [id number]       POC Glucose Once [[id number]]  (Abnormal) Collected: 01/13/25 1620    Specimen: Blood Updated: 01/13/25 1631     Glucose 141 mg/dL      Comment: : [id number] [facility] [place] [place] ID: [id number]       Urine Culture - Urine, Urine, Clean Catch [[id number]] Collected: 01/12/25 0042    Specimen: Urine, Clean Catch Updated: 01/13/25 1253     Urine Culture 25,000 CFU/mL Mixed Peggy Isolated    Narrative:      Specimen contains mixed organisms of questionable pathogenicity suggestive of contamination. If symptoms persist, suggest recollection.  Colonization of the urinary tract without infection is common. Treatment is discouraged unless the patient is  symptomatic, pregnant, or undergoing an invasive urologic procedure.    POC Glucose Once [[id number]]  (Abnormal) Collected: 01/13/25 1038    Specimen: Blood Updated: 01/13/25 1050     Glucose 151 mg/dL      Comment: : [id number] Jennifer Balderas ID: [id number]       Comprehensive Metabolic Panel [[id number]]  (Abnormal) Collected: 01/13/25 0853    Specimen: Blood Updated: 01/13/25 0940     Glucose 198 mg/dL      BUN 21 mg/dL      Creatinine 1.24 mg/dL      Sodium 130 mmol/L      Potassium 4.4 mmol/L      Comment: Slight hemolysis detected by analyzer. Result may be falsely elevated.        Chloride 96 mmol/L      CO2 23.0 mmol/L      Calcium 9.1 mg/dL      Total Protein 6.2 g/dL      Albumin 3.7 g/dL      ALT (SGPT) 12 U/L      AST (SGOT) 18 U/L      Alkaline Phosphatase 65 U/L      Total Bilirubin 0.3 mg/dL      Globulin 2.5 gm/dL      A/G Ratio 1.5 g/dL      BUN/Creatinine Ratio 16.9     Anion Gap 11.0 mmol/L      eGFR 41.7 mL/min/1.73     Narrative:      GFR Categories in Chronic Kidney Disease (CKD)      GFR Category          GFR (mL/min/1.73)    Interpretation  G1                     90 or greater         Normal or high (1)  G2                      60-89                Mild decrease (1)  G3a                   45-59                Mild to moderate decrease  G3b                   30-44                Moderate to severe decrease  G4                    15-29                Severe decrease  G5                    14 or less           Kidney failure          (1)In the absence of evidence of kidney disease, neither GFR category G1 or G2 fulfill the criteria for CKD.    eGFR calculation 2021 CKD-EPI creatinine equation, which does not include race as a factor    CBC & Differential [[id number]]  (Abnormal) Collected: 01/13/25 0853    Specimen: Blood Updated: 01/13/25 0903    Narrative:      The following orders were created for panel order CBC & Differential.  Procedure                               Abnormality          Status                     ---------                               -----------         ------                     CBC Auto Differential[[id number]]        Abnormal            Final result                 Please view results for these tests on the individual orders.    CBC Auto Differential [[id number]]  (Abnormal) Collected: 01/13/25 0853    Specimen: Blood Updated: 01/13/25 0903     WBC 4.70 10*3/mm3      RBC 3.98 10*6/mm3      Hemoglobin 12.4 g/dL      Hematocrit 37.1 %      MCV 93.2 fL      MCH 31.2 pg      MCHC 33.4 g/dL      RDW 12.5 %      RDW-SD 43.0 fl      MPV 9.4 fL      Platelets 247 10*3/mm3      Neutrophil % 50.2 %      Lymphocyte % 29.6 %      Monocyte % 12.3 %      Eosinophil % 6.6 %      Basophil % 1.1 %      Immature Grans % 0.2 %      Neutrophils, Absolute 2.36 10*3/mm3      Lymphocytes, Absolute 1.39 10*3/mm3      Monocytes, Absolute 0.58 10*3/mm3      Eosinophils, Absolute 0.31 10*3/mm3      Basophils, Absolute 0.05 10*3/mm3      Immature Grans, Absolute 0.01 10*3/mm3      nRBC 0.0 /100 WBC     POC Glucose Once [[id number]]  (Normal) Collected: 01/13/25 0733    Specimen: Blood Updated: 01/13/25 0744     Glucose 95 mg/dL      Comment: : [id number] [facility] ID: [id number]       POC Glucose Once [[id number]]  (Normal) Collected: 01/13/25 0217    Specimen: Blood Updated: 01/13/25 0228     Glucose 98 mg/dL      Comment: : [id number] Kaylin ViedMeter ID: [id number]       POC Glucose Once [[id number]]  (Abnormal) Collected: 01/12/25 2005    Specimen: Blood Updated: 01/12/25 2016     Glucose 180 mg/dL      Comment: : [id number] Kaylin ViedMeter ID: [id number]       POC Glucose Once [[id number]]  (Normal) Collected: 01/12/25 1653    Specimen: Blood Updated: 01/12/25 1714     Glucose 101 mg/dL      Comment: : [id number] FELA'Rhys [employer] ID: [id number]       Lipid Panel [[id number]]  (Abnormal) Collected: 01/12/25 0329    Specimen: Blood Updated: 01/12/25 1329     Total Cholesterol  202 mg/dL      Triglycerides 133 mg/dL      HDL Cholesterol 75 mg/dL      LDL Cholesterol  104 mg/dL      VLDL Cholesterol 23 mg/dL      LDL/HDL Ratio 1.34    Narrative:      Cholesterol Reference Ranges  (U.S. Department of Health and Human Services ATP III Classifications)    Desirable          <200 mg/dL  Borderline High    200-239 mg/dL  High Risk          >240 mg/dL      Triglyceride Reference Ranges  (U.S. Department of Health and Human Services ATP III Classifications)    Normal           <150 mg/dL  Borderline High  150-199 mg/dL  High             200-499 mg/dL  Very High        >500 mg/dL    HDL Reference Ranges  (U.S. Department of Health and Human Services ATP III Classifications)    Low     <40 mg/dl (major risk factor for CHD)  High    >60 mg/dl ('negative' risk factor for CHD)        LDL Reference Ranges  (U.S. Department of Health and Human Services ATP III Classifications)    Optimal          <100 mg/dL  Near Optimal     100-129 mg/dL  Borderline High  130-159 mg/dL  High             160-189 mg/dL  Very High        >189 mg/dL    POC Glucose Once [[id number]]  (Abnormal) Collected: 01/12/25 1112    Specimen: Blood Updated: 01/12/25 1140     Glucose 144 mg/dL      Comment: : [id number] O'Rhys [employer] ID: [id number]       POC Glucose Once [[id number]]  (Normal) Collected: 01/12/25 0751    Specimen: Blood Updated: 01/12/25 0822     Glucose 95 mg/dL      Comment: : [id number] [place] [employer] ID: [id number]       Comprehensive Metabolic Panel [[id number]]  (Abnormal) Collected: 01/12/25 0329    Specimen: Blood Updated: 01/12/25 0406     Glucose 143 mg/dL      BUN 21 mg/dL      Creatinine 0.84 mg/dL      Sodium 132 mmol/L      Potassium 3.6 mmol/L      Chloride 95 mmol/L      CO2 23.0 mmol/L      Calcium 9.4 mg/dL      Total Protein 6.9 g/dL      Albumin 4.1 g/dL      ALT (SGPT) 12 U/L      AST (SGOT) 18 U/L      Alkaline Phosphatase 71 U/L      Total Bilirubin 0.3 mg/dL      Globulin 2.8 gm/dL       A/G Ratio 1.5 g/dL      BUN/Creatinine Ratio 25.0     Anion Gap 14.0 mmol/L      eGFR 66.5 mL/min/1.73     Narrative:      GFR Categories in Chronic Kidney Disease (CKD)      GFR Category          GFR (mL/min/1.73)    Interpretation  G1                     90 or greater         Normal or high (1)  G2                      60-89                Mild decrease (1)  G3a                   45-59                Mild to moderate decrease  G3b                   30-44                Moderate to severe decrease  G4                    15-29                Severe decrease  G5                    14 or less           Kidney failure          (1)In the absence of evidence of kidney disease, neither GFR category G1 or G2 fulfill the criteria for CKD.    eGFR calculation 2021 CKD-EPI creatinine equation, which does not include race as a factor    aPTT [[id number]]  (Normal) Collected: 01/12/25 0329    Specimen: Blood Updated: 01/12/25 0355     PTT 28.7 seconds     Protime-INR [[id number]]  (Abnormal) Collected: 01/12/25 0329    Specimen: Blood Updated: 01/12/25 0355     Protime 16.8 Seconds      INR 1.29    CBC (No Diff) [[id number]]  (Normal) Collected: 01/12/25 0329    Specimen: Blood Updated: 01/12/25 0347     WBC 5.89 10*3/mm3      RBC 4.10 10*6/mm3      Hemoglobin 12.8 g/dL      Hematocrit 37.7 %      MCV 92.0 fL      MCH 31.2 pg      MCHC 34.0 g/dL      RDW 12.3 %      RDW-SD 41.1 fl      MPV 9.4 fL      Platelets 256 10*3/mm3     Respiratory Panel PCR w/COVID-19(SARS-CoV-2) [facility] In-House, NP Swab in [facility], 2 HR TAT - Swab, Nasopharynx [[id number]]  (Normal) Collected: 01/12/25 0045    Specimen: Swab from Nasopharynx Updated: 01/12/25 0147     ADENOVIRUS, PCR Not Detected     Coronavirus 229E Not Detected     Coronavirus HKU1 Not Detected     Coronavirus NL63 Not Detected     Coronavirus OC43 Not Detected     COVID19 Not Detected     Human Metapneumovirus Not Detected     Human Rhinovirus/Enterovirus Not Detected      Influenza A PCR Not Detected     Influenza B PCR Not Detected     Parainfluenza Virus 1 Not Detected     Parainfluenza Virus 2 Not Detected     Parainfluenza Virus 3 Not Detected     Parainfluenza Virus 4 Not Detected     RSV, PCR Not Detected     Bordetella pertussis pcr Not Detected     Bordetella parapertussis PCR Not Detected     Chlamydophila pneumoniae PCR Not Detected     Mycoplasma pneumo by PCR Not Detected    Narrative:      In the setting of a positive respiratory panel with a viral infection PLUS a negative procalcitonin without other underlying concern for bacterial infection, consider observing off antibiotics or discontinuation of antibiotics and continue supportive care. If the respiratory panel is positive for atypical bacterial infection (Bordetella pertussis, Chlamydophila pneumoniae, or Mycoplasma pneumoniae), consider antibiotic de-escalation to target atypical bacterial infection.    High Sensitivity Troponin T 1Hr [[id number]]  (Abnormal) Collected: 01/12/25 0059    Specimen: Blood Updated: 01/12/25 0129     HS Troponin T 27 ng/L      Troponin T Numeric Delta 9 ng/L      Troponin T % Delta 50 %     Narrative:      High Sensitive Troponin T Reference Range:  <14.0 ng/L- Negative Female for AMI  <22.0 ng/L- Negative Male for AMI  >=14 - Abnormal Female indicating possible myocardial injury.  >=22 - Abnormal Male indicating possible myocardial injury.   Clinicians would have to utilize clinical acumen, EKG, Troponin, and serial changes to determine if it is an Acute Myocardial Infarction or myocardial injury due to an underlying chronic condition.         Urinalysis With Culture If Indicated - Urine, Clean Catch [[id number]]  (Abnormal) Collected: 01/12/25 0042    Specimen: Urine, Clean Catch Updated: 01/12/25 0104     Color, UA Yellow     Appearance, UA Clear     pH, UA 8.0     Specific Gravity, UA 1.006     Glucose, UA Negative     Ketones, UA Negative     Bilirubin, UA Negative     Blood, UA  Negative     Protein, UA Negative     Leuk Esterase, UA Large (3+)     Nitrite, UA Negative     Urobilinogen, UA 0.2 E.U./dL    Narrative:      In absence of clinical symptoms, the presence of pyuria, bacteria, and/or nitrites on the urinalysis result does not correlate with infection.    Urinalysis, Microscopic Only - Urine, Clean Catch [[id number]]  (Abnormal) Collected: 01/12/25 0042    Specimen: Urine, Clean Catch Updated: 01/12/25 0104     RBC, UA 3-5 /HPF      WBC, UA 11-20 /HPF      Bacteria, UA 1+ /HPF      Squamous Epithelial Cells, UA 3-6 /HPF      Hyaline Casts, UA None Seen /LPF      Methodology Manual Light Microscopy    Comprehensive Metabolic Panel [[id number]]  (Abnormal) Collected: 01/12/25 0009    Specimen: Blood Updated: 01/12/25 0055     Glucose 119 mg/dL      BUN 23 mg/dL      Creatinine 0.91 mg/dL      Sodium 133 mmol/L      Potassium 4.0 mmol/L      Comment: Slight hemolysis detected by analyzer. Result may be falsely elevated.        Chloride 100 mmol/L      CO2 22.0 mmol/L      Calcium 9.0 mg/dL      Total Protein 6.8 g/dL      Albumin 4.0 g/dL      ALT (SGPT) 13 U/L      AST (SGOT) 20 U/L      Comment: Slight hemolysis detected by analyzer. Result may be falsely elevated.        Alkaline Phosphatase 72 U/L      Total Bilirubin 0.2 mg/dL      Globulin 2.8 gm/dL      A/G Ratio 1.4 g/dL      BUN/Creatinine Ratio 25.3     Anion Gap 11.0 mmol/L      eGFR 60.4 mL/min/1.73     Narrative:      GFR Categories in Chronic Kidney Disease (CKD)      GFR Category          GFR (mL/min/1.73)    Interpretation  G1                     90 or greater         Normal or high (1)  G2                      60-89                Mild decrease (1)  G3a                   45-59                Mild to moderate decrease  G3b                   30-44                Moderate to severe decrease  G4                    15-29                Severe decrease  G5                    14 or less           Kidney failure          (1)In  the absence of evidence of kidney disease, neither GFR category G1 or G2 fulfill the criteria for CKD.    eGFR calculation 2021 CKD-EPI creatinine equation, which does not include race as a factor    High Sensitivity Troponin T [[id number]]  (Abnormal) Collected: 01/12/25 0009    Specimen: Blood Updated: 01/12/25 0052     HS Troponin T 18 ng/L     Narrative:      High Sensitive Troponin T Reference Range:  <14.0 ng/L- Negative Female for AMI  <22.0 ng/L- Negative Male for AMI  >=14 - Abnormal Female indicating possible myocardial injury.  >=22 - Abnormal Male indicating possible myocardial injury.   Clinicians would have to utilize clinical acumen, EKG, Troponin, and serial changes to determine if it is an Acute Myocardial Infarction or myocardial injury due to an underlying chronic condition.         BNP [[id number]]  (Normal) Collected: 01/12/25 0009    Specimen: Blood Updated: 01/12/25 0052     proBNP 1,192.0 pg/mL     Narrative:      This assay is used as an aid in the diagnosis of individuals suspected of having heart failure. It can be used as an aid in the diagnosis of acute decompensated heart failure (ADHF) in patients presenting with signs and symptoms of ADHF to the emergency department (ED). In addition, NT-proBNP of <300 pg/mL indicates ADHF is not likely.    Age Range Result Interpretation  NT-proBNP Concentration (pg/mL:      <50             Positive            >450                   Gray                 300-450                    Negative             <300    50-75           Positive            >900                  Gray                300-900                  Negative            <300      >75             Positive            >1800                  Gray                300-1800                  Negative            <300    aPTT [[id number]]  (Normal) Collected: 01/12/25 0009    Specimen: Blood Updated: 01/12/25 0046     PTT 28.8 seconds     Protime-INR [[id number]]  (Abnormal) Collected: 01/12/25 0009     Specimen: Blood Updated: 01/12/25 0046     Protime 18.4 Seconds      INR 1.44    CBC & Differential [[id number]]  (Abnormal) Collected: 01/12/25 0009    Specimen: Blood Updated: 01/12/25 0036    Narrative:      The following orders were created for panel order CBC & Differential.  Procedure                               Abnormality         Status                     ---------                               -----------         ------                     CBC Auto Differential[[id number]]        Abnormal            Final result                 Please view results for these tests on the individual orders.    CBC Auto Differential [[id number]]  (Abnormal) Collected: 01/12/25 0009    Specimen: Blood Updated: 01/12/25 0036     WBC 6.42 10*3/mm3      RBC 3.93 10*6/mm3      Hemoglobin 12.1 g/dL      Hematocrit 36.5 %      MCV 92.9 fL      MCH 30.8 pg      MCHC 33.2 g/dL      RDW 12.2 %      RDW-SD 42.1 fl      MPV 9.4 fL      Platelets 256 10*3/mm3      Neutrophil % 51.6 %      Lymphocyte % 29.1 %      Monocyte % 12.8 %      Eosinophil % 5.5 %      Basophil % 0.8 %      Immature Grans % 0.2 %      Neutrophils, Absolute 3.32 10*3/mm3      Lymphocytes, Absolute 1.87 10*3/mm3      Monocytes, Absolute 0.82 10*3/mm3      Eosinophils, Absolute 0.35 10*3/mm3      Basophils, Absolute 0.05 10*3/mm3      Immature Grans, Absolute 0.01 10*3/mm3      nRBC 0.0 /100 WBC     [place] Draw [[id number]] Collected: 01/12/25 0009    Specimen: Blood Updated: 01/12/25 0015    Narrative:      The following orders were created for panel order [place] Draw.  Procedure                               Abnormality         Status                     ---------                               -----------         ------                     Green Top (Gel)[[id number]]                                  Final result               Lavender Top[[id number]]                                     Final result               Red Top[[id number]]                                           Final result               Light Blue Top[[id number]]                                   Final result                 Please view results for these tests on the individual orders.    Green Top (Gel) [[id number]] Collected: 01/12/25 0009    Specimen: Blood Updated: 01/12/25 0015     Extra Tube Hold for add-ons.     Comment: Auto resulted.       Lavender Top [[id number]] Collected: 01/12/25 0009    Specimen: Blood Updated: 01/12/25 0015     Extra Tube hold for add-on     Comment: Auto resulted       Red Top [[id number]] Collected: 01/12/25 0009    Specimen: Blood Updated: 01/12/25 0015     Extra Tube Hold for add-ons.     Comment: Auto resulted.       Light Blue Top [[id number]] Collected: 01/12/25 0009    Specimen: Blood Updated: 01/12/25 0015     Extra Tube Hold for add-ons.     Comment: Auto resulted                   Echo EF Estimated  Results for orders placed during the hospital encounter of 01/12/25    Adult Transthoracic Echo Complete W/ Cont if Necessary Per Protocol    Interpretation Summary    Left ventricular systolic function is mildly decreased. Left ventricular ejection fraction appears to be 41 - 45%.    The left ventricular cavity is mildly dilated.    Left ventricular wall thickness is consistent with mild septal asymmetric hypertrophy.    Left ventricular diastolic function is consistent with (grade I) impaired relaxation.    There is no significant (greater than mild) valvular dysfunction.    Normal right ventricular cavity size and systolic function noted.    Estimated right ventricular systolic pressure from tricuspid regurgitation is normal (<35 mmHg).       Medication Review: yes  Current Facility-Administered Medications   Medication Dose Route Frequency Provider Last Rate Last Admin    acetaminophen (TYLENOL) tablet 650 mg  650 mg Oral Q4H PRN Blake Park MD        Or    acetaminophen (TYLENOL) 160 MG/5ML oral solution 650 mg  650 mg Oral Q4H PRN Blake Park MD        Or    acetaminophen  (TYLENOL) suppository 650 mg  650 mg Rectal Q4H PRN Blake Park MD        apixaban (ELIQUIS) tablet 2.5 mg  2.5 mg Oral Q12H Mak Nickerson DO   2.5 mg at 01/14/25 0844    dextrose (D50W) (25 g/50 mL) IV injection 25 g  25 g Intravenous Q15 Min PRN Blake Park MD        dextrose (GLUTOSE) oral gel 15 g  15 g Oral Q15 Min PRN Blake Park MD        flecainide (TAMBOCOR) tablet 25 mg  25 mg Oral BID Blake Park MD   25 mg at 01/14/25 0843    glucagon (GLUCAGEN) injection 1 mg  1 mg Intramuscular Q15 Min PRN Blake Park MD        Insulin Lispro (humaLOG) injection 2-7 Units  2-7 Units Subcutaneous 4x Daily AC & at Bedtime Blake Park MD        levothyroxine (SYNTHROID, LEVOTHROID) tablet 50 mcg  50 mcg Oral Q AM Blake Park MD   50 mcg at 01/14/25 0541    losartan (COZAAR) tablet 25 mg  25 mg Oral Q24H Louie Khoury MD   25 mg at 01/14/25 0843    nitroglycerin (NITROSTAT) SL tablet 0.4 mg  0.4 mg Sublingual Q5 Min PRN Blake Park MD        ondansetron ODT (ZOFRAN-ODT) disintegrating tablet 4 mg  4 mg Translingual Q6H PRN Edgar Tolbert MD        pantoprazole (PROTONIX) EC tablet 40 mg  40 mg Oral Q AM Blake Park MD   40 mg at 01/14/25 0541    saline (AYR) nasal gel 1 Application  1 Application Topical Q6H Mak Nickerson DO   1 Application at 01/14/25 1129    sodium chloride 0.9 % flush 10 mL  10 mL Intravenous PRN Juanpablo Georges MD        sodium chloride 0.9 % flush 10 mL  10 mL Intravenous Q12H Blake Park MD   10 mL at 01/12/25 2141    sodium chloride 0.9 % flush 10 mL  10 mL Intravenous PRN Blake Park MD        sodium chloride 0.9 % infusion 40 mL  40 mL Intravenous PRN Blake Park MD        sulfamethoxazole-trimethoprim (BACTRIM,SEPTRA) 400-80 MG tablet 1 tablet  1 tablet Oral Q12H Louie Khoury MD   1 tablet at 01/14/25 0843    verapamil SR (CALAN-SR) CR tablet 90 mg  90 mg Oral Q24H Mak Nickerson DO   90 mg at 01/14/25  "0843         Assessment & Plan       Dizziness    Benign essential HTN    Hypothyroidism    Type 2 diabetes mellitus, without long-term current use of insulin    H/O thyroid nodule    Mild tricuspid insufficiency    Neuropathy due to type 2 diabetes mellitus    Lumbar radiculopathy    Non-toxic multinodular goiter    UTI symptoms    Mixed hyperlipidemia with pervious statin intolerance    Stage 3a chronic kidney disease (CKD)    LBBB (left bundle branch block)    Persistent atrial fibrillation    Accelerated hypertension with diastolic congestive heart failure, NYHA class 3    Coronary artery disease involving coronary bypass graft of native heart with unstable angina pectoris    Epistaxis      Plan:  Epistaxis- no recurrence. Continue to keep nares hydrated.     Dizziness- stable    Paroxysmal Atrial Fibrillation - stable on Flecainide. However drop in LVEF to 41-45%. Anticoagulated with Eliquis 2.5 mg BID.  If recurrent issues with Epitaxis - would be a good Watchman candidate.     LV dysfunction- with LVEF 41-45%. Previously was 66-70% in 2023. On Losartan. Denies volume overload. Could consider outpatient ischemic evaluation.     Hypertension- blood pressure stable    JOSE ALEJANDRO on CKD- encouraged oral hydration. She notes \"everything feels dry\".     Coronary Artery Disease- nonobstructive on cath in 2021. On Repatha. No angina.       Cardiology will sign off. Please call if can be of further assistance.     Electronically signed by QUETA Elena, 01/14/25, 1:41 PM CST.    "

## 2025-01-14 NOTE — PLAN OF CARE
Goal Outcome Evaluation:  Plan of Care Reviewed With: patient           Outcome Evaluation: PT evaluation complete. Pt found sitting upright in chair with legs elevated. A&O x4 and agreeable to therapy. She denies any current symptoms of dizziness or pain. Pt reports independence at home with all ADLs but reports she no longer drives herself. She says she has a rollator which she uses when walking around the house, and other bathroom DME she uses when bathing. Pt notes that she has been attending outpatient physical therapy in [place] a couple times weekly for strength and balance training, but has briefly paused treatment sessions due to weather. Pt demonstrates BLE AROM WNL and strength grossly 4+/5. Patient able to stand from chair independently and stated that she did not want to use the walker to ambulate. Therapist donned gait belt and provided SBA for patient safety. Pt able to ambulate 100ft before reporting mild SOB and fatigue. She requested to return to the recliner and was left with legs elevated, call light in reach, and encouraged to call the nurse for any assistance. PT not needed at this time due to patient independence and level of function. Recommend d/c home with assistance as needed.    Anticipated Discharge Disposition (PT): home, home with assist

## 2025-01-14 NOTE — PLAN OF CARE
Goal Outcome Evaluation:  Plan of Care Reviewed With: patient        Progress: no change  Outcome Evaluation: VSS. S/SB 58-70 BBB on tele. Pt rested well overnight. No complaints. Call light within reach. Safety maintained.

## 2025-01-14 NOTE — PROGRESS NOTES
Daily Progress Note  Nedra Tolliver  MRN: [id number] LOS: 2    Admit Date: 1/12/2025 1/14/2025 08:02 CST    Subjective:      Chief Complaint:  Chief Complaint   Patient presents with    Nose Bleed    Dizziness       Interval History:    Reviewed overnight events and nursing notes.   No acute events overnight.  No further nosebleed.  Dizziness is slightly improved.    Review of Systems   Constitutional:  Positive for activity change, appetite change and fatigue.   Respiratory:  Positive for shortness of breath. Negative for cough.    Gastrointestinal:  Negative for nausea and vomiting.   Genitourinary:  Negative for dysuria.   Musculoskeletal:  Positive for gait problem.   Skin:  Negative for color change and pallor.   Neurological:  Positive for dizziness and weakness.       DIET:  Diet: Cardiac, Diabetic; Healthy Heart (2-3 Na+); Consistent Carbohydrate; Fluid Consistency: Thin (IDDSI 0)    Medications:      apixaban, 2.5 mg, Oral, Q12H  flecainide, 25 mg, Oral, BID  insulin lispro, 2-7 Units, Subcutaneous, 4x Daily AC & at Bedtime  levothyroxine, 50 mcg, Oral, Q AM  losartan, 25 mg, Oral, Q24H  pantoprazole, 40 mg, Oral, Q AM  saline, 1 Application, Topical, Q6H  sodium chloride, 10 mL, Intravenous, Q12H  sulfamethoxazole-trimethoprim, 1 tablet, Oral, Q12H  verapamil SR, 90 mg, Oral, Q24H        Data:     Code Status:   Code Status and Medical Interventions: CPR (Attempt to Resuscitate); Full Support   Ordered at: 01/12/25 0317     Level Of Support Discussed With:    Patient     Code Status (Patient has no pulse and is not breathing):    CPR (Attempt to Resuscitate)     Medical Interventions (Patient has pulse or is breathing):    Full Support       Family History   Problem Relation Age of Onset    Diabetes Mother     Cancer Mother     Heart failure Mother     Diabetes Father     Colon cancer Neg Hx     Colon polyps Neg Hx      Social History     Socioeconomic History    Marital status:    Tobacco Use     Smoking status: Never    Smokeless tobacco: Never   Vaping Use    Vaping status: Never Used   Substance and Sexual Activity    Alcohol use: No    Drug use: Never    Sexual activity: Defer     Partners: Male       Labs:    Lab Results (last 72 hours)       Procedure Component Value Units Date/Time    POC Glucose Once [[id number]]  (Abnormal) Collected: 01/14/25 0731    Specimen: Blood Updated: 01/14/25 0742     Glucose 150 mg/dL      Comment: : [id number] Jennifer Balderas ID: [id number]       Comprehensive Metabolic Panel [[id number]]  (Abnormal) Collected: 01/14/25 0328    Specimen: Blood Updated: 01/14/25 0443     Glucose 132 mg/dL      BUN 24 mg/dL      Creatinine 1.34 mg/dL      Sodium 129 mmol/L      Potassium 4.5 mmol/L      Chloride 96 mmol/L      CO2 23.0 mmol/L      Calcium 9.3 mg/dL      Total Protein 6.2 g/dL      Albumin 3.7 g/dL      ALT (SGPT) 13 U/L      AST (SGOT) 16 U/L      Alkaline Phosphatase 63 U/L      Total Bilirubin 0.3 mg/dL      Globulin 2.5 gm/dL      A/G Ratio 1.5 g/dL      BUN/Creatinine Ratio 17.9     Anion Gap 10.0 mmol/L      eGFR 38.0 mL/min/1.73     Narrative:      GFR Categories in Chronic Kidney Disease (CKD)      GFR Category          GFR (mL/min/1.73)    Interpretation  G1                     90 or greater         Normal or high (1)  G2                      60-89                Mild decrease (1)  G3a                   45-59                Mild to moderate decrease  G3b                   30-44                Moderate to severe decrease  G4                    15-29                Severe decrease  G5                    14 or less           Kidney failure          (1)In the absence of evidence of kidney disease, neither GFR category G1 or G2 fulfill the criteria for CKD.    eGFR calculation 2021 CKD-EPI creatinine equation, which does not include race as a factor    TSH [[id number]]  (Normal) Collected: 01/14/25 0328    Specimen: Blood Updated: 01/14/25 0443     TSH 2.340  uIU/mL     Lipid Panel [[id number]]  (Abnormal) Collected: 01/14/25 0328    Specimen: Blood Updated: 01/14/25 0443     Total Cholesterol 184 mg/dL      Triglycerides 135 mg/dL      HDL Cholesterol 66 mg/dL      LDL Cholesterol  95 mg/dL      VLDL Cholesterol 23 mg/dL      LDL/HDL Ratio 1.38    Narrative:      Cholesterol Reference Ranges  (U.S. Department of Health and Human Services ATP III Classifications)    Desirable          <200 mg/dL  Borderline High    200-239 mg/dL  High Risk          >240 mg/dL      Triglyceride Reference Ranges  (U.S. Department of Health and Human Services ATP III Classifications)    Normal           <150 mg/dL  Borderline High  150-199 mg/dL  High             200-499 mg/dL  Very High        >500 mg/dL    HDL Reference Ranges  (U.S. Department of Health and Human Services ATP III Classifications)    Low     <40 mg/dl (major risk factor for CHD)  High    >60 mg/dl ('negative' risk factor for CHD)        LDL Reference Ranges  (U.S. Department of Health and Human Services ATP III Classifications)    Optimal          <100 mg/dL  Near Optimal     100-129 mg/dL  Borderline High  130-159 mg/dL  High             160-189 mg/dL  Very High        >189 mg/dL    CBC & Differential [[id number]]  (Abnormal) Collected: 01/14/25 0328    Specimen: Blood Updated: 01/14/25 0429    Narrative:      The following orders were created for panel order CBC & Differential.  Procedure                               Abnormality         Status                     ---------                               -----------         ------                     CBC Auto Differential[[id number]]        Abnormal            Final result                 Please view results for these tests on the individual orders.    CBC Auto Differential [[id number]]  (Abnormal) Collected: 01/14/25 0328    Specimen: Blood Updated: 01/14/25 0429     WBC 6.16 10*3/mm3      RBC 3.79 10*6/mm3      Hemoglobin 11.9 g/dL      Hematocrit 35.8 %      MCV 94.5 fL       MCH 31.4 pg      MCHC 33.2 g/dL      RDW 12.5 %      RDW-SD 43.7 fl      MPV 10.2 fL      Platelets 256 10*3/mm3      Neutrophil % 43.9 %      Lymphocyte % 36.2 %      Monocyte % 12.5 %      Eosinophil % 6.2 %      Basophil % 1.0 %      Immature Grans % 0.2 %      Neutrophils, Absolute 2.71 10*3/mm3      Lymphocytes, Absolute 2.23 10*3/mm3      Monocytes, Absolute 0.77 10*3/mm3      Eosinophils, Absolute 0.38 10*3/mm3      Basophils, Absolute 0.06 10*3/mm3      Immature Grans, Absolute 0.01 10*3/mm3      nRBC 0.0 /100 WBC     Hemoglobin A1c [[id number]]  (Abnormal) Collected: 01/14/25 0328    Specimen: Blood Updated: 01/14/25 0428     Hemoglobin A1C 7.30 %     Narrative:      Hemoglobin A1C Ranges:    Increased Risk for Diabetes  5.7% to 6.4%  Diabetes                     >= 6.5%  Diabetic Goal                < 7.0%    POC Glucose Once [[id number]]  (Normal) Collected: 01/13/25 2023    Specimen: Blood Updated: 01/13/25 2034     Glucose 126 mg/dL      Comment: : [id number] [place] RebeccaMeter ID: [id number]       POC Glucose Once [[id number]]  (Abnormal) Collected: 01/13/25 1620    Specimen: Blood Updated: 01/13/25 1631     Glucose 141 mg/dL      Comment: [facility] [id number] [facility] [id number]       Urine Culture - Urine, Urine, Clean Catch [[id number]] Collected: 01/12/25 0042    Specimen: Urine, Clean Catch Updated: 01/13/25 1253     Urine Culture 25,000 CFU/mL Mixed Peggy Isolated    Narrative:      Specimen contains mixed organisms of questionable pathogenicity suggestive of contamination. If symptoms persist, suggest recollection.  Colonization of the urinary tract without infection is common. Treatment is discouraged unless the patient is symptomatic, pregnant, or undergoing an invasive urologic procedure.    POC Glucose Once [[id number]]  (Abnormal) Collected: 01/13/25 1038    Specimen: Blood Updated: 01/13/25 1050     Glucose 151 mg/dL      Comment: : [id number] [facility]  [id number]       Comprehensive Metabolic Panel [[id number]]  (Abnormal) Collected: 01/13/25 0853    Specimen: Blood Updated: 01/13/25 0940     Glucose 198 mg/dL      BUN 21 mg/dL      Creatinine 1.24 mg/dL      Sodium 130 mmol/L      Potassium 4.4 mmol/L      Comment: Slight hemolysis detected by analyzer. Result may be falsely elevated.        Chloride 96 mmol/L      CO2 23.0 mmol/L      Calcium 9.1 mg/dL      Total Protein 6.2 g/dL      Albumin 3.7 g/dL      ALT (SGPT) 12 U/L      AST (SGOT) 18 U/L      Alkaline Phosphatase 65 U/L      Total Bilirubin 0.3 mg/dL      Globulin 2.5 gm/dL      A/G Ratio 1.5 g/dL      BUN/Creatinine Ratio 16.9     Anion Gap 11.0 mmol/L      eGFR 41.7 mL/min/1.73     Narrative:      GFR Categories in Chronic Kidney Disease (CKD)      GFR Category          GFR (mL/min/1.73)    Interpretation  G1                     90 or greater         Normal or high (1)  G2                      60-89                Mild decrease (1)  G3a                   45-59                Mild to moderate decrease  G3b                   30-44                Moderate to severe decrease  G4                    15-29                Severe decrease  G5                    14 or less           Kidney failure          (1)In the absence of evidence of kidney disease, neither GFR category G1 or G2 fulfill the criteria for CKD.    eGFR calculation 2021 CKD-EPI creatinine equation, which does not include race as a factor    CBC & Differential [[id number]]  (Abnormal) Collected: 01/13/25 0853    Specimen: Blood Updated: 01/13/25 0903    Narrative:      The following orders were created for panel order CBC & Differential.  Procedure                               Abnormality         Status                     ---------                               -----------         ------                     CBC Auto Differential[[id number]]        Abnormal            Final result                 Please view results for these tests on the  individual orders.    CBC Auto Differential [[id number]]  (Abnormal) Collected: 01/13/25 0853    Specimen: Blood Updated: 01/13/25 0903     WBC 4.70 10*3/mm3      RBC 3.98 10*6/mm3      Hemoglobin 12.4 g/dL      Hematocrit 37.1 %      MCV 93.2 fL      MCH 31.2 pg      MCHC 33.4 g/dL      RDW 12.5 %      RDW-SD 43.0 fl      MPV 9.4 fL      Platelets 247 10*3/mm3      Neutrophil % 50.2 %      Lymphocyte % 29.6 %      Monocyte % 12.3 %      Eosinophil % 6.6 %      Basophil % 1.1 %      Immature Grans % 0.2 %      Neutrophils, Absolute 2.36 10*3/mm3      Lymphocytes, Absolute 1.39 10*3/mm3      Monocytes, Absolute 0.58 10*3/mm3      Eosinophils, Absolute 0.31 10*3/mm3      Basophils, Absolute 0.05 10*3/mm3      Immature Grans, Absolute 0.01 10*3/mm3      nRBC 0.0 /100 WBC     POC Glucose Once [[id number]]  (Normal) Collected: 01/13/25 0733    Specimen: Blood Updated: 01/13/25 0744     Glucose 95 mg/dL      Comment: : [id number] [facility] ID: [id number]       POC Glucose Once [[id number]]  (Normal) Collected: 01/13/25 0217    Specimen: Blood Updated: 01/13/25 0228     Glucose 98 mg/dL      Comment: : [id number] Kaylin ViedMeter ID: [id number]       POC Glucose Once [[id number]]  (Abnormal) Collected: 01/12/25 2005    Specimen: Blood Updated: 01/12/25 2016     Glucose 180 mg/dL      Comment: : [id number] Kaylin ViedMeter ID: [id number]       POC Glucose Once [[id number]]  (Normal) Collected: 01/12/25 1653    Specimen: Blood Updated: 01/12/25 1714     Glucose 101 mg/dL      Comment: : [id number] FELA'Rhys IvyMeter ID: [id number]       Lipid Panel [[id number]]  (Abnormal) Collected: 01/12/25 0329    Specimen: Blood Updated: 01/12/25 1329     Total Cholesterol 202 mg/dL      Triglycerides 133 mg/dL      HDL Cholesterol 75 mg/dL      LDL Cholesterol  104 mg/dL      VLDL Cholesterol 23 mg/dL      LDL/HDL Ratio 1.34    Narrative:      Cholesterol Reference Ranges  (U.S. Department of Health and Human Services  ATP III Classifications)    Desirable          <200 mg/dL  Borderline High    200-239 mg/dL  High Risk          >240 mg/dL      Triglyceride Reference Ranges  (U.S. Department of Health and Human Services ATP III Classifications)    Normal           <150 mg/dL  Borderline High  150-199 mg/dL  High             200-499 mg/dL  Very High        >500 mg/dL    HDL Reference Ranges  (U.S. Department of Health and Human Services ATP III Classifications)    Low     <40 mg/dl (major risk factor for CHD)  High    >60 mg/dl ('negative' risk factor for CHD)        LDL Reference Ranges  (U.S. Department of Health and Human Services ATP III Classifications)    Optimal          <100 mg/dL  Near Optimal     100-129 mg/dL  Borderline High  130-159 mg/dL  High             160-189 mg/dL  Very High        >189 mg/dL    POC Glucose Once [[id number]]  (Abnormal) Collected: 01/12/25 1112    Specimen: Blood Updated: 01/12/25 1140     Glucose 144 mg/dL      Comment: : [id number] O'Rhys IvyMeter ID: [id number]       POC Glucose Once [[id number]]  (Normal) Collected: 01/12/25 0751    Specimen: Blood Updated: 01/12/25 0822     Glucose 95 mg/dL      Comment: : [id number] [place] Ivy[employer]eter ID: [id number]       Comprehensive Metabolic Panel [[id number]]  (Abnormal) Collected: 01/12/25 0329    Specimen: Blood Updated: 01/12/25 0406     Glucose 143 mg/dL      BUN 21 mg/dL      Creatinine 0.84 mg/dL      Sodium 132 mmol/L      Potassium 3.6 mmol/L      Chloride 95 mmol/L      CO2 23.0 mmol/L      Calcium 9.4 mg/dL      Total Protein 6.9 g/dL      Albumin 4.1 g/dL      ALT (SGPT) 12 U/L      AST (SGOT) 18 U/L      Alkaline Phosphatase 71 U/L      Total Bilirubin 0.3 mg/dL      Globulin 2.8 gm/dL      A/G Ratio 1.5 g/dL      BUN/Creatinine Ratio 25.0     Anion Gap 14.0 mmol/L      eGFR 66.5 mL/min/1.73     Narrative:      GFR Categories in Chronic Kidney Disease (CKD)      GFR Category          GFR (mL/min/1.73)    Interpretation  G1                      90 or greater         Normal or high (1)  G2                      60-89                Mild decrease (1)  G3a                   45-59                Mild to moderate decrease  G3b                   30-44                Moderate to severe decrease  G4                    15-29                Severe decrease  G5                    14 or less           Kidney failure          (1)In the absence of evidence of kidney disease, neither GFR category G1 or G2 fulfill the criteria for CKD.    eGFR calculation 2021 CKD-EPI creatinine equation, which does not include race as a factor    aPTT [[id number]]  (Normal) Collected: 01/12/25 0329    Specimen: Blood Updated: 01/12/25 0355     PTT 28.7 seconds     Protime-INR [[id number]]  (Abnormal) Collected: 01/12/25 0329    Specimen: Blood Updated: 01/12/25 0355     Protime 16.8 Seconds      INR 1.29    CBC (No Diff) [[id number]]  (Normal) Collected: 01/12/25 0329    Specimen: Blood Updated: 01/12/25 0347     WBC 5.89 10*3/mm3      RBC 4.10 10*6/mm3      Hemoglobin 12.8 g/dL      Hematocrit 37.7 %      MCV 92.0 fL      MCH 31.2 pg      MCHC 34.0 g/dL      RDW 12.3 %      RDW-SD 41.1 fl      MPV 9.4 fL      Platelets 256 10*3/mm3     Respiratory Panel PCR w/COVID-19(SARS-CoV-2) [facility] In-House, NP Swab in UTM/[facility], 2 HR TAT - Swab, Nasopharynx [[id number]]  (Normal) Collected: 01/12/25 0045    Specimen: Swab from Nasopharynx Updated: 01/12/25 0147     ADENOVIRUS, PCR Not Detected     Coronavirus 229E Not Detected     Coronavirus HKU1 Not Detected     Coronavirus NL63 Not Detected     Coronavirus OC43 Not Detected     COVID19 Not Detected     Human Metapneumovirus Not Detected     Human Rhinovirus/Enterovirus Not Detected     Influenza A PCR Not Detected     Influenza B PCR Not Detected     Parainfluenza Virus 1 Not Detected     Parainfluenza Virus 2 Not Detected     Parainfluenza Virus 3 Not Detected     Parainfluenza Virus 4 Not Detected     RSV, PCR Not Detected      Bordetella pertussis pcr Not Detected     Bordetella parapertussis PCR Not Detected     Chlamydophila pneumoniae PCR Not Detected     Mycoplasma pneumo by PCR Not Detected    Narrative:      In the setting of a positive respiratory panel with a viral infection PLUS a negative procalcitonin without other underlying concern for bacterial infection, consider observing off antibiotics or discontinuation of antibiotics and continue supportive care. If the respiratory panel is positive for atypical bacterial infection (Bordetella pertussis, Chlamydophila pneumoniae, or Mycoplasma pneumoniae), consider antibiotic de-escalation to target atypical bacterial infection.    High Sensitivity Troponin T 1Hr [[id number]]  (Abnormal) Collected: 01/12/25 0059    Specimen: Blood Updated: 01/12/25 0129     HS Troponin T 27 ng/L      Troponin T Numeric Delta 9 ng/L      Troponin T % Delta 50 %     Narrative:      High Sensitive Troponin T Reference Range:  <14.0 ng/L- Negative Female for AMI  <22.0 ng/L- Negative Male for AMI  >=14 - Abnormal Female indicating possible myocardial injury.  >=22 - Abnormal Male indicating possible myocardial injury.   Clinicians would have to utilize clinical acumen, EKG, Troponin, and serial changes to determine if it is an Acute Myocardial Infarction or myocardial injury due to an underlying chronic condition.         Urinalysis With Culture If Indicated - Urine, Clean Catch [[id number]]  (Abnormal) Collected: 01/12/25 0042    Specimen: Urine, Clean Catch Updated: 01/12/25 0104     Color, UA Yellow     Appearance, UA Clear     pH, UA 8.0     Specific Gravity, UA 1.006     Glucose, UA Negative     Ketones, UA Negative     Bilirubin, UA Negative     Blood, UA Negative     Protein, UA Negative     Leuk Esterase, UA Large (3+)     Nitrite, UA Negative     Urobilinogen, UA 0.2 E.U./dL    Narrative:      In absence of clinical symptoms, the presence of pyuria, bacteria, and/or nitrites on the urinalysis result  does not correlate with infection.    Urinalysis, Microscopic Only - Urine, Clean Catch [[id number]]  (Abnormal) Collected: 01/12/25 0042    Specimen: Urine, Clean Catch Updated: 01/12/25 0104     RBC, UA 3-5 /HPF      WBC, UA 11-20 /HPF      Bacteria, UA 1+ /HPF      Squamous Epithelial Cells, UA 3-6 /HPF      Hyaline Casts, UA None Seen /LPF      Methodology Manual Light Microscopy    Comprehensive Metabolic Panel [[id number]]  (Abnormal) Collected: 01/12/25 0009    Specimen: Blood Updated: 01/12/25 0055     Glucose 119 mg/dL      BUN 23 mg/dL      Creatinine 0.91 mg/dL      Sodium 133 mmol/L      Potassium 4.0 mmol/L      Comment: Slight hemolysis detected by analyzer. Result may be falsely elevated.        Chloride 100 mmol/L      CO2 22.0 mmol/L      Calcium 9.0 mg/dL      Total Protein 6.8 g/dL      Albumin 4.0 g/dL      ALT (SGPT) 13 U/L      AST (SGOT) 20 U/L      Comment: Slight hemolysis detected by analyzer. Result may be falsely elevated.        Alkaline Phosphatase 72 U/L      Total Bilirubin 0.2 mg/dL      Globulin 2.8 gm/dL      A/G Ratio 1.4 g/dL      BUN/Creatinine Ratio 25.3     Anion Gap 11.0 mmol/L      eGFR 60.4 mL/min/1.73     Narrative:      GFR Categories in Chronic Kidney Disease (CKD)      GFR Category          GFR (mL/min/1.73)    Interpretation  G1                     90 or greater         Normal or high (1)  G2                      60-89                Mild decrease (1)  G3a                   45-59                Mild to moderate decrease  G3b                   30-44                Moderate to severe decrease  G4                    15-29                Severe decrease  G5                    14 or less           Kidney failure          (1)In the absence of evidence of kidney disease, neither GFR category G1 or G2 fulfill the criteria for CKD.    eGFR calculation 2021 CKD-EPI creatinine equation, which does not include race as a factor    High Sensitivity Troponin T [[id number]]  (Abnormal)  Collected: 01/12/25 0009    Specimen: Blood Updated: 01/12/25 0052     HS Troponin T 18 ng/L     Narrative:      High Sensitive Troponin T Reference Range:  <14.0 ng/L- Negative Female for AMI  <22.0 ng/L- Negative Male for AMI  >=14 - Abnormal Female indicating possible myocardial injury.  >=22 - Abnormal Male indicating possible myocardial injury.   Clinicians would have to utilize clinical acumen, EKG, Troponin, and serial changes to determine if it is an Acute Myocardial Infarction or myocardial injury due to an underlying chronic condition.         BNP [[id number]]  (Normal) Collected: 01/12/25 0009    Specimen: Blood Updated: 01/12/25 0052     proBNP 1,192.0 pg/mL     Narrative:      This assay is used as an aid in the diagnosis of individuals suspected of having heart failure. It can be used as an aid in the diagnosis of acute decompensated heart failure (ADHF) in patients presenting with signs and symptoms of ADHF to the emergency department (ED). In addition, NT-proBNP of <300 pg/mL indicates ADHF is not likely.    Age Range Result Interpretation  NT-proBNP Concentration (pg/mL:      <50             Positive            >450                   Gray                 300-450                    Negative             <300    50-75           Positive            >900                  Gray                300-900                  Negative            <300      >75             Positive            >1800                  Gray                300-1800                  Negative            <300    aPTT [[id number]]  (Normal) Collected: 01/12/25 0009    Specimen: Blood Updated: 01/12/25 0046     PTT 28.8 seconds     Protime-INR [[id number]]  (Abnormal) Collected: 01/12/25 0009    Specimen: Blood Updated: 01/12/25 0046     Protime 18.4 Seconds      INR 1.44    CBC & Differential [[id number]]  (Abnormal) Collected: 01/12/25 0009    Specimen: Blood Updated: 01/12/25 0036    Narrative:      The following orders were created for panel  order CBC & Differential.  Procedure                               Abnormality         Status                     ---------                               -----------         ------                     CBC Auto Differential[[id number]]        Abnormal            Final result                 Please view results for these tests on the individual orders.    CBC Auto Differential [[id number]]  (Abnormal) Collected: 01/12/25 0009    Specimen: Blood Updated: 01/12/25 0036     WBC 6.42 10*3/mm3      RBC 3.93 10*6/mm3      Hemoglobin 12.1 g/dL      Hematocrit 36.5 %      MCV 92.9 fL      MCH 30.8 pg      MCHC 33.2 g/dL      RDW 12.2 %      RDW-SD 42.1 fl      MPV 9.4 fL      Platelets 256 10*3/mm3      Neutrophil % 51.6 %      Lymphocyte % 29.1 %      Monocyte % 12.8 %      Eosinophil % 5.5 %      Basophil % 0.8 %      Immature Grans % 0.2 %      Neutrophils, Absolute 3.32 10*3/mm3      Lymphocytes, Absolute 1.87 10*3/mm3      Monocytes, Absolute 0.82 10*3/mm3      Eosinophils, Absolute 0.35 10*3/mm3      Basophils, Absolute 0.05 10*3/mm3      Immature Grans, Absolute 0.01 10*3/mm3      nRBC 0.0 /100 WBC     [place] Draw [[id number]] Collected: 01/12/25 0009    Specimen: Blood Updated: 01/12/25 0015    Narrative:      The following orders were created for panel order [place] Draw.  Procedure                               Abnormality         Status                     ---------                               -----------         ------                     Green Top (Gel)[[id number]]                                  Final result               Lavender Top[[id number]]                                     Final result               Red Top[[id number]]                                          Final result               Light Blue Top[[id number]]                                   Final result                 Please view results for these tests on the individual orders.    Green Top (Gel) [[id number]] Collected: 01/12/25 0009    Specimen:  "Blood Updated: 25     Extra Tube Hold for add-ons.     Comment: Auto resulted.       Lavender Top [[id number]] Collected: 25    Specimen: Blood Updated: 25     Extra Tube hold for add-on     Comment: Auto resulted       Red Top [[id number]] Collected: 25    Specimen: Blood Updated: 25     Extra Tube Hold for add-ons.     Comment: Auto resulted.       Light Blue Top [[id number]] Collected: 25    Specimen: Blood Updated: 25     Extra Tube Hold for add-ons.     Comment: Auto resulted                 Objective:     Vitals: /59 (BP Location: Left arm, Patient Position: Lying)   Pulse 61   Temp 97.7 °F (36.5 °C) (Oral)   Resp 16   Ht 160 cm (63\")   Wt 64.9 kg (143 lb)   LMP  (LMP Unknown)   SpO2 91%   BMI 25.33 kg/m²    Intake/Output Summary (Last 24 hours) at 2025 0802  Last data filed at 2025 1700  Gross per 24 hour   Intake 480 ml   Output 400 ml   Net 80 ml    Temp (24hrs), Av.7 °F (36.5 °C), Min:97.6 °F (36.4 °C), Max:97.8 °F (36.6 °C)      Physical Exam  Vitals reviewed.   Constitutional:       Appearance: Normal appearance.      Comments: Frail   HENT:      Head: Normocephalic and atraumatic.   Eyes:      General:         Right eye: No discharge.         Left eye: No discharge.      Conjunctiva/sclera: Conjunctivae normal.   Cardiovascular:      Rate and Rhythm: Normal rate and regular rhythm.      Pulses: Normal pulses.      Heart sounds: No murmur heard.  Pulmonary:      Effort: Pulmonary effort is normal. No respiratory distress.   Abdominal:      General: Abdomen is flat. Bowel sounds are normal. There is no distension.   Musculoskeletal:      Right lower leg: No edema.      Left lower leg: No edema.   Skin:     Capillary Refill: Capillary refill takes less than 2 seconds.   Neurological:      General: No focal deficit present.      Mental Status: She is alert and oriented to person, place, and time. "   Psychiatric:         Mood and Affect: Mood normal.         Behavior: Behavior normal.             Assessment and Plan:     Primary Problem:  Dizziness    Hospital Problem list:    Dizziness    Benign essential HTN    Hypothyroidism    Type 2 diabetes mellitus, without long-term current use of insulin    H/O thyroid nodule    Mild tricuspid insufficiency    Neuropathy due to type 2 diabetes mellitus    Lumbar radiculopathy    Non-toxic multinodular goiter    UTI symptoms    Mixed hyperlipidemia with pervious statin intolerance    Stage 3a chronic kidney disease (CKD)    LBBB (left bundle branch block)    Persistent atrial fibrillation    Accelerated hypertension with diastolic congestive heart failure, NYHA class 3    Coronary artery disease involving coronary bypass graft of native heart with unstable angina pectoris    Epistaxis      PMH:  Past Medical History:   Diagnosis Date    Arthritis     Cancer     BCC @ nose & Left arm    Diabetes type 2, controlled     Diverticulitis     History of GI bleed     History of transfusion     Has had x 5 units.    Hypertension     STAGE 3     Hyponatremia     Required hospitalization    Hypothyroidism     Knee arthropathy 09/30/2020    LPRD (laryngopharyngeal reflux disease)     Pharyngeal dysphagia     Thyroid nodule        Treatment Plan:  UTI: Continue Bactrim, follow urine culture.    Epistaxis: Resolution.  Eliquis has been restarted.    A-fib: Cardiology following.    CKD 3A: Kidney function continues to downtrend.  GFR 38 this morning.  Will start gentle IVF and encourage p.o. intake.    Dizziness/weakness: Slightly more than her baseline.  Will get PT/OT to evaluate, possible SNF as she lives alone.    Disposition: Inpatient, SNF?    Reviewed treatment plans with the patient and/or family.   30 minutes spent in face to face interaction and coordination of care.     Electronically signed by Edgar Tolbert MD on 1/14/2025 at 08:02 CST

## 2025-01-15 ENCOUNTER — READMISSION MANAGEMENT (OUTPATIENT)
Dept: CALL CENTER | Facility: HOSPITAL | Age: OVER 89
End: 2025-01-15
Payer: MEDICARE

## 2025-01-15 VITALS
HEIGHT: 63 IN | HEART RATE: 61 BPM | DIASTOLIC BLOOD PRESSURE: 58 MMHG | SYSTOLIC BLOOD PRESSURE: 168 MMHG | WEIGHT: 143 LBS | BODY MASS INDEX: 25.34 KG/M2 | TEMPERATURE: 97.4 F | OXYGEN SATURATION: 95 % | RESPIRATION RATE: 16 BRPM

## 2025-01-15 PROBLEM — I50.32 CHRONIC HEART FAILURE WITH PRESERVED EJECTION FRACTION (HFPEF): Status: ACTIVE | Noted: 2025-01-15

## 2025-01-15 LAB
ALBUMIN SERPL-MCNC: 3.7 G/DL (ref 3.5–5.2)
ALBUMIN/GLOB SERPL: 1.5 G/DL
ALP SERPL-CCNC: 61 U/L (ref 39–117)
ALT SERPL W P-5'-P-CCNC: 13 U/L (ref 1–33)
ANION GAP SERPL CALCULATED.3IONS-SCNC: 13 MMOL/L (ref 5–15)
AST SERPL-CCNC: 18 U/L (ref 1–32)
BASOPHILS # BLD AUTO: 0.05 10*3/MM3 (ref 0–0.2)
BASOPHILS NFR BLD AUTO: 1 % (ref 0–1.5)
BILIRUB SERPL-MCNC: 0.4 MG/DL (ref 0–1.2)
BUN SERPL-MCNC: 26 MG/DL (ref 8–23)
BUN/CREAT SERPL: 20.2 (ref 7–25)
CALCIUM SPEC-SCNC: 9.3 MG/DL (ref 8.6–10.5)
CHLORIDE SERPL-SCNC: 94 MMOL/L (ref 98–107)
CO2 SERPL-SCNC: 21 MMOL/L (ref 22–29)
CREAT SERPL-MCNC: 1.29 MG/DL (ref 0.57–1)
DEPRECATED RDW RBC AUTO: 41.6 FL (ref 37–54)
EGFRCR SERPLBLD CKD-EPI 2021: 39.8 ML/MIN/1.73
EOSINOPHIL # BLD AUTO: 0.37 10*3/MM3 (ref 0–0.4)
EOSINOPHIL NFR BLD AUTO: 7.3 % (ref 0.3–6.2)
ERYTHROCYTE [DISTWIDTH] IN BLOOD BY AUTOMATED COUNT: 12.3 % (ref 12.3–15.4)
GLOBULIN UR ELPH-MCNC: 2.4 GM/DL
GLUCOSE BLDC GLUCOMTR-MCNC: 148 MG/DL (ref 70–130)
GLUCOSE SERPL-MCNC: 115 MG/DL (ref 65–99)
HCT VFR BLD AUTO: 35.3 % (ref 34–46.6)
HGB BLD-MCNC: 11.8 G/DL (ref 12–15.9)
IMM GRANULOCYTES # BLD AUTO: 0.01 10*3/MM3 (ref 0–0.05)
IMM GRANULOCYTES NFR BLD AUTO: 0.2 % (ref 0–0.5)
LYMPHOCYTES # BLD AUTO: 2.03 10*3/MM3 (ref 0.7–3.1)
LYMPHOCYTES NFR BLD AUTO: 40 % (ref 19.6–45.3)
MCH RBC QN AUTO: 30.9 PG (ref 26.6–33)
MCHC RBC AUTO-ENTMCNC: 33.4 G/DL (ref 31.5–35.7)
MCV RBC AUTO: 92.4 FL (ref 79–97)
MONOCYTES # BLD AUTO: 0.75 10*3/MM3 (ref 0.1–0.9)
MONOCYTES NFR BLD AUTO: 14.8 % (ref 5–12)
NEUTROPHILS NFR BLD AUTO: 1.86 10*3/MM3 (ref 1.7–7)
NEUTROPHILS NFR BLD AUTO: 36.7 % (ref 42.7–76)
NRBC BLD AUTO-RTO: 0 /100 WBC (ref 0–0.2)
PLATELET # BLD AUTO: 245 10*3/MM3 (ref 140–450)
PMV BLD AUTO: 9.9 FL (ref 6–12)
POTASSIUM SERPL-SCNC: 4 MMOL/L (ref 3.5–5.2)
PROT SERPL-MCNC: 6.1 G/DL (ref 6–8.5)
QT INTERVAL: 438 MS
QTC INTERVAL: 499 MS
RBC # BLD AUTO: 3.82 10*6/MM3 (ref 3.77–5.28)
SODIUM SERPL-SCNC: 128 MMOL/L (ref 136–145)
WBC NRBC COR # BLD AUTO: 5.07 10*3/MM3 (ref 3.4–10.8)

## 2025-01-15 PROCEDURE — 82948 REAGENT STRIP/BLOOD GLUCOSE: CPT

## 2025-01-15 PROCEDURE — 80053 COMPREHEN METABOLIC PANEL: CPT | Performed by: FAMILY MEDICINE

## 2025-01-15 PROCEDURE — 85025 COMPLETE CBC W/AUTO DIFF WBC: CPT | Performed by: FAMILY MEDICINE

## 2025-01-15 RX ORDER — SULFAMETHOXAZOLE AND TRIMETHOPRIM 400; 80 MG/1; MG/1
1 TABLET ORAL EVERY 12 HOURS SCHEDULED
Qty: 3 TABLET | Refills: 0 | Status: SHIPPED | OUTPATIENT
Start: 2025-01-15 | End: 2025-01-17

## 2025-01-15 RX ADMIN — LOSARTAN POTASSIUM 25 MG: 25 TABLET, FILM COATED ORAL at 10:07

## 2025-01-15 RX ADMIN — ACETAMINOPHEN 650 MG: 325 TABLET ORAL at 05:49

## 2025-01-15 RX ADMIN — Medication 1 APPLICATION: at 05:50

## 2025-01-15 RX ADMIN — FLECAINIDE ACETATE 25 MG: 50 TABLET ORAL at 10:07

## 2025-01-15 RX ADMIN — SULFAMETHOXAZOLE AND TRIMETHOPRIM 1 TABLET: 400; 80 TABLET ORAL at 10:07

## 2025-01-15 RX ADMIN — PANTOPRAZOLE SODIUM 40 MG: 40 TABLET, DELAYED RELEASE ORAL at 05:47

## 2025-01-15 RX ADMIN — LEVOTHYROXINE SODIUM 50 MCG: 0.05 TABLET ORAL at 05:47

## 2025-01-15 RX ADMIN — APIXABAN 2.5 MG: 2.5 TABLET, FILM COATED ORAL at 10:07

## 2025-01-15 RX ADMIN — VERAPAMIL HYDROCHLORIDE 90 MG: 180 TABLET, FILM COATED, EXTENDED RELEASE ORAL at 10:07

## 2025-01-15 NOTE — THERAPY DISCHARGE NOTE
Acute Care - Occupational Therapy Discharge Summary  [facility]     Patient Name: Nedra Tolliver  : 1935  MRN: [id number]    Today's Date: 1/15/2025                 Admit Date: 2025        OT Recommendation and Plan    Visit Dx:    ICD-10-CM ICD-9-CM   1. Elevated troponin  R79.89 790.6   2. Dizziness  R42 780.4   3. Cystitis  N30.90 595.9   4. Impaired mobility [Z74.09]  Z74.09 799.89                OT Rehab Goals       Row Name 01/15/25 1500             Transfer Goal 1 (OT)    Activity/Assistive Device (Transfer Goal 1, OT) transfers, all  -TS      [place] Level/Cues Needed (Transfer Goal 1, OT) modified independence  -TS      Time Frame (Transfer Goal 1, OT) long term goal (LTG);by discharge  -TS      Progress/Outcome (Transfer Goal 1, OT) goal not met  -TS         Bathing Goal 1 (OT)    Activity/Device (Bathing Goal 1, OT) bathing skills, all;grab bar, tub/shower;hand-held shower spray hose;tub bench  -TS      [place] Level/Cues Needed (Bathing Goal 1, OT) supervision required  -TS      Time Frame (Bathing Goal 1, OT) long term goal (LTG);by discharge  -TS      Progress/Outcomes (Bathing Goal 1, OT) goal not met  -TS         Dressing Goal 1 (OT)    Activity/Device (Dressing Goal 1, OT) dressing skills, all  -TS      [place]/Cues Needed (Dressing Goal 1, OT) set-up required  -TS      Time Frame (Dressing Goal 1, OT) long term goal (LTG);by discharge  -TS      Progress/Outcome (Dressing Goal 1, OT) goal not met  -TS         Toileting Goal 1 (OT)    Activity/Device (Toileting Goal 1, OT) toileting skills, all  -TS      [place] Level/Cues Needed (Toileting Goal 1, OT) modified independence  -TS      Time Frame (Toileting Goal 1, OT) long term goal (LTG);by discharge  -TS      Progress/Outcome (Toileting Goal 1, OT) goal not met  -TS                User Key  (r) = Recorded By, (t) = Taken By, (c) = Cosigned By      Initials Name Provider Type Discipline    TS Sayra Yang,  YAW Occupational Therapist Assistant OT                                OT Discharge Summary  Anticipated Discharge Disposition (OT): home with assist  Reason for Discharge: Discharge from facility  Outcomes Achieved: Refer to plan of care for updates on goals achieved  Discharge Destination: Home with assist      YAW Pierre  1/15/2025

## 2025-01-15 NOTE — DISCHARGE SUMMARY
Hospital Discharge Summary    Nedra Tolliver  :  1935  MRN:  [id number]    Admit date:  2025  Discharge date:  1/15/2025    Admitting Physician:    No admitting provider for patient encounter.    Discharge Diagnoses:      Dizziness    Benign essential HTN    Hypothyroidism    Type 2 diabetes mellitus, without long-term current use of insulin    H/O thyroid nodule    Mild tricuspid insufficiency    Neuropathy due to type 2 diabetes mellitus    Lumbar radiculopathy    Non-toxic multinodular goiter    UTI symptoms    Mixed hyperlipidemia with pervious statin intolerance    Stage 3a chronic kidney disease (CKD)    LBBB (left bundle branch block)    Persistent atrial fibrillation    Accelerated hypertension with diastolic congestive heart failure, NYHA class 3    Coronary artery disease involving coronary bypass graft of native heart with unstable angina pectoris    Epistaxis    Chronic heart failure with preserved ejection fraction (HFpEF)      Hospital Course:   This is an 89-year-old female patient with a medical history of A-fib on Eliquis, presented to the ED after she had an episode of epistaxis, and she was feeling lightheaded. As reported, she was cleaning her nose when she started having bleeding. She was also having some runny nose. Upon arrival of the EMS her epistaxis was resolved. She was also complaining of lower abdominal pain along with urinary frequency. She denies any fever, chills, chest pain, nausea, vomiting or shortness of breath. Her hemoglobin remained stable without further bleeding and she was restarted on her home eliquis. She was treated for UTI with bactrim. She was evaluated by PT and performed quite well. She was discharged home in stable condition with close outpatient follow up.     The patient was admitted for the above noted medical/surgical issues. Please see daily progress note for further details concerning their stay. The patient improved throughout their stay and  reached maximum medical improvement on the day of discharge. The patient/family agree with the treatment plan as outlined above. All questions concerning their stay were answered prior to discharge. They understand the importance of follow up concerning any abnormal test results.     Physical Exam      Discharge Medications:         Discharge Medications        New Medications        Instructions Start Date   sulfamethoxazole-trimethoprim 400-80 MG tablet  Commonly known as: BACTRIM,SEPTRA   1 tablet, Oral, Every 12 Hours Scheduled             Continue These Medications        Instructions Start Date   acetaminophen 500 MG tablet  Commonly known as: TYLENOL   500 mg, Every 6 Hours PRN      apixaban 5 MG tablet tablet  Commonly known as: Eliquis   5 mg, Oral, 2 Times Daily      bumetanide 1 MG tablet  Commonly known as: BUMEX   1 mg, Oral, Daily PRN      Co Q 10 100 MG capsule   1 tablet, Daily      CRANBERRY PO   1 tablet, Oral, Daily      flecainide 50 MG tablet  Commonly known as: TAMBOCOR   25 mg, Oral, 2 Times Daily, RX states 1/2 tab BID      fluticasone 0.05 % cream  Commonly known as: CUTIVATE   1 Application, Topical, Nightly PRN      glimepiride 4 MG tablet  Commonly known as: AMARYL   4 mg, Every Morning Before Breakfast      hydrOXYzine 25 MG tablet  Commonly known as: ATARAX   25 mg, Oral, Nightly PRN      levothyroxine 50 MCG tablet  Commonly known as: SYNTHROID, LEVOTHROID   50 mcg, Daily      multivitamin with minerals tablet tablet   1 tablet, Daily      omeprazole 40 MG capsule  Commonly known as: priLOSEC   40 mg, Oral, Daily      Repatha solution prefilled syringe injection  Generic drug: Evolocumab   140 mg, Subcutaneous, Every 14 Days      simethicone 80 MG chewable tablet  Commonly known as: MYLICON   80 mg, Oral, Every 6 Hours PRN      verapamil 200 MG capsule sustained-release 24 hr capsule  Commonly known as: VERELAN PM   200 mg, Oral, Nightly      Vitamin D3 125 MCG (5000 UT) tablet  dispersible   5,000 Units, Daily               Activity: as tolerated    Diet: regular    Consults: cardiology    Significant Diagnostic Studies:    XR Chest 1 View    Result Date: 1/12/2025  1. Hypoventilation with vascular crowding. 2. Patchy infiltrates in both lung bases, likely atelectasis. Pneumonia cannot be ruled out. 3. Stable bronchial wall thickening.    This report was signed and finalized on 1/12/2025 8:26 AM by Dr. Doyle Dixon MD.            Treatments:   as above    Disposition:   discharge home    Time spent on discharge including discussion with patient/family, SW, and coordination of care.     Follow up with Edgar Tolbert MD in 1 weeks.    Signed:  Edgar Tolbert MD   1/15/2025, 09:10 CST

## 2025-01-15 NOTE — PLAN OF CARE
Admitted 1/12 for nosebleed, uti  Cardiology consult; paroxysmal Afib; signed off 1/14  Bactrim 1 tab bid x 3 days  Eliquis 2.5 mg bid    Problem: Adult Inpatient Plan of Care  Goal: Plan of Care Review  1/15/2025 0309 by Berto Campos RN  Outcome: Progressing  Flowsheets (Taken 1/15/2025 0309)  Plan of Care Reviewed With: patient  1/15/2025 0308 by Berto Campos RN  Outcome: Progressing  Flowsheets (Taken 1/15/2025 0308)  Plan of Care Reviewed With: patient  1/15/2025 0308 by Berto Campos RN  Outcome: Progressing  Flowsheets (Taken 1/15/2025 0308)  Plan of Care Reviewed With: patient     Problem: Fall Injury Risk  Goal: Absence of Fall and Fall-Related Injury  Outcome: Progressing  Intervention: Promote Injury-Free Environment  Recent Flowsheet Documentation  Taken 1/15/2025 0300 by Berto Campos RN  Safety Promotion/Fall Prevention: safety round/check completed  Taken 1/15/2025 0200 by Berto Campos RN  Safety Promotion/Fall Prevention: safety round/check completed  Taken 1/15/2025 0100 by Berto Campos RN  Safety Promotion/Fall Prevention: safety round/check completed  Taken 1/15/2025 0000 by Berto Campos RN  Safety Promotion/Fall Prevention: safety round/check completed  Taken 1/14/2025 2300 by Berto Campos RN  Safety Promotion/Fall Prevention: safety round/check completed  Taken 1/14/2025 2200 by Berto Campos RN  Safety Promotion/Fall Prevention: safety round/check completed  Taken 1/14/2025 2000 by Berto Campos RN  Safety Promotion/Fall Prevention: safety round/check completed     Problem: UTI (Urinary Tract Infection)  Goal: Improved Infection Symptoms  Outcome: Progressing     Problem: Adult Inpatient Plan of Care  Goal: Absence of Hospital-Acquired Illness or Injury  Intervention: Identify and Manage Fall Risk  Recent Flowsheet Documentation  Taken 1/15/2025 0300 by Berto Campos RN  Safety Promotion/Fall Prevention: safety round/check completed  Taken 1/15/2025 0200 by Berto Campos RN  Safety Promotion/Fall  Prevention: safety round/check completed  Taken 1/15/2025 0100 by Berto Campos RN  Safety Promotion/Fall Prevention: safety round/check completed  Taken 1/15/2025 0000 by Berto Campos RN  Safety Promotion/Fall Prevention: safety round/check completed  Taken 1/14/2025 2300 by Berto Campos RN  Safety Promotion/Fall Prevention: safety round/check completed  Taken 1/14/2025 2200 by Berto Campos RN  Safety Promotion/Fall Prevention: safety round/check completed  Taken 1/14/2025 2000 by Berto Campos RN  Safety Promotion/Fall Prevention: safety round/check completed  Intervention: Prevent Skin Injury  Recent Flowsheet Documentation  Taken 1/14/2025 2000 by Berto Campos RN  Body Position: position changed independently  Intervention: Prevent and Manage VTE (Venous Thromboembolism) Risk  Recent Flowsheet Documentation  Taken 1/14/2025 2000 by Berto Campos RN  VTE Prevention/Management: (eliquis bid) other (see comments)   Goal Outcome Evaluation:

## 2025-01-16 NOTE — OUTREACH NOTE
Prep Survey      Flowsheet Row Responses   [facility] facility patient discharged from? [place]   Is LACE score < 7 ? No   Eligibility Readm Mgmt   Discharge diagnosis dizziness   Does the patient have one of the following disease processes/diagnoses(primary or secondary)? Other   Does the patient have Home health ordered? No   Is there a DME ordered? No   Prep survey completed? Yes            Reva VICKERS - Registered Nurse

## 2025-01-21 ENCOUNTER — READMISSION MANAGEMENT (OUTPATIENT)
Dept: CALL CENTER | Facility: HOSPITAL | Age: OVER 89
End: 2025-01-21
Payer: MEDICARE

## 2025-01-21 NOTE — OUTREACH NOTE
Medical Week 1 Survey      Flowsheet Row Responses   [facility] facility patient discharged from? [place]   Does the patient have one of the following disease processes/diagnoses(primary or secondary)? Other   Week 1 attempt successful? No   Unsuccessful attempts Attempt 1            Magali Beasley Registered Nurse

## 2025-01-28 ENCOUNTER — READMISSION MANAGEMENT (OUTPATIENT)
Dept: CALL CENTER | Facility: HOSPITAL | Age: OVER 89
End: 2025-01-28
Payer: MEDICARE

## 2025-01-28 NOTE — OUTREACH NOTE
Medical Week 2 Survey      Flowsheet Row Responses   [facility] patient discharged from? [place]   Does the patient have one of the following disease processes/diagnoses(primary or secondary)? Other   Week 2 attempt successful? No   Unsuccessful attempts Attempt 1            Eri Beasley Registered Nurse

## 2025-03-06 ENCOUNTER — OFFICE VISIT (OUTPATIENT)
Dept: CARDIOLOGY | Facility: CLINIC | Age: OVER 89
End: 2025-03-06
Payer: MEDICARE

## 2025-03-06 VITALS
SYSTOLIC BLOOD PRESSURE: 148 MMHG | OXYGEN SATURATION: 98 % | DIASTOLIC BLOOD PRESSURE: 80 MMHG | HEART RATE: 63 BPM | HEIGHT: 63 IN | WEIGHT: 149 LBS | BODY MASS INDEX: 26.4 KG/M2

## 2025-03-06 DIAGNOSIS — I07.1 MILD TRICUSPID INSUFFICIENCY: ICD-10-CM

## 2025-03-06 DIAGNOSIS — I11.0 ACCELERATED HYPERTENSION WITH DIASTOLIC CONGESTIVE HEART FAILURE, NYHA CLASS 3: Primary | ICD-10-CM

## 2025-03-06 DIAGNOSIS — I50.32 CHRONIC HEART FAILURE WITH PRESERVED EJECTION FRACTION (HFPEF): ICD-10-CM

## 2025-03-06 DIAGNOSIS — I48.0 PAROXYSMAL A-FIB: ICD-10-CM

## 2025-03-06 DIAGNOSIS — I44.7 LBBB (LEFT BUNDLE BRANCH BLOCK): ICD-10-CM

## 2025-03-06 DIAGNOSIS — E78.00 HIGH CHOLESTEROL: ICD-10-CM

## 2025-03-06 DIAGNOSIS — I50.30 ACCELERATED HYPERTENSION WITH DIASTOLIC CONGESTIVE HEART FAILURE, NYHA CLASS 3: Primary | ICD-10-CM

## 2025-03-06 PROCEDURE — 99214 OFFICE O/P EST MOD 30 MIN: CPT | Performed by: EMERGENCY MEDICINE

## 2025-03-16 NOTE — PROGRESS NOTES
Subjective:     Encounter Date:03/06/2025      Patient ID: Nedra Tolliver is a 89 y.o. female.    Chief Complaint:  History of Present Illness  History of Present Illness  The patient is an 89-year-old female who presents for evaluation of shortness of breath, atrial fibrillation, and vitamin D supplementation.    She reports experiencing dyspnea, which she perceives as progressively worsening. She also describes a sensation of chest tightness, particularly when seated, although it is not severe. She is not experiencing any significant chest pain. Her blood pressure readings have been within normal limits, with occasional elevations to 170/60s at night, which subsequently decrease to 150 or 140 upon rechecking. This symptom is exacerbated by physical activities such as walking and household chores.    She expresses concern about potential hypervitaminosis D due to her long-term use of vitamin D3 supplements, specifically 5000 units daily, in addition to a Centrum Silver for Women multivitamin containing 1000 units of vitamin D.    She is currently on Eliquis and flecainide for atrial fibrillation, Repatha for cholesterol management, and verapamil administered at night. She does not require any medication refills at this time.    She recalls a previous diagnosis of arterial blockage in 2022.    MEDICATIONS  Eliquis, Bumex (as needed), Repatha, flecainide, verapamil, vitamin D3, Centrum silver for women.    The following portions of the patient's history were reviewed and updated as appropriate: allergies, current medications, past family history, past medical history, past social history, past surgical history, and problem list.    Review of Systems   Constitutional: Negative for diaphoresis, fever and malaise/fatigue.   HENT:  Negative for congestion.    Eyes:  Negative for vision loss in left eye and vision loss in right eye.   Cardiovascular:  Positive for chest pain. Negative for claudication, dyspnea on  exertion, irregular heartbeat, leg swelling, orthopnea, palpitations and syncope.   Respiratory:  Positive for shortness of breath. Negative for cough and wheezing.    Hematologic/Lymphatic: Negative for adenopathy.   Skin:  Negative for rash.   Musculoskeletal:  Negative for joint pain and joint swelling.   Gastrointestinal:  Negative for abdominal pain, diarrhea, nausea and vomiting.   Neurological:  Negative for excessive daytime sleepiness, dizziness, focal weakness, light-headedness, numbness and weakness.   Psychiatric/Behavioral:  Negative for depression. The patient does not have insomnia.            Current Outpatient Medications:     acetaminophen (TYLENOL) 500 MG tablet, Take 1 tablet by mouth Every 6 (Six) Hours As Needed for Mild Pain., Disp: , Rfl:     apixaban (Eliquis) 5 MG tablet tablet, Take 1 tablet by mouth 2 (Two) Times a Day., Disp: 180 tablet, Rfl: 3    bumetanide (BUMEX) 1 MG tablet, Take 1 tablet by mouth Daily As Needed (daily as needed for leg swelling)., Disp: 90 tablet, Rfl: 3    Cholecalciferol (Vitamin D3) 125 MCG (5000 UT) tablet dispersible, Take 5,000 Units by mouth Daily., Disp: , Rfl:     Coenzyme Q10 (CO Q 10) 100 MG capsule, Take 1 tablet by mouth Daily., Disp: , Rfl:     CRANBERRY PO, Take 1 tablet by mouth Daily., Disp: , Rfl:     Evolocumab (Repatha) solution prefilled syringe injection, Inject 1 mL under the skin into the appropriate area as directed Every 14 (Fourteen) Days., Disp: 1 mL, Rfl: 11    flecainide (TAMBOCOR) 50 MG tablet, Take 0.5 tablets by mouth 2 (Two) Times a Day. RX states 1/2 tab BID, Disp: 180 tablet, Rfl: 3    fluticasone (CUTIVATE) 0.05 % cream, Apply 1 Application topically to the appropriate area as directed At Night As Needed (breakout)., Disp: , Rfl:     glimepiride (AMARYL) 4 MG tablet, Take 1 tablet by mouth Every Morning Before Breakfast. Takes an extra 1/2 tablet when blood sugar is high at night, Disp: , Rfl:     hydrOXYzine (ATARAX) 25 MG  tablet, Take 1 tablet by mouth At Night As Needed for Itching or Anxiety., Disp: , Rfl:     levothyroxine (SYNTHROID, LEVOTHROID) 50 MCG tablet, Take 1 tablet by mouth Daily., Disp: , Rfl:     Multiple Vitamins-Minerals (CENTRUM SILVER PO), Take 1 tablet by mouth Daily., Disp: , Rfl:     omeprazole (priLOSEC) 40 MG capsule, Take 1 capsule by mouth Daily., Disp: , Rfl:     simethicone (MYLICON) 80 MG chewable tablet, Chew 1 tablet Every 6 (Six) Hours As Needed for Flatulence., Disp: , Rfl:     verapamil (VERELAN PM) 200 MG capsule sustained-release 24 hr capsule, Take 1 capsule by mouth Every Night., Disp: , Rfl:        Objective:      Vitals:    03/06/25 1119   BP: 148/80   Pulse: 63   SpO2: 98%     Vitals and nursing note reviewed.   Constitutional:       Appearance: Normal and healthy appearance. Well-developed and not in distress.   Eyes:      Extraocular Movements: Extraocular movements intact.      Pupils: Pupils are equal, round, and reactive to light.   HENT:      Head: Normocephalic and atraumatic.    Mouth/Throat:      Pharynx: Oropharynx is clear.   Neck:      Vascular: JVD normal.      Trachea: Trachea normal.   Pulmonary:      Effort: Pulmonary effort is normal.      Breath sounds: Normal breath sounds. No wheezing. No rhonchi. No rales.   Cardiovascular:      PMI at left midclavicular line. Normal rate. Regular rhythm. Normal S1. Normal S2.       Murmurs: There is a grade 2/6 systolic murmur.      No gallop.  No click. No rub.   Pulses:     Dorsalis pedis: 2+ bilaterally.     Posterior tibial: 2+ bilaterally.  Abdominal:      General: Bowel sounds are normal.      Palpations: Abdomen is soft.      Tenderness: There is no abdominal tenderness.   Musculoskeletal: Normal range of motion.      Cervical back: Normal range of motion and neck supple. Skin:     General: Skin is warm and dry.      Capillary Refill: Capillary refill takes less than 2 seconds.   Feet:      Right foot:      Skin integrity: Skin  integrity normal.      Left foot:      Skin integrity: Skin integrity normal.   Neurological:      Mental Status: Alert and oriented to person, place and time.      Sensory: Sensation is intact.      Motor: Motor function is intact.      Coordination: Coordination is intact.   Psychiatric:         Speech: Speech normal.         Behavior: Behavior is cooperative.       Physical Exam  Heart was examined.    Lab Review:       Procedures  Results  Imaging  Echocardiogram done in January showed ejection fraction was 41-45, grade 1 diastolic, no significant valve disease. Heart catheterization done last year showed no change from 3 years prior. Abdominal aorta ultrasound in 2021 showed mild atherosclerosis.    Testing  Monitor worn 3 years ago showed lot of SVT, lot of PACs.    Assessment/Plan:     Problem List Items Addressed This Visit       Mild tricuspid insufficiency    High cholesterol    LBBB (left bundle branch block)    Overview   Added automatically from request for surgery [id number]         Accelerated hypertension with diastolic congestive heart failure, NYHA class 3 - Primary    Paroxysmal A-fib    Chronic heart failure with preserved ejection fraction (HFpEF)     Assessment & Plan  1. Dyspnea.  Her dyspnea appears to be slightly worsening, especially during activities such as walking and climbing stairs. She reports experiencing tightness in her chest while sitting, though it is not severe. Her blood pressure readings have been fluctuating, with occasional spikes to 170/60 mmHg at night, which later decrease to 150 or 140 mmHg.    2. Atrial Fibrillation.  She is currently on Eliquis and flecainide for atrial fibrillation. She reports feeling well on these medications. No changes to her medication regimen are necessary at this time.    3. Hyperlipidemia.  She is on Repatha for cholesterol management. No changes to her medication regimen are necessary at this time.    4. Vitamin D Supplementation.  She is taking  6000 units of vitamin D daily (5000 units of vitamin D3 and 1000 units from Centrum Silver for women). This dosage is considered safe and should not cause any issues.    5. Mild Atherosclerosis.  An ultrasound of her abdominal aorta in 2021 showed mild atherosclerosis. No changes to her current management plan are necessary.    Follow-up  The patient will follow up in 6 months.      Recommendations/plans:    Transcribed from ambient dictation for Mak Nickerson DO by Mak Nickerson DO.  03/16/25   17:57 CDT    Patient or patient representative verbalized consent for the use of Ambient Listening during the visit with  Mak Nickerson DO for chart documentation. 3/16/2025  17:57 CDT

## 2025-08-15 ENCOUNTER — TELEPHONE (OUTPATIENT)
Dept: CARDIOLOGY | Facility: CLINIC | Age: OVER 89
End: 2025-08-15
Payer: MEDICARE

## (undated) DEVICE — PENCL E/S PLUMEPEN 9.5MM 10FT LF

## (undated) DEVICE — CATH F5 INF PIG145 110CM 6SH: Brand: INFINITI

## (undated) DEVICE — TOOL INSRT GW MTL OR PLSTC

## (undated) DEVICE — Device

## (undated) DEVICE — RECIPROCATING BLADE HEAVY DUTY LONG, OFFSET  (77.6 X 0.77 X 11.2MM)

## (undated) DEVICE — SUT GUT CHRM 2/0 CT1 36IN 923H

## (undated) DEVICE — TR BAND RADIAL ARTERY COMPRESSION DEVICE: Brand: TR BAND

## (undated) DEVICE — GLV SURG TRIUMPH MICRO PF LTX 8.5 STRL

## (undated) DEVICE — SOL IRR NACL 0.9PCT BT 1000ML

## (undated) DEVICE — SUT ETHLN 2/0 FSLX 30IN 1674H

## (undated) DEVICE — SNAP KOVER: Brand: UNBRANDED

## (undated) DEVICE — DRSNG SURESITE WNDW 4X4.5

## (undated) DEVICE — SUP ARMBRD ART/LINE BLU

## (undated) DEVICE — Device: Brand: MEDEX

## (undated) DEVICE — 6F .070 XB 3.5 100CM: Brand: VISTA BRITE TIP

## (undated) DEVICE — GLIDESHEATH SLENDER STAINLESS STEEL KIT: Brand: GLIDESHEATH SLENDER

## (undated) DEVICE — GW STARTER FXD CORE J .035 3X260CM 3MM

## (undated) DEVICE — PK CATH CARD 30 CA/4

## (undated) DEVICE — SOLIDIFIER LIQUI LOC PLUS 2000CC

## (undated) DEVICE — CATH F5 INF JL 3.5 100CM: Brand: INFINITI

## (undated) DEVICE — ANTIBACTERIAL UNDYED BRAIDED (POLYGLACTIN 910), SYNTHETIC ABSORBABLE SUTURE: Brand: COATED VICRYL

## (undated) DEVICE — A2000 MULTI-USE SYRINGE KIT, P/N 701277-003KIT CONTENTS: 100ML CONTRAST RESERVOIR AND TUBING WITH CONTRAST SPIKE AND CLAMP: Brand: A2000 MULTI-USE SYRINGE KIT

## (undated) DEVICE — GW PRESSUREWIRE X WIRELESS FFR 175CM

## (undated) DEVICE — KT NDL GUIDE STRL 18GA

## (undated) DEVICE — FOAM BUMP ROUND LARGE: Brand: MEDLINE INDUSTRIES, INC.

## (undated) DEVICE — DRAPE,ANGIO,BRACH,STERILE,38X44: Brand: MEDLINE

## (undated) DEVICE — MODEL AT P65, P/N 701554-001KIT CONTENTS: HAND CONTROLLER, 3-WAY HIGH-PRESSURE STOPCOCK WITH ROTATING END AND PREMIUM HIGH-PRESSURE TUBING: Brand: ANGIOTOUCH® KIT

## (undated) DEVICE — RECIPROCATING BLADE, DOUBLE SIDED, OFFSET  (70.0 X 1.0 X 12.5MM)

## (undated) DEVICE — DUAL CUT SAGITTAL BLADE

## (undated) DEVICE — ELECTRD PAD DEFIB A/

## (undated) DEVICE — 4-PORT MANIFOLD: Brand: NEPTUNE 2

## (undated) DEVICE — BIPOLAR SEALER 23-112-1 AQM 6.0: Brand: AQUAMANTYS™

## (undated) DEVICE — SHORT LENS-STERILE

## (undated) DEVICE — DISPOSABLE TOURNIQUET CUFF SINGLE BLADDER, SINGLE PORT AND QUICK CONNECT CONNECTOR: Brand: COLOR CUFF

## (undated) DEVICE — CONCISE CEMENT SCULPS KIT: Brand: CONCISE

## (undated) DEVICE — GLV SURG DERMASSURE GRN LF PF 8.0

## (undated) DEVICE — PAD UNDERCAST WYTEX 6IN 4YD LF STRL

## (undated) DEVICE — CEMENT MIXING SYSTEM WITH FEMORAL BREAKWAY NOZZLE: Brand: REVOLUTION

## (undated) DEVICE — GLV SURG TRIUMPH PF LTX 7.5 STRL

## (undated) DEVICE — VAGINAL PREP TRAY: Brand: MEDLINE INDUSTRIES, INC.

## (undated) DEVICE — TIBURON BRACHIAL ANGIOGRAPHY DRAPE: Brand: CONVERTORS

## (undated) DEVICE — SOL NACL 0.9PCT 100ML SGL

## (undated) DEVICE — CVR BRD ARM 13X30

## (undated) DEVICE — CANN CO2/O2 NASL A/

## (undated) DEVICE — DRSNG WND GZ CURAD OIL EMULSION 3X8IN STRL PK/3

## (undated) DEVICE — PK TURNOVER RM ADV

## (undated) DEVICE — COPILOT BLEEDBACK CONTROL VALVE: Brand: COPILOT

## (undated) DEVICE — RADIFOCUS OPTITORQUE ANGIOGRAPHIC CATHETER: Brand: OPTITORQUE

## (undated) DEVICE — PAD, DEFIB, ADULT, RADIOTRANS, PHYSIO: Brand: MEDLINE

## (undated) DEVICE — PK KN TOTL 30

## (undated) DEVICE — ST PRIM PUMP 20DRP 3VLV 127IN

## (undated) DEVICE — CANN NASL ETCO2 LO/FLO A/

## (undated) DEVICE — KT DRN EVAC WND PVC PCH WTROC RND 10F400

## (undated) DEVICE — CLTH CLENS READYCLEANSE PERI CARE PK/5

## (undated) DEVICE — SUT ETHLN 3/0 PSLX 30IN 1683H

## (undated) DEVICE — SOLIDIFIER LIQ LIQUILOC/PLUS W/TREAT 2000CC